# Patient Record
Sex: MALE | Race: WHITE | NOT HISPANIC OR LATINO | Employment: FULL TIME | URBAN - METROPOLITAN AREA
[De-identification: names, ages, dates, MRNs, and addresses within clinical notes are randomized per-mention and may not be internally consistent; named-entity substitution may affect disease eponyms.]

---

## 2017-02-02 ENCOUNTER — LAB (OUTPATIENT)
Dept: LAB | Facility: HOSPITAL | Age: 72
End: 2017-02-02

## 2017-02-02 ENCOUNTER — OFFICE VISIT (OUTPATIENT)
Dept: ONCOLOGY | Facility: CLINIC | Age: 72
End: 2017-02-02

## 2017-02-02 VITALS
HEIGHT: 69 IN | TEMPERATURE: 98 F | SYSTOLIC BLOOD PRESSURE: 192 MMHG | HEART RATE: 67 BPM | BODY MASS INDEX: 25.45 KG/M2 | WEIGHT: 171.8 LBS | DIASTOLIC BLOOD PRESSURE: 98 MMHG | OXYGEN SATURATION: 98 % | RESPIRATION RATE: 14 BRPM

## 2017-02-02 DIAGNOSIS — C91.40: ICD-10-CM

## 2017-02-02 DIAGNOSIS — C91.41 HAIRY CELL LEUKEMIA, IN REMISSION (HCC): Primary | ICD-10-CM

## 2017-02-02 LAB
BASOPHILS # BLD AUTO: 0.01 10*3/MM3 (ref 0–0.1)
BASOPHILS NFR BLD AUTO: 0.2 % (ref 0–1.1)
DEPRECATED RDW RBC AUTO: 39.8 FL (ref 37–49)
EOSINOPHIL # BLD AUTO: 0.05 10*3/MM3 (ref 0–0.36)
EOSINOPHIL NFR BLD AUTO: 0.9 % (ref 1–5)
ERYTHROCYTE [DISTWIDTH] IN BLOOD BY AUTOMATED COUNT: 12.1 % (ref 11.7–14.5)
HCT VFR BLD AUTO: 40.5 % (ref 40–49)
HGB BLD-MCNC: 14.8 G/DL (ref 13.5–16.5)
IMM GRANULOCYTES # BLD: 0.04 10*3/MM3 (ref 0–0.03)
IMM GRANULOCYTES NFR BLD: 0.7 % (ref 0–0.5)
LYMPHOCYTES # BLD AUTO: 2.1 10*3/MM3 (ref 1–3.5)
LYMPHOCYTES NFR BLD AUTO: 37.8 % (ref 20–49)
MCH RBC QN AUTO: 33.1 PG (ref 27–33)
MCHC RBC AUTO-ENTMCNC: 36.5 G/DL (ref 32–35)
MCV RBC AUTO: 90.6 FL (ref 83–97)
MONOCYTES # BLD AUTO: 0.38 10*3/MM3 (ref 0.25–0.8)
MONOCYTES NFR BLD AUTO: 6.8 % (ref 4–12)
NEUTROPHILS # BLD AUTO: 2.97 10*3/MM3 (ref 1.5–7)
NEUTROPHILS NFR BLD AUTO: 53.6 % (ref 39–75)
NRBC BLD MANUAL-RTO: 0 /100 WBC (ref 0–0)
PLATELET # BLD AUTO: 104 10*3/MM3 (ref 150–375)
PMV BLD AUTO: 11.9 FL (ref 8.9–12.1)
RBC # BLD AUTO: 4.47 10*6/MM3 (ref 4.3–5.5)
WBC NRBC COR # BLD: 5.55 10*3/MM3 (ref 4–10)

## 2017-02-02 PROCEDURE — 85025 COMPLETE CBC W/AUTO DIFF WBC: CPT

## 2017-02-02 PROCEDURE — 36416 COLLJ CAPILLARY BLOOD SPEC: CPT

## 2017-02-02 PROCEDURE — 99213 OFFICE O/P EST LOW 20 MIN: CPT | Performed by: INTERNAL MEDICINE

## 2017-02-02 NOTE — PROGRESS NOTES
Subjective .  Patient generally feels well, describes recent prostate biopsy that was negative though with subsequent bleeding post procedure that has abated.    REASONS FOR FOLLOWUP:   1. Hairy cell leukemia.   2. Admission 05/11/2012 through 05/17/12 for 7 day continuous infusion cladribine (2-CdA) without complication.   3. Patient seen 5 months post treatment in complete remission.   4. Patient seen 8 months post treatment with continued complete remission, every 3 month reassessment per CBC planned, 6 month review by MD scheduled.   5. Patient seen at 15 months without evidence of recurrent disease, every 6 month followup planned.   6. The patient was seen 02/05/2014 with no evidence of recurrent disease, 6-month followup planned.   7. Patient seen 07/31/2014, stable with no evidence of recurrent disease, every 6 month followup.   8. Patient seen 01/15/2015, stable without recurrent disease, 6-month CBC planned, yearly followup scheduled.   9. Patient seen 02/04/2016, stable, ?early peripheral neuropathy developing distally per feet, no evidence for recurrent disease per hairy cell leukemia.  10. Patient reviewed September 02, 2016, previous July CBC with mild thrombocytopenia developing, recheck September 2 normalized, every 6 month follow-ups anticipated  11. Patient reviewed February 02, 2017, status post negative prostate biopsy, mild thrombocytopenia again recognized        HISTORY OF PRESENT ILLNESS:  The patient is a 71 y.o. year old male who is here for follow-up with the above-mentioned history.    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 He is usually followed by Dr. Vuong for a history of hypertension, hyperlipidemia, minimal right carotid plaque as well as elevated PSA.       He had exam done on 1-09-12 for routine followup. This included normal serum chemistry including LFTs, cholesterol  131, triglyceride 34, HDL 51, and LDL 73. CBC revealed H&H 13.3, 37.9%, WBC 3300, platelet count 110,000, 26% polys and 74% lymphs with A NC of 0.9.       Dr. Remy provides information today with studies from as far back as 1- at which time hemoglobin and hematocrit was 14.4 and 42.1%, WBC 6950, platelets 227,000 with 47% polys, 42% lymph; this was automated. The additional test includ es approximately every year to every other year thereafter though it was noted in January 2010 WBC dropped from an average of approximately 5,000-6,000 to 3600. Hemoglobin and hematocrit 13.6 and 39.7%. Platelets 150,000-170,000 with ANC beginning in Ja nuary 2010 at 1900.       We were contacted per the above findings and asked Dr. Remy to undergo further assessment including flow cytometric exam per peripheral blood. This revealed no evidence of abnormal myeloid maturation increased blast population. No evidence of lymphopro l iferative disorder. The patient was asked to be seen formally in the office now and comes in today for further assessment. Dr. Remy indicates that he has taken a number of medications in an attempt to improve his health. Several homeopathic medication s include vitamin C, folate, flaxseed oil, fish oil, vitamin E, selenium, pomegranate tabs, Co-enzyme Q, vitamin D, niacin, and red yeast rice daily. He has discontinued these and stays on those described below.       He feels well with no additional problem s and states that he has no little or no symptoms that he can detail, whether this is just ill health in anyway, though he does have occasional hemorrhoidal pain. He also notes rare palpitations and occasional epistaxis when the ambient air has dried sub stantially. No fevers, chills, weight loss, night sweats, or other constitutional symptoms.       As a result of the above, the patient was asked to undergo a series of studies. This went onto include rheumatoid factor of 6, MARYURI screen negative,  negative s misael protein electrophoresis, normal quantitative immunoglobulins, CMV IgG of 2.9, IgM less than .9, EBV IgM of less than .9 and BCA IgG antibody of 4.6. These are consistent with previous exposures. HIV was negative, CRSP less than .1, sedimentation ra t e of 4, iron 125, TIBC 297, 14% saturation, and ferritin of 77. Peripheral blood flow cytometry was negative for evidence of abnormal myeloid maturation, increased blast population or lymphoproliferative disorder. As the patient is seen back today, he i s continuing to feel well though is obviously greatly concerned about his followup counts. Reviews in the office had been planned to follow him briefly recheck performance 3/29/12 with an H&H of 12.9, 36.1, WBC 3900, 32 polys, 62 lymphs, platelet count 94 , 000, IPF slightly elevated at 8.8. The patient was therefore asked to return and to be further assessed with bone marrow aspirate and biopsy. He now presents to do so. He has had no additional symptoms since last seen. He has discontinued his Crestor use.       As a result of his review the patient underwent bone marrow aspiration and biopsy. This revealed evidence of lymphocytic infiltrate reviewed here in the office. 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 Eventually cytogenetics was found to be negative with no evidence of cyclin-D1/IgH or BCL-2/IgH rearrangement. Additional tissue was requested. As a result, the patient was notifi e d of these findings by personal visit, and plans were made to proceed with endoscopy, ? mantle cell involvement. At the time of this dictation, this was not yet performed. He was also asked to undergo CT of the neck, chest, abdomen and pelvis which show ed no evidence of abnormality. PET scan 04/13/12 showed increased activity within the marrow, but no additional abnormalities including the spleen, with  no background bowel or gastric activity as well.       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 hairy cell leukemia. This has been discussed in great detail with the alexander thakur at [employer] and additional stains are pending as is the patient's GI assessment now to be pursued through Dr. Ray.       His case was again reviewed over quite a period of time, and ultimately his marrow was signed out as 80% involved with hairy cell leukemia - normal cytogenetics, no evidence of cyclin D1/IgH or BCL-2/IgH rearrangement. Dr. Remy was advised of the treatment options which include followup with no immediate therapy, and/or the use of cladribine or pentostatin. After numerous di s cussion, he ultimately elected to try to proceed with treatment when he is feeling as well as he is to improve his ability to withstand the therapy itself. We therefore began to discuss the treatment schedule, which also could be somewhat variable, inclu d ing 7-day infusions, 2-hr infusion q.d. x five days or every other week infusion. After discussion he wishes to proceed with 7-day infusion. When we attempted to do this as an outpatient we found out thereafter that this would not be covered as an outpa tient infusion through his insurance. Therefore we have made plans for him to be admitted after he is seen in office 05/10/12.       The patient was thereafter admitted 05/11-05/17/12. PICC line was placed and he initiated treatment at 0.1 mg/kg or 7.7 mg IV in fusion over seven days. The patient fortunately had no serious issues at all during the seven days and continued to work out on a daily basis. He was seen by his internist as well with some of his meds adjusted including his HCTZ and Ziac. He was stabl e at discharge, but was given Neulasta 6 mg  subcutaneously 24 hours after he completed treatment. He has been having counts done on a regular basis, and returns back today with only minimal evidence of neutropenia at his becca, and has an excellent periph eral smear today - normal findings. His performance status remains excellent as well.       The patient when seen on 6/7/12 was felt to have improved substantially. We followed him for weekly counts and plans at that point were for him to take a trip out of the country. He did actually take this trip, which had him eventually hiking at 11-12,000 feet without any complication or ill effect. As he returns back today on 7/19 he feels well and again with an excellent performance status. He has had no fever, c hills or suggestion of infection or complications thus far. He remains again, with an excellent performance status.       The patient thereafter is asked to have his counts done on an every six weeks basis, now seen three months later with a normal CBC as well as examination. He again feels quite well with a completely normal performance status.       The patient was asked to return for followup studies that included normal serum chemistries, unchanged lipid profile and stable hematologic findings. He is now seen on 2/7/13, 4 months from his last appointment. He continues to do well with unchanged performance status.       The patient was asked to have counts done q3 months seeing the physician in six months. He is now seen back 08/22/2013 feeling well [place]. He had no additional fevers, sweats, chills, significant change in weight although he has tried to lose some additional pounds and we see this today. He continues to exercise regularl y and dietary program. Review of his smears again continues to demonstrate a complete remission.       Today, the patient was asked to be seen back in 6 months for followup, and is now seen  02/05/2014, continuing to feel well. Again, review of his smears and physical examination fortunately demonstrates no evidence of recurrent disease.       The patient was asked to be seen back in 6 months for followup and is now seen on 07/31/2014. Again he feels well with an excellent performance status and no change since last reviewed.       The patient thereafter was now seen back 2015, 2016 stable at both visits with no evidence of recurrence of his hairy cell leukemia. He feels well but when now reviewed 02/04/2016, he has had some degree of peripheral neuropathy? This improv es with activity and it is certainly not limiting his action or his function.   Patient now reviewed back again September 02, 2016, continues to have an excellent performance status and hematologically remain stable brother had some concern about thrombocytopenia last visit.    The patient is next seen February 02, 2017.  He has not had any medical issues since last seen and remains physically quite well.  He does describe following with urology and undergoing a repeat biopsy recently when his PSA was above 7.  His findings evidently were negative for malignancy though he did have post procedure hematuria.  This is also now abated.    Past Medical History   Diagnosis Date   • H/O Elevated PSA    • H/O Internal thrombosed hemorrhoids    • H/O minimal right carotid plaque    • H/O peripheral neuropathy    • Hairy cell leukemia      H/O   • Hyperlipidemia    • Hypertension        ONCOLOGIC HISTORY:  (History from previous dates can be found in the separate document.)    Current Outpatient Prescriptions on File Prior to Visit   Medication Sig Dispense Refill   • acyclovir (ZOVIRAX) 400 MG tablet Take 1 tablet by mouth 2 (two) times a day. Take no more than 5 doses a day. 60 tablet 2   • propafenone (RHTHYMOL) 150 MG tablet Take 1 tablet by mouth 3 (three) times a day.     • [DISCONTINUED] acyclovir (ZOVIRAX) 400 MG tablet TAKE ONE TABLET BY  "MOUTH TWICE A DAY 60 tablet 1     No current facility-administered medications on file prior to visit.        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   • Alcohol use Yes      Comment: 1 glass of wine or beer per day   • Drug use: Not on file   • Sexual activity: Not on file     Other Topics Concern   • Not on file     Social History Narrative         Cancer-related family history includes Cancer in his father; Cancer (age of onset: 90) in his mother.     Review of Systems  A comprehensive 14 point review of systems was performed and was negative except as mentioned.    Objective      Vitals:    02/02/17 1241   BP: (!) 192/98   Pulse: 67   Resp: 14   Temp: 98 °F (36.7 °C)   TempSrc: Oral   SpO2: 98%   Weight: 171 lb 12.8 oz (77.9 kg)   Height: 69.09\" (175.5 cm)   PainSc: 0-No pain     Current Status 2/2/2017   ECOG score 0       Physical Exam      GENERAL: Well-developed, well-nourished male in no acute distress.   SKIN: Warm, dry without rashes, purpura or petechiae.   HEAD: Normocephalic.   EYES: Pupils equal, round and reactive to light. EOMs intact. Conjunctivae normal.   EARS: Hearing intact.   NOSE: Septum midline. No excoriations or nasal discharge.   MOUTH: Tongue is well-papillated; no stomatitis or ulcers. Lips normal.   THROAT: Oropharynx without lesions or exudates.   NECK: Supple with good range of motion; no thyromegaly or masses, no JVD or bruits.   LYMPHATICS: No cervical, supraclavicular, axillary or inguinal adenopathy.   CHEST: Lungs clear to percussion and auscultation.   CARDIAC: Regular rate and rhythm without murmurs, rubs or gallops. "   ABDOMEN: Soft, nontender with no organomegaly or masses.   EXTREMITIES: No clubbing, cyanosis or edema.   NEUROLOGICAL: No focal neurological deficits.       RECENT LABS:  Hematology WBC   Date Value Ref Range Status   02/02/2017 5.55 4.00 - 10.00 10*3/mm3 Final     RBC   Date Value Ref Range Status   02/02/2017 4.47 4.30 - 5.50 10*6/mm3 Final     HEMOGLOBIN   Date Value Ref Range Status   02/02/2017 14.8 13.5 - 16.5 g/dL Final     HEMATOCRIT   Date Value Ref Range Status   02/02/2017 40.5 40.0 - 49.0 % Final     PLATELETS   Date Value Ref Range Status   02/02/2017 104 (L) 150 - 375 10*3/mm3 Final        Assessment/Plan     A 71-year-old male with the diagnosis of hairy cell leukemia in April 2012. He was treated 5/11-5/17/ 12 with cladribine being given as continuous infusion at 0.1 mg/kg or 7.7 mg/24-hours x7 days. He did well during hospitalization with little toxicity and minimal myelosuppression. He has gone onto obtain a complete remission and this continues to be th e case at a 6 month visit now 07/31/2014. The patient has been now assessed on a regular basis every 6 months with no evidence of hematologic deterioration. As he is seen today, 02/04/2016, and now again September 02, 2016 he remains clinically stable as well.      He is now assessed February 2 and overall doing well without any additional issues except for the prostate described as above.  He is now recognized there is also possibly hypertensive and I'll contact him about this is afternoon though during our conversation he had few if any symptoms.?.  Pending our review will ask him to have a repeat CBC at 3 months to see that physician in 6 months.              The patient indicates when called about his blood pressure that he often experiences white coat hypertension.  He is going to recheck his blood pressure night and notify me if it remains elevated.        Cc:  Angel Remy MD

## 2017-03-31 ENCOUNTER — TRANSCRIBE ORDERS (OUTPATIENT)
Dept: ADMINISTRATIVE | Facility: HOSPITAL | Age: 72
End: 2017-03-31

## 2017-03-31 DIAGNOSIS — I10 ESSENTIAL HYPERTENSION: Primary | ICD-10-CM

## 2017-04-06 ENCOUNTER — HOSPITAL ENCOUNTER (OUTPATIENT)
Dept: CARDIOLOGY | Facility: HOSPITAL | Age: 72
Discharge: HOME | End: 2017-04-06
Attending: INTERNAL MEDICINE | Admitting: INTERNAL MEDICINE

## 2017-04-06 DIAGNOSIS — I10 ESSENTIAL HYPERTENSION: ICD-10-CM

## 2017-04-06 LAB
BH CV ECHO MEAS - DIST REN A EDV LEFT: 23.3 CM/SEC
BH CV ECHO MEAS - DIST REN A PSV LEFT: 80.4 CM/SEC
BH CV ECHO MEAS - DIST REN A RI LEFT: 0.71
BH CV ECHO MEAS - MID REN A EDV LEFT: 29.8 CM/SEC
BH CV ECHO MEAS - MID REN A PSV LEFT: 123 CM/SEC
BH CV ECHO MEAS - MID REN A RI LEFT: 0.76
BH CV ECHO MEAS - PROX REN A EDV LEFT: 40.9 CM/SEC
BH CV ECHO MEAS - PROX REN A PSV LEFT: 177 CM/SEC
BH CV ECHO MEAS - PROX REN A RI LEFT: 0.78
BH CV VAS RENAL AORTIC MID EDV: 15.5 CM/S
BH CV VAS RENAL AORTIC MID PSV: 101 CM/S
BH CV VAS RENAL HILUM LEFT EDV: 17.7 CM/S
BH CV VAS RENAL HILUM LEFT PSV: 66 CM/S
BH CV VAS RENAL HILUM RIGHT EDV: 16.5 CM/S
BH CV VAS RENAL HILUM RIGHT PSV: 58.1 CM/S
BH CV XLRA MEAS - KID L LEFT: 10.7 CM
BH CV XLRA MEAS DIST REN A EDV RIGHT: 31 CM/SEC
BH CV XLRA MEAS DIST REN A PSV RIGHT: 126 CM/SEC
BH CV XLRA MEAS DIST REN A RI RIGHT: 0.75
BH CV XLRA MEAS KID L RIGHT: 9.71 CM
BH CV XLRA MEAS KID W RIGHT: 4.54 CM
BH CV XLRA MEAS MID REN A EDV RIGHT: 27.9 CM/SEC
BH CV XLRA MEAS MID REN A PSV RIGHT: 108 CM/SEC
BH CV XLRA MEAS MID REN A RI RIGHT: 0.74
BH CV XLRA MEAS PROX REN A EDV RIGHT: 25.2 CM/SEC
BH CV XLRA MEAS PROX REN A PSV RIGHT: 110 CM/SEC
BH CV XLRA MEAS PROX REN A RI RIGHT: 0.77
BH CV XLRA MEAS RAR LEFT: 1.75
BH CV XLRA MEAS RAR RIGHT: 1.24
BH CV XLRA MEAS RENAL A ORG EDV RIGHT: 19.4 CM/S
BH CV XLRA MEAS RENAL A ORG PSV RIGHT: 74.3 CM/S
LEFT KIDNEY WIDTH: 5.58 CM
LEFT RENAL UPPER PARENCHYMA MAX: 21.7 CM/S
LEFT RENAL UPPER PARENCHYMA MIN: 7.33 CM/S
LEFT RENAL UPPER PARENCHYMA RI: 0.66
RIGHT RENAL UPPER PARENCHYMA MAX: 25.4 CM/S
RIGHT RENAL UPPER PARENCHYMA MIN: 6.11 CM/S
RIGHT RENAL UPPER PARENCHYMA RI: 0.76

## 2017-04-06 PROCEDURE — 93975 VASCULAR STUDY: CPT

## 2017-04-27 ENCOUNTER — LAB (OUTPATIENT)
Dept: LAB | Facility: HOSPITAL | Age: 72
End: 2017-04-27

## 2017-04-27 ENCOUNTER — CLINICAL SUPPORT (OUTPATIENT)
Dept: ONCOLOGY | Facility: HOSPITAL | Age: 72
End: 2017-04-27

## 2017-04-27 DIAGNOSIS — C91.41 HAIRY CELL LEUKEMIA, IN REMISSION (HCC): ICD-10-CM

## 2017-04-27 LAB
BASOPHILS # BLD AUTO: 0.01 10*3/MM3 (ref 0–0.1)
BASOPHILS NFR BLD AUTO: 0.2 % (ref 0–1.1)
DEPRECATED RDW RBC AUTO: 40.4 FL (ref 37–49)
EOSINOPHIL # BLD AUTO: 0.05 10*3/MM3 (ref 0–0.36)
EOSINOPHIL NFR BLD AUTO: 1 % (ref 1–5)
ERYTHROCYTE [DISTWIDTH] IN BLOOD BY AUTOMATED COUNT: 12 % (ref 11.7–14.5)
HCT VFR BLD AUTO: 40.2 % (ref 40–49)
HGB BLD-MCNC: 14.6 G/DL (ref 13.5–16.5)
IMM GRANULOCYTES # BLD: 0.02 10*3/MM3 (ref 0–0.03)
IMM GRANULOCYTES NFR BLD: 0.4 % (ref 0–0.5)
LYMPHOCYTES # BLD AUTO: 2.11 10*3/MM3 (ref 1–3.5)
LYMPHOCYTES NFR BLD AUTO: 40.6 % (ref 20–49)
MCH RBC QN AUTO: 33.3 PG (ref 27–33)
MCHC RBC AUTO-ENTMCNC: 36.3 G/DL (ref 32–35)
MCV RBC AUTO: 91.6 FL (ref 83–97)
MONOCYTES # BLD AUTO: 0.39 10*3/MM3 (ref 0.25–0.8)
MONOCYTES NFR BLD AUTO: 7.5 % (ref 4–12)
NEUTROPHILS # BLD AUTO: 2.62 10*3/MM3 (ref 1.5–7)
NEUTROPHILS NFR BLD AUTO: 50.3 % (ref 39–75)
NRBC BLD MANUAL-RTO: 0 /100 WBC (ref 0–0)
PLATELET # BLD AUTO: 167 10*3/MM3 (ref 150–375)
PMV BLD AUTO: 10.5 FL (ref 8.9–12.1)
RBC # BLD AUTO: 4.39 10*6/MM3 (ref 4.3–5.5)
WBC NRBC COR # BLD: 5.2 10*3/MM3 (ref 4–10)

## 2017-04-27 PROCEDURE — 85025 COMPLETE CBC W/AUTO DIFF WBC: CPT

## 2017-04-27 PROCEDURE — 36416 COLLJ CAPILLARY BLOOD SPEC: CPT

## 2017-04-27 NOTE — PROGRESS NOTES
CALL PLACED TO PATIENT.  PATIENT DID NOT STAY FOR RN REVIEW.  INFORMED THAT HIS CBC VALUES WERE ALL WNL.  EACH COMPONENT REVIEWED.  PATIENT APPRECIATIVE OF CALL AND WAS INSTRUCTED TO KEEP APPT IN 3 MONTHS WITH MD AND TO CALL FOR NEW QUESTIONS OR CONCERNS.

## 2017-07-28 ENCOUNTER — OFFICE VISIT (OUTPATIENT)
Dept: ONCOLOGY | Facility: CLINIC | Age: 72
End: 2017-07-28

## 2017-07-28 ENCOUNTER — LAB (OUTPATIENT)
Dept: LAB | Facility: HOSPITAL | Age: 72
End: 2017-07-28

## 2017-07-28 VITALS
HEART RATE: 67 BPM | BODY MASS INDEX: 25.18 KG/M2 | OXYGEN SATURATION: 99 % | SYSTOLIC BLOOD PRESSURE: 164 MMHG | WEIGHT: 170 LBS | HEIGHT: 69 IN | RESPIRATION RATE: 12 BRPM | DIASTOLIC BLOOD PRESSURE: 78 MMHG | TEMPERATURE: 98.2 F

## 2017-07-28 DIAGNOSIS — C91.41 HAIRY CELL LEUKEMIA, IN REMISSION (HCC): ICD-10-CM

## 2017-07-28 DIAGNOSIS — C91.41 HAIRY CELL LEUKEMIA, IN REMISSION (HCC): Primary | ICD-10-CM

## 2017-07-28 LAB
BASOPHILS # BLD AUTO: 0.02 10*3/MM3 (ref 0–0.1)
BASOPHILS NFR BLD AUTO: 0.4 % (ref 0–1.1)
DEPRECATED RDW RBC AUTO: 41.6 FL (ref 37–49)
EOSINOPHIL # BLD AUTO: 0.13 10*3/MM3 (ref 0–0.36)
EOSINOPHIL NFR BLD AUTO: 2.3 % (ref 1–5)
ERYTHROCYTE [DISTWIDTH] IN BLOOD BY AUTOMATED COUNT: 12.2 % (ref 11.7–14.5)
HCT VFR BLD AUTO: 41.2 % (ref 40–49)
HGB BLD-MCNC: 15 G/DL (ref 13.5–16.5)
IMM GRANULOCYTES # BLD: 0.03 10*3/MM3 (ref 0–0.03)
IMM GRANULOCYTES NFR BLD: 0.5 % (ref 0–0.5)
LYMPHOCYTES # BLD AUTO: 2.2 10*3/MM3 (ref 1–3.5)
LYMPHOCYTES NFR BLD AUTO: 39.1 % (ref 20–49)
MCH RBC QN AUTO: 33.7 PG (ref 27–33)
MCHC RBC AUTO-ENTMCNC: 36.4 G/DL (ref 32–35)
MCV RBC AUTO: 92.6 FL (ref 83–97)
MONOCYTES # BLD AUTO: 0.4 10*3/MM3 (ref 0.25–0.8)
MONOCYTES NFR BLD AUTO: 7.1 % (ref 4–12)
NEUTROPHILS # BLD AUTO: 2.84 10*3/MM3 (ref 1.5–7)
NEUTROPHILS NFR BLD AUTO: 50.6 % (ref 39–75)
NRBC BLD MANUAL-RTO: 0 /100 WBC (ref 0–0)
PLATELET # BLD AUTO: 125 10*3/MM3 (ref 150–375)
PMV BLD AUTO: 11 FL (ref 8.9–12.1)
RBC # BLD AUTO: 4.45 10*6/MM3 (ref 4.3–5.5)
WBC NRBC COR # BLD: 5.62 10*3/MM3 (ref 4–10)

## 2017-07-28 PROCEDURE — 36416 COLLJ CAPILLARY BLOOD SPEC: CPT | Performed by: INTERNAL MEDICINE

## 2017-07-28 PROCEDURE — 99213 OFFICE O/P EST LOW 20 MIN: CPT | Performed by: INTERNAL MEDICINE

## 2017-07-28 PROCEDURE — 85025 COMPLETE CBC W/AUTO DIFF WBC: CPT | Performed by: INTERNAL MEDICINE

## 2017-07-28 RX ORDER — BISOPROLOL FUMARATE AND HYDROCHLOROTHIAZIDE 10; 6.25 MG/1; MG/1
1 TABLET ORAL DAILY
COMMUNITY

## 2017-07-28 RX ORDER — AMLODIPINE BESYLATE 10 MG/1
10 TABLET ORAL DAILY
COMMUNITY
End: 2021-01-21

## 2017-07-28 RX ORDER — ASPIRIN 81 MG/1
81 TABLET, CHEWABLE ORAL DAILY
COMMUNITY
End: 2022-06-23 | Stop reason: HOSPADM

## 2017-07-28 RX ORDER — ROSUVASTATIN CALCIUM 10 MG/1
10 TABLET, COATED ORAL DAILY
COMMUNITY

## 2017-07-28 NOTE — PROGRESS NOTES
Subjective .  Patient generally feels well    REASONS FOR FOLLOWUP:   1. Hairy cell leukemia.   2. Admission 05/11/2012 through 05/17/12 for 7 day continuous infusion cladribine (2-CdA) without complication.   3. Patient seen 5 months post treatment in complete remission.   4. Patient seen 8 months post treatment with continued complete remission, every 3 month reassessment per CBC planned, 6 month review by MD scheduled.   5. Patient seen at 15 months without evidence of recurrent disease, every 6 month followup planned.   6. The patient was seen 02/05/2014 with no evidence of recurrent disease, 6-month followup planned.   7. Patient seen 07/31/2014, stable with no evidence of recurrent disease, every 6 month followup.   8. Patient seen 01/15/2015, stable without recurrent disease, 6-month CBC planned, yearly followup scheduled.   9. Patient seen 02/04/2016, stable, ?early peripheral neuropathy developing distally per feet, no evidence for recurrent disease per hairy cell leukemia.  10. Patient reviewed September 02, 2016, previous July CBC with mild thrombocytopenia developing, recheck September 2 normalized, every 6 month follow-ups anticipated  11. Patient reviewed February 02, 2017, status post negative prostate biopsy, mild thrombocytopenia again recognized  12. Patient reviewed July 20, 2017, hematologically stable, every 6 month follow-up CBCs planned, yearly M.D. assessment        HISTORY OF PRESENT ILLNESS:  The patient is a 71 y.o. year old male who is here for follow-up with the above-mentioned history.    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 He is usually followed by Dr. Vuong for a history of hypertension, hyperlipidemia, minimal right carotid plaque as well as elevated PSA.       He had exam done on 1-09-12 for routine followup. This included normal serum chemistry including LFTs,  cholesterol 131, triglyceride 34, HDL 51, and LDL 73. CBC revealed H&H 13.3, 37.9%, WBC 3300, platelet count 110,000, 26% polys and 74% lymphs with A NC of 0.9.       Dr. Remy provides information today with studies from as far back as 1- at which time hemoglobin and hematocrit was 14.4 and 42.1%, WBC 6950, platelets 227,000 with 47% polys, 42% lymph; this was automated. The additional test includ es approximately every year to every other year thereafter though it was noted in January 2010 WBC dropped from an average of approximately 5,000-6,000 to 3600. Hemoglobin and hematocrit 13.6 and 39.7%. Platelets 150,000-170,000 [facility]       We were contacted per the above findings and asked Dr. Remy to undergo further assessment including flow cytometric exam per peripheral blood. This revealed no evidence of abnormal myeloid maturation increased blast population. No evidence of lymphopro l iferative disorder. The patient was asked to be seen formally in the office now and comes in today for further assessment. Dr. Remy indicates that he has taken a number of medications in an attempt to improve his health. Several homeopathic medication s include vitamin C, folate, flaxseed oil, fish oil, vitamin E, selenium, pomegranate tabs, Co-enzyme Q, vitamin D, niacin, and red yeast rice daily. He has discontinued these and stays on those described below.       He feels well with no additional problem s and states that he has no little or no symptoms that he can detail, whether this is just ill health in anyway, though he does have occasional hemorrhoidal pain. He also notes rare palpitations and occasional epistaxis when the ambient air has dried sub stantially. No fevers, chills, weight loss, night sweats, or other constitutional symptoms.       As a result of the above, the patient was asked to undergo a series of studies. This went onto include rheumatoid factor of 6, MARYURI screen  negative, negative s misael protein electrophoresis, normal quantitative immunoglobulins, CMV IgG of 2.9, IgM less than .9, EBV IgM of less than .9 and BCA IgG antibody of 4.6. These are consistent with previous exposures. HIV was negative, CRSP less than .1, sedimentation ra t e of 4, iron 125, TIBC 297, 14% saturation, and ferritin of 77. Peripheral blood flow cytometry was negative for evidence of abnormal myeloid maturation, increased blast population or lymphoproliferative disorder. As the patient is seen back today, he i s continuing to feel well though is obviously greatly concerned about his followup counts. Reviews in the office had been planned to follow him briefly recheck performance 3/29/12 with an H&H of 12.9, 36.1, WBC 3900, 32 polys, 62 lymphs, platelet count 94 , 000, IPF slightly elevated at 8.8. The patient was therefore asked to return and to be further assessed with bone marrow aspirate and biopsy. He now presents to do so. He has had no additional symptoms since last seen. He has discontinued his Crestor use.       As a result of his review the patient underwent bone marrow aspiration and biopsy. This revealed evidence of lymphocytic infiltrate reviewed here in the office. 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 Eventually cytogenetics was found to be negative with no evidence of cyclin-D1/IgH or BCL-2/IgH rearrangement. Additional tissue was requested. As a result, the patient was notifi e edward of these findings by personal visit, and plans were made to proceed with endoscopy, ? mantle cell involvement. At the time of this dictation, this was not yet performed. He was also asked to undergo CT of the neck, chest, abdomen and pelvis which show ed no evidence of abnormality. PET scan 04/13/12 showed increased activity within the marrow, but no additional abnormalities including the  spleen, with no background bowel or gastric activity as well.       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 hairy cell leukemia. This has been discussed in great detail with the alexander thakur at [facility] and additional stains are pending as is the patient's GI assessment now to be pursued through Dr. Ray.       His case was again reviewed over quite a period of time, and ultimately his marrow was signed out as 80% involved with hairy cell leukemia - normal cytogenetics, no evidence of cyclin D1/IgH or BCL-2/IgH rearrangement. Dr. Remy was advised of the treatment options which include followup with no immediate therapy, and/or the use of cladribine or pentostatin. After numerous di s cussion, he ultimately elected to try to proceed with treatment when he is feeling as well as he is to improve his ability to withstand the therapy itself. We therefore began to discuss the treatment schedule, which also could be somewhat variable, inclu d ing 7-day infusions, 2-hr infusion q.d. x five days or every other week infusion. After discussion he wishes to proceed with 7-day infusion. When we attempted to do this as an outpatient we found out thereafter that this would not be covered as an outpa tient infusion through his insurance. Therefore we have made plans for him to be admitted after he is seen in office 05/10/12.       The patient was thereafter admitted 05/11-05/17/12. PICC line was placed and he initiated treatment at 0.1 mg/kg or 7.7 mg IV in fusion over seven days. The patient fortunately had no serious issues at all during the seven days and continued to work out on a daily basis. He was seen by his internist as well with some of his meds adjusted including his HCTZ and Ziac. He was stabl e at discharge, but was given  Neulasta 6 mg subcutaneously 24 hours after he completed treatment. He has been having counts done on a regular basis, and returns back today with only minimal evidence of neutropenia at his becca, and has an excellent periph eral smear today - normal findings. His performance status remains excellent as well.       The patient when seen on 6/7/12 was felt to have improved substantially. We followed him for weekly counts and plans at that point were for him to take a trip out of the country. He did actually take this trip, which had him eventually hiking at 11-12,000 feet without any complication or ill effect. As he returns back today on 7/19 he feels well and again with an excellent performance status. He has had no fever, c hills or suggestion of infection or complications thus far. He remains again, with an excellent performance status.       The patient thereafter is asked to have his counts done on an every six weeks basis, now seen three months later with a normal CBC as well as examination. He again feels quite well with a completely normal performance status.       The patient was asked to return for followup studies that included normal serum chemistries, unchanged lipid profile and stable hematologic findings. He is now seen on 2/7/13, 4 months from his last appointment. He continues to do well with unchanged performance status.       The patient was asked to have counts done q3 months seeing the physician in six months. He is now seen back 08/22/2013 feeling well [place]. He had no additional fevers, sweats, chills, significant change in weight although he has tried to lose some additional pounds and we see this today. He continues to exercise regularl y and dietary program. Review of his smears again continues to demonstrate a complete remission.       Today, the patient was asked to be seen back in 6 months for followup, and is  now seen 02/05/2014, continuing to feel well. Again, review of his smears and physical examination fortunately demonstrates no evidence of recurrent disease.       The patient was asked to be seen back in 6 months for followup and is now seen on 07/31/2014. Again he feels well with an excellent performance status and no change since last reviewed.       The patient thereafter was now seen back 2015, 2016 stable at both visits with no evidence of recurrence of his hairy cell leukemia. He feels well but when now reviewed 02/04/2016, he has had some degree of peripheral neuropathy? This improv es with activity and it is certainly not limiting his action or his function.   Patient now reviewed back again September 02, 2016, continues to have an excellent performance status and hematologically remain stable brother had some concern about thrombocytopenia last visit.    The patient is next seen February 02, 2017.  He has not had any medical issues since last seen and remains physically quite well.  He does describe following with urology and undergoing a repeat biopsy recently when his PSA was above 7.  His findings evidently were negative for malignancy though he did have post procedure hematuria.  This is also now abated.  The patient is next seen July 28, 2017.  He continues to have an excellent performance status and no particular difficulty in day-to-day function and/or pursuing active vacations.      Past Medical History:   Diagnosis Date   • H/O Elevated PSA    • H/O Internal thrombosed hemorrhoids    • H/O minimal right carotid plaque    • H/O peripheral neuropathy    • Hairy cell leukemia     H/O   • Hyperlipidemia    • Hypertension        ONCOLOGIC HISTORY:  (History from previous dates can be found in the separate document.)    Current Outpatient Prescriptions on File Prior to Visit   Medication Sig Dispense Refill   • acyclovir (ZOVIRAX) 400 MG tablet Take 1 tablet by mouth 2 (two) times a day. Take no more  than 5 doses a day. 60 tablet 2   • propafenone (RHTHYMOL) 150 MG tablet Take 1 tablet by mouth 3 (three) times a day.       No current facility-administered medications on file prior to visit.        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   • Alcohol use Yes      Comment: 1 glass of wine or beer per day   • Drug use: Not on file   • Sexual activity: Not on file     Other Topics Concern   • Not on file     Social History Narrative         Cancer-related family history includes Cancer in his father; Cancer (age of onset: 90) in his mother.     Review of Systems  A comprehensive 14 point review of systems was performed and was negative except as mentioned.    Objective      There were no vitals filed for this visit.  Current Status 2/2/2017   ECOG score 0       Physical Exam      GENERAL: Well-developed, well-nourished male in no acute distress.   SKIN: Warm, dry without rashes, purpura or petechiae.   HEAD: Normocephalic.   EYES: Pupils equal, round and reactive to light. EOMs intact. Conjunctivae normal.   EARS: Hearing intact.   NOSE: Septum midline. No excoriations or nasal discharge.   MOUTH: Tongue is well-papillated; no stomatitis or ulcers. Lips normal.   THROAT: Oropharynx without lesions or exudates.   NECK: Supple with good range of motion; no thyromegaly or masses, no JVD or bruits.   LYMPHATICS: No cervical, supraclavicular, axillary or inguinal adenopathy.   CHEST: Lungs clear to percussion and auscultation.   CARDIAC: Regular rate and rhythm without murmurs, rubs or gallops.   ABDOMEN: Soft, nontender with no organomegaly or masses.   EXTREMITIES: No  clubbing, cyanosis or edema.   NEUROLOGICAL: No focal neurological deficits.       RECENT LABS:  Hematology WBC   Date Value Ref Range Status   07/28/2017 5.62 4.00 - 10.00 10*3/mm3 Final     RBC   Date Value Ref Range Status   07/28/2017 4.45 4.30 - 5.50 10*6/mm3 Final     Hemoglobin   Date Value Ref Range Status   07/28/2017 15.0 13.5 - 16.5 g/dL Final     Hematocrit   Date Value Ref Range Status   07/28/2017 41.2 40.0 - 49.0 % Final     Platelets   Date Value Ref Range Status   07/28/2017 125 (L) 150 - 375 10*3/mm3 Final        Assessment/Plan     A 71-year-old male with the diagnosis of hairy cell leukemia in April 2012. He was treated 5/11-5/17/ 12 with cladribine being given as continuous infusion at 0.1 mg/kg or 7.7 mg/24-hours x7 days. He did well during hospitalization with little toxicity and minimal myelosuppression. He has gone onto obtain a complete remission and this continues to be th e case at a 6 month visit now 07/31/2014. The patient has been now assessed on a regular basis every 6 months with no evidence of hematologic deterioration. As he is seen today, 02/04/2016, and now again September 02, 2016 he remains clinically stable as well.      He is now assessed February 2 and overall doing well without any additional issues except for the prostate described as above.  He is now recognized there is also possibly hypertensive and I'll contact him about this is afternoon though during our conversation he had few if any symptoms.?.  Pending our review will ask him to have a repeat CBC at 3 months to see that physician in 6 months.    The patient indicates when called about his blood pressure that he often experiences white coat hypertension.  He is going to recheck his blood pressure night and notify me if it remains elevated.     He is next seen July 28, 2017, hematologically stable, remaining quite active.  Plan at this point to move to every 6 months CBC, MGtD. yearly.

## 2018-01-16 ENCOUNTER — LAB (OUTPATIENT)
Dept: LAB | Facility: HOSPITAL | Age: 73
End: 2018-01-16

## 2018-01-16 DIAGNOSIS — C91.41 HAIRY CELL LEUKEMIA, IN REMISSION (HCC): ICD-10-CM

## 2018-01-16 LAB
BASOPHILS # BLD AUTO: 0.02 10*3/MM3 (ref 0–0.1)
BASOPHILS NFR BLD AUTO: 0.3 % (ref 0–1.1)
DEPRECATED RDW RBC AUTO: 41.9 FL (ref 37–49)
EOSINOPHIL # BLD AUTO: 0.07 10*3/MM3 (ref 0–0.36)
EOSINOPHIL NFR BLD AUTO: 1.2 % (ref 1–5)
ERYTHROCYTE [DISTWIDTH] IN BLOOD BY AUTOMATED COUNT: 12.1 % (ref 11.7–14.5)
HCT VFR BLD AUTO: 41.6 % (ref 40–49)
HGB BLD-MCNC: 14.6 G/DL (ref 13.5–16.5)
IMM GRANULOCYTES # BLD: 0.06 10*3/MM3 (ref 0–0.03)
IMM GRANULOCYTES NFR BLD: 1 % (ref 0–0.5)
LYMPHOCYTES # BLD AUTO: 2.46 10*3/MM3 (ref 1–3.5)
LYMPHOCYTES NFR BLD AUTO: 40.7 % (ref 20–49)
MCH RBC QN AUTO: 33 PG (ref 27–33)
MCHC RBC AUTO-ENTMCNC: 35.1 G/DL (ref 32–35)
MCV RBC AUTO: 93.9 FL (ref 83–97)
MONOCYTES # BLD AUTO: 0.37 10*3/MM3 (ref 0.25–0.8)
MONOCYTES NFR BLD AUTO: 6.1 % (ref 4–12)
NEUTROPHILS # BLD AUTO: 3.07 10*3/MM3 (ref 1.5–7)
NEUTROPHILS NFR BLD AUTO: 50.7 % (ref 39–75)
NRBC BLD MANUAL-RTO: 0 /100 WBC (ref 0–0)
PLATELET # BLD AUTO: 157 10*3/MM3 (ref 150–375)
PMV BLD AUTO: 10.9 FL (ref 8.9–12.1)
RBC # BLD AUTO: 4.43 10*6/MM3 (ref 4.3–5.5)
WBC NRBC COR # BLD: 6.05 10*3/MM3 (ref 4–10)

## 2018-01-16 PROCEDURE — 36416 COLLJ CAPILLARY BLOOD SPEC: CPT | Performed by: INTERNAL MEDICINE

## 2018-01-16 PROCEDURE — 85025 COMPLETE CBC W/AUTO DIFF WBC: CPT | Performed by: INTERNAL MEDICINE

## 2018-01-18 ENCOUNTER — APPOINTMENT (OUTPATIENT)
Dept: ONCOLOGY | Facility: HOSPITAL | Age: 73
End: 2018-01-18

## 2018-01-18 ENCOUNTER — APPOINTMENT (OUTPATIENT)
Dept: LAB | Facility: HOSPITAL | Age: 73
End: 2018-01-18

## 2018-07-26 ENCOUNTER — LAB (OUTPATIENT)
Dept: LAB | Facility: HOSPITAL | Age: 73
End: 2018-07-26

## 2018-07-26 ENCOUNTER — OFFICE VISIT (OUTPATIENT)
Dept: ONCOLOGY | Facility: CLINIC | Age: 73
End: 2018-07-26

## 2018-07-26 ENCOUNTER — CLINICAL SUPPORT (OUTPATIENT)
Dept: CARDIOLOGY | Facility: CLINIC | Age: 73
End: 2018-07-26

## 2018-07-26 VITALS
HEART RATE: 58 BPM | RESPIRATION RATE: 16 BRPM | HEIGHT: 69 IN | SYSTOLIC BLOOD PRESSURE: 120 MMHG | OXYGEN SATURATION: 99 % | DIASTOLIC BLOOD PRESSURE: 82 MMHG | TEMPERATURE: 99 F | WEIGHT: 157.8 LBS | BODY MASS INDEX: 23.37 KG/M2

## 2018-07-26 DIAGNOSIS — I48.0 PAF (PAROXYSMAL ATRIAL FIBRILLATION) (HCC): Primary | ICD-10-CM

## 2018-07-26 DIAGNOSIS — C91.41 HAIRY CELL LEUKEMIA, IN REMISSION (HCC): Primary | ICD-10-CM

## 2018-07-26 DIAGNOSIS — C91.41 HAIRY CELL LEUKEMIA, IN REMISSION (HCC): ICD-10-CM

## 2018-07-26 LAB
ALBUMIN SERPL-MCNC: 4.1 G/DL (ref 3.5–5.2)
ALBUMIN/GLOB SERPL: 1.5 G/DL (ref 1.1–2.4)
ALP SERPL-CCNC: 51 U/L (ref 38–116)
ALT SERPL W P-5'-P-CCNC: 25 U/L (ref 0–41)
ANION GAP SERPL CALCULATED.3IONS-SCNC: 11.7 MMOL/L
AST SERPL-CCNC: 18 U/L (ref 0–40)
BASOPHILS # BLD AUTO: 0 10*3/MM3 (ref 0–0.1)
BASOPHILS NFR BLD AUTO: 0 % (ref 0–1.1)
BILIRUB SERPL-MCNC: 1.4 MG/DL (ref 0.1–1.2)
BUN BLD-MCNC: 15 MG/DL (ref 6–20)
BUN/CREAT SERPL: 18.8 (ref 7.3–30)
CALCIUM SPEC-SCNC: 9 MG/DL (ref 8.5–10.2)
CHLORIDE SERPL-SCNC: 90 MMOL/L (ref 98–107)
CO2 SERPL-SCNC: 28.3 MMOL/L (ref 22–29)
CREAT BLD-MCNC: 0.8 MG/DL (ref 0.7–1.3)
DEPRECATED RDW RBC AUTO: 41.4 FL (ref 37–49)
EOSINOPHIL # BLD AUTO: 0 10*3/MM3 (ref 0–0.36)
EOSINOPHIL NFR BLD AUTO: 0 % (ref 1–5)
ERYTHROCYTE [DISTWIDTH] IN BLOOD BY AUTOMATED COUNT: 12.1 % (ref 11.7–14.5)
GFR SERPL CREATININE-BSD FRML MDRD: 95 ML/MIN/1.73
GLOBULIN UR ELPH-MCNC: 2.7 GM/DL (ref 1.8–3.5)
GLUCOSE BLD-MCNC: 138 MG/DL (ref 74–124)
HCT VFR BLD AUTO: 36.9 % (ref 40–49)
HGB BLD-MCNC: 13.5 G/DL (ref 13.5–16.5)
IMM GRANULOCYTES # BLD: 0.01 10*3/MM3 (ref 0–0.03)
IMM GRANULOCYTES NFR BLD: 0.3 % (ref 0–0.5)
LYMPHOCYTES # BLD AUTO: 1.18 10*3/MM3 (ref 1–3.5)
LYMPHOCYTES NFR BLD AUTO: 32.6 % (ref 20–49)
MCH RBC QN AUTO: 34 PG (ref 27–33)
MCHC RBC AUTO-ENTMCNC: 36.6 G/DL (ref 32–35)
MCV RBC AUTO: 92.9 FL (ref 83–97)
MONOCYTES # BLD AUTO: 0.17 10*3/MM3 (ref 0.25–0.8)
MONOCYTES NFR BLD AUTO: 4.7 % (ref 4–12)
NEUTROPHILS # BLD AUTO: 2.26 10*3/MM3 (ref 1.5–7)
NEUTROPHILS NFR BLD AUTO: 62.4 % (ref 39–75)
NRBC BLD MANUAL-RTO: 0 /100 WBC (ref 0–0)
PLATELET # BLD AUTO: 183 10*3/MM3 (ref 150–375)
PMV BLD AUTO: 9.4 FL (ref 8.9–12.1)
POTASSIUM BLD-SCNC: 3.9 MMOL/L (ref 3.5–4.7)
PROT SERPL-MCNC: 6.8 G/DL (ref 6.3–8)
RBC # BLD AUTO: 3.97 10*6/MM3 (ref 4.3–5.5)
SODIUM BLD-SCNC: 130 MMOL/L (ref 134–145)
WBC NRBC COR # BLD: 3.62 10*3/MM3 (ref 4–10)

## 2018-07-26 PROCEDURE — 93000 ELECTROCARDIOGRAM COMPLETE: CPT | Performed by: INTERNAL MEDICINE

## 2018-07-26 PROCEDURE — 99213 OFFICE O/P EST LOW 20 MIN: CPT | Performed by: INTERNAL MEDICINE

## 2018-07-26 PROCEDURE — 80053 COMPREHEN METABOLIC PANEL: CPT | Performed by: INTERNAL MEDICINE

## 2018-07-26 PROCEDURE — 85025 COMPLETE CBC W/AUTO DIFF WBC: CPT | Performed by: INTERNAL MEDICINE

## 2018-07-26 PROCEDURE — 36415 COLL VENOUS BLD VENIPUNCTURE: CPT | Performed by: INTERNAL MEDICINE

## 2018-07-26 RX ORDER — RAMIPRIL 10 MG/1
10 CAPSULE ORAL DAILY
Refills: 2 | COMMUNITY
Start: 2018-07-22

## 2018-07-26 NOTE — PROGRESS NOTES
Procedure     ECG 12 Lead  Date/Time: 7/26/2018 4:32 PM  Performed by: ROSANA BROWN  Authorized by: ROSANA BROWN   Previous ECG: no previous ECG available  Rhythm: sinus rhythm  Clinical impression: normal ECG            Patient here for EKG complaining of PVC's.  EKG faxed to Dr. Brown at [facility].

## 2018-07-26 NOTE — PROGRESS NOTES
Subjective .  With recently severe cold symptoms-primarily cough and congestion after airplane travel    REASONS FOR FOLLOWUP:   1. Hairy cell leukemia.   2. Admission 05/11/2012 through 05/17/12 for 7 day continuous infusion cladribine (2-CdA) without complication.   3. Patient seen 5 months post treatment in complete remission.   4. Patient seen 8 months post treatment with continued complete remission, every 3 month reassessment per CBC planned, 6 month review by MD scheduled.   5. Patient seen at 15 months without evidence of recurrent disease, every 6 month followup planned.   6. The patient was seen 02/05/2014 with no evidence of recurrent disease, 6-month followup planned.   7. Patient seen 07/31/2014, stable with no evidence of recurrent disease, every 6 month followup.   8. Patient seen 01/15/2015, stable without recurrent disease, 6-month CBC planned, yearly followup scheduled.   9. Patient seen 02/04/2016, stable, ?early peripheral neuropathy developing distally per feet, no evidence for recurrent disease per hairy cell leukemia.  10. Patient reviewed September 02, 2016, previous July CBC with mild thrombocytopenia developing, recheck September 2 normalized, every 6 month follow-ups anticipated  11. Patient reviewed February 02, 2017, status post negative prostate biopsy, mild thrombocytopenia again recognized  12. Patient reviewed July 20, 2017, hematologically stable, every 6 month follow-up CBCs planned, yearly M.D. Assessment  13. Patient seen July 26, 2018 after recent   airplane travel and development of cold symptoms.        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 He is usually followed by Dr. Vuong for a history of hypertension, hyperlipidemia, minimal right carotid plaque as well as elevated PSA.       He had exam done on 1-09-12 for routine followup. This included normal serum  chemistry including LFTs, cholesterol 131, triglyceride 34, HDL 51, and LDL 73. CBC revealed H&H 13.3, 37.9%, WBC 3300, platelet count 110,000, 26% polys and 74% lymphs with A NC of 0.9.       Dr. Remy provides information today with studies from as far back as 1- at which time hemoglobin and hematocrit was 14.4 and 42.1%, WBC 6950, platelets 227,000 with 47% polys, 42% lymph; this was automated. The additional test includ es approximately every year to every other year thereafter though it was noted in January 2010 WBC dropped from an average of approximately 5,000-6,000 to 3600. Hemoglobin and hematocrit 13.6 and 39.7%. Platelets 150,000-170,000 with ANC beginning in Ja nuary 2010 at 1900.       We were contacted per the above findings and asked Dr. Remy to undergo further assessment including flow cytometric exam per peripheral blood. This revealed no evidence of abnormal myeloid maturation increased blast population. No evidence of lymphopro l iferative disorder. The patient was asked to be seen formally in the office now and comes in today for further assessment. Dr. Remy indicates that he has taken a number of medications in an attempt to improve his health. Several homeopathic medication s include vitamin C, folate, flaxseed oil, fish oil, vitamin E, selenium, pomegranate tabs, Co-enzyme Q, vitamin D, niacin, and red yeast rice daily. He has discontinued these and stays on those described below.       He feels well with no additional problem s and states that he has no little or no symptoms that he can detail, whether this is just ill health in anyway, though he does have occasional hemorrhoidal pain. He also notes rare palpitations and occasional epistaxis when the ambient air has dried sub stantially. No fevers, chills, weight loss, night sweats, or other constitutional symptoms.       As a result of the above, the patient was asked to undergo a series of studies. This went onto include rheumatoid  factor of 6, MARYURI screen negative, negative s misael protein electrophoresis, normal quantitative immunoglobulins, CMV IgG of 2.9, IgM less than .9, EBV IgM of less than .9 and BCA IgG antibody of 4.6. These are consistent with previous exposures. HIV was negative, CRSP less than .1, sedimentation ra t e of 4, iron 125, TIBC 297, 14% saturation, and ferritin of 77. Peripheral blood flow cytometry was negative for evidence of abnormal myeloid maturation, increased blast population or lymphoproliferative disorder. As the patient is seen back today, he i s continuing to feel well though is obviously greatly concerned about his followup counts. Reviews in the office had been planned to follow him briefly recheck performance 3/29/12 with an H&H of 12.9, 36.1, WBC 3900, 32 polys, 62 lymphs, platelet count 94 , 000, IPF slightly elevated at 8.8. The patient was therefore asked to return and to be further assessed with bone marrow aspirate and biopsy. He now presents to do so. He has had no additional symptoms since last seen. He has discontinued his Crestor use.       As a result of his review the patient underwent bone marrow aspiration and biopsy. This revealed evidence of lymphocytic infiltrate reviewed here in the office. 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 Eventually cytogenetics was found to be negative with no evidence of cyclin-D1/IgH or BCL-2/IgH rearrangement. Additional tissue was requested. As a result, the patient was notifi e edward of these findings by personal visit, and plans were made to proceed with endoscopy, ? mantle cell involvement. At the time of this dictation, this was not yet performed. He was also asked to undergo CT of the neck, chest, abdomen and pelvis which show ed no evidence of abnormality. PET scan 04/13/12 showed increased activity within the marrow, but no additional  abnormalities including the spleen, with no background bowel or gastric activity as well.       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 hairy cell leukemia. This has been discussed in great detail with the alexander thakur at [facility] and additional stains are pending as is the patient's GI assessment now to be pursued through Dr. Ray.       His case was again reviewed over quite a period of time, and ultimately his marrow was signed out as 80% involved with hairy cell leukemia - normal cytogenetics, no evidence of cyclin D1/IgH or BCL-2/IgH rearrangement. Dr. Remy was advised of the treatment options which include followup with no immediate therapy, and/or the use of cladribine or pentostatin. After numerous di s cussion, he ultimately elected to try to proceed with treatment when he is feeling as well as he is to improve his ability to withstand the therapy itself. We therefore began to discuss the treatment schedule, which also could be somewhat variable, inclu d ing 7-day infusions, 2-hr infusion q.d. x five days or every other week infusion. After discussion he wishes to proceed with 7-day infusion. When we attempted to do this as an outpatient we found out thereafter that this would not be covered as an outpa tient infusion through his insurance. Therefore we have made plans for him to be admitted after he is seen in office 05/10/12.       The patient was thereafter admitted 05/11-05/17/12. PICC line was placed and he initiated treatment at 0.1 mg/kg or 7.7 mg IV in fusion over seven days. The patient fortunately had no serious issues at all during the seven days and continued to work out on a daily basis. He was seen by his internist as well with some of his meds adjusted including his HCTZ and Ziac. He was stabl e  at discharge, but was given Neulasta 6 mg subcutaneously 24 hours after he completed treatment. He has been having counts done on a regular basis, and returns back today with only minimal evidence of neutropenia at his becca, and has an excellent periph eral smear today - normal findings. His performance status remains excellent as well.       The patient when seen on 6/7/12 was felt to have improved substantially. We followed him for weekly counts and plans at that point were for him to take a trip out of the country. He did actually take this trip, which had him eventually hiking at 11-12,000 feet without any complication or ill effect. As he returns back today on 7/19 he feels well and again with an excellent performance status. He has had no fever, c hills or suggestion of infection or complications thus far. He remains again, with an excellent performance status.       The patient thereafter is asked to have his counts done on an every six weeks basis, now seen three months later with a normal CBC as well as examination. He again feels quite well with a completely normal performance status.       The patient was asked to return for followup studies that included normal serum chemistries, unchanged lipid profile and stable hematologic findings. He is now seen on 2/7/13, 4 months from his last appointment. He continues to do well with unchanged performance status.       The patient was asked to have counts done q3 months seeing the physician in six months. He is now seen back 08/22/2013 feeling well [place]. He had no additional fevers, sweats, chills, significant change in weight although he has tried to lose some additional pounds and we see this today. He continues to exercise regularl y and dietary program. Review of his smears again continues to demonstrate a complete remission.       Today, the patient was asked to be seen back in 6  months for followup, and is now seen 02/05/2014, continuing to feel well. Again, review of his smears and physical examination fortunately demonstrates no evidence of recurrent disease.       The patient was asked to be seen back in 6 months for followup and is now seen on 07/31/2014. Again he feels well with an excellent performance status and no change since last reviewed.       The patient thereafter was now seen back 2015, 2016 stable at both visits with no evidence of recurrence of his hairy cell leukemia. He feels well but when now reviewed 02/04/2016, he has had some degree of peripheral neuropathy? This improv es with activity and it is certainly not limiting his action or his function.   Patient now reviewed back again September 02, 2016, continues to have an excellent performance status and hematologically remain stable brother had some concern about thrombocytopenia last visit.    The patient is next seen February 02, 2017.  He has not had any medical issues since last seen and remains physically quite well.  He does describe following with urology and undergoing a repeat biopsy recently when his PSA was above 7.  His findings evidently were negative for malignancy though he did have post procedure hematuria.  This is also now abated.  The patient is next seen July 28, 2017.  He continues to have an excellent performance status and no particular difficulty in day-to-day function and/or pursuing active vacations.      The patient is next seen July 26, 2018.  He had recently returned from a trip to [place].  After experiencing an airplane ride with multiple coughing passengers he himself developed a cold, particularly severe over the last 3-4 days with cough and congestion.  He's had low-grade fever which is now beginning to resolve but is clearly uncomfortable.    Past Medical History:   Diagnosis Date   • H/O Elevated PSA    • H/O Internal thrombosed hemorrhoids    • H/O minimal right carotid plaque    •  H/O peripheral neuropathy    • Hairy cell leukemia (CMS/HCC)     H/O   • Hyperlipidemia    • Hypertension        ONCOLOGIC HISTORY:  (History from previous dates can be found in the separate document.)    Current Outpatient Prescriptions on File Prior to Visit   Medication Sig Dispense Refill   • acyclovir (ZOVIRAX) 400 MG tablet Take 1 tablet by mouth 2 (two) times a day. Take no more than 5 doses a day. 60 tablet 2   • amLODIPine (NORVASC) 10 MG tablet Take 10 mg by mouth Daily.     • aspirin 81 MG chewable tablet Chew 81 mg Daily.     • bisoprolol-hydrochlorothiazide (ZIAC) 10-6.25 MG per tablet Take 1 tablet by mouth Daily.     • hepatitis A (HAVRIX) 1440 EL U/ML vaccine Inject  into the shoulder, thigh, or buttocks. 1 mL 0   • propafenone (RHTHYMOL) 150 MG tablet Take 1 tablet by mouth 3 (three) times a day.     • rosuvastatin (CRESTOR) 10 MG tablet Take 10 mg by mouth Daily.       No current facility-administered medications on file prior to visit.        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   • Alcohol use Yes      Comment: 1 glass of wine or beer per day   • Drug use: Unknown   • Sexual activity: Not on file     Other Topics Concern   • Not on file     Social History Narrative   • No narrative on file         Cancer-related family history includes Cancer in his father; Cancer (age of onset: 90) in his mother.     Review of Systems  A comprehensive 14 point review of systems was performed and was negative except as mentioned.  As above per cold symptoms   Objective      Vitals:    07/26/18 1606   BP: 120/82   Pulse: 58   Resp: 16   Temp: 99 °F (37.2 °C)  "  SpO2: 99%  Comment: at rest   Weight: 71.6 kg (157 lb 12.8 oz)   Height: 176 cm (69.29\")  Comment: new ht.   PainSc: 0-No pain     Current Status 7/26/2018   ECOG score 0       Physical Exam      GENERAL: Well-developed, well-nourished male in no acute distress.   SKIN: Warm, dry without rashes, purpura or petechiae.   HEAD: Normocephalic.   EYES: Pupils equal, round and reactive to light.Mild periorbital swelling  EOMs intact. Conjunctivae normal.   EARS: Hearing intact.   NOSE: Septum midline. No excoriations or nasal discharge.   MOUTH: Tongue is well-papillated; no stomatitis or ulcers. Lips normal.  dry mucous membranes, thick sinus drainage clear   THROAT: Oropharynx without lesions or exudates.   NECK: Supple with good range of motion; no thyromegaly or masses, no JVD or bruits.   LYMPHATICS: No cervical, supraclavicular, axillary or inguinal adenopathy.   CHEST: Lungs clear to percussion and auscultation.   CARDIAC: Regular rate and rhythm without murmurs, rubs or gallops.   ABDOMEN: Soft, nontender with no organomegaly or masses.   EXTREMITIES: No clubbing, cyanosis or edema.   NEUROLOGICAL: No focal neurological deficits.       RECENT LABS:  Hematology WBC   Date Value Ref Range Status   07/26/2018 3.62 (L) 4.00 - 10.00 10*3/mm3 Final     RBC   Date Value Ref Range Status   07/26/2018 3.97 (L) 4.30 - 5.50 10*6/mm3 Final     Hemoglobin   Date Value Ref Range Status   07/26/2018 13.5 13.5 - 16.5 g/dL Final     Hematocrit   Date Value Ref Range Status   07/26/2018 36.9 (L) 40.0 - 49.0 % Final     Platelets   Date Value Ref Range Status   07/26/2018 183 150 - 375 10*3/mm3 Final        Assessment/Plan     A 72-year-old male with the diagnosis of hairy cell leukemia in April 2012. He was treated 5/11-5/17/ 12 with cladribine being given as continuous infusion at 0.1 mg/kg or 7.7 mg/24-hours x7 days. He did well during hospitalization with little toxicity and minimal myelosuppression. He has gone onto obtain a " complete remission and this continues to be th e case at a 6 month visit now 07/31/2014. The patient has been now assessed on a regular basis every 6 months with no evidence of hematologic deterioration. As he is seen today, 02/04/2016, and now again September 02, 2016 he remains clinically stable as well.      He is now assessed February 2 and overall doing well without any additional issues except for the prostate described as above.  He is now recognized there is also possibly hypertensive and I'll contact him about this is afternoon though during our conversation he had few if any symptoms.?.  Pending our review will ask him to have a repeat CBC at 3 months to see that physician in 6 months.    The patient indicates when called about his blood pressure that he often experiences white coat hypertension.  He is going to recheck his blood pressure night and notify me if it remains elevated.     He is next seen July 28, 2017, hematologically stable, remaining quite active.      As he is again reviewed July 2018 he has had recent viral exposure and is slowly working through a cold.  This appears to have suppressed his marrow very modestly though review of the smear shows no evidence of hairy cells though there are atypical lymphocytes thought to be virally activated.  At this point we'll have a follow-up CBC in 4 weeks and have him see the physician again in 6 months .

## 2018-08-23 ENCOUNTER — LAB (OUTPATIENT)
Dept: LAB | Facility: HOSPITAL | Age: 73
End: 2018-08-23

## 2018-08-23 DIAGNOSIS — C91.41 HAIRY CELL LEUKEMIA, IN REMISSION (HCC): Primary | ICD-10-CM

## 2018-08-23 LAB
BASOPHILS # BLD AUTO: 0 10*3/MM3 (ref 0–0.1)
BASOPHILS NFR BLD AUTO: 0 % (ref 0–1.1)
DEPRECATED RDW RBC AUTO: 44 FL (ref 37–49)
EOSINOPHIL # BLD AUTO: 0.04 10*3/MM3 (ref 0–0.36)
EOSINOPHIL NFR BLD AUTO: 1 % (ref 1–5)
ERYTHROCYTE [DISTWIDTH] IN BLOOD BY AUTOMATED COUNT: 12.4 % (ref 11.7–14.5)
HCT VFR BLD AUTO: 37.4 % (ref 40–49)
HGB BLD-MCNC: 13.2 G/DL (ref 13.5–16.5)
IMM GRANULOCYTES # BLD: 0.01 10*3/MM3 (ref 0–0.03)
IMM GRANULOCYTES NFR BLD: 0.3 % (ref 0–0.5)
LYMPHOCYTES # BLD AUTO: 1.63 10*3/MM3 (ref 1–3.5)
LYMPHOCYTES NFR BLD AUTO: 41.9 % (ref 20–49)
MCH RBC QN AUTO: 34 PG (ref 27–33)
MCHC RBC AUTO-ENTMCNC: 35.3 G/DL (ref 32–35)
MCV RBC AUTO: 96.4 FL (ref 83–97)
MONOCYTES # BLD AUTO: 0.21 10*3/MM3 (ref 0.25–0.8)
MONOCYTES NFR BLD AUTO: 5.4 % (ref 4–12)
NEUTROPHILS # BLD AUTO: 2 10*3/MM3 (ref 1.5–7)
NEUTROPHILS NFR BLD AUTO: 51.4 % (ref 39–75)
NRBC BLD MANUAL-RTO: 0 /100 WBC (ref 0–0)
PLATELET # BLD AUTO: 135 10*3/MM3 (ref 150–375)
PMV BLD AUTO: 10.5 FL (ref 8.9–12.1)
RBC # BLD AUTO: 3.88 10*6/MM3 (ref 4.3–5.5)
WBC NRBC COR # BLD: 3.89 10*3/MM3 (ref 4–10)

## 2018-08-23 PROCEDURE — 36415 COLL VENOUS BLD VENIPUNCTURE: CPT | Performed by: INTERNAL MEDICINE

## 2018-08-23 PROCEDURE — 85025 COMPLETE CBC W/AUTO DIFF WBC: CPT | Performed by: INTERNAL MEDICINE

## 2018-09-13 ENCOUNTER — DOCUMENTATION (OUTPATIENT)
Dept: ONCOLOGY | Facility: HOSPITAL | Age: 73
End: 2018-09-13

## 2018-09-13 NOTE — PROGRESS NOTES
Pt calling wanting to know if it is okay to receive flu vaccine and 2nd shingles vaccine  Message forwarded to Dr Jeffers  Awaiting response

## 2018-09-17 ENCOUNTER — TELEPHONE (OUTPATIENT)
Dept: ONCOLOGY | Facility: HOSPITAL | Age: 73
End: 2018-09-17

## 2018-09-17 NOTE — TELEPHONE ENCOUNTER
----- Message from Fiona Bingham sent at 9/17/2018 10:42 AM EDT -----  Contact: [phone]  Pt is calling back about getting his second shingles vac and hep shot.  He said no one called him back last week.

## 2018-09-17 NOTE — TELEPHONE ENCOUNTER
Called and notified patient. He v/u     ----- Message from Sofia Cintorn RN sent at 9/17/2018 11:54 AM EDT -----  Regarding: FW: shots  Contact: [phone]      ----- Message -----  From: Paco Jeffers MD  Sent: 9/14/2018   7:38 AM  To: Faith Bundy RN  Subject: RE: shots                                        Yes, please. ZACHARY Degroot  ----- Message -----  From: Faith Bundy RN  Sent: 9/13/2018   2:37 PM  To: Paco Jeffers MD  Subject: FW: shots                                        Please advise  Thanks    ----- Message -----  From: Fiona Bingham  Sent: 9/13/2018   2:33 PM  To: [facility]  Subject: shots                                            Can he get a flu shot and his second shingles shot

## 2019-01-10 ENCOUNTER — OFFICE VISIT (OUTPATIENT)
Dept: ONCOLOGY | Facility: CLINIC | Age: 74
End: 2019-01-10

## 2019-01-10 ENCOUNTER — APPOINTMENT (OUTPATIENT)
Dept: LAB | Facility: HOSPITAL | Age: 74
End: 2019-01-10

## 2019-01-10 VITALS
HEART RATE: 65 BPM | RESPIRATION RATE: 18 BRPM | TEMPERATURE: 98.8 F | HEIGHT: 69 IN | SYSTOLIC BLOOD PRESSURE: 191 MMHG | WEIGHT: 161.5 LBS | DIASTOLIC BLOOD PRESSURE: 81 MMHG | OXYGEN SATURATION: 98 % | BODY MASS INDEX: 23.92 KG/M2

## 2019-01-10 DIAGNOSIS — C91.41 HAIRY CELL LEUKEMIA, IN REMISSION (HCC): Primary | ICD-10-CM

## 2019-01-10 LAB
BASOPHILS # BLD AUTO: 0.01 10*3/MM3 (ref 0–0.1)
BASOPHILS NFR BLD AUTO: 0.3 % (ref 0–1.1)
DEPRECATED RDW RBC AUTO: 42.1 FL (ref 37–49)
EOSINOPHIL # BLD AUTO: 0.04 10*3/MM3 (ref 0–0.36)
EOSINOPHIL NFR BLD AUTO: 1 % (ref 1–5)
ERYTHROCYTE [DISTWIDTH] IN BLOOD BY AUTOMATED COUNT: 12.1 % (ref 11.7–14.5)
HCT VFR BLD AUTO: 40.5 % (ref 40–49)
HGB BLD-MCNC: 14.6 G/DL (ref 13.5–16.5)
IMM GRANULOCYTES # BLD AUTO: 0.02 10*3/MM3 (ref 0–0.03)
IMM GRANULOCYTES NFR BLD AUTO: 0.5 % (ref 0–0.5)
LYMPHOCYTES # BLD AUTO: 1.63 10*3/MM3 (ref 1–3.5)
LYMPHOCYTES NFR BLD AUTO: 41.4 % (ref 20–49)
MCH RBC QN AUTO: 34.1 PG (ref 27–33)
MCHC RBC AUTO-ENTMCNC: 36 G/DL (ref 32–35)
MCV RBC AUTO: 94.6 FL (ref 83–97)
MONOCYTES # BLD AUTO: 0.33 10*3/MM3 (ref 0.25–0.8)
MONOCYTES NFR BLD AUTO: 8.4 % (ref 4–12)
NEUTROPHILS # BLD AUTO: 1.91 10*3/MM3 (ref 1.5–7)
NEUTROPHILS NFR BLD AUTO: 48.4 % (ref 39–75)
NRBC BLD AUTO-RTO: 0 /100 WBC (ref 0–0)
PLATELET # BLD AUTO: 123 10*3/MM3 (ref 150–375)
PMV BLD AUTO: 10.9 FL (ref 8.9–12.1)
RBC # BLD AUTO: 4.28 10*6/MM3 (ref 4.3–5.5)
WBC NRBC COR # BLD: 3.94 10*3/MM3 (ref 4–10)

## 2019-01-10 PROCEDURE — 85025 COMPLETE CBC W/AUTO DIFF WBC: CPT | Performed by: INTERNAL MEDICINE

## 2019-01-10 PROCEDURE — 99213 OFFICE O/P EST LOW 20 MIN: CPT | Performed by: INTERNAL MEDICINE

## 2019-01-10 PROCEDURE — 36416 COLLJ CAPILLARY BLOOD SPEC: CPT | Performed by: INTERNAL MEDICINE

## 2019-01-10 NOTE — PROGRESS NOTES
Subjective no current issues, patient feels excellent    REASONS FOR FOLLOWUP:   1. Hairy cell leukemia.   2. Admission 05/11/2012 through 05/17/12 for 7 day continuous infusion cladribine (2-CdA) without complication.   3. Patient seen 5 months post treatment in complete remission.   4. Patient seen 8 months post treatment with continued complete remission, every 3 month reassessment per CBC planned, 6 month review by MD scheduled.   5. Patient seen at 15 months without evidence of recurrent disease, every 6 month followup planned.   6. The patient was seen 02/05/2014 with no evidence of recurrent disease, 6-month followup planned.   7. Patient seen 07/31/2014, stable with no evidence of recurrent disease, every 6 month followup.   8. Patient seen 01/15/2015, stable without recurrent disease, 6-month CBC planned, yearly followup scheduled.   9. Patient seen 02/04/2016, stable, ?early peripheral neuropathy developing distally per feet, no evidence for recurrent disease per hairy cell leukemia.  10. Patient reviewed September 02, 2016, previous July CBC with mild thrombocytopenia developing, recheck September 2 normalized, every 6 month follow-ups anticipated  11. Patient reviewed February 02, 2017, status post negative prostate biopsy, mild thrombocytopenia again recognized  12. Patient reviewed July 20, 2017, hematologically stable, every 6 month follow-up CBCs planned, yearly M.D. Assessment  13. Patient seen July 26, 2018 after recent airline travel any cold symptoms  14. 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 He is usually followed by Dr. Vuong for a history of hypertension, hyperlipidemia, minimal right carotid plaque as well as elevated PSA.       He had exam done on 1-09-12 for routine followup. This included  normal serum chemistry including LFTs, cholesterol 131, triglyceride 34, HDL 51, and LDL 73. CBC revealed H&H 13.3, 37.9%, WBC 3300, platelet count 110,000, 26% polys and 74% lymphs with A NC of 0.9.       Dr. Remy provides information today with studies from as far back as 1- at which time hemoglobin and hematocrit was 14.4 and 42.1%, WBC 6950, platelets 227,000 with 47% polys, 42% lymph; this was automated. The additional test includ es approximately every year to every other year thereafter though it was noted in January 2010 WBC dropped from an average of approximately 5,000-6,000 to 3600. Hemoglobin and hematocrit 13.6 and 39.7%. Platelets 150,000-170,000 with ANC beginning in Ja nuary 2010 at 1900.       We were contacted per the above findings and asked Dr. Remy to undergo further assessment including flow cytometric exam per peripheral blood. This revealed no evidence of abnormal myeloid maturation increased blast population. No evidence of lymphopro l iferative disorder. The patient was asked to be seen formally in the office now and comes in today for further assessment. Dr. Remy indicates that he has taken a number of medications in an attempt to improve his health. Several homeopathic medication s include vitamin C, folate, flaxseed oil, fish oil, vitamin E, selenium, pomegranate tabs, Co-enzyme Q, vitamin D, niacin, and red yeast rice daily. He has discontinued these and stays on those described below.       He feels well with no additional problem s and states that he has no little or no symptoms that he can detail, whether this is just ill health in anyway, though he does have occasional hemorrhoidal pain. He also notes rare palpitations and occasional epistaxis when the ambient air has dried sub stantially. No fevers, chills, weight loss, night sweats, or other constitutional symptoms.       As a result of the above, the patient was asked to undergo a series of studies. This went onto  include rheumatoid factor of 6, MARYURI screen negative, negative s misael protein electrophoresis, normal quantitative immunoglobulins, CMV IgG of 2.9, IgM less than .9, EBV IgM of less than .9 and BCA IgG antibody of 4.6. These are consistent with previous exposures. HIV was negative, CRSP less than .1, sedimentation ra t e of 4, iron 125, TIBC 297, 14% saturation, and ferritin of 77. Peripheral blood flow cytometry was negative for evidence of abnormal myeloid maturation, increased blast population or lymphoproliferative disorder. As the patient is seen back today, he i s continuing to feel well though is obviously greatly concerned about his followup counts. Reviews in the office had been planned to follow him briefly recheck performance 3/29/12 with an H&H of 12.9, 36.1, WBC 3900, 32 polys, 62 lymphs, platelet count 94 , 000, IPF slightly elevated at 8.8. The patient was therefore asked to return and to be further assessed with bone marrow aspirate and biopsy. He now presents to do so. He has had no additional symptoms since last seen. He has discontinued his Crestor use.       As a result of his review the patient underwent bone marrow aspiration and biopsy. This revealed evidence of lymphocytic infiltrate reviewed here in the office. 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 Eventually cytogenetics was found to be negative with no evidence of cyclin-D1/IgH or BCL-2/IgH rearrangement. Additional tissue was requested. As a result, the patient was notifi e edward of these findings by personal visit, and plans were made to proceed with endoscopy, ? mantle cell involvement. At the time of this dictation, this was not yet performed. He was also asked to undergo CT of the neck, chest, abdomen and pelvis which show ed no evidence of abnormality. PET scan 04/13/12 showed increased activity within the marrow, but  no additional abnormalities including the spleen, with no background bowel or gastric activity as well.       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 hairy cell leukemia. This has been discussed in great detail with the alexander thakur at [facility] and additional stains are pending as is the patient's GI assessment now to be pursued through Dr. Ray.       His case was again reviewed over quite a period of time, and ultimately his marrow was signed out as 80% involved with hairy cell leukemia - normal cytogenetics, no evidence of cyclin D1/IgH or BCL-2/IgH rearrangement. Dr. Remy was advised of the treatment options which include followup with no immediate therapy, and/or the use of cladribine or pentostatin. After numerous di s cussion, he ultimately elected to try to proceed with treatment when he is feeling as well as he is to improve his ability to withstand the therapy itself. We therefore began to discuss the treatment schedule, which also could be somewhat variable, inclu d ing 7-day infusions, 2-hr infusion q.d. x five days or every other week infusion. After discussion he wishes to proceed with 7-day infusion. When we attempted to do this as an outpatient we found out thereafter that this would not be covered as an outpa tient infusion through his insurance. Therefore we have made plans for him to be admitted after he is seen in office 05/10/12.       The patient was thereafter admitted 05/11-05/17/12. PICC line was placed and he initiated treatment at 0.1 mg/kg or 7.7 mg IV in fusion over seven days. The patient fortunately had no serious issues at all during the seven days and continued to work out on a daily basis. He was seen by his internist as well with some of his meds adjusted including his HCTZ and Ziac. He  was stabl e at discharge, but was given Neulasta 6 mg subcutaneously 24 hours after he completed treatment. He has been having counts done on a regular basis, and returns back today with only minimal evidence of neutropenia at his becca, and has an excellent periph eral smear today - normal findings. His performance status remains excellent as well.       The patient when seen on 6/7/12 was felt to have improved substantially. We followed him for weekly counts and plans at that point were for him to take a trip out of the country. He did actually take this trip, which had him eventually hiking at 11-12,000 feet without any complication or ill effect. As he returns back today on 7/19 he feels well and again with an excellent performance status. He has had no fever, c hills or suggestion of infection or complications thus far. He remains again, with an excellent performance status.       The patient thereafter is asked to have his counts done on an every six weeks basis, now seen three months later with a normal CBC as well as examination. He again feels quite well with a completely normal performance status.       The patient was asked to return for followup studies that included normal serum chemistries, unchanged lipid profile and stable hematologic findings. He is now seen on 2/7/13, 4 months from his last appointment. He continues to do well with unchanged performance status.       The patient was asked to have counts done q3 months seeing the physician in six months. He is now seen back 08/22/2013 feeling well having [place] with that. He had no additional fevers, sweats, chills, significant change in weight although he has tried to lose some additional pounds and we see this today. He continues to exercise regularl y and dietary program. Review of his smears again continues to demonstrate a complete remission.       Today, the patient was asked to be  seen back in 6 months for followup, and is now seen 02/05/2014, continuing to feel well. Again, review of his smears and physical examination fortunately demonstrates no evidence of recurrent disease.       The patient was asked to be seen back in 6 months for followup and is now seen on 07/31/2014. Again he feels well with an excellent performance status and no change since last reviewed.       The patient thereafter was now seen back 2015, 2016 stable at both visits with no evidence of recurrence of his hairy cell leukemia. He feels well but when now reviewed 02/04/2016, he has had some degree of peripheral neuropathy? This improv es with activity and it is certainly not limiting his action or his function.   Patient now reviewed back again September 02, 2016, continues to have an excellent performance status and hematologically remain stable brother had some concern about thrombocytopenia last visit.    The patient is next seen February 02, 2017.  He has not had any medical issues since last seen and remains physically quite well.  He does describe following with urology and undergoing a repeat biopsy recently when his PSA was above 7.  His findings evidently were negative for malignancy though he did have post procedure hematuria.  This is also now abated.  The patient is next seen July 28, 2017.  He continues to have an excellent performance status and no particular difficulty in day-to-day function and/or pursuing active vacations.      The patient is next seen July 26, 2018.  He had recently returned from a trip to [place].  After experiencing an airplane ride with multiple coughing passengers he himself developed a cold, particularly severe over the last 3-4 days with cough and congestion.  He's had low-grade fever which is now beginning to resolve but is clearly uncomfortable.    The patient's next seen January 10, 2019.  He has continued to travel without issues and is feeling well when reviewed  today.    Past Medical History:   Diagnosis Date   • H/O Elevated PSA    • H/O Hairy cell leukemia (CMS/HCC) 2012   • H/O Internal thrombosed hemorrhoids    • H/O minimal right carotid plaque    • H/O peripheral neuropathy    • Hyperlipidemia    • Hypertension        ONCOLOGIC HISTORY:  (History from previous dates can be found in the separate document.)    Current Outpatient Medications on File Prior to Visit   Medication Sig Dispense Refill   • acyclovir (ZOVIRAX) 400 MG tablet Take 1 tablet by mouth 2 (two) times a day. Take no more than 5 doses a day. 60 tablet 2   • amLODIPine (NORVASC) 10 MG tablet Take 10 mg by mouth Daily.     • aspirin 81 MG chewable tablet Chew 81 mg Daily.     • bisoprolol-hydrochlorothiazide (ZIAC) 10-6.25 MG per tablet Take 1 tablet by mouth Daily.     • hepatitis A (HAVRIX) 1440 EL U/ML vaccine Inject  into the shoulder, thigh, or buttocks. 1 mL 0   • hepatitis A (HAVRIX) 1440 EL U/ML vaccine Inject  into the appropriate muscle as directed by prescriber. 1 mL 0   • propafenone (RHTHYMOL) 150 MG tablet Take 1 tablet by mouth 3 (three) times a day.     • ramipril (ALTACE) 10 MG capsule TK ONE C PO QD  2   • rosuvastatin (CRESTOR) 10 MG tablet Take 10 mg by mouth Daily.       No current facility-administered medications on file prior to visit.        ALLERGIES:   No Known Allergies    Social History     Socioeconomic History   • Marital status:      Spouse name: Hanna   • Number of children: Not on file   • Years of education: Not on file   • Highest education level: Not on file   Social Needs   • Financial resource strain: Not on file   • Food insecurity - worry: Not on file   • Food insecurity - inability: Not on file   • Transportation needs - medical: Not on file   • Transportation needs - non-medical: Not on file   Occupational History   • Occupation: Dermatologist     Employer: [employer]   Tobacco Use   • Smoking status: Former Smoker     Packs/day: 0.50   • Smokeless  "tobacco: Never Used   • Tobacco comment: Quit smoking many years ago.   Substance and Sexual Activity   • Alcohol use: Yes     Comment: 1 glass of wine or beer per day   • Drug use: No   • Sexual activity: Defer   Other Topics Concern   • Not on file   Social History Narrative   • Not on file         Cancer-related family history includes Cancer in his father; Cancer (age of onset: 90) in his mother.      Review of Systems   Constitutional: Negative for fatigue.   Respiratory: Negative for chest tightness, shortness of breath and wheezing.    Gastrointestinal: Negative for constipation, diarrhea, nausea and vomiting.   Neurological: Negative for weakness.     A comprehensive 14 point review of systems was performed and was negative except as mentioned.  As above per cold symptoms   Objective      Vitals:    01/10/19 1623   BP: (!) 191/81   Pulse: 65   Resp: 18   Temp: 98.8 °F (37.1 °C)   TempSrc: Oral   SpO2: 98%   Weight: 73.3 kg (161 lb 8 oz)   Height: 176 cm (69.29\")  Comment: new 6 month height   PainSc: 0-No pain     Current Status 1/10/2019   ECOG score 0       Physical Exam      GENERAL: Well-developed, well-nourished male in no acute distress.   SKIN: Warm, dry without rashes, purpura or petechiae.   HEAD: Normocephalic.   EYES: Pupils equal, round and reactive to light.Mild periorbital swelling  EOMs intact. Conjunctivae normal.   EARS: Hearing intact.   NOSE: Septum midline. No excoriations or nasal discharge.   MOUTH: Tongue is well-papillated; no stomatitis or ulcers. Lips normal.  dry mucous membranes, thick sinus drainage clear   THROAT: Oropharynx without lesions or exudates.   NECK: Supple with good range of motion; no thyromegaly or masses, no JVD or bruits.   LYMPHATICS: No cervical, supraclavicular, axillary or inguinal adenopathy.   CHEST: Lungs clear to percussion and auscultation.   CARDIAC: Regular rate and rhythm without murmurs, rubs or gallops.   ABDOMEN: Soft, nontender with no " organomegaly or masses.   EXTREMITIES: No clubbing, cyanosis or edema.   NEUROLOGICAL: No focal neurological deficits.       RECENT LABS:  Hematology WBC   Date Value Ref Range Status   01/10/2019 3.94 (L) 4.00 - 10.00 10*3/mm3 Final     RBC   Date Value Ref Range Status   01/10/2019 4.28 (L) 4.30 - 5.50 10*6/mm3 Final     Hemoglobin   Date Value Ref Range Status   01/10/2019 14.6 13.5 - 16.5 g/dL Final     Hematocrit   Date Value Ref Range Status   01/10/2019 40.5 40.0 - 49.0 % Final     Platelets   Date Value Ref Range Status   01/10/2019 123 (L) 150 - 375 10*3/mm3 Final        Assessment/Plan     A 72-year-old male with the diagnosis of hairy cell leukemia in April 2012. He was treated 5/11-5/17/ 12 with cladribine being given as continuous infusion at 0.1 mg/kg or 7.7 mg/24-hours x7 days. He did well during hospitalization with little toxicity and minimal myelosuppression. He has gone onto obtain a complete remission and this continues to be th e case at a 6 month visit now 07/31/2014. The patient has been now assessed on a regular basis every 6 months with no evidence of hematologic deterioration. As he is seen today, 02/04/2016, and now again September 02, 2016 he remains clinically stable as well.      He is now assessed February 2 and overall doing well without any additional issues except for the prostate described as above.  He is now recognized there is also possibly hypertensive and I'll contact him about this is afternoon though during our conversation he had few if any symptoms.?.  Pending our review will ask him to have a repeat CBC at 3 months to see that physician in 6 months.    The patient indicates when called about his blood pressure that he often experiences white coat hypertension.  He is going to recheck his blood pressure night and notify me if it remains elevated.     He is next seen July 28, 2017, hematologically stable, remaining quite active.      As he is again reviewed July 2018 he has  had recent viral exposure and is slowly working through a cold.  This appears to have suppressed his marrow very modestly though review of the smear shows no evidence of hairy cells though there are atypical lymphocytes thought to be virally activated.  At this point we'll have a follow-up CBC in 4 weeks and have him see the physician again in 6 months .    The patient is next seen January 10, 2019 doing well with no hematologic deterioration.  Plan to see back in 6 months for follow-up.

## 2019-07-25 ENCOUNTER — APPOINTMENT (OUTPATIENT)
Dept: LAB | Facility: HOSPITAL | Age: 74
End: 2019-07-25

## 2019-07-25 ENCOUNTER — OFFICE VISIT (OUTPATIENT)
Dept: ONCOLOGY | Facility: CLINIC | Age: 74
End: 2019-07-25

## 2019-07-25 VITALS
RESPIRATION RATE: 16 BRPM | HEART RATE: 61 BPM | TEMPERATURE: 98.1 F | SYSTOLIC BLOOD PRESSURE: 198 MMHG | WEIGHT: 157.4 LBS | HEIGHT: 69 IN | OXYGEN SATURATION: 99 % | BODY MASS INDEX: 23.31 KG/M2 | DIASTOLIC BLOOD PRESSURE: 84 MMHG

## 2019-07-25 DIAGNOSIS — C91.41 HAIRY CELL LEUKEMIA, IN REMISSION (HCC): Primary | ICD-10-CM

## 2019-07-25 LAB
BASOPHILS # BLD AUTO: 0.01 10*3/MM3 (ref 0–0.2)
BASOPHILS NFR BLD AUTO: 0.3 % (ref 0–1.5)
DEPRECATED RDW RBC AUTO: 44.2 FL (ref 37–54)
EOSINOPHIL # BLD AUTO: 0.01 10*3/MM3 (ref 0–0.4)
EOSINOPHIL NFR BLD AUTO: 0.3 % (ref 0.3–6.2)
ERYTHROCYTE [DISTWIDTH] IN BLOOD BY AUTOMATED COUNT: 12.4 % (ref 12.3–15.4)
HCT VFR BLD AUTO: 38.2 % (ref 37.5–51)
HGB BLD-MCNC: 13.8 G/DL (ref 13–17.7)
IMM GRANULOCYTES # BLD AUTO: 0.03 10*3/MM3 (ref 0–0.05)
IMM GRANULOCYTES NFR BLD AUTO: 0.9 % (ref 0–0.5)
LYMPHOCYTES # BLD AUTO: 1.36 10*3/MM3 (ref 0.7–3.1)
LYMPHOCYTES NFR BLD AUTO: 42.4 % (ref 19.6–45.3)
MCH RBC QN AUTO: 35.2 PG (ref 26.6–33)
MCHC RBC AUTO-ENTMCNC: 36.1 G/DL (ref 31.5–35.7)
MCV RBC AUTO: 97.4 FL (ref 79–97)
MONOCYTES # BLD AUTO: 0.2 10*3/MM3 (ref 0.1–0.9)
MONOCYTES NFR BLD AUTO: 6.2 % (ref 5–12)
NEUTROPHILS # BLD AUTO: 1.6 10*3/MM3 (ref 1.7–7)
NEUTROPHILS NFR BLD AUTO: 49.9 % (ref 42.7–76)
NRBC BLD AUTO-RTO: 0 /100 WBC (ref 0–0.2)
PLATELET # BLD AUTO: 119 10*3/MM3 (ref 140–450)
PMV BLD AUTO: 10.4 FL (ref 6–12)
RBC # BLD AUTO: 3.92 10*6/MM3 (ref 4.14–5.8)
WBC NRBC COR # BLD: 3.21 10*3/MM3 (ref 3.4–10.8)

## 2019-07-25 PROCEDURE — 85025 COMPLETE CBC W/AUTO DIFF WBC: CPT | Performed by: INTERNAL MEDICINE

## 2019-07-25 PROCEDURE — 36415 COLL VENOUS BLD VENIPUNCTURE: CPT | Performed by: INTERNAL MEDICINE

## 2019-07-25 PROCEDURE — 99213 OFFICE O/P EST LOW 20 MIN: CPT | Performed by: INTERNAL MEDICINE

## 2019-07-25 NOTE — PROGRESS NOTES
Subjective no current issues, patient feels excellent    REASONS FOR FOLLOWUP:   1. Hairy cell leukemia.   2. Admission 05/11/2012 through 05/17/12 for 7 day continuous infusion cladribine (2-CdA) without complication.   3. Patient seen 5 months post treatment in complete remission.   4. Patient seen 8 months post treatment with continued complete remission, every 3 month reassessment per CBC planned, 6 month review by MD scheduled.   5. Patient seen at 15 months without evidence of recurrent disease, every 6 month followup planned.   6. The patient was seen 02/05/2014 with no evidence of recurrent disease, 6-month followup planned.   7. Patient seen 07/31/2014, stable with no evidence of recurrent disease, every 6 month followup.   8. Patient seen 01/15/2015, stable without recurrent disease, 6-month CBC planned, yearly followup scheduled.   9. Patient seen 02/04/2016, stable, ?early peripheral neuropathy developing distally per feet, no evidence for recurrent disease per hairy cell leukemia.  10. Patient reviewed September 02, 2016, previous July CBC with mild thrombocytopenia developing, recheck September 2 normalized, every 6 month follow-ups anticipated  11. Patient reviewed February 02, 2017, status post negative prostate biopsy, mild thrombocytopenia again recognized  12. Patient reviewed July 20, 2017, hematologically stable, every 6 month follow-up CBCs planned, yearly M.D. Assessment  13. Patient seen July 26, 2018 after recent airline travel any cold symptoms  14. Patient reviewed January 10, 2019, no additional symptoms, hematologically stable  15. 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 He is usually followed by Dr. Vuong for a history of hypertension, hyperlipidemia, minimal right carotid plaque as well as elevated  PSA.       He had exam done on 1-09-12 for routine followup. This included normal serum chemistry including LFTs, cholesterol 131, triglyceride 34, HDL 51, and LDL 73. CBC revealed H&H 13.3, 37.9%, WBC 3300, platelet count 110,000, 26% polys and 74% lymphs with A NC of 0.9.       Dr. Remy provides information today with studies from as far back as 1- at which time hemoglobin and hematocrit was 14.4 and 42.1%, WBC 6950, platelets 227,000 with 47% polys, 42% lymph; this was automated. The additional test includ es approximately every year to every other year thereafter though it was noted in January 2010 WBC dropped from an average of approximately 5,000-6,000 to 3600. Hemoglobin and hematocrit 13.6 and 39.7%. Platelets 150,000-170,000 with ANC beginning in Ja nuary 2010 at 1900.       We were contacted per the above findings and asked Dr. Remy to undergo further assessment including flow cytometric exam per peripheral blood. This revealed no evidence of abnormal myeloid maturation increased blast population. No evidence of lymphopro l iferative disorder. The patient was asked to be seen formally in the office now and comes in today for further assessment. Dr. Remy indicates that he has taken a number of medications in an attempt to improve his health. Several homeopathic medication s include vitamin C, folate, flaxseed oil, fish oil, vitamin E, selenium, pomegranate tabs, Co-enzyme Q, vitamin D, niacin, and red yeast rice daily. He has discontinued these and stays on those described below.       He feels well with no additional problem s and states that he has no little or no symptoms that he can detail, whether this is just ill health in anyway, though he does have occasional hemorrhoidal pain. He also notes rare palpitations and occasional epistaxis when the ambient air has dried sub stantially. No fevers, chills, weight loss, night sweats, or other constitutional symptoms.       As a result of the above,  the patient was asked to undergo a series of studies. This went onto include rheumatoid factor of 6, MARYURI screen negative, negative s misael protein electrophoresis, normal quantitative immunoglobulins, CMV IgG of 2.9, IgM less than .9, EBV IgM of less than .9 and BCA IgG antibody of 4.6. These are consistent with previous exposures. HIV was negative, CRSP less than .1, sedimentation ra t e of 4, iron 125, TIBC 297, 14% saturation, and ferritin of 77. Peripheral blood flow cytometry was negative for evidence of abnormal myeloid maturation, increased blast population or lymphoproliferative disorder. As the patient is seen back today, he i s continuing to feel well though is obviously greatly concerned about his followup counts. Reviews in the office had been planned to follow him briefly recheck performance 3/29/12 with an H&H of 12.9, 36.1, WBC 3900, 32 polys, 62 lymphs, platelet count 94 , 000, IPF slightly elevated at 8.8. The patient was therefore asked to return and to be further assessed with bone marrow aspirate and biopsy. He now presents to do so. He has had no additional symptoms since last seen. He has discontinued his Crestor use.       As a result of his review the patient underwent bone marrow aspiration and biopsy. This revealed evidence of lymphocytic infiltrate reviewed here in the office. 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 Eventually cytogenetics was found to be negative with no evidence of cyclin-D1/IgH or BCL-2/IgH rearrangement. Additional tissue was requested. As a result, the patient was notifi e d of these findings by personal visit, and plans were made to proceed with endoscopy, ? mantle cell involvement. At the time of this dictation, this was not yet performed. He was also asked to undergo CT of the neck, chest, abdomen and pelvis which show ed no evidence of abnormality.  PET scan 04/13/12 showed increased activity within the marrow, but no additional abnormalities including the spleen, with no background bowel or gastric activity as well.       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 hairy cell leukemia. This has been discussed in great detail with the alexander thakur at [employer] and additional stains are pending as is the patient's GI assessment now to be pursued through Dr. Ray.       His case was again reviewed over quite a period of time, and ultimately his marrow was signed out as 80% involved with hairy cell leukemia - normal cytogenetics, no evidence of cyclin D1/IgH or BCL-2/IgH rearrangement. Dr. Remy was advised of the treatment options which include followup with no immediate therapy, and/or the use of cladribine or pentostatin. After numerous di s cussion, he ultimately elected to try to proceed with treatment when he is feeling as well as he is to improve his ability to withstand the therapy itself. We therefore began to discuss the treatment schedule, which also could be somewhat variable, inclu d ing 7-day infusions, 2-hr infusion q.d. x five days or every other week infusion. After discussion he wishes to proceed with 7-day infusion. When we attempted to do this as an outpatient we found out thereafter that this would not be covered as an outpa tient infusion through his insurance. Therefore we have made plans for him to be admitted after he is seen in office 05/10/12.       The patient was thereafter admitted 05/11-05/17/12. PICC line was placed and he initiated treatment at 0.1 mg/kg or 7.7 mg IV in fusion over seven days. The patient fortunately had no serious issues at all during the seven days and continued to work out on a daily basis. He was seen by his internist as  well with some of his meds adjusted including his HCTZ and Ziac. He was stabl e at discharge, but was given Neulasta 6 mg subcutaneously 24 hours after he completed treatment. He has been having counts done on a regular basis, and returns back today with only minimal evidence of neutropenia at his becca, and has an excellent periph eral smear today - normal findings. His performance status remains excellent as well.       The patient when seen on 6/7/12 was felt to have improved substantially. We followed him for weekly counts and plans at that point were for him to take a trip out of the country. He did actually take this trip, which had him eventually hiking at 11-12,000 feet without any complication or ill effect. As he returns back today on 7/19 he feels well and again with an excellent performance status. He has had no fever, c hills or suggestion of infection or complications thus far. He remains again, with an excellent performance status.       The patient thereafter is asked to have his counts done on an every six weeks basis, now seen three months later with a normal CBC as well as examination. He again feels quite well with a completely normal performance status.       The patient was asked to return for followup studies that included normal serum chemistries, unchanged lipid profile and stable hematologic findings. He is now seen on 2/7/13, 4 months from his last appointment. He continues to do well with unchanged performance status.       The patient was asked to have counts done q3 months seeing the physician in six months. He is now seen back 08/22/2013 feeling well having [place] with that. He had no additional fevers, sweats, chills, significant change in weight although he has tried to lose some additional pounds and we see this today. He continues to exercise regularl y and dietary program. Review of his smears again continues to  demonstrate a complete remission.       Today, the patient was asked to be seen back in 6 months for followup, and is now seen 02/05/2014, continuing to feel well. Again, review of his smears and physical examination fortunately demonstrates no evidence of recurrent disease.       The patient was asked to be seen back in 6 months for followup and is now seen on 07/31/2014. Again he feels well with an excellent performance status and no change since last reviewed.       The patient thereafter was now seen back 2015, 2016 stable at both visits with no evidence of recurrence of his hairy cell leukemia. He feels well but when now reviewed 02/04/2016, he has had some degree of peripheral neuropathy? This improv es with activity and it is certainly not limiting his action or his function.   Patient now reviewed back again September 02, 2016, continues to have an excellent performance status and hematologically remain stable brother had some concern about thrombocytopenia last visit.    The patient is next seen February 02, 2017.  He has not had any medical issues since last seen and remains physically quite well.  He does describe following with urology and undergoing a repeat biopsy recently when his PSA was above 7.  His findings evidently were negative for malignancy though he did have post procedure hematuria.  This is also now abated.  The patient is next seen July 28, 2017.  He continues to have an excellent performance status and no particular difficulty in day-to-day function and/or pursuing active vacations.      The patient is next seen July 26, 2018.  He had recently returned from a trip to [place].  After experiencing an airplane ride with multiple coughing passengers he himself developed a cold, particularly severe over the last 3-4 days with cough and congestion.  He's had low-grade fever which is now beginning to resolve but is clearly uncomfortable.    The patient's next seen January 10, 2019.  He has  continued to travel without issues and is feeling well when reviewed today.  Plans were made for usual follow-up and the patient is next seen July 25, 2019.  His performance status remains excellent though he has lost some weight and indicates he did this per altering his diet.  His stamina and other usual activities are continued unabated.  Past Medical History:   Diagnosis Date   • H/O Elevated PSA    • H/O Hairy cell leukemia (CMS/HCC) 2012   • H/O Internal thrombosed hemorrhoids    • H/O minimal right carotid plaque    • H/O peripheral neuropathy    • Hyperlipidemia    • Hypertension        ONCOLOGIC HISTORY:  (History from previous dates can be found in the separate document.)    Current Outpatient Medications on File Prior to Visit   Medication Sig Dispense Refill   • acyclovir (ZOVIRAX) 400 MG tablet Take 1 tablet by mouth 2 (two) times a day. Take no more than 5 doses a day. 60 tablet 2   • amLODIPine (NORVASC) 10 MG tablet Take 10 mg by mouth Daily.     • aspirin 81 MG chewable tablet Chew 81 mg Daily.     • bisoprolol-hydrochlorothiazide (ZIAC) 10-6.25 MG per tablet Take 1 tablet by mouth Daily.     • hepatitis A (HAVRIX) 1440 EL U/ML vaccine Inject  into the shoulder, thigh, or buttocks. 1 mL 0   • hepatitis A (HAVRIX) 1440 EL U/ML vaccine Inject  into the appropriate muscle as directed by prescriber. 1 mL 0   • propafenone (RHTHYMOL) 150 MG tablet Take 1 tablet by mouth 3 (three) times a day.     • ramipril (ALTACE) 10 MG capsule TK ONE C PO QD  2   • rosuvastatin (CRESTOR) 10 MG tablet Take 10 mg by mouth Daily.       No current facility-administered medications on file prior to visit.        ALLERGIES:   No Known Allergies    Social History     Socioeconomic History   • Marital status:      Spouse name: Hanna   • Number of children: Not on file   • Years of education: Not on file   • Highest education level: Not on file   Occupational History   • Occupation: Dermatologist     Employer: [employer]   Tobacco Use   • Smoking status: Former Smoker     Packs/day: 0.50   • Smokeless tobacco: Never Used   • Tobacco comment: Quit smoking many years ago.   Substance and Sexual Activity   • Alcohol use: Yes     Comment: 1 glass of wine or beer per day   • Drug use: No   • Sexual activity: Defer         Cancer-related family history includes Cancer in his father; Cancer (age of onset: 90) in his mother.      Review of Systems   Constitutional: Negative for fatigue.   Respiratory: Negative for chest tightness, shortness of breath and wheezing.    Gastrointestinal: Negative for constipation, diarrhea, nausea and vomiting.   Neurological: Negative for weakness.     A comprehensive 14 point review of systems was performed and was negative except as mentioned.  As above per cold symptoms   Objective      Vitals:    07/25/19 1401   BP: (!) 198/84   Pulse: 61   Resp: 16   Temp: 98.1 °F (36.7 °C)   TempSrc: Oral   SpO2: 99%   Weight: 71.4 kg (157 lb 6.4 oz)   Height: 176 cm (69.29\")  Comment: new 6 month height   PainSc: 0-No pain     Current Status 7/25/2019   ECOG score 0       Physical Exam      GENERAL: Well-developed, well-nourished male in no acute distress.   SKIN: Warm, dry without rashes, purpura or petechiae.   HEAD: Normocephalic.   EYES: Pupils equal, round and reactive to light.Mild periorbital swelling  EOMs intact. Conjunctivae normal.   EARS: Hearing intact.   NOSE: Septum midline. No excoriations or nasal discharge.   MOUTH: Tongue is well-papillated; no stomatitis or ulcers. Lips normal.  dry mucous membranes, thick sinus drainage clear   THROAT: Oropharynx without lesions or exudates.   NECK: Supple with good range of motion; no thyromegaly or masses, no JVD or bruits.   LYMPHATICS: No cervical, supraclavicular, axillary or inguinal adenopathy.   CHEST: Lungs clear to percussion and auscultation.   CARDIAC: Regular rate and rhythm without murmurs, rubs or gallops.   ABDOMEN: Soft, nontender with no " organomegaly or masses.   EXTREMITIES: No clubbing, cyanosis or edema.   NEUROLOGICAL: No focal neurological deficits.       RECENT LABS:  Hematology WBC   Date Value Ref Range Status   07/25/2019 3.21 (L) 3.40 - 10.80 10*3/mm3 Final     RBC   Date Value Ref Range Status   07/25/2019 3.92 (L) 4.14 - 5.80 10*6/mm3 Final     Hemoglobin   Date Value Ref Range Status   07/25/2019 13.8 13.0 - 17.7 g/dL Final     Hematocrit   Date Value Ref Range Status   07/25/2019 38.2 37.5 - 51.0 % Final     Platelets   Date Value Ref Range Status   07/25/2019 119 (L) 140 - 450 10*3/mm3 Final        Assessment/Plan     A 73-year-old male with the diagnosis of hairy cell leukemia in April 2012. He was treated 5/11-5/17/ 12 with cladribine being given as continuous infusion at 0.1 mg/kg or 7.7 mg/24-hours x7 days. He did well during hospitalization with little toxicity and minimal myelosuppression. He has gone onto obtain a complete remission and this continues to be th e case at a 6 month visit now 07/31/2014. The patient has been now assessed on a regular basis every 6 months with no evidence of hematologic deterioration. As he is seen today, 02/04/2016, and now again September 02, 2016 he remains clinically stable as well.      He is now assessed February 2 and overall doing well without any additional issues except for the prostate described as above.  He is now recognized there is also possibly hypertensive and I'll contact him about this is afternoon though during our conversation he had few if any symptoms.?.  Pending our review will ask him to have a repeat CBC at 3 months to see that physician in 6 months.    The patient indicates when called about his blood pressure that he often experiences white coat hypertension.  He is going to recheck his blood pressure night and notify me if it remains elevated.     He is next seen July 28, 2017, hematologically stable, remaining quite active.      As he is again reviewed July 2018 he has  had recent viral exposure and is slowly working through a cold.  This appears to have suppressed his marrow very modestly though review of the smear shows no evidence of hairy cells though there are atypical lymphocytes thought to be virally activated.  At this point we'll have a follow-up CBC in 4 weeks and have him see the physician again in 6 months .    The patient is next seen January 10, 2019 doing well with no hematologic deterioration.  Plan to see back in 6 months for follow-up.  The patient is next seen July 25, 2019.  He does continue to do extremely well and we discussed his weight loss which appears to be dietarily produced.  His exam is not indicative of progressive disease or new disorder developing.  We have agreed to have him reassessed in 6-month follow-ups.

## 2019-08-08 DIAGNOSIS — R00.2 PALPITATION: Primary | ICD-10-CM

## 2020-01-09 NOTE — PROGRESS NOTES
Subjective The patient has lost additional 8 to 9 pounds by dieting    REASONS FOR FOLLOWUP:   1. Hairy cell leukemia.   2. Admission 05/11/2012 through 05/17/12 for 7 day continuous infusion cladribine (2-CdA) without complication.   3. Patient seen 5 months post treatment in complete remission.   4. Patient seen 8 months post treatment with continued complete remission, every 3 month reassessment per CBC planned, 6 month review by MD scheduled.   5. Patient seen at 15 months without evidence of recurrent disease, every 6 month followup planned.   6. The patient was seen 02/05/2014 with no evidence of recurrent disease, 6-month followup planned.   7. Patient seen 07/31/2014, stable with no evidence of recurrent disease, every 6 month followup.   8. Patient seen 01/15/2015, stable without recurrent disease, 6-month CBC planned, yearly followup scheduled.   9. Patient seen 02/04/2016, stable, ?early peripheral neuropathy developing distally per feet, no evidence for recurrent disease per hairy cell leukemia.  10. Patient reviewed September 02, 2016, previous July CBC with mild thrombocytopenia developing, recheck September 2 normalized, every 6 month follow-ups anticipated  11. Patient reviewed February 02, 2017, status post negative prostate biopsy, mild thrombocytopenia again recognized  12. Patient reviewed July 20, 2017, hematologically stable, every 6 month follow-up CBCs planned, yearly M.D. Assessment  13. Patient seen July 26, 2018 after recent airline travel any cold symptoms  14. Patient reviewed January 10, 2019, no additional symptoms, hematologically stable  15. Patient reviewed July 25, 2019, stable, six-month follow-up as planned  16. 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 He is usually followed by  Dr. Vuong for a history of hypertension, hyperlipidemia, minimal right carotid plaque as well as elevated PSA.       He had exam done on 1-09-12 for routine followup. This included normal serum chemistry including LFTs, cholesterol 131, triglyceride 34, HDL 51, and LDL 73. CBC revealed H&H 13.3, 37.9%, WBC 3300, platelet count 110,000, 26% polys and 74% lymphs with A NC of 0.9.       Dr. Remy provides information today with studies from as far back as 1- at which time hemoglobin and hematocrit was 14.4 and 42.1%, WBC 6950, platelets 227,000 with 47% polys, 42% lymph; this was automated. The additional test includ es approximately every year to every other year thereafter though it was noted in January 2010 WBC dropped from an average of approximately 5,000-6,000 to 3600. Hemoglobin and hematocrit 13.6 and 39.7%. Platelets 150,000-170,000 with ANC beginning in Ja nuary 2010 at 1900.       We were contacted per the above findings and asked Dr. Remy to undergo further assessment including flow cytometric exam per peripheral blood. This revealed no evidence of abnormal myeloid maturation increased blast population. No evidence of lymphopro l iferative disorder. The patient was asked to be seen formally in the office now and comes in today for further assessment. Dr. Remy indicates that he has taken a number of medications in an attempt to improve his health. Several homeopathic medication s include vitamin C, folate, flaxseed oil, fish oil, vitamin E, selenium, pomegranate tabs, Co-enzyme Q, vitamin D, niacin, and red yeast rice daily. He has discontinued these and stays on those described below.       He feels well with no additional problem s and states that he has no little or no symptoms that he can detail, whether this is just ill health in anyway, though he does have occasional hemorrhoidal pain. He also notes rare palpitations and occasional epistaxis when the ambient air has dried sub stantially. No  fevers, chills, weight loss, night sweats, or other constitutional symptoms.       As a result of the above, the patient was asked to undergo a series of studies. This went onto include rheumatoid factor of 6, MARYURI screen negative, negative s misael protein electrophoresis, normal quantitative immunoglobulins, CMV IgG of 2.9, IgM less than .9, EBV IgM of less than .9 and BCA IgG antibody of 4.6. These are consistent with previous exposures. HIV was negative, CRSP less than .1, sedimentation ra t e of 4, iron 125, TIBC 297, 14% saturation, and ferritin of 77. Peripheral blood flow cytometry was negative for evidence of abnormal myeloid maturation, increased blast population or lymphoproliferative disorder. As the patient is seen back today, he i s continuing to feel well though is obviously greatly concerned about his followup counts. Reviews in the office had been planned to follow him briefly recheck performance 3/29/12 with an H&H of 12.9, 36.1, WBC 3900, 32 polys, 62 lymphs, platelet count 94 , 000, IPF slightly elevated at 8.8. The patient was therefore asked to return and to be further assessed with bone marrow aspirate and biopsy. He now presents to do so. He has had no additional symptoms since last seen. He has discontinued his Crestor use.       As a result of his review the patient underwent bone marrow aspiration and biopsy. This revealed evidence of lymphocytic infiltrate reviewed here in the office. 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 Eventually cytogenetics was found to be negative with no evidence of cyclin-D1/IgH or BCL-2/IgH rearrangement. Additional tissue was requested. As a result, the patient was notifi e d of these findings by personal visit, and plans were made to proceed with endoscopy, ? mantle cell involvement. At the time of this dictation, this was not yet performed. He  was also asked to undergo CT of the neck, chest, abdomen and pelvis which show ed no evidence of abnormality. PET scan 04/13/12 showed increased activity within the marrow, but no additional abnormalities including the spleen, with no background bowel or gastric activity as well.       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 hairy cell leukemia. This has been discussed in great detail with the alexander thakur at [facility] and additional stains are pending as is the patient's GI assessment now to be pursued through Dr. Ray.       His case was again reviewed over quite a period of time, and ultimately his marrow was signed out as 80% involved with hairy cell leukemia - normal cytogenetics, no evidence of cyclin D1/IgH or BCL-2/IgH rearrangement. Dr. Remy was advised of the treatment options which include followup with no immediate therapy, and/or the use of cladribine or pentostatin. After numerous di s cussion, he ultimately elected to try to proceed with treatment when he is feeling as well as he is to improve his ability to withstand the therapy itself. We therefore began to discuss the treatment schedule, which also could be somewhat variable, inclu d ing 7-day infusions, 2-hr infusion q.d. x five days or every other week infusion. After discussion he wishes to proceed with 7-day infusion. When we attempted to do this as an outpatient we found out thereafter that this would not be covered as an outpa tient infusion through his insurance. Therefore we have made plans for him to be admitted after he is seen in office 05/10/12.       The patient was thereafter admitted 05/11-05/17/12. PICC line was placed and he initiated treatment at 0.1 mg/kg or 7.7 mg IV in fusion over seven days. The patient fortunately had no serious  issues at all during the seven days and continued to work out on a daily basis. He was seen by his internist as well with some of his meds adjusted including his HCTZ and Ziac. He was stabl e at discharge, but was given Neulasta 6 mg subcutaneously 24 hours after he completed treatment. He has been having counts done on a regular basis, and returns back today with only minimal evidence of neutropenia at his becca, and has an excellent periph eral smear today - normal findings. His performance status remains excellent as well.       The patient when seen on 6/7/12 was felt to have improved substantially. We followed him for weekly counts and plans at that point were for him to take a trip out of the country. He did actually take this trip, which had him eventually hiking at 11-12,000 feet without any complication or ill effect. As he returns back today on 7/19 he feels well and again with an excellent performance status. He has had no fever, c hills or suggestion of infection or complications thus far. He remains again, with an excellent performance status.       The patient thereafter is asked to have his counts done on an every six weeks basis, now seen three months later with a normal CBC as well as examination. He again feels quite well with a completely normal performance status.       The patient was asked to return for followup studies that included normal serum chemistries, unchanged lipid profile and stable hematologic findings. He is now seen on 2/7/13, 4 months from his last appointment. He continues to do well with unchanged performance status.       The patient was asked to have counts done q3 months seeing the physician in six months. He is now seen back 08/22/2013 feeling well having [place] with that. He had no additional fevers, sweats, chills, significant change in weight although he has tried to lose some additional pounds and we see this  today. He continues to exercise regularl y and dietary program. Review of his smears again continues to demonstrate a complete remission.       Today, the patient was asked to be seen back in 6 months for followup, and is now seen 02/05/2014, continuing to feel well. Again, review of his smears and physical examination fortunately demonstrates no evidence of recurrent disease.       The patient was asked to be seen back in 6 months for followup and is now seen on 07/31/2014. Again he feels well with an excellent performance status and no change since last reviewed.       The patient thereafter was now seen back 2015, 2016 stable at both visits with no evidence of recurrence of his hairy cell leukemia. He feels well but when now reviewed 02/04/2016, he has had some degree of peripheral neuropathy? This improv es with activity and it is certainly not limiting his action or his function.   Patient now reviewed back again September 02, 2016, continues to have an excellent performance status and hematologically remain stable brother had some concern about thrombocytopenia last visit.    The patient is next seen February 02, 2017.  He has not had any medical issues since last seen and remains physically quite well.  He does describe following with urology and undergoing a repeat biopsy recently when his PSA was above 7.  His findings evidently were negative for malignancy though he did have post procedure hematuria.  This is also now abated.  The patient is next seen July 28, 2017.  He continues to have an excellent performance status and no particular difficulty in day-to-day function and/or pursuing active vacations.      The patient is next seen July 26, 2018.  He had recently returned from a trip to [place].  After experiencing an airplane ride with multiple coughing passengers he himself developed a cold, particularly severe over the last 3-4 days with cough and congestion.  He's had low-grade fever which is now  beginning to resolve but is clearly uncomfortable.    The patient's next seen January 10, 2019.  He has continued to travel without issues and is feeling well when reviewed today.  Plans were made for usual follow-up and the patient is next seen July 25, 2019.  His performance status remains excellent though he has lost some weight and indicates he did this per altering his diet.  His stamina and other usual activities are continued unabated.  The patient is next seen January 16, 2020.  He continues an active practice and lifestyle.  He has lost some additional weight on purpose through diet.       Past Medical History:   Diagnosis Date   • H/O Elevated PSA    • H/O Hairy cell leukemia (CMS/HCC) 2012   • H/O Internal thrombosed hemorrhoids    • H/O minimal right carotid plaque    • H/O peripheral neuropathy    • Hyperlipidemia    • Hypertension        ONCOLOGIC HISTORY:  (History from previous dates can be found in the separate document.)    Current Outpatient Medications on File Prior to Visit   Medication Sig Dispense Refill   • acyclovir (ZOVIRAX) 400 MG tablet Take 1 tablet by mouth 2 (two) times a day. Take no more than 5 doses a day. 60 tablet 2   • amLODIPine (NORVASC) 10 MG tablet Take 10 mg by mouth Daily.     • amLODIPine (NORVASC) 2.5 MG tablet      • amoxicillin (AMOXIL) 500 MG capsule      • aspirin 81 MG chewable tablet Chew 81 mg Daily.     • bisoprolol-hydrochlorothiazide (ZIAC) 10-6.25 MG per tablet Take 1 tablet by mouth Daily.     • hepatitis A (HAVRIX) 1440 EL U/ML vaccine Inject  into the shoulder, thigh, or buttocks. 1 mL 0   • hepatitis A (HAVRIX) 1440 EL U/ML vaccine Inject  into the appropriate muscle as directed by prescriber. 1 mL 0   • propafenone (RHTHYMOL) 150 MG tablet Take 1 tablet by mouth 3 (three) times a day.     • ramipril (ALTACE) 10 MG capsule TK ONE C PO QD  2   • rosuvastatin (CRESTOR) 10 MG tablet Take 10 mg by mouth Daily.       No current facility-administered medications  "on file prior to visit.        ALLERGIES:   No Known Allergies    Social History     Socioeconomic History   • Marital status:      Spouse name: Hanna   • Number of children: Not on file   • Years of education: Not on file   • Highest education level: Not on file   Occupational History   • Occupation: Dermatologist     Employer: [employer]   Tobacco Use   • Smoking status: Former Smoker     Packs/day: 0.50   • Smokeless tobacco: Never Used   • Tobacco comment: Quit smoking many years ago.   Substance and Sexual Activity   • Alcohol use: Yes     Comment: 1 glass of wine or beer per day   • Drug use: No   • Sexual activity: Defer         Cancer-related family history includes Cancer in his father; Cancer (age of onset: 90) in his mother.      Review of Systems   Constitutional: Negative for fatigue.   Respiratory: Negative for chest tightness, shortness of breath and wheezing.    Gastrointestinal: Negative for constipation, diarrhea, nausea and vomiting.   Neurological: Negative for weakness.        Vitals:    01/16/20 1407   BP: (!) 192/90   Pulse: 61   Resp: 16   Temp: 98.2 °F (36.8 °C)   TempSrc: Oral   SpO2: 100%   Weight: 71.9 kg (158 lb 9.6 oz)   Height: 176 cm (69.29\")   PainSc: 0-No pain     Current Status 1/16/2020   ECOG score 1       Physical Exam      GENERAL: Well-developed, well-nourished male in no acute distress.   SKIN: Warm, dry, areas of excoriation mid chest status post adhesive pad placements for rhythm monitoring  HEAD: Normocephalic.   EYES: Pupils equal, round and reactive to light.Mild periorbital swelling  EOMs intact. Conjunctivae normal.   EARS: Hearing intact.   NOSE: Septum midline. No excoriations or nasal discharge.   MOUTH: Tongue is well-papillated; no stomatitis or ulcers. Lips normal.  dry mucous membranes, thick sinus drainage clear   THROAT: Oropharynx without lesions or exudates.   NECK: Supple with good range of motion; no thyromegaly or masses, no JVD or bruits. "   LYMPHATICS: No cervical, supraclavicular, axillary or inguinal adenopathy.   CHEST: Lungs clear to percussion and auscultation.   CARDIAC: Regular rate and rhythm without murmurs, rubs or gallops.   ABDOMEN: Soft, nontender with no organomegaly or masses.   EXTREMITIES: No clubbing, cyanosis or edema.   NEUROLOGICAL: No focal neurological deficits.       RECENT LABS:  Hematology WBC   Date Value Ref Range Status   01/16/2020 3.61 3.40 - 10.80 10*3/mm3 Final     RBC   Date Value Ref Range Status   01/16/2020 4.06 (L) 4.14 - 5.80 10*6/mm3 Final     Hemoglobin   Date Value Ref Range Status   01/16/2020 14.1 13.0 - 17.7 g/dL Final     Hematocrit   Date Value Ref Range Status   01/16/2020 38.8 37.5 - 51.0 % Final     Platelets   Date Value Ref Range Status   01/16/2020 101 (L) 140 - 450 10*3/mm3 Final        Assessment/Plan     A 74-year-old male with the diagnosis of hairy cell leukemia in April 2012. He was treated 5/11-5/17/ 12 with cladribine being given as continuous infusion at 0.1 mg/kg or 7.7 mg/24-hours x7 days. He did well during hospitalization with little toxicity and minimal myelosuppression. He has gone onto obtain a complete remission and this continues to be th e case at a 6 month visit now 07/31/2014. The patient has been now assessed on a regular basis every 6 months with no evidence of hematologic deterioration. As he is seen today, 02/04/2016, and now again September 02, 2016 he remains clinically stable as well.      He is now assessed February 2 and overall doing well without any additional issues except for the prostate described as above.  He is now recognized there is also possibly hypertensive and I'll contact him about this is afternoon though during our conversation he had few if any symptoms.?.  Pending our review will ask him to have a repeat CBC at 3 months to see that physician in 6 months.    The patient indicates when called about his blood pressure that he often experiences white coat  hypertension.  He is going to recheck his blood pressure night and notify me if it remains elevated.     He is next seen July 28, 2017, hematologically stable, remaining quite active.      As he is again reviewed July 2018 he has had recent viral exposure and is slowly working through a cold.  This appears to have suppressed his marrow very modestly though review of the smear shows no evidence of hairy cells though there are atypical lymphocytes thought to be virally activated.  At this point we'll have a follow-up CBC in 4 weeks and have him see the physician again in 6 months .    The patient is next seen January 10, 2019 doing well with no hematologic deterioration.  Plan to see back in 6 months for follow-up.  The patient is next seen July 25, 2019.  He does continue to do extremely well and we discussed his weight loss which appears to be dietarily produced.  His exam is not indicative of progressive disease or new disorder developing.  We have agreed to have him reassessed in 6-month follow-ups.  As he is seen back January 16, 2020 there are no findings of concern though he has lost weight purposely.  It is felt that we can reduce his visits for MD assessment but continue every 6 month CBCs.  Plan:    *6-month CBC RN evaluation    *12 months, MD assessment, CBC    *Advised patient of additional availability of Lumoxiti recently released for treatment of hairy cell leukemia

## 2020-01-16 ENCOUNTER — LAB (OUTPATIENT)
Dept: LAB | Facility: HOSPITAL | Age: 75
End: 2020-01-16

## 2020-01-16 ENCOUNTER — OFFICE VISIT (OUTPATIENT)
Dept: ONCOLOGY | Facility: CLINIC | Age: 75
End: 2020-01-16

## 2020-01-16 VITALS
RESPIRATION RATE: 16 BRPM | OXYGEN SATURATION: 100 % | SYSTOLIC BLOOD PRESSURE: 192 MMHG | HEART RATE: 61 BPM | WEIGHT: 158.6 LBS | BODY MASS INDEX: 23.49 KG/M2 | HEIGHT: 69 IN | DIASTOLIC BLOOD PRESSURE: 90 MMHG | TEMPERATURE: 98.2 F

## 2020-01-16 DIAGNOSIS — C91.41 HAIRY CELL LEUKEMIA, IN REMISSION (HCC): Primary | ICD-10-CM

## 2020-01-16 LAB
BASOPHILS # BLD AUTO: 0.01 10*3/MM3 (ref 0–0.2)
BASOPHILS NFR BLD AUTO: 0.3 % (ref 0–1.5)
DEPRECATED RDW RBC AUTO: 42.5 FL (ref 37–54)
EOSINOPHIL # BLD AUTO: 0.03 10*3/MM3 (ref 0–0.4)
EOSINOPHIL NFR BLD AUTO: 0.8 % (ref 0.3–6.2)
ERYTHROCYTE [DISTWIDTH] IN BLOOD BY AUTOMATED COUNT: 12.2 % (ref 12.3–15.4)
HCT VFR BLD AUTO: 38.8 % (ref 37.5–51)
HGB BLD-MCNC: 14.1 G/DL (ref 13–17.7)
IMM GRANULOCYTES # BLD AUTO: 0.05 10*3/MM3 (ref 0–0.05)
IMM GRANULOCYTES NFR BLD AUTO: 1.4 % (ref 0–0.5)
LYMPHOCYTES # BLD AUTO: 1.64 10*3/MM3 (ref 0.7–3.1)
LYMPHOCYTES NFR BLD AUTO: 45.4 % (ref 19.6–45.3)
MCH RBC QN AUTO: 34.7 PG (ref 26.6–33)
MCHC RBC AUTO-ENTMCNC: 36.3 G/DL (ref 31.5–35.7)
MCV RBC AUTO: 95.6 FL (ref 79–97)
MONOCYTES # BLD AUTO: 0.24 10*3/MM3 (ref 0.1–0.9)
MONOCYTES NFR BLD AUTO: 6.6 % (ref 5–12)
NEUTROPHILS # BLD AUTO: 1.64 10*3/MM3 (ref 1.7–7)
NEUTROPHILS NFR BLD AUTO: 45.5 % (ref 42.7–76)
NRBC BLD AUTO-RTO: 0 /100 WBC (ref 0–0.2)
PLATELET # BLD AUTO: 101 10*3/MM3 (ref 140–450)
PMV BLD AUTO: 11 FL (ref 6–12)
RBC # BLD AUTO: 4.06 10*6/MM3 (ref 4.14–5.8)
WBC NRBC COR # BLD: 3.61 10*3/MM3 (ref 3.4–10.8)

## 2020-01-16 PROCEDURE — 85025 COMPLETE CBC W/AUTO DIFF WBC: CPT

## 2020-01-16 PROCEDURE — 99213 OFFICE O/P EST LOW 20 MIN: CPT | Performed by: INTERNAL MEDICINE

## 2020-01-16 PROCEDURE — 36415 COLL VENOUS BLD VENIPUNCTURE: CPT

## 2020-01-16 RX ORDER — AMOXICILLIN 500 MG/1
CAPSULE ORAL
COMMUNITY
Start: 2019-12-12 | End: 2021-01-21

## 2020-01-16 RX ORDER — AMLODIPINE BESYLATE 2.5 MG/1
6 TABLET ORAL DAILY
COMMUNITY
Start: 2019-11-24

## 2020-07-23 ENCOUNTER — LAB (OUTPATIENT)
Dept: LAB | Facility: HOSPITAL | Age: 75
End: 2020-07-23

## 2020-07-23 DIAGNOSIS — C91.41 HAIRY CELL LEUKEMIA, IN REMISSION (HCC): ICD-10-CM

## 2020-07-23 LAB
BASOPHILS # BLD AUTO: 0 10*3/MM3 (ref 0–0.2)
BASOPHILS NFR BLD AUTO: 0 % (ref 0–1.5)
DEPRECATED RDW RBC AUTO: 44.5 FL (ref 37–54)
EOSINOPHIL # BLD AUTO: 0.01 10*3/MM3 (ref 0–0.4)
EOSINOPHIL NFR BLD AUTO: 0.2 % (ref 0.3–6.2)
ERYTHROCYTE [DISTWIDTH] IN BLOOD BY AUTOMATED COUNT: 12.3 % (ref 12.3–15.4)
HCT VFR BLD AUTO: 38.2 % (ref 37.5–51)
HGB BLD-MCNC: 13.9 G/DL (ref 13–17.7)
IMM GRANULOCYTES # BLD AUTO: 0.05 10*3/MM3 (ref 0–0.05)
IMM GRANULOCYTES NFR BLD AUTO: 1.2 % (ref 0–0.5)
LYMPHOCYTES # BLD AUTO: 2 10*3/MM3 (ref 0.7–3.1)
LYMPHOCYTES NFR BLD AUTO: 48.2 % (ref 19.6–45.3)
MCH RBC QN AUTO: 35.5 PG (ref 26.6–33)
MCHC RBC AUTO-ENTMCNC: 36.4 G/DL (ref 31.5–35.7)
MCV RBC AUTO: 97.7 FL (ref 79–97)
MONOCYTES # BLD AUTO: 0.24 10*3/MM3 (ref 0.1–0.9)
MONOCYTES NFR BLD AUTO: 5.8 % (ref 5–12)
NEUTROPHILS NFR BLD AUTO: 1.85 10*3/MM3 (ref 1.7–7)
NEUTROPHILS NFR BLD AUTO: 44.6 % (ref 42.7–76)
NRBC BLD AUTO-RTO: 0 /100 WBC (ref 0–0.2)
PLATELET # BLD AUTO: 107 10*3/MM3 (ref 140–450)
PMV BLD AUTO: 12.5 FL (ref 6–12)
RBC # BLD AUTO: 3.91 10*6/MM3 (ref 4.14–5.8)
WBC # BLD AUTO: 4.15 10*3/MM3 (ref 3.4–10.8)

## 2020-07-23 PROCEDURE — 36415 COLL VENOUS BLD VENIPUNCTURE: CPT

## 2020-07-23 PROCEDURE — 85025 COMPLETE CBC W/AUTO DIFF WBC: CPT

## 2020-08-10 ENCOUNTER — PREP FOR SURGERY (OUTPATIENT)
Dept: OTHER | Facility: HOSPITAL | Age: 75
End: 2020-08-10

## 2020-08-10 ENCOUNTER — TELEPHONE (OUTPATIENT)
Dept: GASTROENTEROLOGY | Facility: CLINIC | Age: 75
End: 2020-08-10

## 2020-08-10 DIAGNOSIS — Z80.0 FH: COLON CANCER: Primary | ICD-10-CM

## 2020-08-10 NOTE — TELEPHONE ENCOUNTER
JH - Please schedule Dr. Angel Remy to have a colonoscopy to be done by me the next time that I am on call on a Saturday. [email or web address]

## 2020-08-10 NOTE — TELEPHONE ENCOUNTER
8/10/20 - I spoke to Dr. Angel Remy in the [facility] and he says it is time for his q 5 yr colonoscopy. He wants another one done on a Saturday that I am on call. I will try to get his latest colonoscopy report to define why we are doing q 5 yr (instead of q 10 yr) colonoscopies.    SA/MT - please get his most recent colonoscopy (and path) report from [facility]. [email or web address]

## 2020-09-02 PROBLEM — Z80.0 FH: COLON CANCER: Status: ACTIVE | Noted: 2020-09-02

## 2020-10-12 ENCOUNTER — TRANSCRIBE ORDERS (OUTPATIENT)
Dept: ADMINISTRATIVE | Facility: HOSPITAL | Age: 75
End: 2020-10-12

## 2020-10-12 DIAGNOSIS — Z01.818 OTHER SPECIFIED PRE-OPERATIVE EXAMINATION: Primary | ICD-10-CM

## 2020-10-24 ENCOUNTER — TELEPHONE (OUTPATIENT)
Dept: GASTROENTEROLOGY | Facility: CLINIC | Age: 75
End: 2020-10-24

## 2020-10-24 NOTE — TELEPHONE ENCOUNTER
CHRISTY - Please cancel his colonoscopy that is scheduled for 10/31/20. He called me today and said he prefers to do this when COVID is under control. I told him I would have you cancel the colonoscopy.  [email or web address]

## 2020-10-29 ENCOUNTER — APPOINTMENT (OUTPATIENT)
Dept: LAB | Facility: HOSPITAL | Age: 75
End: 2020-10-29

## 2020-12-18 ENCOUNTER — IMMUNIZATION (OUTPATIENT)
Dept: VACCINE CLINIC | Facility: HOSPITAL | Age: 75
End: 2020-12-18

## 2020-12-18 PROCEDURE — 0001A: CPT | Performed by: INTERNAL MEDICINE

## 2020-12-18 PROCEDURE — 91300 HC SARSCOV02 VAC 30MCG/0.3ML IM: CPT | Performed by: INTERNAL MEDICINE

## 2021-01-08 ENCOUNTER — IMMUNIZATION (OUTPATIENT)
Dept: VACCINE CLINIC | Facility: HOSPITAL | Age: 76
End: 2021-01-08

## 2021-01-08 PROCEDURE — 0001A: CPT | Performed by: INTERNAL MEDICINE

## 2021-01-08 PROCEDURE — 0002A: CPT | Performed by: INTERNAL MEDICINE

## 2021-01-08 PROCEDURE — 91300 HC SARSCOV02 VAC 30MCG/0.3ML IM: CPT | Performed by: INTERNAL MEDICINE

## 2021-01-15 NOTE — PROGRESS NOTES
Subjective Patient continues dieting, regular exercise    REASONS FOR FOLLOWUP:   1. Hairy cell leukemia.   2. Admission 05/11/2012 through 05/17/12 for 7 day continuous infusion cladribine (2-CdA) without complication.   3. Patient seen 5 months post treatment in complete remission.   4. Patient seen 8 months post treatment with continued complete remission, every 3 month reassessment per CBC planned, 6 month review by MD scheduled.   5. Patient seen at 15 months without evidence of recurrent disease, every 6 month followup planned.   6. The patient was seen 02/05/2014 with no evidence of recurrent disease, 6-month followup planned.   7. Patient seen 07/31/2014, stable with no evidence of recurrent disease, every 6 month followup.   8. Patient seen 01/15/2015, stable without recurrent disease, 6-month CBC planned, yearly followup scheduled.   9. Patient seen 02/04/2016, stable, ?early peripheral neuropathy developing distally per feet, no evidence for recurrent disease per hairy cell leukemia.  10. Patient reviewed September 02, 2016, previous July CBC with mild thrombocytopenia developing, recheck September 2 normalized, every 6 month follow-ups anticipated  11. Patient reviewed February 02, 2017, status post negative prostate biopsy, mild thrombocytopenia again recognized  12. Patient reviewed July 20, 2017, hematologically stable, every 6 month follow-up CBCs planned, yearly M.D. Assessment  13. Patient seen July 26, 2018 after recent airline travel any cold symptoms  14. Patient reviewed January 10, 2019, no additional symptoms, hematologically stable  15. Patient reviewed July 25, 2019, stable, six-month follow-up as planned  16. Patient viewed January 16, 2020, stable, every 6 months CBC, MD assessment yearly planned  17. Reassessment January 21, 2021, no evidence of recurrence disease       History of Present Illness    The patient is a 75-year-old male, again, well-known to our practice from his  dermatology work on the [facility]. He is usually followed by Dr. Vuong for a history of hypertension, hyperlipidemia, minimal right carotid plaque as well as elevated PSA.       He had exam done on 1-09-12 for routine followup. This included normal serum chemistry including LFTs, cholesterol 131, triglyceride 34, HDL 51, and LDL 73. CBC revealed H&H 13.3, 37.9%, WBC 3300, platelet count 110,000, 26% polys and 74% lymphs with A NC of 0.9.       Dr. Remy provides information today with studies from as far back as 1- at which time hemoglobin and hematocrit was 14.4 and 42.1%, WBC 6950, platelets 227,000 with 47% polys, 42% lymph; this was automated. The additional test includ es approximately every year to every other year thereafter though it was noted in January 2010 WBC dropped from an average of approximately 5,000-6,000 to 3600. Hemoglobin and hematocrit 13.6 and 39.7%. Platelets 150,000-170,000 with ANC beginning in Ja nuary 2010 at 1900.       We were contacted per the above findings and asked Dr. Remy to undergo further assessment including flow cytometric exam per peripheral blood. This revealed no evidence of abnormal myeloid maturation increased blast population. No evidence of lymphopro l iferative disorder. The patient was asked to be seen formally in the office now and comes in today for further assessment. Dr. Remy indicates that he has taken a number of medications in an attempt to improve his health. Several homeopathic medication s include vitamin C, folate, flaxseed oil, fish oil, vitamin E, selenium, pomegranate tabs, Co-enzyme Q, vitamin D, niacin, and red yeast rice daily. He has discontinued these and stays on those described below.       He feels well with no additional problem s and states that he has no little or no symptoms that he can detail, whether this is just ill health in anyway, though he does have occasional hemorrhoidal pain. He also notes rare palpitations and  occasional epistaxis when the ambient air has dried sub stantially. No fevers, chills, weight loss, night sweats, or other constitutional symptoms.       As a result of the above, the patient was asked to undergo a series of studies. This went onto include rheumatoid factor of 6, MARYURI screen negative, negative s misael protein electrophoresis, normal quantitative immunoglobulins, CMV IgG of 2.9, IgM less than .9, EBV IgM of less than .9 and BCA IgG antibody of 4.6. These are consistent with previous exposures. HIV was negative, CRSP less than .1, sedimentation ra t e of 4, iron 125, TIBC 297, 14% saturation, and ferritin of 77. Peripheral blood flow cytometry was negative for evidence of abnormal myeloid maturation, increased blast population or lymphoproliferative disorder. As the patient is seen back today, he i s continuing to feel well though is obviously greatly concerned about his followup counts. Reviews in the office had been planned to follow him briefly recheck performance 3/29/12 with an H&H of 12.9, 36.1, WBC 3900, 32 polys, 62 lymphs, platelet count 94 , 000, IPF slightly elevated at 8.8. The patient was therefore asked to return and to be further assessed with bone marrow aspirate and biopsy. He now presents to do so. He has had no additional symptoms since last seen. He has discontinued his Crestor use.       As a result of his review the patient underwent bone marrow aspiration and biopsy. This revealed evidence of lymphocytic infiltrate reviewed here in the office. 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 Eventually cytogenetics was found to be negative with no evidence of cyclin-D1/IgH or BCL-2/IgH rearrangement. Additional tissue was requested. As a result, the patient was notifi e d of these findings by personal visit, and plans were made to proceed with endoscopy, ? mantle cell  involvement. At the time of this dictation, this was not yet performed. He was also asked to undergo CT of the neck, chest, abdomen and pelvis which show ed no evidence of abnormality. PET scan 04/13/12 showed increased activity within the marrow, but no additional abnormalities including the spleen, with no background bowel or gastric activity as well.       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 hairy cell leukemia. This has been discussed in great detail with the alexander thakur at [facility] and additional stains are pending as is the patient's GI assessment now to be pursued through Dr. Ray.       His case was again reviewed over quite a period of time, and ultimately his marrow was signed out as 80% involved with hairy cell leukemia - normal cytogenetics, no evidence of cyclin D1/IgH or BCL-2/IgH rearrangement. Dr. Remy was advised of the treatment options which include followup with no immediate therapy, and/or the use of cladribine or pentostatin. After numerous di s cussion, he ultimately elected to try to proceed with treatment when he is feeling as well as he is to improve his ability to withstand the therapy itself. We therefore began to discuss the treatment schedule, which also could be somewhat variable, inclu d ing 7-day infusions, 2-hr infusion q.d. x five days or every other week infusion. After discussion he wishes to proceed with 7-day infusion. When we attempted to do this as an outpatient we found out thereafter that this would not be covered as an outpa tient infusion through his insurance. Therefore we have made plans for him to be admitted after he is seen in office 05/10/12.       The patient was thereafter admitted 05/11-05/17/12. PICC line was placed and he initiated treatment at 0.1 mg/kg or 7.7  mg IV in fusion over seven days. The patient fortunately had no serious issues at all during the seven days and continued to work out on a daily basis. He was seen by his internist as well with some of his meds adjusted including his HCTZ and Ziac. He was stabl e at discharge, but was given Neulasta 6 mg subcutaneously 24 hours after he completed treatment. He has been having counts done on a regular basis, and returns back today with only minimal evidence of neutropenia at his becca, and has an excellent periph eral smear today - normal findings. His performance status remains excellent as well.       The patient when seen on 6/7/12 was felt to have improved substantially. We followed him for weekly counts and plans at that point were for him to take a trip out of the country. He did actually take this trip, which had him eventually hiking at 11-12,000 feet without any complication or ill effect. As he returns back today on 7/19 he feels well and again with an excellent performance status. He has had no fever, c hills or suggestion of infection or complications thus far. He remains again, with an excellent performance status.       The patient thereafter is asked to have his counts done on an every six weeks basis, now seen three months later with a normal CBC as well as examination. He again feels quite well with a completely normal performance status.       The patient was asked to return for followup studies that included normal serum chemistries, unchanged lipid profile and stable hematologic findings. He is now seen on 2/7/13, 4 months from his last appointment. He continues to do well with unchanged performance status.       The patient was asked to have counts done q3 months seeing the physician in six months. He is now seen back 08/22/2013 feeling well having [place] with that. He had no additional fevers, sweats, chills, significant change in weight  although he has tried to lose some additional pounds and we see this today. He continues to exercise regularl y and dietary program. Review of his smears again continues to demonstrate a complete remission.       Today, the patient was asked to be seen back in 6 months for followup, and is now seen 02/05/2014, continuing to feel well. Again, review of his smears and physical examination fortunately demonstrates no evidence of recurrent disease.       The patient was asked to be seen back in 6 months for followup and is now seen on 07/31/2014. Again he feels well with an excellent performance status and no change since last reviewed.       The patient thereafter was now seen back 2015, 2016 stable at both visits with no evidence of recurrence of his hairy cell leukemia. He feels well but when now reviewed 02/04/2016, he has had some degree of peripheral neuropathy? This improv es with activity and it is certainly not limiting his action or his function.   Patient now reviewed back again September 02, 2016, continues to have an excellent performance status and hematologically remain stable brother had some concern about thrombocytopenia last visit.    The patient is next seen February 02, 2017.  He has not had any medical issues since last seen and remains physically quite well.  He does describe following with urology and undergoing a repeat biopsy recently when his PSA was above 7.  His findings evidently were negative for malignancy though he did have post procedure hematuria.  This is also now abated.  The patient is next seen July 28, 2017.  He continues to have an excellent performance status and no particular difficulty in day-to-day function and/or pursuing active vacations.      The patient is next seen July 26, 2018.  He had recently returned from a trip to [place].  After experiencing an airplane ride with multiple coughing passengers he himself developed a cold, particularly severe over the last 3-4 days  with cough and congestion.  He's had low-grade fever which is now beginning to resolve but is clearly uncomfortable.    The patient's next seen January 10, 2019.  He has continued to travel without issues and is feeling well when reviewed today.  Plans were made for usual follow-up and the patient is next seen July 25, 2019.  His performance status remains excellent though he has lost some weight and indicates he did this per altering his diet.  His stamina and other usual activities are continued unabated.  The patient is next seen January 16, 2020.  He continues an active practice and lifestyle.  He has lost some additional weight on purpose through diet.    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       Past Medical History:   Diagnosis Date   • H/O Elevated PSA    • H/O Hairy cell leukemia (CMS/HCC) 2012   • H/O Internal thrombosed hemorrhoids    • H/O minimal right carotid plaque    • H/O peripheral neuropathy    • Hyperlipidemia    • Hypertension        ONCOLOGIC HISTORY:  (History from previous dates can be found in the separate document.)    Current Outpatient Medications on File Prior to Visit   Medication Sig Dispense Refill   • acyclovir (ZOVIRAX) 400 MG tablet Take 1 tablet by mouth 2 (two) times a day. Take no more than 5 doses a day. 60 tablet 2   • amLODIPine (NORVASC) 2.5 MG tablet      • aspirin 81 MG chewable tablet Chew 81 mg Daily.     • bisoprolol-hydrochlorothiazide (ZIAC) 10-6.25 MG per tablet Take 1 tablet by mouth Daily.     • hepatitis A (HAVRIX) 1440 EL U/ML vaccine Inject  into the shoulder, thigh, or buttocks. 1 mL 0   • hepatitis A (HAVRIX) 1440 EL U/ML vaccine Inject  into the appropriate muscle as directed by prescriber. 1 mL 0   • propafenone (RHTHYMOL) 150 MG tablet Take 1 tablet by mouth 3 (three) times a day.     • ramipril (ALTACE) 10  "MG capsule TK ONE C PO QD  2   • rosuvastatin (CRESTOR) 10 MG tablet Take 10 mg by mouth Daily.     • [DISCONTINUED] amLODIPine (NORVASC) 10 MG tablet Take 10 mg by mouth Daily.     • [DISCONTINUED] amoxicillin (AMOXIL) 500 MG capsule        No current facility-administered medications on file prior to visit.        ALLERGIES:   No Known Allergies    Social History     Socioeconomic History   • Marital status:      Spouse name: Hanna   • Number of children: Not on file   • Years of education: Not on file   • Highest education level: Not on file   Occupational History   • Occupation: Dermatologist     Employer: [employer]   Tobacco Use   • Smoking status: Former Smoker     Packs/day: 0.50   • Smokeless tobacco: Never Used   • Tobacco comment: Quit smoking many years ago.   Substance and Sexual Activity   • Alcohol use: Yes     Comment: 1 glass of wine or beer per day   • Drug use: No   • Sexual activity: Defer         Cancer-related family history includes Cancer in his father; Cancer (age of onset: 90) in his mother.      Review of Systems   Constitutional: Negative for fatigue.   Respiratory: Negative for chest tightness, shortness of breath and wheezing.    Gastrointestinal: Negative for constipation, diarrhea, nausea and vomiting.   Neurological: Negative for weakness.        Vitals:    01/21/21 1512   BP: (!) 192/104  Comment: 2nd 193/100   Pulse: 62   Resp: 16   Temp: 98.2 °F (36.8 °C)   TempSrc: Temporal   SpO2: 99%   Weight: 70.7 kg (155 lb 12.8 oz)   Height: 177.8 cm (70\")  Comment: new yearly height   PainSc: 0-No pain     Current Status 1/21/2021   ECOG score 0       Physical Exam      GENERAL: Well-developed, well-nourished male in no acute distress.   SKIN: Warm, dry, areas of excoriation mid chest status post adhesive pad placements for rhythm monitoring  HEAD: Normocephalic.   EYES: Pupils equal, round and reactive to light.Mild periorbital swelling  EOMs intact. Conjunctivae normal. "   EARS: Hearing intact.   NOSE: Septum midline. No excoriations or nasal discharge.   MOUTH: Tongue is well-papillated; no stomatitis or ulcers. Lips normal.  dry mucous membranes, thick sinus drainage clear   THROAT: Oropharynx without lesions or exudates.   NECK: Supple with good range of motion; no thyromegaly or masses, no JVD or bruits.   LYMPHATICS: No cervical, supraclavicular, axillary or inguinal adenopathy.   CHEST: Lungs clear to percussion and auscultation.   CARDIAC: Regular rate and rhythm without murmurs, rubs or gallops.   ABDOMEN: Soft, nontender with no organomegaly or masses.   EXTREMITIES: No clubbing, cyanosis or edema.   NEUROLOGICAL: No focal neurological deficits.       RECENT LABS:  Hematology WBC   Date Value Ref Range Status   01/21/2021 3.50 3.40 - 10.80 10*3/mm3 Final     RBC   Date Value Ref Range Status   01/21/2021 3.75 (L) 4.14 - 5.80 10*6/mm3 Final     Hemoglobin   Date Value Ref Range Status   01/21/2021 12.9 (L) 13.0 - 17.7 g/dL Final     Hematocrit   Date Value Ref Range Status   01/21/2021 35.8 (L) 37.5 - 51.0 % Final     Platelets   Date Value Ref Range Status   01/21/2021 134 (L) 140 - 450 10*3/mm3 Final        Assessment/Plan        A 75-year-old male with the diagnosis of hairy cell leukemia in April 2012. He was treated 5/11-5/17/ 12 with cladribine being given as continuous infusion at 0.1 mg/kg or 7.7 mg/24-hours x7 days. He did well during hospitalization with little toxicity and minimal myelosuppression. He has gone onto obtain a complete remission and this continues to be th e case at a 6 month visit now 07/31/2014. The patient has been now assessed on a regular basis every 6 months with no evidence of hematologic deterioration. As he is seen today, 02/04/2016, and now again September 02, 2016 he remains clinically stable as well.      He is now assessed February 2 and overall doing well without any additional issues except for the prostate described as above.  He is  now recognized there is also possibly hypertensive and I'll contact him about this is afternoon though during our conversation he had few if any symptoms.?.  Pending our review will ask him to have a repeat CBC at 3 months to see that physician in 6 months.    The patient indicates when called about his blood pressure that he often experiences white coat hypertension.  He is going to recheck his blood pressure night and notify me if it remains elevated.     He is next seen July 28, 2017, hematologically stable, remaining quite active.      As he is again reviewed July 2018 he has had recent viral exposure and is slowly working through a cold.  This appears to have suppressed his marrow very modestly though review of the smear shows no evidence of hairy cells though there are atypical lymphocytes thought to be virally activated.  At this point we'll have a follow-up CBC in 4 weeks and have him see the physician again in 6 months .    The patient is next seen January 10, 2019 doing well with no hematologic deterioration.  Plan to see back in 6 months for follow-up.  The patient is next seen July 25, 2019.  He does continue to do extremely well and we discussed his weight loss which appears to be dietarily produced.  His exam is not indicative of progressive disease or new disorder developing.  We have agreed to have him reassessed in 6-month follow-ups.  As he is seen back January 16, 2020 there are no findings of concern though he has lost weight purposely.  It is felt that we can reduce his visits for MD assessment but continue every 6 month CBCs.  This remains the case as he is assessed January 21, 2021.  Plan:    *6-month CBC RN evaluation    *12 months, MD assessment, CBC    *Advised patient of additional availability of Lumoxiti recently released for treatment of hairy cell leukemia

## 2021-01-21 ENCOUNTER — OFFICE VISIT (OUTPATIENT)
Dept: ONCOLOGY | Facility: CLINIC | Age: 76
End: 2021-01-21

## 2021-01-21 ENCOUNTER — LAB (OUTPATIENT)
Dept: LAB | Facility: HOSPITAL | Age: 76
End: 2021-01-21

## 2021-01-21 VITALS
TEMPERATURE: 98.2 F | RESPIRATION RATE: 16 BRPM | SYSTOLIC BLOOD PRESSURE: 192 MMHG | HEIGHT: 70 IN | BODY MASS INDEX: 22.3 KG/M2 | OXYGEN SATURATION: 99 % | HEART RATE: 62 BPM | DIASTOLIC BLOOD PRESSURE: 104 MMHG | WEIGHT: 155.8 LBS

## 2021-01-21 DIAGNOSIS — C91.41 HAIRY CELL LEUKEMIA, IN REMISSION (HCC): Primary | ICD-10-CM

## 2021-01-21 LAB
BASOPHILS # BLD AUTO: 0.01 10*3/MM3 (ref 0–0.2)
BASOPHILS NFR BLD AUTO: 0.3 % (ref 0–1.5)
DEPRECATED RDW RBC AUTO: 42.7 FL (ref 37–54)
EOSINOPHIL # BLD AUTO: 0.02 10*3/MM3 (ref 0–0.4)
EOSINOPHIL NFR BLD AUTO: 0.6 % (ref 0.3–6.2)
ERYTHROCYTE [DISTWIDTH] IN BLOOD BY AUTOMATED COUNT: 12.3 % (ref 12.3–15.4)
HCT VFR BLD AUTO: 35.8 % (ref 37.5–51)
HGB BLD-MCNC: 12.9 G/DL (ref 13–17.7)
IMM GRANULOCYTES # BLD AUTO: 0.08 10*3/MM3 (ref 0–0.05)
IMM GRANULOCYTES NFR BLD AUTO: 2.3 % (ref 0–0.5)
LYMPHOCYTES # BLD AUTO: 1.67 10*3/MM3 (ref 0.7–3.1)
LYMPHOCYTES NFR BLD AUTO: 47.7 % (ref 19.6–45.3)
MCH RBC QN AUTO: 34.4 PG (ref 26.6–33)
MCHC RBC AUTO-ENTMCNC: 36 G/DL (ref 31.5–35.7)
MCV RBC AUTO: 95.5 FL (ref 79–97)
MONOCYTES # BLD AUTO: 0.16 10*3/MM3 (ref 0.1–0.9)
MONOCYTES NFR BLD AUTO: 4.6 % (ref 5–12)
NEUTROPHILS NFR BLD AUTO: 1.56 10*3/MM3 (ref 1.7–7)
NEUTROPHILS NFR BLD AUTO: 44.5 % (ref 42.7–76)
NRBC BLD AUTO-RTO: 0 /100 WBC (ref 0–0.2)
PLATELET # BLD AUTO: 134 10*3/MM3 (ref 140–450)
PMV BLD AUTO: 10.8 FL (ref 6–12)
RBC # BLD AUTO: 3.75 10*6/MM3 (ref 4.14–5.8)
WBC # BLD AUTO: 3.5 10*3/MM3 (ref 3.4–10.8)

## 2021-01-21 PROCEDURE — 85025 COMPLETE CBC W/AUTO DIFF WBC: CPT

## 2021-01-21 PROCEDURE — 36415 COLL VENOUS BLD VENIPUNCTURE: CPT

## 2021-01-21 PROCEDURE — 99213 OFFICE O/P EST LOW 20 MIN: CPT | Performed by: INTERNAL MEDICINE

## 2021-06-16 ENCOUNTER — TELEPHONE (OUTPATIENT)
Dept: GASTROENTEROLOGY | Facility: CLINIC | Age: 76
End: 2021-06-16

## 2021-06-16 NOTE — TELEPHONE ENCOUNTER
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        MW - please call the patient and work this out. Make sure that he is not on any blood thinners. [email or web address]

## 2021-07-08 ENCOUNTER — LAB (OUTPATIENT)
Dept: LAB | Facility: HOSPITAL | Age: 76
End: 2021-07-08

## 2021-07-08 DIAGNOSIS — C91.41 HAIRY CELL LEUKEMIA, IN REMISSION (HCC): ICD-10-CM

## 2021-07-08 LAB
BASOPHILS # BLD AUTO: 0.01 10*3/MM3 (ref 0–0.2)
BASOPHILS NFR BLD AUTO: 0.3 % (ref 0–1.5)
DEPRECATED RDW RBC AUTO: 44.1 FL (ref 37–54)
EOSINOPHIL # BLD AUTO: 0.03 10*3/MM3 (ref 0–0.4)
EOSINOPHIL NFR BLD AUTO: 0.9 % (ref 0.3–6.2)
ERYTHROCYTE [DISTWIDTH] IN BLOOD BY AUTOMATED COUNT: 12.6 % (ref 12.3–15.4)
HCT VFR BLD AUTO: 36.3 % (ref 37.5–51)
HGB BLD-MCNC: 13.1 G/DL (ref 13–17.7)
IMM GRANULOCYTES # BLD AUTO: 0.04 10*3/MM3 (ref 0–0.05)
IMM GRANULOCYTES NFR BLD AUTO: 1.2 % (ref 0–0.5)
LYMPHOCYTES # BLD AUTO: 1.5 10*3/MM3 (ref 0.7–3.1)
LYMPHOCYTES NFR BLD AUTO: 45.5 % (ref 19.6–45.3)
MCH RBC QN AUTO: 34.7 PG (ref 26.6–33)
MCHC RBC AUTO-ENTMCNC: 36.1 G/DL (ref 31.5–35.7)
MCV RBC AUTO: 96.3 FL (ref 79–97)
MONOCYTES # BLD AUTO: 0.15 10*3/MM3 (ref 0.1–0.9)
MONOCYTES NFR BLD AUTO: 4.5 % (ref 5–12)
NEUTROPHILS NFR BLD AUTO: 1.57 10*3/MM3 (ref 1.7–7)
NEUTROPHILS NFR BLD AUTO: 47.6 % (ref 42.7–76)
NRBC BLD AUTO-RTO: 0 /100 WBC (ref 0–0.2)
PLATELET # BLD AUTO: 116 10*3/MM3 (ref 140–450)
PMV BLD AUTO: 10.4 FL (ref 6–12)
RBC # BLD AUTO: 3.77 10*6/MM3 (ref 4.14–5.8)
WBC # BLD AUTO: 3.3 10*3/MM3 (ref 3.4–10.8)

## 2021-07-08 PROCEDURE — 85025 COMPLETE CBC W/AUTO DIFF WBC: CPT

## 2021-07-08 PROCEDURE — 36415 COLL VENOUS BLD VENIPUNCTURE: CPT

## 2021-07-19 ENCOUNTER — PREP FOR SURGERY (OUTPATIENT)
Dept: OTHER | Facility: HOSPITAL | Age: 76
End: 2021-07-19

## 2021-07-19 DIAGNOSIS — Z80.0 FH: COLON CANCER: Primary | ICD-10-CM

## 2021-07-21 ENCOUNTER — TELEPHONE (OUTPATIENT)
Dept: GASTROENTEROLOGY | Facility: CLINIC | Age: 76
End: 2021-07-21

## 2021-09-25 ENCOUNTER — HOSPITAL ENCOUNTER (OUTPATIENT)
Facility: HOSPITAL | Age: 76
Setting detail: HOSPITAL OUTPATIENT SURGERY
Discharge: HOME | End: 2021-09-25
Attending: INTERNAL MEDICINE | Admitting: INTERNAL MEDICINE

## 2021-09-25 ENCOUNTER — ANESTHESIA EVENT (OUTPATIENT)
Dept: GASTROENTEROLOGY | Facility: HOSPITAL | Age: 76
End: 2021-09-25

## 2021-09-25 ENCOUNTER — ANESTHESIA (OUTPATIENT)
Dept: GASTROENTEROLOGY | Facility: HOSPITAL | Age: 76
End: 2021-09-25

## 2021-09-25 VITALS
HEART RATE: 56 BPM | TEMPERATURE: 98.1 F | RESPIRATION RATE: 16 BRPM | OXYGEN SATURATION: 99 % | DIASTOLIC BLOOD PRESSURE: 64 MMHG | SYSTOLIC BLOOD PRESSURE: 121 MMHG

## 2021-09-25 DIAGNOSIS — Z80.0 FH: COLON CANCER: ICD-10-CM

## 2021-09-25 LAB
ALBUMIN SERPL-MCNC: 3.8 G/DL (ref 3.5–5.2)
ALBUMIN/GLOB SERPL: 2.2 G/DL
ALP SERPL-CCNC: 33 U/L (ref 39–117)
ALT SERPL W P-5'-P-CCNC: 31 U/L (ref 1–41)
ANION GAP SERPL CALCULATED.3IONS-SCNC: 7.8 MMOL/L (ref 5–15)
AST SERPL-CCNC: 26 U/L (ref 1–40)
BASOPHILS # BLD AUTO: 0 10*3/MM3 (ref 0–0.2)
BASOPHILS NFR BLD AUTO: 0 % (ref 0–1.5)
BILIRUB SERPL-MCNC: 1.2 MG/DL (ref 0–1.2)
BUN SERPL-MCNC: 12 MG/DL (ref 8–23)
BUN/CREAT SERPL: 16.9 (ref 7–25)
CALCIUM SPEC-SCNC: 8.6 MG/DL (ref 8.6–10.5)
CHLORIDE SERPL-SCNC: 100 MMOL/L (ref 98–107)
CO2 SERPL-SCNC: 27.2 MMOL/L (ref 22–29)
CREAT SERPL-MCNC: 0.71 MG/DL (ref 0.76–1.27)
DEPRECATED RDW RBC AUTO: 46.4 FL (ref 37–54)
EOSINOPHIL # BLD AUTO: 0.01 10*3/MM3 (ref 0–0.4)
EOSINOPHIL NFR BLD AUTO: 0.6 % (ref 0.3–6.2)
ERYTHROCYTE [DISTWIDTH] IN BLOOD BY AUTOMATED COUNT: 12.6 % (ref 12.3–15.4)
GFR SERPL CREATININE-BSD FRML MDRD: 108 ML/MIN/1.73
GLOBULIN UR ELPH-MCNC: 1.7 GM/DL
GLUCOSE SERPL-MCNC: 97 MG/DL (ref 65–99)
HCT VFR BLD AUTO: 34.8 % (ref 37.5–51)
HGB BLD-MCNC: 12 G/DL (ref 13–17.7)
IMM GRANULOCYTES # BLD AUTO: 0 10*3/MM3 (ref 0–0.05)
IMM GRANULOCYTES NFR BLD AUTO: 0 % (ref 0–0.5)
LYMPHOCYTES # BLD AUTO: 0.89 10*3/MM3 (ref 0.7–3.1)
LYMPHOCYTES NFR BLD AUTO: 51.7 % (ref 19.6–45.3)
MCH RBC QN AUTO: 34.7 PG (ref 26.6–33)
MCHC RBC AUTO-ENTMCNC: 34.5 G/DL (ref 31.5–35.7)
MCV RBC AUTO: 100.6 FL (ref 79–97)
MONOCYTES # BLD AUTO: 0.11 10*3/MM3 (ref 0.1–0.9)
MONOCYTES NFR BLD AUTO: 6.4 % (ref 5–12)
NEUTROPHILS NFR BLD AUTO: 0.71 10*3/MM3 (ref 1.7–7)
NEUTROPHILS NFR BLD AUTO: 41.3 % (ref 42.7–76)
NRBC BLD AUTO-RTO: 0 /100 WBC (ref 0–0.2)
PLATELET # BLD AUTO: 101 10*3/MM3 (ref 140–450)
PMV BLD AUTO: 10.2 FL (ref 6–12)
POTASSIUM SERPL-SCNC: 4 MMOL/L (ref 3.5–5.2)
PROT SERPL-MCNC: 5.5 G/DL (ref 6–8.5)
RBC # BLD AUTO: 3.46 10*6/MM3 (ref 4.14–5.8)
SODIUM SERPL-SCNC: 135 MMOL/L (ref 136–145)
WBC # BLD AUTO: 1.72 10*3/MM3 (ref 3.4–10.8)

## 2021-09-25 PROCEDURE — 80053 COMPREHEN METABOLIC PANEL: CPT | Performed by: INTERNAL MEDICINE

## 2021-09-25 PROCEDURE — 88305 TISSUE EXAM BY PATHOLOGIST: CPT | Performed by: INTERNAL MEDICINE

## 2021-09-25 PROCEDURE — 85025 COMPLETE CBC W/AUTO DIFF WBC: CPT | Performed by: INTERNAL MEDICINE

## 2021-09-25 PROCEDURE — 25010000002 PROPOFOL 10 MG/ML EMULSION: Performed by: ANESTHESIOLOGY

## 2021-09-25 PROCEDURE — 45380 COLONOSCOPY AND BIOPSY: CPT | Performed by: INTERNAL MEDICINE

## 2021-09-25 RX ORDER — MULTIVIT WITH MINERALS/LUTEIN
1000 TABLET ORAL DAILY
COMMUNITY

## 2021-09-25 RX ORDER — PROPOFOL 10 MG/ML
VIAL (ML) INTRAVENOUS AS NEEDED
Status: DISCONTINUED | OUTPATIENT
Start: 2021-09-25 | End: 2021-09-25 | Stop reason: SURG

## 2021-09-25 RX ORDER — NIACIN 500 MG
500 TABLET ORAL NIGHTLY
COMMUNITY

## 2021-09-25 RX ORDER — THIAMINE HCL 100 MG
500 TABLET ORAL 2 TIMES DAILY
COMMUNITY

## 2021-09-25 RX ORDER — LIDOCAINE HYDROCHLORIDE 20 MG/ML
INJECTION, SOLUTION INFILTRATION; PERINEURAL AS NEEDED
Status: DISCONTINUED | OUTPATIENT
Start: 2021-09-25 | End: 2021-09-25 | Stop reason: SURG

## 2021-09-25 RX ORDER — SODIUM CHLORIDE, SODIUM LACTATE, POTASSIUM CHLORIDE, CALCIUM CHLORIDE 600; 310; 30; 20 MG/100ML; MG/100ML; MG/100ML; MG/100ML
30 INJECTION, SOLUTION INTRAVENOUS CONTINUOUS PRN
Status: DISCONTINUED | OUTPATIENT
Start: 2021-09-25 | End: 2021-09-27 | Stop reason: HOSPADM

## 2021-09-25 RX ORDER — PROPOFOL 10 MG/ML
VIAL (ML) INTRAVENOUS CONTINUOUS PRN
Status: DISCONTINUED | OUTPATIENT
Start: 2021-09-25 | End: 2021-09-25 | Stop reason: SURG

## 2021-09-25 RX ORDER — EPHEDRINE SULFATE 50 MG/ML
INJECTION, SOLUTION INTRAVENOUS AS NEEDED
Status: DISCONTINUED | OUTPATIENT
Start: 2021-09-25 | End: 2021-09-25 | Stop reason: SURG

## 2021-09-25 RX ADMIN — EPHEDRINE SULFATE 35 MG: 50 INJECTION INTRAVENOUS at 08:26

## 2021-09-25 RX ADMIN — SODIUM CHLORIDE, POTASSIUM CHLORIDE, SODIUM LACTATE AND CALCIUM CHLORIDE 30 ML/HR: 600; 310; 30; 20 INJECTION, SOLUTION INTRAVENOUS at 08:01

## 2021-09-25 RX ADMIN — Medication 100 MCG/KG/MIN: at 08:22

## 2021-09-25 RX ADMIN — LIDOCAINE HYDROCHLORIDE 100 MG: 20 INJECTION, SOLUTION INFILTRATION; PERINEURAL at 08:16

## 2021-09-25 RX ADMIN — EPHEDRINE SULFATE 15 MG: 50 INJECTION INTRAVENOUS at 08:21

## 2021-09-25 RX ADMIN — PROPOFOL 150 MG: 10 INJECTION, EMULSION INTRAVENOUS at 08:17

## 2021-09-25 NOTE — DISCHARGE INSTRUCTIONS
For the next 24 hours patient needs to be with a responsible adult.    For 24 hours DO NOT drive, operate machinery, appliances, drink alcohol, make important decisions or sign legal documents.    Start with a light or bland diet if you are feeling sick to your stomach otherwise advance to regular diet as tolerated.    Follow recommendations on procedure report if provided by your doctor.    Call Dr Jenkins for problems [phone]    Problems may include but not limited to: large amounts of bleeding, trouble breathing, repeated vomiting, severe unrelieved pain, fever or chills.              WHAT ARE DIVERTICULOSIS AND DIVERTICULITIS?  Many people have small pouches in their colons that bulge outward through weak spots, like an inner tube that pokes through weak places in a tire.  Each pouch is called a diverticulum.  The condition of having diverticula is DIVERTICULOSIS.  The condition becomes more common as people age.  About half of all people over the age of 60 have diverticulosis    When pouches become infected or inflamed, the condition is called DIVERTICULITIS.  This happens in 10% to 25% of people with diverticulosis.  Diverticulosis and diverticulitis are also called DIVERTICULAR DISEASE.     WHAT ARE THE SYMPTOMS?  Diverticulosis - Most people do not have any discomfort or symptoms.  However, symptoms may include mild cramps, bloating, and constipation.  Other diseases such as irritable bowel syndrome (IBS) and stomach ulcers cause similar problems, so these symptoms do not always mean a person has diverticulosis.  You should visit your doctor if you have these troubling symptoms.    Diverticulitis - The most common symptom is abdominal pain.  The most common sign is tenderness around the left side of the lower abdomen.  If infection is the cause, fever, nausea, vomiting, chills, cramping, and constipation may occur as well.  The severity depends on the extent of the infection and complications.    WHAT ARE  THE COMPLICATIONS?  Diverticulitis can lead to bleeding, infections,perforations or tears, or blockages.  These complications always require treatment to prevent them from proggressing and causing serous illness.    Bleeding from a diverticula is a rare complication.  When this occurs, blood may appear in the toilet or in your stool.  Bleeding can be severe, but it may stop by itself and not require treatment.  Doctors believe bleeding diverticula are caused by a small blood vessel in a diverticulum that weakens and finally bursts.  If you have bleeding from the rectum, you should see your doctor.  If the bleeding does not stop you may need surgery.    Abscess, Perforation, and Peritonitis - The infection causing diverticulitis often clears up after a few days of treatment with antibiotics.  If the condition gets worse, an abscess may form in the colon.  An abscess is an infected area with pus that may cause swelling and destroy tissue.  Sometimes the infected diverticula may develop small holes, called perforations.  These perforations allow pus to leak out of the colon into the abdominal area.  If the abscess is small and remains in the colon, it may clear up after treatment with antibiotics.  If not, the doctor may need to drain it.  A large abscess can become a serious problem if the infection leaks out and contaminates areas outside the colon.  Infection that spreads into the abdominal cavity is called peritonitis.  Peritonitis requires immediate surgery toclean the abdominal cavity and remove the damaged part of the colon.  Without surgery, peritonitis can be fatal.    FISTULA  A fistula is an abnormal connection of tissue between two organs or between an organ and the skin.  When damaged tissues come into contact with each other during infection, they sometimes stick together.  If they heal that way, a fistula forms.  When diverticulitis-related infection spreads through out the colon, the colon's tissue may  stick to nearby tissues.  The organs usually involved are the bladder, small intestine, and skin.  The problem can be corrected with surgery to remove the fistula and affected part of the colon.    INTESTINAL OBSTRUCTION  The scarring caused by infection may cause partial or total blockage of the large intestine.  When this happens, the colon is unable to move bowel contents normally.  When the obstruction totally blocks the intestine, emergency surgery is necessary.  Partial blockage is not an emergency, so the surgery to correct it can be planned.    WHAT CAUSES DIVERTICULAR DISEASE  Although not proven, the dominant theory is that a low-fiber diet is the main cause of diverticular disease.  The disease was first noticed in the [place] in the early 1900s.  At about the same time, processed foods were introduced into the American diet.  Many processed foods contain refined, low-fiber flour.  Unlike whole-wheat flour, refined flour has no wheat bran.    Diverticular disease is common in developed or industrialized countries-particularly the [place], [place], and [place]-where low-fiber diets are common.  The disease is rare in countries of [place] and [place], where people eat high-fiber vegetable diets.    Fiber is the part of fruits, vegetables, and whole grains that the body cannot digest.  Some fiber dissolves easily in water (soluble fiber).  It takes on a soft, jelly-like texture in the intestines.  Some fiber passes almost unchanged through the intestines (insoluble fiber).  Both kinds of fiber help make stools soft and easy to pass.  Fiber also prevents constipation.    Constipation makes the muscles strain to move stool that is too hard.  It is the main cause of increased pressure in the colon.  This excess pressure might cause the weak spots in the colon to bulge out and become diverticula.  Diverticulitis occurs when diverticula become infected or inflamed.  Doctors are not certain what causes  the infection.  It may begin when stool or bacteria are caught in the diverticula.  An attack of diverticulitis can develop suddenly and without warning.    HOW DOES THE DOCTOR DIAGNOSE DIVERTICULAR DISEASE  The doctor asks about medical history, does a physical exam, and may perform one or more diagnostic tests.  Because most people do not have symptoms, diverticulosis is often found through tests ordered for another ailment.    When taking a medical history, the doctor may ask about bowel habits, symptoms, pain, diet, and medications.  The physical exam usually involves a digital rectal exam.  To preform this test. The doctor inserts a gloved, lubricated finger into the rectum to detect tenderness, blockage, or blood.  The doctor may check stool for signs of bleeding and test blood for signs of infection.  The doctor may also order x-rays or other tests.    WHAT IS THE TREATMENT FOR DIVERTICULAR DISEASE  Increasing the amount of fiber in the diet may reduce symptoms of diverticulosis and prevent complications such as diverticulitis.  Fiber keeps stool soft and lowers pressure inside the colon so that bowel contents can move through easily.  The American Dietetic Association. Recommends 20 to 35 grams of fiber each day.  The doctor may also recommend taking a fiber product such as Citrucel or Metamucil once a dya.  These products are mixed water and provide about 2 to 3.5 grams of fiber per  Tablespoon, mixed with 8 ounces of water.    Avoidance of nuts, popcorn, and sunflower, pumpkin, darlene, and sesame seeds has been recommended by physicians out of fear that food particles could enter, block, or irritate the diverticula.  However, no scientific data support this treatment measure.  Eating a high-fiber diet is the only requirement highly emphasized across the medical literature.  Eliminating specific foods is not necessary.  The seeds in tomatoes, zucchini, cucumbers, strawberries, and raspberries, as well as  poppy seeds, are generally considered harmless.  People differ in amounts and types of foods the can eat.  Decisions about diet should be made based on what works best for each person.  Keeping a food diary may help identify what foods may cause symptoms.    If cramps, bloating, and constipation are problems, the doctor may prescribe  Short course of pain medication.  However, many medications affect emptying of the colon, an undesirable side effect for people with diverticulosis.    DIVERTICULITIS  Treatment focuses on clearing up the infection and inflammation, resting the colon, and preventing or minimizing complications.  An attack of diverticulitis without complications may respond to antibiotics within a few days if treated early.  To help the colon rest, the doctor may recommend bed rest and a liquid diet, along with a pain reliever.    An acute attack with severe pain or sever infection may require a hospital stay.  Most acute cases of diverticulitis are treated with antibiotics and a liquid diet.  The antibiotics are given by injection into a vein.  In some cases, however, surgery may be necessary.    WHEN IS SURGERY NECESSARY  If attacks are severe or frequent, the doctor may advise surgery.  The surgeon removes the affected part of the colon and joins the remaining sections.  This typed of surgery, called colon resection, aims to keep attacks from coming back and to prevent complications.  The doctor may also recommend surgery for complications of a fistula or intestinal obstruction.    If antibiotics do not correct an attack, emergency surgery may be required.  Other reasons for emergency surgery include a large abscess, perforation, peritonitis, or continued bleeding.    Emergency surgery usually involves 2 operations.  The first will clear the infected abdominal cavity and remove part of the colon.  Because infection and sometimes obstruction, it is not safe to rejoin the colon during the first  operation.  Instead, the surgeon creates a temporary hole, or stoma, in the abdomen.  The end of the colon is connected to the hole, a procedure called a colostomy, to allow normal eating and bowel movements.  The stool goes into a bag attached to the opening in the abdomen.  In the second operation, the surgeon rejoins the ends of the colon.

## 2021-09-25 NOTE — ANESTHESIA POSTPROCEDURE EVALUATION
Patient: Angel Remy    Procedure Summary     Date: 09/25/21 Room / Location:  [facility]    Anesthesia Start: 0814 Anesthesia Stop: 0844    Procedure: COLONOSCOPY TO CECUM AND INTO TI WITH COLD BIOPSY POLYPECTOMIES (N/A ) Diagnosis:       FH: colon cancer      (FH: colon cancer [Z80.0])    Surgeons: Juan Jenkins MD Provider: Franklin Eric MD    Anesthesia Type: MAC ASA Status: 3          Anesthesia Type: MAC    Vitals  Vitals Value Taken Time   /64 09/25/21 0857   Temp     Pulse 56 09/25/21 0857   Resp 16 09/25/21 0857   SpO2 99 % 09/25/21 0857           Post Anesthesia Care and Evaluation    Patient location during evaluation: bedside  Patient participation: complete - patient participated  Level of consciousness: sleepy but conscious  Pain score: 0  Pain management: adequate  Airway patency: patent  Anesthetic complications: No anesthetic complications    Cardiovascular status: acceptable  Respiratory status: acceptable  Hydration status: acceptable    Comments: /64 (BP Location: Left arm, Patient Position: Lying)   Pulse 56   Temp 36.7 °C (98.1 °F) (Oral)   Resp 16   SpO2 99%

## 2021-09-25 NOTE — ANESTHESIA PREPROCEDURE EVALUATION
Anesthesia Evaluation     Patient summary reviewed and Nursing notes reviewed   NPO Solid Status: > 8 hours  NPO Liquid Status: > 2 hours           Airway   Mallampati: II  Neck ROM: full  No difficulty expected  Dental - normal exam     Pulmonary     breath sounds clear to auscultation  Cardiovascular     Rhythm: regular    (+) hypertension, hyperlipidemia,  carotid artery disease      Neuro/Psych  GI/Hepatic/Renal/Endo      Musculoskeletal     Abdominal    Substance History      OB/GYN          Other   blood dyscrasia,   history of cancer      Other Comment: leukemia                  Anesthesia Plan    ASA 3     MAC     intravenous induction     Anesthetic plan, all risks, benefits, and alternatives have been provided, discussed and informed consent has been obtained with: patient.

## 2021-09-25 NOTE — PROGRESS NOTES
09/25/21        MOSES KRUGER did a colonoscopy on Billy Remy and he did well.  After the procedure I drew some lab work.  I am sending this lab work for your information.  Note that his white count is 1.72!!   Thx. kjh

## 2021-09-25 NOTE — H&P
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 His last colonoscopy was in 2/15.     Past Medical History:   Past Medical History:   Diagnosis Date   • H/O Elevated PSA    • H/O Hairy cell leukemia (CMS/HCC) 2012   • H/O Internal thrombosed hemorrhoids    • H/O minimal right carotid plaque    • H/O peripheral neuropathy    • Hyperlipidemia    • Hypertension        Family History:  Family History   Problem Relation Age of Onset   • Cancer Mother 90        Breast    • Hypertension Mother    • Cancer Father         Unknown primary melanoma   • Hypertension Father    • Hypertension Sister        Social History:   reports that he has quit smoking. He smoked 0.50 packs per day. He has never used smokeless tobacco. He reports current alcohol use. He reports that he does not use drugs.    Medications:   No medications prior to admission.       Allergies:  Patient has no known allergies.    ROS:    Pertinent items are noted in HPI     Objective     Blood pressure (!) 183/88, pulse 56, temperature 98.1 °F (36.7 °C), temperature source Oral, resp. rate 16, SpO2 100 %.    Physical Exam   Constitutional: Pt is oriented to person, place, and time and well-developed, well-nourished, and in no distress.   HENT:   Mouth/Throat: Oropharynx is clear and moist.   Neck: Normal range of motion. Neck supple.   Cardiovascular: Normal rate, regular rhythm and normal heart sounds.    Pulmonary/Chest: Effort normal and breath sounds normal. No respiratory distress. No  wheezes.   Abdominal: Soft. Bowel sounds are normal.   Skin: Skin is warm and dry.   Psychiatric: Mood, memory, affect and judgment normal.     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 His last colonoscopy was in 2/15.     Anticipated Surgical Procedure:  Colonoscopy    The risks, benefits, and alternatives of this procedure have been discussed with the patient or the responsible party- the patient understands and agrees to proceed.    Juan Jenkins M.D.

## 2021-09-27 ENCOUNTER — LAB (OUTPATIENT)
Dept: LAB | Facility: HOSPITAL | Age: 76
End: 2021-09-27

## 2021-09-27 ENCOUNTER — TELEPHONE (OUTPATIENT)
Dept: ONCOLOGY | Facility: CLINIC | Age: 76
End: 2021-09-27

## 2021-09-27 DIAGNOSIS — C91.41 HAIRY CELL LEUKEMIA, IN REMISSION (HCC): Primary | ICD-10-CM

## 2021-09-27 DIAGNOSIS — C91.41 HAIRY CELL LEUKEMIA, IN REMISSION (HCC): ICD-10-CM

## 2021-09-27 LAB
BASOPHILS # BLD AUTO: 0 10*3/MM3 (ref 0–0.2)
BASOPHILS NFR BLD AUTO: 0 % (ref 0–1.5)
DEPRECATED RDW RBC AUTO: 46.5 FL (ref 37–54)
EOSINOPHIL # BLD AUTO: 0.01 10*3/MM3 (ref 0–0.4)
EOSINOPHIL NFR BLD AUTO: 0.3 % (ref 0.3–6.2)
ERYTHROCYTE [DISTWIDTH] IN BLOOD BY AUTOMATED COUNT: 12.6 % (ref 12.3–15.4)
HCT VFR BLD AUTO: 36.3 % (ref 37.5–51)
HGB BLD-MCNC: 12.7 G/DL (ref 13–17.7)
IMM GRANULOCYTES # BLD AUTO: 0 10*3/MM3 (ref 0–0.05)
IMM GRANULOCYTES NFR BLD AUTO: 0 % (ref 0–0.5)
LYMPHOCYTES # BLD AUTO: 1.34 10*3/MM3 (ref 0.7–3.1)
LYMPHOCYTES NFR BLD AUTO: 45 % (ref 19.6–45.3)
MCH RBC QN AUTO: 34.9 PG (ref 26.6–33)
MCHC RBC AUTO-ENTMCNC: 35 G/DL (ref 31.5–35.7)
MCV RBC AUTO: 99.7 FL (ref 79–97)
MONOCYTES # BLD AUTO: 0.1 10*3/MM3 (ref 0.1–0.9)
MONOCYTES NFR BLD AUTO: 3.4 % (ref 5–12)
NEUTROPHILS NFR BLD AUTO: 1.53 10*3/MM3 (ref 1.7–7)
NEUTROPHILS NFR BLD AUTO: 51.3 % (ref 42.7–76)
NRBC BLD AUTO-RTO: 0 /100 WBC (ref 0–0.2)
PLATELET # BLD AUTO: 120 10*3/MM3 (ref 140–450)
PMV BLD AUTO: 10 FL (ref 6–12)
RBC # BLD AUTO: 3.64 10*6/MM3 (ref 4.14–5.8)
WBC # BLD AUTO: 2.98 10*3/MM3 (ref 3.4–10.8)

## 2021-09-27 PROCEDURE — 85025 COMPLETE CBC W/AUTO DIFF WBC: CPT

## 2021-09-27 PROCEDURE — 36415 COLL VENOUS BLD VENIPUNCTURE: CPT | Performed by: INTERNAL MEDICINE

## 2021-09-27 NOTE — TELEPHONE ENCOUNTER
Called pt and informed him of the need to come in for CBC today. He is able to come in today. Orders placed and pt sent to scheduling.   ----- Message from Paco Jeffers MD sent at 9/26/2021  8:10 PM EDT -----  Please see about this patient 9/27/2021, needs repeat CBC approximately noon.  Thanks, ZACHARY

## 2021-09-27 NOTE — TELEPHONE ENCOUNTER
----- Message from Paco Jeffers MD sent at 9/26/2021  8:10 PM EDT -----  Please see about this patient 9/27/2021, needs repeat CBC approximately noon.  Thanks, ZACHARY

## 2021-09-28 LAB
CYTO UR: NORMAL
LAB AP CASE REPORT: NORMAL
PATH REPORT.FINAL DX SPEC: NORMAL
PATH REPORT.GROSS SPEC: NORMAL

## 2021-09-29 ENCOUNTER — TELEPHONE (OUTPATIENT)
Dept: GASTROENTEROLOGY | Facility: CLINIC | Age: 76
End: 2021-09-29

## 2021-09-29 NOTE — TELEPHONE ENCOUNTER
----- Message from Juan Jeknins MD sent at 9/29/2021 12:50 PM EDT -----  09/29/21  I talked to Dr. Remy about the results of the pathology from his recent colonoscopy.  I would recommend that he consider a repeat colonoscopy in 3 to 5 years.  Please fax a copy of this report to his PCP.  Thx. kjh

## 2021-09-29 NOTE — PROGRESS NOTES
09/29/21  I talked to Dr. Remy about the results of the pathology from his recent colonoscopy.  I would recommend that he consider a repeat colonoscopy in 3 to 5 years.  Please fax a copy of this report to his PCP.  Jaquelin. kjh

## 2022-02-07 ENCOUNTER — DOCUMENTATION (OUTPATIENT)
Dept: ONCOLOGY | Facility: CLINIC | Age: 77
End: 2022-02-07

## 2022-02-07 ENCOUNTER — LAB (OUTPATIENT)
Dept: LAB | Facility: HOSPITAL | Age: 77
End: 2022-02-07

## 2022-02-07 DIAGNOSIS — C91.41 HAIRY CELL LEUKEMIA, IN REMISSION: ICD-10-CM

## 2022-02-07 DIAGNOSIS — C91.41 HAIRY CELL LEUKEMIA, IN REMISSION: Primary | ICD-10-CM

## 2022-02-07 LAB
BASOPHILS # BLD AUTO: 0 10*3/MM3 (ref 0–0.2)
BASOPHILS NFR BLD AUTO: 0 % (ref 0–1.5)
DEPRECATED RDW RBC AUTO: 46.9 FL (ref 37–54)
EOSINOPHIL # BLD AUTO: 0.03 10*3/MM3 (ref 0–0.4)
EOSINOPHIL NFR BLD AUTO: 0.8 % (ref 0.3–6.2)
ERYTHROCYTE [DISTWIDTH] IN BLOOD BY AUTOMATED COUNT: 12.7 % (ref 12.3–15.4)
HCT VFR BLD AUTO: 37.9 % (ref 37.5–51)
HGB BLD-MCNC: 13.2 G/DL (ref 13–17.7)
IMM GRANULOCYTES # BLD AUTO: 0.01 10*3/MM3 (ref 0–0.05)
IMM GRANULOCYTES NFR BLD AUTO: 0.3 % (ref 0–0.5)
LYMPHOCYTES # BLD AUTO: 2.14 10*3/MM3 (ref 0.7–3.1)
LYMPHOCYTES NFR BLD AUTO: 59.8 % (ref 19.6–45.3)
MCH RBC QN AUTO: 34.9 PG (ref 26.6–33)
MCHC RBC AUTO-ENTMCNC: 34.8 G/DL (ref 31.5–35.7)
MCV RBC AUTO: 100.3 FL (ref 79–97)
MONOCYTES # BLD AUTO: 0.14 10*3/MM3 (ref 0.1–0.9)
MONOCYTES NFR BLD AUTO: 3.9 % (ref 5–12)
NEUTROPHILS NFR BLD AUTO: 1.26 10*3/MM3 (ref 1.7–7)
NEUTROPHILS NFR BLD AUTO: 35.2 % (ref 42.7–76)
NRBC BLD AUTO-RTO: 0 /100 WBC (ref 0–0.2)
PLATELET # BLD AUTO: 126 10*3/MM3 (ref 140–450)
PMV BLD AUTO: 9.4 FL (ref 6–12)
RBC # BLD AUTO: 3.78 10*6/MM3 (ref 4.14–5.8)
WBC NRBC COR # BLD: 3.58 10*3/MM3 (ref 3.4–10.8)

## 2022-02-07 PROCEDURE — 36415 COLL VENOUS BLD VENIPUNCTURE: CPT

## 2022-02-07 PROCEDURE — 88182 CELL MARKER STUDY: CPT

## 2022-02-07 PROCEDURE — 85025 COMPLETE CBC W/AUTO DIFF WBC: CPT

## 2022-02-07 PROCEDURE — 88184 FLOWCYTOMETRY/ TC 1 MARKER: CPT

## 2022-02-07 PROCEDURE — 88185 FLOWCYTOMETRY/TC ADD-ON: CPT

## 2022-02-07 NOTE — PROGRESS NOTES
Flow Cytometry ordered from Sept not drawn. Per Dr. Jeffers, he would like to have this done prior to him being seen on 2/10. Called pt and he said he could come over today to have this done. Informed pt and he v/u.

## 2022-02-08 LAB — REF LAB TEST METHOD: NORMAL

## 2022-02-10 ENCOUNTER — APPOINTMENT (OUTPATIENT)
Dept: LAB | Facility: HOSPITAL | Age: 77
End: 2022-02-10

## 2022-02-10 ENCOUNTER — OFFICE VISIT (OUTPATIENT)
Dept: ONCOLOGY | Facility: CLINIC | Age: 77
End: 2022-02-10

## 2022-02-10 VITALS
TEMPERATURE: 97.5 F | BODY MASS INDEX: 23.64 KG/M2 | RESPIRATION RATE: 18 BRPM | OXYGEN SATURATION: 99 % | HEIGHT: 69 IN | WEIGHT: 159.6 LBS | HEART RATE: 59 BPM | SYSTOLIC BLOOD PRESSURE: 187 MMHG | DIASTOLIC BLOOD PRESSURE: 92 MMHG

## 2022-02-10 DIAGNOSIS — C91.41 HAIRY CELL LEUKEMIA, IN REMISSION: Primary | ICD-10-CM

## 2022-02-10 PROCEDURE — 99214 OFFICE O/P EST MOD 30 MIN: CPT | Performed by: INTERNAL MEDICINE

## 2022-02-10 PROCEDURE — 36415 COLL VENOUS BLD VENIPUNCTURE: CPT | Performed by: INTERNAL MEDICINE

## 2022-02-10 NOTE — PROGRESS NOTES
Subjective Patient with excellent performance status, no directed symptoms per relapse, discussed findings including flow cytometry is consistent with remission.    REASONS FOR FOLLOWUP:   1. Hairy cell leukemia.   2. Admission 05/11/2012 through 05/17/12 for 7 day continuous infusion cladribine (2-CdA) without complication.   3. Patient seen 5 months post treatment in complete remission.   4. Patient seen 8 months post treatment with continued complete remission, every 3 month reassessment per CBC planned, 6 month review by MD scheduled.   5. Patient seen at 15 months without evidence of recurrent disease, every 6 month followup planned.   6. The patient was seen 02/05/2014 with no evidence of recurrent disease, 6-month followup planned.   7. Patient seen 07/31/2014, stable with no evidence of recurrent disease, every 6 month followup.   8. Patient seen 01/15/2015, stable without recurrent disease, 6-month CBC planned, yearly followup scheduled.   9. Patient seen 02/04/2016, stable, ?early peripheral neuropathy developing distally per feet, no evidence for recurrent disease per hairy cell leukemia.  10. Patient reviewed September 02, 2016, previous July CBC with mild thrombocytopenia developing, recheck September 2 normalized, every 6 month follow-ups anticipated  11. Patient reviewed February 02, 2017, status post negative prostate biopsy, mild thrombocytopenia again recognized  12. Patient reviewed July 20, 2017, hematologically stable, every 6 month follow-up CBCs planned, yearly M.D. Assessment  13. Patient seen July 26, 2018 after recent airline travel any cold symptoms  14. Patient reviewed January 10, 2019, no additional symptoms, hematologically stable  15. Patient reviewed July 25, 2019, stable, six-month follow-up as planned  16. Patient viewed January 16, 2020, stable, every 6 months CBC, MD assessment yearly planned  17. Reassessment January 21, 2021, no evidence of recurrence disease  18. Patient  assessed 2/10/2022, evidence of continued remission noted both on clinical examination, peripheral blood smear review and flow cytometric evaluation.       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 He is usually followed by Dr. Vuong for a history of hypertension, hyperlipidemia, minimal right carotid plaque as well as elevated PSA.       He had exams done on 1-09-12 for routine followup. This included normal serum chemistry including LFTs, cholesterol 131, triglyceride 34, HDL 51, and LDL 73. CBC revealed H&H 13.3, 37.9%, WBC 3300, platelet count 110,000, 26% polys and 74% lymphs with A NC of 0.9.       Dr. Remy provides information when initially seen with studies from as far back as 1- at which time hemoglobin and hematocrit was 14.4 and 42.1%, WBC 6950, platelets 227,000 with 47% polys, 42% lymph; this was automated. The additional test includ es approximately every year to every other year thereafter though it was noted in January 2010 WBC dropped from an average of approximately 5,000-6,000 to 3600. Hemoglobin and hematocrit 13.6 and 39.7%. Platelets 150,000-170,000 with ANC beginning in Ja nuary 2010 at 1900.       We were contacted per the above findings and asked Dr. Remy to undergo further assessment including flow cytometric exam per peripheral blood. This revealed no evidence of abnormal myeloid maturation increased blast population. No evidence of lymphopro l iferative disorder. The patient was asked to be seen formally in the office now and comes in today for further assessment. Dr. Remy indicates that he has taken a number of medications in an attempt to improve his health. Several homeopathic medication s include vitamin C, folate, flaxseed oil, fish oil, vitamin E, selenium, pomegranate tabs, Co-enzyme Q, vitamin D, niacin, and red yeast rice daily. He has discontinued these and  stays on those described below.       He feels well with no additional problem s and states that he has no little or no symptoms that he can detail, whether this is just ill health in anyway, though he does have occasional hemorrhoidal pain. He also notes rare palpitations and occasional epistaxis when the ambient air has dried sub stantially. No fevers, chills, weight loss, night sweats, or other constitutional symptoms.       As a result of the above, the patient was asked to undergo a series of studies. This went onto include rheumatoid factor of 6, MARYURI screen negative, negative s misael protein electrophoresis, normal quantitative immunoglobulins, CMV IgG of 2.9, IgM less than .9, EBV IgM of less than .9 and BCA IgG antibody of 4.6. These are consistent with previous exposures. HIV was negative, CRSP less than .1, sedimentation ra t e of 4, iron 125, TIBC 297, 14% saturation, and ferritin of 77. Peripheral blood flow cytometry was negative for evidence of abnormal myeloid maturation, increased blast population or lymphoproliferative disorder. As the patient is seen back today, he i s continuing to feel well though is obviously greatly concerned about his followup counts. Reviews in the office had been planned to follow him briefly recheck performance 3/29/12 with an H&H of 12.9, 36.1, WBC 3900, 32 polys, 62 lymphs, platelet count 94 , 000, IPF slightly elevated at 8.8. The patient was therefore asked to return and to be further assessed with bone marrow aspirate and biopsy. He now presents to do so. He has had no additional symptoms since last seen. He has discontinued his Crestor use.       As a result of his review the patient underwent bone marrow aspiration and biopsy. This revealed evidence of lymphocytic infiltrate reviewed here in the office. 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  Eventually cytogenetics was found to be negative with no evidence of cyclin-D1/IgH or BCL-2/IgH rearrangement. Additional tissue was requested. As a result, the patient was notifi alesha leon of these findings by personal visit, and plans were made to proceed with endoscopy, ? mantle cell involvement. At the time of this dictation, this was not yet performed. He was also asked to undergo CT of the neck, chest, abdomen and pelvis which show ed no evidence of abnormality. PET scan 04/13/12 showed increased activity within the marrow, but no additional abnormalities including the spleen, with no background bowel or gastric activity as well.       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 hairy cell leukemia. This has been discussed in great detail with the alexander thakur at [facility] and additional stains are pending as is the patient's GI assessment now to be pursued through Dr. Ray.       His case was again reviewed over quite a period of time, and ultimately his marrow was signed out as 80% involved with hairy cell leukemia - normal cytogenetics, no evidence of cyclin D1/IgH or BCL-2/IgH rearrangement. Dr. Remy was advised of the treatment options which include followup with no immediate therapy, and/or the use of cladribine or pentostatin. After numerous di s cussion, he ultimately elected to try to proceed with treatment when he is feeling as well as he is to improve his ability to withstand the therapy itself. We therefore began to discuss the treatment schedule, which also could be somewhat variable, inclu d ing 7-day infusions, 2-hr infusion q.d. x five days or every other week infusion. After discussion he wishes to proceed with 7-day infusion. When we attempted to do this as an outpatient we found out thereafter that this  would not be covered as an outpa tient infusion through his insurance. Therefore we have made plans for him to be admitted after he is seen in office 05/10/12.       The patient was thereafter admitted 05/11-05/17/12. PICC line was placed and he initiated treatment at 0.1 mg/kg or 7.7 mg IV in fusion over seven days. The patient fortunately had no serious issues at all during the seven days and continued to work out on a daily basis. He was seen by his internist as well with some of his meds adjusted including his HCTZ and Ziac. He was stabl e at discharge, but was given Neulasta 6 mg subcutaneously 24 hours after he completed treatment. He has been having counts done on a regular basis, and returns back today with only minimal evidence of neutropenia at his becca, and has an excellent periph eral smear today - normal findings. His performance status remains excellent as well.       The patient when seen on 6/7/12 was felt to have improved substantially. We followed him for weekly counts and plans at that point were for him to take a trip out of the country. He did actually take this trip, which had him eventually hiking at 11-12,000 feet without any complication or ill effect. As he returns back today on 7/19 he feels well and again with an excellent performance status. He has had no fever, c hills or suggestion of infection or complications thus far. He remains again, with an excellent performance status.       The patient thereafter is asked to have his counts done on an every six weeks basis, now seen three months later with a normal CBC as well as examination. He again feels quite well with a completely normal performance status.       The patient was asked to return for followup studies that included normal serum chemistries, unchanged lipid profile and stable hematologic findings. He is now seen on 2/7/13, 4 months from his last appointment. He continues to do well with unchanged performance status.       The  patient was asked to have counts done q3 months seeing the physician in six months. He is now seen back 08/22/2013 feeling well having [place] with [place]. He had no additional fevers, sweats, chills, significant change in weight although he has tried to lose some additional pounds and we see this today. He continues to exercise regularl y and dietary program. Review of his smears again continues to demonstrate a complete remission.       Today, the patient was asked to be seen back in 6 months for followup, and is now seen 02/05/2014, continuing to feel well. Again, review of his smears and physical examination fortunately demonstrates no evidence of recurrent disease.       The patient was asked to be seen back in 6 months for followup and is now seen on 07/31/2014. Again he feels well with an excellent performance status and no change since last reviewed.       The patient thereafter was now seen back 2015, 2016 stable at both visits with no evidence of recurrence of his hairy cell leukemia. He feels well but when now reviewed 02/04/2016, he has had some degree of peripheral neuropathy? This improv es with activity and it is certainly not limiting his action or his function.   Patient now reviewed back again September 02, 2016, continues to have an excellent performance status and hematologically remain stable brother had some concern about thrombocytopenia last visit.    The patient is next seen February 02, 2017.  He has not had any medical issues since last seen and remains physically quite well.  He does describe following with urology and undergoing a repeat biopsy recently when his PSA was above 7.  His findings evidently were negative for malignancy though he did have post procedure hematuria.  This is also now abated.  The patient is next seen July 28, 2017.  He continues to have an excellent performance status and no particular difficulty in  day-to-day function and/or pursuing active vacations.      The patient is next seen July 26, 2018.  He had recently returned from a trip to [place].  After experiencing an airplane ride with multiple coughing passengers he himself developed a cold, particularly severe over the last 3-4 days with cough and congestion.  He's had low-grade fever which is now beginning to resolve but is clearly uncomfortable.    The patient's next seen January 10, 2019.  He has continued to travel without issues and is feeling well when reviewed today.  Plans were made for usual follow-up and the patient is next seen July 25, 2019.  His performance status remains excellent though he has lost some weight and indicates he did this per altering his diet.  His stamina and other usual activities are continued unabated.  The patient is next seen January 16, 2020.  He continues an active practice and lifestyle.  He has lost some additional weight on purpose through diet.    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    The patient's follow-up laboratory studies demonstrated by late September degree of leukopenia, unchanged thrombocytopenia and this was followed with repeat analysis within the last several weeks as he seen February 10, 2022. Flow cytometry failed to demonstrate any new process.  As you seen February 10, 2020 his performance status remains excellent, stable weight, appetite, no additional fever, chills, rash, night sweats.       Past Medical History:   Diagnosis Date   • H/O Elevated PSA    • H/O Hairy cell leukemia (CMS/HCC) 2012   • H/O Internal thrombosed hemorrhoids    • H/O minimal right carotid plaque    • H/O peripheral neuropathy    • Hyperlipidemia    • Hypertension        ONCOLOGIC HISTORY:  (History from previous dates can be found in the separate document.)    Current  Outpatient Medications on File Prior to Visit   Medication Sig Dispense Refill   • acyclovir (ZOVIRAX) 400 MG tablet Take 1 tablet by mouth 2 (two) times a day. Take no more than 5 doses a day. 60 tablet 2   • amLODIPine (NORVASC) 2.5 MG tablet      • aspirin 81 MG chewable tablet Chew 81 mg Daily.     • bisoprolol-hydrochlorothiazide (ZIAC) 10-6.25 MG per tablet Take 1 tablet by mouth Daily.     • Calcium-Magnesium-Vitamin D (CALCIUM 1200+D3 PO) Take  by mouth.     • hepatitis A (HAVRIX) 1440 EL U/ML vaccine Inject  into the shoulder, thigh, or buttocks. 1 mL 0   • hepatitis A (HAVRIX) 1440 EL U/ML vaccine Inject  into the appropriate muscle as directed by prescriber. 1 mL 0   • Influenza Vac High-Dose Quad (Fluzone High-Dose Quadrivalent) 0.7 ML suspension prefilled syringe injection Inject 0.7 mL into the appropriate muscle as directed by prescriber. 0.7 mL 0   • Magnesium 500 MG tablet Take 500 mg by mouth 2 (two) times a day.     • niacin 500 MG tablet Take 500 mg by mouth Every Night.     • propafenone (RHTHYMOL) 150 MG tablet Take 1 tablet by mouth 3 (three) times a day.     • ramipril (ALTACE) 10 MG capsule TK ONE C PO QD  2   • rosuvastatin (CRESTOR) 10 MG tablet Take 10 mg by mouth Daily.     • vitamin C (ASCORBIC ACID) 250 MG tablet Take 1,000 mg by mouth Daily. With madisyn hips     • vitamin D3 125 MCG (5000 UT) capsule capsule Take 5,000 Units by mouth Daily.       No current facility-administered medications on file prior to visit.       ALLERGIES:   No Known Allergies    Social History     Socioeconomic History   • Marital status:      Spouse name: Hanna   Tobacco Use   • Smoking status: Former Smoker     Packs/day: 0.50   • Smokeless tobacco: Never Used   • Tobacco comment: Quit smoking many years ago.   Vaping Use   • Vaping Use: Never used   Substance and Sexual Activity   • Alcohol use: Yes     Comment: 1 glass of wine or beer per day   • Drug use: No   • Sexual activity: Defer      "    Cancer-related family history includes Cancer in his father; Cancer (age of onset: 90) in his mother.      Review of Systems   Constitutional: Positive for fever. Negative for fatigue.   HENT: Negative.    Eyes: Negative.    Respiratory: Negative for chest tightness, shortness of breath and wheezing.    Gastrointestinal: Negative for constipation, diarrhea, nausea and vomiting.   Endocrine: Negative.    Musculoskeletal: Negative.    Skin: Negative.    Neurological: Negative.  Negative for weakness.        Vitals:    02/10/22 1524   BP: (!) 187/92   Pulse: 59   Resp: 18   Temp: 97.5 °F (36.4 °C)   TempSrc: Temporal   SpO2: 99%   Weight: 72.4 kg (159 lb 9.6 oz)   Height: 175.3 cm (69\")   PainSc: 0-No pain     Current Status 2/10/2022   ECOG score 0       Physical Exam      GENERAL: Well-developed, well-nourished male in no acute distress.   SKIN: Warm, dry, without new rash or lesion  HEAD: Normocephalic.   EYES: Pupils equal, round and reactive to light, EOMs intact. Conjunctivae normal.   EARS: Hearing intact.   NOSE: Septum midline. No excoriations or nasal discharge.   MOUTH: Tongue is well-papillated; no stomatitis or ulcers. Lips normal.  dry mucous membranes, clear sinus drainage noted  THROAT: Oropharynx without lesions or exudates.   NECK: Supple with good range of motion; no thyromegaly or masses, no JVD or bruits.   LYMPHATICS: No cervical, supraclavicular, axillary or inguinal adenopathy.   CHEST: Lungs clear to percussion and auscultation.   CARDIAC: Regular rate and rhythm without murmurs, rubs or gallops.   ABDOMEN: Soft, nontender with no evidence hepatosplenomegaly or masses  EXTREMITIES: No clubbing, cyanosis or edema.   NEUROLOGICAL: No focal neurological deficits.       RECENT LABS:  Hematology WBC   Date Value Ref Range Status   02/07/2022 3.58 3.40 - 10.80 10*3/mm3 Final     RBC   Date Value Ref Range Status   02/07/2022 3.78 (L) 4.14 - 5.80 10*6/mm3 Final     Hemoglobin   Date Value Ref " Range Status   02/07/2022 13.2 13.0 - 17.7 g/dL Final     Hematocrit   Date Value Ref Range Status   02/07/2022 37.9 37.5 - 51.0 % Final     Platelets   Date Value Ref Range Status   02/07/2022 126 (L) 140 - 450 10*3/mm3 Final        Assessment/Plan        A 76-year-old male with the diagnosis of hairy cell leukemia in April 2012. He was treated 5/11-5/17/ 12 with cladribine being given as continuous infusion at 0.1 mg/kg or 7.7 mg/24-hours x7 days. He did well during hospitalization with little toxicity and minimal myelosuppression. He has gone onto obtain a complete remission and this continues to be th e case at a 6 month visit now 07/31/2014. The patient has been now assessed on a regular basis every 6 months with no evidence of hematologic deterioration. As he is seen today, 02/04/2016, and now again September 02, 2016 he remains clinically stable as well.      He is now assessed February 2 and overall doing well without any additional issues except for the prostate described as above.  He is now recognized there is also possibly hypertensive and I'll contact him about this is afternoon though during our conversation he had few if any symptoms.?.  Pending our review will ask him to have a repeat CBC at 3 months to see that physician in 6 months.    The patient indicates when called about his blood pressure that he often experiences white coat hypertension.  He is going to recheck his blood pressure night and notify me if it remains elevated.     He is next seen July 28, 2017, hematologically stable, remaining quite active.      As he is again reviewed July 2018 he has had recent viral exposure and is slowly working through a cold.  This appears to have suppressed his marrow very modestly though review of the smear shows no evidence of hairy cells though there are atypical lymphocytes thought to be virally activated.  At this point we'll have a follow-up CBC in 4 weeks and have him see the physician again in 6  months .    The patient is next seen January 10, 2019 doing well with no hematologic deterioration.  Plan to see back in 6 months for follow-up.  The patient is next seen July 25, 2019.  He does continue to do extremely well and we discussed his weight loss which appears to be dietarily produced.  His exam is not indicative of progressive disease or new disorder developing.  We have agreed to have him reassessed in 6-month follow-ups.  As he is seen back January 16, 2020 there are no findings of concern though he has lost weight purposely.  It is felt that we can reduce his visits for MD assessment but continue every 6 month CBCs.  This remains the case as he is assessed January 21, 2021.     The patient's follow-up laboratory studies demonstrated by late September 2021, a degree of leukopenia, unchanged thrombocytopenia and this was followed with repeat analysis within the last several weeks as he seen February 10, 2022. Flow cytometry failed to demonstrate any new process.  As you seen February 10, 2020 his performance status remains excellent, stable weight, appetite, no additional fever, chills, rash, night sweats.  As he is reexamined including peripheral blood smears 2/10/2022 there is no suggestion of additional lymphadenopathy hepatosplenomegaly.  We have discussed a fourth COVID 19 vaccination considering his history and will clarify his status by COVID 19 antibodies in office today.    Plan:    *Return to laboratory today for SARS-CoV-2 semiquantitative antibodies    *6-month CBC RN evaluation    *12 months, MD assessment, CBC    *Advised patient of additional availability of other medications including Lumoxiti recently released for treatment of hairy cell leukemia

## 2022-02-11 ENCOUNTER — TELEPHONE (OUTPATIENT)
Dept: ONCOLOGY | Facility: CLINIC | Age: 77
End: 2022-02-11

## 2022-02-11 LAB
SARS-COV-2 AB SERPL IA-ACNC: >2500 U/ML
SARS-COV-2 AB SERPL-IMP: POSITIVE

## 2022-02-17 ENCOUNTER — TELEPHONE (OUTPATIENT)
Dept: ONCOLOGY | Facility: CLINIC | Age: 77
End: 2022-02-17

## 2022-02-17 NOTE — TELEPHONE ENCOUNTER
Caller: DR. KOTHARI    Relationship to patient: Self    Best call back number: [phone]    Chief complaint: CANC. RN REVIEW BUT NO LAB APPT.    Type of visit: RN REVIEW    When is the original appointment: 7/28/2022    Additional notes: I WOULD NORMALLY CANC. THIS BUT WANTED YOU ALL TO KNOW SO SENDING TO YOU SO YOY KNOW WHY.  DR. KOTHARI IS OKAY WITH THE LAB BUT STATES HE DOES NOT DO THE RN REVIEW.

## 2022-06-20 ENCOUNTER — TRANSCRIBE ORDERS (OUTPATIENT)
Dept: ADMINISTRATIVE | Facility: HOSPITAL | Age: 77
End: 2022-06-20

## 2022-06-20 ENCOUNTER — LAB (OUTPATIENT)
Dept: LAB | Facility: HOSPITAL | Age: 77
End: 2022-06-20

## 2022-06-20 DIAGNOSIS — Z01.818 PRE-OP EXAM: Primary | ICD-10-CM

## 2022-06-20 DIAGNOSIS — Z01.818 PRE-OP EXAM: ICD-10-CM

## 2022-06-20 LAB
DEPRECATED RDW RBC AUTO: 44.9 FL (ref 37–54)
ERYTHROCYTE [DISTWIDTH] IN BLOOD BY AUTOMATED COUNT: 12.9 % (ref 12.3–15.4)
HCT VFR BLD AUTO: 34.5 % (ref 37.5–51)
HGB BLD-MCNC: 12.4 G/DL (ref 13–17.7)
MCH RBC QN AUTO: 35.3 PG (ref 26.6–33)
MCHC RBC AUTO-ENTMCNC: 35.9 G/DL (ref 31.5–35.7)
MCV RBC AUTO: 98.3 FL (ref 79–97)
PLATELET # BLD AUTO: 103 10*3/MM3 (ref 140–450)
PMV BLD AUTO: 10.2 FL (ref 6–12)
RBC # BLD AUTO: 3.51 10*6/MM3 (ref 4.14–5.8)
WBC NRBC COR # BLD: 2.75 10*3/MM3 (ref 3.4–10.8)

## 2022-06-20 PROCEDURE — 85027 COMPLETE CBC AUTOMATED: CPT

## 2022-06-20 PROCEDURE — 36415 COLL VENOUS BLD VENIPUNCTURE: CPT

## 2022-06-22 ENCOUNTER — LAB (OUTPATIENT)
Dept: LAB | Facility: HOSPITAL | Age: 77
End: 2022-06-22

## 2022-06-22 ENCOUNTER — TRANSCRIBE ORDERS (OUTPATIENT)
Dept: ADMINISTRATIVE | Facility: HOSPITAL | Age: 77
End: 2022-06-22

## 2022-06-22 DIAGNOSIS — Z01.818 OTHER SPECIFIED PRE-OPERATIVE EXAMINATION: Primary | ICD-10-CM

## 2022-06-22 LAB — SARS-COV-2 ORF1AB RESP QL NAA+PROBE: NOT DETECTED

## 2022-06-22 PROCEDURE — C9803 HOPD COVID-19 SPEC COLLECT: HCPCS | Performed by: INTERNAL MEDICINE

## 2022-06-22 PROCEDURE — U0005 INFEC AGEN DETEC AMPLI PROBE: HCPCS | Performed by: INTERNAL MEDICINE

## 2022-06-22 PROCEDURE — U0004 COV-19 TEST NON-CDC HGH THRU: HCPCS | Performed by: INTERNAL MEDICINE

## 2022-06-22 RX ORDER — BIMATOPROST 0.3 MG/ML
1 SOLUTION/ DROPS OPHTHALMIC NIGHTLY
COMMUNITY
Start: 2021-05-27

## 2022-06-23 ENCOUNTER — ANESTHESIA EVENT (OUTPATIENT)
Dept: PERIOP | Facility: HOSPITAL | Age: 77
End: 2022-06-23

## 2022-06-23 ENCOUNTER — ANESTHESIA (OUTPATIENT)
Dept: PERIOP | Facility: HOSPITAL | Age: 77
End: 2022-06-23

## 2022-06-23 ENCOUNTER — HOSPITAL ENCOUNTER (OUTPATIENT)
Facility: HOSPITAL | Age: 77
Setting detail: HOSPITAL OUTPATIENT SURGERY
Discharge: HOME | End: 2022-06-23
Attending: PLASTIC SURGERY | Admitting: PLASTIC SURGERY

## 2022-06-23 VITALS
RESPIRATION RATE: 14 BRPM | WEIGHT: 152 LBS | BODY MASS INDEX: 21.76 KG/M2 | OXYGEN SATURATION: 98 % | DIASTOLIC BLOOD PRESSURE: 75 MMHG | TEMPERATURE: 97.4 F | SYSTOLIC BLOOD PRESSURE: 157 MMHG | HEIGHT: 70 IN | HEART RATE: 57 BPM

## 2022-06-23 DIAGNOSIS — C44.91 BASAL CELL CARCINOMA: ICD-10-CM

## 2022-06-23 PROCEDURE — 88305 TISSUE EXAM BY PATHOLOGIST: CPT | Performed by: PLASTIC SURGERY

## 2022-06-23 PROCEDURE — 25010000002 PROPOFOL 10 MG/ML EMULSION: Performed by: NURSE ANESTHETIST, CERTIFIED REGISTERED

## 2022-06-23 PROCEDURE — 25010000002 SUCCINYLCHOLINE PER 20 MG: Performed by: NURSE ANESTHETIST, CERTIFIED REGISTERED

## 2022-06-23 PROCEDURE — 88331 PATH CONSLTJ SURG 1 BLK 1SPC: CPT | Performed by: PLASTIC SURGERY

## 2022-06-23 PROCEDURE — 25010000002 DEXAMETHASONE PER 1 MG: Performed by: NURSE ANESTHETIST, CERTIFIED REGISTERED

## 2022-06-23 PROCEDURE — 25010000002 CEFAZOLIN IN DEXTROSE 2-4 GM/100ML-% SOLUTION: Performed by: PLASTIC SURGERY

## 2022-06-23 PROCEDURE — 25010000002 FENTANYL CITRATE (PF) 50 MCG/ML SOLUTION: Performed by: NURSE ANESTHETIST, CERTIFIED REGISTERED

## 2022-06-23 PROCEDURE — 25010000002 ONDANSETRON PER 1 MG: Performed by: NURSE ANESTHETIST, CERTIFIED REGISTERED

## 2022-06-23 RX ORDER — NALOXONE HCL 0.4 MG/ML
0.2 VIAL (ML) INJECTION AS NEEDED
Status: DISCONTINUED | OUTPATIENT
Start: 2022-06-23 | End: 2022-06-23 | Stop reason: HOSPADM

## 2022-06-23 RX ORDER — LIDOCAINE HYDROCHLORIDE 20 MG/ML
INJECTION, SOLUTION INFILTRATION; PERINEURAL AS NEEDED
Status: DISCONTINUED | OUTPATIENT
Start: 2022-06-23 | End: 2022-06-23 | Stop reason: SURG

## 2022-06-23 RX ORDER — LIDOCAINE HYDROCHLORIDE AND EPINEPHRINE 10; 10 MG/ML; UG/ML
INJECTION, SOLUTION INFILTRATION; PERINEURAL AS NEEDED
Status: DISCONTINUED | OUTPATIENT
Start: 2022-06-23 | End: 2022-06-23 | Stop reason: HOSPADM

## 2022-06-23 RX ORDER — IBUPROFEN 600 MG/1
600 TABLET ORAL ONCE AS NEEDED
Status: DISCONTINUED | OUTPATIENT
Start: 2022-06-23 | End: 2022-06-23 | Stop reason: HOSPADM

## 2022-06-23 RX ORDER — GLYCOPYRROLATE 0.2 MG/ML
INJECTION INTRAMUSCULAR; INTRAVENOUS AS NEEDED
Status: DISCONTINUED | OUTPATIENT
Start: 2022-06-23 | End: 2022-06-23 | Stop reason: SURG

## 2022-06-23 RX ORDER — HYDROCODONE BITARTRATE AND ACETAMINOPHEN 7.5; 325 MG/1; MG/1
1 TABLET ORAL ONCE AS NEEDED
Status: DISCONTINUED | OUTPATIENT
Start: 2022-06-23 | End: 2022-06-23 | Stop reason: HOSPADM

## 2022-06-23 RX ORDER — DEXAMETHASONE SODIUM PHOSPHATE 10 MG/ML
INJECTION INTRAMUSCULAR; INTRAVENOUS AS NEEDED
Status: DISCONTINUED | OUTPATIENT
Start: 2022-06-23 | End: 2022-06-23 | Stop reason: SURG

## 2022-06-23 RX ORDER — EPHEDRINE SULFATE 50 MG/ML
INJECTION, SOLUTION INTRAVENOUS AS NEEDED
Status: DISCONTINUED | OUTPATIENT
Start: 2022-06-23 | End: 2022-06-23 | Stop reason: SURG

## 2022-06-23 RX ORDER — ONDANSETRON 2 MG/ML
4 INJECTION INTRAMUSCULAR; INTRAVENOUS ONCE AS NEEDED
Status: DISCONTINUED | OUTPATIENT
Start: 2022-06-23 | End: 2022-06-23 | Stop reason: HOSPADM

## 2022-06-23 RX ORDER — HYDRALAZINE HYDROCHLORIDE 20 MG/ML
5 INJECTION INTRAMUSCULAR; INTRAVENOUS
Status: DISCONTINUED | OUTPATIENT
Start: 2022-06-23 | End: 2022-06-23 | Stop reason: HOSPADM

## 2022-06-23 RX ORDER — DIPHENHYDRAMINE HCL 25 MG
25 CAPSULE ORAL
Status: DISCONTINUED | OUTPATIENT
Start: 2022-06-23 | End: 2022-06-23 | Stop reason: HOSPADM

## 2022-06-23 RX ORDER — HYDROMORPHONE HYDROCHLORIDE 1 MG/ML
0.5 INJECTION, SOLUTION INTRAMUSCULAR; INTRAVENOUS; SUBCUTANEOUS
Status: DISCONTINUED | OUTPATIENT
Start: 2022-06-23 | End: 2022-06-23 | Stop reason: HOSPADM

## 2022-06-23 RX ORDER — SODIUM CHLORIDE 0.9 % (FLUSH) 0.9 %
3 SYRINGE (ML) INJECTION EVERY 12 HOURS SCHEDULED
Status: DISCONTINUED | OUTPATIENT
Start: 2022-06-23 | End: 2022-06-23 | Stop reason: HOSPADM

## 2022-06-23 RX ORDER — HYDROCODONE BITARTRATE AND ACETAMINOPHEN 7.5; 325 MG/1; MG/1
1 TABLET ORAL EVERY 6 HOURS PRN
Qty: 12 TABLET | Refills: 0 | Status: SHIPPED | OUTPATIENT
Start: 2022-06-23

## 2022-06-23 RX ORDER — FLUMAZENIL 0.1 MG/ML
0.2 INJECTION INTRAVENOUS AS NEEDED
Status: DISCONTINUED | OUTPATIENT
Start: 2022-06-23 | End: 2022-06-23 | Stop reason: HOSPADM

## 2022-06-23 RX ORDER — EPHEDRINE SULFATE 50 MG/ML
5 INJECTION, SOLUTION INTRAVENOUS ONCE AS NEEDED
Status: DISCONTINUED | OUTPATIENT
Start: 2022-06-23 | End: 2022-06-23 | Stop reason: HOSPADM

## 2022-06-23 RX ORDER — PROMETHAZINE HYDROCHLORIDE 25 MG/1
25 SUPPOSITORY RECTAL ONCE AS NEEDED
Status: DISCONTINUED | OUTPATIENT
Start: 2022-06-23 | End: 2022-06-23 | Stop reason: HOSPADM

## 2022-06-23 RX ORDER — CEFAZOLIN SODIUM 2 G/100ML
2 INJECTION, SOLUTION INTRAVENOUS ONCE
Status: COMPLETED | OUTPATIENT
Start: 2022-06-23 | End: 2022-06-23

## 2022-06-23 RX ORDER — FENTANYL CITRATE 50 UG/ML
50 INJECTION, SOLUTION INTRAMUSCULAR; INTRAVENOUS
Status: DISCONTINUED | OUTPATIENT
Start: 2022-06-23 | End: 2022-06-23 | Stop reason: HOSPADM

## 2022-06-23 RX ORDER — OXYCODONE AND ACETAMINOPHEN 7.5; 325 MG/1; MG/1
1 TABLET ORAL EVERY 4 HOURS PRN
Status: DISCONTINUED | OUTPATIENT
Start: 2022-06-23 | End: 2022-06-23 | Stop reason: HOSPADM

## 2022-06-23 RX ORDER — ONDANSETRON 2 MG/ML
INJECTION INTRAMUSCULAR; INTRAVENOUS AS NEEDED
Status: DISCONTINUED | OUTPATIENT
Start: 2022-06-23 | End: 2022-06-23 | Stop reason: SURG

## 2022-06-23 RX ORDER — MAGNESIUM HYDROXIDE 1200 MG/15ML
LIQUID ORAL AS NEEDED
Status: DISCONTINUED | OUTPATIENT
Start: 2022-06-23 | End: 2022-06-23 | Stop reason: HOSPADM

## 2022-06-23 RX ORDER — LABETALOL HYDROCHLORIDE 5 MG/ML
5 INJECTION, SOLUTION INTRAVENOUS
Status: DISCONTINUED | OUTPATIENT
Start: 2022-06-23 | End: 2022-06-23 | Stop reason: HOSPADM

## 2022-06-23 RX ORDER — SODIUM CHLORIDE 0.9 % (FLUSH) 0.9 %
3-10 SYRINGE (ML) INJECTION AS NEEDED
Status: DISCONTINUED | OUTPATIENT
Start: 2022-06-23 | End: 2022-06-23 | Stop reason: HOSPADM

## 2022-06-23 RX ORDER — ROCURONIUM BROMIDE 10 MG/ML
INJECTION, SOLUTION INTRAVENOUS AS NEEDED
Status: DISCONTINUED | OUTPATIENT
Start: 2022-06-23 | End: 2022-06-23 | Stop reason: SURG

## 2022-06-23 RX ORDER — PROPOFOL 10 MG/ML
VIAL (ML) INTRAVENOUS AS NEEDED
Status: DISCONTINUED | OUTPATIENT
Start: 2022-06-23 | End: 2022-06-23 | Stop reason: SURG

## 2022-06-23 RX ORDER — SODIUM CHLORIDE, SODIUM LACTATE, POTASSIUM CHLORIDE, CALCIUM CHLORIDE 600; 310; 30; 20 MG/100ML; MG/100ML; MG/100ML; MG/100ML
9 INJECTION, SOLUTION INTRAVENOUS CONTINUOUS
Status: DISCONTINUED | OUTPATIENT
Start: 2022-06-23 | End: 2022-06-23 | Stop reason: HOSPADM

## 2022-06-23 RX ORDER — DIPHENHYDRAMINE HYDROCHLORIDE 50 MG/ML
12.5 INJECTION INTRAMUSCULAR; INTRAVENOUS
Status: DISCONTINUED | OUTPATIENT
Start: 2022-06-23 | End: 2022-06-23 | Stop reason: HOSPADM

## 2022-06-23 RX ORDER — SUCCINYLCHOLINE CHLORIDE 20 MG/ML
INJECTION INTRAMUSCULAR; INTRAVENOUS AS NEEDED
Status: DISCONTINUED | OUTPATIENT
Start: 2022-06-23 | End: 2022-06-23 | Stop reason: SURG

## 2022-06-23 RX ORDER — PROMETHAZINE HYDROCHLORIDE 25 MG/1
25 TABLET ORAL ONCE AS NEEDED
Status: DISCONTINUED | OUTPATIENT
Start: 2022-06-23 | End: 2022-06-23 | Stop reason: HOSPADM

## 2022-06-23 RX ORDER — CEFAZOLIN SODIUM 1 G/50ML
1 INJECTION, SOLUTION INTRAVENOUS ONCE
Status: DISCONTINUED | OUTPATIENT
Start: 2022-06-23 | End: 2022-06-23 | Stop reason: DRUGHIGH

## 2022-06-23 RX ORDER — LIDOCAINE HYDROCHLORIDE 10 MG/ML
0.5 INJECTION, SOLUTION EPIDURAL; INFILTRATION; INTRACAUDAL; PERINEURAL ONCE AS NEEDED
Status: DISCONTINUED | OUTPATIENT
Start: 2022-06-23 | End: 2022-06-23 | Stop reason: HOSPADM

## 2022-06-23 RX ORDER — FENTANYL CITRATE 50 UG/ML
INJECTION, SOLUTION INTRAMUSCULAR; INTRAVENOUS AS NEEDED
Status: DISCONTINUED | OUTPATIENT
Start: 2022-06-23 | End: 2022-06-23 | Stop reason: SURG

## 2022-06-23 RX ADMIN — CEFAZOLIN SODIUM 2 G: 2 INJECTION, SOLUTION INTRAVENOUS at 12:13

## 2022-06-23 RX ADMIN — PROPOFOL 150 MG: 10 INJECTION, EMULSION INTRAVENOUS at 12:23

## 2022-06-23 RX ADMIN — ONDANSETRON 4 MG: 2 INJECTION INTRAMUSCULAR; INTRAVENOUS at 13:03

## 2022-06-23 RX ADMIN — DEXAMETHASONE SODIUM PHOSPHATE 4 MG: 10 INJECTION INTRAMUSCULAR; INTRAVENOUS at 12:28

## 2022-06-23 RX ADMIN — FENTANYL CITRATE 25 MCG: 50 INJECTION INTRAMUSCULAR; INTRAVENOUS at 13:32

## 2022-06-23 RX ADMIN — LIDOCAINE HYDROCHLORIDE 70 MG: 20 INJECTION, SOLUTION INFILTRATION; PERINEURAL at 12:23

## 2022-06-23 RX ADMIN — FENTANYL CITRATE 25 MCG: 50 INJECTION INTRAMUSCULAR; INTRAVENOUS at 12:36

## 2022-06-23 RX ADMIN — PROPOFOL 30 MG: 10 INJECTION, EMULSION INTRAVENOUS at 12:24

## 2022-06-23 RX ADMIN — FENTANYL CITRATE 50 MCG: 50 INJECTION INTRAMUSCULAR; INTRAVENOUS at 12:21

## 2022-06-23 RX ADMIN — ROCURONIUM BROMIDE 5 MG: 50 INJECTION INTRAVENOUS at 12:23

## 2022-06-23 RX ADMIN — SODIUM CHLORIDE, POTASSIUM CHLORIDE, SODIUM LACTATE AND CALCIUM CHLORIDE 9 ML/HR: 600; 310; 30; 20 INJECTION, SOLUTION INTRAVENOUS at 11:23

## 2022-06-23 RX ADMIN — EPHEDRINE SULFATE 10 MG: 50 INJECTION INTRAVENOUS at 13:01

## 2022-06-23 RX ADMIN — GLYCOPYRROLATE 0.2 MG: 0.2 INJECTION INTRAMUSCULAR; INTRAVENOUS at 12:42

## 2022-06-23 RX ADMIN — EPHEDRINE SULFATE 10 MG: 50 INJECTION INTRAVENOUS at 13:20

## 2022-06-23 RX ADMIN — SUCCINYLCHOLINE CHLORIDE 140 MG: 20 INJECTION, SOLUTION INTRAMUSCULAR; INTRAVENOUS; PARENTERAL at 12:23

## 2022-06-23 RX ADMIN — EPHEDRINE SULFATE 10 MG: 50 INJECTION INTRAVENOUS at 12:51

## 2022-06-23 RX ADMIN — EPHEDRINE SULFATE 10 MG: 50 INJECTION INTRAVENOUS at 12:28

## 2022-06-23 NOTE — ANESTHESIA POSTPROCEDURE EVALUATION
"Patient: Angel Remy    Procedure Summary     Date: 06/23/22 Room / Location:  [facility]    Anesthesia Start: 1217 Anesthesia Stop: 1349    Procedure: EXCISION BASAL CELL CARCINOMA NOSE WITH FROZEN SECTION FULL THICKNESS GRAFT (N/A ) Diagnosis: (BCC nose)    Surgeons: Arturo Weiss MD Provider: Ron Torres MD    Anesthesia Type: general ASA Status: 2          Anesthesia Type: general    Vitals  Vitals Value Taken Time   /76 06/23/22 1431   Temp 36.3 °C (97.4 °F) 06/23/22 1430   Pulse 59 06/23/22 1434   Resp 16 06/23/22 1430   SpO2 98 % 06/23/22 1434   Vitals shown include unvalidated device data.        Post Anesthesia Care and Evaluation    Patient location during evaluation: bedside  Patient participation: complete - patient participated  Level of consciousness: awake  Pain management: adequate    Airway patency: patent  Anesthetic complications: No anesthetic complications  PONV Status: controlled  Cardiovascular status: acceptable  Respiratory status: acceptable  Hydration status: acceptable    Comments: /75 (BP Location: Right arm, Patient Position: Lying)   Pulse 57   Temp 36.3 °C (97.4 °F) (Temporal)   Resp 14   Ht 177.8 cm (70\")   Wt 68.9 kg (152 lb)   SpO2 98%   BMI 21.81 kg/m²         "

## 2022-06-23 NOTE — ANESTHESIA PREPROCEDURE EVALUATION
Anesthesia Evaluation     Patient summary reviewed and Nursing notes reviewed   no history of anesthetic complications:  NPO Solid Status: > 8 hours  NPO Liquid Status: > 2 hours           Airway   Mallampati: II  TM distance: >3 FB  Neck ROM: full  Dental      Pulmonary    Cardiovascular     (+) hypertension, hyperlipidemia,       Neuro/Psych  GI/Hepatic/Renal/Endo      Musculoskeletal     Abdominal    Substance History      OB/GYN          Other      history of cancer                    Anesthesia Plan    ASA 2     general     intravenous induction     Anesthetic plan, risks, benefits, and alternatives have been provided, discussed and informed consent has been obtained with: patient.        CODE STATUS:

## 2022-06-23 NOTE — BRIEF OP NOTE
EXCISION MASS HEAD NECK  Progress Note    Angel Remy  6/23/2022    Pre-op Diagnosis:   BCC nose       Post-Op Diagnosis Codes:  Same    Procedure/CPT® Codes:        Procedure(s):  EXCISION BASAL CELL CARCINOMA NOSE WITH FROZEN SECTION FULL THICKNESS GRAFT    Surgeon(s):  Arturo Weiss MD    Anesthesia: General    Staff:   Circulator: Andria Chery RN  Scrub Person: Jasmin Alvarez         Estimated Blood Loss: minimal    Urine Voided: * No values recorded between 6/23/2022 12:00 AM and 6/23/2022 12:21 PM *    Specimens:                A: BCC nose          Drains: * No LDAs found *    Findings: BCC tip of nose        Complications: None          Arturo Weiss MD     Date: 6/23/2022  Time: 12:21 EDT

## 2022-06-23 NOTE — H&P
Patient is a 76-year-old male with a biopsy-proven BCC on the tip of the nose.    Medical history includes hypertension, arrhythmia and hairy cell leukemia.  He is a non-smoker and has no known drug allergies.    On examination he is 5 foot 10 inches tall and weighs 152 pounds.  He is alert and oriented and in no distress.  Chest is clear, heart sounds are normal with a dual rhythm and no bruits.  There is a biopsy site on the tip of the nose.    Diagnosis is basal cell carcinoma tip of the nose and plan of management is wide excision with frozen section and full-thickness graft reconstruction.

## 2022-06-23 NOTE — ANESTHESIA PROCEDURE NOTES
Airway  Urgency: elective    Date/Time: 6/23/2022 12:25 PM  Airway not difficult    General Information and Staff    Patient location during procedure: OR  Anesthesiologist: Ron Torres MD  CRNA/CAA: Kiara Del Rio CRNA    Indications and Patient Condition  Indications for airway management: airway protection    Preoxygenated: yes  MILS not maintained throughout  Mask difficulty assessment: 1 - vent by mask    Final Airway Details  Final airway type: endotracheal airway      Successful airway: ETT  Cuffed: yes   Successful intubation technique: direct laryngoscopy  Facilitating devices/methods: intubating stylet and anterior pressure/BURP  Endotracheal tube insertion site: oral  Blade: Markus  Blade size: 4  ETT size (mm): 7.5  Cormack-Lehane Classification: grade I - full view of glottis  Placement verified by: chest auscultation and capnometry   Measured from: lips  ETT/EBT  to lips (cm): 23  Number of attempts at approach: 1  Assessment: lips, teeth, and gum same as pre-op and atraumatic intubation

## 2022-06-23 NOTE — OP NOTE
Lesion Excision Procedure Note    Indications: patient complains of a lesion on the tip of the nose, which has been growing and producing discomfort.  Dermatological biopsy shows BCC with marginal involvement.    Pre-operative Diagnosis:  BCC (basal cell carcinoma),       Post-operative Diagnosis:      * BCC (basal cell carcinoma),     Surgeon: Arturo Weiss MD     Assistants: None    Anesthesia: LMA    Procedure Details   The patient was seen in the Holding Room. The patient concurred with the proposed plan, giving informed consent.  The site of surgery properly noted/marked. The patient was taken to Operating Room, identified  and staff verified the following Procedure(s):  Excision of  BCC tip of nose with frozen sections and full-thickness graft   A Time Out was held and the above information confirmed.    The patient was placed lying supine.  The face and left neck were prepped and draped in standard fashion.  One-half percent Marcaine with epinephrine was infiltrated under the skin surrounding a 1 by 1 cm rodent ulcer. The BCC was completely excised and sent for frozen section. Hemostasis was achieved with cautery.  Frozen section showed positive margin at 3:00.  A further excision was performed with negative margins.  A full-thickness skin graft measuring 2 x 2 cm cm was harvested from the left neck.  Hemostasis was secured and the donor site was closed with interrupted and continuous 4-0 Vicryl sutures. The wound was reinforced with Steri-Strips after Mastisol application and dressed with sterile gauze and coverall tape.  The full-thickness graft was defatted and cut to shape and sewn into the nasal defect with 5-0 and 6-0 Vicryl sutures.  A tie-over dressing of Adaptic bacitracin ointment and saline soaked gauze was constructed Bacitracin ointment was applied around the margins of the dressing. At the end of the operation, all sponge, instrument, and needle counts were correct.    Findings:  BCC  nasal tip    Estimated Blood Loss:  Minimal              Specimens:  A:  infiltrating basal cell carcinoma           Complications:  None; patient tolerated the procedure well.           Disposition: PACU - hemodynamically stable.           Condition: stable    Attending Attestation: I was present and scrubbed for the entire procedure.    Arturo Weiss MD

## 2022-06-24 LAB
LAB AP CASE REPORT: NORMAL
Lab: NORMAL
PATH REPORT.FINAL DX SPEC: NORMAL
PATH REPORT.GROSS SPEC: NORMAL

## 2022-07-28 ENCOUNTER — LAB (OUTPATIENT)
Dept: LAB | Facility: HOSPITAL | Age: 77
End: 2022-07-28

## 2022-07-28 DIAGNOSIS — C91.41 HAIRY CELL LEUKEMIA, IN REMISSION: ICD-10-CM

## 2022-07-28 LAB
BASOPHILS # BLD AUTO: 0.01 10*3/MM3 (ref 0–0.2)
BASOPHILS NFR BLD AUTO: 0.4 % (ref 0–1.5)
DEPRECATED RDW RBC AUTO: 46.8 FL (ref 37–54)
EOSINOPHIL # BLD AUTO: 0.02 10*3/MM3 (ref 0–0.4)
EOSINOPHIL NFR BLD AUTO: 0.8 % (ref 0.3–6.2)
ERYTHROCYTE [DISTWIDTH] IN BLOOD BY AUTOMATED COUNT: 12.8 % (ref 12.3–15.4)
HCT VFR BLD AUTO: 34.9 % (ref 37.5–51)
HGB BLD-MCNC: 12.1 G/DL (ref 13–17.7)
IMM GRANULOCYTES # BLD AUTO: 0 10*3/MM3 (ref 0–0.05)
IMM GRANULOCYTES NFR BLD AUTO: 0 % (ref 0–0.5)
LYMPHOCYTES # BLD AUTO: 1.43 10*3/MM3 (ref 0.7–3.1)
LYMPHOCYTES NFR BLD AUTO: 54.8 % (ref 19.6–45.3)
MCH RBC QN AUTO: 34.7 PG (ref 26.6–33)
MCHC RBC AUTO-ENTMCNC: 34.7 G/DL (ref 31.5–35.7)
MCV RBC AUTO: 100 FL (ref 79–97)
MONOCYTES # BLD AUTO: 0.1 10*3/MM3 (ref 0.1–0.9)
MONOCYTES NFR BLD AUTO: 3.8 % (ref 5–12)
NEUTROPHILS NFR BLD AUTO: 1.05 10*3/MM3 (ref 1.7–7)
NEUTROPHILS NFR BLD AUTO: 40.2 % (ref 42.7–76)
NRBC BLD AUTO-RTO: 0 /100 WBC (ref 0–0.2)
PLATELET # BLD AUTO: 101 10*3/MM3 (ref 140–450)
PMV BLD AUTO: 9.5 FL (ref 6–12)
RBC # BLD AUTO: 3.49 10*6/MM3 (ref 4.14–5.8)
WBC NRBC COR # BLD: 2.61 10*3/MM3 (ref 3.4–10.8)

## 2022-07-28 PROCEDURE — 85025 COMPLETE CBC W/AUTO DIFF WBC: CPT

## 2022-07-28 PROCEDURE — 36415 COLL VENOUS BLD VENIPUNCTURE: CPT

## 2022-10-13 ENCOUNTER — TELEPHONE (OUTPATIENT)
Dept: ONCOLOGY | Facility: CLINIC | Age: 77
End: 2022-10-13

## 2022-10-13 NOTE — TELEPHONE ENCOUNTER
Caller: YURIDIA    Relationship to patient:     Best call back number: [phone]    Patient is needing: TO PUSH 2-16-23 LAB AND F/U APPT TO 2-.

## 2023-02-17 DIAGNOSIS — C91.41 HAIRY CELL LEUKEMIA, IN REMISSION: Primary | ICD-10-CM

## 2023-02-23 ENCOUNTER — LAB (OUTPATIENT)
Dept: LAB | Facility: HOSPITAL | Age: 78
End: 2023-02-23
Payer: MEDICARE

## 2023-02-23 ENCOUNTER — OFFICE VISIT (OUTPATIENT)
Dept: ONCOLOGY | Facility: CLINIC | Age: 78
End: 2023-02-23
Payer: MEDICARE

## 2023-02-23 VITALS
SYSTOLIC BLOOD PRESSURE: 184 MMHG | OXYGEN SATURATION: 99 % | DIASTOLIC BLOOD PRESSURE: 80 MMHG | HEIGHT: 70 IN | HEART RATE: 66 BPM | RESPIRATION RATE: 16 BRPM | WEIGHT: 168.4 LBS | TEMPERATURE: 98.2 F | BODY MASS INDEX: 24.11 KG/M2

## 2023-02-23 DIAGNOSIS — C91.41 HAIRY CELL LEUKEMIA, IN REMISSION: ICD-10-CM

## 2023-02-23 DIAGNOSIS — C91.41 HAIRY CELL LEUKEMIA, IN REMISSION: Primary | ICD-10-CM

## 2023-02-23 LAB
DEPRECATED RDW RBC AUTO: 44.1 FL (ref 37–54)
EOSINOPHIL # BLD MANUAL: 0.03 10*3/MM3 (ref 0–0.4)
EOSINOPHIL NFR BLD MANUAL: 1 % (ref 0.3–6.2)
ERYTHROCYTE [DISTWIDTH] IN BLOOD BY AUTOMATED COUNT: 12.6 % (ref 12.3–15.4)
HCT VFR BLD AUTO: 34.7 % (ref 37.5–51)
HGB BLD-MCNC: 12.7 G/DL (ref 13–17.7)
LYMPHOCYTES # BLD MANUAL: 1.35 10*3/MM3 (ref 0.7–3.1)
LYMPHOCYTES NFR BLD MANUAL: 8 % (ref 5–12)
MCH RBC QN AUTO: 34.6 PG (ref 26.6–33)
MCHC RBC AUTO-ENTMCNC: 36.6 G/DL (ref 31.5–35.7)
MCV RBC AUTO: 94.6 FL (ref 79–97)
MONOCYTES # BLD: 0.22 10*3/MM3 (ref 0.1–0.9)
NEUTROPHILS # BLD AUTO: 1.21 10*3/MM3 (ref 1.7–7)
NEUTROPHILS NFR BLD MANUAL: 40 % (ref 42.7–76)
NEUTS BAND NFR BLD MANUAL: 3 % (ref 0–5)
PLAT MORPH BLD: NORMAL
PLATELET # BLD AUTO: 82 10*3/MM3 (ref 140–450)
PMV BLD AUTO: 10.7 FL (ref 6–12)
RBC # BLD AUTO: 3.67 10*6/MM3 (ref 4.14–5.8)
RBC MORPH BLD: NORMAL
VARIANT LYMPHS NFR BLD MANUAL: 48 % (ref 19.6–45.3)
WBC MORPH BLD: NORMAL
WBC NRBC COR # BLD: 2.81 10*3/MM3 (ref 3.4–10.8)

## 2023-02-23 PROCEDURE — 99214 OFFICE O/P EST MOD 30 MIN: CPT | Performed by: INTERNAL MEDICINE

## 2023-02-23 PROCEDURE — 85007 BL SMEAR W/DIFF WBC COUNT: CPT

## 2023-02-23 PROCEDURE — 85025 COMPLETE CBC W/AUTO DIFF WBC: CPT

## 2023-02-23 PROCEDURE — 36415 COLL VENOUS BLD VENIPUNCTURE: CPT

## 2023-02-23 RX ORDER — BIMATOPROST 0.01 %
1 DROPS OPHTHALMIC (EYE)
COMMUNITY
Start: 2022-11-30

## 2023-02-23 RX ORDER — TAMSULOSIN HYDROCHLORIDE 0.4 MG/1
CAPSULE ORAL
COMMUNITY
Start: 2022-10-13

## 2023-02-23 RX ORDER — FINASTERIDE 5 MG/1
TABLET, FILM COATED ORAL
COMMUNITY
Start: 2022-11-10

## 2023-02-23 NOTE — PROGRESS NOTES
Subjective Patient with excellent performance status, no additional symptoms    REASONS FOR FOLLOWUP:   1. Hairy cell leukemia.   2. Admission 05/11/2012 through 05/17/12 for 7 day continuous infusion cladribine (2-CdA) without complication.   3. Patient seen 5 months post treatment in complete remission.   4. Patient seen 8 months post treatment with continued complete remission, every 3 month reassessment per CBC planned, 6 month review by MD scheduled.   5. Patient seen at 15 months without evidence of recurrent disease, every 6 month followup planned.   6. The patient was seen 02/05/2014 with no evidence of recurrent disease, 6-month followup planned.   7. Patient seen 07/31/2014, stable with no evidence of recurrent disease, every 6 month followup.   8. Patient seen 01/15/2015, stable without recurrent disease, 6-month CBC planned, yearly followup scheduled.   9. Patient seen 02/04/2016, stable, ?early peripheral neuropathy developing distally per feet, no evidence for recurrent disease per hairy cell leukemia.  10. Patient reviewed September 02, 2016, previous July CBC with mild thrombocytopenia developing, recheck September 2 normalized, every 6 month follow-ups anticipated  11. Patient reviewed February 02, 2017, status post negative prostate biopsy, mild thrombocytopenia again recognized  12. Patient reviewed July 20, 2017, hematologically stable, every 6 month follow-up CBCs planned, yearly M.D. Assessment  13. Patient seen July 26, 2018 after recent airline travel any cold symptoms  14. Patient reviewed January 10, 2019, no additional symptoms, hematologically stable  15. Patient reviewed July 25, 2019, stable, six-month follow-up as planned  16. Patient viewed January 16, 2020, stable, every 6 months CBC, MD assessment yearly planned  17. Reassessment January 21, 2021, no evidence of recurrence disease  18. Patient assessed 2/10/2022, evidence of continued remission noted both on clinical examination,  peripheral blood smear review and flow cytometric evaluation.  19. 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 He is usually followed by Dr. Vuong for a history of hypertension, hyperlipidemia, minimal right carotid plaque as well as elevated PSA.       He had exams done on 1-09-12 for routine followup. This included normal serum chemistry including LFTs, cholesterol 131, triglyceride 34, HDL 51, and LDL 73. CBC revealed H&H 13.3, 37.9%, WBC 3300, platelet count 110,000, 26% polys and 74% lymphs with A NC of 0.9.       Dr. Remy provides information when initially seen with studies from as far back as 1- at which time hemoglobin and hematocrit was 14.4 and 42.1%, WBC 6950, platelets 227,000 with 47% polys, 42% lymph; this was automated. The additional test includ es approximately every year to every other year thereafter though it was noted in January 2010 WBC dropped from an average of approximately 5,000-6,000 to 3600. Hemoglobin and hematocrit 13.6 and 39.7%. Platelets 150,000-170,000 with ANC beginning in Ja nuary 2010 at 1900.       We were contacted per the above findings and asked Dr. Remy to undergo further assessment including flow cytometric exam per peripheral blood. This revealed no evidence of abnormal myeloid maturation increased blast population. No evidence of lymphopro l iferative disorder. The patient was asked to be seen formally in the office now and comes in today for further assessment. Dr. Remy indicates that he has taken a number of medications in an attempt to improve his health. Several homeopathic medication s include vitamin C, folate, flaxseed oil, fish oil, vitamin E, selenium, pomegranate tabs, Co-enzyme Q, vitamin D, niacin, and red yeast rice  daily. He has discontinued these and stays on those described below.       He feels well with no additional problem s and states that he has no little or no symptoms that he can detail, whether this is just ill health in anyway, though he does have occasional hemorrhoidal pain. He also notes rare palpitations and occasional epistaxis when the ambient air has dried sub stantially. No fevers, chills, weight loss, night sweats, or other constitutional symptoms.       As a result of the above, the patient was asked to undergo a series of studies. This went onto include rheumatoid factor of 6, MARYURI screen negative, negative s misael protein electrophoresis, normal quantitative immunoglobulins, CMV IgG of 2.9, IgM less than .9, EBV IgM of less than .9 and BCA IgG antibody of 4.6. These are consistent with previous exposures. HIV was negative, CRSP less than .1, sedimentation ra t e of 4, iron 125, TIBC 297, 14% saturation, and ferritin of 77. Peripheral blood flow cytometry was negative for evidence of abnormal myeloid maturation, increased blast population or lymphoproliferative disorder. As the patient is seen back today, he i s continuing to feel well though is obviously greatly concerned about his followup counts. Reviews in the office had been planned to follow him briefly recheck performance 3/29/12 with an H&H of 12.9, 36.1, WBC 3900, 32 polys, 62 lymphs, platelet count 94 , 000, IPF slightly elevated at 8.8. The patient was therefore asked to return and to be further assessed with bone marrow aspirate and biopsy. He now presents to do so. He has had no additional symptoms since last seen. He has discontinued his Crestor use.       As a result of his review the patient underwent bone marrow aspiration and biopsy. This revealed evidence of lymphocytic infiltrate reviewed here in the office. Bone marrow biopsy and clot section revealed normocellular marrow involved by CD10 po sitive B cell lymphoma, approximately 8%,  with BCL-1 protein expression. Eventually cytogenetics was found to be negative with no evidence of cyclin-D1/IgH or BCL-2/IgH rearrangement. Additional tissue was requested. As a result, the patient was notifi alesha leon of these findings by personal visit, and plans were made to proceed with endoscopy, ? mantle cell involvement. At the time of this dictation, this was not yet performed. He was also asked to undergo CT of the neck, chest, abdomen and pelvis which show ed no evidence of abnormality. PET scan 04/13/12 showed increased activity within the marrow, but no additional abnormalities including the spleen, with no background bowel or gastric activity as well.       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 hairy cell leukemia. This has been discussed in great detail with the alexander thakur at [facility] and additional stains are pending as is the patient's GI assessment now to be pursued through Dr. Ray.       His case was again reviewed over quite a period of time, and ultimately his marrow was signed out as 80% involved with hairy cell leukemia - normal cytogenetics, no evidence of cyclin D1/IgH or BCL-2/IgH rearrangement. Dr. Remy was advised of the treatment options which include followup with no immediate therapy, and/or the use of cladribine or pentostatin. After numerous di s cussion, he ultimately elected to try to proceed with treatment when he is feeling as well as he is to improve his ability to withstand the therapy itself. We therefore began to discuss the treatment schedule, which also could be somewhat variable, inclu d ing 7-day infusions, 2-hr infusion q.d. x five days or every other week infusion. After discussion he wishes to proceed with 7-day infusion. When we attempted to do this as an outpatient we  found out thereafter that this would not be covered as an outpa tient infusion through his insurance. Therefore we have made plans for him to be admitted after he is seen in office 05/10/12.       The patient was thereafter admitted 05/11-05/17/12. PICC line was placed and he initiated treatment at 0.1 mg/kg or 7.7 mg IV in fusion over seven days. The patient fortunately had no serious issues at all during the seven days and continued to work out on a daily basis. He was seen by his internist as well with some of his meds adjusted including his HCTZ and Ziac. He was stabl e at discharge, but was given Neulasta 6 mg subcutaneously 24 hours after he completed treatment. He has been having counts done on a regular basis, and returns back today with only minimal evidence of neutropenia at his becca, and has an excellent periph eral smear today - normal findings. His performance status remains excellent as well.       The patient when seen on 6/7/12 was felt to have improved substantially. We followed him for weekly counts and plans at that point were for him to take a trip out of the country. He did actually take this trip, which had him eventually hiking at 11-12,000 feet without any complication or ill effect. As he returns back today on 7/19 he feels well and again with an excellent performance status. He has had no fever, c hills or suggestion of infection or complications thus far. He remains again, with an excellent performance status.       The patient thereafter is asked to have his counts done on an every six weeks basis, now seen three months later with a normal CBC as well as examination. He again feels quite well with a completely normal performance status.       The patient was asked to return for followup studies that included normal serum chemistries, unchanged lipid profile and stable hematologic findings. He is now seen on 2/7/13, 4 months from his last appointment. He continues to do well with  unchanged performance status.       The patient was asked to have counts done q3 months seeing the physician in six months. He is now seen back 08/22/2013 feeling well having [place] with that. He had no additional fevers, sweats, chills, significant change in weight although he has tried to lose some additional pounds and we see this today. He continues to exercise regularl y and dietary program. Review of his smears again continues to demonstrate a complete remission.       Today, the patient was asked to be seen back in 6 months for followup, and is now seen 02/05/2014, continuing to feel well. Again, review of his smears and physical examination fortunately demonstrates no evidence of recurrent disease.       The patient was asked to be seen back in 6 months for followup and is now seen on 07/31/2014. Again he feels well with an excellent performance status and no change since last reviewed.       The patient thereafter was now seen back 2015, 2016 stable at both visits with no evidence of recurrence of his hairy cell leukemia. He feels well but when now reviewed 02/04/2016, he has had some degree of peripheral neuropathy? This improv es with activity and it is certainly not limiting his action or his function.   Patient now reviewed back again September 02, 2016, continues to have an excellent performance status and hematologically remain stable brother had some concern about thrombocytopenia last visit.    The patient is next seen February 02, 2017.  He has not had any medical issues since last seen and remains physically quite well.  He does describe following with urology and undergoing a repeat biopsy recently when his PSA was above 7.  His findings evidently were negative for malignancy though he did have post procedure hematuria.  This is also now abated.  The patient is next seen July 28, 2017.  He continues to have an excellent performance status  and no particular difficulty in day-to-day function and/or pursuing active vacations.      The patient is next seen July 26, 2018.  He had recently returned from a trip to [place].  After experiencing an airplane ride with multiple coughing passengers he himself developed a cold, particularly severe over the last 3-4 days with cough and congestion.  He's had low-grade fever which is now beginning to resolve but is clearly uncomfortable.    The patient's next seen January 10, 2019.  He has continued to travel without issues and is feeling well when reviewed today.  Plans were made for usual follow-up and the patient is next seen July 25, 2019.  His performance status remains excellent though he has lost some weight and indicates he did this per altering his diet.  His stamina and other usual activities are continued unabated.  The patient is next seen January 16, 2020.  He continues an active practice and lifestyle.  He has lost some additional weight on purpose through diet.    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    The patient's follow-up laboratory studies demonstrated by late September degree of leukopenia, unchanged thrombocytopenia and this was followed with repeat analysis within the last several weeks as he seen February 10, 2022. Flow cytometry failed to demonstrate any new process.  As you seen February 10, 2020 his performance status remains excellent, stable weight, appetite, no additional fever, chills, rash, night sweats.    The patient is next evaluated 2/23/2023 with an unchanged performance status for mild thrombocytopenia.  He has had some exposure to viral illnesses through his grandchildren as of late but is otherwise felt fine with a unchanged performance status and work schedule.       Past Medical History:   Diagnosis Date   • BCC (basal cell  carcinoma of skin) 06/22/2022    NOSE   • Benign prostate hyperplasia    • H/O Elevated PSA    • H/O Hairy cell leukemia (CMS/HCC) 2012   • H/O Internal thrombosed hemorrhoids    • H/O minimal right carotid plaque    • H/O peripheral neuropathy    • Hyperlipidemia    • Hypertension    • Primary open-angle glaucoma, bilateral, mild stage        ONCOLOGIC HISTORY:  (History from previous dates can be found in the separate document.)    Current Outpatient Medications on File Prior to Visit   Medication Sig Dispense Refill   • acyclovir (ZOVIRAX) 400 MG tablet Take 1 tablet by mouth 2 (two) times a day. Take no more than 5 doses a day. 60 tablet 2   • amLODIPine (NORVASC) 2.5 MG tablet Take 6 mg by mouth Daily.     • bimatoprost (LUMIGAN) 0.03 % ophthalmic drops Administer 1 drop to both eyes Every Night.     • bisoprolol-hydrochlorothiazide (ZIAC) 10-6.25 MG per tablet Take 1 tablet by mouth Daily.     • Calcium-Magnesium-Vitamin D (CALCIUM 1200+D3 PO) Take  by mouth.     • finasteride (PROSCAR) 5 MG tablet      • Lumigan 0.01 % ophthalmic drops Administer 1 drop to both eyes every night at bedtime.     • Magnesium 500 MG tablet Take 500 mg by mouth 2 (two) times a day.     • niacin 500 MG tablet Take 500 mg by mouth Every Night.     • propafenone (RHTHYMOL) 150 MG tablet Take 1 tablet by mouth 3 (three) times a day.     • ramipril (ALTACE) 10 MG capsule Take 10 mg by mouth Daily.  2   • rosuvastatin (CRESTOR) 10 MG tablet Take 10 mg by mouth Daily.     • tamsulosin (FLOMAX) 0.4 MG capsule 24 hr capsule      • vitamin C (ASCORBIC ACID) 250 MG tablet Take 1,000 mg by mouth Daily. With madisyn hips     • vitamin D3 125 MCG (5000 UT) capsule capsule Take 5,000 Units by mouth Daily.     • HYDROcodone-acetaminophen (Norco) 7.5-325 MG per tablet Take 1 tablet by mouth Every 6 (Six) Hours As Needed for Moderate Pain . 12 tablet 0     No current facility-administered medications on file prior to visit.       ALLERGIES:   No Known  "Allergies    Social History     Socioeconomic History   • Marital status:      Spouse name: Hanna   Tobacco Use   • Smoking status: Former     Packs/day: 0.50     Types: Cigarettes   • Smokeless tobacco: Never   • Tobacco comments:     Quit smoking many years ago.   Vaping Use   • Vaping Use: Never used   Substance and Sexual Activity   • Alcohol use: Yes     Comment: 1 glass of wine or beer per day   • Drug use: No   • Sexual activity: Defer         Cancer-related family history includes Cancer in his father; Cancer (age of onset: 90) in his mother.      Review of Systems   Constitutional: Negative for fatigue.   HENT: Negative.    Eyes: Negative.    Respiratory: Negative for chest tightness, shortness of breath and wheezing.    Gastrointestinal: Negative for constipation, diarrhea, nausea and vomiting.   Endocrine: Negative.    Musculoskeletal: Negative.    Skin: Negative.    Neurological: Negative.  Negative for weakness.        Vitals:    02/23/23 1449   BP: (!) 184/80   Pulse: 66   Resp: 16   Temp: 98.2 °F (36.8 °C)   TempSrc: Temporal   SpO2: 99%   Weight: 76.4 kg (168 lb 6.4 oz)   Height: 177.8 cm (70\")   PainSc: 0-No pain     Current Status 2/23/2023   ECOG score 0       Physical Exam      GENERAL: Well-developed, well-nourished male in no acute distress.   SKIN: Warm, dry, without new rash or lesion  HEAD: Normocephalic.   EYES: Pupils equal, round and reactive to light, EOMs intact. Conjunctivae normal.   EARS: Hearing intact.   NOSE: Septum midline. No excoriations or nasal discharge.   MOUTH: Tongue is well-papillated; no stomatitis or ulcers. Lips normal.  dry mucous membranes, clear sinus drainage noted  THROAT: Oropharynx without lesions or exudates.   NECK: Supple with good range of motion; no thyromegaly or masses, no JVD or bruits.   LYMPHATICS: No cervical, supraclavicular, axillary or inguinal adenopathy.   CHEST: Lungs clear to percussion and auscultation.   CARDIAC: Regular rate and " rhythm without murmurs, rubs or gallops.   ABDOMEN: Soft, nontender with no evidence hepatosplenomegaly or masses  EXTREMITIES: No clubbing, cyanosis or edema.   NEUROLOGICAL: No focal neurological deficits.       RECENT LABS:  Hematology WBC   Date Value Ref Range Status   02/23/2023 2.81 (L) 3.40 - 10.80 10*3/mm3 Final     RBC   Date Value Ref Range Status   02/23/2023 3.67 (L) 4.14 - 5.80 10*6/mm3 Final     Hemoglobin   Date Value Ref Range Status   02/23/2023 12.7 (L) 13.0 - 17.7 g/dL Final     Hematocrit   Date Value Ref Range Status   02/23/2023 34.7 (L) 37.5 - 51.0 % Final     Platelets   Date Value Ref Range Status   02/23/2023 82 (L) 140 - 450 10*3/mm3 Final        Assessment & Plan        A 77-year-old male with the diagnosis of hairy cell leukemia in April 2012. He was treated 5/11-5/17/ 12 with cladribine being given as continuous infusion at 0.1 mg/kg or 7.7 mg/24-hours x7 days. He did well during hospitalization with little toxicity and minimal myelosuppression. He has gone onto obtain a complete remission and this continues to be th e case at a 6 month visit now 07/31/2014. The patient has been now assessed on a regular basis every 6 months with no evidence of hematologic deterioration. As he is seen today, 02/04/2016, and now again September 02, 2016 he remains clinically stable as well.      He is now assessed February 2 and overall doing well without any additional issues except for the prostate described as above.  He is now recognized there is also possibly hypertensive and I'll contact him about this is afternoon though during our conversation he had few if any symptoms.?.  Pending our review will ask him to have a repeat CBC at 3 months to see that physician in 6 months.    The patient indicates when called about his blood pressure that he often experiences white coat hypertension.  He is going to recheck his blood pressure night and notify me if it remains elevated.     He is next seen July  28, 2017, hematologically stable, remaining quite active.      As he is again reviewed July 2018 he has had recent viral exposure and is slowly working through a cold.  This appears to have suppressed his marrow very modestly though review of the smear shows no evidence of hairy cells though there are atypical lymphocytes thought to be virally activated.  At this point we'll have a follow-up CBC in 4 weeks and have him see the physician again in 6 months .    The patient is next seen January 10, 2019 doing well with no hematologic deterioration.  Plan to see back in 6 months for follow-up.  The patient is next seen July 25, 2019.  He does continue to do extremely well and we discussed his weight loss which appears to be dietarily produced.  His exam is not indicative of progressive disease or new disorder developing.  We have agreed to have him reassessed in 6-month follow-ups.  As he is seen back January 16, 2020 there are no findings of concern though he has lost weight purposely.  It is felt that we can reduce his visits for MD assessment but continue every 6 month CBCs.  This remains the case as he is assessed January 21, 2021.     The patient's follow-up laboratory studies demonstrated by late September 2021, a degree of leukopenia, unchanged thrombocytopenia and this was followed with repeat analysis within the last several weeks as he seen February 10, 2022. Flow cytometry failed to demonstrate any new process.  As you seen February 10, 2020 his performance status remains excellent, stable weight, appetite, no additional fever, chills, rash, night sweats.  As he is reexamined including peripheral blood smears 2/10/2022 there is no suggestion of additional lymphadenopathy hepatosplenomegaly.  We have discussed a fourth COVID 19 vaccination considering his history and will clarify his status by COVID 19 antibodies in office today.    Patient's subsequent exams did demonstrate responsiveness to COVID-19  vaccinations.  He is now reviewed at 6 and 12 months and with some degree of mild thrombocytopenia noted 2/23/2023 after recent viral exposures.  Peripheral smear does not demonstrate any suggestion of relapse though there are activated lymphocytes noted.    Plan:    *3-month CBC RN evaluation    *6 months MD CBC    *Advised patient of additional availability of other medications including Lumoxiti recently released for treatment of hairy cell leukemia

## 2023-05-23 ENCOUNTER — APPOINTMENT (OUTPATIENT)
Dept: GENERAL RADIOLOGY | Facility: HOSPITAL | Age: 78
DRG: 644 | End: 2023-05-23
Payer: MEDICARE

## 2023-05-23 ENCOUNTER — HOSPITAL ENCOUNTER (INPATIENT)
Facility: HOSPITAL | Age: 78
LOS: 4 days | Discharge: HOME | DRG: 644 | End: 2023-05-27
Attending: EMERGENCY MEDICINE | Admitting: INTERNAL MEDICINE
Payer: MEDICARE

## 2023-05-23 DIAGNOSIS — R11.0 NAUSEA: ICD-10-CM

## 2023-05-23 DIAGNOSIS — E87.1 HYPONATREMIA: Primary | ICD-10-CM

## 2023-05-23 PROBLEM — D64.9 ANEMIA: Status: ACTIVE | Noted: 2023-05-23

## 2023-05-23 PROBLEM — I95.1 ORTHOSTATIC HYPOTENSION: Status: ACTIVE | Noted: 2023-05-23

## 2023-05-23 PROBLEM — I10 SUPINE HYPERTENSION: Status: ACTIVE | Noted: 2023-05-23

## 2023-05-23 PROBLEM — D75.89 MACROCYTOSIS: Status: ACTIVE | Noted: 2023-05-23

## 2023-05-23 LAB
ALBUMIN SERPL-MCNC: 4.3 G/DL (ref 3.5–5.2)
ALBUMIN/GLOB SERPL: 1.9 G/DL
ALP SERPL-CCNC: 58 U/L (ref 39–117)
ALT SERPL W P-5'-P-CCNC: 30 U/L (ref 1–41)
ANION GAP SERPL CALCULATED.3IONS-SCNC: 15.3 MMOL/L (ref 5–15)
AST SERPL-CCNC: 23 U/L (ref 1–40)
B-OH-BUTYR SERPL-SCNC: 0.62 MMOL/L (ref 0.02–0.27)
BACTERIA UR QL AUTO: ABNORMAL /HPF
BASOPHILS # BLD AUTO: 0 10*3/MM3 (ref 0–0.2)
BASOPHILS NFR BLD AUTO: 0 % (ref 0–1.5)
BILIRUB SERPL-MCNC: 2.2 MG/DL (ref 0–1.2)
BILIRUB UR QL STRIP: NEGATIVE
BUN SERPL-MCNC: 10 MG/DL (ref 8–23)
BUN/CREAT SERPL: 14.7 (ref 7–25)
CALCIUM SPEC-SCNC: 8.4 MG/DL (ref 8.6–10.5)
CHLORIDE SERPL-SCNC: 78 MMOL/L (ref 98–107)
CLARITY UR: CLEAR
CO2 SERPL-SCNC: 21.7 MMOL/L (ref 22–29)
COLOR UR: YELLOW
CORTIS SERPL-MCNC: 42.7 MCG/DL
CREAT SERPL-MCNC: 0.68 MG/DL (ref 0.76–1.27)
D-LACTATE SERPL-SCNC: 2.5 MMOL/L (ref 0.5–2)
DEPRECATED RDW RBC AUTO: 46.9 FL (ref 37–54)
EGFRCR SERPLBLD CKD-EPI 2021: 95.7 ML/MIN/1.73
EOSINOPHIL # BLD AUTO: 0 10*3/MM3 (ref 0–0.4)
EOSINOPHIL NFR BLD AUTO: 0 % (ref 0.3–6.2)
ERYTHROCYTE [DISTWIDTH] IN BLOOD BY AUTOMATED COUNT: 13.1 % (ref 12.3–15.4)
GLOBULIN UR ELPH-MCNC: 2.3 GM/DL
GLUCOSE SERPL-MCNC: 148 MG/DL (ref 65–99)
GLUCOSE UR STRIP-MCNC: NEGATIVE MG/DL
HCT VFR BLD AUTO: 36.4 % (ref 37.5–51)
HGB BLD-MCNC: 12.9 G/DL (ref 13–17.7)
HGB UR QL STRIP.AUTO: ABNORMAL
HOLD SPECIMEN: NORMAL
HOLD SPECIMEN: NORMAL
HYALINE CASTS UR QL AUTO: ABNORMAL /LPF
IMM GRANULOCYTES # BLD AUTO: 0.01 10*3/MM3 (ref 0–0.05)
IMM GRANULOCYTES NFR BLD AUTO: 0.3 % (ref 0–0.5)
KETONES UR QL STRIP: ABNORMAL
LEUKOCYTE ESTERASE UR QL STRIP.AUTO: NEGATIVE
LIPASE SERPL-CCNC: 17 U/L (ref 13–60)
LYMPHOCYTES # BLD AUTO: 0.85 10*3/MM3 (ref 0.7–3.1)
LYMPHOCYTES NFR BLD AUTO: 28.1 % (ref 19.6–45.3)
MCH RBC QN AUTO: 34.7 PG (ref 26.6–33)
MCHC RBC AUTO-ENTMCNC: 35.4 G/DL (ref 31.5–35.7)
MCV RBC AUTO: 97.8 FL (ref 79–97)
MONOCYTES # BLD AUTO: 0.06 10*3/MM3 (ref 0.1–0.9)
MONOCYTES NFR BLD AUTO: 2 % (ref 5–12)
NEUTROPHILS NFR BLD AUTO: 2.1 10*3/MM3 (ref 1.7–7)
NEUTROPHILS NFR BLD AUTO: 69.6 % (ref 42.7–76)
NITRITE UR QL STRIP: NEGATIVE
NRBC BLD AUTO-RTO: 0 /100 WBC (ref 0–0.2)
OSMOLALITY SERPL: 246 MOSM/KG (ref 280–301)
OSMOLALITY UR: 458 MOSM/KG
PH UR STRIP.AUTO: 8 [PH] (ref 5–8)
PLATELET # BLD AUTO: 156 10*3/MM3 (ref 140–450)
PMV BLD AUTO: 9.3 FL (ref 6–12)
POTASSIUM SERPL-SCNC: 4.4 MMOL/L (ref 3.5–5.2)
PROT SERPL-MCNC: 6.6 G/DL (ref 6–8.5)
PROT UR QL STRIP: NEGATIVE
QT INTERVAL: 510 MS
RBC # BLD AUTO: 3.72 10*6/MM3 (ref 4.14–5.8)
RBC # UR STRIP: ABNORMAL /HPF
REF LAB TEST METHOD: ABNORMAL
SODIUM SERPL-SCNC: 112 MMOL/L (ref 136–145)
SODIUM SERPL-SCNC: 112 MMOL/L (ref 136–145)
SODIUM SERPL-SCNC: 115 MMOL/L (ref 136–145)
SODIUM UR-SCNC: 138 MMOL/L
SP GR UR STRIP: 1.01 (ref 1–1.03)
SQUAMOUS #/AREA URNS HPF: ABNORMAL /HPF
TSH SERPL DL<=0.05 MIU/L-ACNC: 5.03 UIU/ML (ref 0.27–4.2)
URATE SERPL-MCNC: 1.7 MG/DL (ref 3.4–7)
UROBILINOGEN UR QL STRIP: ABNORMAL
WBC # UR STRIP: ABNORMAL /HPF
WBC NRBC COR # BLD: 3.02 10*3/MM3 (ref 3.4–10.8)
WHOLE BLOOD HOLD COAG: NORMAL
WHOLE BLOOD HOLD SPECIMEN: NORMAL

## 2023-05-23 PROCEDURE — 93010 ELECTROCARDIOGRAM REPORT: CPT | Performed by: INTERNAL MEDICINE

## 2023-05-23 PROCEDURE — 25010000002 ONDANSETRON PER 1 MG: Performed by: INTERNAL MEDICINE

## 2023-05-23 PROCEDURE — 84300 ASSAY OF URINE SODIUM: CPT | Performed by: NURSE PRACTITIONER

## 2023-05-23 PROCEDURE — 25010000002 METOCLOPRAMIDE PER 10 MG: Performed by: NURSE PRACTITIONER

## 2023-05-23 PROCEDURE — 71046 X-RAY EXAM CHEST 2 VIEWS: CPT

## 2023-05-23 PROCEDURE — 82010 KETONE BODYS QUAN: CPT | Performed by: INTERNAL MEDICINE

## 2023-05-23 PROCEDURE — 85025 COMPLETE CBC W/AUTO DIFF WBC: CPT

## 2023-05-23 PROCEDURE — 83930 ASSAY OF BLOOD OSMOLALITY: CPT | Performed by: INTERNAL MEDICINE

## 2023-05-23 PROCEDURE — 80053 COMPREHEN METABOLIC PANEL: CPT | Performed by: EMERGENCY MEDICINE

## 2023-05-23 PROCEDURE — 84550 ASSAY OF BLOOD/URIC ACID: CPT | Performed by: INTERNAL MEDICINE

## 2023-05-23 PROCEDURE — 84295 ASSAY OF SERUM SODIUM: CPT | Performed by: INTERNAL MEDICINE

## 2023-05-23 PROCEDURE — 83605 ASSAY OF LACTIC ACID: CPT

## 2023-05-23 PROCEDURE — 84443 ASSAY THYROID STIM HORMONE: CPT | Performed by: INTERNAL MEDICINE

## 2023-05-23 PROCEDURE — 99284 EMERGENCY DEPT VISIT MOD MDM: CPT

## 2023-05-23 PROCEDURE — 83690 ASSAY OF LIPASE: CPT

## 2023-05-23 PROCEDURE — 81001 URINALYSIS AUTO W/SCOPE: CPT

## 2023-05-23 PROCEDURE — 82533 TOTAL CORTISOL: CPT | Performed by: INTERNAL MEDICINE

## 2023-05-23 PROCEDURE — 93005 ELECTROCARDIOGRAM TRACING: CPT | Performed by: NURSE PRACTITIONER

## 2023-05-23 PROCEDURE — 36415 COLL VENOUS BLD VENIPUNCTURE: CPT

## 2023-05-23 PROCEDURE — 83935 ASSAY OF URINE OSMOLALITY: CPT | Performed by: NURSE PRACTITIONER

## 2023-05-23 PROCEDURE — 25010000002 DIPHENHYDRAMINE PER 50 MG: Performed by: NURSE PRACTITIONER

## 2023-05-23 PROCEDURE — 25010000002 FUROSEMIDE PER 20 MG: Performed by: INTERNAL MEDICINE

## 2023-05-23 RX ORDER — AMOXICILLIN 250 MG
2 CAPSULE ORAL 2 TIMES DAILY
Status: DISCONTINUED | OUTPATIENT
Start: 2023-05-23 | End: 2023-05-27 | Stop reason: HOSPADM

## 2023-05-23 RX ORDER — FUROSEMIDE 10 MG/ML
20 INJECTION INTRAMUSCULAR; INTRAVENOUS ONCE
Status: COMPLETED | OUTPATIENT
Start: 2023-05-23 | End: 2023-05-23

## 2023-05-23 RX ORDER — ACYCLOVIR 400 MG/1
400 TABLET ORAL DAILY
Status: COMPLETED | OUTPATIENT
Start: 2023-05-23 | End: 2023-05-27

## 2023-05-23 RX ORDER — SODIUM CHLORIDE 9 MG/ML
40 INJECTION, SOLUTION INTRAVENOUS AS NEEDED
Status: DISCONTINUED | OUTPATIENT
Start: 2023-05-23 | End: 2023-05-27 | Stop reason: HOSPADM

## 2023-05-23 RX ORDER — PROPAFENONE HYDROCHLORIDE 150 MG/1
150 TABLET, COATED ORAL 3 TIMES DAILY
Status: DISCONTINUED | OUTPATIENT
Start: 2023-05-23 | End: 2023-05-27 | Stop reason: HOSPADM

## 2023-05-23 RX ORDER — SODIUM CHLORIDE 0.9 % (FLUSH) 0.9 %
10 SYRINGE (ML) INJECTION AS NEEDED
Status: DISCONTINUED | OUTPATIENT
Start: 2023-05-23 | End: 2023-05-27 | Stop reason: HOSPADM

## 2023-05-23 RX ORDER — BISACODYL 10 MG
10 SUPPOSITORY, RECTAL RECTAL DAILY PRN
Status: DISCONTINUED | OUTPATIENT
Start: 2023-05-23 | End: 2023-05-27 | Stop reason: HOSPADM

## 2023-05-23 RX ORDER — BISACODYL 5 MG/1
5 TABLET, DELAYED RELEASE ORAL DAILY PRN
Status: DISCONTINUED | OUTPATIENT
Start: 2023-05-23 | End: 2023-05-27 | Stop reason: HOSPADM

## 2023-05-23 RX ORDER — SODIUM CHLORIDE 1 G/1
1 TABLET ORAL
Status: DISCONTINUED | OUTPATIENT
Start: 2023-05-24 | End: 2023-05-27 | Stop reason: HOSPADM

## 2023-05-23 RX ORDER — ONDANSETRON 2 MG/ML
4 INJECTION INTRAMUSCULAR; INTRAVENOUS EVERY 6 HOURS PRN
Status: DISCONTINUED | OUTPATIENT
Start: 2023-05-23 | End: 2023-05-27 | Stop reason: HOSPADM

## 2023-05-23 RX ORDER — FINASTERIDE 5 MG/1
5 TABLET, FILM COATED ORAL DAILY
Status: DISCONTINUED | OUTPATIENT
Start: 2023-05-23 | End: 2023-05-27 | Stop reason: HOSPADM

## 2023-05-23 RX ORDER — SODIUM CHLORIDE 1 G/1
1 TABLET ORAL 2 TIMES DAILY WITH MEALS
Status: DISCONTINUED | OUTPATIENT
Start: 2023-05-23 | End: 2023-05-23

## 2023-05-23 RX ORDER — SODIUM CHLORIDE 0.9 % (FLUSH) 0.9 %
10 SYRINGE (ML) INJECTION EVERY 12 HOURS SCHEDULED
Status: DISCONTINUED | OUTPATIENT
Start: 2023-05-23 | End: 2023-05-27 | Stop reason: HOSPADM

## 2023-05-23 RX ORDER — SODIUM CHLORIDE 9 MG/ML
100 INJECTION, SOLUTION INTRAVENOUS CONTINUOUS
Status: DISCONTINUED | OUTPATIENT
Start: 2023-05-23 | End: 2023-05-23

## 2023-05-23 RX ORDER — LATANOPROST 50 UG/ML
1 SOLUTION/ DROPS OPHTHALMIC NIGHTLY
Status: DISCONTINUED | OUTPATIENT
Start: 2023-05-23 | End: 2023-05-27 | Stop reason: HOSPADM

## 2023-05-23 RX ORDER — ASCORBIC ACID 500 MG
1000 TABLET ORAL DAILY
Status: DISCONTINUED | OUTPATIENT
Start: 2023-05-23 | End: 2023-05-27 | Stop reason: HOSPADM

## 2023-05-23 RX ORDER — TAMSULOSIN HYDROCHLORIDE 0.4 MG/1
0.4 CAPSULE ORAL DAILY
Status: DISCONTINUED | OUTPATIENT
Start: 2023-05-23 | End: 2023-05-25

## 2023-05-23 RX ORDER — MELATONIN
5000 DAILY
Status: DISCONTINUED | OUTPATIENT
Start: 2023-05-23 | End: 2023-05-27 | Stop reason: HOSPADM

## 2023-05-23 RX ORDER — DIPHENHYDRAMINE HYDROCHLORIDE 50 MG/ML
25 INJECTION INTRAMUSCULAR; INTRAVENOUS ONCE
Status: COMPLETED | OUTPATIENT
Start: 2023-05-23 | End: 2023-05-23

## 2023-05-23 RX ORDER — NITROGLYCERIN 0.4 MG/1
0.4 TABLET SUBLINGUAL
Status: DISCONTINUED | OUTPATIENT
Start: 2023-05-23 | End: 2023-05-27 | Stop reason: HOSPADM

## 2023-05-23 RX ORDER — ROSUVASTATIN CALCIUM 10 MG/1
10 TABLET, COATED ORAL DAILY
Status: DISCONTINUED | OUTPATIENT
Start: 2023-05-23 | End: 2023-05-27 | Stop reason: HOSPADM

## 2023-05-23 RX ORDER — POLYETHYLENE GLYCOL 3350 17 G/17G
17 POWDER, FOR SOLUTION ORAL DAILY PRN
Status: DISCONTINUED | OUTPATIENT
Start: 2023-05-23 | End: 2023-05-27 | Stop reason: HOSPADM

## 2023-05-23 RX ORDER — METOCLOPRAMIDE HYDROCHLORIDE 5 MG/ML
10 INJECTION INTRAMUSCULAR; INTRAVENOUS ONCE
Status: COMPLETED | OUTPATIENT
Start: 2023-05-23 | End: 2023-05-23

## 2023-05-23 RX ADMIN — ONDANSETRON 4 MG: 2 INJECTION INTRAMUSCULAR; INTRAVENOUS at 20:37

## 2023-05-23 RX ADMIN — DIPHENHYDRAMINE HYDROCHLORIDE 25 MG: 50 INJECTION, SOLUTION INTRAMUSCULAR; INTRAVENOUS at 16:47

## 2023-05-23 RX ADMIN — DOCUSATE SODIUM 50MG AND SENNOSIDES 8.6MG 2 TABLET: 8.6; 5 TABLET, FILM COATED ORAL at 20:43

## 2023-05-23 RX ADMIN — SODIUM CHLORIDE 1000 ML: 9 INJECTION, SOLUTION INTRAVENOUS at 16:22

## 2023-05-23 RX ADMIN — FUROSEMIDE 20 MG: 20 INJECTION, SOLUTION INTRAMUSCULAR; INTRAVENOUS at 20:44

## 2023-05-23 RX ADMIN — OXYCODONE HYDROCHLORIDE AND ACETAMINOPHEN 1000 MG: 500 TABLET ORAL at 20:43

## 2023-05-23 RX ADMIN — AMLODIPINE BESYLATE 6.25 MG: 2.5 TABLET ORAL at 20:42

## 2023-05-23 RX ADMIN — TAMSULOSIN HYDROCHLORIDE 0.4 MG: 0.4 CAPSULE ORAL at 20:44

## 2023-05-23 RX ADMIN — ROSUVASTATIN CALCIUM 10 MG: 10 TABLET, FILM COATED ORAL at 20:43

## 2023-05-23 RX ADMIN — Medication 10 ML: at 23:50

## 2023-05-23 RX ADMIN — Medication 15 G: at 21:54

## 2023-05-23 RX ADMIN — ACYCLOVIR 400 MG: 400 TABLET ORAL at 20:44

## 2023-05-23 RX ADMIN — Medication 5000 UNITS: at 20:41

## 2023-05-23 RX ADMIN — PROPAFENONE HYDROCHLORIDE 150 MG: 150 TABLET, COATED ORAL at 20:42

## 2023-05-23 RX ADMIN — METOCLOPRAMIDE 10 MG: 5 INJECTION, SOLUTION INTRAMUSCULAR; INTRAVENOUS at 16:47

## 2023-05-23 RX ADMIN — FINASTERIDE 5 MG: 5 TABLET, FILM COATED ORAL at 21:55

## 2023-05-23 RX ADMIN — LATANOPROST 1 DROP: 50 SOLUTION OPHTHALMIC at 21:55

## 2023-05-23 NOTE — ED PROVIDER NOTES
EMERGENCY DEPARTMENT ENCOUNTER    Room Number:  26/26  Date seen:  5/23/2023  PCP: Damián Vuong Jr., MD  Historian: patient, wife      HPI:  Chief Complaint: Nausea  A complete HPI/ROS/PMH/PSH/SH/FH are unobtainable due to: Nothing  Context: Angel Remy is an afebrile ambulatory 77 y.o.  male with a past medical history of hairy T-cell leukemia in remission who presents to the ED c/o nausea.    He states he has been persistently nauseous over the last 48 hours.  Recently got home from [place] vacation with family.  States nobody else at home is having GI symptoms.  He denies any emesis but states his nausea is present despite taking Zofran.    States he had a bowel movement today which was small.  He denies any melena.  He denies any abdominal pain, fever or chills.    He has a known history of hairy cell leukemia.  He follows with a [facility] oncology group.  He has been in remission for this since 2012.  Patient's daughter works as an internist in [place] and called him in a prescription for Phenergan but he has not picked this up yet.    PAST MEDICAL HISTORY  Active Ambulatory Problems     Diagnosis Date Noted   • Hairy T-cell leukemia 09/02/2016   • FH: colon cancer 09/02/2020     Resolved Ambulatory Problems     Diagnosis Date Noted   • No Resolved Ambulatory Problems     Past Medical History:   Diagnosis Date   • BCC (basal cell carcinoma of skin) 06/22/2022   • Benign prostate hyperplasia    • H/O Elevated PSA    • H/O Hairy cell leukemia (CMS/HCC) 2012   • H/O Internal thrombosed hemorrhoids    • H/O minimal right carotid plaque    • H/O peripheral neuropathy    • Hyperlipidemia    • Hypertension    • Primary open-angle glaucoma, bilateral, mild stage          PAST SURGICAL HISTORY  Past Surgical History:   Procedure Laterality Date   • BONE MARROW BIOPSY W/ ASPIRATION     • COLONOSCOPY     • COLONOSCOPY N/A 9/25/2021    Procedure: COLONOSCOPY TO CECUM AND INTO TI WITH COLD BIOPSY  POLYPECTOMIES;  Surgeon: Juan Jenkins MD;  Location: [facility] ENDOSCOPY;  Service: Gastroenterology;  Laterality: N/A;  pre: family history of colon cancer  post: polyps, diverticulosis  and internal hemorrhoids   • EXCISION MASS HEAD/NECK N/A 6/23/2022    Procedure: EXCISION BASAL CELL CARCINOMA NOSE WITH FROZEN SECTION FULL THICKNESS GRAFT;  Surgeon: Arturo Weiss MD;  Location:  [facility];  Service: Plastics;  Laterality: N/A;   • HERNIA REPAIR Right 02/2000    Inguinal    • OTHER SURGICAL HISTORY  1989    Median nerve foreign body excision   • PROSTATE BIOPSY      March 2010 and October 2011 whan PSA had acceleration   • SIGMOIDOSCOPY  1999    With small thrombosed internal hemorrhoid.   • TONSILLECTOMY     • WISDOM TOOTH EXTRACTION  1961         FAMILY HISTORY  Family History   Problem Relation Age of Onset   • Cancer Mother 90        Breast    • Hypertension Mother    • Cancer Father         Unknown primary melanoma   • Hypertension Father    • Hypertension Sister    • Malig Hyperthermia Neg Hx          SOCIAL HISTORY  Social History     Socioeconomic History   • Marital status:      Spouse name: Hanna   Tobacco Use   • Smoking status: Former     Packs/day: 0.50     Types: Cigarettes   • Smokeless tobacco: Never   • Tobacco comments:     Quit smoking many years ago.   Vaping Use   • Vaping Use: Never used   Substance and Sexual Activity   • Alcohol use: Yes     Comment: 1 glass of wine or beer per day   • Drug use: No   • Sexual activity: Defer         ALLERGIES  Patient has no known allergies.        REVIEW OF SYSTEMS  Review of Systems   Gastrointestinal: Positive for nausea.   All other systems reviewed and are negative.       PHYSICAL EXAM  ED Triage Vitals   Temp Heart Rate Resp BP SpO2   05/23/23 1457 05/23/23 1456 05/23/23 1456 05/23/23 1509 05/23/23 1456   98 °F (36.7 °C) 67 18 175/85 97 %      Temp src Heart Rate Source Patient Position BP Location FiO2 (%)   05/23/23 1457 --  05/23/23 1509 05/23/23 1509 --   Tympanic  Sitting Left arm        Physical Exam      GENERAL: Alert and oriented x4, no acute distress, thin body habitus  HENT: nares patent, mucous membranes moist and intact  EYES: no scleral icterus, no injection  CV: regular rhythm, normal rate, no peripheral edema, no rubs or murmurs  RESPIRATORY: normal effort, clear to auscultation to all lung fields, able to speak in full sentences without him to distress  ABDOMEN: soft and nontender diffusely, no rebound or guarding, bowel sounds WNL  MUSCULOSKELETAL: no deformity, normal active range of motion to all extremities without difficulty  NEURO: alert, moves all extremities, follows commands  PSYCH:  calm, cooperative  SKIN: warm, dry and intact, generalized sallow present      Vital signs and nursing notes reviewed.          LAB RESULTS  Recent Results (from the past 24 hour(s))   Lipase    Collection Time: 05/23/23  3:21 PM    Specimen: Blood   Result Value Ref Range    Lipase 17 13 - 60 U/L   Lactic Acid, Plasma    Collection Time: 05/23/23  3:21 PM    Specimen: Blood   Result Value Ref Range    Lactate 2.5 (C) 0.5 - 2.0 mmol/L   Green Top (Gel)    Collection Time: 05/23/23  3:21 PM   Result Value Ref Range    Extra Tube Hold for add-ons.    Lavender Top    Collection Time: 05/23/23  3:21 PM   Result Value Ref Range    Extra Tube hold for add-on    Gold Top - SST    Collection Time: 05/23/23  3:21 PM   Result Value Ref Range    Extra Tube Hold for add-ons.    Light Blue Top    Collection Time: 05/23/23  3:21 PM   Result Value Ref Range    Extra Tube Hold for add-ons.    CBC Auto Differential    Collection Time: 05/23/23  3:21 PM    Specimen: Blood   Result Value Ref Range    WBC 3.02 (L) 3.40 - 10.80 10*3/mm3    RBC 3.72 (L) 4.14 - 5.80 10*6/mm3    Hemoglobin 12.9 (L) 13.0 - 17.7 g/dL    Hematocrit 36.4 (L) 37.5 - 51.0 %    MCV 97.8 (H) 79.0 - 97.0 fL    MCH 34.7 (H) 26.6 - 33.0 pg    MCHC 35.4 31.5 - 35.7 g/dL    RDW 13.1  12.3 - 15.4 %    RDW-SD 46.9 37.0 - 54.0 fl    MPV 9.3 6.0 - 12.0 fL    Platelets 156 140 - 450 10*3/mm3    Neutrophil % 69.6 42.7 - 76.0 %    Lymphocyte % 28.1 19.6 - 45.3 %    Monocyte % 2.0 (L) 5.0 - 12.0 %    Eosinophil % 0.0 (L) 0.3 - 6.2 %    Basophil % 0.0 0.0 - 1.5 %    Immature Grans % 0.3 0.0 - 0.5 %    Neutrophils, Absolute 2.10 1.70 - 7.00 10*3/mm3    Lymphocytes, Absolute 0.85 0.70 - 3.10 10*3/mm3    Monocytes, Absolute 0.06 (L) 0.10 - 0.90 10*3/mm3    Eosinophils, Absolute 0.00 0.00 - 0.40 10*3/mm3    Basophils, Absolute 0.00 0.00 - 0.20 10*3/mm3    Immature Grans, Absolute 0.01 0.00 - 0.05 10*3/mm3    nRBC 0.0 0.0 - 0.2 /100 WBC   Comprehensive Metabolic Panel    Collection Time: 05/23/23  3:21 PM    Specimen: Blood   Result Value Ref Range    Glucose 148 (H) 65 - 99 mg/dL    BUN 10 8 - 23 mg/dL    Creatinine 0.68 (L) 0.76 - 1.27 mg/dL    Sodium 115 (C) 136 - 145 mmol/L    Potassium 4.4 3.5 - 5.2 mmol/L    Chloride 78 (L) 98 - 107 mmol/L    CO2 21.7 (L) 22.0 - 29.0 mmol/L    Calcium 8.4 (L) 8.6 - 10.5 mg/dL    Total Protein 6.6 6.0 - 8.5 g/dL    Albumin 4.3 3.5 - 5.2 g/dL    ALT (SGPT) 30 1 - 41 U/L    AST (SGOT) 23 1 - 40 U/L    Alkaline Phosphatase 58 39 - 117 U/L    Total Bilirubin 2.2 (H) 0.0 - 1.2 mg/dL    Globulin 2.3 gm/dL    A/G Ratio 1.9 g/dL    BUN/Creatinine Ratio 14.7 7.0 - 25.0    Anion Gap 15.3 (H) 5.0 - 15.0 mmol/L    eGFR 95.7 >60.0 mL/min/1.73   Urinalysis With Microscopic If Indicated (No Culture) - Urine, Clean Catch    Collection Time: 05/23/23  4:17 PM    Specimen: Urine, Clean Catch   Result Value Ref Range    Color, UA Yellow Yellow, Straw    Appearance, UA Clear Clear    pH, UA 8.0 5.0 - 8.0    Specific Gravity, UA 1.014 1.005 - 1.030    Glucose, UA Negative Negative    Ketones, UA 40 mg/dL (2+) (A) Negative    Bilirubin, UA Negative Negative    Blood, UA Small (1+) (A) Negative    Protein, UA Negative Negative    Leuk Esterase, UA Negative Negative    Nitrite, UA Negative  Negative    Urobilinogen, UA 1.0 E.U./dL 0.2 - 1.0 E.U./dL   Urinalysis, Microscopic Only - Urine, Clean Catch    Collection Time: 05/23/23  4:17 PM    Specimen: Urine, Clean Catch   Result Value Ref Range    RBC, UA 13-20 (A) None Seen, 0-2 /HPF    WBC, UA 0-2 None Seen, 0-2 /HPF    Bacteria, UA None Seen None Seen /HPF    Squamous Epithelial Cells, UA 0-2 None Seen, 0-2 /HPF    Hyaline Casts, UA 0-2 None Seen /LPF    Methodology Automated Microscopy    Sodium, Urine, Random - Urine, Clean Catch    Collection Time: 05/23/23  4:17 PM    Specimen: Urine, Clean Catch   Result Value Ref Range    Sodium, Urine 138 mmol/L   ECG 12 Lead Electrolyte Imbalance    Collection Time: 05/23/23  4:54 PM   Result Value Ref Range    QT Interval 510 ms       Ordered the above labs and reviewed the results.        RADIOLOGY  No Radiology Exams Resulted Within Past 24 Hours    Ordered the above noted radiological studies. Reviewed by me in PACS.            PROCEDURES  Procedures        MEDICATIONS GIVEN IN ER  Medications   sodium chloride 0.9 % flush 10 mL (has no administration in time range)   sodium chloride 0.9 % bolus 1,000 mL (1,000 mL Intravenous New Bag 5/23/23 1622)   diphenhydrAMINE (BENADRYL) injection 25 mg (25 mg Intravenous Given 5/23/23 1647)   metoclopramide (REGLAN) injection 10 mg (10 mg Intravenous Given 5/23/23 1647)                   MEDICAL DECISION MAKING, PROGRESS, and CONSULTS    All labs have been independently reviewed by me.  All radiology studies have been reviewed by me and I have also reviewed the radiology report.   EKG's independently viewed and interpreted by me.  Discussion below represents my analysis of pertinent findings related to patient's condition, differential diagnosis, treatment plan and final disposition.      Additional sources:  - Discussed/ obtained information from independent historians: Obtained from patient    - External (non-ED) record review: Oncology office visit 2/23/2023   Zeyad for hairy cell leukemia, has known leukopenia and unchanged thrombocytopenia with flow cytometry not showing any new process    - Chronic or social conditions impacting care: None patient has good social support    - Shared decision making: I have discussed admission, patient is agreeable and request Dr. Wilson's group to be consulted for nephrology      Orders placed during this visit:  Orders Placed This Encounter   Procedures   • [place] Draw   • Lipase   • Urinalysis With Microscopic If Indicated (No Culture) - Urine, Clean Catch   • Lactic Acid, Plasma   • CBC Auto Differential   • Comprehensive Metabolic Panel   • STAT Lactic Acid, Reflex   • NPO Diet NPO Type: Strict NPO   • Undress & Gown   • Insert Peripheral IV   • CBC & Differential   • Green Top (Gel)   • Lavender Top   • Gold Top - SST   • Light Blue Top         Additional orders considered but not ordered:  I considered CT imaging of patient's chest abdomen pelvis to evaluate for neoplasm however based on need for nephrology consultation I suspect patient will undergo imaging once we get the rest of his osmolarity and urine sodium results back and will defer to the recommendations        Differential diagnosis includes but is not limited to:    Anemia, hyponatremia, GI bleeding, acute renal failure      Independent interpretation of labs, radiology studies, and discussions with consultants:  ED Course as of 05/23/23 1714 Tue May 23, 2023   1642 Patient here with nausea and poor p.o. intake, found to have marked hyponatremia he is on HCTZ at home no other diuretics [AH]   1655 Phone call with dr. De La Vega.  Discussed the patient, relevant history, exam, diagnostics, ED findings/progress, and concerns. MD requests orthostatic vitals and consult to nephrology They agree to admit to an inpatient tele bed.    [AH]   1999 Phone call with dr. Corbett on call for nephrology.  Discussed the patient, relevant history, exam, diagnostics, ED  findings/progress, and concerns. They agree to consult. States will be by in about 30 minutes   []   1714 Patient reports nausea is improved following Reglan administration []      ED Course User Index  [] Lubna Thomas APRN             I have worn appropriate PPE during this patient encounter, sanitized my hands both with entering and exiting patient's room.      DIAGNOSIS  Final diagnoses:   Hyponatremia   Nausea         DISPOSITION  Admitted to medicine            Latest Documented Vital Signs:  As of 16:07 EDT  BP- 175/85 HR- 67 Temp- 98 °F (36.7 °C) (Tympanic) O2 sat- 97%              --    Please note that portions of this were completed with a voice recognition program.       Note Disclaimer: At [facility], we believe that sharing information builds trust and better relationships. You are receiving this note because you are receiving care at [facility] or recently visited. It is possible you will see health information before a provider has talked with you about it. This kind of information can be easy to misunderstand. To help you fully understand what it means for your health, we urge you to discuss this note with your provider.           Lubna Thomas APRN  05/23/23 9832

## 2023-05-23 NOTE — H&P
Internal medicine history and physical  INTERNAL MEDICINE   [facility]       Patient Identification:  Name: Angel Remy  Age: 77 y.o.  Sex: male  :  1945  MRN: [id number]                   Primary Care Physician: Damián Vuong Jr., MD                               Date of admission:2023    Chief Complaint: Nauseous and unable to eat in the week since .    History of Present Illness:   Patient is a 77-year-old male with past medical history as noted below including hypertension, dyslipidemia, open-angle glaucoma bilateral, history of hairy cell leukemia was in his usual state of his health and went to [place] [facility] last [facility].  Patient had a great time and no issues while he was there.  Couple days after arriving back to [place] he developed cold symptoms with cough congestion and hoarseness of voice and body aches and not feeling very well.  His wife also had similar symptoms.  His symptoms of cold and upper respiratory tract infection resolved by this past Friday and he was okay in the earlier part of the weekend until  when he started having significant nausea and unable to keep anything down.  He took some Zofran prescribed to his wife by her primary care physician with some improvement in his symptoms but was getting weaker and has been some dizzy spells upon standing up and walking.  Combination of persistent nausea and weakness and not getting better despite using silhouetted treatment patient decided to come to the emergency room for further evaluation.  Patient continues to take his blood pressure medications throughout this illness despite decreased appetite and nausea and was drinking flavored water to keep up with his thirst.  Evaluation in the emergency room revealed elevated blood pressure while laying in the bed was dropped significantly upon sitting up and standing without much rise in his heart rate and blood work revealed  serum sodium level of 115.  Patient is being admitted for further care.  Nephrology service have been consulted and they have written the order for the management of hyponatremia.      Past Medical History:  Past Medical History:   Diagnosis Date   • BCC (basal cell carcinoma of skin) 06/22/2022    NOSE   • Benign prostate hyperplasia    • H/O Elevated PSA    • H/O Hairy cell leukemia (CMS/HCC) 2012   • H/O Internal thrombosed hemorrhoids    • H/O minimal right carotid plaque    • H/O peripheral neuropathy    • Hyperlipidemia    • Hypertension    • Primary open-angle glaucoma, bilateral, mild stage      Past Surgical History:  Past Surgical History:   Procedure Laterality Date   • BONE MARROW BIOPSY W/ ASPIRATION     • COLONOSCOPY     • COLONOSCOPY N/A 9/25/2021    Procedure: COLONOSCOPY TO CECUM AND INTO TI WITH COLD BIOPSY POLYPECTOMIES;  Surgeon: Juan Jenkins MD;  Location: [facility] ENDOSCOPY;  Service: Gastroenterology;  Laterality: N/A;  pre: family history of colon cancer  post: polyps, diverticulosis  and internal hemorrhoids   • EXCISION MASS HEAD/NECK N/A 6/23/2022    Procedure: EXCISION BASAL CELL CARCINOMA NOSE WITH FROZEN SECTION FULL THICKNESS GRAFT;  Surgeon: Arturo Weiss MD;  Location: [facility] OR [facility];  Service: Plastics;  Laterality: N/A;   • HERNIA REPAIR Right 02/2000    Inguinal    • OTHER SURGICAL HISTORY  1989    Median nerve foreign body excision   • PROSTATE BIOPSY      March 2010 and October 2011 whan PSA had acceleration   • SIGMOIDOSCOPY  1999    With small thrombosed internal hemorrhoid.   • TONSILLECTOMY     • WISDOM TOOTH EXTRACTION  1961      Home Meds:  Medications Prior to Admission   Medication Sig Dispense Refill Last Dose   • acyclovir (ZOVIRAX) 400 MG tablet Take 1 tablet by mouth 2 (two) times a day. Take no more than 5 doses a day. (Patient taking differently: Take 1 tablet by mouth Daily. Take no more than 5 doses a day.) 60 tablet 2 5/23/2023   • amLODIPine (NORVASC)  2.5 MG tablet Take 6 mg by mouth Daily.   5/23/2023   • bimatoprost (LUMIGAN) 0.03 % ophthalmic drops Administer 1 drop to both eyes Every Night.   5/22/2023   • Calcium-Magnesium-Vitamin D (CALCIUM 1200+D3 PO) Take  by mouth.   5/23/2023   • finasteride (PROSCAR) 5 MG tablet    5/23/2023   • Lumigan 0.01 % ophthalmic drops Administer 1 drop to both eyes every night at bedtime.   5/22/2023   • Magnesium 500 MG tablet Take 1 tablet by mouth 2 (two) times a day.   5/23/2023   • niacin 500 MG tablet Take 1 tablet by mouth Every Night.   Past Week   • propafenone (RHTHYMOL) 150 MG tablet Take 1 tablet by mouth 3 (Three) Times a Day.   5/22/2023   • ramipril (ALTACE) 10 MG capsule Take 1 capsule by mouth Daily.  2 5/22/2023   • rosuvastatin (CRESTOR) 10 MG tablet Take 1 tablet by mouth Daily.   5/23/2023   • tamsulosin (FLOMAX) 0.4 MG capsule 24 hr capsule    5/23/2023   • vitamin C (ASCORBIC ACID) 250 MG tablet Take 4 tablets by mouth Daily. With madisyn hips   Past Week   • vitamin D3 125 MCG (5000 UT) capsule capsule Take 1 capsule by mouth Daily.   Past Week   • bisoprolol-hydrochlorothiazide (ZIAC) 10-6.25 MG per tablet Take 1.5 tablets by mouth 2 (Two) Times a Day. Pt states they take 1 tablet in morning and 0.5 tablet in evening      • HYDROcodone-acetaminophen (Norco) 7.5-325 MG per tablet Take 1 tablet by mouth Every 6 (Six) Hours As Needed for Moderate Pain . 12 tablet 0      Current Meds:     Current Facility-Administered Medications:   •  sodium chloride 0.9 % flush 10 mL, 10 mL, Intravenous, PRN, Emergency, Triage Protocol, MD  •  sodium chloride tablet 1 g, 1 g, Oral, BID With Meals, Derek Corbett MD  Allergies:  No Known Allergies  Social History:   Social History     Tobacco Use   • Smoking status: Former     Packs/day: 0.50     Types: Cigarettes   • Smokeless tobacco: Never   • Tobacco comments:     Quit smoking many years ago.   Substance Use Topics   • Alcohol use: Yes     Comment: 1 glass of wine  "or beer per day      Family History:  Family History   Problem Relation Age of Onset   • Cancer Mother 90        Breast    • Hypertension Mother    • Cancer Father         Unknown primary melanoma   • Hypertension Father    • Hypertension Sister    • Malig Hyperthermia Neg Hx           Review of Systems  See history of present illness and past medical history.    Constitutional: Remarkable for no fever or chills  Cardiovascular: Remarkable for no chest pain or shortness of breath occasional swelling of his legs for which he takes Lasix but not currently taking it.  No orthopnea or PND does have dizziness.  Respiratory: Remarkable for symptoms of Cough and congestion improved his voice is still hoarse but is getting better.  GI: Remarkable for decreased appetite and nausea but denies any abdominal pain vomiting or diarrhea.  : Remarkable for no burning urination frequency urgency  Musculoskeletal: Remarkable for no new joint aches and pain.  No    Vitals:   /70 (BP Location: Left arm, Patient Position: Lying)   Pulse 63   Temp 97.2 °F (36.2 °C) (Oral)   Resp 16   Ht 177.8 cm (70\")   Wt 70.3 kg (155 lb)   SpO2 96%   BMI 22.24 kg/m²   I/O: No intake or output data in the 24 hours ending 05/23/23 1905  Exam:  Patient is examined using the personal protective equipment as per guidelines from infection control for this particular patient as enacted.  Hand washing was performed before and after patient interaction.  General Appearance:    Alert, cooperative, no distress, appears stated age   Head:    Normocephalic, without obvious abnormality, atraumatic   Eyes:    PERRL, conjunctiva/corneas clear, EOM's intact, both eyes   Ears:    Normal external ear canals, both ears   Nose:   Nares normal, septum midline, mucosa normal, no drainage    or sinus tenderness   Throat:   Lips, tongue, gums normal; oral mucosa pink and moist   Neck:   Supple, symmetrical, trachea midline, no adenopathy;     thyroid:  no " enlargement/tenderness/nodules; no carotid    bruit or JVD   Back:     Symmetric, no curvature, ROM normal, no CVA tenderness   Lungs:     Clear to auscultation bilaterally, respirations unlabored   Chest Wall:    No tenderness or deformity    Heart:   S1-S2 regular   Abdomen:    Soft nontender no organomegaly detected   Extremities:  Bilateral trace edema around his ankles.   Pulses:   Pulses palpable in all extremities; symmetric all extremities   Skin:  Chronic skin changes noted.   Neurologic:  Alert and oriented and grossly nonfocal       Data Review:      I reviewed the patient's new clinical results.  Results from last 7 days   Lab Units 05/23/23  1521   WBC 10*3/mm3 3.02*   HEMOGLOBIN g/dL 12.9*   PLATELETS 10*3/mm3 156     Results from last 7 days   Lab Units 05/23/23  1521   SODIUM mmol/L 115*   POTASSIUM mmol/L 4.4   CHLORIDE mmol/L 78*   CO2 mmol/L 21.7*   BUN mg/dL 10   CREATININE mg/dL 0.68*   CALCIUM mg/dL 8.4*   GLUCOSE mg/dL 148*     No radiology results for the last 30 days.  Microbiology Results (last 10 days)     ** No results found for the last 240 hours. **        ECG 12 Lead Electrolyte Imbalance   Final Result   HEART RATE= 62  bpm   RR Interval= 968  ms   OR Interval= 220  ms   P Horizontal Axis= 104  deg   P Front Axis= -74  deg   QRSD Interval= 182  ms   QT Interval= 510  ms   QRS Axis= -13  deg   T Wave Axis= 65  deg   - ABNORMAL ECG -   Sinus or ectopic atrial rhythm   Prolonged OR interval   Left bundle branch block   No Prior Tracing for Comparison   Electronically Signed By: Lloyd Barraza ([facility]) 23-May-2023 17:48:16   Date and Time of Study: 2023-05-23 16:54:33        Brief Urine Lab Results  (Last result in the past 365 days)      Color   Clarity   Blood   Leuk Est   Nitrite   Protein   CREAT   Urine HCG        05/23/23 1617 Yellow   Clear   Small (1+)   Negative   Negative   Negative             Serum osmolality 246  TSH 5.03  Cortisol level 42.7  Uric acid 1.7  Urine sodium 138  urine osmolality 458  Orthostatic blood pressure:pulse                        BP   59 -- 126/63 Standing -- --    05/23/23 1710 -- 66 -- 99/51 Standing -- --   05/23/23 1704 -- -- -- -- Standing -- --   05/23/23 1701 -- 60 -- 148/64 Lying -- --   05/23/23 1509 -- -- -- 175/85 Sitting --        Assessment:  Active Hospital Problems    Diagnosis  POA   • **Hyponatremia [E87.1]  Yes   • Orthostatic hypotension [I95.1]  Unknown   • Supine hypertension [I10]  Unknown   • Anemia [D64.9]  Yes   • Macrocytosis [D75.89]  Unknown   • 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]  Yes       Medical decision making/care plan: See admitting orders  · Symptomatic hyponatremia-multifactorial and likely represent combination of ongoing use of hydrochlorothiazide, decrease intake and consumption of free water after recent upper respiratory tract infection orthostatic drop in blood pressure reflective of some element of volume depletion as he has elevated lactic acid level and also may be underlying element of autonomic insufficiency exacerbated by recent viral illness and underlying chronic SIADH as his serum sodium level has been low in the past and was noted to be 130 in 2018.  Plan is to hold his hydrochlorothiazide, hydrate him with close monitoring of his orthostatic, check urine and serum osmolality and urine electrolytes and probably would require combination of fluid restriction and correction of intravascular volume depletion because of the orthostatic changes that he is demonstrating while awaiting the nephrology consultation.  Check serial BMP.  To complete work-up we will get chest x-ray given his recent respiratory symptoms rule out structural intrapulmonary process contributing to this.  · Supine hypertension with significant drop in blood pressure upon orthostatic measurement with no significant rise in heart rate with postural stress-questionable underlying autonomic insufficiency-continue his Norvasc and Flomax while holding his  other regimen until seen by nephrology service.  Check orthostatics  every shift.  · History of glaucoma continue his current regimen.  · History of benign prostatic hyperplasia continue with finasteride and Flomax.      Angel De La Vega MD   5/23/2023  19:05 EDT    Parts of this note may be an electronic transcription/translation of spoken language to printed text using the Dragon dictation system.

## 2023-05-23 NOTE — ED NOTES
"Nursing report ED to floor  Angel Remy  77 y.o.  male    HPI :   Chief Complaint   Patient presents with    Nausea       Admitting doctor:   Angel De La Vega MD    Admitting diagnosis:   The encounter diagnosis was Hyponatremia.    Code status:   Current Code Status       Date Active Code Status Order ID Comments User Context       Not on file            Allergies:   Patient has no known allergies.    Isolation:   No active isolations    Intake and Output  No intake or output data in the 24 hours ending 05/23/23 1701    Weight:       05/23/23  1513   Weight: 70.3 kg (155 lb)       Most recent vitals:   Vitals:    05/23/23 1456 05/23/23 1457 05/23/23 1509 05/23/23 1513   BP:   175/85    BP Location:   Left arm    Patient Position:   Sitting    Pulse: 67      Resp: 18      Temp:  98 °F (36.7 °C)     TempSrc:  Tympanic     SpO2: 97%      Weight:    70.3 kg (155 lb)   Height:   177.8 cm (70\")        Active LDAs/IV Access:   Lines, Drains & Airways       Active LDAs       Name Placement date Placement time Site Days    Peripheral IV 05/23/23 1621 Distal;Posterior;Right Forearm 05/23/23  1621  Forearm  less than 1                    Labs (abnormal labs have a star):   Labs Reviewed   URINALYSIS W/ MICROSCOPIC IF INDICATED (NO CULTURE) - Abnormal; Notable for the following components:       Result Value    Ketones, UA 40 mg/dL (2+) (*)     Blood, UA Small (1+) (*)     All other components within normal limits   LACTIC ACID, PLASMA - Abnormal; Notable for the following components:    Lactate 2.5 (*)     All other components within normal limits   CBC WITH AUTO DIFFERENTIAL - Abnormal; Notable for the following components:    WBC 3.02 (*)     RBC 3.72 (*)     Hemoglobin 12.9 (*)     Hematocrit 36.4 (*)     MCV 97.8 (*)     MCH 34.7 (*)     Monocyte % 2.0 (*)     Eosinophil % 0.0 (*)     Monocytes, Absolute 0.06 (*)     All other components within normal limits   COMPREHENSIVE METABOLIC PANEL - Abnormal; Notable for the " following components:    Glucose 148 (*)     Creatinine 0.68 (*)     Sodium 115 (*)     Chloride 78 (*)     CO2 21.7 (*)     Calcium 8.4 (*)     Total Bilirubin 2.2 (*)     Anion Gap 15.3 (*)     All other components within normal limits    Narrative:     GFR Normal >60  Chronic Kidney Disease <60  Kidney Failure <15    The GFR formula is only valid for adults with stable renal function between ages 18 and 70.   URINALYSIS, MICROSCOPIC ONLY - Abnormal; Notable for the following components:    RBC, UA 13-20 (*)     All other components within normal limits   LIPASE - Normal   RAINBOW DRAW    Narrative:     The following orders were created for panel order [place] Draw.  Procedure                               Abnormality         Status                     ---------                               -----------         ------                     Green Top (Gel)[[id number]]                                  Final result               Lavender Top[[id number]]                                     Final result               Gold Top - SST[[id number]]                                   Final result               Light Blue Top[[id number]]                                   Final result                 Please view results for these tests on the individual orders.   SODIUM, URINE, RANDOM    Narrative:     Reference intervals for random urine have not been established.  Clinical usage is dependent upon physician's interpretation in combination with other laboratory tests.      LACTIC ACID, REFLEX   OSMOLALITY, URINE   CBC AND DIFFERENTIAL    Narrative:     The following orders were created for panel order CBC & Differential.  Procedure                               Abnormality         Status                     ---------                               -----------         ------                     CBC Auto Differential[[id number]]        Abnormal            Final result                 Please view results for these tests on the individual  orders.   GREEN TOP   LAVENDER TOP   GOLD TOP - SST   LIGHT BLUE TOP       EKG:   ECG 12 Lead Electrolyte Imbalance   Preliminary Result   HEART RATE= 62  bpm   RR Interval= 968  ms   OH Interval= 220  ms   P Horizontal Axis= 104  deg   P Front Axis= -74  deg   QRSD Interval= 182  ms   QT Interval= 510  ms   QRS Axis= -13  deg   T Wave Axis= 65  deg   - ABNORMAL ECG -   Sinus or ectopic atrial rhythm   Prolonged OH interval   Left bundle branch block   Electronically Signed By:    Date and Time of Study: 2023-05-23 16:54:33          Meds given in ED:   Medications   sodium chloride 0.9 % flush 10 mL (has no administration in time range)   sodium chloride 0.9 % bolus 1,000 mL (1,000 mL Intravenous New Bag 5/23/23 1622)   diphenhydrAMINE (BENADRYL) injection 25 mg (25 mg Intravenous Given 5/23/23 1647)   metoclopramide (REGLAN) injection 10 mg (10 mg Intravenous Given 5/23/23 1647)       Imaging results:  No radiology results for the last day    Ambulatory status:   - adlib    Social issues:   Social History     Socioeconomic History    Marital status:      Spouse name: Hanna   Tobacco Use    Smoking status: Former     Packs/day: 0.50     Types: Cigarettes    Smokeless tobacco: Never    Tobacco comments:     Quit smoking many years ago.   Vaping Use    Vaping Use: Never used   Substance and Sexual Activity    Alcohol use: Yes     Comment: 1 glass of wine or beer per day    Drug use: No    Sexual activity: Defer       NIH Stroke Scale:         Angela Remy RN  05/23/23 17:01 EDT

## 2023-05-23 NOTE — PLAN OF CARE
Goal Outcome Evaluation:             Problem: Adult Inpatient Plan of Care  Goal: Plan of Care Review  Outcome: Ongoing, Progressing  Flowsheets (Taken 5/23/2023 1914)  Progress: no change  Outcome Evaluation: Pt admitted to 4E. Vitals stable. Heart healthy diet and 1200mL fluid restriction ordered. Pt needs met and questions answered. Will continue to monitor

## 2023-05-23 NOTE — ED TRIAGE NOTES
"Patient reports \"cold symptoms\" after returning from [place]; states symptoms subsided by Sunday. Sunday evening, patient reports nausea started, was unable to eat. Since Sunday, patient has taken Zofran x5 since Sunday; states first 2 doses helped. Patient denies vomiting, reporting dry heaving only.  "

## 2023-05-24 ENCOUNTER — APPOINTMENT (OUTPATIENT)
Dept: CARDIOLOGY | Facility: HOSPITAL | Age: 78
DRG: 644 | End: 2023-05-24
Payer: MEDICARE

## 2023-05-24 PROBLEM — I44.7 LBBB (LEFT BUNDLE BRANCH BLOCK): Status: ACTIVE | Noted: 2023-05-24

## 2023-05-24 PROBLEM — I44.0 PROLONGED P-R INTERVAL: Status: ACTIVE | Noted: 2023-05-24

## 2023-05-24 PROBLEM — D61.818 PANCYTOPENIA: Status: ACTIVE | Noted: 2023-05-24

## 2023-05-24 LAB
ALBUMIN SERPL-MCNC: 3.9 G/DL (ref 3.5–5.2)
ALBUMIN/GLOB SERPL: 1.6 G/DL
ALP SERPL-CCNC: 51 U/L (ref 39–117)
ALT SERPL W P-5'-P-CCNC: 29 U/L (ref 1–41)
ANION GAP SERPL CALCULATED.3IONS-SCNC: 11.2 MMOL/L (ref 5–15)
ANION GAP SERPL CALCULATED.3IONS-SCNC: 11.4 MMOL/L (ref 5–15)
ANION GAP SERPL CALCULATED.3IONS-SCNC: 11.6 MMOL/L (ref 5–15)
ANION GAP SERPL CALCULATED.3IONS-SCNC: 11.6 MMOL/L (ref 5–15)
ANION GAP SERPL CALCULATED.3IONS-SCNC: 8.4 MMOL/L (ref 5–15)
ANION GAP SERPL CALCULATED.3IONS-SCNC: 9.9 MMOL/L (ref 5–15)
AST SERPL-CCNC: 27 U/L (ref 1–40)
BASOPHILS # BLD AUTO: 0 10*3/MM3 (ref 0–0.2)
BASOPHILS NFR BLD AUTO: 0 % (ref 0–1.5)
BILIRUB SERPL-MCNC: 2 MG/DL (ref 0–1.2)
BUN SERPL-MCNC: 18 MG/DL (ref 8–23)
BUN SERPL-MCNC: 19 MG/DL (ref 8–23)
BUN SERPL-MCNC: 22 MG/DL (ref 8–23)
BUN SERPL-MCNC: 24 MG/DL (ref 8–23)
BUN/CREAT SERPL: 24.7 (ref 7–25)
BUN/CREAT SERPL: 28.6 (ref 7–25)
BUN/CREAT SERPL: 32.2 (ref 7–25)
BUN/CREAT SERPL: 32.2 (ref 7–25)
BUN/CREAT SERPL: 34.8 (ref 7–25)
BUN/CREAT SERPL: 35.5 (ref 7–25)
CALCIUM SPEC-SCNC: 7.9 MG/DL (ref 8.6–10.5)
CALCIUM SPEC-SCNC: 7.9 MG/DL (ref 8.6–10.5)
CALCIUM SPEC-SCNC: 8.5 MG/DL (ref 8.6–10.5)
CALCIUM SPEC-SCNC: 8.5 MG/DL (ref 8.6–10.5)
CALCIUM SPEC-SCNC: 8.6 MG/DL (ref 8.6–10.5)
CALCIUM SPEC-SCNC: 8.7 MG/DL (ref 8.6–10.5)
CHLORIDE SERPL-SCNC: 80 MMOL/L (ref 98–107)
CHLORIDE SERPL-SCNC: 81 MMOL/L (ref 98–107)
CHLORIDE SERPL-SCNC: 82 MMOL/L (ref 98–107)
CO2 SERPL-SCNC: 21.8 MMOL/L (ref 22–29)
CO2 SERPL-SCNC: 22.4 MMOL/L (ref 22–29)
CO2 SERPL-SCNC: 22.4 MMOL/L (ref 22–29)
CO2 SERPL-SCNC: 22.6 MMOL/L (ref 22–29)
CO2 SERPL-SCNC: 24.1 MMOL/L (ref 22–29)
CO2 SERPL-SCNC: 25.6 MMOL/L (ref 22–29)
CREAT SERPL-MCNC: 0.59 MG/DL (ref 0.76–1.27)
CREAT SERPL-MCNC: 0.59 MG/DL (ref 0.76–1.27)
CREAT SERPL-MCNC: 0.62 MG/DL (ref 0.76–1.27)
CREAT SERPL-MCNC: 0.63 MG/DL (ref 0.76–1.27)
CREAT SERPL-MCNC: 0.69 MG/DL (ref 0.76–1.27)
CREAT SERPL-MCNC: 0.77 MG/DL (ref 0.76–1.27)
DEPRECATED RDW RBC AUTO: 48.1 FL (ref 37–54)
EGFRCR SERPLBLD CKD-EPI 2021: 92.2 ML/MIN/1.73
EGFRCR SERPLBLD CKD-EPI 2021: 95.3 ML/MIN/1.73
EGFRCR SERPLBLD CKD-EPI 2021: 98 ML/MIN/1.73
EGFRCR SERPLBLD CKD-EPI 2021: 98.4 ML/MIN/1.73
EGFRCR SERPLBLD CKD-EPI 2021: 99.9 ML/MIN/1.73
EGFRCR SERPLBLD CKD-EPI 2021: 99.9 ML/MIN/1.73
EOSINOPHIL # BLD AUTO: 0 10*3/MM3 (ref 0–0.4)
EOSINOPHIL NFR BLD AUTO: 0 % (ref 0.3–6.2)
ERYTHROCYTE [DISTWIDTH] IN BLOOD BY AUTOMATED COUNT: 13.2 % (ref 12.3–15.4)
GEN 5 2HR TROPONIN T REFLEX: 17 NG/L
GLOBULIN UR ELPH-MCNC: 2.4 GM/DL
GLUCOSE SERPL-MCNC: 113 MG/DL (ref 65–99)
GLUCOSE SERPL-MCNC: 113 MG/DL (ref 65–99)
GLUCOSE SERPL-MCNC: 127 MG/DL (ref 65–99)
GLUCOSE SERPL-MCNC: 131 MG/DL (ref 65–99)
GLUCOSE SERPL-MCNC: 132 MG/DL (ref 65–99)
GLUCOSE SERPL-MCNC: 133 MG/DL (ref 65–99)
HCT VFR BLD AUTO: 36.3 % (ref 37.5–51)
HGB BLD-MCNC: 12.9 G/DL (ref 13–17.7)
IMM GRANULOCYTES # BLD AUTO: 0.01 10*3/MM3 (ref 0–0.05)
IMM GRANULOCYTES NFR BLD AUTO: 0.4 % (ref 0–0.5)
LYMPHOCYTES # BLD AUTO: 0.83 10*3/MM3 (ref 0.7–3.1)
LYMPHOCYTES NFR BLD AUTO: 32 % (ref 19.6–45.3)
MCH RBC QN AUTO: 35.1 PG (ref 26.6–33)
MCHC RBC AUTO-ENTMCNC: 35.5 G/DL (ref 31.5–35.7)
MCV RBC AUTO: 98.6 FL (ref 79–97)
MONOCYTES # BLD AUTO: 0.06 10*3/MM3 (ref 0.1–0.9)
MONOCYTES NFR BLD AUTO: 2.3 % (ref 5–12)
NEUTROPHILS NFR BLD AUTO: 1.69 10*3/MM3 (ref 1.7–7)
NEUTROPHILS NFR BLD AUTO: 65.3 % (ref 42.7–76)
NRBC BLD AUTO-RTO: 0 /100 WBC (ref 0–0.2)
NT-PROBNP SERPL-MCNC: 733 PG/ML (ref 0–1800)
PLATELET # BLD AUTO: 123 10*3/MM3 (ref 140–450)
PMV BLD AUTO: 9.1 FL (ref 6–12)
POTASSIUM SERPL-SCNC: 3.1 MMOL/L (ref 3.5–5.2)
POTASSIUM SERPL-SCNC: 3.1 MMOL/L (ref 3.5–5.2)
POTASSIUM SERPL-SCNC: 3.5 MMOL/L (ref 3.5–5.2)
POTASSIUM SERPL-SCNC: 3.9 MMOL/L (ref 3.5–5.2)
POTASSIUM SERPL-SCNC: 3.9 MMOL/L (ref 3.5–5.2)
POTASSIUM SERPL-SCNC: 4 MMOL/L (ref 3.5–5.2)
PROT SERPL-MCNC: 6.3 G/DL (ref 6–8.5)
QT INTERVAL: 512 MS
RBC # BLD AUTO: 3.68 10*6/MM3 (ref 4.14–5.8)
SODIUM SERPL-SCNC: 114 MMOL/L (ref 136–145)
SODIUM SERPL-SCNC: 115 MMOL/L (ref 136–145)
SODIUM SERPL-SCNC: 115 MMOL/L (ref 136–145)
TROPONIN T DELTA: 0 NG/L
TROPONIN T SERPL HS-MCNC: 17 NG/L
WBC NRBC COR # BLD: 2.59 10*3/MM3 (ref 3.4–10.8)

## 2023-05-24 PROCEDURE — 85025 COMPLETE CBC W/AUTO DIFF WBC: CPT | Performed by: INTERNAL MEDICINE

## 2023-05-24 PROCEDURE — 84484 ASSAY OF TROPONIN QUANT: CPT | Performed by: INTERNAL MEDICINE

## 2023-05-24 PROCEDURE — 93306 TTE W/DOPPLER COMPLETE: CPT

## 2023-05-24 PROCEDURE — 87040 BLOOD CULTURE FOR BACTERIA: CPT | Performed by: INTERNAL MEDICINE

## 2023-05-24 PROCEDURE — 93005 ELECTROCARDIOGRAM TRACING: CPT | Performed by: INTERNAL MEDICINE

## 2023-05-24 PROCEDURE — 25510000001 PERFLUTREN (DEFINITY) 8.476 MG IN SODIUM CHLORIDE (PF) 0.9 % 10 ML INJECTION: Performed by: INTERNAL MEDICINE

## 2023-05-24 PROCEDURE — 97530 THERAPEUTIC ACTIVITIES: CPT

## 2023-05-24 PROCEDURE — 93010 ELECTROCARDIOGRAM REPORT: CPT | Performed by: INTERNAL MEDICINE

## 2023-05-24 PROCEDURE — 80053 COMPREHEN METABOLIC PANEL: CPT | Performed by: INTERNAL MEDICINE

## 2023-05-24 PROCEDURE — 93306 TTE W/DOPPLER COMPLETE: CPT | Performed by: INTERNAL MEDICINE

## 2023-05-24 PROCEDURE — 84295 ASSAY OF SERUM SODIUM: CPT | Performed by: INTERNAL MEDICINE

## 2023-05-24 PROCEDURE — 97162 PT EVAL MOD COMPLEX 30 MIN: CPT

## 2023-05-24 PROCEDURE — 25010000002 ONDANSETRON PER 1 MG: Performed by: INTERNAL MEDICINE

## 2023-05-24 PROCEDURE — 83880 ASSAY OF NATRIURETIC PEPTIDE: CPT | Performed by: INTERNAL MEDICINE

## 2023-05-24 RX ORDER — POTASSIUM CHLORIDE 1.5 G/1.77G
40 POWDER, FOR SOLUTION ORAL ONCE
Status: DISCONTINUED | OUTPATIENT
Start: 2023-05-24 | End: 2023-05-27 | Stop reason: HOSPADM

## 2023-05-24 RX ORDER — FUROSEMIDE 40 MG/1
40 TABLET ORAL DAILY
Status: DISCONTINUED | OUTPATIENT
Start: 2023-05-24 | End: 2023-05-27 | Stop reason: HOSPADM

## 2023-05-24 RX ORDER — POTASSIUM CHLORIDE 750 MG/1
40 TABLET, FILM COATED, EXTENDED RELEASE ORAL ONCE
Status: COMPLETED | OUTPATIENT
Start: 2023-05-24 | End: 2023-05-24

## 2023-05-24 RX ADMIN — FUROSEMIDE 40 MG: 40 TABLET ORAL at 11:02

## 2023-05-24 RX ADMIN — FINASTERIDE 5 MG: 5 TABLET, FILM COATED ORAL at 08:18

## 2023-05-24 RX ADMIN — SODIUM CHLORIDE TAB 1 GM 1 G: 1 TAB at 08:17

## 2023-05-24 RX ADMIN — Medication 10 ML: at 20:55

## 2023-05-24 RX ADMIN — POTASSIUM CHLORIDE 40 MEQ: 750 TABLET, EXTENDED RELEASE ORAL at 02:07

## 2023-05-24 RX ADMIN — ACYCLOVIR 400 MG: 400 TABLET ORAL at 08:17

## 2023-05-24 RX ADMIN — DOCUSATE SODIUM 50MG AND SENNOSIDES 8.6MG 2 TABLET: 8.6; 5 TABLET, FILM COATED ORAL at 11:01

## 2023-05-24 RX ADMIN — LATANOPROST 1 DROP: 50 SOLUTION OPHTHALMIC at 20:55

## 2023-05-24 RX ADMIN — Medication 5000 UNITS: at 08:17

## 2023-05-24 RX ADMIN — PROPAFENONE HYDROCHLORIDE 150 MG: 150 TABLET, COATED ORAL at 20:48

## 2023-05-24 RX ADMIN — OXYCODONE HYDROCHLORIDE AND ACETAMINOPHEN 1000 MG: 500 TABLET ORAL at 08:17

## 2023-05-24 RX ADMIN — Medication 10 ML: at 11:02

## 2023-05-24 RX ADMIN — ONDANSETRON 4 MG: 2 INJECTION INTRAMUSCULAR; INTRAVENOUS at 01:59

## 2023-05-24 RX ADMIN — TAMSULOSIN HYDROCHLORIDE 0.4 MG: 0.4 CAPSULE ORAL at 08:17

## 2023-05-24 RX ADMIN — PERFLUTREN 2 ML: 6.52 INJECTION, SUSPENSION INTRAVENOUS at 18:19

## 2023-05-24 RX ADMIN — AMLODIPINE BESYLATE 6.25 MG: 2.5 TABLET ORAL at 08:17

## 2023-05-24 RX ADMIN — PROPAFENONE HYDROCHLORIDE 150 MG: 150 TABLET, COATED ORAL at 16:18

## 2023-05-24 RX ADMIN — DOCUSATE SODIUM 50MG AND SENNOSIDES 8.6MG 2 TABLET: 8.6; 5 TABLET, FILM COATED ORAL at 20:48

## 2023-05-24 RX ADMIN — PROPAFENONE HYDROCHLORIDE 150 MG: 150 TABLET, COATED ORAL at 08:17

## 2023-05-24 RX ADMIN — SODIUM CHLORIDE TAB 1 GM 1 G: 1 TAB at 18:15

## 2023-05-24 RX ADMIN — SODIUM CHLORIDE TAB 1 GM 1 G: 1 TAB at 11:54

## 2023-05-24 RX ADMIN — ROSUVASTATIN CALCIUM 10 MG: 10 TABLET, FILM COATED ORAL at 08:17

## 2023-05-24 NOTE — PLAN OF CARE
Goal Outcome Evaluation:  Plan of Care Reviewed With: patient          Problem: Adult Inpatient Plan of Care  Goal: Plan of Care Review  Outcome: Ongoing, Progressing  Flowsheets (Taken 5/24/2023 3105)  Progress: improving  Plan of Care Reviewed With: patient  Outcome Evaluation: Vitals stable. Pt worked w PT. Ambulated multiple times around unit w nursing staff SBA. Q6h sodium draws. 1200mL fluid restriction. Pt needs met and questions answered. Will continue to monitor.

## 2023-05-24 NOTE — NURSING NOTE
Dr carlie Remy admitted 5/23/2023 for hyponatremia. Pt feels nausea and generalized weakness. Given twice Zofran for nausea.Pt Potassium was 3.1 notified nephrology and new order 40 mEq.  Denies pain. Safety maintained. Will continue to monitor.

## 2023-05-24 NOTE — THERAPY EVALUATION
Patient Name: Angel Remy  : 1945    MRN: [id number]                              Today's Date: 2023       Admit Date: 2023    Visit Dx:     ICD-10-CM ICD-9-CM   1. Hyponatremia  E87.1 276.1   2. Nausea  R11.0 787.02     Patient Active Problem List   Diagnosis   • Hairy T-cell leukemia   • FH: colon cancer   • Hyponatremia   • Orthostatic hypotension   • Supine hypertension   • Anemia   • Macrocytosis   • LBBB (left bundle branch block)   • Prolonged P-R interval     Past Medical History:   Diagnosis Date   • BCC (basal cell carcinoma of skin) 2022    NOSE   • Benign prostate hyperplasia    • H/O Elevated PSA    • H/O Hairy cell leukemia (CMS/HCC)    • H/O Internal thrombosed hemorrhoids    • H/O minimal right carotid plaque    • H/O peripheral neuropathy    • Hyperlipidemia    • Hypertension    • Primary open-angle glaucoma, bilateral, mild stage      Past Surgical History:   Procedure Laterality Date   • BONE MARROW BIOPSY W/ ASPIRATION     • COLONOSCOPY     • COLONOSCOPY N/A 2021    Procedure: COLONOSCOPY TO CECUM AND INTO TI WITH COLD BIOPSY POLYPECTOMIES;  Surgeon: Juan Jenkins MD;  Location: [facility] ENDOSCOPY;  Service: Gastroenterology;  Laterality: N/A;  pre: family history of colon cancer  post: polyps, diverticulosis  and internal hemorrhoids   • EXCISION MASS HEAD/NECK N/A 2022    Procedure: EXCISION BASAL CELL CARCINOMA NOSE WITH FROZEN SECTION FULL THICKNESS GRAFT;  Surgeon: Arturo Weiss MD;  Location: [facility] OR [facility];  Service: Plastics;  Laterality: N/A;   • HERNIA REPAIR Right 2000    Inguinal    • OTHER SURGICAL HISTORY      Median nerve foreign body excision   • PROSTATE BIOPSY      2010 and 2011 whan PSA had acceleration   • SIGMOIDOSCOPY      With small thrombosed internal hemorrhoid.   • TONSILLECTOMY     • WISDOM TOOTH EXTRACTION        General Information     Row Name 23 1017          Physical Therapy Time  and Intention    Document Type evaluation  -DJ     Mode of Treatment individual therapy;physical therapy  -DJ     Row Name 05/24/23 1017          General Information    Patient Profile Reviewed yes  -DJ     Prior Level of Function independent:;ADL's;work  -DJ     Existing Precautions/Restrictions fall  -DJ     Barriers to Rehab none identified  -DJ     Row Name 05/24/23 1017          Living Environment    People in Home spouse  -DJ     Row Name 05/24/23 1017          Stairs Within Home, Primary    Stairs, Within Home, Primary Pt reports he has stairs in home which he uses for exercise  -DJ     Row Name 05/24/23 1017          Cognition    Orientation Status (Cognition) oriented x 4  -DJ     Row Name 05/24/23 1017          Safety Issues, Functional Mobility    Impairments Affecting Function (Mobility) balance  -DJ     Comment, Safety Issues/Impairments (Mobility) nonskid socks  -DJ           User Key  (r) = Recorded By, (t) = Taken By, (c) = Cosigned By    Initials Name Provider Type    DJ Brigitte Barrios, PT Physical Therapist               Mobility     Row Name 05/24/23 1018          Bed Mobility    Supine-Sit [place] (Bed Mobility) not tested  -DJ     Comment, (Bed Mobility) Pt UIC (get to chair by himself per nsg report)  -DJ     Row Name 05/24/23 1018          Transfers    Comment, (Transfers) sit/stand from recliner  -DJ     Row Name 05/24/23 1018          Bed-Chair Transfer    Bed-Chair [place] (Transfers) not tested  -DJ     Row Name 05/24/23 1018          Sit-Stand Transfer    Sit-Stand [place] (Transfers) standby assist;verbal cues  -DJ     Row Name 05/24/23 1018          Gait/Stairs (Locomotion)    [place] Level (Gait) contact guard;verbal cues  -DJ     Assistive Device (Gait) other (see comments)  0 AD  -DJ     Distance in Feet (Gait) 180'  -DJ     Deviations/Abnormal Patterns (Gait) base of support, narrow;stride length decreased  -DJ     [place] Level (Stairs) not tested  -DJ      Comment, (Gait/Stairs) Pt amb 180' with CGA without AD on level surfaces. Pt is mildly unsteady on his feet with several LOB that req CGA to recover. Pt declined use of r wx.  -DJ           User Key  (r) = Recorded By, (t) = Taken By, (c) = Cosigned By    Initials Name Provider Type    Brigitte Torrez, PT Physical Therapist               Obj/Interventions     Row Name 05/24/23 1021          Range of Motion Comprehensive    General Range of Motion no range of motion deficits identified  -DJ     Row Name 05/24/23 1021          Strength Comprehensive (MMT)    Comment, General Manual Muscle Testing (MMT) Assessment good extremity strength  -DJ     Row Name 05/24/23 1021          Motor Skills    Motor Skills functional endurance  -DJ     Functional Endurance fair  -DJ     Therapeutic Exercise other (see comments)  standing MIP and calf raises  -DJ     Row Name 05/24/23 1021          Balance    Balance Assessment standing static balance;standing dynamic balance  -DJ     Static Standing Balance standby assist;verbal cues  -DJ     Dynamic Standing Balance contact guard;verbal cues  -DJ     Position/Device Used, Standing Balance supported  -DJ     Balance Interventions standing;sit to stand;supported;weight shifting activity  -DJ     Comment, Balance unsteady when amb without AD - several LOB  -DJ     Row Name 05/24/23 1021          Sensory Assessment (Somatosensory)    Sensory Assessment (Somatosensory) sensation intact  -DJ           User Key  (r) = Recorded By, (t) = Taken By, (c) = Cosigned By    Initials Name Provider Type    Brigitte Torrez, PT Physical Therapist               Goals/Plan     Row Name 05/24/23 1029          Bed Mobility Goal 1 (PT)    Activity/Assistive Device (Bed Mobility Goal 1, PT) sit to supine;supine to sit  -DJ     [place] Level/Cues Needed (Bed Mobility Goal 1, PT) modified independence;verbal cues required  -DJ     Time Frame (Bed Mobility Goal 1, PT) 1 week  -DJ     Row Name 05/24/23 1021  "         Transfer Goal 1 (PT)    Activity/Assistive Device (Transfer Goal 1, PT) sit-to-stand/stand-to-sit  -DJ     [place] Level/Cues Needed (Transfer Goal 1, PT) verbal cues required;modified independence  -DJ     Time Frame (Transfer Goal 1, PT) 1 week  -DJ     Row Name 05/24/23 1029          Gait Training Goal 1 (PT)    Activity/Assistive Device (Gait Training Goal 1, PT) gait (walking locomotion);assistive device use;improve balance and speed;increase endurance/gait distance  -DJ     [place] Level (Gait Training Goal 1, PT) standby assist  -DJ     Distance (Gait Training Goal 1, PT) >350'  -DJ     Time Frame (Gait Training Goal 1, PT) 1 week  -DJ     Row Name 05/24/23 1029          Stairs Goal 1 (PT)    Activity/Assistive Device (Stairs Goal 1, PT) ascending stairs;descending stairs  -DJ     [place] Level/Cues Needed (Stairs Goal 1, PT) contact guard required  -DJ     Number of Stairs (Stairs Goal 1, PT) 1 flight  -DJ     Time Frame (Stairs Goal 1, PT) 1 week  -DJ     Row Name 05/24/23 1029          Patient Education Goal (PT)    Activity (Patient Education Goal, PT) HEP  -DJ     [place]/Cues/Accuracy (Memory Goal 2, PT) demonstrates adequately;verbalizes understanding  -DJ     Time Frame (Patient Education Goal, PT) 3 days;short term goal (STG)  -DJ     Row Name 05/24/23 1029          Therapy Assessment/Plan (PT)    Planned Therapy Interventions (PT) balance training;bed mobility training;gait training;home exercise program;strengthening;stair training;patient/family education;transfer training  -DJ           User Key  (r) = Recorded By, (t) = Taken By, (c) = Cosigned By    Initials Name Provider Type    Brigitte Torrez, PT Physical Therapist               Clinical Impression     Row Name 05/24/23 1022          Pain    Pretreatment Pain Rating 0/10 - no pain  -DJ     Pre/Posttreatment Pain Comment c/c is \"immobility\"  -DJ     Row Name 05/24/23 1022          Plan of Care Review    Plan of " Care Reviewed With patient  -DJ     Outcome Evaluation 76yo pleasant white male admitted 5/23/23 with hyponatremia, abn ekg, L heart block and c/o nausea. PMH includes hairy cell leukemia, anemia, orthostatic hypotension. PLOF: Pt lives at home with his wife and has stairs in his home which he uses for exs. 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 he was independent with ADLs. He is limited now by generalized weaknesss and decreased dynamic balance. today, he was found UIC (nsg reports he got to chair without assist). He req min A to don pants to place legs in pants. He req SBA to stand from recliner.Pt amb 180' with CGA without AD on level surfaces. Pt is mildly unsteady on his feet with several LOB that req CGA to recover. Pt declined use of r wx.Pt performed standing LE ther ex until MD entered room for exam. Pt placed in recliner chair with all needs met. He may benefit from skilled PT services to address functional deficits and prepare for d/c home.  -DJ     Row Name 05/24/23 1022          Therapy Assessment/Plan (PT)    Patient/Family Therapy Goals Statement (PT) return to home and work  -DJ     Rehab Potential (PT) good, to achieve stated therapy goals  -DJ     Criteria for Skilled Interventions Met (PT) yes;meets criteria;skilled treatment is necessary  -DJ     Therapy Frequency (PT) 5 times/wk  -DJ     Row Name 05/24/23 1022          Vital Signs    O2 Delivery Intra Treatment room air  -DJ     O2 Delivery Post Treatment room air  -DJ     Pre Patient Position Sitting  -DJ     Intra Patient Position Standing  -DJ     Post Patient Position Sitting  -DJ     Row Name 05/24/23 1022          Positioning and Restraints    Pre-Treatment Position sitting in chair/recliner  -DJ     Post Treatment Position chair  -DJ     In Chair reclined;call light within reach;encouraged to call for assist;with other staff  0 chair alarm on when PT entered room  -DJ           User Key  (r) = Recorded By,  (t) = Taken By, (c) = Cosigned By    Initials Name Provider Type    Brigitte Torrez, PT Physical Therapist               Outcome Measures     Row Name 05/24/23 1030          How much help from another person do you currently need...    Turning from your back to your side while in flat bed without using bedrails? 3  -DJ     Moving from lying on back to sitting on the side of a flat bed without bedrails? 3  -DJ     Moving to and from a bed to a chair (including a wheelchair)? 3  -DJ     Standing up from a chair using your arms (e.g., wheelchair, bedside chair)? 4  -DJ     Climbing 3-5 steps with a railing? 2  -DJ     To walk in hospital room? 3  -DJ     AM-PAC 6 Clicks Score (PT) 18  -DJ     Highest level of mobility 6 --> Walked 10 steps or more  -DJ     Row Name 05/24/23 1030          Functional Assessment    Outcome Measure Options AM-PAC 6 Clicks Basic Mobility (PT)  -           User Key  (r) = Recorded By, (t) = Taken By, (c) = Cosigned By    Initials Name Provider Type    Brigitte Torrez, TEENA Physical Therapist                             Physical Therapy Education     Title: PT OT SLP Therapies (Done)     Topic: Physical Therapy (Done)     Point: Mobility training (Done)     Learning Progress Summary           Patient Acceptance, TB, DU by AMNA at 5/24/2023 1031                   Point: Home exercise program (Done)     Learning Progress Summary           Patient Acceptance, TB, DU by AMNA at 5/24/2023 1031                   Point: Body mechanics (Done)     Learning Progress Summary           Patient Acceptance, TB, DU by  at 5/24/2023 1031                   Point: Precautions (Done)     Learning Progress Summary           Patient Acceptance, TB, DU by  at 5/24/2023 1031                               User Key     Initials Effective Dates Name Provider Type Discipline     10/25/19 -  Brigitte Barrios, PT Physical Therapist PT              PT Recommendation and Plan  Planned Therapy Interventions (PT): balance  training, bed mobility training, gait training, home exercise program, strengthening, stair training, patient/family education, transfer training  Plan of Care Reviewed With: patient  Outcome Evaluation: 76yo pleasant white male admitted 5/23/23 with hyponatremia, abn ekg, L heart block and c/o nausea. PMH includes hairy cell leukemia, anemia, orthostatic hypotension. PLOF: Pt lives at home with his wife and has stairs in his home which he uses for exs. 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 he was independent with ADLs. He is limited now by generalized weaknesss and decreased dynamic balance. today, he was found UIC (nsg reports he got to chair without assist). He req min A to don pants to place legs in pants. He req SBA to stand from recliner.Pt amb 180' with CGA without AD on level surfaces. Pt is mildly unsteady on his feet with several LOB that req CGA to recover. Pt declined use of r wx.Pt performed standing LE ther ex until MD entered room for exam. Pt placed in recliner chair with all needs met. He may benefit from skilled PT services to address functional deficits and prepare for d/c home.     Time Calculation:    PT Charges     Row Name 05/24/23 1032             Time Calculation    Start Time 0922  -DJ      Stop Time 0940  -DJ      Time Calculation (min) 18 min  -DJ      PT Non-Billable Time (min) 10 min  -DJ      PT Received On 05/24/23  -AMNA      PT - Next Appointment 05/25/23  -AMNA      PT Goal Re-Cert Due Date 05/31/23  -AMNA            User Key  (r) = Recorded By, (t) = Taken By, (c) = Cosigned By    Initials Name Provider Type    Brigitte Torrez PT Physical Therapist              Therapy Charges for Today     Code Description Service Date Service Provider Modifiers Qty    30960235820 HC PT EVAL MOD COMPLEXITY 2 5/24/2023 Brigitte Barrios, PT GP 1    33519838840 HC PT THERAPEUTIC ACT EA 15 MIN 5/24/2023 Brigitte Barrios, PT GP 1          PT G-Codes  Outcome Measure Options: AM-PAC  6 Clicks Basic Mobility (PT)  AM-PAC 6 Clicks Score (PT): 18  PT Discharge Summary  Anticipated Discharge Disposition (PT): home with assist    Brigitte Barrios, PT  5/24/2023

## 2023-05-24 NOTE — CASE MANAGEMENT/SOCIAL WORK
Discharge Planning Assessment  [facility]     Patient Name: Angel Remy  MRN: [id number]  Today's Date: 5/24/2023    Admit Date: 5/23/2023    Plan: Home with wife. Family to transport.   Discharge Needs Assessment    No documentation.                Discharge Plan     Row Name 05/24/23 1753       Plan    Plan Home with wife. Family to transport.    Patient/Family in Agreement with Plan yes    Plan Comments Pt lives with his wife.  There are steps at his home but has no issues maneuvering. Denies any home DME.  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 Pt has never been to Rehab or used HH. Pt’s PCP is Damián Vuong MD. Uses [facility] at [address]. At discharge, family will transport. Walked 180 ft with PT without using cane or walker. PT rec home. Explained that CCP would follow to assess for discharge needs.  Timur Charlton RN-BC              Continued Care and Services - Admitted Since 5/23/2023    Coordination has not been started for this encounter.               Timur Charlton RN

## 2023-05-24 NOTE — PROGRESS NOTES
[facility] Progress Note      Patient Name: Angel Remy  : 1945  MRN: [id number]  Primary Care Physician:  Damián Vuong Jr., MD  Date of admission: 2023    Subjective     Interval History:   Follow-up severe hyponatremia    Patient complaining of weakness, no nausea or vomiting today, no chest pain or shortness of air, no orthopnea or PND, no dysuria or gross hematuria he had mild lower extremity edema    Review of Systems:   As noted above    Objective     Vitals:   Temp:  [97.2 °F (36.2 °C)-99 °F (37.2 °C)] 98.9 °F (37.2 °C)  Heart Rate:  [55-67] 57  Resp:  [16-18] 18  BP: ()/(51-85) 142/71    Intake/Output Summary (Last 24 hours) at 2023 0750  Last data filed at 2023 0749  Gross per 24 hour   Intake 0 ml   Output --   Net 0 ml       Physical Exam:    General Appearance: alert, oriented x 3, no acute distress, chronically  Skin: warm and dry  HEENT: oral mucosa normal, nonicteric sclera  Neck: supple, no JVD, faint carotid bruit  Lungs: CTA, unlabored breathing effort  Heart: RRR, normal S1 and S2, no S3, no rub  Abdomen: soft, nontender, nondistended, normoactive  : no palpable bladder  Extremities: 1+ pretibial edema  Neuro: normal speech and mental status     Scheduled Meds:     acyclovir, 400 mg, Oral, Daily  amLODIPine, 6.25 mg, Oral, Daily  vitamin C, 1,000 mg, Oral, Daily  cholecalciferol, 5,000 Units, Oral, Daily  finasteride, 5 mg, Oral, Daily  latanoprost, 1 drop, Both Eyes, Nightly  potassium chloride, 40 mEq, Oral, Once  propafenone, 150 mg, Oral, TID  rosuvastatin, 10 mg, Oral, Daily  senna-docusate sodium, 2 tablet, Oral, BID  sodium chloride, 10 mL, Intravenous, Q12H  sodium chloride, 1 g, Oral, TID With Meals  tamsulosin, 0.4 mg, Oral, Daily      IV Meds:        Results Reviewed:   I have personally reviewed the results from the time of this admission to 2023 07:50 EDT     Results from last 7 days   Lab Units  05/24/23  0556 05/23/23  2340 05/23/23  2131 05/23/23  1901 05/23/23  1521   SODIUM mmol/L 114*  114* 115* 112*   < > 115*   POTASSIUM mmol/L 3.9  3.9 3.1*  --   --  4.4   CHLORIDE mmol/L 80*  80* 81*  --   --  78*   CO2 mmol/L 22.4  22.4 25.6  --   --  21.7*   BUN mg/dL 19  19 24*  --   --  10   CREATININE mg/dL 0.59*  0.59* 0.69*  --   --  0.68*   CALCIUM mg/dL 8.5*  8.5* 7.9*  --   --  8.4*   BILIRUBIN mg/dL 2.0*  --   --   --  2.2*   ALK PHOS U/L 51  --   --   --  58   ALT (SGPT) U/L 29  --   --   --  30   AST (SGOT) U/L 27  --   --   --  23   GLUCOSE mg/dL 113*  113* 132*  --   --  148*    < > = values in this interval not displayed.       Estimated Creatinine Clearance: 104.3 mL/min (A) (by C-G formula based on SCr of 0.59 mg/dL (L)).          Results from last 7 days   Lab Units 05/23/23  1521   URIC ACID mg/dL 1.7*       Results from last 7 days   Lab Units 05/24/23  0556 05/23/23  1521   WBC 10*3/mm3 2.59* 3.02*   HEMOGLOBIN g/dL 12.9* 12.9*   PLATELETS 10*3/mm3 123* 156             Assessment / Plan     ASSESSMENT:  1.  Hyponatremia picture consistent with SIADH the only caveat that we have here he has mild edema but his uric acid is 1.7 and was on hydrochlorothiazide until yesterday morning which causes hyponatremia by sodium wasting also and SIADH-like picture.  The drop in sodium apparently took place over a few days hence the need to correct it should be very slow  2.  Hypertension, acceptable control systolic component is a bit on the high side  3.  Chronic thrombocytopenia and leukopenia followed by hematology with known history of hairy cell leukemia treated in 2012  4.  BPH, treated  5.  Dyslipidemia  6.  History of glaucoma    PLAN:  -Restrict oral fluid intake to 1200 cc per 24 hours  -Start oral salt tablets 1 g 3 times daily  -Start oral furosemide 20 mg daily  -The goal is not to allow the sodium to increase by more than 6 to 8 mEq per 24 hours in order to prevent osmotic  demyelination  -Surveillance labs  I discussed the case with Dr. Remy and I will be trying to contact his daughter later this morning by phone  Thank you for involving us in the care of Angel Remy.  Please feel free to call with any questions.    Remy Wilson MD  05/24/23  07:50 EDT    [facility]  [phone]    Please note that portions of this note were completed with a voice recognition program.

## 2023-05-24 NOTE — PROGRESS NOTES
[facility] Medicine Services  PROGRESS NOTE    Patient Name: Angel Remy  : 1945  MRN: [id number]    Date of Admission: 2023  Primary Care Physician: Damián Vuong Jr., MD    Subjective   Subjective     CC:  Follow-up low sodium    Subjective: Patient says he is feeling better today.  Denies any nausea vomiting lightheadedness or confusion.  No other new complaints reported.    Review of Systems  No current fevers or chills  No current shortness of breath or cough  No current nausea, vomiting, or diarrhea  No current chest pain or palpitations      Objective   Objective     Vital Signs:   Temp:  [97.2 °F (36.2 °C)-99 °F (37.2 °C)] 98.9 °F (37.2 °C)  Heart Rate:  [55-67] 59  Resp:  [16-18] 18  BP: ()/(51-85) 142/71        Physical Exam:  Constitutional:Awake, alert  HENT: NCAT, mucous membranes moist, neck supple  Respiratory: No cough or wheezing, nonlabored breathing, normal inspiration on room air  Cardiovascular: Pulse rate is normal, palpable radial pulse bilaterally  Gastrointestinal: Positive bowel sounds, soft, nontender, nondistended  Musculoskeletal: Normal musculature for age, no lower extremity edema, BMI 22  Psychiatric: Appropriate affect, cooperative, conversational  Neurologic: No slurred speech or facial droop, follows commands  Skin: No rashes or jaundice, warm      Results Reviewed:  Results from last 7 days   Lab Units 23  0556 23  1521   WBC 10*3/mm3 2.59* 3.02*   HEMOGLOBIN g/dL 12.9* 12.9*   HEMATOCRIT % 36.3* 36.4*   PLATELETS 10*3/mm3 123* 156     Results from last 7 days   Lab Units 23  0833 23  0556 23  2340 23  1901 23  1521   SODIUM mmol/L 114*  114* 114*  114* 115*   < > 115*   POTASSIUM mmol/L 4.0 3.9  3.9 3.1*  --  4.4   CHLORIDE mmol/L 80* 80*  80* 81*  --  78*   CO2 mmol/L 22.6 22.4  22.4 25.6  --  21.7*   BUN mg/dL 18 19  19 24*  --  10   CREATININE mg/dL 0.63* 0.59*  0.59* 0.69*  --  0.68*    GLUCOSE mg/dL 127* 113*  113* 132*  --  148*   CALCIUM mg/dL 8.6 8.5*  8.5* 7.9*  --  8.4*   ALT (SGPT) U/L  --  29  --   --  30   AST (SGOT) U/L  --  27  --   --  23   HSTROP T ng/L 17* 17*  --   --   --    PROBNP pg/mL  --  733.0  --   --   --     < > = values in this interval not displayed.     Estimated Creatinine Clearance: 97.6 mL/min (A) (by C-G formula based on SCr of 0.63 mg/dL (L)).    Microbiology Results Abnormal     None          Imaging Results (Last 24 Hours)     Procedure Component Value Units Date/Time    XR Chest PA & Lateral [[id number]] Collected: 05/24/23 1031     Updated: 05/24/23 1035    Narrative:      XR CHEST PA AND LATERAL-  05/23/2023     HISTORY: Hyponatremia.     Heart size is within normal limits. Lungs appear free of acute  infiltrates. Bones and soft tissues are unremarkable.       Impression:      1. No acute process.     This report was finalized on 5/24/2023 10:31 AM by Dr. Kun Villatoro M.D.                 I have reviewed the medications:  Scheduled Meds:acyclovir, 400 mg, Oral, Daily  amLODIPine, 6.25 mg, Oral, Daily  vitamin C, 1,000 mg, Oral, Daily  cholecalciferol, 5,000 Units, Oral, Daily  finasteride, 5 mg, Oral, Daily  furosemide, 40 mg, Oral, Daily  latanoprost, 1 drop, Both Eyes, Nightly  potassium chloride, 40 mEq, Oral, Once  propafenone, 150 mg, Oral, TID  rosuvastatin, 10 mg, Oral, Daily  senna-docusate sodium, 2 tablet, Oral, BID  sodium chloride, 10 mL, Intravenous, Q12H  sodium chloride, 1 g, Oral, TID With Meals  tamsulosin, 0.4 mg, Oral, Daily      Continuous Infusions:   PRN Meds:.•  senna-docusate sodium **AND** polyethylene glycol **AND** bisacodyl **AND** bisacodyl  •  nitroglycerin  •  ondansetron  •  sodium chloride  •  sodium chloride  •  sodium chloride    Assessment & Plan   Assessment & Plan     Active Hospital Problems    Diagnosis  POA   • **Hyponatremia [E87.1]  Yes   • LBBB (left bundle branch block) [I44.7]  Yes   • Prolonged P-R interval  [I44.0]  Yes   • Orthostatic hypotension [I95.1]  Unknown   • Supine hypertension [I10]  Unknown   • Anemia [D64.9]  Yes   • Macrocytosis [D75.89]  Unknown   • 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        Brief Hospital Course to date:  Angel Remy is a 77 y.o. male with history of hairy T-cell leukemia presents to the hospital with nausea and poor appetite and was found to have severe hyponatremia with sodium level initially of 115.    Discussion/plan for today:  All medical problems are new under my management today.  Plan for fluid restriction.  Sodium tablets.  Supportive care and symptom treatment.  Careful monitoring of frequent electrolytes every 6 hours and avoid excessive rise in creatinine.    GI symptoms of nausea vomiting and poor appetite are improving.  Zofran as needed.  Encourage oral intake.    Pancytopenia noted.  Blood counts are stable.  White count slightly decreased and platelets show mild thrombocytopenia.  No sign of bleeding.  Continue to trend and patient will follow-up with outpatient oncology clinic.    Beta hydroxybutyrate elevation.  Patient likely has starvation ketosis with metabolic acidosis noted on admission.  Encouraging nutrition.    EKG reviewed.  Left bundle branch block is new.  Check echocardiogram.  WA interval prolonged.  Possibly related to electrolyte imbalance.  Recheck EKG.  No acute sign of ACS.  Continue chronic cardiac medicines for now.    Treatment plan discussed with the patient who is in agreement.    DVT Prophylaxis: Mechanical      Disposition: Possibly home in 1 to 2 days    CODE STATUS:   Code Status and Medical Interventions:   Ordered at: 05/23/23 1900     Code Status (Patient has no pulse and is not breathing):    CPR (Attempt to Resuscitate)     Medical Interventions (Patient has pulse or is breathing):    Full Support       Angel Sagastume MD  05/24/23

## 2023-05-24 NOTE — PLAN OF CARE
Goal Outcome Evaluation:  Plan of Care Reviewed With: patient           Outcome Evaluation: 76yo pleasant white male admitted 5/23/23 with hyponatremia, abn ekg, L heart block and c/o nausea. PMH includes hairy cell leukemia, anemia, orthostatic hypotension. PLOF: Pt lives at home with his wife and has stairs in his home which he uses for exs. 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 he was independent with ADLs. He is limited now by generalized weaknesss and decreased dynamic balance. today, he was found UIC (nsg reports he got to chair without assist). He req min A to don pants to place legs in pants. He req SBA to stand from recliner.Pt amb 180' with CGA without AD on level surfaces. Pt is mildly unsteady on his feet with several LOB that req CGA to recover. Pt declined use of r wx.Pt performed standing LE ther ex until MD entered room for exam. Pt placed in recliner chair with all needs met. He may benefit from skilled PT services to address functional deficits and prepare for d/c home.

## 2023-05-25 LAB
ALBUMIN SERPL-MCNC: 4 G/DL (ref 3.5–5.2)
ANION GAP SERPL CALCULATED.3IONS-SCNC: 10 MMOL/L (ref 5–15)
ANION GAP SERPL CALCULATED.3IONS-SCNC: 13.2 MMOL/L (ref 5–15)
AORTIC DIMENSIONLESS INDEX: 1 (DI)
BASOPHILS # BLD AUTO: 0 10*3/MM3 (ref 0–0.2)
BASOPHILS NFR BLD AUTO: 0 % (ref 0–1.5)
BH CV ECHO MEAS - ACS: 2.16 CM
BH CV ECHO MEAS - AO MAX PG: 6.9 MMHG
BH CV ECHO MEAS - AO MEAN PG: 4.1 MMHG
BH CV ECHO MEAS - AO V2 MAX: 131.4 CM/SEC
BH CV ECHO MEAS - AO V2 VTI: 26.1 CM
BH CV ECHO MEAS - AVA(I,D): 2.7 CM2
BH CV ECHO MEAS - EDV(CUBED): 100.1 ML
BH CV ECHO MEAS - EDV(MOD-SP2): 73 ML
BH CV ECHO MEAS - EDV(MOD-SP4): 124 ML
BH CV ECHO MEAS - EF(MOD-BP): 60.5 %
BH CV ECHO MEAS - EF(MOD-SP2): 53.4 %
BH CV ECHO MEAS - EF(MOD-SP4): 62.9 %
BH CV ECHO MEAS - ESV(CUBED): 30 ML
BH CV ECHO MEAS - ESV(MOD-SP2): 34 ML
BH CV ECHO MEAS - ESV(MOD-SP4): 46 ML
BH CV ECHO MEAS - FS: 33.1 %
BH CV ECHO MEAS - IVS/LVPW: 0.92 CM
BH CV ECHO MEAS - IVSD: 0.87 CM
BH CV ECHO MEAS - LAT PEAK E' VEL: 7.5 CM/SEC
BH CV ECHO MEAS - LV DIASTOLIC VOL/BSA (35-75): 66.5 CM2
BH CV ECHO MEAS - LV MASS(C)D: 142.4 GRAMS
BH CV ECHO MEAS - LV MAX PG: 6.2 MMHG
BH CV ECHO MEAS - LV MEAN PG: 2.7 MMHG
BH CV ECHO MEAS - LV SYSTOLIC VOL/BSA (12-30): 24.7 CM2
BH CV ECHO MEAS - LV V1 MAX: 124.3 CM/SEC
BH CV ECHO MEAS - LV V1 VTI: 24.9 CM
BH CV ECHO MEAS - LVIDD: 4.6 CM
BH CV ECHO MEAS - LVIDS: 3.1 CM
BH CV ECHO MEAS - LVOT AREA: 2.9 CM2
BH CV ECHO MEAS - LVOT DIAM: 1.91 CM
BH CV ECHO MEAS - LVPWD: 0.95 CM
BH CV ECHO MEAS - MED PEAK E' VEL: 6.7 CM/SEC
BH CV ECHO MEAS - MV A MAX VEL: 53.1 CM/SEC
BH CV ECHO MEAS - MV DEC SLOPE: 433 CM/SEC2
BH CV ECHO MEAS - MV DEC TIME: 0.19 MSEC
BH CV ECHO MEAS - MV E MAX VEL: 57 CM/SEC
BH CV ECHO MEAS - MV E/A: 1.07
BH CV ECHO MEAS - MV MAX PG: 1.48 MMHG
BH CV ECHO MEAS - MV MEAN PG: 0.58 MMHG
BH CV ECHO MEAS - MV P1/2T: 37 MSEC
BH CV ECHO MEAS - MV V2 VTI: 18.4 CM
BH CV ECHO MEAS - MVA(P1/2T): 5.9 CM2
BH CV ECHO MEAS - MVA(VTI): 3.9 CM2
BH CV ECHO MEAS - PA ACC TIME: 0.11 SEC
BH CV ECHO MEAS - PA PR(ACCEL): 27.9 MMHG
BH CV ECHO MEAS - PA V2 MAX: 152 CM/SEC
BH CV ECHO MEAS - QP/QS: 1.12
BH CV ECHO MEAS - RAP SYSTOLE: 3 MMHG
BH CV ECHO MEAS - RV MAX PG: 4.6 MMHG
BH CV ECHO MEAS - RV V1 MAX: 107.1 CM/SEC
BH CV ECHO MEAS - RV V1 VTI: 16.2 CM
BH CV ECHO MEAS - RVOT DIAM: 2.5 CM
BH CV ECHO MEAS - RVSP: 34 MMHG
BH CV ECHO MEAS - SI(MOD-SP2): 20.9 ML/M2
BH CV ECHO MEAS - SI(MOD-SP4): 41.9 ML/M2
BH CV ECHO MEAS - SV(LVOT): 71.3 ML
BH CV ECHO MEAS - SV(MOD-SP2): 39 ML
BH CV ECHO MEAS - SV(MOD-SP4): 78 ML
BH CV ECHO MEAS - SV(RVOT): 79.8 ML
BH CV ECHO MEAS - TR MAX PG: 31.3 MMHG
BH CV ECHO MEAS - TR MAX VEL: 279.6 CM/SEC
BH CV ECHO MEASUREMENTS AVERAGE E/E' RATIO: 8.03
BH CV XLRA - RV BASE: 3.6 CM
BH CV XLRA - RV LENGTH: 8.1 CM
BH CV XLRA - TDI S': 23.4 CM/SEC
BUN SERPL-MCNC: 18 MG/DL (ref 8–23)
BUN SERPL-MCNC: 24 MG/DL (ref 8–23)
BUN/CREAT SERPL: 27.3 (ref 7–25)
BUN/CREAT SERPL: 29 (ref 7–25)
CALCIUM SPEC-SCNC: 7.7 MG/DL (ref 8.6–10.5)
CALCIUM SPEC-SCNC: 8.6 MG/DL (ref 8.6–10.5)
CHLORIDE SERPL-SCNC: 84 MMOL/L (ref 98–107)
CHLORIDE SERPL-SCNC: 86 MMOL/L (ref 98–107)
CO2 SERPL-SCNC: 22.8 MMOL/L (ref 22–29)
CO2 SERPL-SCNC: 23 MMOL/L (ref 22–29)
CREAT SERPL-MCNC: 0.62 MG/DL (ref 0.76–1.27)
CREAT SERPL-MCNC: 0.88 MG/DL (ref 0.76–1.27)
DEPRECATED RDW RBC AUTO: 45.4 FL (ref 37–54)
EGFRCR SERPLBLD CKD-EPI 2021: 88.6 ML/MIN/1.73
EGFRCR SERPLBLD CKD-EPI 2021: 98.4 ML/MIN/1.73
EOSINOPHIL # BLD AUTO: 0 10*3/MM3 (ref 0–0.4)
EOSINOPHIL NFR BLD AUTO: 0 % (ref 0.3–6.2)
ERYTHROCYTE [DISTWIDTH] IN BLOOD BY AUTOMATED COUNT: 13 % (ref 12.3–15.4)
GLUCOSE SERPL-MCNC: 109 MG/DL (ref 65–99)
GLUCOSE SERPL-MCNC: 123 MG/DL (ref 65–99)
HCT VFR BLD AUTO: 32.7 % (ref 37.5–51)
HGB BLD-MCNC: 12.2 G/DL (ref 13–17.7)
LEFT ATRIUM VOLUME INDEX: 47.7 ML/M2
LYMPHOCYTES # BLD AUTO: 0.71 10*3/MM3 (ref 0.7–3.1)
LYMPHOCYTES NFR BLD AUTO: 28.9 % (ref 19.6–45.3)
MAGNESIUM SERPL-MCNC: 1.9 MG/DL (ref 1.6–2.4)
MAXIMAL PREDICTED HEART RATE: 143 BPM
MCH RBC QN AUTO: 35.5 PG (ref 26.6–33)
MCHC RBC AUTO-ENTMCNC: 37.3 G/DL (ref 31.5–35.7)
MCV RBC AUTO: 95.1 FL (ref 79–97)
MONOCYTES # BLD AUTO: 0.06 10*3/MM3 (ref 0.1–0.9)
MONOCYTES NFR BLD AUTO: 2.4 % (ref 5–12)
NEUTROPHILS NFR BLD AUTO: 1.67 10*3/MM3 (ref 1.7–7)
NEUTROPHILS NFR BLD AUTO: 67.9 % (ref 42.7–76)
PHOSPHATE SERPL-MCNC: 2.1 MG/DL (ref 2.5–4.5)
PLATELET # BLD AUTO: 133 10*3/MM3 (ref 140–450)
PMV BLD AUTO: 9.4 FL (ref 6–12)
POTASSIUM SERPL-SCNC: 3.1 MMOL/L (ref 3.5–5.2)
POTASSIUM SERPL-SCNC: 4.2 MMOL/L (ref 3.5–5.2)
RBC # BLD AUTO: 3.44 10*6/MM3 (ref 4.14–5.8)
SINUS: 3.4 CM
SODIUM SERPL-SCNC: 117 MMOL/L (ref 136–145)
SODIUM SERPL-SCNC: 122 MMOL/L (ref 136–145)
STRESS TARGET HR: 122 BPM
URATE SERPL-MCNC: 2 MG/DL (ref 3.4–7)
WBC NRBC COR # BLD: 2.46 10*3/MM3 (ref 3.4–10.8)

## 2023-05-25 PROCEDURE — 85025 COMPLETE CBC W/AUTO DIFF WBC: CPT | Performed by: INTERNAL MEDICINE

## 2023-05-25 PROCEDURE — 80048 BASIC METABOLIC PNL TOTAL CA: CPT | Performed by: INTERNAL MEDICINE

## 2023-05-25 PROCEDURE — 97530 THERAPEUTIC ACTIVITIES: CPT

## 2023-05-25 PROCEDURE — 83735 ASSAY OF MAGNESIUM: CPT | Performed by: INTERNAL MEDICINE

## 2023-05-25 PROCEDURE — 84550 ASSAY OF BLOOD/URIC ACID: CPT | Performed by: INTERNAL MEDICINE

## 2023-05-25 PROCEDURE — 80069 RENAL FUNCTION PANEL: CPT | Performed by: INTERNAL MEDICINE

## 2023-05-25 RX ORDER — POTASSIUM CHLORIDE 750 MG/1
40 TABLET, FILM COATED, EXTENDED RELEASE ORAL
Status: COMPLETED | OUTPATIENT
Start: 2023-05-25 | End: 2023-05-25

## 2023-05-25 RX ORDER — AMLODIPINE BESYLATE 2.5 MG/1
2.5 TABLET ORAL DAILY
Status: DISCONTINUED | OUTPATIENT
Start: 2023-05-25 | End: 2023-05-27 | Stop reason: HOSPADM

## 2023-05-25 RX ORDER — TAMSULOSIN HYDROCHLORIDE 0.4 MG/1
0.4 CAPSULE ORAL ONCE
Status: COMPLETED | OUTPATIENT
Start: 2023-05-26 | End: 2023-05-26

## 2023-05-25 RX ORDER — TAMSULOSIN HYDROCHLORIDE 0.4 MG/1
0.4 CAPSULE ORAL 2 TIMES DAILY
Status: DISCONTINUED | OUTPATIENT
Start: 2023-05-26 | End: 2023-05-27 | Stop reason: HOSPADM

## 2023-05-25 RX ADMIN — PROPAFENONE HYDROCHLORIDE 150 MG: 150 TABLET, COATED ORAL at 20:29

## 2023-05-25 RX ADMIN — PROPAFENONE HYDROCHLORIDE 150 MG: 150 TABLET, COATED ORAL at 16:24

## 2023-05-25 RX ADMIN — DOCUSATE SODIUM 50MG AND SENNOSIDES 8.6MG 2 TABLET: 8.6; 5 TABLET, FILM COATED ORAL at 20:29

## 2023-05-25 RX ADMIN — PROPAFENONE HYDROCHLORIDE 150 MG: 150 TABLET, COATED ORAL at 08:59

## 2023-05-25 RX ADMIN — Medication 10 ML: at 09:02

## 2023-05-25 RX ADMIN — Medication 5000 UNITS: at 08:59

## 2023-05-25 RX ADMIN — FUROSEMIDE 40 MG: 40 TABLET ORAL at 08:58

## 2023-05-25 RX ADMIN — DOCUSATE SODIUM 50MG AND SENNOSIDES 8.6MG 2 TABLET: 8.6; 5 TABLET, FILM COATED ORAL at 08:59

## 2023-05-25 RX ADMIN — POTASSIUM CHLORIDE 40 MEQ: 750 TABLET, EXTENDED RELEASE ORAL at 08:59

## 2023-05-25 RX ADMIN — SODIUM CHLORIDE TAB 1 GM 1 G: 1 TAB at 08:59

## 2023-05-25 RX ADMIN — TAMSULOSIN HYDROCHLORIDE 0.4 MG: 0.4 CAPSULE ORAL at 08:59

## 2023-05-25 RX ADMIN — SODIUM CHLORIDE TAB 1 GM 1 G: 1 TAB at 18:08

## 2023-05-25 RX ADMIN — AMLODIPINE BESYLATE 2.5 MG: 2.5 TABLET ORAL at 08:58

## 2023-05-25 RX ADMIN — Medication 10 ML: at 20:29

## 2023-05-25 RX ADMIN — SODIUM CHLORIDE TAB 1 GM 1 G: 1 TAB at 12:28

## 2023-05-25 RX ADMIN — POTASSIUM CHLORIDE 40 MEQ: 750 TABLET, EXTENDED RELEASE ORAL at 12:28

## 2023-05-25 RX ADMIN — OXYCODONE HYDROCHLORIDE AND ACETAMINOPHEN 1000 MG: 500 TABLET ORAL at 08:58

## 2023-05-25 RX ADMIN — LATANOPROST 1 DROP: 50 SOLUTION OPHTHALMIC at 20:31

## 2023-05-25 RX ADMIN — FINASTERIDE 5 MG: 5 TABLET, FILM COATED ORAL at 09:00

## 2023-05-25 RX ADMIN — ROSUVASTATIN CALCIUM 10 MG: 10 TABLET, FILM COATED ORAL at 08:58

## 2023-05-25 RX ADMIN — ACYCLOVIR 400 MG: 400 TABLET ORAL at 08:58

## 2023-05-25 NOTE — PROGRESS NOTES
[facility] Progress Note      Patient Name: Angel Remy  : 1945  MRN: [id number]  Primary Care Physician:  Damián Vuong Jr., MD  Date of admission: 2023    Subjective     Interval History:   Follow-up severe hyponatremia    Patient complaining of weakness, no nausea or vomiting today, no chest pain or shortness of air, no orthopnea or PND, no dysuria or gross hematuria, his edema has resolved his blood pressure this morning is 98 systolic  Review of Systems:   As noted above    Objective     Vitals:   Temp:  [98.4 °F (36.9 °C)-98.9 °F (37.2 °C)] 98.4 °F (36.9 °C)  Heart Rate:  [57-67] 59  Resp:  [15-18] 15  BP: ()/(61-72) 98/61    Intake/Output Summary (Last 24 hours) at 2023 0742  Last data filed at 2023 2337  Gross per 24 hour   Intake 600 ml   Output 700 ml   Net -100 ml       Physical Exam:    General Appearance: alert, oriented x 3, no acute distress, chronically  Skin: warm and dry  HEENT: oral mucosa normal, nonicteric sclera  Neck: supple, no JVD, faint carotid bruit  Lungs: CTA, unlabored breathing effort  Heart: RRR, normal S1 and S2, no S3, no rub  Abdomen: soft, nontender, nondistended, normoactive  : no palpable bladder  Extremities: No edema  Neuro: normal speech and mental status     Scheduled Meds:     acyclovir, 400 mg, Oral, Daily  amLODIPine, 6.25 mg, Oral, Daily  vitamin C, 1,000 mg, Oral, Daily  cholecalciferol, 5,000 Units, Oral, Daily  finasteride, 5 mg, Oral, Daily  furosemide, 40 mg, Oral, Daily  latanoprost, 1 drop, Both Eyes, Nightly  potassium chloride, 40 mEq, Oral, Once  propafenone, 150 mg, Oral, TID  rosuvastatin, 10 mg, Oral, Daily  senna-docusate sodium, 2 tablet, Oral, BID  sodium chloride, 10 mL, Intravenous, Q12H  sodium chloride, 1 g, Oral, TID With Meals  tamsulosin, 0.4 mg, Oral, Daily      IV Meds:        Results Reviewed:   I have personally reviewed the results from the time of this admission to  5/25/2023 07:42 EDT     Results from last 7 days   Lab Units 05/25/23  0452 05/24/23  2238 05/24/23  1641 05/24/23  0833 05/24/23  0556 05/23/23  1901 05/23/23  1521   SODIUM mmol/L 117* 115* 114*   < > 114*  114*   < > 115*   POTASSIUM mmol/L 3.1* 3.1* 3.5   < > 3.9  3.9   < > 4.4   CHLORIDE mmol/L 84* 82* 80*   < > 80*  80*   < > 78*   CO2 mmol/L 23.0 21.8* 24.1   < > 22.4  22.4   < > 21.7*   BUN mg/dL 18 22 19   < > 19  19   < > 10   CREATININE mg/dL 0.62* 0.62* 0.77   < > 0.59*  0.59*   < > 0.68*   CALCIUM mg/dL 7.7* 7.9* 8.7   < > 8.5*  8.5*   < > 8.4*   BILIRUBIN mg/dL  --   --   --   --  2.0*  --  2.2*   ALK PHOS U/L  --   --   --   --  51  --  58   ALT (SGPT) U/L  --   --   --   --  29  --  30   AST (SGOT) U/L  --   --   --   --  27  --  23   GLUCOSE mg/dL 109* 131* 133*   < > 113*  113*   < > 148*    < > = values in this interval not displayed.       Estimated Creatinine Clearance: 98.8 mL/min (A) (by C-G formula based on SCr of 0.62 mg/dL (L)).    Results from last 7 days   Lab Units 05/25/23  0452   MAGNESIUM mg/dL 1.9   PHOSPHORUS mg/dL 2.1*       Results from last 7 days   Lab Units 05/25/23  0452 05/23/23  1521   URIC ACID mg/dL 2.0* 1.7*       Results from last 7 days   Lab Units 05/25/23  0452 05/24/23  0556 05/23/23  1521   WBC 10*3/mm3 2.46* 2.59* 3.02*   HEMOGLOBIN g/dL 12.2* 12.9* 12.9*   PLATELETS 10*3/mm3 133* 123* 156             Assessment / Plan     ASSESSMENT:  1.  Hyponatremia picture consistent with SIADH most likely related to hydrochlorothiazide, sodium up to 117 today and the potassium is low that is probably slowing the rise of sodium day desired level of sodium rises about 6 mEq to 8 mEq per 24 hours no more than that.  He appears to be euvolemic, he has no further edema.  Uric acid is 2 and renal function is normal this is all consistent with SIADH, in the next few days if we do not see adequate improvement in sodium we may have to look for other etiologies like malignancies  but at this moment I would not get any further work-up in the next day or 2 until we have a clearer picture.  2.  Hypertension, blood pressure is a bit on the low side this morning  3.  Chronic thrombocytopenia and leukopenia followed by hematology with known history of hairy cell leukemia treated in 2012  4.  BPH, treated  5.  Dyslipidemia  6.  History of glaucoma  7.  Hypokalemia, contributing to the slow rate of rise of sodium since sodium is shifted intracellularly to compensate for the hypokalemia, hopefully repleting the potassium will help improve the rate of sodium rise    PLAN:  -Continue fluid restriction and salt tablets 1 g 3 times a day, and the furosemide 40 mg daily  -Decrease amlodipine to 2.5 mg daily  -Check orthostatic blood pressure  -Replete potassium  -Surveillance labs  I discussed the case with Dr. Remy also I discussed the case with his daughter Princess over the phone yesterday  Thank you for involving us in the care of Angel Remy.  Please feel free to call with any questions.    Remy Wilson MD  05/25/23  07:42 EDT    [facility]  [phone]    Please note that portions of this note were completed with a voice recognition program.

## 2023-05-25 NOTE — PLAN OF CARE
Goal Outcome Evaluation:  Plan of Care Reviewed With: patient        Progress: improving  Outcome Evaluation: Pt seen for PT tx this PM showing good progress with endurance and balance. Pt stood independently from the bedside chair ambulating into hallway and 3 laps around the nsg station w/ SBA progressing to independent and no use of AD. Pt had no c/o dizziness, SOB or faitgue with mobility. No LOB or unsteadiness noted. Pt performed standing exercises at window w/ SBA demoing good form. Educated on slow/controlled movement with exercises and mobility. Educated on activity rec of performing exercises TID and ambulating w/ nsg staff TID. Pt states no concerns for return home and feels back to baseline. As pt is back to baseline, PT will sign-off. Thank you.

## 2023-05-25 NOTE — THERAPY TREATMENT NOTE
Patient Name: Angel Remy  : 1945    MRN: [id number]                              Today's Date: 2023       Admit Date: 2023    Visit Dx:     ICD-10-CM ICD-9-CM   1. Hyponatremia  E87.1 276.1   2. Nausea  R11.0 787.02     Patient Active Problem List   Diagnosis   • Hairy T-cell leukemia   • FH: colon cancer   • Hyponatremia   • Orthostatic hypotension   • Supine hypertension   • Anemia   • Macrocytosis   • LBBB (left bundle branch block)   • Prolonged P-R interval   • Pancytopenia     Past Medical History:   Diagnosis Date   • BCC (basal cell carcinoma of skin) 2022    NOSE   • Benign prostate hyperplasia    • H/O Elevated PSA    • H/O Hairy cell leukemia (CMS/HCC)    • H/O Internal thrombosed hemorrhoids    • H/O minimal right carotid plaque    • H/O peripheral neuropathy    • Hyperlipidemia    • Hypertension    • Primary open-angle glaucoma, bilateral, mild stage      Past Surgical History:   Procedure Laterality Date   • BONE MARROW BIOPSY W/ ASPIRATION     • COLONOSCOPY     • COLONOSCOPY N/A 2021    Procedure: COLONOSCOPY TO CECUM AND INTO TI WITH COLD BIOPSY POLYPECTOMIES;  Surgeon: Juan Jenkins MD;  Location: [facility] ENDOSCOPY;  Service: Gastroenterology;  Laterality: N/A;  pre: family history of colon cancer  post: polyps, diverticulosis  and internal hemorrhoids   • EXCISION MASS HEAD/NECK N/A 2022    Procedure: EXCISION BASAL CELL CARCINOMA NOSE WITH FROZEN SECTION FULL THICKNESS GRAFT;  Surgeon: Arturo Weiss MD;  Location: [facility] OR [facility];  Service: Plastics;  Laterality: N/A;   • HERNIA REPAIR Right 2000    Inguinal    • OTHER SURGICAL HISTORY      Median nerve foreign body excision   • PROSTATE BIOPSY      2010 and 2011 whan PSA had acceleration   • SIGMOIDOSCOPY      With small thrombosed internal hemorrhoid.   • TONSILLECTOMY     • WISDOM TOOTH EXTRACTION        General Information     Row Name 23 1611           Physical Therapy Time and Intention    Document Type therapy note (daily note)  -MG     Mode of Treatment individual therapy;physical therapy  -MG     Row Name 05/25/23 1613          General Information    Patient Profile Reviewed yes  -MG     Existing Precautions/Restrictions fall  -MG     Row Name 05/25/23 1613          Cognition    Orientation Status (Cognition) oriented x 4  -MG     Row Name 05/25/23 1613          Safety Issues, Functional Mobility    Comment, Safety Issues/Impairments (Mobility) Non-skid socks donned  -MG           User Key  (r) = Recorded By, (t) = Taken By, (c) = Cosigned By    Initials Name Provider Type    MG Carolyne Freire, PT Physical Therapist               Mobility     Row Name 05/25/23 1614          Bed Mobility    Comment, (Bed Mobility) UIC and returned to chair  -MG     Row Name 05/25/23 1614          Sit-Stand Transfer    Sit-Stand [place] (Transfers) independent  -MG     Comment, (Sit-Stand Transfer) no AD.  -MG     Row Name 05/25/23 1614          Gait/Stairs (Locomotion)    [place] Level (Gait) standby assist;independent  -MG     Assistive Device (Gait) other (see comments)  no AD  -MG     Distance in Feet (Gait) 450  -MG     Deviations/Abnormal Patterns (Gait) base of support, narrow;stride length decreased  -MG     Comment, (Gait/Stairs) Pt ambulated in hallway and 3 laps around [facility] station w/ SBA progressing to independent. No AD. No c/o fatigue, SOB or dizziness. No LOB. Demoing increased gait speed w/ cues to slow.  -MG           User Key  (r) = Recorded By, (t) = Taken By, (c) = Cosigned By    Initials Name Provider Type    MG Carolyne Freire, PT Physical Therapist               Obj/Interventions     Row Name 05/25/23 1616          Motor Skills    Therapeutic Exercise other (see comments)  Heel raises and standing marches x10  -MG     Row Name 05/25/23 1616          Balance    Static Standing Balance standby assist;independent  -MG     Dynamic Standing Balance  standby assist;independent  -MG     Position/Device Used, Standing Balance unsupported  -MG     Comment, Balance No LOB or unsteadiness.  -MG           User Key  (r) = Recorded By, (t) = Taken By, (c) = Cosigned By    Initials Name Provider Type    Carolyne Siddiqui, PT Physical Therapist               Goals/Plan     Row Name 05/25/23 1621          Bed Mobility Goal 1 (PT)    Progress/Outcomes (Bed Mobility Goal 1, PT) goal met  -MG     Row Name 05/25/23 1621          Transfer Goal 1 (PT)    Progress/Outcome (Transfer Goal 1, PT) goal met  -MG     Row Name 05/25/23 1621          Gait Training Goal 1 (PT)    Progress/Outcome (Gait Training Goal 1, PT) goal met  -MG     Row Name 05/25/23 1621          Patient Education Goal (PT)    Progress/Outcome (Patient Education Goal, PT) goal met  -MG           User Key  (r) = Recorded By, (t) = Taken By, (c) = Cosigned By    Initials Name Provider Type    Carolyne Siddiqui, PT Physical Therapist               Clinical Impression     Row Name 05/25/23 1617          Pain    Pretreatment Pain Rating 0/10 - no pain  -MG     Posttreatment Pain Rating 0/10 - no pain  -MG     Pre/Posttreatment Pain Comment States BLE stiff once getting up.  -MG     Pain Intervention(s) Medication (See MAR);Ambulation/increased activity;Repositioned;Rest  -MG     Row Name 05/25/23 1617          Plan of Care Review    Plan of Care Reviewed With patient  -MG     Progress improving  -MG     Outcome Evaluation Pt seen for PT tx this PM showing good progress with endurance and balance. Pt stood independently from the bedside chair ambulating into hallway and 3 laps around the nsg station w/ SBA progressing to independent and no use of AD. Pt had no c/o dizziness, SOB or faitgue with mobility. No LOB or unsteadiness noted. Pt performed standing exercises at window w/ SBA demoing good form. Educated on slow/controlled movement with exercises and mobility. Educated on activity rec of performing exercises TID  and ambulating w/ nsg staff TID. Pt states no concerns for return home and feels back to baseline. As pt is back to baseline, PT will sign-off. Thank you.  -MG     Row Name 05/25/23 1617          Vital Signs    O2 Delivery Pre Treatment room air  -MG     O2 Delivery Intra Treatment room air  -MG     Post SpO2 (%) 96  -MG     O2 Delivery Post Treatment room air  -MG     Pre Patient Position Sitting  -MG     Intra Patient Position Standing  -MG     Post Patient Position Sitting  -MG     Row Name 05/25/23 1617          Positioning and Restraints    Pre-Treatment Position sitting in chair/recliner  -MG     Post Treatment Position chair  -MG     In Chair sitting;call light within reach;encouraged to call for assist  -MG           User Key  (r) = Recorded By, (t) = Taken By, (c) = Cosigned By    Initials Name Provider Type    Carolyne Siddiqui, PT Physical Therapist               Outcome Measures     Row Name 05/25/23 1621 05/25/23 0858       How much help from another person do you currently need...    Turning from your back to your side while in flat bed without using bedrails? 4  -MG 4  -BB    Moving from lying on back to sitting on the side of a flat bed without bedrails? 4  -MG 4  -BB    Moving to and from a bed to a chair (including a wheelchair)? 4  -MG 4  -BB    Standing up from a chair using your arms (e.g., wheelchair, bedside chair)? 4  -MG 4  -BB    Climbing 3-5 steps with a railing? 3  -MG 4  -BB    To walk in hospital room? 4  -MG 4  -BB    AM-PAC 6 Clicks Score (PT) 23  -MG 24  -BB    Highest level of mobility 7 --> Walked 25 feet or more  -MG 8 --> Walked 250 feet or more  -BB          User Key  (r) = Recorded By, (t) = Taken By, (c) = Cosigned By    Initials Name Provider Type    Carolyne Siddiqui, PT Physical Therapist    Andra Drake, RN Registered Nurse                             Physical Therapy Education     Title: PT OT SLP Therapies (Done)     Topic: Physical Therapy (Done)     Point:  Mobility training (Done)     Learning Progress Summary           Patient Acceptance, E,TB, VU by MG at 5/25/2023 1622    Acceptance, TB, DU by DJ at 5/24/2023 1031                   Point: Home exercise program (Done)     Learning Progress Summary           Patient Acceptance, E,TB, VU by MG at 5/25/2023 1622    Acceptance, TB, DU by DJ at 5/24/2023 1031                   Point: Body mechanics (Done)     Learning Progress Summary           Patient Acceptance, E,TB, VU by MG at 5/25/2023 1622    Acceptance, TB, DU by DJ at 5/24/2023 1031                   Point: Precautions (Done)     Learning Progress Summary           Patient Acceptance, E,TB, VU by MG at 5/25/2023 1622    Acceptance, TB, DU by DJ at 5/24/2023 1031                               User Key     Initials Effective Dates Name Provider Type Discipline    MG 05/24/22 -  Carolyne Freire, PT Physical Therapist PT    DJ 10/25/19 -  Brigitte Barrios, PT Physical Therapist PT              PT Recommendation and Plan     Plan of Care Reviewed With: patient  Progress: improving  Outcome Evaluation: Pt seen for PT tx this PM showing good progress with endurance and balance. Pt stood independently from the bedside chair ambulating into hallway and 3 laps around the nsg station w/ SBA progressing to independent and no use of AD. Pt had no c/o dizziness, SOB or faitgue with mobility. No LOB or unsteadiness noted. Pt performed standing exercises at window w/ SBA demoing good form. Educated on slow/controlled movement with exercises and mobility. Educated on activity rec of performing exercises TID and ambulating w/ nsg staff TID. Pt states no concerns for return home and feels back to baseline. As pt is back to baseline, PT will sign-off. Thank you.     Time Calculation:    PT Charges     Row Name 05/25/23 1622             Time Calculation    Start Time 1600  -MG      Stop Time 1609  -MG      Time Calculation (min) 9 min  -MG      PT Received On 05/25/23  -MG             User Key  (r) = Recorded By, (t) = Taken By, (c) = Cosigned By    Initials Name Provider Type    MG Carolyne Freire, PT Physical Therapist              Therapy Charges for Today     Code Description Service Date Service Provider Modifiers Qty    [id number]  PT THERAPEUTIC ACT EA 15 MIN 5/25/2023 Carolyne Freire, PT GP 1          PT G-Codes  Outcome Measure Options: AM-PAC 6 Clicks Basic Mobility (PT)  AM-PAC 6 Clicks Score (PT): 23  PT Discharge Summary  Anticipated Discharge Disposition (PT): home with assist    Carolyne Freire, TEENA  5/25/2023

## 2023-05-25 NOTE — PLAN OF CARE
Goal Outcome Evaluation:    Progress: improving  Outcome Evaluation: Vitals stable. Na up to 122 from 117. K - 3.1 - replaced per nephrology orders. Orthostatics positive - no dizziness upon standing. Laying B/P (159/81), standing 1 min (124/730, standing 3 min (137/83). Nephrology notified - no new orders. Fluid restriction maintained. No c/o pain, nausea, or dizziness. Ambulated in roldan multiple times throughout the day. Wife at bedside this afternoon. Patient stable and needs met at this time.

## 2023-05-25 NOTE — PLAN OF CARE
Goal Outcome Evaluation:   Dr carlie Remy admitted 5/23/2023 for hyponatremia. Pt mentioned he feels much better today . Pt ambulate  outside the room multiple times. Pt sodium is 114 asymptomatic. Q6h sodium draws. 1200 ml fluid restrictionNo complaints  or concerns per pt. Seizer precaution maintained.will continue to monitor .Plan going home with wife

## 2023-05-25 NOTE — PROGRESS NOTES
Name: Angel Remy ADMIT: 2023   : 1945  PCP: Damián Vuong Jr., MD    MRN: [id number] LOS: 2 days   AGE/SEX: 77 y.o. male  ROOM: [facility]/     Subjective   Subjective   Sitting up in chair. No family present. He denies any pain, nausea, vomiting. No dizziness and states he walked around the nurses' station. He hasn't had a BM in a few days. States that he normally goes every day but hasn't had a large salad since being here which he uses to promote his bowel movements. No chest pain or dyspnea. Asking about his echo results.      Objective   Objective   Vital Signs  Temp:  [98.1 °F (36.7 °C)-98.6 °F (37 °C)] 98.1 °F (36.7 °C)  Heart Rate:  [59-68] 68  Resp:  [15-18] 16  BP: ()/(61-84) 154/71  SpO2:  [96 %-98 %] 96 %  on   ;   Device (Oxygen Therapy): room air  Body mass index is 22.34 kg/m².     Physical Exam  Vitals and nursing note reviewed.   Constitutional:       Appearance: He is ill-appearing. He is not toxic-appearing.   Cardiovascular:      Rate and Rhythm: Normal rate and regular rhythm.      Pulses: Normal pulses.   Pulmonary:      Effort: Pulmonary effort is normal. No respiratory distress.      Breath sounds: Normal breath sounds.   Abdominal:      General: Bowel sounds are normal. There is no distension.      Palpations: Abdomen is soft.      Tenderness: There is no abdominal tenderness.   Musculoskeletal:         General: Swelling (mild BLE at ankles) present. Normal range of motion.   Skin:     General: Skin is warm and dry.      Findings: No bruising.   Neurological:      Mental Status: He is alert and oriented to person, place, and time.      Sensory: No sensory deficit.      Coordination: Coordination normal.   Psychiatric:         Mood and Affect: Mood normal.         Behavior: Behavior normal.       Results Review:       I reviewed the patient's new clinical results.  Results from last 7 days   Lab Units 23  0452 23  0556 23  1521   WBC 10*3/mm3  2.46* 2.59* 3.02*   HEMOGLOBIN g/dL 12.2* 12.9* 12.9*   PLATELETS 10*3/mm3 133* 123* 156     Results from last 7 days   Lab Units 05/25/23 0452 05/24/23 2238 05/24/23 1641 05/24/23  0833   SODIUM mmol/L 117* 115* 114* 114*  114*   POTASSIUM mmol/L 3.1* 3.1* 3.5 4.0   CHLORIDE mmol/L 84* 82* 80* 80*   CO2 mmol/L 23.0 21.8* 24.1 22.6   BUN mg/dL 18 22 19 18   CREATININE mg/dL 0.62* 0.62* 0.77 0.63*   GLUCOSE mg/dL 109* 131* 133* 127*   Estimated Creatinine Clearance: 98.8 mL/min (A) (by C-G formula based on SCr of 0.62 mg/dL (L)).  Results from last 7 days   Lab Units 05/25/23 0452 05/24/23 0556 05/23/23  1521   ALBUMIN g/dL 4.0 3.9 4.3   BILIRUBIN mg/dL  --  2.0* 2.2*   ALK PHOS U/L  --  51 58   AST (SGOT) U/L  --  27 23   ALT (SGPT) U/L  --  29 30     Results from last 7 days   Lab Units 05/25/23 0452 05/24/23 2238 05/24/23 1641 05/24/23  0833 05/24/23 0556 05/23/23  2340 05/23/23  1521   CALCIUM mg/dL 7.7* 7.9* 8.7 8.6 8.5*  8.5*   < > 8.4*   ALBUMIN g/dL 4.0  --   --   --  3.9  --  4.3   MAGNESIUM mg/dL 1.9  --   --   --   --   --   --    PHOSPHORUS mg/dL 2.1*  --   --   --   --   --   --     < > = values in this interval not displayed.     Results from last 7 days   Lab Units 05/23/23  1521   LACTATE mmol/L 2.5*     No results found for: HGBA1C, POCGLU    acyclovir, 400 mg, Oral, Daily  amLODIPine, 2.5 mg, Oral, Daily  vitamin C, 1,000 mg, Oral, Daily  cholecalciferol, 5,000 Units, Oral, Daily  finasteride, 5 mg, Oral, Daily  furosemide, 40 mg, Oral, Daily  latanoprost, 1 drop, Both Eyes, Nightly  potassium chloride, 40 mEq, Oral, Q2H  potassium chloride, 40 mEq, Oral, Once  propafenone, 150 mg, Oral, TID  rosuvastatin, 10 mg, Oral, Daily  senna-docusate sodium, 2 tablet, Oral, BID  sodium chloride, 10 mL, Intravenous, Q12H  sodium chloride, 1 g, Oral, TID With Meals  tamsulosin, 0.4 mg, Oral, Daily       Diet: Cardiac Diets, Fluid Restriction (240 mL/tray) Diets; Healthy Heart (2-3 Na+); Other  (Specify mL/day) (1200 ml/day); Texture: Regular Texture (IDDSI 7); Fluid Consistency: Thin (IDDSI 0)       Assessment/Plan     Active Hospital Problems    Diagnosis  POA   • **Hyponatremia [E87.1]  Yes   • LBBB (left bundle branch block) [I44.7]  Yes   • Prolonged P-R interval [I44.0]  Yes   • Pancytopenia [D61.818]  Yes   • Orthostatic hypotension [I95.1]  Yes   • Supine hypertension [I10]  Yes   • Anemia [D64.9]  Yes   • Macrocytosis [D75.89]  Yes   • 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 He recently took a trip to [place] and developed URI symptoms upon returning. He developed significant nausea and vomiting and was unable to keep anything down. He developed associated dizziness as well. He presented to the ED where he was found to have a sodium of 115.     · Hyponatremia: Acute on chronic with prior levels in low-mid 130s. Nephrology following. [place] to have SIADH aggravated by decreased solute intake and increased free water intake in combination of HCTZ use. On fluid restriction as well as salt tablets TID and furosemide 40 mg daily.     · Pancytopenia/anemia/history of hairy T-cell leukemia: Levels appear near his prior baseline. He is established with Dr. Jeffers with CBC group. Monitor trends. Recommend close continued outpatient follow up.    · Orthostatic hypotension/supine hypertension: Norvasc reduced per nephrology. Monitor BP trends. Off HCTZ, lisinopril and bisoprolol since admission.    · LBBB/prolonged [place]: LBBB new (when compared to 2018). Echo here with EF 60.5%, indeterminate diastolic function. Left atrial cavity/volume increased. No wall motion abnormalities or significant valvular findings. Continue to monitor on telemetry and correct electrolytes per nephrology. No cardiac complaints at this time. Appears orders  have been placed in the past with Dr. Messina- no office notes available for review. He is on propafenone therapy (history of PAF?).    Discussed with patient and Dr. Sagastume.    VTE Prophylaxis - SCDs  Code Status - Full code  Disposition - Anticipate discharge home once sodium levels stabilized. Likely another couple days.      CHANTE Casey  [facility]  05/25/23  10:43 EDT

## 2023-05-26 LAB
ALBUMIN SERPL-MCNC: 3.5 G/DL (ref 3.5–5.2)
ANION GAP SERPL CALCULATED.3IONS-SCNC: 12 MMOL/L (ref 5–15)
ANION GAP SERPL CALCULATED.3IONS-SCNC: 8 MMOL/L (ref 5–15)
ANION GAP SERPL CALCULATED.3IONS-SCNC: 9.3 MMOL/L (ref 5–15)
ANION GAP SERPL CALCULATED.3IONS-SCNC: 9.9 MMOL/L (ref 5–15)
ANION GAP SERPL CALCULATED.3IONS-SCNC: 9.9 MMOL/L (ref 5–15)
BUN SERPL-MCNC: 20 MG/DL (ref 8–23)
BUN SERPL-MCNC: 20 MG/DL (ref 8–23)
BUN SERPL-MCNC: 22 MG/DL (ref 8–23)
BUN SERPL-MCNC: 23 MG/DL (ref 8–23)
BUN SERPL-MCNC: 30 MG/DL (ref 8–23)
BUN/CREAT SERPL: 23.7 (ref 7–25)
BUN/CREAT SERPL: 31.1 (ref 7–25)
BUN/CREAT SERPL: 33.9 (ref 7–25)
BUN/CREAT SERPL: 33.9 (ref 7–25)
BUN/CREAT SERPL: 35.7 (ref 7–25)
CALCIUM SPEC-SCNC: 8.1 MG/DL (ref 8.6–10.5)
CALCIUM SPEC-SCNC: 8.1 MG/DL (ref 8.6–10.5)
CALCIUM SPEC-SCNC: 8.4 MG/DL (ref 8.6–10.5)
CHLORIDE SERPL-SCNC: 86 MMOL/L (ref 98–107)
CHLORIDE SERPL-SCNC: 89 MMOL/L (ref 98–107)
CHLORIDE SERPL-SCNC: 90 MMOL/L (ref 98–107)
CO2 SERPL-SCNC: 23 MMOL/L (ref 22–29)
CO2 SERPL-SCNC: 23.1 MMOL/L (ref 22–29)
CO2 SERPL-SCNC: 23.1 MMOL/L (ref 22–29)
CO2 SERPL-SCNC: 24.7 MMOL/L (ref 22–29)
CO2 SERPL-SCNC: 28 MMOL/L (ref 22–29)
CREAT SERPL-MCNC: 0.59 MG/DL (ref 0.76–1.27)
CREAT SERPL-MCNC: 0.59 MG/DL (ref 0.76–1.27)
CREAT SERPL-MCNC: 0.74 MG/DL (ref 0.76–1.27)
CREAT SERPL-MCNC: 0.84 MG/DL (ref 0.76–1.27)
CREAT SERPL-MCNC: 0.93 MG/DL (ref 0.76–1.27)
DEPRECATED RDW RBC AUTO: 45.7 FL (ref 37–54)
EGFRCR SERPLBLD CKD-EPI 2021: 84.6 ML/MIN/1.73
EGFRCR SERPLBLD CKD-EPI 2021: 89.8 ML/MIN/1.73
EGFRCR SERPLBLD CKD-EPI 2021: 93.3 ML/MIN/1.73
EGFRCR SERPLBLD CKD-EPI 2021: 99.9 ML/MIN/1.73
EGFRCR SERPLBLD CKD-EPI 2021: 99.9 ML/MIN/1.73
ERYTHROCYTE [DISTWIDTH] IN BLOOD BY AUTOMATED COUNT: 13.2 % (ref 12.3–15.4)
GLUCOSE SERPL-MCNC: 106 MG/DL (ref 65–99)
GLUCOSE SERPL-MCNC: 106 MG/DL (ref 65–99)
GLUCOSE SERPL-MCNC: 112 MG/DL (ref 65–99)
GLUCOSE SERPL-MCNC: 121 MG/DL (ref 65–99)
GLUCOSE SERPL-MCNC: 127 MG/DL (ref 65–99)
HCT VFR BLD AUTO: 30.5 % (ref 37.5–51)
HGB BLD-MCNC: 11.3 G/DL (ref 13–17.7)
MAGNESIUM SERPL-MCNC: 2.1 MG/DL (ref 1.6–2.4)
MCH RBC QN AUTO: 35.2 PG (ref 26.6–33)
MCHC RBC AUTO-ENTMCNC: 37 G/DL (ref 31.5–35.7)
MCV RBC AUTO: 95 FL (ref 79–97)
PHOSPHATE SERPL-MCNC: 2.5 MG/DL (ref 2.5–4.5)
PLATELET # BLD AUTO: 113 10*3/MM3 (ref 140–450)
PMV BLD AUTO: 9.2 FL (ref 6–12)
POTASSIUM SERPL-SCNC: 3.7 MMOL/L (ref 3.5–5.2)
POTASSIUM SERPL-SCNC: 3.7 MMOL/L (ref 3.5–5.2)
POTASSIUM SERPL-SCNC: 3.9 MMOL/L (ref 3.5–5.2)
POTASSIUM SERPL-SCNC: 3.9 MMOL/L (ref 3.5–5.2)
POTASSIUM SERPL-SCNC: 4.1 MMOL/L (ref 3.5–5.2)
RBC # BLD AUTO: 3.21 10*6/MM3 (ref 4.14–5.8)
SODIUM SERPL-SCNC: 121 MMOL/L (ref 136–145)
SODIUM SERPL-SCNC: 123 MMOL/L (ref 136–145)
SODIUM SERPL-SCNC: 123 MMOL/L (ref 136–145)
SODIUM SERPL-SCNC: 124 MMOL/L (ref 136–145)
SODIUM SERPL-SCNC: 125 MMOL/L (ref 136–145)
URATE SERPL-MCNC: 2.7 MG/DL (ref 3.4–7)
WBC NRBC COR # BLD: 2.02 10*3/MM3 (ref 3.4–10.8)

## 2023-05-26 PROCEDURE — 80069 RENAL FUNCTION PANEL: CPT | Performed by: INTERNAL MEDICINE

## 2023-05-26 PROCEDURE — 85027 COMPLETE CBC AUTOMATED: CPT | Performed by: INTERNAL MEDICINE

## 2023-05-26 PROCEDURE — 84550 ASSAY OF BLOOD/URIC ACID: CPT | Performed by: INTERNAL MEDICINE

## 2023-05-26 PROCEDURE — 80048 BASIC METABOLIC PNL TOTAL CA: CPT | Performed by: INTERNAL MEDICINE

## 2023-05-26 PROCEDURE — 83735 ASSAY OF MAGNESIUM: CPT | Performed by: INTERNAL MEDICINE

## 2023-05-26 RX ADMIN — TAMSULOSIN HYDROCHLORIDE 0.4 MG: 0.4 CAPSULE ORAL at 00:02

## 2023-05-26 RX ADMIN — SODIUM CHLORIDE TAB 1 GM 1 G: 1 TAB at 08:03

## 2023-05-26 RX ADMIN — ROSUVASTATIN CALCIUM 10 MG: 10 TABLET, FILM COATED ORAL at 08:03

## 2023-05-26 RX ADMIN — LATANOPROST 1 DROP: 50 SOLUTION OPHTHALMIC at 20:28

## 2023-05-26 RX ADMIN — AMLODIPINE BESYLATE 2.5 MG: 2.5 TABLET ORAL at 08:03

## 2023-05-26 RX ADMIN — DOCUSATE SODIUM 50MG AND SENNOSIDES 8.6MG 2 TABLET: 8.6; 5 TABLET, FILM COATED ORAL at 20:28

## 2023-05-26 RX ADMIN — SODIUM CHLORIDE TAB 1 GM 1 G: 1 TAB at 18:06

## 2023-05-26 RX ADMIN — TAMSULOSIN HYDROCHLORIDE 0.4 MG: 0.4 CAPSULE ORAL at 20:28

## 2023-05-26 RX ADMIN — DOCUSATE SODIUM 50MG AND SENNOSIDES 8.6MG 2 TABLET: 8.6; 5 TABLET, FILM COATED ORAL at 08:03

## 2023-05-26 RX ADMIN — PROPAFENONE HYDROCHLORIDE 150 MG: 150 TABLET, COATED ORAL at 16:16

## 2023-05-26 RX ADMIN — FINASTERIDE 5 MG: 5 TABLET, FILM COATED ORAL at 08:03

## 2023-05-26 RX ADMIN — OXYCODONE HYDROCHLORIDE AND ACETAMINOPHEN 1000 MG: 500 TABLET ORAL at 08:03

## 2023-05-26 RX ADMIN — Medication 10 ML: at 20:28

## 2023-05-26 RX ADMIN — Medication 5000 UNITS: at 08:03

## 2023-05-26 RX ADMIN — Medication 10 ML: at 08:06

## 2023-05-26 RX ADMIN — TAMSULOSIN HYDROCHLORIDE 0.4 MG: 0.4 CAPSULE ORAL at 08:03

## 2023-05-26 RX ADMIN — SODIUM CHLORIDE TAB 1 GM 1 G: 1 TAB at 12:17

## 2023-05-26 RX ADMIN — PROPAFENONE HYDROCHLORIDE 150 MG: 150 TABLET, COATED ORAL at 20:28

## 2023-05-26 RX ADMIN — FUROSEMIDE 40 MG: 40 TABLET ORAL at 08:03

## 2023-05-26 RX ADMIN — ACYCLOVIR 400 MG: 400 TABLET ORAL at 08:03

## 2023-05-26 RX ADMIN — PROPAFENONE HYDROCHLORIDE 150 MG: 150 TABLET, COATED ORAL at 08:03

## 2023-05-26 NOTE — PROGRESS NOTES
[facility] Progress Note      Patient Name: Angel Remy  : 1945  MRN: [id number]  Primary Care Physician:  Damián Vuong Jr., MD  Date of admission: 2023    Subjective     Interval History:   Follow-up severe hyponatremia    He is feeling better today, he had mild orthostatic blood pressure changes but he was totally asymptomatic his amlodipine was decreased yesterday, his sodium slowly rising, last chest pain or shortness of air, no nausea or vomiting, no lightheadedness  Review of Systems:   As noted above    Objective     Vitals:   Temp:  [97.9 °F (36.6 °C)-98.6 °F (37 °C)] 98.6 °F (37 °C)  Heart Rate:  [68-88] 71  Resp:  [16-18] 18  BP: (103-172)/(61-83) 123/80    Intake/Output Summary (Last 24 hours) at 2023 0841  Last data filed at 2023 0806  Gross per 24 hour   Intake 860 ml   Output 2200 ml   Net -1340 ml       Physical Exam:    General Appearance: alert, oriented x 3, no acute distress,   Skin: warm and dry  HEENT: oral mucosa normal, nonicteric sclera  Neck: supple, no JVD, faint carotid bruit  Lungs: CTA, unlabored breathing effort  Heart: RRR, normal S1 and S2, no S3, no rub  Extremities: No edema  Neuro: normal speech and mental status     Scheduled Meds:     acyclovir, 400 mg, Oral, Daily  amLODIPine, 2.5 mg, Oral, Daily  vitamin C, 1,000 mg, Oral, Daily  cholecalciferol, 5,000 Units, Oral, Daily  finasteride, 5 mg, Oral, Daily  furosemide, 40 mg, Oral, Daily  latanoprost, 1 drop, Both Eyes, Nightly  potassium chloride, 40 mEq, Oral, Once  propafenone, 150 mg, Oral, TID  rosuvastatin, 10 mg, Oral, Daily  senna-docusate sodium, 2 tablet, Oral, BID  sodium chloride, 10 mL, Intravenous, Q12H  sodium chloride, 1 g, Oral, TID With Meals  tamsulosin, 0.4 mg, Oral, BID      IV Meds:        Results Reviewed:   I have personally reviewed the results from the time of this admission to 2023 08:41 EDT     Results from last 7 days   Lab Units  05/26/23  0558 05/26/23  0010 05/25/23  1615 05/24/23  0833 05/24/23  0556 05/23/23  1901 05/23/23  1521   SODIUM mmol/L 123*  123* 121* 122*   < > 114*  114*   < > 115*   POTASSIUM mmol/L 3.7  3.7 4.1 4.2   < > 3.9  3.9   < > 4.4   CHLORIDE mmol/L 90*  90* 86* 86*   < > 80*  80*   < > 78*   CO2 mmol/L 23.1  23.1 23.0 22.8   < > 22.4  22.4   < > 21.7*   BUN mg/dL 20  20 30* 24*   < > 19  19   < > 10   CREATININE mg/dL 0.59*  0.59* 0.84 0.88   < > 0.59*  0.59*   < > 0.68*   CALCIUM mg/dL 8.1*  8.1* 8.4* 8.6   < > 8.5*  8.5*   < > 8.4*   BILIRUBIN mg/dL  --   --   --   --  2.0*  --  2.2*   ALK PHOS U/L  --   --   --   --  51  --  58   ALT (SGPT) U/L  --   --   --   --  29  --  30   AST (SGOT) U/L  --   --   --   --  27  --  23   GLUCOSE mg/dL 106*  106* 127* 123*   < > 113*  113*   < > 148*    < > = values in this interval not displayed.       Estimated Creatinine Clearance: 103.8 mL/min (A) (by C-G formula based on SCr of 0.59 mg/dL (L)).    Results from last 7 days   Lab Units 05/26/23 0558 05/25/23  0452   MAGNESIUM mg/dL 2.1 1.9   PHOSPHORUS mg/dL 2.5 2.1*       Results from last 7 days   Lab Units 05/26/23 0558 05/25/23  0452 05/23/23  1521   URIC ACID mg/dL 2.7* 2.0* 1.7*       Results from last 7 days   Lab Units 05/26/23  0558 05/25/23  0452 05/24/23  0556 05/23/23  1521   WBC 10*3/mm3 2.02* 2.46* 2.59* 3.02*   HEMOGLOBIN g/dL 11.3* 12.2* 12.9* 12.9*   PLATELETS 10*3/mm3 113* 133* 123* 156             Assessment / Plan     ASSESSMENT:  1.  Hyponatremia picture consistent with SIADH most likely related to hydrochlorothiazide, sodium up to 123 today, the rate of rise as expected to maintain the safety of this condition.  Potassium was corrected and that definitely helped improvement of the hyponatremia uric acid slowly rising up to 2.7 renal function is normal.  2.  Hypertension, reasonably controlled with slight orthostatic changes but the patient is totally asymptomatic.  3.  Chronic  thrombocytopenia and leukopenia followed by hematology with known history of hairy cell leukemia treated in 2012  4.  BPH, treated  5.  Dyslipidemia  6.  History of glaucoma  7.  Hypokalemia, resolved    PLAN:  -Continue fluid restriction and salt tablets 1 g 3 times a day, and the furosemide 40 mg daily  -Continue the same dose of amlodipine and will continue to monitor orthostatic blood pressures  -Surveillance labs  I discussed the case with Dr. Remy, will postpone any decision to make any further evaluation looking for malignancies in his case until reevaluating his hyponatremia as an outpatient, in the next week or 2.    Copied text in this note has been reviewed and is accurate as of 05/26/23.       Thank you for involving us in the care of Angel Remy.  Please feel free to call with any questions.    Remy Wilson MD  05/26/23  08:41 EDT    [facility]  [phone]    Please note that portions of this note were completed with a voice recognition program.

## 2023-05-26 NOTE — PROGRESS NOTES
Name: Angel Remy ADMIT: 2023   : 1945  PCP: Damián Vuong Jr., MD    MRN: [id number] LOS: 3 days   AGE/SEX: 77 y.o. male  ROOM: [facility]     Subjective   Subjective   Sitting up in chair. Slept well. Denies any pain, nausea, vomiting. + BM yesterday. He is eating and drinking well with food brought in by family. No dyspnea or dizziness. He walked well with PT who has signed off.     Objective   Objective   Vital Signs  Temp:  [97.9 °F (36.6 °C)-98.6 °F (37 °C)] 98.6 °F (37 °C)  Heart Rate:  [71-88] 71  Resp:  [16-18] 18  BP: (103-172)/(61-83) 123/80  SpO2:  [96 %-99 %] 99 %  on   ;   Device (Oxygen Therapy): room air  Body mass index is 22.34 kg/m².     Physical Exam  Vitals and nursing note reviewed.   Constitutional:       Appearance: He is ill-appearing. He is not toxic-appearing.   Cardiovascular:      Rate and Rhythm: Normal rate and regular rhythm.      Pulses: Normal pulses.   Pulmonary:      Effort: Pulmonary effort is normal. No respiratory distress.      Breath sounds: Normal breath sounds.   Abdominal:      General: Bowel sounds are normal. There is no distension.      Palpations: Abdomen is soft.      Tenderness: There is no abdominal tenderness.   Musculoskeletal:         General: Swelling (mild BLE at ankles) present. Normal range of motion.   Skin:     General: Skin is warm and dry.      Findings: No bruising.   Neurological:      Mental Status: He is alert and oriented to person, place, and time.      Sensory: No sensory deficit.      Coordination: Coordination normal.   Psychiatric:         Mood and Affect: Mood normal.         Behavior: Behavior normal.     Results Review:       I reviewed the patient's new clinical results.  Results from last 7 days   Lab Units 23  0558 23  0452 23  0556 23  1521   WBC 10*3/mm3 2.02* 2.46* 2.59* 3.02*   HEMOGLOBIN g/dL 11.3* 12.2* 12.9* 12.9*   PLATELETS 10*3/mm3 113* 133* 123* 156     Results from last 7 days    Lab Units 05/26/23 0558 05/26/23  0010 05/25/23 1615 05/25/23  0452   SODIUM mmol/L 123*  123* 121* 122* 117*   POTASSIUM mmol/L 3.7  3.7 4.1 4.2 3.1*   CHLORIDE mmol/L 90*  90* 86* 86* 84*   CO2 mmol/L 23.1  23.1 23.0 22.8 23.0   BUN mg/dL 20  20 30* 24* 18   CREATININE mg/dL 0.59*  0.59* 0.84 0.88 0.62*   GLUCOSE mg/dL 106*  106* 127* 123* 109*   Estimated Creatinine Clearance: 103.8 mL/min (A) (by C-G formula based on SCr of 0.59 mg/dL (L)).  Results from last 7 days   Lab Units 05/26/23 0558 05/25/23 0452 05/24/23  0556 05/23/23  1521   ALBUMIN g/dL 3.5 4.0 3.9 4.3   BILIRUBIN mg/dL  --   --  2.0* 2.2*   ALK PHOS U/L  --   --  51 58   AST (SGOT) U/L  --   --  27 23   ALT (SGPT) U/L  --   --  29 30     Results from last 7 days   Lab Units 05/26/23 0558 05/26/23 0010 05/25/23 1615 05/25/23 0452 05/24/23  0833 05/24/23  0556 05/23/23  2340 05/23/23  1521   CALCIUM mg/dL 8.1*  8.1* 8.4* 8.6 7.7*   < > 8.5*  8.5*   < > 8.4*   ALBUMIN g/dL 3.5  --   --  4.0  --  3.9  --  4.3   MAGNESIUM mg/dL 2.1  --   --  1.9  --   --   --   --    PHOSPHORUS mg/dL 2.5  --   --  2.1*  --   --   --   --     < > = values in this interval not displayed.     Results from last 7 days   Lab Units 05/23/23  1521   LACTATE mmol/L 2.5*     No results found for: HGBA1C, POCGLU    acyclovir, 400 mg, Oral, Daily  amLODIPine, 2.5 mg, Oral, Daily  vitamin C, 1,000 mg, Oral, Daily  cholecalciferol, 5,000 Units, Oral, Daily  finasteride, 5 mg, Oral, Daily  furosemide, 40 mg, Oral, Daily  latanoprost, 1 drop, Both Eyes, Nightly  potassium chloride, 40 mEq, Oral, Once  propafenone, 150 mg, Oral, TID  rosuvastatin, 10 mg, Oral, Daily  senna-docusate sodium, 2 tablet, Oral, BID  sodium chloride, 10 mL, Intravenous, Q12H  sodium chloride, 1 g, Oral, TID With Meals  tamsulosin, 0.4 mg, Oral, BID       Diet: Cardiac Diets, Fluid Restriction (240 mL/tray) Diets; Healthy Heart (2-3 Na+); Other (Specify mL/day) (1200 ml/day); Texture:  Regular Texture (IDDSI 7); Fluid Consistency: Thin (IDDSI 0)       Assessment/Plan     Active Hospital Problems    Diagnosis  POA   • **Hyponatremia [E87.1]  Yes   • LBBB (left bundle branch block) [I44.7]  Yes   • Prolonged P-R interval [I44.0]  Yes   • Pancytopenia [D61.818]  Yes   • Orthostatic hypotension [I95.1]  Yes   • Supine hypertension [I10]  Yes   • Anemia [D64.9]  Yes   • Macrocytosis [D75.89]  Yes   • 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      Remy is a 77 year old male who presented to the hospital with complaints of nausea, vomiting and dizziness. He recently took a trip to [place] and developed URI symptoms upon returning. He developed significant nausea and vomiting and was unable to keep anything down. He developed associated dizziness as well. He presented to the ED where he was found to have a sodium of 115.      • Hyponatremia: Acute on chronic with prior levels in low-mid 130s. Nephrology following. [place] to have SIADH aggravated by decreased solute intake and increased free water intake in combination of HCTZ use. On fluid restriction as well as salt tablets TID and furosemide 40 mg daily. Sodium improving to 123 today. Nephrology would like closer to 130 prior to discharge.     • Pancytopenia/anemia/history of hairy T-cell leukemia: Levels appear near his prior baseline. He is established with Dr. Jeffers with CBC group. Monitor trends. Recommend close continued outpatient follow up.      • Orthostatic hypotension/supine hypertension: Norvasc reduced per nephrology. Monitor BP trends. Off HCTZ, lisinopril and bisoprolol since admission.     • LBBB/prolonged [place]: LBBB new (when compared to 2018). Echo here with EF 60.5%, indeterminate diastolic function. Left atrial cavity/volume increased. No wall motion abnormalities or significant valvular findings. Continue to monitor on telemetry and correct electrolytes per nephrology. No  cardiac complaints at this time. Appears orders have been placed in the past with Dr. Messina- no office notes available for review. He is on propafenone therapy for a history of PAF per his reports. I recommended he follow up with Dr. Messina at discharge given his new LBBB.      Discussed with patient and Dr. Sagastume.     VTE Prophylaxis - SCDs  Code Status - Full code  Disposition - Anticipate discharge home once sodium levels stabilized. Possibly 1-2 days.     CHANTE Casey  [facility]  05/26/23  12:50 EDT

## 2023-05-26 NOTE — CASE MANAGEMENT/SOCIAL WORK
Continued Stay Note  [facility]     Patient Name: Angel Remy  MRN: [id number]  Today's Date: 5/26/2023    Admit Date: 5/23/2023    Plan: Home with wife. Family to transport.   Discharge Plan     Row Name 05/26/23 1238       Plan    Plan Home with wife. Family to transport.    Patient/Family in Agreement with Plan yes    Plan Comments Na 123 this AM. Discussed in huddle and may D/C tomorrow if Na 130. No needs. Timur Charlton RN-BC               Discharge Codes    No documentation.               Expected Discharge Date and Time     Expected Discharge Date Expected Discharge Time    May 27, 2023             Timur Charlton RN

## 2023-05-26 NOTE — PLAN OF CARE
Problem: Adult Inpatient Plan of Care  Goal: Plan of Care Review  Outcome: Ongoing, Progressing  Flowsheets (Taken 5/26/2023 0429)  Outcome Evaluation: VSS. NA up to 121. Patient verbalized he's taking Flomax twice daily, [facility] informed, changed quantity. Fluid restriction maintained. Patient up ad sushant. Will continue to monitor. To be endorsed accordingly.

## 2023-05-26 NOTE — PLAN OF CARE
Goal Outcome Evaluation:    Progress: improving  Outcome Evaluation: Vitals stable. Na up to 125 - awaiting lab draw at 1800. Orthostatics positive this AM - Dr. Wilson aware. Ambulating in roldan throughout the day with standby assistance. No c/o pain, dizziness, or nausea. Potential discharge home tomorrow - nephrology wanting Na at least 130. Wife at bedside this afternoon. Patient stable and needs met at this time.

## 2023-05-27 ENCOUNTER — READMISSION MANAGEMENT (OUTPATIENT)
Dept: CALL CENTER | Facility: HOSPITAL | Age: 78
End: 2023-05-27
Payer: MEDICARE

## 2023-05-27 VITALS
HEART RATE: 87 BPM | RESPIRATION RATE: 18 BRPM | SYSTOLIC BLOOD PRESSURE: 134 MMHG | HEIGHT: 70 IN | DIASTOLIC BLOOD PRESSURE: 79 MMHG | OXYGEN SATURATION: 98 % | BODY MASS INDEX: 22.09 KG/M2 | WEIGHT: 154.32 LBS | TEMPERATURE: 98.2 F

## 2023-05-27 LAB
ALBUMIN SERPL-MCNC: 3.2 G/DL (ref 3.5–5.2)
ANION GAP SERPL CALCULATED.3IONS-SCNC: 7.7 MMOL/L (ref 5–15)
ANION GAP SERPL CALCULATED.3IONS-SCNC: 7.7 MMOL/L (ref 5–15)
ANION GAP SERPL CALCULATED.3IONS-SCNC: 8.7 MMOL/L (ref 5–15)
BUN SERPL-MCNC: 24 MG/DL (ref 8–23)
BUN SERPL-MCNC: 24 MG/DL (ref 8–23)
BUN SERPL-MCNC: 31 MG/DL (ref 8–23)
BUN/CREAT SERPL: 24.8 (ref 7–25)
BUN/CREAT SERPL: 34.8 (ref 7–25)
BUN/CREAT SERPL: 34.8 (ref 7–25)
CALCIUM SPEC-SCNC: 8.9 MG/DL (ref 8.6–10.5)
CALCIUM SPEC-SCNC: 8.9 MG/DL (ref 8.6–10.5)
CALCIUM SPEC-SCNC: 9 MG/DL (ref 8.6–10.5)
CHLORIDE SERPL-SCNC: 92 MMOL/L (ref 98–107)
CHLORIDE SERPL-SCNC: 94 MMOL/L (ref 98–107)
CHLORIDE SERPL-SCNC: 94 MMOL/L (ref 98–107)
CO2 SERPL-SCNC: 27.3 MMOL/L (ref 22–29)
CREAT SERPL-MCNC: 0.69 MG/DL (ref 0.76–1.27)
CREAT SERPL-MCNC: 0.69 MG/DL (ref 0.76–1.27)
CREAT SERPL-MCNC: 1.25 MG/DL (ref 0.76–1.27)
DEPRECATED RDW RBC AUTO: 46.6 FL (ref 37–54)
EGFRCR SERPLBLD CKD-EPI 2021: 59.3 ML/MIN/1.73
EGFRCR SERPLBLD CKD-EPI 2021: 95.3 ML/MIN/1.73
EGFRCR SERPLBLD CKD-EPI 2021: 95.3 ML/MIN/1.73
ERYTHROCYTE [DISTWIDTH] IN BLOOD BY AUTOMATED COUNT: 13.1 % (ref 12.3–15.4)
GLUCOSE SERPL-MCNC: 100 MG/DL (ref 65–99)
GLUCOSE SERPL-MCNC: 100 MG/DL (ref 65–99)
GLUCOSE SERPL-MCNC: 112 MG/DL (ref 65–99)
HCT VFR BLD AUTO: 31 % (ref 37.5–51)
HGB BLD-MCNC: 10.9 G/DL (ref 13–17.7)
MAGNESIUM SERPL-MCNC: 2.1 MG/DL (ref 1.6–2.4)
MCH RBC QN AUTO: 34.3 PG (ref 26.6–33)
MCHC RBC AUTO-ENTMCNC: 35.2 G/DL (ref 31.5–35.7)
MCV RBC AUTO: 97.5 FL (ref 79–97)
PHOSPHATE SERPL-MCNC: 3.3 MG/DL (ref 2.5–4.5)
PLATELET # BLD AUTO: 113 10*3/MM3 (ref 140–450)
PMV BLD AUTO: 9.1 FL (ref 6–12)
POTASSIUM SERPL-SCNC: 3.5 MMOL/L (ref 3.5–5.2)
RBC # BLD AUTO: 3.18 10*6/MM3 (ref 4.14–5.8)
SODIUM SERPL-SCNC: 128 MMOL/L (ref 136–145)
SODIUM SERPL-SCNC: 129 MMOL/L (ref 136–145)
SODIUM SERPL-SCNC: 129 MMOL/L (ref 136–145)
URATE SERPL-MCNC: 3.2 MG/DL (ref 3.4–7)
WBC NRBC COR # BLD: 1.92 10*3/MM3 (ref 3.4–10.8)

## 2023-05-27 PROCEDURE — 80069 RENAL FUNCTION PANEL: CPT | Performed by: INTERNAL MEDICINE

## 2023-05-27 PROCEDURE — 80048 BASIC METABOLIC PNL TOTAL CA: CPT | Performed by: INTERNAL MEDICINE

## 2023-05-27 PROCEDURE — 83735 ASSAY OF MAGNESIUM: CPT | Performed by: INTERNAL MEDICINE

## 2023-05-27 PROCEDURE — 84550 ASSAY OF BLOOD/URIC ACID: CPT | Performed by: INTERNAL MEDICINE

## 2023-05-27 PROCEDURE — 85027 COMPLETE CBC AUTOMATED: CPT | Performed by: INTERNAL MEDICINE

## 2023-05-27 RX ORDER — BISOPROLOL FUMARATE 5 MG/1
5 TABLET, FILM COATED ORAL
Status: DISCONTINUED | OUTPATIENT
Start: 2023-05-27 | End: 2023-05-27 | Stop reason: HOSPADM

## 2023-05-27 RX ORDER — ACYCLOVIR 400 MG/1
400 TABLET ORAL DAILY
Start: 2023-05-27

## 2023-05-27 RX ORDER — POTASSIUM CHLORIDE 20 MEQ/1
20 TABLET, EXTENDED RELEASE ORAL DAILY
Qty: 30 TABLET | Refills: 0 | Status: SHIPPED | OUTPATIENT
Start: 2023-05-27

## 2023-05-27 RX ORDER — POTASSIUM CHLORIDE 750 MG/1
20 TABLET, FILM COATED, EXTENDED RELEASE ORAL DAILY
Status: DISCONTINUED | OUTPATIENT
Start: 2023-05-27 | End: 2023-05-27 | Stop reason: HOSPADM

## 2023-05-27 RX ORDER — AMLODIPINE BESYLATE 2.5 MG/1
2.5 TABLET ORAL DAILY
Start: 2023-05-27

## 2023-05-27 RX ORDER — FUROSEMIDE 40 MG/1
40 TABLET ORAL DAILY
Qty: 30 TABLET | Refills: 0 | Status: SHIPPED | OUTPATIENT
Start: 2023-05-27

## 2023-05-27 RX ORDER — BISOPROLOL FUMARATE 5 MG/1
5 TABLET, FILM COATED ORAL
Qty: 30 TABLET | Refills: 0 | Status: SHIPPED | OUTPATIENT
Start: 2023-05-27

## 2023-05-27 RX ORDER — SODIUM CHLORIDE 1 G/1
1 TABLET ORAL
Qty: 90 TABLET | Refills: 0 | Status: SHIPPED | OUTPATIENT
Start: 2023-05-27

## 2023-05-27 RX ADMIN — FUROSEMIDE 40 MG: 40 TABLET ORAL at 08:59

## 2023-05-27 RX ADMIN — ROSUVASTATIN CALCIUM 10 MG: 10 TABLET, FILM COATED ORAL at 08:58

## 2023-05-27 RX ADMIN — TAMSULOSIN HYDROCHLORIDE 0.4 MG: 0.4 CAPSULE ORAL at 08:57

## 2023-05-27 RX ADMIN — FINASTERIDE 5 MG: 5 TABLET, FILM COATED ORAL at 09:00

## 2023-05-27 RX ADMIN — POTASSIUM CHLORIDE 20 MEQ: 750 TABLET, EXTENDED RELEASE ORAL at 09:00

## 2023-05-27 RX ADMIN — Medication 10 ML: at 09:01

## 2023-05-27 RX ADMIN — AMLODIPINE BESYLATE 2.5 MG: 2.5 TABLET ORAL at 08:57

## 2023-05-27 RX ADMIN — SODIUM CHLORIDE TAB 1 GM 1 G: 1 TAB at 08:58

## 2023-05-27 RX ADMIN — OXYCODONE HYDROCHLORIDE AND ACETAMINOPHEN 1000 MG: 500 TABLET ORAL at 08:58

## 2023-05-27 RX ADMIN — ACYCLOVIR 400 MG: 400 TABLET ORAL at 08:58

## 2023-05-27 RX ADMIN — DOCUSATE SODIUM 50MG AND SENNOSIDES 8.6MG 2 TABLET: 8.6; 5 TABLET, FILM COATED ORAL at 08:58

## 2023-05-27 RX ADMIN — PROPAFENONE HYDROCHLORIDE 150 MG: 150 TABLET, COATED ORAL at 08:57

## 2023-05-27 NOTE — PLAN OF CARE
Problem: Adult Inpatient Plan of Care  Goal: Plan of Care Review  Outcome: Ongoing, Progressing  Flowsheets (Taken 5/27/2023 0340)  Outcome Evaluation: VSS. MA up to 128. Patient up ad sushant. No dizziness while standing. Possible discharge today. All due medications given. Will continue to monitor. To be endorsed accordingly.

## 2023-05-27 NOTE — PLAN OF CARE
Goal Outcome Evaluation:              Outcome Evaluation: Pt D/C home, new m,edications called in to preferred pharmacy

## 2023-05-27 NOTE — DISCHARGE SUMMARY
NAME: Angel Remy ADMIT: 2023   : 1945  PCP: Damián Vuong Jr., MD    MRN: [id number] LOS: 4 days   AGE/SEX: 77 y.o. 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 He recently took a trip to [place] and developed URI symptoms upon returning. He developed significant nausea and vomiting and was unable to keep anything down. He developed associated dizziness as well. He presented to the ED where he was found to have a sodium of 115.     Patient was admitted to the hospital and seen by hospitalist as well that is nephrology.  Etiology of his hyponatremia was likely related to his HCTZ along with SIADH.  Patient had fluid restriction as well as salt tabs and p.o. Lasix and had gradual improvement of his sodium.  He will continue the same at discharge and plan for  lab checks in 2 days with renal function, uric acid as well as magnesium.  He will discuss these results with nephrology as an outpatient for any change of plan.  We will discharge him on his current antihypertensive medications but will add back a beta-blocker which she previously was taking but in combination with the HCTZ and obviously will be holding the HCTZ at discharge.  For the time being he will be on reduced dose of Norvasc as well as his ACE inhibitor held as recommended by nephrology.    He has history of hairy cell leukemia status post previous treatment and follows up with Dr. Jeffers with [facility].  He does have chronic pancytopenia present previously but also present during this hospitalization.  Not having any bleeding.  Also not having any fevers or sign of infection at this time.  He should follow-up with Dr. Eldridge this coming week for lab monitoring.    He feels much better and strongly wishes for discharge today to get better rest at home as he feels continued hospitalization is not of additional benefit at this time.  I do agree with his assessment and think it is reasonable for him to discharge with close outpatient follow-up.    DIAGNOSTICS    Collected Updated Procedure Result Status    05/27/2023 0545 05/27/2023 0653 Renal Function Panel [[id number]]    (Abnormal)   Blood    Final result Component Value Units   Glucose 100 High  mg/dL   BUN 24 High  mg/dL   Creatinine 0.69 Low  mg/dL   Sodium 129 Low  mmol/L   Potassium 3.5 mmol/L   Chloride 94 Low  mmol/L   CO2 27.3 mmol/L   Calcium 8.9 mg/dL   Albumin 3.2 Low  g/dL   Phosphorus 3.3 mg/dL   Anion Gap 7.7 mmol/L   BUN/Creatinine Ratio 34.8 High     eGFR 95.3 mL/min/1.73          05/27/2023 0545 05/27/2023 0638 Uric Acid [[id number]]   (Abnormal)   Blood    Final result Component Value Units   Uric Acid 3.2 Low  mg/dL          05/27/2023 0545 05/27/2023 0638 Magnesium [[id number]]   Blood    Final result Component Value Units   Magnesium  2.1 mg/dL          05/27/2023 0545 05/27/2023 0621 CBC (No Diff) [[id number]]   (Abnormal)   Blood    Final result Component Value Units   WBC 1.92 Low Critical  10*3/mm3   RBC 3.18 Low  10*6/mm3   Hemoglobin 10.9 Low  g/dL   Hematocrit 31.0 Low  %   MCV 97.5 High  fL   MCH 34.3 High  pg   MCHC 35.2 g/dL   RDW 13.1 %   RDW-SD 46.6 fl   MPV 9.1 fL   Platelets 113 Low  10*3/mm3            PHYSICAL EXAM  Objective    Alert  nad  No resp distress  Wishing for discharge today    CONDITION ON DISCHARGE  Stable.      DISCHARGE DISPOSITION   Home or Self Care      DISCHARGE MEDICATIONS       Your medication list      START taking these medications      Instructions Last Dose Given Next Dose Due   bisoprolol 5 MG tablet  Commonly known as: ZEBeta      Take 1 tablet by mouth Daily.       furosemide 40 MG tablet  Commonly known as: LASIX      Take 1 tablet by mouth Daily.       potassium chloride 20 MEQ CR tablet  Commonly known as: K-DUR,KLOR-CON      Take 1 tablet by mouth Daily.       sodium chloride 1 g tablet      Take 1 tablet by mouth 3 (Three) Times a Day With Meals.          CHANGE how you take these medications      Instructions Last Dose Given Next Dose Due   amLODIPine 2.5 MG tablet  Commonly known as: NORVASC  What changed: how much to take      Take 1 tablet by mouth Daily.          CONTINUE taking these medications      Instructions Last Dose Given Next Dose Due   acyclovir 400 MG tablet  Commonly known as: ZOVIRAX      Take 1 tablet by mouth Daily. Take no more than 5 doses a day.       bimatoprost 0.03 % ophthalmic drops  Commonly known as: LUMIGAN      Administer 1 drop to both eyes Every Night.       Lumigan 0.01 % ophthalmic drops  Generic drug: bimatoprost      Administer 1 drop to both eyes every night at bedtime.       CALCIUM 1200+D3 PO      Take  by mouth.       finasteride 5 MG tablet  Commonly known as: PROSCAR           Magnesium 500 MG tablet      Take 1 tablet by mouth 2 (two) times a day.        niacin 500 MG tablet      Take 1 tablet by mouth Every Night.       propafenone 150 MG tablet  Commonly known as: RYTHMOL      Take 1 tablet by mouth 3 (Three) Times a Day.       rosuvastatin 10 MG tablet  Commonly known as: CRESTOR      Take 1 tablet by mouth Daily.       tamsulosin 0.4 MG capsule 24 hr capsule  Commonly known as: FLOMAX           vitamin C 250 MG tablet  Commonly known as: ASCORBIC ACID      Take 4 tablets by mouth Daily. With madisyn hips       vitamin D3 125 MCG (5000 UT) capsule capsule      Take 1 capsule by mouth Daily.          STOP taking these medications    bisoprolol-hydrochlorothiazide 10-6.25 MG per tablet  Commonly known as: ZIAC        HYDROcodone-acetaminophen 7.5-325 MG per tablet  Commonly known as: Norco        ramipril 10 MG capsule  Commonly known as: ALTACE              Where to Get Your Medications      These medications were sent to [facility]/pharmacy #[id number] [place], [place] - [address] [address] [facility] [facility] - [phone] [facility] [phone]   [address] [address], [place] [address]    Phone: [phone]   · bisoprolol 5 MG tablet  · furosemide 40 MG tablet  · potassium chloride 20 MEQ CR tablet  · sodium chloride 1 g tablet     Information about where to get these medications is not yet available    Ask your nurse or doctor about these medications  · acyclovir 400 MG tablet  · amLODIPine 2.5 MG tablet          Future Appointments   Date Time Provider Department Center   6/1/2023  1:40 PM LAB CHAIR 1 [facility]  LAB [facility]   8/3/2023  1:00 PM LAB CHAIR 2 [facility]  LAB [facility]EL[facility] Cornel   8/3/2023  1:20 PM Paco Jeffers MD [facility]     Additional Instructions for the Follow-ups that You Need to Schedule     Discharge Follow-up with Specialty: [facility] Dr. Jeffers- call his office this week to follow up on your pancytopenia   As directed      Specialty: [facility] Dr. Jeffers- call his office this week to follow up on your pancytopenia          Discharge Follow-up with Specialty: PCP in 1-2 weeks. Nephrology- have a renal panel and magnesium and uric acid levels checked on Monday and then call Dr. Driver office after the labs are drawn for him to review   As directed      Specialty: PCP in 1-2 weeks. Nephrology- have a renal panel and magnesium and uric acid levels checked on Monday and then call Dr. Driver office after the labs are drawn for him to review            Follow-up Information     Damián Vuong Jr., MD .    Specialty: Internal Medicine  Contact information:  [address] [id number] [phone]             Provider, No Known .    Contact information:  [facility] [address]                         TEST  RESULTS PENDING AT DISCHARGE  Pending Labs     Order Current Status    Blood Culture - Blood, Arm, Left Preliminary result    Blood Culture - Blood, Arm, Right Preliminary result             Elvis Hector MD  [facility]  05/27/23  08:12 EDT      Time: greater than 32 minutes on discharge  It was a pleasure taking care of this patient while in the hospital.

## 2023-05-28 NOTE — OUTREACH NOTE
Prep Survey    Flowsheet Row Responses   [facility] facility patient discharged from? [place]   Is LACE score < 7 ? No   Eligibility Readm Mgmt   Discharge diagnosis Hyponatremia   Does the patient have one of the following disease processes/diagnoses(primary or secondary)? Other   Does the patient have Home health ordered? No   Is there a DME ordered? No   Prep survey completed? Yes          Hanna GIRALDO - Registered Nurse

## 2023-05-29 LAB
BACTERIA SPEC AEROBE CULT: NORMAL
BACTERIA SPEC AEROBE CULT: NORMAL

## 2023-05-29 NOTE — CASE MANAGEMENT/SOCIAL WORK
Case Management Discharge Note      Final Note: Pt discharged home on 5/27.   CHERIE Simmons RN         Selected Continued Care - Discharged on 5/27/2023 Admission date: 5/23/2023 - Discharge disposition: Home or Self Care    Destination    No services have been selected for the patient.              Durable Medical Equipment    No services have been selected for the patient.              Dialysis/Infusion    No services have been selected for the patient.              Home Medical Care    No services have been selected for the patient.              Therapy    No services have been selected for the patient.              Community Resources    No services have been selected for the patient.              Community & DME    No services have been selected for the patient.                  Transportation Services  Private: Car    Final Discharge Disposition Code: 01 - home or self-care

## 2023-05-30 ENCOUNTER — LAB (OUTPATIENT)
Dept: LAB | Facility: HOSPITAL | Age: 78
End: 2023-05-30

## 2023-05-30 ENCOUNTER — TRANSCRIBE ORDERS (OUTPATIENT)
Dept: ADMINISTRATIVE | Facility: HOSPITAL | Age: 78
End: 2023-05-30

## 2023-05-30 DIAGNOSIS — E87.1 HYPONATREMIA: ICD-10-CM

## 2023-05-30 DIAGNOSIS — E87.1 HYPONATREMIA: Primary | ICD-10-CM

## 2023-05-30 LAB
ALBUMIN SERPL-MCNC: 4.6 G/DL (ref 3.5–5.2)
ANION GAP SERPL CALCULATED.3IONS-SCNC: 8.7 MMOL/L (ref 5–15)
BUN SERPL-MCNC: 36 MG/DL (ref 8–23)
BUN/CREAT SERPL: 40.4 (ref 7–25)
CALCIUM SPEC-SCNC: 9.9 MG/DL (ref 8.6–10.5)
CHLORIDE SERPL-SCNC: 103 MMOL/L (ref 98–107)
CO2 SERPL-SCNC: 32.3 MMOL/L (ref 22–29)
CREAT SERPL-MCNC: 0.89 MG/DL (ref 0.76–1.27)
EGFRCR SERPLBLD CKD-EPI 2021: 88.3 ML/MIN/1.73
GLUCOSE SERPL-MCNC: 123 MG/DL (ref 65–99)
HOLD SPECIMEN: NORMAL
MAGNESIUM SERPL-MCNC: 2.6 MG/DL (ref 1.6–2.4)
PHOSPHATE SERPL-MCNC: 4.5 MG/DL (ref 2.5–4.5)
POTASSIUM SERPL-SCNC: 4.3 MMOL/L (ref 3.5–5.2)
SODIUM SERPL-SCNC: 144 MMOL/L (ref 136–145)
URATE SERPL-MCNC: 4.1 MG/DL (ref 3.4–7)
WHOLE BLOOD HOLD SPECIMEN: NORMAL

## 2023-05-30 PROCEDURE — 83735 ASSAY OF MAGNESIUM: CPT

## 2023-05-30 PROCEDURE — 36415 COLL VENOUS BLD VENIPUNCTURE: CPT

## 2023-05-30 PROCEDURE — 80069 RENAL FUNCTION PANEL: CPT

## 2023-05-30 PROCEDURE — 84550 ASSAY OF BLOOD/URIC ACID: CPT

## 2023-05-31 ENCOUNTER — READMISSION MANAGEMENT (OUTPATIENT)
Dept: CALL CENTER | Facility: HOSPITAL | Age: 78
End: 2023-05-31

## 2023-05-31 NOTE — OUTREACH NOTE
Medical Week 1 Survey    Flowsheet Row Responses   [facility] [place] patient discharged from? [place]   Does the patient have one of the following disease processes/diagnoses(primary or secondary)? Other   Week 1 attempt successful? Yes   Call start time 1410   Call end time 1412   Discharge diagnosis Hyponatremia   Person spoke with today (if not patient) and relationship Patient   Meds reviewed with patient/caregiver? Yes  [Denies any medication questions or needs. ]   Is the patient taking all medications as directed (includes completed medication regime)? Yes   Does the patient have a primary care provider?  Yes   Comments regarding PCP Follow up with Damián Vuong,   Has the patient kept scheduled appointments due by today? N/A   Comments Follow up with [facility] Dr. Jeffers, PCP in 1-2 weeks. Nephrology   Has home health visited the patient within 72 hours of discharge? N/A   Did the patient receive a copy of their discharge instructions? Yes   What is the patient's perception of their health status since discharge? Improving   If the patient is a current smoker, are they able to teach back resources for cessation? Not a smoker   Week 1 call completed? Yes   Is the patient interested in additional calls from an ambulatory ?  NOTE:  applies to high risk patients requiring additional follow-up. No   Revoked No further contact(revokes)-requires comment   Graduated/Revoked comments Patient verbalizes satisfaction with care received. Denies any further needs today. No further follow up calls.          ELLEN JACOB - Registered Nurse

## 2023-06-01 ENCOUNTER — LAB (OUTPATIENT)
Dept: LAB | Facility: HOSPITAL | Age: 78
End: 2023-06-01
Payer: MEDICARE

## 2023-06-01 DIAGNOSIS — C91.41 HAIRY CELL LEUKEMIA, IN REMISSION: ICD-10-CM

## 2023-06-01 LAB
BASOPHILS # BLD AUTO: 0.01 10*3/MM3 (ref 0–0.2)
BASOPHILS NFR BLD AUTO: 0.4 % (ref 0–1.5)
DEPRECATED RDW RBC AUTO: 47.8 FL (ref 37–54)
EOSINOPHIL # BLD AUTO: 0.02 10*3/MM3 (ref 0–0.4)
EOSINOPHIL NFR BLD AUTO: 0.7 % (ref 0.3–6.2)
ERYTHROCYTE [DISTWIDTH] IN BLOOD BY AUTOMATED COUNT: 13.2 % (ref 12.3–15.4)
HCT VFR BLD AUTO: 32.2 % (ref 37.5–51)
HGB BLD-MCNC: 11.3 G/DL (ref 13–17.7)
IMM GRANULOCYTES # BLD AUTO: 0.01 10*3/MM3 (ref 0–0.05)
IMM GRANULOCYTES NFR BLD AUTO: 0.4 % (ref 0–0.5)
LYMPHOCYTES # BLD AUTO: 1.13 10*3/MM3 (ref 0.7–3.1)
LYMPHOCYTES NFR BLD AUTO: 39.8 % (ref 19.6–45.3)
MCH RBC QN AUTO: 35.1 PG (ref 26.6–33)
MCHC RBC AUTO-ENTMCNC: 35.1 G/DL (ref 31.5–35.7)
MCV RBC AUTO: 100 FL (ref 79–97)
MONOCYTES # BLD AUTO: 0.06 10*3/MM3 (ref 0.1–0.9)
MONOCYTES NFR BLD AUTO: 2.1 % (ref 5–12)
NEUTROPHILS NFR BLD AUTO: 1.61 10*3/MM3 (ref 1.7–7)
NEUTROPHILS NFR BLD AUTO: 56.6 % (ref 42.7–76)
NRBC BLD AUTO-RTO: 0 /100 WBC (ref 0–0.2)
PLATELET # BLD AUTO: 93 10*3/MM3 (ref 140–450)
PMV BLD AUTO: 9.7 FL (ref 6–12)
RBC # BLD AUTO: 3.22 10*6/MM3 (ref 4.14–5.8)
WBC NRBC COR # BLD: 2.84 10*3/MM3 (ref 3.4–10.8)

## 2023-06-01 PROCEDURE — 85025 COMPLETE CBC W/AUTO DIFF WBC: CPT

## 2023-06-01 PROCEDURE — 36415 COLL VENOUS BLD VENIPUNCTURE: CPT

## 2023-06-02 ENCOUNTER — TRANSCRIBE ORDERS (OUTPATIENT)
Dept: ADMINISTRATIVE | Facility: HOSPITAL | Age: 78
End: 2023-06-02

## 2023-06-02 ENCOUNTER — LAB (OUTPATIENT)
Dept: LAB | Facility: HOSPITAL | Age: 78
End: 2023-06-02
Payer: MEDICARE

## 2023-06-02 DIAGNOSIS — E87.1 HYPONATREMIA: Primary | ICD-10-CM

## 2023-06-02 DIAGNOSIS — E87.1 HYPONATREMIA: ICD-10-CM

## 2023-06-02 LAB
ALBUMIN SERPL-MCNC: 3.9 G/DL (ref 3.5–5.2)
ANION GAP SERPL CALCULATED.3IONS-SCNC: 6.3 MMOL/L (ref 5–15)
BUN SERPL-MCNC: 30 MG/DL (ref 8–23)
BUN/CREAT SERPL: 50 (ref 7–25)
CALCIUM SPEC-SCNC: 9 MG/DL (ref 8.6–10.5)
CHLORIDE SERPL-SCNC: 105 MMOL/L (ref 98–107)
CO2 SERPL-SCNC: 27.7 MMOL/L (ref 22–29)
CREAT SERPL-MCNC: 0.6 MG/DL (ref 0.76–1.27)
EGFRCR SERPLBLD CKD-EPI 2021: 99.4 ML/MIN/1.73
GLUCOSE SERPL-MCNC: 108 MG/DL (ref 65–99)
MAGNESIUM SERPL-MCNC: 2.5 MG/DL (ref 1.6–2.4)
PHOSPHATE SERPL-MCNC: 2.8 MG/DL (ref 2.5–4.5)
POTASSIUM SERPL-SCNC: 4.6 MMOL/L (ref 3.5–5.2)
SODIUM SERPL-SCNC: 139 MMOL/L (ref 136–145)
URATE SERPL-MCNC: 3.4 MG/DL (ref 3.4–7)
WHOLE BLOOD HOLD SPECIMEN: NORMAL

## 2023-06-02 PROCEDURE — 83735 ASSAY OF MAGNESIUM: CPT

## 2023-06-02 PROCEDURE — 80069 RENAL FUNCTION PANEL: CPT

## 2023-06-02 PROCEDURE — 84550 ASSAY OF BLOOD/URIC ACID: CPT

## 2023-06-07 DIAGNOSIS — I44.7 LBBB (LEFT BUNDLE BRANCH BLOCK): Primary | ICD-10-CM

## 2023-06-07 DIAGNOSIS — R94.31 ABNORMAL ELECTROCARDIOGRAM (ECG) (EKG): ICD-10-CM

## 2023-07-06 ENCOUNTER — TELEPHONE (OUTPATIENT)
Dept: CARDIOLOGY | Facility: CLINIC | Age: 78
End: 2023-07-06

## 2023-07-06 NOTE — TELEPHONE ENCOUNTER
Caller: Angel Remy    Relationship: Self    Best call back number: [phone]    What is the best time to reach you: ANY    Who are you requesting to speak with (clinical staff, provider,  specific staff member): CLINICAL        What was the call regarding: PATIENT IS SCHEDULED FOR A STRESS TEST ON 07-13-23, AND HAS SOME QUESTIONS IN REGARDS TO THE TEST AND HIS MEDICATION. PLEASE REACH OUT TO PATIENT AT YOUR EARLIEST CONVENIENCE.

## 2023-08-03 ENCOUNTER — OFFICE VISIT (OUTPATIENT)
Dept: ONCOLOGY | Facility: CLINIC | Age: 78
End: 2023-08-03
Payer: MEDICARE

## 2023-08-03 ENCOUNTER — LAB (OUTPATIENT)
Dept: LAB | Facility: HOSPITAL | Age: 78
End: 2023-08-03
Payer: MEDICARE

## 2023-08-03 VITALS
RESPIRATION RATE: 16 BRPM | HEIGHT: 70 IN | DIASTOLIC BLOOD PRESSURE: 84 MMHG | WEIGHT: 160.4 LBS | OXYGEN SATURATION: 99 % | HEART RATE: 62 BPM | SYSTOLIC BLOOD PRESSURE: 185 MMHG | TEMPERATURE: 98.4 F | BODY MASS INDEX: 22.96 KG/M2

## 2023-08-03 DIAGNOSIS — C91.41 HAIRY CELL LEUKEMIA, IN REMISSION: ICD-10-CM

## 2023-08-03 DIAGNOSIS — C91.41 HAIRY CELL LEUKEMIA, IN REMISSION: Primary | ICD-10-CM

## 2023-08-03 LAB
ALBUMIN SERPL-MCNC: 4.2 G/DL (ref 3.5–5.2)
ALBUMIN/GLOB SERPL: 2.6 G/DL
ALP SERPL-CCNC: 43 U/L (ref 39–117)
ALT SERPL W P-5'-P-CCNC: 45 U/L (ref 1–41)
ANION GAP SERPL CALCULATED.3IONS-SCNC: 14.3 MMOL/L (ref 5–15)
AST SERPL-CCNC: 49 U/L (ref 1–40)
BASOPHILS # BLD AUTO: 0 10*3/MM3 (ref 0–0.2)
BASOPHILS NFR BLD AUTO: 0 % (ref 0–1.5)
BILIRUB SERPL-MCNC: 0.9 MG/DL (ref 0–1.2)
BUN SERPL-MCNC: 27 MG/DL (ref 8–23)
BUN/CREAT SERPL: 35.5 (ref 7–25)
CALCIUM SPEC-SCNC: 8.7 MG/DL (ref 8.6–10.5)
CHLORIDE SERPL-SCNC: 103 MMOL/L (ref 98–107)
CO2 SERPL-SCNC: 22.7 MMOL/L (ref 22–29)
CREAT SERPL-MCNC: 0.76 MG/DL (ref 0.7–1.3)
DEPRECATED RDW RBC AUTO: 49.6 FL (ref 37–54)
EGFRCR SERPLBLD CKD-EPI 2021: 92.6 ML/MIN/1.73
EOSINOPHIL # BLD AUTO: 0.02 10*3/MM3 (ref 0–0.4)
EOSINOPHIL NFR BLD AUTO: 0.9 % (ref 0.3–6.2)
ERYTHROCYTE [DISTWIDTH] IN BLOOD BY AUTOMATED COUNT: 13.5 % (ref 12.3–15.4)
GLOBULIN UR ELPH-MCNC: 1.6 GM/DL
GLUCOSE SERPL-MCNC: 109 MG/DL (ref 65–99)
HCT VFR BLD AUTO: 33.6 % (ref 37.5–51)
HGB BLD-MCNC: 11.8 G/DL (ref 13–17.7)
IMM GRANULOCYTES # BLD AUTO: 0.01 10*3/MM3 (ref 0–0.05)
IMM GRANULOCYTES NFR BLD AUTO: 0.4 % (ref 0–0.5)
LDH SERPL-CCNC: 178 U/L (ref 135–225)
LYMPHOCYTES # BLD AUTO: 1.18 10*3/MM3 (ref 0.7–3.1)
LYMPHOCYTES NFR BLD AUTO: 52.4 % (ref 19.6–45.3)
MCH RBC QN AUTO: 35 PG (ref 26.6–33)
MCHC RBC AUTO-ENTMCNC: 35.1 G/DL (ref 31.5–35.7)
MCV RBC AUTO: 99.7 FL (ref 79–97)
MONOCYTES # BLD AUTO: 0.06 10*3/MM3 (ref 0.1–0.9)
MONOCYTES NFR BLD AUTO: 2.7 % (ref 5–12)
NEUTROPHILS NFR BLD AUTO: 0.98 10*3/MM3 (ref 1.7–7)
NEUTROPHILS NFR BLD AUTO: 43.6 % (ref 42.7–76)
NRBC BLD AUTO-RTO: 0 /100 WBC (ref 0–0.2)
PLATELET # BLD AUTO: 88 10*3/MM3 (ref 140–450)
PMV BLD AUTO: 10.2 FL (ref 6–12)
POTASSIUM SERPL-SCNC: 4.6 MMOL/L (ref 3.5–5.2)
PROT SERPL-MCNC: 5.8 G/DL (ref 6–8.5)
RBC # BLD AUTO: 3.37 10*6/MM3 (ref 4.14–5.8)
SODIUM SERPL-SCNC: 140 MMOL/L (ref 136–145)
WBC NRBC COR # BLD: 2.25 10*3/MM3 (ref 3.4–10.8)

## 2023-08-03 PROCEDURE — 3079F DIAST BP 80-89 MM HG: CPT | Performed by: INTERNAL MEDICINE

## 2023-08-03 PROCEDURE — 85025 COMPLETE CBC W/AUTO DIFF WBC: CPT

## 2023-08-03 PROCEDURE — 1126F AMNT PAIN NOTED NONE PRSNT: CPT | Performed by: INTERNAL MEDICINE

## 2023-08-03 PROCEDURE — 36415 COLL VENOUS BLD VENIPUNCTURE: CPT

## 2023-08-03 PROCEDURE — 3077F SYST BP >= 140 MM HG: CPT | Performed by: INTERNAL MEDICINE

## 2023-08-03 PROCEDURE — 80053 COMPREHEN METABOLIC PANEL: CPT | Performed by: INTERNAL MEDICINE

## 2023-08-03 PROCEDURE — 88185 FLOWCYTOMETRY/TC ADD-ON: CPT | Performed by: INTERNAL MEDICINE

## 2023-08-03 PROCEDURE — 99214 OFFICE O/P EST MOD 30 MIN: CPT | Performed by: INTERNAL MEDICINE

## 2023-08-03 PROCEDURE — 88182 CELL MARKER STUDY: CPT | Performed by: INTERNAL MEDICINE

## 2023-08-03 PROCEDURE — 88184 FLOWCYTOMETRY/ TC 1 MARKER: CPT | Performed by: INTERNAL MEDICINE

## 2023-08-03 PROCEDURE — 83615 LACTATE (LD) (LDH) ENZYME: CPT | Performed by: INTERNAL MEDICINE

## 2023-08-04 LAB
ALBUMIN SERPL ELPH-MCNC: 4.1 G/DL (ref 2.9–4.4)
ALBUMIN/GLOB SERPL: 1.8 {RATIO} (ref 0.7–1.7)
ALPHA1 GLOB SERPL ELPH-MCNC: 0.2 G/DL (ref 0–0.4)
ALPHA2 GLOB SERPL ELPH-MCNC: 0.5 G/DL (ref 0.4–1)
B-GLOBULIN SERPL ELPH-MCNC: 0.8 G/DL (ref 0.7–1.3)
GAMMA GLOB SERPL ELPH-MCNC: 0.8 G/DL (ref 0.4–1.8)
GLOBULIN SER-MCNC: 2.4 G/DL (ref 2.2–3.9)
IGA SERPL-MCNC: 79 MG/DL (ref 61–437)
IGG SERPL-MCNC: 869 MG/DL (ref 603–1613)
IGM SERPL-MCNC: 52 MG/DL (ref 15–143)
INTERPRETATION SERPL IEP-IMP: ABNORMAL
KAPPA LC FREE SER-MCNC: 14.2 MG/L (ref 3.3–19.4)
KAPPA LC FREE/LAMBDA FREE SER: 1.07 {RATIO} (ref 0.26–1.65)
LABORATORY COMMENT REPORT: ABNORMAL
LAMBDA LC FREE SERPL-MCNC: 13.3 MG/L (ref 5.7–26.3)
M PROTEIN SERPL ELPH-MCNC: ABNORMAL G/DL
PROT SERPL-MCNC: 6.5 G/DL (ref 6–8.5)

## 2023-08-08 LAB — REF LAB TEST METHOD: NORMAL

## 2023-08-10 ENCOUNTER — DOCUMENTATION (OUTPATIENT)
Dept: ONCOLOGY | Facility: CLINIC | Age: 78
End: 2023-08-10
Payer: MEDICARE

## 2023-08-10 DIAGNOSIS — C91.41 HAIRY CELL LEUKEMIA, IN REMISSION: Primary | ICD-10-CM

## 2023-08-10 NOTE — PROGRESS NOTES
Patient contacted about flow cytometry findings of small population potentially suggestive of recurrence versus secondary process.  Plan to reassess in follow-up visits 9/7/2023.  Please note that if treatment is necessary then repeat treatment with cladribine could be given in a variety of of schedules including 0.15 mg/kg/day over 2 hours x 5 days followed by rituximab or 5.6 mg/mý over 2 hours also for 5 days and also followed by rituximab therapy.  ZACHAYR

## 2023-08-17 ENCOUNTER — LAB (OUTPATIENT)
Dept: LAB | Facility: HOSPITAL | Age: 78
End: 2023-08-17
Payer: MEDICARE

## 2023-08-17 DIAGNOSIS — C91.41 HAIRY CELL LEUKEMIA, IN REMISSION: ICD-10-CM

## 2023-08-17 LAB
BASOPHILS # BLD AUTO: 0 10*3/MM3 (ref 0–0.2)
BASOPHILS NFR BLD AUTO: 0 % (ref 0–1.5)
DEPRECATED RDW RBC AUTO: 48.1 FL (ref 37–54)
EOSINOPHIL # BLD AUTO: 0.03 10*3/MM3 (ref 0–0.4)
EOSINOPHIL NFR BLD AUTO: 1.4 % (ref 0.3–6.2)
ERYTHROCYTE [DISTWIDTH] IN BLOOD BY AUTOMATED COUNT: 13.1 % (ref 12.3–15.4)
HCT VFR BLD AUTO: 33.1 % (ref 37.5–51)
HGB BLD-MCNC: 11.7 G/DL (ref 13–17.7)
IMM GRANULOCYTES # BLD AUTO: 0.02 10*3/MM3 (ref 0–0.05)
IMM GRANULOCYTES NFR BLD AUTO: 1 % (ref 0–0.5)
LYMPHOCYTES # BLD AUTO: 1.01 10*3/MM3 (ref 0.7–3.1)
LYMPHOCYTES NFR BLD AUTO: 48.3 % (ref 19.6–45.3)
MCH RBC QN AUTO: 35.3 PG (ref 26.6–33)
MCHC RBC AUTO-ENTMCNC: 35.3 G/DL (ref 31.5–35.7)
MCV RBC AUTO: 100 FL (ref 79–97)
MONOCYTES # BLD AUTO: 0.07 10*3/MM3 (ref 0.1–0.9)
MONOCYTES NFR BLD AUTO: 3.3 % (ref 5–12)
NEUTROPHILS NFR BLD AUTO: 0.96 10*3/MM3 (ref 1.7–7)
NEUTROPHILS NFR BLD AUTO: 46 % (ref 42.7–76)
NRBC BLD AUTO-RTO: 0 /100 WBC (ref 0–0.2)
PLATELET # BLD AUTO: 80 10*3/MM3 (ref 140–450)
PMV BLD AUTO: 10.7 FL (ref 6–12)
RBC # BLD AUTO: 3.31 10*6/MM3 (ref 4.14–5.8)
WBC NRBC COR # BLD: 2.09 10*3/MM3 (ref 3.4–10.8)

## 2023-08-17 PROCEDURE — 85025 COMPLETE CBC W/AUTO DIFF WBC: CPT

## 2023-08-17 PROCEDURE — 36415 COLL VENOUS BLD VENIPUNCTURE: CPT

## 2023-09-07 ENCOUNTER — LAB (OUTPATIENT)
Dept: LAB | Facility: HOSPITAL | Age: 78
End: 2023-09-07
Payer: MEDICARE

## 2023-09-07 DIAGNOSIS — C91.41 HAIRY CELL LEUKEMIA, IN REMISSION: ICD-10-CM

## 2023-09-07 LAB
BASOPHILS # BLD AUTO: 0 10*3/MM3 (ref 0–0.2)
BASOPHILS NFR BLD AUTO: 0 % (ref 0–1.5)
DEPRECATED RDW RBC AUTO: 47.9 FL (ref 37–54)
EOSINOPHIL # BLD AUTO: 0.02 10*3/MM3 (ref 0–0.4)
EOSINOPHIL NFR BLD AUTO: 1 % (ref 0.3–6.2)
ERYTHROCYTE [DISTWIDTH] IN BLOOD BY AUTOMATED COUNT: 12.9 % (ref 12.3–15.4)
HCT VFR BLD AUTO: 33.1 % (ref 37.5–51)
HGB BLD-MCNC: 11.4 G/DL (ref 13–17.7)
IMM GRANULOCYTES # BLD AUTO: 0 10*3/MM3 (ref 0–0.05)
IMM GRANULOCYTES NFR BLD AUTO: 0 % (ref 0–0.5)
LYMPHOCYTES # BLD AUTO: 0.88 10*3/MM3 (ref 0.7–3.1)
LYMPHOCYTES NFR BLD AUTO: 44.7 % (ref 19.6–45.3)
MCH RBC QN AUTO: 35.5 PG (ref 26.6–33)
MCHC RBC AUTO-ENTMCNC: 34.4 G/DL (ref 31.5–35.7)
MCV RBC AUTO: 103.1 FL (ref 79–97)
MONOCYTES # BLD AUTO: 0.08 10*3/MM3 (ref 0.1–0.9)
MONOCYTES NFR BLD AUTO: 4.1 % (ref 5–12)
NEUTROPHILS NFR BLD AUTO: 0.99 10*3/MM3 (ref 1.7–7)
NEUTROPHILS NFR BLD AUTO: 50.2 % (ref 42.7–76)
NRBC BLD AUTO-RTO: 0 /100 WBC (ref 0–0.2)
PLATELET # BLD AUTO: 65 10*3/MM3 (ref 140–450)
PMV BLD AUTO: 9.3 FL (ref 6–12)
RBC # BLD AUTO: 3.21 10*6/MM3 (ref 4.14–5.8)
WBC NRBC COR # BLD: 1.97 10*3/MM3 (ref 3.4–10.8)

## 2023-09-07 PROCEDURE — 85025 COMPLETE CBC W/AUTO DIFF WBC: CPT

## 2023-09-07 PROCEDURE — 88182 CELL MARKER STUDY: CPT

## 2023-09-07 PROCEDURE — 88184 FLOWCYTOMETRY/ TC 1 MARKER: CPT

## 2023-09-07 PROCEDURE — 88185 FLOWCYTOMETRY/TC ADD-ON: CPT

## 2023-09-07 PROCEDURE — 36415 COLL VENOUS BLD VENIPUNCTURE: CPT

## 2023-09-11 LAB — REF LAB TEST METHOD: NORMAL

## 2023-09-12 DIAGNOSIS — C91.41 HAIRY CELL LEUKEMIA, IN REMISSION: Primary | ICD-10-CM

## 2023-09-13 NOTE — PROGRESS NOTES
Patient contacted about flow cytometry findings of small population potentially suggestive of recurrence versus secondary process.  Plan to reassess in follow-up visits 9/7/2023.  Please note that if treatment is necessary then repeat treatment with cladribine could be given in a variety of of schedules including 0.15 mg/kg/day over 2 hours x 5 days followed by rituximab or 5.6 mg/m² over 2 hours also for 5 days and also followed by rituximab therapy.  ZACHARY

## 2023-09-13 NOTE — H&P (VIEW-ONLY)
Subjective Patient, again, with excellent performance status, no additional symptoms    REASONS FOR FOLLOWUP:   Hairy cell leukemia.   Admission 05/11/2012 through 05/17/12 for 7 day continuous infusion cladribine (2-CdA) without complication.   Patient seen 5 months post treatment in complete remission.   Patient seen 8 months post treatment with continued complete remission, every 3 month reassessment per CBC planned, 6 month review by MD scheduled.   Patient seen at 15 months without evidence of recurrent disease, every 6 month followup planned.   The patient was seen 02/05/2014 with no evidence of recurrent disease, 6-month followup planned.   Patient seen 07/31/2014, stable with no evidence of recurrent disease, every 6 month followup.   Patient seen 01/15/2015, stable without recurrent disease, 6-month CBC planned, yearly followup scheduled.   Patient seen 02/04/2016, stable, ?early peripheral neuropathy developing distally per feet, no evidence for recurrent disease per hairy cell leukemia.  Patient reviewed September 02, 2016, previous July CBC with mild thrombocytopenia developing, recheck September 2 normalized, every 6 month follow-ups anticipated  Patient reviewed February 02, 2017, status post negative prostate biopsy, mild thrombocytopenia again recognized  Patient reviewed July 20, 2017, hematologically stable, every 6 month follow-up CBCs planned, yearly M.D. Assessment  Patient seen July 26, 2018 after recent airline travel any cold symptoms  Patient reviewed January 10, 2019, no additional symptoms, hematologically stable  Patient reviewed July 25, 2019, stable, six-month follow-up as planned  Patient viewed January 16, 2020, stable, every 6 months CBC, MD assessment yearly planned  Reassessment January 21, 2021, no evidence of recurrence disease  Patient assessed 2/10/2022, evidence of continued remission noted both on clinical examination, peripheral blood smear review and flow cytometric  evaluation.  Patient review 2/23/2023 mild thrombocytopenia, no new abnormalities per peripheral smear, close for follow-up planned  Patient assessed 9/14/2023, concern for early relapse.       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 He is usually followed by Dr. Vuong for a history of hypertension, hyperlipidemia, minimal right carotid plaque as well as elevated PSA.       He had exams done on 1-09-12 for routine followup. This included normal serum chemistry including LFTs, cholesterol 131, triglyceride 34, HDL 51, and LDL 73. CBC revealed H&H 13.3, 37.9%, WBC 3300, platelet count 110,000, 26% polys and 74% lymphs with A NC of 0.9.       Dr. Remy provides information when initially seen with studies from as far back as 1- at which time hemoglobin and hematocrit was 14.4 and 42.1%, WBC 6950, platelets 227,000 with 47% polys, 42% lymph; this was automated. The additional test includ es approximately every year to every other year thereafter though it was noted in January 2010 WBC dropped from an average of approximately 5,000-6,000 to 3600. Hemoglobin and hematocrit 13.6 and 39.7%. Platelets 150,000-170,000 with ANC beginning in Ja nuary 2010 at 1900.       We were contacted per the above findings and asked Dr. Remy to undergo further assessment including flow cytometric exam per peripheral blood. This revealed no evidence of abnormal myeloid maturation increased blast population. No evidence of lymphopro l iferative disorder. The patient was asked to be seen formally in the office now and comes in today for further assessment. Dr. Remy indicates that he has taken a number of medications in an attempt to improve his health. Several homeopathic medication s include vitamin C, folate, flaxseed oil, fish oil, vitamin E, selenium, pomegranate tabs, Co-enzyme Q, vitamin D, niacin, and red yeast  rice daily. He has discontinued these and stays on those described below.       He feels well with no additional problem s and states that he has no little or no symptoms that he can detail, whether this is just ill health in anyway, though he does have occasional hemorrhoidal pain. He also notes rare palpitations and occasional epistaxis when the ambient air has dried sub stantially. No fevers, chills, weight loss, night sweats, or other constitutional symptoms.       As a result of the above, the patient was asked to undergo a series of studies. This went onto include rheumatoid factor of 6, MARYURI screen negative, negative s misael protein electrophoresis, normal quantitative immunoglobulins, CMV IgG of 2.9, IgM less than .9, EBV IgM of less than .9 and BCA IgG antibody of 4.6. These are consistent with previous exposures. HIV was negative, CRSP less than .1, sedimentation ra t e of 4, iron 125, TIBC 297, 14% saturation, and ferritin of 77. Peripheral blood flow cytometry was negative for evidence of abnormal myeloid maturation, increased blast population or lymphoproliferative disorder. As the patient is seen back today, he i s continuing to feel well though is obviously greatly concerned about his followup counts. Reviews in the office had been planned to follow him briefly recheck performance 3/29/12 with an H&H of 12.9, 36.1, WBC 3900, 32 polys, 62 lymphs, platelet count 94 , 000, IPF slightly elevated at 8.8. The patient was therefore asked to return and to be further assessed with bone marrow aspirate and biopsy. He now presents to do so. He has had no additional symptoms since last seen. He has discontinued his Crestor use.       As a result of his review the patient underwent bone marrow aspiration and biopsy. This revealed evidence of lymphocytic infiltrate reviewed here in the office. Bone marrow biopsy and clot section revealed normocellular marrow involved by CD10 po sitive B cell lymphoma, approximately  8%, with BCL-1 protein expression. Eventually cytogenetics was found to be negative with no evidence of cyclin-D1/IgH or BCL-2/IgH rearrangement. Additional tissue was requested. As a result, the patient was notifi alesha leon of these findings by personal visit, and plans were made to proceed with endoscopy, ? mantle cell involvement. At the time of this dictation, this was not yet performed. He was also asked to undergo CT of the neck, chest, abdomen and pelvis which show ed no evidence of abnormality. PET scan 04/13/12 showed increased activity within the marrow, but no additional abnormalities including the spleen, with no background bowel or gastric activity as well.       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 hairy cell leukemia. This has been discussed in great detail with the alexander thakur at [facility] and additional stains are pending as is the patient's GI assessment now to be pursued through Dr. Ray.       His case was again reviewed over quite a period of time, and ultimately his marrow was signed out as 80% involved with hairy cell leukemia - normal cytogenetics, no evidence of cyclin D1/IgH or BCL-2/IgH rearrangement. Dr. Remy was advised of the treatment options which include followup with no immediate therapy, and/or the use of cladribine or pentostatin. After numerous di s cussion, he ultimately elected to try to proceed with treatment when he is feeling as well as he is to improve his ability to withstand the therapy itself. We therefore began to discuss the treatment schedule, which also could be somewhat variable, inclu d ing 7-day infusions, 2-hr infusion q.d. x five days or every other week infusion. After discussion he wishes to proceed with 7-day infusion. When we attempted to do this as an outpatient we  found out thereafter that this would not be covered as an outpa tient infusion through his insurance. Therefore we have made plans for him to be admitted after he is seen in office 05/10/12.       The patient was thereafter admitted 05/11-05/17/12. PICC line was placed and he initiated treatment at 0.1 mg/kg or 7.7 mg IV in fusion over seven days. The patient fortunately had no serious issues at all during the seven days and continued to work out on a daily basis. He was seen by his internist as well with some of his meds adjusted including his HCTZ and Ziac. He was stabl e at discharge, but was given Neulasta 6 mg subcutaneously 24 hours after he completed treatment. He has been having counts done on a regular basis, and returns back today with only minimal evidence of neutropenia at his becca, and has an excellent periph eral smear today - normal findings. His performance status remains excellent as well.       The patient when seen on 6/7/12 was felt to have improved substantially. We followed him for weekly counts and plans at that point were for him to take a trip out of the country. He did actually take this trip, which had him eventually hiking at 11-12,000 feet without any complication or ill effect. As he returns back today on 7/19 he feels well and again with an excellent performance status. He has had no fever, c hills or suggestion of infection or complications thus far. He remains again, with an excellent performance status.       The patient thereafter is asked to have his counts done on an every six weeks basis, now seen three months later with a normal CBC as well as examination. He again feels quite well with a completely normal performance status.       The patient was asked to return for followup studies that included normal serum chemistries, unchanged lipid profile and stable hematologic findings. He is now seen on 2/7/13, 4 months from his last appointment. He continues to do well with  unchanged performance status.       The patient was asked to have counts done q3 months seeing the physician in six months. He is now seen back 08/22/2013 feeling well having [place] with that. He had no additional fevers, sweats, chills, significant change in weight although he has tried to lose some additional pounds and we see this today. He continues to exercise regularl y and dietary program. Review of his smears again continues to demonstrate a complete remission.       Today, the patient was asked to be seen back in 6 months for followup, and is now seen 02/05/2014, continuing to feel well. Again, review of his smears and physical examination fortunately demonstrates no evidence of recurrent disease.       The patient was asked to be seen back in 6 months for followup and is now seen on 07/31/2014. Again he feels well with an excellent performance status and no change since last reviewed.       The patient thereafter was now seen back 2015, 2016 stable at both visits with no evidence of recurrence of his hairy cell leukemia. He feels well but when now reviewed 02/04/2016, he has had some degree of peripheral neuropathy? This improv es with activity and it is certainly not limiting his action or his function.   Patient now reviewed back again September 02, 2016, continues to have an excellent performance status and hematologically remain stable brother had some concern about thrombocytopenia last visit.    The patient is next seen February 02, 2017.  He has not had any medical issues since last seen and remains physically quite well.  He does describe following with urology and undergoing a repeat biopsy recently when his PSA was above 7.  His findings evidently were negative for malignancy though he did have post procedure hematuria.  This is also now abated.  The patient is next seen July 28, 2017.  He continues to have an excellent performance status  and no particular difficulty in day-to-day function and/or pursuing active vacations.      The patient is next seen July 26, 2018.  He had recently returned from a trip to [place].  After experiencing an airplane ride with multiple coughing passengers he himself developed a cold, particularly severe over the last 3-4 days with cough and congestion.  He's had low-grade fever which is now beginning to resolve but is clearly uncomfortable.    The patient's next seen January 10, 2019.  He has continued to travel without issues and is feeling well when reviewed today.  Plans were made for usual follow-up and the patient is next seen July 25, 2019.  His performance status remains excellent though he has lost some weight and indicates he did this per altering his diet.  His stamina and other usual activities are continued unabated.  The patient is next seen January 16, 2020.  He continues an active practice and lifestyle.  He has lost some additional weight on purpose through diet.    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    The patient's follow-up laboratory studies demonstrated by late September degree of leukopenia, unchanged thrombocytopenia and this was followed with repeat analysis within the last several weeks as he seen February 10, 2022. Flow cytometry failed to demonstrate any new process.  As you seen February 10, 2020 his performance status remains excellent, stable weight, appetite, no additional fever, chills, rash, night sweats.    The patient is next evaluated 2/23/2023 with an unchanged performance status for mild thrombocytopenia.  He has had some exposure to viral illnesses through his grandchildren as of late but is otherwise felt fine with a unchanged performance status and work schedule.    He required admission 5/23-27/2023 had return from [place] where  he developed nausea and vomiting and difficulty with keeping p.o.  He had further nausea, vomiting and dizziness and was seen in the emergency department and was found to have a sodium 115.  He was seen by nephrology with concern of hyponatremia related to hydrochlorothiazide along with SIDH, treated with fluid restriction and salt tablets and p.o. Lasix.  His medications were adjusted per renal medicine.  At that point he continued to demonstrate chronic pancytopenia that did not accelerate in any particular way.     Subsequent CBC 6/1/2023 include H&H 11.3 and 32.2 with white count of 2840, platelet count 93,000, ANC of 1610.    The patient is next evaluated 8/3/2020 3 repeat CBC includes a white count of 2250, H&H 11.8 and 33.6, platelet count of 88,000-differential of 44% polys, 52% lymphs, 3% monocytes.    Patient contacted about flow cytometry findings of small population potentially suggestive of recurrence versus secondary process.  Plan to reassess in follow-up visits 9/7/2023.  Please note that if treatment is necessary then repeat treatment with cladribine could be given in a variety of of schedules including 0.15 mg/kg/day over 2 hours x 5 days followed by rituximab or 5.6 mg/m² over 2 hours also for 5 days and also followed by rituximab therapy.    The patient is seen back 9/14/2023.Flow cytometry assessed on 2 visits includes a CD10 positive monoclonal lambda B-cell population representing 0.3% of total events 8/3/2023 and repeat 9/7/2023 demonstrates a similar B-cell population also 0.3% total events-phenotype positive CD10 bright, CD19 bright, CD20 bright, CD25, CD45,   slambda.  Discussed symptoms usually due to splenomegaly, fatigue, weakness, weight loss, bruising, recurrent infections, periodic vasculitis.  He is clear that he is not having any of the symptoms and feels that his overall performance status is as good as its ever been.  We have discussed that additional issues include possible  organomegaly and cytopenias developing with his follow-up CBC in office today including H&H of 12.2 and 33.4, white count of 3250 and platelet count of 92,000.  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       Past Medical History:   Diagnosis Date    BCC (basal cell carcinoma of skin) 06/22/2022    NOSE    Benign prostate hyperplasia     H/O Elevated PSA     H/O Hairy cell leukemia (CMS/HCC) 2012    H/O Internal thrombosed hemorrhoids     H/O minimal right carotid plaque     H/O peripheral neuropathy     Hyperlipidemia     Hypertension     Primary open-angle glaucoma, bilateral, mild stage        ONCOLOGIC HISTORY:  (History from previous dates can be found in the separate document.)    Current Outpatient Medications on File Prior to Visit   Medication Sig Dispense Refill    acyclovir (ZOVIRAX) 400 MG tablet Take 1 tablet by mouth Daily. Take no more than 5 doses a day. 90 tablet 3    amLODIPine (NORVASC) 2.5 MG tablet Take 1 tablet by mouth Daily. 90 tablet 3    bimatoprost (LUMIGAN) 0.03 % ophthalmic drops Administer 1 drop to both eyes Every Night.      bisoprolol (ZEBeta) 5 MG tablet Take 1 tablet by mouth Daily. 90 tablet 3    Calcium-Magnesium-Vitamin D (CALCIUM 1200+D3 PO) Take  by mouth.      chlorhexidine (PERIDEX) 0.12 % solution Rinse with 15 mL by mouth after breakfast and at bedtime for 30 seconds, then spit. No food or drink for 30 minutes after rinse. 473 mL 3    finasteride (PROSCAR) 5 MG tablet Take 1 tablet by mouth Daily. 90 tablet 3    Magnesium 500 MG tablet Take 1 tablet by mouth 2 (two) times a day.      niacin 500 MG tablet Take 1 tablet by mouth Every Night.      propafenone (RYTHMOL) 150 MG tablet Take 1 tablet by mouth 3 (Three) Times a Day. 270 tablet 3    rosuvastatin (CRESTOR) 10 MG tablet Take 1 tablet by mouth every night at bedtime.  90 tablet 2    tamsulosin (FLOMAX) 0.4 MG capsule 24 hr capsule Take 2 capsules by mouth Daily. 180 capsule 3    vitamin C (ASCORBIC ACID) 250 MG tablet Take 4 tablets by mouth Daily. With madisyn hips      vitamin D3 125 MCG (5000 UT) capsule capsule Take 1 capsule by mouth Daily.      acyclovir (ZOVIRAX) 400 MG tablet Take 1 tablet by mouth Daily. Take no more than 5 doses a day.      amLODIPine (NORVASC) 2.5 MG tablet Take 1 tablet by mouth Daily.      amLODIPine (NORVASC) 2.5 MG tablet Take 1 tablet by mouth Daily. 90 tablet 3    bimatoprost (Lumigan) 0.01 % ophthalmic drops Administer 1 drop to both eyes every night at bedtime. 2.5 mL 6    bisoprolol (ZEBeta) 5 MG tablet Take 1 tablet by mouth Daily. 30 tablet 0    bisoprolol (ZEBeta) 5 MG tablet Take 1 tablet by mouth Daily. 30 tablet 3    bisoprolol-hydrochlorothiazide (ZIAC) 5-6.25 MG per tablet Take 1 tablet by mouth 2 (Two) Times a Day. 180 tablet 2    finasteride (PROSCAR) 5 MG tablet       furosemide (LASIX) 40 MG tablet Take 1 tablet by mouth Daily. 30 tablet 0    Lumigan 0.01 % ophthalmic drops Administer 1 drop to both eyes every night at bedtime.      potassium chloride (K-DUR,KLOR-CON) 20 MEQ CR tablet Take 1 tablet by mouth Daily. 30 tablet 0    propafenone (RHTHYMOL) 150 MG tablet Take 1 tablet by mouth 3 (Three) Times a Day.      ramipril (ALTACE) 10 MG capsule Take 1 capsule by mouth Daily. 90 capsule 3    ramipril (ALTACE) 10 MG capsule Take 1 capsule by mouth Daily. 90 capsule 2    rosuvastatin (CRESTOR) 10 MG tablet Take 1 tablet by mouth Daily.      rosuvastatin (CRESTOR) 10 MG tablet Take 1 tablet by mouth every night at bedtime. 90 tablet 3    sodium chloride 1 g tablet Take 1 tablet by mouth 3 (Three) Times a Day With Meals. 90 tablet 0    sodium chloride 1 g tablet Take 1 tablet by mouth 3 (Three) Times a Day with meals. 90 tablet 0    tamsulosin (FLOMAX) 0.4 MG capsule 24 hr capsule       tamsulosin (FLOMAX) 0.4 MG capsule 24 hr capsule  "Take 1 capsule by mouth every night at bedtime. 30 capsule 3    tamsulosin (FLOMAX) 0.4 MG capsule 24 hr capsule Take 1 capsule by mouth every night at bedtime. 90 capsule 3     No current facility-administered medications on file prior to visit.       ALLERGIES:   No Known Allergies    Social History     Socioeconomic History    Marital status:      Spouse name: Hanna   Tobacco Use    Smoking status: Former     Packs/day: 0.50     Types: Cigarettes    Smokeless tobacco: Never    Tobacco comments:     Quit smoking many years ago.   Vaping Use    Vaping Use: Never used   Substance and Sexual Activity    Alcohol use: Yes     Comment: 1 glass of wine or beer per day    Drug use: No    Sexual activity: Defer         Cancer-related family history includes Cancer in his father; Cancer (age of onset: 90) in his mother.      Review of Systems   Constitutional:  Negative for fatigue.   HENT: Negative.     Eyes: Negative.    Respiratory:  Negative for chest tightness, shortness of breath and wheezing.    Gastrointestinal:  Negative for constipation, diarrhea, nausea and vomiting.   Endocrine: Negative.    Musculoskeletal: Negative.    Skin: Negative.    Neurological: Negative.  Negative for weakness.      Vitals:    09/14/23 1514   BP: 178/95   Pulse: 64   Resp: 16   Temp: 98.7 °F (37.1 °C)   TempSrc: Temporal   SpO2: 99%   Weight: 73.1 kg (161 lb 3.2 oz)   Height: 177 cm (69.69\")   PainSc: 0-No pain         9/14/2023     3:17 PM   Current Status   ECOG score 0       Physical Exam      GENERAL: Well-developed, well-nourished male in no acute distress.   SKIN: Warm, dry, without new rash or lesion  HEAD: Normocephalic.   EYES: Pupils equal, round and reactive to light, EOMs intact. Conjunctivae normal.   EARS: Hearing intact.   NOSE: Septum midline. No excoriations or nasal discharge.   MOUTH: Tongue is well-papillated; no stomatitis or ulcers. Lips normal.  dry mucous membranes, clear sinus drainage noted  THROAT: " Oropharynx without lesions or exudates.   NECK: Supple with good range of motion; no thyromegaly or masses, no JVD or bruits.   LYMPHATICS: No cervical, supraclavicular, axillary or inguinal adenopathy.   CHEST: Lungs clear to percussion and auscultation.   CARDIAC: Regular rate and rhythm without murmurs, rubs or gallops.   ABDOMEN: Soft, nontender with no evidence hepatosplenomegaly or masses  EXTREMITIES: No clubbing, cyanosis or edema.   NEUROLOGICAL: No focal neurological deficits.       RECENT LABS:  Hematology WBC   Date Value Ref Range Status   09/14/2023 3.25 (L) 3.40 - 10.80 10*3/mm3 Final     RBC   Date Value Ref Range Status   09/14/2023 3.43 (L) 4.14 - 5.80 10*6/mm3 Final     Hemoglobin   Date Value Ref Range Status   09/14/2023 12.2 (L) 13.0 - 17.7 g/dL Final     Hematocrit   Date Value Ref Range Status   09/14/2023 33.4 (L) 37.5 - 51.0 % Final     Platelets   Date Value Ref Range Status   09/14/2023 92 (L) 140 - 450 10*3/mm3 Final        Assessment & Plan        A 77-year-old male with the diagnosis of hairy cell leukemia in April 2012. He was treated 5/11-5/17/ 12 with cladribine being given as continuous infusion at 0.1 mg/kg or 7.7 mg/24-hours x7 days. He did well during hospitalization with little toxicity and minimal myelosuppression. He has gone onto obtain a complete remission and this continues to be th e case at a 6 month visit now 07/31/2014. The patient has been now assessed on a regular basis every 6 months with no evidence of hematologic deterioration. As he is seen today, 02/04/2016, and now again September 02, 2016 he remains clinically stable as well.      He is now assessed February 2 and overall doing well without any additional issues except for the prostate described as above.  He is now recognized there is also possibly hypertensive and I'll contact him about this is afternoon though during our conversation he had few if any symptoms.?.  Pending our review will ask him to have a  repeat CBC at 3 months to see that physician in 6 months.    The patient indicates when called about his blood pressure that he often experiences white coat hypertension.  He is going to recheck his blood pressure night and notify me if it remains elevated.     He is next seen July 28, 2017, hematologically stable, remaining quite active.      As he is again reviewed July 2018 he has had recent viral exposure and is slowly working through a cold.  This appears to have suppressed his marrow very modestly though review of the smear shows no evidence of hairy cells though there are atypical lymphocytes thought to be virally activated.  At this point we'll have a follow-up CBC in 4 weeks and have him see the physician again in 6 months .    The patient is next seen January 10, 2019 doing well with no hematologic deterioration.  Plan to see back in 6 months for follow-up.  The patient is next seen July 25, 2019.  He does continue to do extremely well and we discussed his weight loss which appears to be dietarily produced.  His exam is not indicative of progressive disease or new disorder developing.  We have agreed to have him reassessed in 6-month follow-ups.  As he is seen back January 16, 2020 there are no findings of concern though he has lost weight purposely.  It is felt that we can reduce his visits for MD assessment but continue every 6 month CBCs.  This remains the case as he is assessed January 21, 2021.     The patient's follow-up laboratory studies demonstrated by late September 2021, a degree of leukopenia, unchanged thrombocytopenia and this was followed with repeat analysis within the last several weeks as he seen February 10, 2022. Flow cytometry failed to demonstrate any new process.  As you seen February 10, 2020 his performance status remains excellent, stable weight, appetite, no additional fever, chills, rash, night sweats.  As he is reexamined including peripheral blood smears 2/10/2022 there is no  suggestion of additional lymphadenopathy hepatosplenomegaly.  We have discussed a fourth COVID 19 vaccination considering his history and will clarify his status by COVID 19 antibodies in office today.    Patient's subsequent exams did demonstrate responsiveness to COVID-19 vaccinations.  He is now reviewed at 6 and 12 months and with some degree of mild thrombocytopenia noted 2/23/2023 after recent viral exposures.  Peripheral smear does not demonstrate any suggestion of relapse though there are activated lymphocytes noted.    He required admission 5/23-27/2023 had return from [place] where he developed nausea and vomiting and difficulty with keeping p.o.  He had further nausea, vomiting and dizziness and was seen in the emergency department and was found to have a sodium 115.  He was seen by nephrology with concern of hyponatremia related to hydrochlorothiazide along with SIDH, treated with fluid restriction and salt tablets and p.o. Lasix.  His medications were adjusted per renal medicine.  At that point he continued to demonstrate chronic pancytopenia that did not accelerate in any particular way.     Subsequent CBC 6/1/2023 include H&H 11.3 and 32.2 with white count of 2840, platelet count 93,000, ANC of 1610.    The patient is next evaluated 8/3/2020 3 repeat CBC includes a white count of 2250, H&H 11.8 and 33.6, platelet count of 88,000-differential of 44% polys, 52% lymphs, 3% monocytes.    Patient contacted about flow cytometry findings of small population potentially suggestive of recurrence versus secondary process.  Plan to reassess in follow-up visits 9/7/2023.  Please note that if treatment is necessary then repeat treatment with cladribine could be given in a variety of of schedules including 0.15 mg/kg/day over 2 hours x 5 days followed by rituximab or 5.6 mg/m² over 2 hours also for 5 days and also followed by rituximab therapy.    The patient is seen back 9/14/2023.Flow cytometry assessed on 2  visits includes a CD10 positive monoclonal lambda B-cell population representing 0.3% of total events 8/3/2023 and repeat 9/7/2023 demonstrates a similar B-cell population also 0.3% total events-phenotype positive CD10 bright, CD19 bright, CD20 bright, CD25, CD45,   slambda.  Discussed symptoms usually due to splenomegaly, fatigue, weakness, weight loss, bruising, recurrent infections, periodic vasculitis.  He is clear that he is not having any of the symptoms and feels that his overall performance status is as good as its ever been.  We have discussed that additional issues include possible organomegaly and cytopenias developing with his follow-up CBC in office today including H&H of 12.2 and 33.4, white count of 3250 and platelet count of 92,000.  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           Plan:    *Every 2 weeks CBC    *As he returns will obtain hepatitis B surface antigen, hepatitis B surface antibody and hepatitis B core antibody should we need to initiate therapy soon.    *We discussed the various regimens including 5-day cladribine in office followed by rituximab therapy    *4 weeks CT chest, abdomen, pelvis    *Consider repeat marrow    *6 weeks MD CBC.2  I spent 55 minutes caring for Angel on this date of service. This time includes time spent by me in the following activities: preparing for the visit, reviewing tests, obtaining and/or reviewing a separately obtained history, performing a medically appropriate examination and/or evaluation, counseling and educating the patient/family/caregiver, ordering medications, tests, or procedures, referring and communicating with other health care professionals, documenting information in the medical record, independently interpreting results and communicating that information with the  patient/family/caregiver, and care coordination.

## 2023-09-13 NOTE — PROGRESS NOTES
Subjective Patient, again, with excellent performance status, no additional symptoms    REASONS FOR FOLLOWUP:   Hairy cell leukemia.   Admission 05/11/2012 through 05/17/12 for 7 day continuous infusion cladribine (2-CdA) without complication.   Patient seen 5 months post treatment in complete remission.   Patient seen 8 months post treatment with continued complete remission, every 3 month reassessment per CBC planned, 6 month review by MD scheduled.   Patient seen at 15 months without evidence of recurrent disease, every 6 month followup planned.   The patient was seen 02/05/2014 with no evidence of recurrent disease, 6-month followup planned.   Patient seen 07/31/2014, stable with no evidence of recurrent disease, every 6 month followup.   Patient seen 01/15/2015, stable without recurrent disease, 6-month CBC planned, yearly followup scheduled.   Patient seen 02/04/2016, stable, ?early peripheral neuropathy developing distally per feet, no evidence for recurrent disease per hairy cell leukemia.  Patient reviewed September 02, 2016, previous July CBC with mild thrombocytopenia developing, recheck September 2 normalized, every 6 month follow-ups anticipated  Patient reviewed February 02, 2017, status post negative prostate biopsy, mild thrombocytopenia again recognized  Patient reviewed July 20, 2017, hematologically stable, every 6 month follow-up CBCs planned, yearly M.D. Assessment  Patient seen July 26, 2018 after recent airline travel any cold symptoms  Patient reviewed January 10, 2019, no additional symptoms, hematologically stable  Patient reviewed July 25, 2019, stable, six-month follow-up as planned  Patient viewed January 16, 2020, stable, every 6 months CBC, MD assessment yearly planned  Reassessment January 21, 2021, no evidence of recurrence disease  Patient assessed 2/10/2022, evidence of continued remission noted both on clinical examination, peripheral blood smear review and flow cytometric  evaluation.  Patient review 2/23/2023 mild thrombocytopenia, no new abnormalities per peripheral smear, close for follow-up planned  Patient assessed 9/14/2023, concern for early relapse.       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 He is usually followed by Dr. Vuong for a history of hypertension, hyperlipidemia, minimal right carotid plaque as well as elevated PSA.       He had exams done on 1-09-12 for routine followup. This included normal serum chemistry including LFTs, cholesterol 131, triglyceride 34, HDL 51, and LDL 73. CBC revealed H&H 13.3, 37.9%, WBC 3300, platelet count 110,000, 26% polys and 74% lymphs with A NC of 0.9.       Dr. Remy provides information when initially seen with studies from as far back as 1- at which time hemoglobin and hematocrit was 14.4 and 42.1%, WBC 6950, platelets 227,000 with 47% polys, 42% lymph; this was automated. The additional test includ es approximately every year to every other year thereafter though it was noted in January 2010 WBC dropped from an average of approximately 5,000-6,000 to 3600. Hemoglobin and hematocrit 13.6 and 39.7%. Platelets 150,000-170,000 with ANC beginning in Ja nuary 2010 at 1900.       We were contacted per the above findings and asked Dr. Remy to undergo further assessment including flow cytometric exam per peripheral blood. This revealed no evidence of abnormal myeloid maturation increased blast population. No evidence of lymphopro l iferative disorder. The patient was asked to be seen formally in the office now and comes in today for further assessment. Dr. Remy indicates that he has taken a number of medications in an attempt to improve his health. Several homeopathic medication s include vitamin C, folate, flaxseed oil, fish oil, vitamin E, selenium, pomegranate tabs, Co-enzyme Q, vitamin D, niacin, and red yeast  rice daily. He has discontinued these and stays on those described below.       He feels well with no additional problem s and states that he has no little or no symptoms that he can detail, whether this is just ill health in anyway, though he does have occasional hemorrhoidal pain. He also notes rare palpitations and occasional epistaxis when the ambient air has dried sub stantially. No fevers, chills, weight loss, night sweats, or other constitutional symptoms.       As a result of the above, the patient was asked to undergo a series of studies. This went onto include rheumatoid factor of 6, MARYURI screen negative, negative s misael protein electrophoresis, normal quantitative immunoglobulins, CMV IgG of 2.9, IgM less than .9, EBV IgM of less than .9 and BCA IgG antibody of 4.6. These are consistent with previous exposures. HIV was negative, CRSP less than .1, sedimentation ra t e of 4, iron 125, TIBC 297, 14% saturation, and ferritin of 77. Peripheral blood flow cytometry was negative for evidence of abnormal myeloid maturation, increased blast population or lymphoproliferative disorder. As the patient is seen back today, he i s continuing to feel well though is obviously greatly concerned about his followup counts. Reviews in the office had been planned to follow him briefly recheck performance 3/29/12 with an H&H of 12.9, 36.1, WBC 3900, 32 polys, 62 lymphs, platelet count 94 , 000, IPF slightly elevated at 8.8. The patient was therefore asked to return and to be further assessed with bone marrow aspirate and biopsy. He now presents to do so. He has had no additional symptoms since last seen. He has discontinued his Crestor use.       As a result of his review the patient underwent bone marrow aspiration and biopsy. This revealed evidence of lymphocytic infiltrate reviewed here in the office. Bone marrow biopsy and clot section revealed normocellular marrow involved by CD10 po sitive B cell lymphoma, approximately  8%, with BCL-1 protein expression. Eventually cytogenetics was found to be negative with no evidence of cyclin-D1/IgH or BCL-2/IgH rearrangement. Additional tissue was requested. As a result, the patient was notifi alesha leon of these findings by personal visit, and plans were made to proceed with endoscopy, ? mantle cell involvement. At the time of this dictation, this was not yet performed. He was also asked to undergo CT of the neck, chest, abdomen and pelvis which show ed no evidence of abnormality. PET scan 04/13/12 showed increased activity within the marrow, but no additional abnormalities including the spleen, with no background bowel or gastric activity as well.       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 hairy cell leukemia. This has been discussed in great detail with the alexander thakur at [facility] and additional stains are pending as is the patient's GI assessment now to be pursued through Dr. Ray.       His case was again reviewed over quite a period of time, and ultimately his marrow was signed out as 80% involved with hairy cell leukemia - normal cytogenetics, no evidence of cyclin D1/IgH or BCL-2/IgH rearrangement. Dr. Remy was advised of the treatment options which include followup with no immediate therapy, and/or the use of cladribine or pentostatin. After numerous di s cussion, he ultimately elected to try to proceed with treatment when he is feeling as well as he is to improve his ability to withstand the therapy itself. We therefore began to discuss the treatment schedule, which also could be somewhat variable, inclu d ing 7-day infusions, 2-hr infusion q.d. x five days or every other week infusion. After discussion he wishes to proceed with 7-day infusion. When we attempted to do this as an outpatient we  found out thereafter that this would not be covered as an outpa tient infusion through his insurance. Therefore we have made plans for him to be admitted after he is seen in office 05/10/12.       The patient was thereafter admitted 05/11-05/17/12. PICC line was placed and he initiated treatment at 0.1 mg/kg or 7.7 mg IV in fusion over seven days. The patient fortunately had no serious issues at all during the seven days and continued to work out on a daily basis. He was seen by his internist as well with some of his meds adjusted including his HCTZ and Ziac. He was stabl e at discharge, but was given Neulasta 6 mg subcutaneously 24 hours after he completed treatment. He has been having counts done on a regular basis, and returns back today with only minimal evidence of neutropenia at his becca, and has an excellent periph eral smear today - normal findings. His performance status remains excellent as well.       The patient when seen on 6/7/12 was felt to have improved substantially. We followed him for weekly counts and plans at that point were for him to take a trip out of the country. He did actually take this trip, which had him eventually hiking at 11-12,000 feet without any complication or ill effect. As he returns back today on 7/19 he feels well and again with an excellent performance status. He has had no fever, c hills or suggestion of infection or complications thus far. He remains again, with an excellent performance status.       The patient thereafter is asked to have his counts done on an every six weeks basis, now seen three months later with a normal CBC as well as examination. He again feels quite well with a completely normal performance status.       The patient was asked to return for followup studies that included normal serum chemistries, unchanged lipid profile and stable hematologic findings. He is now seen on 2/7/13, 4 months from his last appointment. He continues to do well with  unchanged performance status.       The patient was asked to have counts done q3 months seeing the physician in six months. He is now seen back 08/22/2013 feeling well having [place] with that. He had no additional fevers, sweats, chills, significant change in weight although he has tried to lose some additional pounds and we see this today. He continues to exercise regularl y and dietary program. Review of his smears again continues to demonstrate a complete remission.       Today, the patient was asked to be seen back in 6 months for followup, and is now seen 02/05/2014, continuing to feel well. Again, review of his smears and physical examination fortunately demonstrates no evidence of recurrent disease.       The patient was asked to be seen back in 6 months for followup and is now seen on 07/31/2014. Again he feels well with an excellent performance status and no change since last reviewed.       The patient thereafter was now seen back 2015, 2016 stable at both visits with no evidence of recurrence of his hairy cell leukemia. He feels well but when now reviewed 02/04/2016, he has had some degree of peripheral neuropathy? This improv es with activity and it is certainly not limiting his action or his function.   Patient now reviewed back again September 02, 2016, continues to have an excellent performance status and hematologically remain stable brother had some concern about thrombocytopenia last visit.    The patient is next seen February 02, 2017.  He has not had any medical issues since last seen and remains physically quite well.  He does describe following with urology and undergoing a repeat biopsy recently when his PSA was above 7.  His findings evidently were negative for malignancy though he did have post procedure hematuria.  This is also now abated.  The patient is next seen July 28, 2017.  He continues to have an excellent performance status  and no particular difficulty in day-to-day function and/or pursuing active vacations.      The patient is next seen July 26, 2018.  He had recently returned from a trip to [place].  After experiencing an airplane ride with multiple coughing passengers he himself developed a cold, particularly severe over the last 3-4 days with cough and congestion.  He's had low-grade fever which is now beginning to resolve but is clearly uncomfortable.    The patient's next seen January 10, 2019.  He has continued to travel without issues and is feeling well when reviewed today.  Plans were made for usual follow-up and the patient is next seen July 25, 2019.  His performance status remains excellent though he has lost some weight and indicates he did this per altering his diet.  His stamina and other usual activities are continued unabated.  The patient is next seen January 16, 2020.  He continues an active practice and lifestyle.  He has lost some additional weight on purpose through diet.    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    The patient's follow-up laboratory studies demonstrated by late September degree of leukopenia, unchanged thrombocytopenia and this was followed with repeat analysis within the last several weeks as he seen February 10, 2022. Flow cytometry failed to demonstrate any new process.  As you seen February 10, 2020 his performance status remains excellent, stable weight, appetite, no additional fever, chills, rash, night sweats.    The patient is next evaluated 2/23/2023 with an unchanged performance status for mild thrombocytopenia.  He has had some exposure to viral illnesses through his grandchildren as of late but is otherwise felt fine with a unchanged performance status and work schedule.    He required admission 5/23-27/2023 had return from [place] where  he developed nausea and vomiting and difficulty with keeping p.o.  He had further nausea, vomiting and dizziness and was seen in the emergency department and was found to have a sodium 115.  He was seen by nephrology with concern of hyponatremia related to hydrochlorothiazide along with SIDH, treated with fluid restriction and salt tablets and p.o. Lasix.  His medications were adjusted per renal medicine.  At that point he continued to demonstrate chronic pancytopenia that did not accelerate in any particular way.     Subsequent CBC 6/1/2023 include H&H 11.3 and 32.2 with white count of 2840, platelet count 93,000, ANC of 1610.    The patient is next evaluated 8/3/2020 3 repeat CBC includes a white count of 2250, H&H 11.8 and 33.6, platelet count of 88,000-differential of 44% polys, 52% lymphs, 3% monocytes.    Patient contacted about flow cytometry findings of small population potentially suggestive of recurrence versus secondary process.  Plan to reassess in follow-up visits 9/7/2023.  Please note that if treatment is necessary then repeat treatment with cladribine could be given in a variety of of schedules including 0.15 mg/kg/day over 2 hours x 5 days followed by rituximab or 5.6 mg/m² over 2 hours also for 5 days and also followed by rituximab therapy.    The patient is seen back 9/14/2023.Flow cytometry assessed on 2 visits includes a CD10 positive monoclonal lambda B-cell population representing 0.3% of total events 8/3/2023 and repeat 9/7/2023 demonstrates a similar B-cell population also 0.3% total events-phenotype positive CD10 bright, CD19 bright, CD20 bright, CD25, CD45,   slambda.  Discussed symptoms usually due to splenomegaly, fatigue, weakness, weight loss, bruising, recurrent infections, periodic vasculitis.  He is clear that he is not having any of the symptoms and feels that his overall performance status is as good as its ever been.  We have discussed that additional issues include possible  organomegaly and cytopenias developing with his follow-up CBC in office today including H&H of 12.2 and 33.4, white count of 3250 and platelet count of 92,000.  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       Past Medical History:   Diagnosis Date    BCC (basal cell carcinoma of skin) 06/22/2022    NOSE    Benign prostate hyperplasia     H/O Elevated PSA     H/O Hairy cell leukemia (CMS/HCC) 2012    H/O Internal thrombosed hemorrhoids     H/O minimal right carotid plaque     H/O peripheral neuropathy     Hyperlipidemia     Hypertension     Primary open-angle glaucoma, bilateral, mild stage        ONCOLOGIC HISTORY:  (History from previous dates can be found in the separate document.)    Current Outpatient Medications on File Prior to Visit   Medication Sig Dispense Refill    acyclovir (ZOVIRAX) 400 MG tablet Take 1 tablet by mouth Daily. Take no more than 5 doses a day. 90 tablet 3    amLODIPine (NORVASC) 2.5 MG tablet Take 1 tablet by mouth Daily. 90 tablet 3    bimatoprost (LUMIGAN) 0.03 % ophthalmic drops Administer 1 drop to both eyes Every Night.      bisoprolol (ZEBeta) 5 MG tablet Take 1 tablet by mouth Daily. 90 tablet 3    Calcium-Magnesium-Vitamin D (CALCIUM 1200+D3 PO) Take  by mouth.      chlorhexidine (PERIDEX) 0.12 % solution Rinse with 15 mL by mouth after breakfast and at bedtime for 30 seconds, then spit. No food or drink for 30 minutes after rinse. 473 mL 3    finasteride (PROSCAR) 5 MG tablet Take 1 tablet by mouth Daily. 90 tablet 3    Magnesium 500 MG tablet Take 1 tablet by mouth 2 (two) times a day.      niacin 500 MG tablet Take 1 tablet by mouth Every Night.      propafenone (RYTHMOL) 150 MG tablet Take 1 tablet by mouth 3 (Three) Times a Day. 270 tablet 3    rosuvastatin (CRESTOR) 10 MG tablet Take 1 tablet by mouth every night at bedtime.  90 tablet 2    tamsulosin (FLOMAX) 0.4 MG capsule 24 hr capsule Take 2 capsules by mouth Daily. 180 capsule 3    vitamin C (ASCORBIC ACID) 250 MG tablet Take 4 tablets by mouth Daily. With madisyn hips      vitamin D3 125 MCG (5000 UT) capsule capsule Take 1 capsule by mouth Daily.      acyclovir (ZOVIRAX) 400 MG tablet Take 1 tablet by mouth Daily. Take no more than 5 doses a day.      amLODIPine (NORVASC) 2.5 MG tablet Take 1 tablet by mouth Daily.      amLODIPine (NORVASC) 2.5 MG tablet Take 1 tablet by mouth Daily. 90 tablet 3    bimatoprost (Lumigan) 0.01 % ophthalmic drops Administer 1 drop to both eyes every night at bedtime. 2.5 mL 6    bisoprolol (ZEBeta) 5 MG tablet Take 1 tablet by mouth Daily. 30 tablet 0    bisoprolol (ZEBeta) 5 MG tablet Take 1 tablet by mouth Daily. 30 tablet 3    bisoprolol-hydrochlorothiazide (ZIAC) 5-6.25 MG per tablet Take 1 tablet by mouth 2 (Two) Times a Day. 180 tablet 2    finasteride (PROSCAR) 5 MG tablet       furosemide (LASIX) 40 MG tablet Take 1 tablet by mouth Daily. 30 tablet 0    Lumigan 0.01 % ophthalmic drops Administer 1 drop to both eyes every night at bedtime.      potassium chloride (K-DUR,KLOR-CON) 20 MEQ CR tablet Take 1 tablet by mouth Daily. 30 tablet 0    propafenone (RHTHYMOL) 150 MG tablet Take 1 tablet by mouth 3 (Three) Times a Day.      ramipril (ALTACE) 10 MG capsule Take 1 capsule by mouth Daily. 90 capsule 3    ramipril (ALTACE) 10 MG capsule Take 1 capsule by mouth Daily. 90 capsule 2    rosuvastatin (CRESTOR) 10 MG tablet Take 1 tablet by mouth Daily.      rosuvastatin (CRESTOR) 10 MG tablet Take 1 tablet by mouth every night at bedtime. 90 tablet 3    sodium chloride 1 g tablet Take 1 tablet by mouth 3 (Three) Times a Day With Meals. 90 tablet 0    sodium chloride 1 g tablet Take 1 tablet by mouth 3 (Three) Times a Day with meals. 90 tablet 0    tamsulosin (FLOMAX) 0.4 MG capsule 24 hr capsule       tamsulosin (FLOMAX) 0.4 MG capsule 24 hr capsule  "Take 1 capsule by mouth every night at bedtime. 30 capsule 3    tamsulosin (FLOMAX) 0.4 MG capsule 24 hr capsule Take 1 capsule by mouth every night at bedtime. 90 capsule 3     No current facility-administered medications on file prior to visit.       ALLERGIES:   No Known Allergies    Social History     Socioeconomic History    Marital status:      Spouse name: Hanna   Tobacco Use    Smoking status: Former     Packs/day: 0.50     Types: Cigarettes    Smokeless tobacco: Never    Tobacco comments:     Quit smoking many years ago.   Vaping Use    Vaping Use: Never used   Substance and Sexual Activity    Alcohol use: Yes     Comment: 1 glass of wine or beer per day    Drug use: No    Sexual activity: Defer         Cancer-related family history includes Cancer in his father; Cancer (age of onset: 90) in his mother.      Review of Systems   Constitutional:  Negative for fatigue.   HENT: Negative.     Eyes: Negative.    Respiratory:  Negative for chest tightness, shortness of breath and wheezing.    Gastrointestinal:  Negative for constipation, diarrhea, nausea and vomiting.   Endocrine: Negative.    Musculoskeletal: Negative.    Skin: Negative.    Neurological: Negative.  Negative for weakness.      Vitals:    09/14/23 1514   BP: 178/95   Pulse: 64   Resp: 16   Temp: 98.7 °F (37.1 °C)   TempSrc: Temporal   SpO2: 99%   Weight: 73.1 kg (161 lb 3.2 oz)   Height: 177 cm (69.69\")   PainSc: 0-No pain         9/14/2023     3:17 PM   Current Status   ECOG score 0       Physical Exam      GENERAL: Well-developed, well-nourished male in no acute distress.   SKIN: Warm, dry, without new rash or lesion  HEAD: Normocephalic.   EYES: Pupils equal, round and reactive to light, EOMs intact. Conjunctivae normal.   EARS: Hearing intact.   NOSE: Septum midline. No excoriations or nasal discharge.   MOUTH: Tongue is well-papillated; no stomatitis or ulcers. Lips normal.  dry mucous membranes, clear sinus drainage noted  THROAT: " Oropharynx without lesions or exudates.   NECK: Supple with good range of motion; no thyromegaly or masses, no JVD or bruits.   LYMPHATICS: No cervical, supraclavicular, axillary or inguinal adenopathy.   CHEST: Lungs clear to percussion and auscultation.   CARDIAC: Regular rate and rhythm without murmurs, rubs or gallops.   ABDOMEN: Soft, nontender with no evidence hepatosplenomegaly or masses  EXTREMITIES: No clubbing, cyanosis or edema.   NEUROLOGICAL: No focal neurological deficits.       RECENT LABS:  Hematology WBC   Date Value Ref Range Status   09/14/2023 3.25 (L) 3.40 - 10.80 10*3/mm3 Final     RBC   Date Value Ref Range Status   09/14/2023 3.43 (L) 4.14 - 5.80 10*6/mm3 Final     Hemoglobin   Date Value Ref Range Status   09/14/2023 12.2 (L) 13.0 - 17.7 g/dL Final     Hematocrit   Date Value Ref Range Status   09/14/2023 33.4 (L) 37.5 - 51.0 % Final     Platelets   Date Value Ref Range Status   09/14/2023 92 (L) 140 - 450 10*3/mm3 Final        Assessment & Plan        A 77-year-old male with the diagnosis of hairy cell leukemia in April 2012. He was treated 5/11-5/17/ 12 with cladribine being given as continuous infusion at 0.1 mg/kg or 7.7 mg/24-hours x7 days. He did well during hospitalization with little toxicity and minimal myelosuppression. He has gone onto obtain a complete remission and this continues to be th e case at a 6 month visit now 07/31/2014. The patient has been now assessed on a regular basis every 6 months with no evidence of hematologic deterioration. As he is seen today, 02/04/2016, and now again September 02, 2016 he remains clinically stable as well.      He is now assessed February 2 and overall doing well without any additional issues except for the prostate described as above.  He is now recognized there is also possibly hypertensive and I'll contact him about this is afternoon though during our conversation he had few if any symptoms.?.  Pending our review will ask him to have a  repeat CBC at 3 months to see that physician in 6 months.    The patient indicates when called about his blood pressure that he often experiences white coat hypertension.  He is going to recheck his blood pressure night and notify me if it remains elevated.     He is next seen July 28, 2017, hematologically stable, remaining quite active.      As he is again reviewed July 2018 he has had recent viral exposure and is slowly working through a cold.  This appears to have suppressed his marrow very modestly though review of the smear shows no evidence of hairy cells though there are atypical lymphocytes thought to be virally activated.  At this point we'll have a follow-up CBC in 4 weeks and have him see the physician again in 6 months .    The patient is next seen January 10, 2019 doing well with no hematologic deterioration.  Plan to see back in 6 months for follow-up.  The patient is next seen July 25, 2019.  He does continue to do extremely well and we discussed his weight loss which appears to be dietarily produced.  His exam is not indicative of progressive disease or new disorder developing.  We have agreed to have him reassessed in 6-month follow-ups.  As he is seen back January 16, 2020 there are no findings of concern though he has lost weight purposely.  It is felt that we can reduce his visits for MD assessment but continue every 6 month CBCs.  This remains the case as he is assessed January 21, 2021.     The patient's follow-up laboratory studies demonstrated by late September 2021, a degree of leukopenia, unchanged thrombocytopenia and this was followed with repeat analysis within the last several weeks as he seen February 10, 2022. Flow cytometry failed to demonstrate any new process.  As you seen February 10, 2020 his performance status remains excellent, stable weight, appetite, no additional fever, chills, rash, night sweats.  As he is reexamined including peripheral blood smears 2/10/2022 there is no  suggestion of additional lymphadenopathy hepatosplenomegaly.  We have discussed a fourth COVID 19 vaccination considering his history and will clarify his status by COVID 19 antibodies in office today.    Patient's subsequent exams did demonstrate responsiveness to COVID-19 vaccinations.  He is now reviewed at 6 and 12 months and with some degree of mild thrombocytopenia noted 2/23/2023 after recent viral exposures.  Peripheral smear does not demonstrate any suggestion of relapse though there are activated lymphocytes noted.    He required admission 5/23-27/2023 had return from [place] where he developed nausea and vomiting and difficulty with keeping p.o.  He had further nausea, vomiting and dizziness and was seen in the emergency department and was found to have a sodium 115.  He was seen by nephrology with concern of hyponatremia related to hydrochlorothiazide along with SIDH, treated with fluid restriction and salt tablets and p.o. Lasix.  His medications were adjusted per renal medicine.  At that point he continued to demonstrate chronic pancytopenia that did not accelerate in any particular way.     Subsequent CBC 6/1/2023 include H&H 11.3 and 32.2 with white count of 2840, platelet count 93,000, ANC of 1610.    The patient is next evaluated 8/3/2020 3 repeat CBC includes a white count of 2250, H&H 11.8 and 33.6, platelet count of 88,000-differential of 44% polys, 52% lymphs, 3% monocytes.    Patient contacted about flow cytometry findings of small population potentially suggestive of recurrence versus secondary process.  Plan to reassess in follow-up visits 9/7/2023.  Please note that if treatment is necessary then repeat treatment with cladribine could be given in a variety of of schedules including 0.15 mg/kg/day over 2 hours x 5 days followed by rituximab or 5.6 mg/m² over 2 hours also for 5 days and also followed by rituximab therapy.    The patient is seen back 9/14/2023.Flow cytometry assessed on 2  visits includes a CD10 positive monoclonal lambda B-cell population representing 0.3% of total events 8/3/2023 and repeat 9/7/2023 demonstrates a similar B-cell population also 0.3% total events-phenotype positive CD10 bright, CD19 bright, CD20 bright, CD25, CD45,   slambda.  Discussed symptoms usually due to splenomegaly, fatigue, weakness, weight loss, bruising, recurrent infections, periodic vasculitis.  He is clear that he is not having any of the symptoms and feels that his overall performance status is as good as its ever been.  We have discussed that additional issues include possible organomegaly and cytopenias developing with his follow-up CBC in office today including H&H of 12.2 and 33.4, white count of 3250 and platelet count of 92,000.  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           Plan:    *Every 2 weeks CBC    *As he returns will obtain hepatitis B surface antigen, hepatitis B surface antibody and hepatitis B core antibody should we need to initiate therapy soon.    *We discussed the various regimens including 5-day cladribine in office followed by rituximab therapy    *4 weeks CT chest, abdomen, pelvis    *Consider repeat marrow    *6 weeks MD CBC.2  I spent 55 minutes caring for Angel on this date of service. This time includes time spent by me in the following activities: preparing for the visit, reviewing tests, obtaining and/or reviewing a separately obtained history, performing a medically appropriate examination and/or evaluation, counseling and educating the patient/family/caregiver, ordering medications, tests, or procedures, referring and communicating with other health care professionals, documenting information in the medical record, independently interpreting results and communicating that information with the  patient/family/caregiver, and care coordination.

## 2023-09-14 ENCOUNTER — LAB (OUTPATIENT)
Dept: LAB | Facility: HOSPITAL | Age: 78
End: 2023-09-14
Payer: MEDICARE

## 2023-09-14 ENCOUNTER — OFFICE VISIT (OUTPATIENT)
Dept: ONCOLOGY | Facility: CLINIC | Age: 78
End: 2023-09-14
Payer: MEDICARE

## 2023-09-14 VITALS
RESPIRATION RATE: 16 BRPM | DIASTOLIC BLOOD PRESSURE: 95 MMHG | HEART RATE: 64 BPM | BODY MASS INDEX: 23.08 KG/M2 | OXYGEN SATURATION: 99 % | SYSTOLIC BLOOD PRESSURE: 178 MMHG | WEIGHT: 161.2 LBS | TEMPERATURE: 98.7 F | HEIGHT: 70 IN

## 2023-09-14 DIAGNOSIS — C91.41 HAIRY CELL LEUKEMIA, IN REMISSION: Primary | ICD-10-CM

## 2023-09-14 DIAGNOSIS — Z11.59 ENCOUNTER FOR SCREENING FOR OTHER VIRAL DISEASES: ICD-10-CM

## 2023-09-14 DIAGNOSIS — Z79.899 LONG-TERM USE OF HIGH-RISK MEDICATION: ICD-10-CM

## 2023-09-14 DIAGNOSIS — C91.41 HAIRY CELL LEUKEMIA, IN REMISSION: ICD-10-CM

## 2023-09-14 DIAGNOSIS — C91.42 HAIRY CELL LEUKEMIA, IN RELAPSE: ICD-10-CM

## 2023-09-14 LAB
BASOPHILS # BLD AUTO: 0.05 10*3/MM3 (ref 0–0.2)
BASOPHILS NFR BLD AUTO: 1.5 % (ref 0–1.5)
DEPRECATED RDW RBC AUTO: 45.3 FL (ref 37–54)
EOSINOPHIL # BLD AUTO: 0.03 10*3/MM3 (ref 0–0.4)
EOSINOPHIL NFR BLD AUTO: 0.9 % (ref 0.3–6.2)
ERYTHROCYTE [DISTWIDTH] IN BLOOD BY AUTOMATED COUNT: 12.8 % (ref 12.3–15.4)
HCT VFR BLD AUTO: 33.4 % (ref 37.5–51)
HGB BLD-MCNC: 12.2 G/DL (ref 13–17.7)
IMM GRANULOCYTES # BLD AUTO: 0.42 10*3/MM3 (ref 0–0.05)
IMM GRANULOCYTES NFR BLD AUTO: 12.9 % (ref 0–0.5)
LYMPHOCYTES # BLD AUTO: 1.33 10*3/MM3 (ref 0.7–3.1)
LYMPHOCYTES NFR BLD AUTO: 40.9 % (ref 19.6–45.3)
MCH RBC QN AUTO: 35.6 PG (ref 26.6–33)
MCHC RBC AUTO-ENTMCNC: 36.5 G/DL (ref 31.5–35.7)
MCV RBC AUTO: 97.4 FL (ref 79–97)
MONOCYTES # BLD AUTO: 0.09 10*3/MM3 (ref 0.1–0.9)
MONOCYTES NFR BLD AUTO: 2.8 % (ref 5–12)
NEUTROPHILS NFR BLD AUTO: 1.33 10*3/MM3 (ref 1.7–7)
NEUTROPHILS NFR BLD AUTO: 41 % (ref 42.7–76)
NRBC BLD AUTO-RTO: 0 /100 WBC (ref 0–0.2)
PLATELET # BLD AUTO: 92 10*3/MM3 (ref 140–450)
PMV BLD AUTO: 10.8 FL (ref 6–12)
RBC # BLD AUTO: 3.43 10*6/MM3 (ref 4.14–5.8)
WBC NRBC COR # BLD: 3.25 10*3/MM3 (ref 3.4–10.8)

## 2023-09-14 PROCEDURE — 36415 COLL VENOUS BLD VENIPUNCTURE: CPT

## 2023-09-14 PROCEDURE — 85025 COMPLETE CBC W/AUTO DIFF WBC: CPT

## 2023-09-14 NOTE — LETTER
September 14, 2023     Damián Vuong Jr., MD  [address]  [address] [address]    Patient: Angel Remy   YOB: 1945   Date of Visit: 9/14/2023     Dear Damián Vuong Jr., MD:       Thank you for referring Angel Remy to me for evaluation. Below are the relevant portions of my assessment and plan of care.    If you have questions, please do not hesitate to call me. I look forward to following Angel along with you.         Sincerely,        Paco Jeffers MD        CC: No Recipients    Paco Jeffers MD  09/14/23 2111  Sign when Signing Visit    Subjective Patient, again, with excellent performance status, no additional symptoms    REASONS FOR FOLLOWUP:   Hairy cell leukemia.   Admission 05/11/2012 through 05/17/12 for 7 day continuous infusion cladribine (2-CdA) without complication.   Patient seen 5 months post treatment in complete remission.   Patient seen 8 months post treatment with continued complete remission, every 3 month reassessment per CBC planned, 6 month review by MD scheduled.   Patient seen at 15 months without evidence of recurrent disease, every 6 month followup planned.   The patient was seen 02/05/2014 with no evidence of recurrent disease, 6-month followup planned.   Patient seen 07/31/2014, stable with no evidence of recurrent disease, every 6 month followup.   Patient seen 01/15/2015, stable without recurrent disease, 6-month CBC planned, yearly followup scheduled.   Patient seen 02/04/2016, stable, ?early peripheral neuropathy developing distally per feet, no evidence for recurrent disease per hairy cell leukemia.  Patient reviewed September 02, 2016, previous July CBC with mild thrombocytopenia developing, recheck September 2 normalized, every 6 month follow-ups anticipated  Patient reviewed February 02, 2017, status post negative prostate biopsy, mild thrombocytopenia again recognized  Patient reviewed July 20, 2017, hematologically  stable, every 6 month follow-up CBCs planned, yearly M.D. Assessment  Patient seen July 26, 2018 after recent airline travel any cold symptoms  Patient reviewed January 10, 2019, no additional symptoms, hematologically stable  Patient reviewed July 25, 2019, stable, six-month follow-up as planned  Patient viewed January 16, 2020, stable, every 6 months CBC, MD assessment yearly planned  Reassessment January 21, 2021, no evidence of recurrence disease  Patient assessed 2/10/2022, evidence of continued remission noted both on clinical examination, peripheral blood smear review and flow cytometric evaluation.  Patient review 2/23/2023 mild thrombocytopenia, no new abnormalities per peripheral smear, close for follow-up planned  Patient assessed 9/14/2023, concern for early relapse.       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 He is usually followed by Dr. Vuong for a history of hypertension, hyperlipidemia, minimal right carotid plaque as well as elevated PSA.       He had exams done on 1-09-12 for routine followup. This included normal serum chemistry including LFTs, cholesterol 131, triglyceride 34, HDL 51, and LDL 73. CBC revealed H&H 13.3, 37.9%, WBC 3300, platelet count 110,000, 26% polys and 74% lymphs with A NC of 0.9.       Dr. Remy provides information when initially seen with studies from as far back as 1- at which time hemoglobin and hematocrit was 14.4 and 42.1%, WBC 6950, platelets 227,000 with 47% polys, 42% lymph; this was automated. The additional test includ es approximately every year to every other year thereafter though it was noted in January 2010 WBC dropped from an average of approximately 5,000-6,000 to 3600. Hemoglobin and hematocrit 13.6 and 39.7%. Platelets 150,000-170,000 with ANC beginning in Ja nuary 2010 at 1900.       We were contacted per the above findings and  asked Dr. Remy to undergo further assessment including flow cytometric exam per peripheral blood. This revealed no evidence of abnormal myeloid maturation increased blast population. No evidence of lymphopro l iferative disorder. The patient was asked to be seen formally in the office now and comes in today for further assessment. Dr. Remy indicates that he has taken a number of medications in an attempt to improve his health. Several homeopathic medication s include vitamin C, folate, flaxseed oil, fish oil, vitamin E, selenium, pomegranate tabs, Co-enzyme Q, vitamin D, niacin, and red yeast rice daily. He has discontinued these and stays on those described below.       He feels well with no additional problem s and states that he has no little or no symptoms that he can detail, whether this is just ill health in anyway, though he does have occasional hemorrhoidal pain. He also notes rare palpitations and occasional epistaxis when the ambient air has dried sub stantially. No fevers, chills, weight loss, night sweats, or other constitutional symptoms.       As a result of the above, the patient was asked to undergo a series of studies. This went onto include rheumatoid factor of 6, MARYURI screen negative, negative s misael protein electrophoresis, normal quantitative immunoglobulins, CMV IgG of 2.9, IgM less than .9, EBV IgM of less than .9 and BCA IgG antibody of 4.6. These are consistent with previous exposures. HIV was negative, CRSP less than .1, sedimentation ra t e of 4, iron 125, TIBC 297, 14% saturation, and ferritin of 77. Peripheral blood flow cytometry was negative for evidence of abnormal myeloid maturation, increased blast population or lymphoproliferative disorder. As the patient is seen back today, he i s continuing to feel well though is obviously greatly concerned about his followup counts. Reviews in the office had been planned to follow him briefly recheck performance 3/29/12 with an H&H of 12.9,  36.1, WBC 3900, 32 polys, 62 lymphs, platelet count 94 , 000, IPF slightly elevated at 8.8. The patient was therefore asked to return and to be further assessed with bone marrow aspirate and biopsy. He now presents to do so. He has had no additional symptoms since last seen. He has discontinued his Crestor use.       As a result of his review the patient underwent bone marrow aspiration and biopsy. This revealed evidence of lymphocytic infiltrate reviewed here in the office. 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 Eventually cytogenetics was found to be negative with no evidence of cyclin-D1/IgH or BCL-2/IgH rearrangement. Additional tissue was requested. As a result, the patient was notifi e edward of these findings by personal visit, and plans were made to proceed with endoscopy, ? mantle cell involvement. At the time of this dictation, this was not yet performed. He was also asked to undergo CT of the neck, chest, abdomen and pelvis which show ed no evidence of abnormality. PET scan 04/13/12 showed increased activity within the marrow, but no additional abnormalities including the spleen, with no background bowel or gastric activity as well.       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 hairy cell leukemia. This has been discussed in great detail with the alexander thakur at [facility] and additional stains are pending as is the patient's GI assessment now to be pursued through Dr. Ray.       His case was again reviewed over quite a period of time, and ultimately his marrow was signed out as 80% involved with hairy cell leukemia - normal cytogenetics, no evidence of cyclin D1/IgH or BCL-2/IgH rearrangement.   Jonel was advised of the treatment options which include followup with no immediate therapy, and/or the use of cladribine or pentostatin. After numerous di s cussion, he ultimately elected to try to proceed with treatment when he is feeling as well as he is to improve his ability to withstand the therapy itself. We therefore began to discuss the treatment schedule, which also could be somewhat variable, inclu d ing 7-day infusions, 2-hr infusion q.d. x five days or every other week infusion. After discussion he wishes to proceed with 7-day infusion. When we attempted to do this as an outpatient we found out thereafter that this would not be covered as an outpa tient infusion through his insurance. Therefore we have made plans for him to be admitted after he is seen in office 05/10/12.       The patient was thereafter admitted 05/11-05/17/12. PICC line was placed and he initiated treatment at 0.1 mg/kg or 7.7 mg IV in fusion over seven days. The patient fortunately had no serious issues at all during the seven days and continued to work out on a daily basis. He was seen by his internist as well with some of his meds adjusted including his HCTZ and Ziac. He was stabl e at discharge, but was given Neulasta 6 mg subcutaneously 24 hours after he completed treatment. He has been having counts done on a regular basis, and returns back today with only minimal evidence of neutropenia at his becca, and has an excellent periph eral smear today - normal findings. His performance status remains excellent as well.       The patient when seen on 6/7/12 was felt to have improved substantially. We followed him for weekly counts and plans at that point were for him to take a trip out of the country. He did actually take this trip, which had him eventually hiking at 11-12,000 feet without any complication or ill effect. As he returns back today on 7/19 he feels well and again with an excellent performance status. He has had no fever,  lucina alexandra or suggestion of infection or complications thus far. He remains again, with an excellent performance status.       The patient thereafter is asked to have his counts done on an every six weeks basis, now seen three months later with a normal CBC as well as examination. He again feels quite well with a completely normal performance status.       The patient was asked to return for followup studies that included normal serum chemistries, unchanged lipid profile and stable hematologic findings. He is now seen on 2/7/13, 4 months from his last appointment. He continues to do well with unchanged performance status.       The patient was asked to have counts done q3 months seeing the physician in six months. He is now seen back 08/22/2013 feeling well having [place] with that. He had no additional fevers, sweats, chills, significant change in weight although he has tried to lose some additional pounds and we see this today. He continues to exercise regularl y and dietary program. Review of his smears again continues to demonstrate a complete remission.       Today, the patient was asked to be seen back in 6 months for followup, and is now seen 02/05/2014, continuing to feel well. Again, review of his smears and physical examination fortunately demonstrates no evidence of recurrent disease.       The patient was asked to be seen back in 6 months for followup and is now seen on 07/31/2014. Again he feels well with an excellent performance status and no change since last reviewed.       The patient thereafter was now seen back 2015, 2016 stable at both visits with no evidence of recurrence of his hairy cell leukemia. He feels well but when now reviewed 02/04/2016, he has had some degree of peripheral neuropathy? This improv es with activity and it is certainly not limiting his action or his function.   Patient now reviewed back again September 02, 2016,  continues to have an excellent performance status and hematologically remain stable brother had some concern about thrombocytopenia last visit.    The patient is next seen February 02, 2017.  He has not had any medical issues since last seen and remains physically quite well.  He does describe following with urology and undergoing a repeat biopsy recently when his PSA was above 7.  His findings evidently were negative for malignancy though he did have post procedure hematuria.  This is also now abated.  The patient is next seen July 28, 2017.  He continues to have an excellent performance status and no particular difficulty in day-to-day function and/or pursuing active vacations.      The patient is next seen July 26, 2018.  He had recently returned from a trip to [place].  After experiencing an airplane ride with multiple coughing passengers he himself developed a cold, particularly severe over the last 3-4 days with cough and congestion.  He's had low-grade fever which is now beginning to resolve but is clearly uncomfortable.    The patient's next seen January 10, 2019.  He has continued to travel without issues and is feeling well when reviewed today.  Plans were made for usual follow-up and the patient is next seen July 25, 2019.  His performance status remains excellent though he has lost some weight and indicates he did this per altering his diet.  His stamina and other usual activities are continued unabated.  The patient is next seen January 16, 2020.  He continues an active practice and lifestyle.  He has lost some additional weight on purpose through diet.    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    The patient's follow-up laboratory studies demonstrated by late September degree of leukopenia, unchanged thrombocytopenia and this was followed with repeat  analysis within the last several weeks as he seen February 10, 2022. Flow cytometry failed to demonstrate any new process.  As you seen February 10, 2020 his performance status remains excellent, stable weight, appetite, no additional fever, chills, rash, night sweats.    The patient is next evaluated 2/23/2023 with an unchanged performance status for mild thrombocytopenia.  He has had some exposure to viral illnesses through his grandchildren as of late but is otherwise felt fine with a unchanged performance status and work schedule.    He required admission 5/23-27/2023 had return from [place] where he developed nausea and vomiting and difficulty with keeping p.o.  He had further nausea, vomiting and dizziness and was seen in the emergency department and was found to have a sodium 115.  He was seen by nephrology with concern of hyponatremia related to hydrochlorothiazide along with SIDH, treated with fluid restriction and salt tablets and p.o. Lasix.  His medications were adjusted per renal medicine.  At that point he continued to demonstrate chronic pancytopenia that did not accelerate in any particular way.     Subsequent CBC 6/1/2023 include H&H 11.3 and 32.2 with white count of 2840, platelet count 93,000, ANC of 1610.    The patient is next evaluated 8/3/2020 3 repeat CBC includes a white count of 2250, H&H 11.8 and 33.6, platelet count of 88,000-differential of 44% polys, 52% lymphs, 3% monocytes.    Patient contacted about flow cytometry findings of small population potentially suggestive of recurrence versus secondary process.  Plan to reassess in follow-up visits 9/7/2023.  Please note that if treatment is necessary then repeat treatment with cladribine could be given in a variety of of schedules including 0.15 mg/kg/day over 2 hours x 5 days followed by rituximab or 5.6 mg/m² over 2 hours also for 5 days and also followed by rituximab therapy.    The patient is seen back 9/14/2023.Flow cytometry assessed  on 2 visits includes a CD10 positive monoclonal lambda B-cell population representing 0.3% of total events 8/3/2023 and repeat 9/7/2023 demonstrates a similar B-cell population also 0.3% total events-phenotype positive CD10 bright, CD19 bright, CD20 bright, CD25, CD45,   slambda.  Discussed symptoms usually due to splenomegaly, fatigue, weakness, weight loss, bruising, recurrent infections, periodic vasculitis.  He is clear that he is not having any of the symptoms and feels that his overall performance status is as good as its ever been.  We have discussed that additional issues include possible organomegaly and cytopenias developing with his follow-up CBC in office today including H&H of 12.2 and 33.4, white count of 3250 and platelet count of 92,000.  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       Past Medical History:   Diagnosis Date   • BCC (basal cell carcinoma of skin) 06/22/2022    NOSE   • Benign prostate hyperplasia    • H/O Elevated PSA    • H/O Hairy cell leukemia (CMS/HCC) 2012   • H/O Internal thrombosed hemorrhoids    • H/O minimal right carotid plaque    • H/O peripheral neuropathy    • Hyperlipidemia    • Hypertension    • Primary open-angle glaucoma, bilateral, mild stage        ONCOLOGIC HISTORY:  (History from previous dates can be found in the separate document.)    Current Outpatient Medications on File Prior to Visit   Medication Sig Dispense Refill   • acyclovir (ZOVIRAX) 400 MG tablet Take 1 tablet by mouth Daily. Take no more than 5 doses a day. 90 tablet 3   • amLODIPine (NORVASC) 2.5 MG tablet Take 1 tablet by mouth Daily. 90 tablet 3   • bimatoprost (LUMIGAN) 0.03 % ophthalmic drops Administer 1 drop to both eyes Every Night.     • bisoprolol (ZEBeta) 5 MG tablet Take 1 tablet by mouth Daily. 90 tablet 3   •  Calcium-Magnesium-Vitamin D (CALCIUM 1200+D3 PO) Take  by mouth.     • chlorhexidine (PERIDEX) 0.12 % solution Rinse with 15 mL by mouth after breakfast and at bedtime for 30 seconds, then spit. No food or drink for 30 minutes after rinse. 473 mL 3   • finasteride (PROSCAR) 5 MG tablet Take 1 tablet by mouth Daily. 90 tablet 3   • Magnesium 500 MG tablet Take 1 tablet by mouth 2 (two) times a day.     • niacin 500 MG tablet Take 1 tablet by mouth Every Night.     • propafenone (RYTHMOL) 150 MG tablet Take 1 tablet by mouth 3 (Three) Times a Day. 270 tablet 3   • rosuvastatin (CRESTOR) 10 MG tablet Take 1 tablet by mouth every night at bedtime. 90 tablet 2   • tamsulosin (FLOMAX) 0.4 MG capsule 24 hr capsule Take 2 capsules by mouth Daily. 180 capsule 3   • vitamin C (ASCORBIC ACID) 250 MG tablet Take 4 tablets by mouth Daily. With madisyn hips     • vitamin D3 125 MCG (5000 UT) capsule capsule Take 1 capsule by mouth Daily.     • acyclovir (ZOVIRAX) 400 MG tablet Take 1 tablet by mouth Daily. Take no more than 5 doses a day.     • amLODIPine (NORVASC) 2.5 MG tablet Take 1 tablet by mouth Daily.     • amLODIPine (NORVASC) 2.5 MG tablet Take 1 tablet by mouth Daily. 90 tablet 3   • bimatoprost (Lumigan) 0.01 % ophthalmic drops Administer 1 drop to both eyes every night at bedtime. 2.5 mL 6   • bisoprolol (ZEBeta) 5 MG tablet Take 1 tablet by mouth Daily. 30 tablet 0   • bisoprolol (ZEBeta) 5 MG tablet Take 1 tablet by mouth Daily. 30 tablet 3   • bisoprolol-hydrochlorothiazide (ZIAC) 5-6.25 MG per tablet Take 1 tablet by mouth 2 (Two) Times a Day. 180 tablet 2   • finasteride (PROSCAR) 5 MG tablet      • furosemide (LASIX) 40 MG tablet Take 1 tablet by mouth Daily. 30 tablet 0   • Lumigan 0.01 % ophthalmic drops Administer 1 drop to both eyes every night at bedtime.     • potassium chloride (K-DUR,KLOR-CON) 20 MEQ CR tablet Take 1 tablet by mouth Daily. 30 tablet 0   • propafenone (RHTHYMOL) 150 MG tablet Take 1 tablet  by mouth 3 (Three) Times a Day.     • ramipril (ALTACE) 10 MG capsule Take 1 capsule by mouth Daily. 90 capsule 3   • ramipril (ALTACE) 10 MG capsule Take 1 capsule by mouth Daily. 90 capsule 2   • rosuvastatin (CRESTOR) 10 MG tablet Take 1 tablet by mouth Daily.     • rosuvastatin (CRESTOR) 10 MG tablet Take 1 tablet by mouth every night at bedtime. 90 tablet 3   • sodium chloride 1 g tablet Take 1 tablet by mouth 3 (Three) Times a Day With Meals. 90 tablet 0   • sodium chloride 1 g tablet Take 1 tablet by mouth 3 (Three) Times a Day with meals. 90 tablet 0   • tamsulosin (FLOMAX) 0.4 MG capsule 24 hr capsule      • tamsulosin (FLOMAX) 0.4 MG capsule 24 hr capsule Take 1 capsule by mouth every night at bedtime. 30 capsule 3   • tamsulosin (FLOMAX) 0.4 MG capsule 24 hr capsule Take 1 capsule by mouth every night at bedtime. 90 capsule 3     No current facility-administered medications on file prior to visit.       ALLERGIES:   No Known Allergies    Social History     Socioeconomic History   • Marital status:      Spouse name: Hanna   Tobacco Use   • Smoking status: Former     Packs/day: 0.50     Types: Cigarettes   • Smokeless tobacco: Never   • Tobacco comments:     Quit smoking many years ago.   Vaping Use   • Vaping Use: Never used   Substance and Sexual Activity   • Alcohol use: Yes     Comment: 1 glass of wine or beer per day   • Drug use: No   • Sexual activity: Defer         Cancer-related family history includes Cancer in his father; Cancer (age of onset: 90) in his mother.      Review of Systems   Constitutional:  Negative for fatigue.   HENT: Negative.     Eyes: Negative.    Respiratory:  Negative for chest tightness, shortness of breath and wheezing.    Gastrointestinal:  Negative for constipation, diarrhea, nausea and vomiting.   Endocrine: Negative.    Musculoskeletal: Negative.    Skin: Negative.    Neurological: Negative.  Negative for weakness.      Vitals:    09/14/23 1514   BP: 178/95  "  Pulse: 64   Resp: 16   Temp: 98.7 °F (37.1 °C)   TempSrc: Temporal   SpO2: 99%   Weight: 73.1 kg (161 lb 3.2 oz)   Height: 177 cm (69.69\")   PainSc: 0-No pain         9/14/2023     3:17 PM   Current Status   ECOG score 0       Physical Exam      GENERAL: Well-developed, well-nourished male in no acute distress.   SKIN: Warm, dry, without new rash or lesion  HEAD: Normocephalic.   EYES: Pupils equal, round and reactive to light, EOMs intact. Conjunctivae normal.   EARS: Hearing intact.   NOSE: Septum midline. No excoriations or nasal discharge.   MOUTH: Tongue is well-papillated; no stomatitis or ulcers. Lips normal.  dry mucous membranes, clear sinus drainage noted  THROAT: Oropharynx without lesions or exudates.   NECK: Supple with good range of motion; no thyromegaly or masses, no JVD or bruits.   LYMPHATICS: No cervical, supraclavicular, axillary or inguinal adenopathy.   CHEST: Lungs clear to percussion and auscultation.   CARDIAC: Regular rate and rhythm without murmurs, rubs or gallops.   ABDOMEN: Soft, nontender with no evidence hepatosplenomegaly or masses  EXTREMITIES: No clubbing, cyanosis or edema.   NEUROLOGICAL: No focal neurological deficits.       RECENT LABS:  Hematology WBC   Date Value Ref Range Status   09/14/2023 3.25 (L) 3.40 - 10.80 10*3/mm3 Final     RBC   Date Value Ref Range Status   09/14/2023 3.43 (L) 4.14 - 5.80 10*6/mm3 Final     Hemoglobin   Date Value Ref Range Status   09/14/2023 12.2 (L) 13.0 - 17.7 g/dL Final     Hematocrit   Date Value Ref Range Status   09/14/2023 33.4 (L) 37.5 - 51.0 % Final     Platelets   Date Value Ref Range Status   09/14/2023 92 (L) 140 - 450 10*3/mm3 Final        Assessment & Plan        A 77-year-old male with the diagnosis of hairy cell leukemia in April 2012. He was treated 5/11-5/17/ 12 with cladribine being given as continuous infusion at 0.1 mg/kg or 7.7 mg/24-hours x7 days. He did well during hospitalization with little toxicity and minimal " myelosuppression. He has gone onto obtain a complete remission and this continues to be th e case at a 6 month visit now 07/31/2014. The patient has been now assessed on a regular basis every 6 months with no evidence of hematologic deterioration. As he is seen today, 02/04/2016, and now again September 02, 2016 he remains clinically stable as well.      He is now assessed February 2 and overall doing well without any additional issues except for the prostate described as above.  He is now recognized there is also possibly hypertensive and I'll contact him about this is afternoon though during our conversation he had few if any symptoms.?.  Pending our review will ask him to have a repeat CBC at 3 months to see that physician in 6 months.    The patient indicates when called about his blood pressure that he often experiences white coat hypertension.  He is going to recheck his blood pressure night and notify me if it remains elevated.     He is next seen July 28, 2017, hematologically stable, remaining quite active.      As he is again reviewed July 2018 he has had recent viral exposure and is slowly working through a cold.  This appears to have suppressed his marrow very modestly though review of the smear shows no evidence of hairy cells though there are atypical lymphocytes thought to be virally activated.  At this point we'll have a follow-up CBC in 4 weeks and have him see the physician again in 6 months .    The patient is next seen January 10, 2019 doing well with no hematologic deterioration.  Plan to see back in 6 months for follow-up.  The patient is next seen July 25, 2019.  He does continue to do extremely well and we discussed his weight loss which appears to be dietarily produced.  His exam is not indicative of progressive disease or new disorder developing.  We have agreed to have him reassessed in 6-month follow-ups.  As he is seen back January 16, 2020 there are no findings of concern though he has  lost weight purposely.  It is felt that we can reduce his visits for MD assessment but continue every 6 month CBCs.  This remains the case as he is assessed January 21, 2021.     The patient's follow-up laboratory studies demonstrated by late September 2021, a degree of leukopenia, unchanged thrombocytopenia and this was followed with repeat analysis within the last several weeks as he seen February 10, 2022. Flow cytometry failed to demonstrate any new process.  As you seen February 10, 2020 his performance status remains excellent, stable weight, appetite, no additional fever, chills, rash, night sweats.  As he is reexamined including peripheral blood smears 2/10/2022 there is no suggestion of additional lymphadenopathy hepatosplenomegaly.  We have discussed a fourth COVID 19 vaccination considering his history and will clarify his status by COVID 19 antibodies in office today.    Patient's subsequent exams did demonstrate responsiveness to COVID-19 vaccinations.  He is now reviewed at 6 and 12 months and with some degree of mild thrombocytopenia noted 2/23/2023 after recent viral exposures.  Peripheral smear does not demonstrate any suggestion of relapse though there are activated lymphocytes noted.    He required admission 5/23-27/2023 had return from [place] where he developed nausea and vomiting and difficulty with keeping p.o.  He had further nausea, vomiting and dizziness and was seen in the emergency department and was found to have a sodium 115.  He was seen by nephrology with concern of hyponatremia related to hydrochlorothiazide along with SIDH, treated with fluid restriction and salt tablets and p.o. Lasix.  His medications were adjusted per renal medicine.  At that point he continued to demonstrate chronic pancytopenia that did not accelerate in any particular way.     Subsequent CBC 6/1/2023 include H&H 11.3 and 32.2 with white count of 2840, platelet count 93,000, ANC of 1610.    The patient is next  evaluated 8/3/2020 3 repeat CBC includes a white count of 2250, H&H 11.8 and 33.6, platelet count of 88,000-differential of 44% polys, 52% lymphs, 3% monocytes.    Patient contacted about flow cytometry findings of small population potentially suggestive of recurrence versus secondary process.  Plan to reassess in follow-up visits 9/7/2023.  Please note that if treatment is necessary then repeat treatment with cladribine could be given in a variety of of schedules including 0.15 mg/kg/day over 2 hours x 5 days followed by rituximab or 5.6 mg/m² over 2 hours also for 5 days and also followed by rituximab therapy.    The patient is seen back 9/14/2023.Flow cytometry assessed on 2 visits includes a CD10 positive monoclonal lambda B-cell population representing 0.3% of total events 8/3/2023 and repeat 9/7/2023 demonstrates a similar B-cell population also 0.3% total events-phenotype positive CD10 bright, CD19 bright, CD20 bright, CD25, CD45,   slambda.  Discussed symptoms usually due to splenomegaly, fatigue, weakness, weight loss, bruising, recurrent infections, periodic vasculitis.  He is clear that he is not having any of the symptoms and feels that his overall performance status is as good as its ever been.  We have discussed that additional issues include possible organomegaly and cytopenias developing with his follow-up CBC in office today including H&H of 12.2 and 33.4, white count of 3250 and platelet count of 92,000.  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           Plan:    *Every 2 weeks CBC    *As he returns will obtain hepatitis B surface antigen, hepatitis B surface antibody and hepatitis B core antibody should we need to initiate therapy soon.    *We discussed the various regimens including 5-day cladribine in office followed by rituximab  therapy    *4 weeks CT chest, abdomen, pelvis    *Consider repeat marrow    *6 weeks MD CBC.2  I spent 55 minutes caring for Angel on this date of service. This time includes time spent by me in the following activities: preparing for the visit, reviewing tests, obtaining and/or reviewing a separately obtained history, performing a medically appropriate examination and/or evaluation, counseling and educating the patient/family/caregiver, ordering medications, tests, or procedures, referring and communicating with other health care professionals, documenting information in the medical record, independently interpreting results and communicating that information with the patient/family/caregiver, and care coordination.                   Paco Jeffers MD  09/13/23 1146  Sign when Signing Visit  Patient contacted about flow cytometry findings of small population potentially suggestive of recurrence versus secondary process.  Plan to reassess in follow-up visits 9/7/2023.  Please note that if treatment is necessary then repeat treatment with cladribine could be given in a variety of of schedules including 0.15 mg/kg/day over 2 hours x 5 days followed by rituximab or 5.6 mg/m² over 2 hours also for 5 days and also followed by rituximab therapy.  ZACHARY

## 2023-09-18 ENCOUNTER — TELEPHONE (OUTPATIENT)
Dept: ONCOLOGY | Facility: CLINIC | Age: 78
End: 2023-09-18
Payer: MEDICARE

## 2023-09-18 NOTE — TELEPHONE ENCOUNTER
Caller: Angel Remy    Relationship to patient: Self    Best call back number: [phone]    Patient is needing: TO R/S 9-28-23 LAB TO 1 PM THAT DAY.

## 2023-09-28 ENCOUNTER — LAB (OUTPATIENT)
Dept: LAB | Facility: HOSPITAL | Age: 78
End: 2023-09-28
Payer: MEDICARE

## 2023-09-28 DIAGNOSIS — Z11.59 ENCOUNTER FOR SCREENING FOR OTHER VIRAL DISEASES: ICD-10-CM

## 2023-09-28 DIAGNOSIS — C91.42 HAIRY CELL LEUKEMIA, IN RELAPSE: ICD-10-CM

## 2023-09-28 DIAGNOSIS — C91.41 HAIRY CELL LEUKEMIA, IN REMISSION: ICD-10-CM

## 2023-09-28 DIAGNOSIS — Z79.899 LONG-TERM USE OF HIGH-RISK MEDICATION: ICD-10-CM

## 2023-09-28 LAB
ALBUMIN SERPL-MCNC: 3.9 G/DL (ref 3.5–5.2)
ALBUMIN/GLOB SERPL: 1.9 G/DL
ALP SERPL-CCNC: 48 U/L (ref 39–117)
ALT SERPL W P-5'-P-CCNC: 70 U/L (ref 1–41)
ANION GAP SERPL CALCULATED.3IONS-SCNC: 11.5 MMOL/L (ref 5–15)
AST SERPL-CCNC: 62 U/L (ref 1–40)
BASOPHILS # BLD AUTO: 0 10*3/MM3 (ref 0–0.2)
BASOPHILS NFR BLD AUTO: 0 % (ref 0–1.5)
BILIRUB SERPL-MCNC: 0.7 MG/DL (ref 0–1.2)
BUN SERPL-MCNC: 26 MG/DL (ref 8–23)
BUN/CREAT SERPL: 31.7 (ref 7–25)
CALCIUM SPEC-SCNC: 8.6 MG/DL (ref 8.6–10.5)
CHLORIDE SERPL-SCNC: 103 MMOL/L (ref 98–107)
CO2 SERPL-SCNC: 25.5 MMOL/L (ref 22–29)
CREAT SERPL-MCNC: 0.82 MG/DL (ref 0.7–1.3)
DEPRECATED RDW RBC AUTO: 49.1 FL (ref 37–54)
EGFRCR SERPLBLD CKD-EPI 2021: 90.5 ML/MIN/1.73
EOSINOPHIL # BLD AUTO: 0.02 10*3/MM3 (ref 0–0.4)
EOSINOPHIL NFR BLD AUTO: 1.1 % (ref 0.3–6.2)
ERYTHROCYTE [DISTWIDTH] IN BLOOD BY AUTOMATED COUNT: 12.8 % (ref 12.3–15.4)
GLOBULIN UR ELPH-MCNC: 2.1 GM/DL
GLUCOSE SERPL-MCNC: 107 MG/DL (ref 65–99)
HBV SURFACE AB SER RIA-ACNC: ABNORMAL
HBV SURFACE AG SERPL QL IA: NORMAL
HCT VFR BLD AUTO: 34.2 % (ref 37.5–51)
HGB BLD-MCNC: 11.3 G/DL (ref 13–17.7)
IMM GRANULOCYTES # BLD AUTO: 0.01 10*3/MM3 (ref 0–0.05)
IMM GRANULOCYTES NFR BLD AUTO: 0.6 % (ref 0–0.5)
LYMPHOCYTES # BLD AUTO: 0.9 10*3/MM3 (ref 0.7–3.1)
LYMPHOCYTES NFR BLD AUTO: 51.4 % (ref 19.6–45.3)
MCH RBC QN AUTO: 34.5 PG (ref 26.6–33)
MCHC RBC AUTO-ENTMCNC: 33 G/DL (ref 31.5–35.7)
MCV RBC AUTO: 104.3 FL (ref 79–97)
MONOCYTES # BLD AUTO: 0.05 10*3/MM3 (ref 0.1–0.9)
MONOCYTES NFR BLD AUTO: 2.9 % (ref 5–12)
NEUTROPHILS NFR BLD AUTO: 0.77 10*3/MM3 (ref 1.7–7)
NEUTROPHILS NFR BLD AUTO: 44 % (ref 42.7–76)
NRBC BLD AUTO-RTO: 0 /100 WBC (ref 0–0.2)
PLATELET # BLD AUTO: 73 10*3/MM3 (ref 140–450)
PMV BLD AUTO: 10.8 FL (ref 6–12)
POTASSIUM SERPL-SCNC: 4.5 MMOL/L (ref 3.5–5.2)
PROT SERPL-MCNC: 6 G/DL (ref 6–8.5)
RBC # BLD AUTO: 3.28 10*6/MM3 (ref 4.14–5.8)
SODIUM SERPL-SCNC: 140 MMOL/L (ref 136–145)
WBC NRBC COR # BLD: 1.75 10*3/MM3 (ref 3.4–10.8)

## 2023-09-28 PROCEDURE — 36415 COLL VENOUS BLD VENIPUNCTURE: CPT

## 2023-09-28 PROCEDURE — 85025 COMPLETE CBC W/AUTO DIFF WBC: CPT

## 2023-09-28 PROCEDURE — 87340 HEPATITIS B SURFACE AG IA: CPT | Performed by: INTERNAL MEDICINE

## 2023-09-28 PROCEDURE — 86706 HEP B SURFACE ANTIBODY: CPT | Performed by: INTERNAL MEDICINE

## 2023-09-29 ENCOUNTER — TELEPHONE (OUTPATIENT)
Dept: ONCOLOGY | Facility: CLINIC | Age: 78
End: 2023-09-29
Payer: MEDICARE

## 2023-09-29 DIAGNOSIS — C91.41 HAIRY CELL LEUKEMIA, IN REMISSION: Primary | ICD-10-CM

## 2023-09-29 DIAGNOSIS — C91.42 HAIRY CELL LEUKEMIA, IN RELAPSE: ICD-10-CM

## 2023-09-29 LAB — HBV CORE AB SERPL QL IA: NEGATIVE

## 2023-09-29 NOTE — TELEPHONE ENCOUNTER
Bone marrow biopsy placed and message sent to scheduling.     ----- Message from Paco Jeffers MD sent at 9/29/2023  7:41 AM EDT -----  This patient needs to have a bone marrow aspirate and biopsy obtained.  At present this should go to [facility] labs with a comprehensive profile and this needs to be placed on the request form.  We are going to be using [facility] over the next several months and a  project but his marrow, previously, was done at [facility] labs years ago and it is the comparison that I am after.    He tells me that he can schedule Thursday afternoons and is hopeful to have this done 10/5/2023.  He can best be reached on his cell phone at [phone].  Thanks, ZACHARY

## 2023-10-01 NOTE — PROGRESS NOTES
10/12/23 0004   Pre-Procedure Phone Call   Procedure Time Verified Yes   Arrival Time 1100   Procedure Location Verified Yes   Medical History Reviewed No   NPO Status Reinforced Yes   Ride and Caregiver Arranged Yes   Patient Knows to Bring Current Medications   (No changes in medications since last reviewed.)   Bring Outside Films Requested   (CBC & PLTS ( 73 )  9/28/23)

## 2023-10-05 ENCOUNTER — HOSPITAL ENCOUNTER (OUTPATIENT)
Dept: PET IMAGING | Facility: HOSPITAL | Age: 78
Discharge: HOME | End: 2023-10-05
Admitting: INTERNAL MEDICINE
Payer: MEDICARE

## 2023-10-05 DIAGNOSIS — C91.42 HAIRY CELL LEUKEMIA, IN RELAPSE: ICD-10-CM

## 2023-10-05 PROCEDURE — 71260 CT THORAX DX C+: CPT

## 2023-10-05 PROCEDURE — 0 DIATRIZOATE MEGLUMINE & SODIUM PER 1 ML: Performed by: INTERNAL MEDICINE

## 2023-10-05 PROCEDURE — 74177 CT ABD & PELVIS W/CONTRAST: CPT

## 2023-10-05 PROCEDURE — 25510000001 IOPAMIDOL 61 % SOLUTION: Performed by: INTERNAL MEDICINE

## 2023-10-05 RX ADMIN — DIATRIZOATE MEGLUMINE AND DIATRIZOATE SODIUM 30 ML: 660; 100 LIQUID ORAL; RECTAL at 13:41

## 2023-10-05 RX ADMIN — IOPAMIDOL 100 ML: 612 INJECTION, SOLUTION INTRAVENOUS at 13:30

## 2023-10-09 ENCOUNTER — TELEPHONE (OUTPATIENT)
Dept: ONCOLOGY | Facility: CLINIC | Age: 78
End: 2023-10-09
Payer: MEDICARE

## 2023-10-09 NOTE — TELEPHONE ENCOUNTER
Caller: Angel Remy    Relationship: Self    Best call back number: [phone]     What is the best time to reach you: ANYTIME    Who are you requesting to speak with (clinical staff, provider,  specific staff member): CLINICAL     What was the call regarding: PT IS GETTING LABS DONE ON 10/12/23 WHEN HE HAS HIS BONE MARROW BIOPSY DONE. PT WOULD LIKE TO AVOID GETTING ONE MORE VENIPUNCTURE IF POSSIBLE.     THE PT WAS WONDERING IF THEY WOULD DRAW THE LABS THROUGH THE SAME ENTRY WHEN THEY DO THE CT?     OR COULD THE PT JUST COME OVER TO THE LAB AFTER THE BONE MARROW BIOPSY AND HAVE A FINGER STICK DONE INSTEAD?    Is it okay if the provider responds through [employer]: NO - PLEASE CALL BACK.

## 2023-10-09 NOTE — TELEPHONE ENCOUNTER
Called the patient to let him knoe we requested for his lab to be drawn at his bone marrow biopsy. Patient v/u.

## 2023-10-12 ENCOUNTER — HOSPITAL ENCOUNTER (OUTPATIENT)
Dept: CT IMAGING | Facility: HOSPITAL | Age: 78
Discharge: HOME | End: 2023-10-12
Payer: MEDICARE

## 2023-10-12 VITALS
HEIGHT: 71 IN | BODY MASS INDEX: 21.7 KG/M2 | OXYGEN SATURATION: 97 % | DIASTOLIC BLOOD PRESSURE: 72 MMHG | TEMPERATURE: 98 F | HEART RATE: 58 BPM | SYSTOLIC BLOOD PRESSURE: 141 MMHG | RESPIRATION RATE: 16 BRPM | WEIGHT: 155 LBS

## 2023-10-12 DIAGNOSIS — C91.42 HAIRY CELL LEUKEMIA, IN RELAPSE: ICD-10-CM

## 2023-10-12 LAB
BASOPHILS # BLD AUTO: 0 10*3/MM3 (ref 0–0.2)
BASOPHILS NFR BLD AUTO: 0 % (ref 0–1.5)
DEPRECATED RDW RBC AUTO: 46.6 FL (ref 37–54)
EOSINOPHIL # BLD AUTO: 0.02 10*3/MM3 (ref 0–0.4)
EOSINOPHIL NFR BLD AUTO: 0.8 % (ref 0.3–6.2)
ERYTHROCYTE [DISTWIDTH] IN BLOOD BY AUTOMATED COUNT: 12.7 % (ref 12.3–15.4)
HCT VFR BLD AUTO: 36.5 % (ref 37.5–51)
HGB BLD-MCNC: 12.6 G/DL (ref 13–17.7)
LYMPHOCYTES # BLD AUTO: 1.27 10*3/MM3 (ref 0.7–3.1)
LYMPHOCYTES NFR BLD AUTO: 48.8 % (ref 19.6–45.3)
MCH RBC QN AUTO: 34.8 PG (ref 26.6–33)
MCHC RBC AUTO-ENTMCNC: 34.5 G/DL (ref 31.5–35.7)
MCV RBC AUTO: 100.8 FL (ref 79–97)
MONOCYTES # BLD AUTO: 0.07 10*3/MM3 (ref 0.1–0.9)
MONOCYTES NFR BLD AUTO: 2.7 % (ref 5–12)
NEUTROPHILS NFR BLD AUTO: 1.23 10*3/MM3 (ref 1.7–7)
NEUTROPHILS NFR BLD AUTO: 47.3 % (ref 42.7–76)
PLATELET # BLD AUTO: 103 10*3/MM3 (ref 140–450)
PMV BLD AUTO: 10.3 FL (ref 6–12)
RBC # BLD AUTO: 3.62 10*6/MM3 (ref 4.14–5.8)
WBC NRBC COR # BLD: 2.6 10*3/MM3 (ref 3.4–10.8)

## 2023-10-12 PROCEDURE — 88300 SURGICAL PATH GROSS: CPT | Performed by: INTERNAL MEDICINE

## 2023-10-12 PROCEDURE — 88185 FLOWCYTOMETRY/TC ADD-ON: CPT

## 2023-10-12 PROCEDURE — 88342 IMHCHEM/IMCYTCHM 1ST ANTB: CPT | Performed by: INTERNAL MEDICINE

## 2023-10-12 PROCEDURE — 88184 FLOWCYTOMETRY/ TC 1 MARKER: CPT

## 2023-10-12 PROCEDURE — 88341 IMHCHEM/IMCYTCHM EA ADD ANTB: CPT

## 2023-10-12 PROCEDURE — 88182 CELL MARKER STUDY: CPT

## 2023-10-12 PROCEDURE — 88313 SPECIAL STAINS GROUP 2: CPT | Performed by: INTERNAL MEDICINE

## 2023-10-12 PROCEDURE — 85025 COMPLETE CBC W/AUTO DIFF WBC: CPT | Performed by: RADIOLOGY

## 2023-10-12 PROCEDURE — 88313 SPECIAL STAINS GROUP 2: CPT

## 2023-10-12 PROCEDURE — 88311 DECALCIFY TISSUE: CPT | Performed by: INTERNAL MEDICINE

## 2023-10-12 PROCEDURE — 77012 CT SCAN FOR NEEDLE BIOPSY: CPT

## 2023-10-12 PROCEDURE — 25010000002 LIDOCAINE 1 % SOLUTION: Performed by: RADIOLOGY

## 2023-10-12 PROCEDURE — 88341 IMHCHEM/IMCYTCHM EA ADD ANTB: CPT | Performed by: INTERNAL MEDICINE

## 2023-10-12 PROCEDURE — 88305 TISSUE EXAM BY PATHOLOGIST: CPT | Performed by: INTERNAL MEDICINE

## 2023-10-12 RX ORDER — LIDOCAINE HYDROCHLORIDE 10 MG/ML
20 INJECTION, SOLUTION INFILTRATION; PERINEURAL ONCE
Status: COMPLETED | OUTPATIENT
Start: 2023-10-12 | End: 2023-10-12

## 2023-10-12 RX ORDER — SODIUM CHLORIDE 0.9 % (FLUSH) 0.9 %
10 SYRINGE (ML) INJECTION AS NEEDED
Status: DISCONTINUED | OUTPATIENT
Start: 2023-10-12 | End: 2023-10-13 | Stop reason: HOSPADM

## 2023-10-12 RX ORDER — SODIUM CHLORIDE 9 MG/ML
25 INJECTION, SOLUTION INTRAVENOUS ONCE
Status: DISCONTINUED | OUTPATIENT
Start: 2023-10-12 | End: 2023-10-13 | Stop reason: HOSPADM

## 2023-10-12 RX ORDER — MIDAZOLAM HYDROCHLORIDE 1 MG/ML
0.5 INJECTION INTRAMUSCULAR; INTRAVENOUS ONCE AS NEEDED
Status: DISCONTINUED | OUTPATIENT
Start: 2023-10-12 | End: 2023-10-13 | Stop reason: HOSPADM

## 2023-10-12 RX ADMIN — LIDOCAINE HYDROCHLORIDE 20 ML: 10 INJECTION, SOLUTION INFILTRATION; PERINEURAL at 12:35

## 2023-10-12 NOTE — POST-PROCEDURE NOTE
POST PROCEDURE NOTE    Procedure: ct bone marrow biopsy    Pre-Procedure Diagnosis: leukemia    Post-procedure Diagnosis: same    Findings: technically successful ct guided bone marrow biopsy    Complications: no immediate    Blood loss: none    Specimen Removed: bone marrow core and aspirate    Disposition:   Discharge home

## 2023-10-12 NOTE — DISCHARGE INSTRUCTIONS
EDUCATION /DISCHARGE INSTRUCTIONS  CT/US guided biopsy:  A biopsy is a procedure done to remove tissue for further analysis.  Before images are taken to locate the target area.  Images can be obtained using ultrasound, CT or MRI.  A physician will clean your skin with antiseptic soap, place a sterile towel around the site and administer a local anesthetic to numb the area.  The physician will then insert a special needle.  Sometimes images are taken of the needle after it is inserted to ensure the needle is in the correct area to be biopsied.   A sample is obtained and sent to the laboratory for study.  Occasionally the laboratory is unable to make a diagnosis from the sample and the procedure may need to be repeated.  Within a week the radiologist will send a report to your physician.  A pathologist will also examine the tissue and send a report.    Risks of the procedure include but are not limited to:   *  Bleeding    *  Infection   *  Puncture of surrounding organs *  Death     *  Lung collapse if the biopsy is near the chest which may require insertion of a      chest tube to re-inflate the lung if severe.    Benefits of the procedure:  Using x-ray helps to locate the area that requires a biopsy. The procedure is less invasive than a surgical procedure, there are no large incisions and it does not require anesthesia.    Alternatives to the procedure:  A biopsy can be performed surgically.  Risks of a surgical biopsy include exposure to anesthesia, infection, excessive bleeding and injury to abdominal organs.  A benefit of surgical biopsy is the ability to see the area to be biopsied and remove of a larger piece of tissue.    THIS EDUCATION INFORMATION WAS REVIEWED PRIOR TO PROCEDURE AND CONSENT. Patient initials__________________Time___________________    Post Procedure:    *  Expect the biopsy site may be tender up to one week.    *  Rest today (no pushing pulling or straining).   *  Slowly increase activity  tomorrow.    *  If you received sedation do not drive for 24 hours.   *  Keep dressing clean and dry.   *  Leave dressing on puncture site for 24 hours.    *  You may shower when dressing removed.  Call your doctor if experiencing:   *  Signs of infection such as redness, swelling, excessive pain and / or foul        smelling drainage from the puncture site.   *  Chills or fever over 101 degrees (by mouth).   *  Unrelieved pain.   *  Any new or severe symptoms.   *  If experiencing sudden / severe shortness of breath or chest pain go to the       nearest emergency room.   Following the procedure:     Follow-up with the ordering physician as directed.    Continue to take other medications as directed by your physician unless    otherwise instructed.   If applicable, resume taking your blood thinners or Aspirin on ___________.    If you have any concerns please call the Radiology Nurses Desk at [phone].  You are the most important factor in your recovery.  Follow the above instructions carefully.

## 2023-10-12 NOTE — NURSING NOTE
Pt arrived to \[facility]\"" by self. Wife Hanna spouse will be ride home.

## 2023-10-13 ENCOUNTER — TELEPHONE (OUTPATIENT)
Dept: INTERVENTIONAL RADIOLOGY/VASCULAR | Facility: HOSPITAL | Age: 78
End: 2023-10-13
Payer: MEDICARE

## 2023-10-17 ENCOUNTER — DOCUMENTATION (OUTPATIENT)
Dept: ONCOLOGY | Facility: CLINIC | Age: 78
End: 2023-10-17
Payer: MEDICARE

## 2023-10-17 NOTE — PROGRESS NOTES
Patient contacted about neuro findings that are going to be consistent with relapse.  As his biopsy is completed pathology is indicated that the sample is heavily involved with hairy cell.  Dr. Remy was notified and he indicates that he is going on and undergoing his repeat hep B vaccination as well as COVID, RSV and influenza.    We discussed that a port will be useful in the circumstances and will contact Dr. Fan to facilitate this with the patient to be seen 10/26/2023 and anticipating a 5-day cladribine treatment in the week following.  ZACHARY

## 2023-10-18 ENCOUNTER — APPOINTMENT (OUTPATIENT)
Dept: GENERAL RADIOLOGY | Facility: HOSPITAL | Age: 78
End: 2023-10-18
Payer: MEDICARE

## 2023-10-18 ENCOUNTER — HOSPITAL ENCOUNTER (OUTPATIENT)
Facility: HOSPITAL | Age: 78
Setting detail: HOSPITAL OUTPATIENT SURGERY
Discharge: HOME | End: 2023-10-18
Attending: SURGERY | Admitting: SURGERY
Payer: MEDICARE

## 2023-10-18 ENCOUNTER — ANESTHESIA (OUTPATIENT)
Dept: PERIOP | Facility: HOSPITAL | Age: 78
End: 2023-10-18
Payer: MEDICARE

## 2023-10-18 ENCOUNTER — ANESTHESIA EVENT (OUTPATIENT)
Dept: PERIOP | Facility: HOSPITAL | Age: 78
End: 2023-10-18
Payer: MEDICARE

## 2023-10-18 ENCOUNTER — TELEPHONE (OUTPATIENT)
Dept: ONCOLOGY | Facility: CLINIC | Age: 78
End: 2023-10-18

## 2023-10-18 VITALS
HEART RATE: 47 BPM | RESPIRATION RATE: 16 BRPM | HEIGHT: 70 IN | DIASTOLIC BLOOD PRESSURE: 52 MMHG | WEIGHT: 154.32 LBS | BODY MASS INDEX: 22.09 KG/M2 | OXYGEN SATURATION: 98 % | SYSTOLIC BLOOD PRESSURE: 104 MMHG | TEMPERATURE: 98.2 F

## 2023-10-18 DIAGNOSIS — C91.42 HAIRY CELL LEUKEMIA, IN RELAPSE: Primary | ICD-10-CM

## 2023-10-18 LAB
CYTO UR: NORMAL
DX PRELIMINARY: NORMAL
LAB AP CASE REPORT: NORMAL
LAB AP CLINICAL INFORMATION: NORMAL
LAB AP FLOW CYTOMETRY SUMMARY: NORMAL
LAB AP SPECIAL STAINS: NORMAL
PATH REPORT.FINAL DX SPEC: NORMAL
PATH REPORT.GROSS SPEC: NORMAL

## 2023-10-18 PROCEDURE — 0 IOTHALAMATE 60 % SOLUTION: Performed by: SURGERY

## 2023-10-18 PROCEDURE — 25010000002 PROPOFOL 10 MG/ML EMULSION: Performed by: ANESTHESIOLOGY

## 2023-10-18 PROCEDURE — 25010000002 VASOPRESSIN 20 UNIT/ML SOLUTION: Performed by: ANESTHESIOLOGY

## 2023-10-18 PROCEDURE — 25810000003 LACTATED RINGERS PER 1000 ML: Performed by: STUDENT IN AN ORGANIZED HEALTH CARE EDUCATION/TRAINING PROGRAM

## 2023-10-18 PROCEDURE — 25010000002 BUPIVACAINE (PF) 0.25 % SOLUTION: Performed by: SURGERY

## 2023-10-18 PROCEDURE — 76000 FLUOROSCOPY <1 HR PHYS/QHP: CPT

## 2023-10-18 PROCEDURE — 25010000002 HEPARIN (PORCINE) PER 1000 UNITS: Performed by: SURGERY

## 2023-10-18 PROCEDURE — C1788 PORT, INDWELLING, IMP: HCPCS | Performed by: SURGERY

## 2023-10-18 PROCEDURE — 25010000002 CEFAZOLIN IN DEXTROSE 2000 MG/ 100 ML SOLUTION: Performed by: SURGERY

## 2023-10-18 PROCEDURE — 25010000002 PROPOFOL 200 MG/20ML EMULSION: Performed by: ANESTHESIOLOGY

## 2023-10-18 PROCEDURE — 25010000002 FENTANYL CITRATE (PF) 50 MCG/ML SOLUTION: Performed by: ANESTHESIOLOGY

## 2023-10-18 PROCEDURE — 25010000002 LIDOCAINE 1 % SOLUTION: Performed by: SURGERY

## 2023-10-18 DEVICE — PRT INTRO VASC/INTERV VORTEX FILL/HL DETACH/POLYURET/CATH 8F: Type: IMPLANTABLE DEVICE | Site: CHEST | Status: FUNCTIONAL

## 2023-10-18 RX ORDER — EPHEDRINE SULFATE 50 MG/ML
10 INJECTION, SOLUTION INTRAVENOUS ONCE AS NEEDED
Status: DISCONTINUED | OUTPATIENT
Start: 2023-10-18 | End: 2023-10-18 | Stop reason: HOSPADM

## 2023-10-18 RX ORDER — PHENYLEPHRINE HCL IN 0.9% NACL 0.5 MG/5ML
SYRINGE (ML) INTRAVENOUS AS NEEDED
Status: DISCONTINUED | OUTPATIENT
Start: 2023-10-18 | End: 2023-10-18 | Stop reason: SURG

## 2023-10-18 RX ORDER — DIPHENHYDRAMINE HYDROCHLORIDE 50 MG/ML
12.5 INJECTION INTRAMUSCULAR; INTRAVENOUS
Status: DISCONTINUED | OUTPATIENT
Start: 2023-10-18 | End: 2023-10-18 | Stop reason: HOSPADM

## 2023-10-18 RX ORDER — HEPARIN SODIUM 1000 [USP'U]/ML
INJECTION, SOLUTION INTRAVENOUS; SUBCUTANEOUS AS NEEDED
Status: DISCONTINUED | OUTPATIENT
Start: 2023-10-18 | End: 2023-10-18 | Stop reason: HOSPADM

## 2023-10-18 RX ORDER — FENTANYL CITRATE 50 UG/ML
25 INJECTION, SOLUTION INTRAMUSCULAR; INTRAVENOUS
Status: DISCONTINUED | OUTPATIENT
Start: 2023-10-18 | End: 2023-10-18 | Stop reason: HOSPADM

## 2023-10-18 RX ORDER — BUPIVACAINE HYDROCHLORIDE 2.5 MG/ML
INJECTION, SOLUTION EPIDURAL; INFILTRATION; INTRACAUDAL AS NEEDED
Status: DISCONTINUED | OUTPATIENT
Start: 2023-10-18 | End: 2023-10-18 | Stop reason: HOSPADM

## 2023-10-18 RX ORDER — PROPOFOL 10 MG/ML
VIAL (ML) INTRAVENOUS CONTINUOUS PRN
Status: DISCONTINUED | OUTPATIENT
Start: 2023-10-18 | End: 2023-10-18 | Stop reason: SURG

## 2023-10-18 RX ORDER — SODIUM CHLORIDE 0.9 % (FLUSH) 0.9 %
3 SYRINGE (ML) INJECTION EVERY 12 HOURS SCHEDULED
Status: DISCONTINUED | OUTPATIENT
Start: 2023-10-18 | End: 2023-10-18 | Stop reason: HOSPADM

## 2023-10-18 RX ORDER — SODIUM CHLORIDE, SODIUM LACTATE, POTASSIUM CHLORIDE, CALCIUM CHLORIDE 600; 310; 30; 20 MG/100ML; MG/100ML; MG/100ML; MG/100ML
9 INJECTION, SOLUTION INTRAVENOUS CONTINUOUS
Status: DISCONTINUED | OUTPATIENT
Start: 2023-10-18 | End: 2023-10-18 | Stop reason: HOSPADM

## 2023-10-18 RX ORDER — LIDOCAINE HYDROCHLORIDE 20 MG/ML
INJECTION, SOLUTION INFILTRATION; PERINEURAL AS NEEDED
Status: DISCONTINUED | OUTPATIENT
Start: 2023-10-18 | End: 2023-10-18 | Stop reason: SURG

## 2023-10-18 RX ORDER — SODIUM CHLORIDE 0.9 % (FLUSH) 0.9 %
3-10 SYRINGE (ML) INJECTION AS NEEDED
Status: DISCONTINUED | OUTPATIENT
Start: 2023-10-18 | End: 2023-10-18 | Stop reason: HOSPADM

## 2023-10-18 RX ORDER — HYDROCODONE BITARTRATE AND ACETAMINOPHEN 5; 325 MG/1; MG/1
1 TABLET ORAL EVERY 4 HOURS PRN
Qty: 20 TABLET | Refills: 0 | Status: SHIPPED | OUTPATIENT
Start: 2023-10-18

## 2023-10-18 RX ORDER — LIDOCAINE HYDROCHLORIDE 10 MG/ML
INJECTION, SOLUTION INFILTRATION; PERINEURAL AS NEEDED
Status: DISCONTINUED | OUTPATIENT
Start: 2023-10-18 | End: 2023-10-18 | Stop reason: HOSPADM

## 2023-10-18 RX ORDER — LIDOCAINE HYDROCHLORIDE 10 MG/ML
0.5 INJECTION, SOLUTION INFILTRATION; PERINEURAL ONCE AS NEEDED
Status: DISCONTINUED | OUTPATIENT
Start: 2023-10-18 | End: 2023-10-18 | Stop reason: HOSPADM

## 2023-10-18 RX ORDER — FLUMAZENIL 0.1 MG/ML
0.2 INJECTION INTRAVENOUS AS NEEDED
Status: DISCONTINUED | OUTPATIENT
Start: 2023-10-18 | End: 2023-10-18 | Stop reason: HOSPADM

## 2023-10-18 RX ORDER — ONDANSETRON 2 MG/ML
4 INJECTION INTRAMUSCULAR; INTRAVENOUS ONCE AS NEEDED
Status: DISCONTINUED | OUTPATIENT
Start: 2023-10-18 | End: 2023-10-18 | Stop reason: HOSPADM

## 2023-10-18 RX ORDER — FENTANYL CITRATE 50 UG/ML
INJECTION, SOLUTION INTRAMUSCULAR; INTRAVENOUS AS NEEDED
Status: DISCONTINUED | OUTPATIENT
Start: 2023-10-18 | End: 2023-10-18 | Stop reason: SURG

## 2023-10-18 RX ORDER — FAMOTIDINE 10 MG/ML
20 INJECTION, SOLUTION INTRAVENOUS ONCE
Status: DISCONTINUED | OUTPATIENT
Start: 2023-10-18 | End: 2023-10-18

## 2023-10-18 RX ORDER — VASOPRESSIN 20 U/ML
INJECTION PARENTERAL AS NEEDED
Status: DISCONTINUED | OUTPATIENT
Start: 2023-10-18 | End: 2023-10-18 | Stop reason: SURG

## 2023-10-18 RX ORDER — DROPERIDOL 2.5 MG/ML
0.62 INJECTION, SOLUTION INTRAMUSCULAR; INTRAVENOUS ONCE AS NEEDED
Status: DISCONTINUED | OUTPATIENT
Start: 2023-10-18 | End: 2023-10-18 | Stop reason: HOSPADM

## 2023-10-18 RX ORDER — FENTANYL CITRATE 50 UG/ML
50 INJECTION, SOLUTION INTRAMUSCULAR; INTRAVENOUS
Status: DISCONTINUED | OUTPATIENT
Start: 2023-10-18 | End: 2023-10-18 | Stop reason: HOSPADM

## 2023-10-18 RX ORDER — HYDRALAZINE HYDROCHLORIDE 20 MG/ML
10 INJECTION INTRAMUSCULAR; INTRAVENOUS
Status: DISCONTINUED | OUTPATIENT
Start: 2023-10-18 | End: 2023-10-18 | Stop reason: HOSPADM

## 2023-10-18 RX ORDER — LABETALOL HYDROCHLORIDE 5 MG/ML
10 INJECTION, SOLUTION INTRAVENOUS
Status: DISCONTINUED | OUTPATIENT
Start: 2023-10-18 | End: 2023-10-18 | Stop reason: HOSPADM

## 2023-10-18 RX ORDER — MIDAZOLAM HYDROCHLORIDE 1 MG/ML
0.5 INJECTION INTRAMUSCULAR; INTRAVENOUS ONCE AS NEEDED
Status: DISCONTINUED | OUTPATIENT
Start: 2023-10-18 | End: 2023-10-18 | Stop reason: HOSPADM

## 2023-10-18 RX ORDER — NALOXONE HCL 0.4 MG/ML
0.2 VIAL (ML) INJECTION AS NEEDED
Status: DISCONTINUED | OUTPATIENT
Start: 2023-10-18 | End: 2023-10-18 | Stop reason: HOSPADM

## 2023-10-18 RX ORDER — PROPOFOL 10 MG/ML
INJECTION, EMULSION INTRAVENOUS AS NEEDED
Status: DISCONTINUED | OUTPATIENT
Start: 2023-10-18 | End: 2023-10-18 | Stop reason: SURG

## 2023-10-18 RX ORDER — CEFAZOLIN SODIUM 2 G/100ML
2000 INJECTION, SOLUTION INTRAVENOUS ONCE
Status: COMPLETED | OUTPATIENT
Start: 2023-10-18 | End: 2023-10-18

## 2023-10-18 RX ADMIN — CEFAZOLIN SODIUM 2 G: 2 INJECTION, SOLUTION INTRAVENOUS at 16:19

## 2023-10-18 RX ADMIN — Medication 200 MCG: at 16:38

## 2023-10-18 RX ADMIN — Medication 200 MCG: at 16:36

## 2023-10-18 RX ADMIN — VASOPRESSIN 1 UNITS: 20 INJECTION, SOLUTION INTRAMUSCULAR; SUBCUTANEOUS at 16:58

## 2023-10-18 RX ADMIN — PROPOFOL 100 MCG/KG/MIN: 10 INJECTION, EMULSION INTRAVENOUS at 16:24

## 2023-10-18 RX ADMIN — Medication 200 MCG: at 16:50

## 2023-10-18 RX ADMIN — FENTANYL CITRATE 50 MCG: 50 INJECTION, SOLUTION INTRAMUSCULAR; INTRAVENOUS at 16:22

## 2023-10-18 RX ADMIN — Medication 200 MCG: at 16:43

## 2023-10-18 RX ADMIN — Medication 200 MCG: at 16:33

## 2023-10-18 RX ADMIN — LIDOCAINE HYDROCHLORIDE 60 MG: 20 INJECTION, SOLUTION INFILTRATION; PERINEURAL at 16:24

## 2023-10-18 RX ADMIN — PROPOFOL 80 MG: 10 INJECTION, EMULSION INTRAVENOUS at 16:24

## 2023-10-18 RX ADMIN — SODIUM CHLORIDE, POTASSIUM CHLORIDE, SODIUM LACTATE AND CALCIUM CHLORIDE 9 ML/HR: 600; 310; 30; 20 INJECTION, SOLUTION INTRAVENOUS at 15:30

## 2023-10-18 NOTE — OP NOTE
Operative Note  Date of Admission:  10/18/2023  OR Date: 10/18/2023    Pre-op Diagnosis:   Recurrent hairy cell leukemia    Post-Op Diagnosis Codes:  Same    Procedure:   1) right internal jugular Mediport placement with duplex directed access under fluoroscopic guidance with superior venacavogram    Surgeon: Seth Fan MD    Assistant:  Sandra ARENAS CSA and they provided critical assistance during the case including suctioning, exposure, retraction, and reduction of blood loss.    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  Tortuous veins crossing the top of the supraclavicular area noted on duplex.  Superior venacavogram performed without any evidence of central venous stenosis or disease to explain these veins.  These veins were not directly visualized as the injection was too low for them.    Indications:    The patient is an 77 y.o. male seen for evaluation recurrent hairy cell leukemia with the need for Mediport placement.  Plans for placement of the jugular level of been made.  Patient and family understand risk benefits complications of the procedure and consents to the procedure.       Procedure:    The patient was prepped and draped in a sterile manner.  Using duplex direction we accessed the right internal jugular vein.  The vein was relatively small but there is a duplicated section on the medial side of the jugular as well as the lateral.  We accessed the lateral segment passed a wire centrally without difficulty although a past the right atrium into the infra hepatic vena cava.  Prior to doing this we  for performed superior venacavogram through the micropuncture sheath that was in place confirming venous position and normal central venous anatomy as far as could be seen.  We then performed a pocket on the anterior chest wall under local anesthetic and making sure it could fit the port we then tunneled the port tubing and divided it at approximately 27 cm with the port tip at the atriocaval junction under fluoroscopy.  It was hooked to the Mediport which was then sewn into place in the pocket with 3-0 Prolene suture.  The port was accessed injected and flushed with heparinized saline.  At completion the patient had final fluoroscopy confirming good position with no kinks.  We then closed the deep tissue with 3-0 Vicryl and the skin with 4-0 Vicryl subicular closure on both the neck and the chest with skin glue placed.  Patient tolerated the procedure well.    Radiographic Findings:  Flex direction shows small ulnar duplicated internal jugular vein on the right accessed with good position of the wire.  There was superior vena cava cavogram angiogram performed with wide patency to the superior vena cava no central venous stenosis seen at the level of the tip of the catheter down with good pulmonary outflow tract noted.  Fluoroscopic images confirmed placement of the tip of the port at the atriocaval junction with good position and no kinking of the tubing throughout its course.      Active Hospital Problems    Diagnosis  POA    Hairy cell leukemia [C91.40]  Yes      Resolved Hospital Problems   No resolved problems to display.      Randy Fan MD     Date: 10/18/2023  Time: 17:28 EDT

## 2023-10-18 NOTE — H&P
[facility]   HISTORY AND PHYSICAL    Patient Name:Angel Remy  : 1945  MRN: [id number]  Primary Care Physician: Damián Vuong Jr., MD  Date of admission: 10/18/2023    Subjective   Subjective     Chief Complaint: Recurrent hairy cell leukemia    History of Present Illness   Angel Remy is a 77 y.o. male with recurrent hairy cell leukemia in need of central venous access via Mediport.  Patient understands risk benefits complications of placement and agrees to procedure    Review of Systems no new complaints    Personal History     Past Medical History:   Diagnosis Date    BCC (basal cell carcinoma of skin) 2022    NOSE    Benign prostate hyperplasia     H/O Elevated PSA     H/O Hairy cell leukemia (CMS/HCC)     H/O Internal thrombosed hemorrhoids     H/O minimal right carotid plaque     H/O peripheral neuropathy     Hyperlipidemia     Hypertension     Primary open-angle glaucoma, bilateral, mild stage        Past Surgical History:   Procedure Laterality Date    BONE MARROW BIOPSY W/ ASPIRATION      COLONOSCOPY      COLONOSCOPY N/A 2021    Procedure: COLONOSCOPY TO CECUM AND INTO TI WITH COLD BIOPSY POLYPECTOMIES;  Surgeon: Juan Jenkins MD;  Location: [facility] ENDOSCOPY;  Service: Gastroenterology;  Laterality: N/A;  pre: family history of colon cancer  post: polyps, diverticulosis  and internal hemorrhoids    EXCISION MASS HEAD/NECK N/A 2022    Procedure: EXCISION BASAL CELL CARCINOMA NOSE WITH FROZEN SECTION FULL THICKNESS GRAFT;  Surgeon: Arturo Weiss MD;  Location: [facility] OR [facility];  Service: Plastics;  Laterality: N/A;    HERNIA REPAIR Right 2000    Inguinal     OTHER SURGICAL HISTORY      Median nerve foreign body excision    PROSTATE BIOPSY      2010 and 2011 whan PSA had acceleration    SIGMOIDOSCOPY      With small thrombosed internal hemorrhoid.    TONSILLECTOMY      WISDOM TOOTH EXTRACTION         Family History: His family  history includes Cancer in his father; Cancer (age of onset: 90) in his mother; Hypertension in his father, mother, and sister.     Social History: He  reports that he has quit smoking. His smoking use included cigarettes. He smoked an average of .5 packs per day. He has never used smokeless tobacco. He reports current alcohol use. He reports that he does not use drugs.    Home Medications:  Calcium-Magnesium-Vitamin D, Hepatitis B Vac Recombinant, Magnesium, acyclovir, amLODIPine, bimatoprost, bisoprolol, bisoprolol-hydrochlorothiazide, chlorhexidine, finasteride, furosemide, niacin, potassium chloride, propafenone, ramipril, rosuvastatin, sodium chloride, tamsulosin, vitamin C, and vitamin D3    Allergies:  He has No Known Allergies.    Objective    Objective     Vitals:    Temp:  [98.2 °F (36.8 °C)] 98.2 °F (36.8 °C)  Heart Rate:  [60] 60  Resp:  [16] 16  BP: (182)/(79) 182/79    Physical Exam    Lungs clear   Abd benign  Extremities viable    Result Review    Result Review:  I have personally reviewed the results from the time of this admission to 10/18/2023 15:49 EDT and agree with these findings:  []  Laboratory list / accordion  []  Microbiology  []  Radiology  []  EKG/Telemetry   []  Cardiology/Vascular   []  Pathology  []  Old records  []  Other:  Most notable findings include: None       Assessment & Plan   Assessment / Plan     Brief Patient Summary:  Angel Remy is a 77 y.o. male with recurrent hairy cell leukemia needing port for chemotherapy.  He understands risk benefits complications and is agreeable.    Active Hospital Problems:  Active Hospital Problems    Diagnosis     Hairy cell leukemia      Plan:   Mediport placement        Randy Fan MD

## 2023-10-18 NOTE — ANESTHESIA POSTPROCEDURE EVALUATION
"Patient: Angel Remy    Procedure Summary       Date: 10/18/23 Room / Location: [facility] OR  / [facility] MAIN OR    Anesthesia Start: 1619 Anesthesia Stop: 1727    Procedure: MEDIPORT PLACEMENTWITH SUPERIOR VENACAVAGRAM (Right: Chest) Diagnosis:     Surgeons: Randy Fan MD Provider: Mansoor Smart MD    Anesthesia Type: MAC ASA Status: 3            Anesthesia Type: MAC    Vitals  Vitals Value Taken Time   /52 10/18/23 1730   Temp     Pulse 52 10/18/23 1735   Resp 16 10/18/23 1730   SpO2 100 % 10/18/23 1735   Vitals shown include unfiled device data.        Post Anesthesia Care and Evaluation    Patient location during evaluation: bedside  Patient participation: complete - patient participated  Level of consciousness: sleepy but conscious  Pain management: adequate    Airway patency: patent  Anesthetic complications: No anesthetic complications    Cardiovascular status: acceptable  Respiratory status: acceptable  Hydration status: acceptable    Comments: */52 (BP Location: Right arm, Patient Position: Lying)   Pulse 51   Temp 36.8 °C (98.2 °F) (Oral)   Resp 16   Ht 177.8 cm (70\")   Wt 70 kg (154 lb 5.2 oz)   SpO2 100%   BMI 22.14 kg/m²       "

## 2023-10-18 NOTE — TELEPHONE ENCOUNTER
Caller: Angel Remy    Relationship: Self    Best call back number: [phone]      What was the call regarding: PT CALLED TO GET A SCRIPT FOR HIS SECOND HEP B INJECTION SENT TO  PHARMACY AND ALSO WANTED TO KNOW TREATMENT PLAN    PLEASE CALL TO ADVISE

## 2023-10-18 NOTE — ANESTHESIA PREPROCEDURE EVALUATION
Anesthesia Evaluation     Patient summary reviewed and Nursing notes reviewed   no history of anesthetic complications:   NPO Solid Status: > 8 hours  NPO Liquid Status: > 2 hours           Airway   Mallampati: II  TM distance: >3 FB  Neck ROM: full  Dental - normal exam     Pulmonary - normal exam   (+) a smoker Former,  (-) COPD, asthma  Cardiovascular   Exercise tolerance: good (4-7 METS)    ECG reviewed  Patient on routine beta blocker and Beta blocker given within 24 hours of surgery  Rhythm: regular    (+) hypertension, dysrhythmias (h/o a fib), hyperlipidemia,  carotid artery disease (mild R carotid plaque) right carotid  (-) past MI, angina, cardiac stents    ROS comment: Echo 07/2023:   ·  Left ventricular systolic function is normal. Calculated left ventricular EF = 60.5%  ·  Left ventricular diastolic function was indeterminate.  ·  The left atrial cavity is moderately dilated.  ·  Left atrial volume is moderately increased.  ·  Estimated right ventricular systolic pressure from tricuspid regurgitation is normal (<35 mmHg). Calculated right ventricular systolic pressure from tricuspid regurgitation is 34 mmHg.    Normal Stress 07/2023    Neuro/Psych  (-) seizures, CVA    ROS Comment: Glaucoma  GI/Hepatic/Renal/Endo    (-) GERD, liver disease, no renal disease    ROS Comment: H/o SIADH - controlled    Musculoskeletal     Abdominal    Substance History - negative use     OB/GYN          Other   blood dyscrasia (Pancytopenia; Hairy cell leukemia),   history of cancer (Hairy T-cell leukemia)                  Anesthesia Plan    ASA 3     MAC     (I have reviewed the patient's history and chart with the patient, including all pertinent laboratory results and imaging. I have explained the risks of monitored anesthesia care including but not limited to respiratory depression, possible need for airway intervention, or possible intra-op recall. I explained that airway intervention involves risk of possible dental  "injury.    VITALS:  BP (!) 182/79 (BP Location: Right arm, Patient Position: Lying)   Pulse 60   Temp 36.8 °C (98.2 °F) (Oral)   Resp 16   Ht 177.8 cm (70\")   Wt 70 kg (154 lb 5.2 oz)   SpO2 100%   BMI 22.14 kg/m² )  intravenous induction     Anesthetic plan, risks, benefits, and alternatives have been provided, discussed and informed consent has been obtained with: patient.  Pre-procedure education provided      CODE STATUS:       "

## 2023-10-18 NOTE — DISCHARGE INSTRUCTIONS
[facility]  Campos Hernandez, Tyrell, Christin Fan,Marcos, Kevin  [address]  [place] [id number] [phone]  Discharge Instructions for Port Placement    Go home, rest and take it easy today.    You may experience some dizziness or memory loss from the anesthesia.  This may last for the next 24 hours.  Someone should plan on staying with you for the first 24 hours for your safety.  Do not make any important legal decisions or sign any legal papers for the next 24 hours.    Eat and drink lightly today.  Start off with liquids, jello, soup, crackers or other bland foods at first. You may advance your diet tomorrow as tolerated as long as you do not experience any nausea or vomiting.   If skin glue was used, your incision will be open to air.  No care is required.  If you have a dressing, you may remove it in 2-3 days or until your first treatment whichever comes first. If you have the little white tapes known as steri-strips, leave them alone.  They usually fall off in 1-2 weeks.  Do not worry if they come off sooner.   You may notice some bleeding/drainage. A little bloody drainage is normal.  Some bruising is also normal.  You may shower tomorrow.  No tub baths until your incisions are completely healed.  You have received a prescription for a narcotic pain medicine, as you may have some pain/discomfort following surgery.   You will not be totally pain free, but your pain medicine should make the pain tolerable.  Please take your pain medicine as prescribed and always take your pills with food to prevent nausea. If you are having severe pain that cannot be controlled by the pain medicine, please contact me.  If the pain is such that narcotic pain medicine is not required, you may take Tylenol or Ibuprofen as directed unless indicated otherwise.    No driving for 24 hours and for as long as you are taking your prescription pain medicine.      Remember to contact me for any of  the following:    Fever> 101 degrees  Severe pain that cannot be controlled by taking your pain pills  Severe nausea or vomiting   Significant bleeding from your incision  Drainage that has a bad smell or is yellow or green in appearance  Any other questions or concerns

## 2023-10-19 ENCOUNTER — TELEPHONE (OUTPATIENT)
Dept: ONCOLOGY | Facility: CLINIC | Age: 78
End: 2023-10-19
Payer: MEDICARE

## 2023-10-19 ENCOUNTER — TELEMEDICINE (OUTPATIENT)
Dept: ONCOLOGY | Facility: CLINIC | Age: 78
End: 2023-10-19
Payer: MEDICARE

## 2023-10-19 DIAGNOSIS — C91.40 HAIRY CELL LEUKEMIA NOT HAVING ACHIEVED REMISSION: Primary | ICD-10-CM

## 2023-10-19 PROBLEM — Z45.2 FITTING AND ADJUSTMENT OF VASCULAR CATHETER: Status: ACTIVE | Noted: 2023-10-19

## 2023-10-19 RX ORDER — HEPARIN SODIUM (PORCINE) LOCK FLUSH IV SOLN 100 UNIT/ML 100 UNIT/ML
500 SOLUTION INTRAVENOUS AS NEEDED
Status: CANCELLED | OUTPATIENT
Start: 2023-10-19

## 2023-10-19 RX ORDER — ONDANSETRON HYDROCHLORIDE 8 MG/1
8 TABLET, FILM COATED ORAL 3 TIMES DAILY PRN
Qty: 30 TABLET | Refills: 5 | Status: SHIPPED | OUTPATIENT
Start: 2023-10-19

## 2023-10-19 RX ORDER — SULFAMETHOXAZOLE AND TRIMETHOPRIM 800; 160 MG/1; MG/1
1 TABLET ORAL 3 TIMES WEEKLY
Qty: 12 TABLET | Refills: 7 | Status: SHIPPED | OUTPATIENT
Start: 2023-10-19

## 2023-10-19 RX ORDER — ACYCLOVIR 400 MG/1
400 TABLET ORAL 2 TIMES DAILY
Qty: 60 TABLET | Refills: 7 | Status: SHIPPED | OUTPATIENT
Start: 2023-10-19

## 2023-10-19 RX ORDER — SODIUM CHLORIDE 0.9 % (FLUSH) 0.9 %
10 SYRINGE (ML) INJECTION AS NEEDED
Status: CANCELLED | OUTPATIENT
Start: 2023-10-19

## 2023-10-19 NOTE — PROGRESS NOTES
[facility] Hematology/Oncology Treatment Plan Summary    Name: Angel Remy  Acct# [id number]  MD: Dr. 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     Goal of treatment: disease control    Treatment Medication(s):   Cladribine day 1 through 5  Weekly Rituxan beginning 2 to 4 weeks after cladribine pending blood counts    Number of cycles: As above    Starting on: Monday, 10/23/2023      Rx written for: [x] Nausea    [] Pre-Treatment   acyclovir 400 mg by mouth twice daily , Bactrim DS 1 tab by mouth on Mondays, Wednesdays, and Fridays, and ondansetron 8 mg by mouth every 8 hours as needed for nausea    Notes:   Claritin 10 mg daily 48 hours prior to Neulasta continue for 7-10 days    Next Steps: PORT     Completing Provider: CHANTE Gallo             Date/time: 10/19/2023      Please note: You will be seen by a provider frequently with your treatment plan. This plan may change depending on many factors, if so, this will be discussed with you by your physician.  Last update 03/2022.

## 2023-10-19 NOTE — TELEPHONE ENCOUNTER
Caller: Angel Remy    Relationship to patient: Self    Best call back number: [phone]       Additional notes:PATIENT REQUESTING ORDERS FOR 2ND  HEPATITUS B VACCINE TO BE SENT TO [facility] TO COMPLETE SUNDAY         Patient

## 2023-10-19 NOTE — TELEPHONE ENCOUNTER
Called the patient and he stated that the last time he got the Hep B vaccine he paid $74 out of pocket and that the pharmacy told him if we ordered this for him he would not pay this much. He wanted tosee if we would send the order over. I let him know I would talk with Dr. Jeffers and let him know. He v/u.

## 2023-10-19 NOTE — PROGRESS NOTES
TREATMENT  PREPARATION    Angel Remy  [id number]  1945    Chief Complaint: Treatment preparation and needs assessment    History of present illness:  Angel Remy is a 77 y.o. year old male who is here today for treatment preparation and needs assessment.  The patient has been diagnosed with   Encounter Diagnosis   Name Primary?    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     Past Medical History, Past Surgical History, Social History, Family History have been reviewed and are without significant changes except as mentioned.    Physical Exam:    There were no vitals filed for this visit.  Vitals:    10/19/23 1230   PainSc: 0-No pain        ECOG score: 0             Physical Exam  HENT:      Head: Normocephalic and atraumatic.   Eyes:      Extraocular Movements: Extraocular movements intact.      Conjunctiva/sclera: Conjunctivae normal.   Pulmonary:      Effort: Pulmonary effort is normal.   Neurological:      General: No focal deficit present.      Mental Status: He is alert and oriented to person, place, and time.   Psychiatric:         Mood and Affect: Mood normal.         Behavior: Behavior normal.       I have reexamined the patient and the results are consistent with the previously documented exam. CHANTE Gallo      NEEDS ASSESSMENTS    Genetics  The patient's new diagnosis and family history have been reviewed for genetic counseling needs. The patient will not be referred..     Psychosocial and Barriers to care  The patient has  completed a PHQ-9 Depression Screening and the Distress Thermometer (DT) today.  PHQ-9 results show PHQ-2 Total Score:   PHQ-9 Total Score: PHQ-9 Total Score:       The patient scored their distress today as Distress Level: 0-No distress on a scale of 0-10 with 0 being no distress and 10 being extreme distress. Problems marked by the patient as being an issue for them within the last week include   .      Results were reviewed along with psychosocial resources offered by our cancer center.  Our Supportive Oncology team will be flagged for a score of 4 or above, and a same day call will be made for a score of 9 or 10.  A mental health referral is offered at that time. Patients who score less than 4 have been educated on our support services and can be referred to our  upon request.  The patient will not be referred to our .       Nutrition  The patient has completed the malnutrition screening today. They scored Malnutrition Screening Tool  Have you recently lost weight without trying?  If yes, how much weight have you lost?: 2--> Unsure  Have you been eating poorly because of a decreased appetite?: 0--> No  MST score: 2   with a score of 0-1 meaning not at risk in a score of 2 or greater meaning at risk.  Patients with a score of 3 or higher will be referred to our oncology dietitian for support. Patients beginning at risk treatment regimens or who have dietary concerns will also be referred to our oncology dietitian. The patient will not be referred.    Functional Assessment  Persons who are age 70 or greater will be screened for qualification of a comprehensive geriatric assessment by our survivorship nurse practitioner.  Older adults with cancer face unique challenges. These may include an increased risk of drug reactions, financial burdens, and caregiver stress. The patient scored   . Patients scoring 14 or lower will referred for an older adult functional assessment with the  "survivorship advanced practice registered nurse to ensure all needed support is provided as patients plan for their treatments. The patient will not be referred.    Intravenous Access Assessment  The patient and I discussed planned intravenous chemo/biotherapy as well as other IV treatments that are often needed throughout the course of treatment. These may include, but are not limited to blood transfusions, antibiotics, and IV hydration. Discussed that depending on selected treatment and vein assessment, patient may require venous access device (VAD) which could include but not limited to a Mediport or PICC line. Risks and benefits of VADs reviewed. The patient will be treated via Port.    Reproductive/Sexual Activity   People should avoid becoming pregnant and should not get a partner pregnant while undergoing chemo/biotherapy.  People of childbearing age should use effective contraception during active therapy. The best recommendation for all people is to use a barrier method for a minimum of 1 week after the last infusion of chemo/biotherapy to prevent your partner being exposed to byproducts from treatment medications in bodily fluids. Effective contraception should be discussed with your oncology team to make sure it is safe to take based on your diagnosis. Possible options include oral contraceptives, barrier methods. Chemo/biotherapy can change your ability to reproduce children in the future.  There are options for fertility preservation. NOT APPLICABLE    Advanced Care Planning  Advance Care Planning   The patient and I discussed advanced care planning, \"Conversations that Matter\".   This service is offered for development of advance directives with a certified ACP facilitator.  The patient does not have an up-to-date advanced directive. This document is not on file with our office. The patient is not interested in an appointment with one of our facilitators to create or update their advanced directives.  "   Have you reviewed your Advance Directive and is it valid for this stay?: Not applicable          Smoking cessation  Tobacco Use: Medium Risk (10/19/2023)    Patient History     Smoking Tobacco Use: Former     Smokeless Tobacco Use: Never     Passive Exposure: Not on file       Patient and I discussed their tobacco use history. Referral will not be made for smoking cessation.      Palliative Care  When appropriate, the patient and I discussed the availability palliative care services and when appropriate Hospice care. Palliative care is not the same as Hospice care which was explained to the patient.NOT APPLICABLE.    Survivorship   When appropriate, we discussed that we will refer the patient to survivorship clinic to discuss next steps following completion of planned treatment.  Reviewed this visit will include assessment of your physical, psychological, functional, and spiritual needs as a survivor and the need at attend this visit when scheduled.    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  The patient was given explanation of treatment premed side effects including office policy that prohibits patients to drive if sedating medications are administered, MD explanation given regarding benefits, side effects, toxicities and goals of treatment.  The patient received a Chemotherapy/Biotherapy Plan Summary including diagnosis and explanation of specific treatment plan.    SIDE EFFECTS:  Common side effects were discussed with the patient and/or significant other.  Discussion included where applicable hair loss/discoloration, anemia/fatigue, infection/chills/fever, appetite, bleeding risk/precautions, constipation, diarrhea, mouth sores,  taste alteration, loss of appetite, nausea/vomiting, peripheral neuropathy, skin/nail changes, rash, muscle aches/weakness, photosensitivity, weight gain/loss, hearing loss, dizziness, menopausal symptoms, menstrual irregularity, sterility, high blood pressure, heart damage, liver damage, lung damage, kidney damage, DVT/PE risk, fluid retention, pleural/pericardial effusion, somnolence, electrolyte/LFT imbalance, vein exercises and/or the possible need for vascular access/port placement.  The patient was advised that although uncommon, leakage of an infused medication from the vein or venous access device may lead to skin breakdown and/or other tissue damage.  The patient was advised that he/she may have pain, bleeding, and/or bruising from the insertion of a needle in their vein or venous access device (port).  The patient was further advised that, in spite of proper technique, infection with redness and irritation may rarely occur at the site where the needle was inserted.      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  Dispense: 60 tablet; Refill: 7  -     sulfamethoxazole-trimethoprim (BACTRIM DS,SEPTRA DS) 800-160 MG per tablet; Take 1 tablet by mouth 3 (Three) Times a Week.  Dispense: 12 tablet; Refill: 7  -      ondansetron (ZOFRAN) 8 MG tablet; Take 1 tablet by mouth 3 (Three) Times a Day As Needed for Nausea or Vomiting.  Dispense: 30 tablet; Refill: 5      No orders of the defined types were placed in this encounter.    The patient and I have reviewed their diagnosis and scheduled treatment plan. Needs assessment was completed where applicable including genetics, psychosocial needs, barriers to care, VAD evaluation, advanced care planning, survivorship, and palliative care services where indicated. Referrals have been ordered as appropriate based upon evaluation today and patient desires.   Chemo/biotherapy teaching was completed today and consent obtained. See separate documentation for further details.  Adequate time was given to answer questions.  Patient made aware of their care team members and contact information if they have questions or problems throughout the treatment course.  Discussion held and written information provided describing frequency of office visits and ongoing monitoring throughout the treatment plan.     Reviewed with patient any prescribed medication sent to pharmacy.  Education provided regarding proper storage, safe handling, and proper disposal of unused medication.  Proper handling of body fluids and waste discussed and written information provided.  If appropriate, patient had pretreatment labs drawn today.    Learning assessment completed at initial patient encounter. See separate flowsheet. Chemo/biotherapy education comprehension assessed at today's visit.    Mode of Visit: Video  Location of patient: home  Location of provider: [facility]  You have chosen to receive care through a telehealth visit.  Does the patient consent to use a video/audio connection for your medical care today? Yes  The visit included audio and video interaction.  I was able to visualize and hear the patient though he could not hear me.  We therefore performed audio through the telephone.    I spent 35 minutes  caring for Angel on this date of service. This time includes time spent by me in the following activities: preparing for the visit, reviewing tests, counseling and educating the patient/family/caregiver, ordering medications, tests, or procedures, referring and communicating with other health care professionals, documenting information in the medical record, and care coordination.     Sri Arnold, APRN   10/19/23

## 2023-10-23 ENCOUNTER — OFFICE VISIT (OUTPATIENT)
Dept: ONCOLOGY | Facility: CLINIC | Age: 78
End: 2023-10-23
Payer: MEDICARE

## 2023-10-23 ENCOUNTER — INFUSION (OUTPATIENT)
Dept: ONCOLOGY | Facility: HOSPITAL | Age: 78
End: 2023-10-23
Payer: MEDICARE

## 2023-10-23 VITALS
HEART RATE: 64 BPM | RESPIRATION RATE: 16 BRPM | DIASTOLIC BLOOD PRESSURE: 92 MMHG | OXYGEN SATURATION: 98 % | TEMPERATURE: 98.2 F | BODY MASS INDEX: 22.63 KG/M2 | WEIGHT: 157.7 LBS | SYSTOLIC BLOOD PRESSURE: 176 MMHG

## 2023-10-23 DIAGNOSIS — C91.40 HAIRY CELL LEUKEMIA NOT HAVING ACHIEVED REMISSION: ICD-10-CM

## 2023-10-23 DIAGNOSIS — C91.40 HAIRY CELL LEUKEMIA NOT HAVING ACHIEVED REMISSION: Primary | ICD-10-CM

## 2023-10-23 DIAGNOSIS — Z45.2 FITTING AND ADJUSTMENT OF VASCULAR CATHETER: ICD-10-CM

## 2023-10-23 LAB
ALBUMIN SERPL-MCNC: 3.7 G/DL (ref 3.5–5.2)
ALBUMIN/GLOB SERPL: 1.4 G/DL
ALP SERPL-CCNC: 37 U/L (ref 39–117)
ALT SERPL W P-5'-P-CCNC: 19 U/L (ref 1–41)
ANION GAP SERPL CALCULATED.3IONS-SCNC: 12.7 MMOL/L (ref 5–15)
AST SERPL-CCNC: 21 U/L (ref 1–40)
BASOPHILS # BLD AUTO: 0.01 10*3/MM3 (ref 0–0.2)
BASOPHILS NFR BLD AUTO: 0.4 % (ref 0–1.5)
BILIRUB SERPL-MCNC: 0.8 MG/DL (ref 0–1.2)
BUN SERPL-MCNC: 27 MG/DL (ref 8–23)
BUN/CREAT SERPL: 33.3 (ref 7–25)
CALCIUM SPEC-SCNC: 9.1 MG/DL (ref 8.6–10.5)
CHLORIDE SERPL-SCNC: 103 MMOL/L (ref 98–107)
CO2 SERPL-SCNC: 23.3 MMOL/L (ref 22–29)
CREAT SERPL-MCNC: 0.81 MG/DL (ref 0.7–1.3)
DEPRECATED RDW RBC AUTO: 47.4 FL (ref 37–54)
EGFRCR SERPLBLD CKD-EPI 2021: 90.8 ML/MIN/1.73
EOSINOPHIL # BLD AUTO: 0.02 10*3/MM3 (ref 0–0.4)
EOSINOPHIL NFR BLD AUTO: 0.8 % (ref 0.3–6.2)
ERYTHROCYTE [DISTWIDTH] IN BLOOD BY AUTOMATED COUNT: 12.9 % (ref 12.3–15.4)
GLOBULIN UR ELPH-MCNC: 2.7 GM/DL
GLUCOSE SERPL-MCNC: 119 MG/DL (ref 65–99)
HCT VFR BLD AUTO: 34.7 % (ref 37.5–51)
HGB BLD-MCNC: 11.9 G/DL (ref 13–17.7)
IMM GRANULOCYTES # BLD AUTO: 0.01 10*3/MM3 (ref 0–0.05)
IMM GRANULOCYTES NFR BLD AUTO: 0.4 % (ref 0–0.5)
LYMPHOCYTES # BLD AUTO: 1.13 10*3/MM3 (ref 0.7–3.1)
LYMPHOCYTES NFR BLD AUTO: 43.3 % (ref 19.6–45.3)
MCH RBC QN AUTO: 34.5 PG (ref 26.6–33)
MCHC RBC AUTO-ENTMCNC: 34.3 G/DL (ref 31.5–35.7)
MCV RBC AUTO: 100.6 FL (ref 79–97)
MONOCYTES # BLD AUTO: 0.06 10*3/MM3 (ref 0.1–0.9)
MONOCYTES NFR BLD AUTO: 2.3 % (ref 5–12)
NEUTROPHILS NFR BLD AUTO: 1.38 10*3/MM3 (ref 1.7–7)
NEUTROPHILS NFR BLD AUTO: 52.8 % (ref 42.7–76)
NRBC BLD AUTO-RTO: 0 /100 WBC (ref 0–0.2)
PLATELET # BLD AUTO: 78 10*3/MM3 (ref 140–450)
PMV BLD AUTO: 9.8 FL (ref 6–12)
POTASSIUM SERPL-SCNC: 4.5 MMOL/L (ref 3.5–5.2)
PROT SERPL-MCNC: 6.4 G/DL (ref 6–8.5)
RBC # BLD AUTO: 3.45 10*6/MM3 (ref 4.14–5.8)
SODIUM SERPL-SCNC: 139 MMOL/L (ref 136–145)
WBC NRBC COR # BLD: 2.61 10*3/MM3 (ref 3.4–10.8)

## 2023-10-23 PROCEDURE — 25810000003 SODIUM CHLORIDE 0.9 % SOLUTION: Performed by: INTERNAL MEDICINE

## 2023-10-23 PROCEDURE — 96413 CHEMO IV INFUSION 1 HR: CPT

## 2023-10-23 PROCEDURE — 80053 COMPREHEN METABOLIC PANEL: CPT

## 2023-10-23 PROCEDURE — 25010000002 HEPARIN LOCK FLUSH PER 10 UNITS: Performed by: INTERNAL MEDICINE

## 2023-10-23 PROCEDURE — 96415 CHEMO IV INFUSION ADDL HR: CPT

## 2023-10-23 PROCEDURE — 96417 CHEMO IV INFUS EACH ADDL SEQ: CPT

## 2023-10-23 PROCEDURE — 85025 COMPLETE CBC W/AUTO DIFF WBC: CPT

## 2023-10-23 PROCEDURE — 25010000002 CLADRIBINE 10 MG/10ML SOLUTION 10 ML VIAL: Performed by: INTERNAL MEDICINE

## 2023-10-23 PROCEDURE — 25810000003 SODIUM CHLORIDE 0.9 % SOLUTION 500 ML FLEX CONT: Performed by: INTERNAL MEDICINE

## 2023-10-23 RX ORDER — SODIUM CHLORIDE 9 MG/ML
250 INJECTION, SOLUTION INTRAVENOUS ONCE
Status: COMPLETED | OUTPATIENT
Start: 2023-10-23 | End: 2023-10-23

## 2023-10-23 RX ORDER — SODIUM CHLORIDE 0.9 % (FLUSH) 0.9 %
10 SYRINGE (ML) INJECTION AS NEEDED
Status: DISCONTINUED | OUTPATIENT
Start: 2023-10-23 | End: 2023-10-23 | Stop reason: HOSPADM

## 2023-10-23 RX ORDER — SODIUM CHLORIDE 9 MG/ML
250 INJECTION, SOLUTION INTRAVENOUS ONCE
Status: CANCELLED | OUTPATIENT
Start: 2023-10-23

## 2023-10-23 RX ORDER — SODIUM CHLORIDE 9 MG/ML
250 INJECTION, SOLUTION INTRAVENOUS ONCE
Status: CANCELLED | OUTPATIENT
Start: 2023-10-24

## 2023-10-23 RX ORDER — HEPARIN SODIUM (PORCINE) LOCK FLUSH IV SOLN 100 UNIT/ML 100 UNIT/ML
500 SOLUTION INTRAVENOUS AS NEEDED
Status: CANCELLED | OUTPATIENT
Start: 2023-10-23

## 2023-10-23 RX ORDER — SODIUM CHLORIDE 9 MG/ML
250 INJECTION, SOLUTION INTRAVENOUS ONCE
Status: CANCELLED | OUTPATIENT
Start: 2023-10-25

## 2023-10-23 RX ORDER — SODIUM CHLORIDE 9 MG/ML
250 INJECTION, SOLUTION INTRAVENOUS ONCE
Status: CANCELLED | OUTPATIENT
Start: 2023-10-26

## 2023-10-23 RX ORDER — SODIUM CHLORIDE 0.9 % (FLUSH) 0.9 %
10 SYRINGE (ML) INJECTION AS NEEDED
Status: CANCELLED | OUTPATIENT
Start: 2023-10-23

## 2023-10-23 RX ORDER — SODIUM CHLORIDE 9 MG/ML
250 INJECTION, SOLUTION INTRAVENOUS ONCE
Status: CANCELLED | OUTPATIENT
Start: 2023-10-27

## 2023-10-23 RX ORDER — HEPARIN SODIUM (PORCINE) LOCK FLUSH IV SOLN 100 UNIT/ML 100 UNIT/ML
500 SOLUTION INTRAVENOUS AS NEEDED
Status: DISCONTINUED | OUTPATIENT
Start: 2023-10-23 | End: 2023-10-23 | Stop reason: HOSPADM

## 2023-10-23 RX ADMIN — CLADRIBINE 10 MG: 1 INJECTION INTRAVENOUS at 13:48

## 2023-10-23 RX ADMIN — SODIUM CHLORIDE 250 ML: 9 INJECTION, SOLUTION INTRAVENOUS at 13:48

## 2023-10-23 RX ADMIN — Medication 10 ML: at 15:57

## 2023-10-23 RX ADMIN — Medication 500 UNITS: at 15:57

## 2023-10-23 NOTE — LETTER
October 23, 2023     Damián Vuong Jr., MD  [address]  [address] [address]    Patient: Angel Remy   YOB: 1945   Date of Visit: 10/23/2023     Dear Damián Vuong Jr., MD:       Thank you for referring Angel Remy to me for evaluation. Below are the relevant portions of my assessment and plan of care.    If you have questions, please do not hesitate to call me. I look forward to following Angel along with you.         Sincerely,        Paco Jeffers MD        CC: No Recipients    Paco Jeffers MD  10/23/23 1402  Sign when Signing Visit    SubjectivePatient, again, with excellent performance status, no additional symptoms    REASONS FOR FOLLOWUP:   Hairy cell leukemia.   Admission 05/11/2012 through 05/17/12 for 7 day continuous infusion cladribine (2-CdA) without complication.   Patient seen 5 months post treatment in complete remission.   Patient seen 8 months post treatment with continued complete remission, every 3 month reassessment per CBC planned, 6 month review by MD scheduled.   Patient seen at 15 months without evidence of recurrent disease, every 6 month followup planned.   The patient was seen 02/05/2014 with no evidence of recurrent disease, 6-month followup planned.   Patient seen 07/31/2014, stable with no evidence of recurrent disease, every 6 month followup.   Patient seen 01/15/2015, stable without recurrent disease, 6-month CBC planned, yearly followup scheduled.   Patient seen 02/04/2016, stable, ?early peripheral neuropathy developing distally per feet, no evidence for recurrent disease per hairy cell leukemia.  Patient reviewed September 02, 2016, previous July CBC with mild thrombocytopenia developing, recheck September 2 normalized, every 6 month follow-ups anticipated  Patient reviewed February 02, 2017, status post negative prostate biopsy, mild thrombocytopenia again recognized  Patient reviewed July 20, 2017, hematologically  stable, every 6 month follow-up CBCs planned, yearly M.D. Assessment  Patient seen July 26, 2018 after recent airline travel any cold symptoms  Patient reviewed January 10, 2019, no additional symptoms, hematologically stable  Patient reviewed July 25, 2019, stable, six-month follow-up as planned  Patient viewed January 16, 2020, stable, every 6 months CBC, MD assessment yearly planned  Reassessment January 21, 2021, no evidence of recurrence disease  Patient assessed 2/10/2022, evidence of continued remission noted both on clinical examination, peripheral blood smear review and flow cytometric evaluation.  Patient review 2/23/2023 mild thrombocytopenia, no new abnormalities per peripheral smear, close for follow-up planned  Patient assessed 9/14/2023, concern for early relapse.  Subsequent marrow 10/12/2023-variably hypercellular with diffuse involvement-a 90%-hairy cell leukemia relapse.  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. He is usually followed by Dr. Vuong for a history of hypertension, hyperlipidemia, minimal right carotid plaque as well as elevated PSA.       He had exams done on 1-09-12 for routine followup. This included normal serum chemistry including LFTs, cholesterol 131, triglyceride 34, HDL 51, and LDL 73. CBC revealed H&H 13.3, 37.9%, WBC 3300, platelet count 110,000, 26% polys and 74% lymphs with A NC of 0.9.       Dr. Remy provides information when initially seen with studies from as far back as 1- at which time hemoglobin and hematocrit was 14.4 and 42.1%, WBC 6950, platelets 227,000 with 47% polys, 42% lymph; this was automated. The additional test includ es approximately every year to every other year thereafter though it was  noted in January 2010 WBC dropped from an average of approximately 5,000-6,000 to 3600. Hemoglobin and hematocrit 13.6 and 39.7%. Platelets 150,000-170,000 with ANC beginning in Ja nuary 2010 at 1900.       We were contacted per the above findings and asked Dr. Remy to undergo further assessment including flow cytometric exam per peripheral blood. This revealed no evidence of abnormal myeloid maturation increased blast population. No evidence of lymphopro l iferative disorder. The patient was asked to be seen formally in the office now and comes in today for further assessment. Dr. Remy indicates that he has taken a number of medications in an attempt to improve his health. Several homeopathic medication s include vitamin C, folate, flaxseed oil, fish oil, vitamin E, selenium, pomegranate tabs, Co-enzyme Q, vitamin D, niacin, and red yeast rice daily. He has discontinued these and stays on those described below.       He feels well with no additional problem s and states that he has no little or no symptoms that he can detail, whether this is just ill health in anyway, though he does have occasional hemorrhoidal pain. He also notes rare palpitations and occasional epistaxis when the ambient air has dried sub stantially. No fevers, chills, weight loss, night sweats, or other constitutional symptoms.       As a result of the above, the patient was asked to undergo a series of studies. This went onto include rheumatoid factor of 6, MARYURI screen negative, negative s misael protein electrophoresis, normal quantitative immunoglobulins, CMV IgG of 2.9, IgM less than .9, EBV IgM of less than .9 and BCA IgG antibody of 4.6. These are consistent with previous exposures. HIV was negative, CRSP less than .1, sedimentation ra t e of 4, iron 125, TIBC 297, 14% saturation, and ferritin of 77. Peripheral blood flow cytometry was negative for evidence of abnormal myeloid maturation, increased blast population or lymphoproliferative  disorder. As the patient is seen back today, he i s continuing to feel well though is obviously greatly concerned about his followup counts. Reviews in the office had been planned to follow him briefly recheck performance 3/29/12 with an H&H of 12.9, 36.1, WBC 3900, 32 polys, 62 lymphs, platelet count 94 , 000, IPF slightly elevated at 8.8. The patient was therefore asked to return and to be further assessed with bone marrow aspirate and biopsy. He now presents to do so. He has had no additional symptoms since last seen. He has discontinued his Crestor use.       As a result of his review the patient underwent bone marrow aspiration and biopsy. This revealed evidence of lymphocytic infiltrate reviewed here in the office. 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 Eventually cytogenetics was found to be negative with no evidence of cyclin-D1/IgH or BCL-2/IgH rearrangement. Additional tissue was requested. As a result, the patient was notifi e edward of these findings by personal visit, and plans were made to proceed with endoscopy, ? mantle cell involvement. At the time of this dictation, this was not yet performed. He was also asked to undergo CT of the neck, chest, abdomen and pelvis which show ed no evidence of abnormality. PET scan 04/13/12 showed increased activity within the marrow, but no additional abnormalities including the spleen, with no background bowel or gastric activity as well.       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 hairy cell leukemia. This has been discussed in great detail with the alexander thakur at [facility] and additional stains are pending as is the patient's GI assessment now to be pursued  through Dr. Ray.       His case was again reviewed over quite a period of time, and ultimately his marrow was signed out as 80% involved with hairy cell leukemia - normal cytogenetics, no evidence of cyclin D1/IgH or BCL-2/IgH rearrangement. Dr. Remy was advised of the treatment options which include followup with no immediate therapy, and/or the use of cladribine or pentostatin. After numerous di s cussion, he ultimately elected to try to proceed with treatment when he is feeling as well as he is to improve his ability to withstand the therapy itself. We therefore began to discuss the treatment schedule, which also could be somewhat variable, inclu d ing 7-day infusions, 2-hr infusion q.d. x five days or every other week infusion. After discussion he wishes to proceed with 7-day infusion. When we attempted to do this as an outpatient we found out thereafter that this would not be covered as an outpa tient infusion through his insurance. Therefore we have made plans for him to be admitted after he is seen in office 05/10/12.       The patient was thereafter admitted 05/11-05/17/12. PICC line was placed and he initiated treatment at 0.1 mg/kg or 7.7 mg IV in fusion over seven days. The patient fortunately had no serious issues at all during the seven days and continued to work out on a daily basis. He was seen by his internist as well with some of his meds adjusted including his HCTZ and Ziac. He was stabl e at discharge, but was given Neulasta 6 mg subcutaneously 24 hours after he completed treatment. He has been having counts done on a regular basis, and returns back today with only minimal evidence of neutropenia at his becca, and has an excellent periph eral smear today - normal findings. His performance status remains excellent as well.       The patient when seen on 6/7/12 was felt to have improved substantially. We followed him for weekly counts and plans at that point were for him to take a trip out of  the country. He did actually take this trip, which had him eventually hiking at 11-12,000 feet without any complication or ill effect. As he returns back today on 7/19 he feels well and again with an excellent performance status. He has had no fever, c hills or suggestion of infection or complications thus far. He remains again, with an excellent performance status.       The patient thereafter is asked to have his counts done on an every six weeks basis, now seen three months later with a normal CBC as well as examination. He again feels quite well with a completely normal performance status.       The patient was asked to return for followup studies that included normal serum chemistries, unchanged lipid profile and stable hematologic findings. He is now seen on 2/7/13, 4 months from his last appointment. He continues to do well with unchanged performance status.       The patient was asked to have counts done q3 months seeing the physician in six months. He is now seen back 08/22/2013 feeling well having [place] with that. He had no additional fevers, sweats, chills, significant change in weight although he has tried to lose some additional pounds and we see this today. He continues to exercise regularl y and dietary program. Review of his smears again continues to demonstrate a complete remission.       Today, the patient was asked to be seen back in 6 months for followup, and is now seen 02/05/2014, continuing to feel well. Again, review of his smears and physical examination fortunately demonstrates no evidence of recurrent disease.       The patient was asked to be seen back in 6 months for followup and is now seen on 07/31/2014. Again he feels well with an excellent performance status and no change since last reviewed.       The patient thereafter was now seen back 2015, 2016 stable at both visits with no evidence of recurrence of his hairy cell  leukemia. He feels well but when now reviewed 02/04/2016, he has had some degree of peripheral neuropathy? This improv es with activity and it is certainly not limiting his action or his function.   Patient now reviewed back again September 02, 2016, continues to have an excellent performance status and hematologically remain stable brother had some concern about thrombocytopenia last visit.    The patient is next seen February 02, 2017.  He has not had any medical issues since last seen and remains physically quite well.  He does describe following with urology and undergoing a repeat biopsy recently when his PSA was above 7.  His findings evidently were negative for malignancy though he did have post procedure hematuria.  This is also now abated.  The patient is next seen July 28, 2017.  He continues to have an excellent performance status and no particular difficulty in day-to-day function and/or pursuing active vacations.      The patient is next seen July 26, 2018.  He had recently returned from a trip to [place].  After experiencing an airplane ride with multiple coughing passengers he himself developed a cold, particularly severe over the last 3-4 days with cough and congestion.  He's had low-grade fever which is now beginning to resolve but is clearly uncomfortable.    The patient's next seen January 10, 2019.  He has continued to travel without issues and is feeling well when reviewed today.  Plans were made for usual follow-up and the patient is next seen July 25, 2019.  His performance status remains excellent though he has lost some weight and indicates he did this per altering his diet.  His stamina and other usual activities are continued unabated.  The patient is next seen January 16, 2020.  He continues an active practice and lifestyle.  He has lost some additional weight on purpose through diet.    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    The patient's follow-up laboratory studies demonstrated by late September degree of leukopenia, unchanged thrombocytopenia and this was followed with repeat analysis within the last several weeks as he seen February 10, 2022. Flow cytometry failed to demonstrate any new process.  As you seen February 10, 2020 his performance status remains excellent, stable weight, appetite, no additional fever, chills, rash, night sweats.    The patient is next evaluated 2/23/2023 with an unchanged performance status for mild thrombocytopenia.  He has had some exposure to viral illnesses through his grandchildren as of late but is otherwise felt fine with a unchanged performance status and work schedule.    He required admission 5/23-27/2023 had return from [place] where he developed nausea and vomiting and difficulty with keeping p.o.  He had further nausea, vomiting and dizziness and was seen in the emergency department and was found to have a sodium 115.  He was seen by nephrology with concern of hyponatremia related to hydrochlorothiazide along with SIDH, treated with fluid restriction and salt tablets and p.o. Lasix.  His medications were adjusted per renal medicine.  At that point he continued to demonstrate chronic pancytopenia that did not accelerate in any particular way.     Subsequent CBC 6/1/2023 include H&H 11.3 and 32.2 with white count of 2840, platelet count 93,000, ANC of 1610.    The patient is next evaluated 8/3/2020 3 repeat CBC includes a white count of 2250, H&H 11.8 and 33.6, platelet count of 88,000-differential of 44% polys, 52% lymphs, 3% monocytes.    Patient contacted about flow cytometry findings of small population potentially suggestive of recurrence versus secondary process.  Plan to reassess in follow-up visits 9/7/2023.  Please note that if treatment is necessary then repeat treatment with cladribine  could be given in a variety of of schedules including 0.15 mg/kg/day over 2 hours x 5 days followed by rituximab or 5.6 mg/m² over 2 hours also for 5 days and also followed by rituximab therapy.      The patient was seen back 9/14/2023.Flow cytometry assessed on 2 visits includes a CD10 positive monoclonal lambda B-cell population representing 0.3% of total events 8/3/2023 and repeat 9/7/2023 demonstrates a similar B-cell population also 0.3% total events-phenotype positive CD10 bright, CD19 bright, CD20 bright, CD25, CD45,   slambda.  Discussed symptoms usually due to splenomegaly, fatigue, weakness, weight loss, bruising, recurrent infections, periodic vasculitis.  He is clear that he is not having any of the symptoms and feels that his overall performance status is as good as its ever been.  We have discussed that additional issues include possible organomegaly and cytopenias developing with his follow-up CBC in office today including H&H of 12.2 and 33.4, white count of 3250 and platelet count of 92,000.  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    We had the patient return for periodic blood counts, had him undergo testing for hepatitis and reviewed the various regimens including 5-day cladribine followed by Rituxan therapy.  10/5/2023 scans revealed no substantial abnormalities including lack of splenomegaly or lymphadenopathy.    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  Flow cytometry revealed a CD10 B-cell population, 45% total events, monoclonal lambda light chain expression compatible with his previously  diagnosed hairy cell leukemia.  Additionally the patient's hepatitis B surface antibody was equivocal and he went on to have additional vaccinations   -influenza, COVID-19, RSV and hepatitis B.  Further vascular surgeon was contacted and the patient underwent a right internal jugular Mediport placement 10/18/2023 and teaching concerning cladribine day 1 through 5 and subsequent Rituxan therapy.    After further discussion of available treatment options plans made to offer 5-day cladribine and, review of literature, indicates that Rituxan is associated with further immunosuppression.  Available guidance suggests proceeding with initial cladribine and then 4-6 weekly doses of Rituxan consolidation starting 4 to 6 weeks after initial treatment.             Past Medical History:   Diagnosis Date   • BCC (basal cell carcinoma of skin) 06/22/2022    NOSE   • Benign prostate hyperplasia    • H/O Elevated PSA    • H/O Hairy cell leukemia (CMS/HCC) 2012   • H/O Internal thrombosed hemorrhoids    • H/O minimal right carotid plaque    • H/O peripheral neuropathy    • Hyperlipidemia    • Hypertension    • Primary open-angle glaucoma, bilateral, mild stage        ONCOLOGIC HISTORY:  (History from previous dates can be found in the separate document.)    Current Outpatient Medications on File Prior to Visit   Medication Sig Dispense Refill   • acyclovir (Zovirax) 400 MG tablet Take 1 tablet by mouth 2 (Two) Times a Day. 60 tablet 7   • amLODIPine (NORVASC) 2.5 MG tablet Take 1 tablet by mouth Daily. 90 tablet 3   • bimatoprost (LUMIGAN) 0.03 % ophthalmic drops Administer 1 drop to both eyes Every Night.     • bisoprolol (ZEBeta) 5 MG tablet Take 1 tablet by mouth Daily. 90 tablet 3   • Calcium-Magnesium-Vitamin D (CALCIUM 1200+D3 PO) Take  by mouth.     • chlorhexidine (PERIDEX) 0.12 % solution Rinse with 15 mL by mouth after breakfast and at bedtime for 30 seconds, then spit. No food or drink for 30 minutes after rinse. 473 mL 3    • finasteride (PROSCAR) 5 MG tablet Take 1 tablet by mouth Daily. 90 tablet 3   • Hepatitis B Vac Recombinant (Engerix-B) 20 MCG/ML suspension prefilled syringe Inject 1 mL into the appropriate muscle as directed by prescriber 1 (One) Time for 1 dose. 1 mL 0   • Magnesium 500 MG tablet Take 1 tablet by mouth 2 (two) times a day.     • niacin 500 MG tablet Take 1 tablet by mouth Every Night.     • ondansetron (ZOFRAN) 8 MG tablet Take 1 tablet by mouth 3 (Three) Times a Day As Needed for Nausea or Vomiting. 30 tablet 5   • propafenone (RYTHMOL) 150 MG tablet Take 1 tablet by mouth 3 (Three) Times a Day. 270 tablet 3   • rosuvastatin (CRESTOR) 10 MG tablet Take 1 tablet by mouth every night at bedtime. 90 tablet 2   • sulfamethoxazole-trimethoprim (BACTRIM DS,SEPTRA DS) 800-160 MG per tablet Take 1 tablet by mouth 3 (Three) Times a Week. 12 tablet 7   • tamsulosin (FLOMAX) 0.4 MG capsule 24 hr capsule Take 2 capsules by mouth Daily. 180 capsule 3   • vitamin C (ASCORBIC ACID) 250 MG tablet Take 4 tablets by mouth Daily. With madisyn hips     • vitamin D3 125 MCG (5000 UT) capsule capsule Take 1 capsule by mouth Daily.     • HYDROcodone-acetaminophen (NORCO) 5-325 MG per tablet Take 1 tablet by mouth Every 4 (Four) Hours As Needed (Pain). (Patient not taking: Reported on 10/23/2023) 20 tablet 0     No current facility-administered medications on file prior to visit.       ALLERGIES:   No Known Allergies    Social History     Socioeconomic History   • Marital status:      Spouse name: Hanna   Tobacco Use   • Smoking status: Former     Packs/day: .5     Types: Cigarettes   • Smokeless tobacco: Never   • Tobacco comments:     Quit smoking many years ago.   Vaping Use   • Vaping Use: Never used   Substance and Sexual Activity   • Alcohol use: Yes     Comment: 1 glass of wine or beer per day   • Drug use: No   • Sexual activity: Defer         Cancer-related family history includes Cancer in his father; Cancer (age of  onset: 90) in his mother.      Review of Systems   Constitutional:  Negative for fatigue.   HENT: Negative.     Eyes: Negative.    Respiratory:  Negative for chest tightness, shortness of breath and wheezing.    Gastrointestinal:  Negative for constipation, diarrhea, nausea and vomiting.   Endocrine: Negative.    Musculoskeletal: Negative.    Skin: Negative.    Neurological: Negative.  Negative for weakness.        Vitals:    10/23/23 1250   BP: 176/92   Pulse: 64   Resp: 16   Temp: 98.2 °F (36.8 °C)   TempSrc: Temporal   SpO2: 98%   Weight: 71.5 kg (157 lb 11.2 oz)   PainSc: 0-No pain           10/23/2023    12:47 PM   Current Status   ECOG score 0       Physical Exam      GENERAL: Well-developed, well-nourished male in no acute distress.   SKIN: Warm, dry, without new rash or lesion  HEAD: Normocephalic.   EYES: Pupils equal, round and reactive to light, EOMs intact. Conjunctivae normal.   EARS: Hearing intact.   NOSE: Septum midline. No excoriations or nasal discharge.   MOUTH: Tongue is well-papillated; no stomatitis or ulcers. Lips normal.  dry mucous membranes, clear sinus drainage noted  THROAT: Oropharynx without lesions or exudates.   NECK: Supple with good range of motion; no thyromegaly or masses, no JVD or bruits.   LYMPHATICS: No cervical, supraclavicular, axillary or inguinal adenopathy.   CHEST: Lungs clear to percussion and auscultation.   CARDIAC: Regular rate and rhythm without murmurs, rubs or gallops.   ABDOMEN: Soft, nontender with no evidence hepatosplenomegaly or masses  EXTREMITIES: No clubbing, cyanosis or edema.   NEUROLOGICAL: No focal neurological deficits.       RECENT LABS:  Hematology WBC   Date Value Ref Range Status   10/23/2023 2.61 (L) 3.40 - 10.80 10*3/mm3 Final     RBC   Date Value Ref Range Status   10/23/2023 3.45 (L) 4.14 - 5.80 10*6/mm3 Final     Hemoglobin   Date Value Ref Range Status   10/23/2023 11.9 (L) 13.0 - 17.7 g/dL Final     Hematocrit   Date Value Ref Range Status    10/23/2023 34.7 (L) 37.5 - 51.0 % Final     Platelets   Date Value Ref Range Status   10/23/2023 78 (L) 140 - 450 10*3/mm3 Final        Assessment & Plan       A 77-year-old male with the diagnosis of hairy cell leukemia in April 2012. He was treated 5/11-5/17/ 12 with cladribine being given as continuous infusion at 0.1 mg/kg or 7.7 mg/24-hours x7 days. He did well during hospitalization with little toxicity and minimal myelosuppression. He has gone onto obtain a complete remission and this continues to be th e case at a 6 month visit now 07/31/2014. The patient has been now assessed on a regular basis every 6 months with no evidence of hematologic deterioration. As he is seen today, 02/04/2016, and now again September 02, 2016 he remains clinically stable as well.      He is now assessed February 2 and overall doing well without any additional issues except for the prostate described as above.  He is now recognized there is also possibly hypertensive and I'll contact him about this is afternoon though during our conversation he had few if any symptoms.?.  Pending our review will ask him to have a repeat CBC at 3 months to see that physician in 6 months.    The patient indicates when called about his blood pressure that he often experiences white coat hypertension.  He is going to recheck his blood pressure night and notify me if it remains elevated.     He is next seen July 28, 2017, hematologically stable, remaining quite active.      As he is again reviewed July 2018 he has had recent viral exposure and is slowly working through a cold.  This appears to have suppressed his marrow very modestly though review of the smear shows no evidence of hairy cells though there are atypical lymphocytes thought to be virally activated.  At this point we'll have a follow-up CBC in 4 weeks and have him see the physician again in 6 months .    The patient is next seen January 10, 2019 doing well with no hematologic  deterioration.  Plan to see back in 6 months for follow-up.  The patient is next seen July 25, 2019.  He does continue to do extremely well and we discussed his weight loss which appears to be dietarily produced.  His exam is not indicative of progressive disease or new disorder developing.  We have agreed to have him reassessed in 6-month follow-ups.  As he is seen back January 16, 2020 there are no findings of concern though he has lost weight purposely.  It is felt that we can reduce his visits for MD assessment but continue every 6 month CBCs.  This remains the case as he is assessed January 21, 2021.     The patient's follow-up laboratory studies demonstrated by late September 2021, a degree of leukopenia, unchanged thrombocytopenia and this was followed with repeat analysis within the last several weeks as he seen February 10, 2022. Flow cytometry failed to demonstrate any new process.  As you seen February 10, 2020 his performance status remains excellent, stable weight, appetite, no additional fever, chills, rash, night sweats.  As he is reexamined including peripheral blood smears 2/10/2022 there is no suggestion of additional lymphadenopathy hepatosplenomegaly.  We have discussed a fourth COVID 19 vaccination considering his history and will clarify his status by COVID 19 antibodies in office today.    Patient's subsequent exams did demonstrate responsiveness to COVID-19 vaccinations.  He is now reviewed at 6 and 12 months and with some degree of mild thrombocytopenia noted 2/23/2023 after recent viral exposures.  Peripheral smear does not demonstrate any suggestion of relapse though there are activated lymphocytes noted.    He required admission 5/23-27/2023 had return from [place] where he developed nausea and vomiting and difficulty with keeping p.o.  He had further nausea, vomiting and dizziness and was seen in the emergency department and was found to have a sodium 115.  He was seen by nephrology with  concern of hyponatremia related to hydrochlorothiazide along with SIDH, treated with fluid restriction and salt tablets and p.o. Lasix.  His medications were adjusted per renal medicine.  At that point he continued to demonstrate chronic pancytopenia that did not accelerate in any particular way.     Subsequent CBC 6/1/2023 include H&H 11.3 and 32.2 with white count of 2840, platelet count 93,000, ANC of 1610.    The patient is next evaluated 8/3/2020 3 repeat CBC includes a white count of 2250, H&H 11.8 and 33.6, platelet count of 88,000-differential of 44% polys, 52% lymphs, 3% monocytes.    Patient contacted about flow cytometry findings of small population potentially suggestive of recurrence versus secondary process.  Plan to reassess in follow-up visits 9/7/2023.  Please note that if treatment is necessary then repeat treatment with cladribine could be given in a variety of of schedules including 0.15 mg/kg/day over 2 hours x 5 days followed by rituximab or 5.6 mg/m² over 2 hours also for 5 days and also followed by rituximab therapy.    The patient was seen back 9/14/2023.Flow cytometry assessed on 2 visits includes a CD10 positive monoclonal lambda B-cell population representing 0.3% of total events 8/3/2023 and repeat 9/7/2023 demonstrates a similar B-cell population also 0.3% total events-phenotype positive CD10 bright, CD19 bright, CD20 bright, CD25, CD45,   slambda.  Discussed symptoms usually due to splenomegaly, fatigue, weakness, weight loss, bruising, recurrent infections, periodic vasculitis.  He is clear that he is not having any of the symptoms and feels that his overall performance status is as good as its ever been.  We have discussed that additional issues include possible organomegaly and cytopenias developing with his follow-up CBC in office today including H&H of 12.2 and 33.4, white count of 3250 and platelet count of 92,000.  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    We had the patient return for periodic blood counts, had him undergo testing for hepatitis and reviewed the various regimens including 5-day cladribine followed by Rituxan therapy.  10/5/2023 scans revealed no substantial abnormalities including lack of splenomegaly or lymphadenopathy.    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  Additionally the patient's hepatitis B surface antibody was equivocal and he went on to have additional vaccinations   -influenza, COVID-19, RSV and hepatitis B.  Further vascular surgeon was contacted and the patient underwent a right internal jugular Mediport placement 10/18/2023 and teaching concerning cladribine day 1 through 5 and subsequent Rituxan therapy.    After further discussion of available treatment options plans made to offer 5-day cladribine and, review of literature, indicates that Rituxan is associated with further immunosuppression.  Available guidance suggests proceeding with initial cladribine and then 4 weekly doses of Rituxan consolidation starting 4 to 6 weeks after initial treatment.  At this point the patient has completed all vaccinations including hepatitis B.      Plan:      *Proceed with retreatment using cladribine daily x5 starting 10/23/2023    *Neulasta will be given 10/28/2023 at [facility]    *Current prophylaxis continues with acyclovir 400 mg twice daily, other medications to continue    *MD follow-up  November 2, 2023, CBC, CMP, magnesium, possible IV fluids or additional therapy as needed.    I spent 50 minutes caring for Angel on this date of service. This time includes time spent by me in the following activities: preparing for the visit, reviewing tests, obtaining and/or reviewing a separately obtained history, performing a medically appropriate examination and/or evaluation, counseling and educating the patient/family/caregiver, ordering medications, tests, or procedures, referring and communicating with other health care professionals, documenting information in the medical record, independently interpreting results and communicating that information with the patient/family/caregiver, and care coordination.

## 2023-10-23 NOTE — PROGRESS NOTES
Subjective Patient, again, with excellent performance status, no additional symptoms    REASONS FOR FOLLOWUP:   Hairy cell leukemia.   Admission 05/11/2012 through 05/17/12 for 7 day continuous infusion cladribine (2-CdA) without complication.   Patient seen 5 months post treatment in complete remission.   Patient seen 8 months post treatment with continued complete remission, every 3 month reassessment per CBC planned, 6 month review by MD scheduled.   Patient seen at 15 months without evidence of recurrent disease, every 6 month followup planned.   The patient was seen 02/05/2014 with no evidence of recurrent disease, 6-month followup planned.   Patient seen 07/31/2014, stable with no evidence of recurrent disease, every 6 month followup.   Patient seen 01/15/2015, stable without recurrent disease, 6-month CBC planned, yearly followup scheduled.   Patient seen 02/04/2016, stable, ?early peripheral neuropathy developing distally per feet, no evidence for recurrent disease per hairy cell leukemia.  Patient reviewed September 02, 2016, previous July CBC with mild thrombocytopenia developing, recheck September 2 normalized, every 6 month follow-ups anticipated  Patient reviewed February 02, 2017, status post negative prostate biopsy, mild thrombocytopenia again recognized  Patient reviewed July 20, 2017, hematologically stable, every 6 month follow-up CBCs planned, yearly M.D. Assessment  Patient seen July 26, 2018 after recent airline travel any cold symptoms  Patient reviewed January 10, 2019, no additional symptoms, hematologically stable  Patient reviewed July 25, 2019, stable, six-month follow-up as planned  Patient viewed January 16, 2020, stable, every 6 months CBC, MD assessment yearly planned  Reassessment January 21, 2021, no evidence of recurrence disease  Patient assessed 2/10/2022, evidence of continued remission noted both on clinical examination, peripheral blood smear review and flow cytometric  evaluation.  Patient review 2/23/2023 mild thrombocytopenia, no new abnormalities per peripheral smear, close for follow-up planned  Patient assessed 9/14/2023, concern for early relapse.  Subsequent marrow 10/12/2023-variably hypercellular with diffuse involvement-a 90%-hairy cell leukemia relapse.  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 He is usually followed by Dr. Vuong for a history of hypertension, hyperlipidemia, minimal right carotid plaque as well as elevated PSA.       He had exams done on 1-09-12 for routine followup. This included normal serum chemistry including LFTs, cholesterol 131, triglyceride 34, HDL 51, and LDL 73. CBC revealed H&H 13.3, 37.9%, WBC 3300, platelet count 110,000, 26% polys and 74% lymphs with A NC of 0.9.       Dr. Remy provides information when initially seen with studies from as far back as 1- at which time hemoglobin and hematocrit was 14.4 and 42.1%, WBC 6950, platelets 227,000 with 47% polys, 42% lymph; this was automated. The additional test includ es approximately every year to every other year thereafter though it was noted in January 2010 WBC dropped from an average of approximately 5,000-6,000 to 3600. Hemoglobin and hematocrit 13.6 and 39.7%. Platelets 150,000-170,000 with ANC beginning in Ja nuary 2010 at 1900.       We were contacted per the above findings and asked Dr. Remy to undergo further assessment including flow cytometric exam per peripheral blood. This revealed no evidence of abnormal myeloid maturation increased blast population. No evidence of lymphopro l iferative disorder. The patient was asked to be seen formally in the office now and comes in today for further assessment. Dr. Remy  indicates that he has taken a number of medications in an attempt to improve his health. Several homeopathic medication s include vitamin C, folate, flaxseed oil, fish oil, vitamin E, selenium, pomegranate tabs, Co-enzyme Q, vitamin D, niacin, and red yeast rice daily. He has discontinued these and stays on those described below.       He feels well with no additional problem s and states that he has no little or no symptoms that he can detail, whether this is just ill health in anyway, though he does have occasional hemorrhoidal pain. He also notes rare palpitations and occasional epistaxis when the ambient air has dried sub stantially. No fevers, chills, weight loss, night sweats, or other constitutional symptoms.       As a result of the above, the patient was asked to undergo a series of studies. This went onto include rheumatoid factor of 6, MARYURI screen negative, negative s misael protein electrophoresis, normal quantitative immunoglobulins, CMV IgG of 2.9, IgM less than .9, EBV IgM of less than .9 and BCA IgG antibody of 4.6. These are consistent with previous exposures. HIV was negative, CRSP less than .1, sedimentation ra t e of 4, iron 125, TIBC 297, 14% saturation, and ferritin of 77. Peripheral blood flow cytometry was negative for evidence of abnormal myeloid maturation, increased blast population or lymphoproliferative disorder. As the patient is seen back today, he i s continuing to feel well though is obviously greatly concerned about his followup counts. Reviews in the office had been planned to follow him briefly recheck performance 3/29/12 with an H&H of 12.9, 36.1, WBC 3900, 32 polys, 62 lymphs, platelet count 94 , 000, IPF slightly elevated at 8.8. The patient was therefore asked to return and to be further assessed with bone marrow aspirate and biopsy. He now presents to do so. He has had no additional symptoms since last seen. He has discontinued his Crestor use.       As a result of his review  the patient underwent bone marrow aspiration and biopsy. This revealed evidence of lymphocytic infiltrate reviewed here in the office. 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 Eventually cytogenetics was found to be negative with no evidence of cyclin-D1/IgH or BCL-2/IgH rearrangement. Additional tissue was requested. As a result, the patient was notifi e edward of these findings by personal visit, and plans were made to proceed with endoscopy, ? mantle cell involvement. At the time of this dictation, this was not yet performed. He was also asked to undergo CT of the neck, chest, abdomen and pelvis which show ed no evidence of abnormality. PET scan 04/13/12 showed increased activity within the marrow, but no additional abnormalities including the spleen, with no background bowel or gastric activity as well.       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 hairy cell leukemia. This has been discussed in great detail with the alexander thakur at [facility] and additional stains are pending as is the patient's GI assessment now to be pursued through Dr. Ray.       His case was again reviewed over quite a period of time, and ultimately his marrow was signed out as 80% involved with hairy cell leukemia - normal cytogenetics, no evidence of cyclin D1/IgH or BCL-2/IgH rearrangement. Dr. Remy was advised of the treatment options which include followup with no immediate therapy, and/or the use of cladribine or pentostatin. After numerous di s cussion, he ultimately elected to try to proceed with treatment when he is feeling as well as he is to improve his ability to withstand the therapy itself. We therefore began to discuss  the treatment schedule, which also could be somewhat variable, inclu d ing 7-day infusions, 2-hr infusion q.d. x five days or every other week infusion. After discussion he wishes to proceed with 7-day infusion. When we attempted to do this as an outpatient we found out thereafter that this would not be covered as an outpa tient infusion through his insurance. Therefore we have made plans for him to be admitted after he is seen in office 05/10/12.       The patient was thereafter admitted 05/11-05/17/12. PICC line was placed and he initiated treatment at 0.1 mg/kg or 7.7 mg IV in fusion over seven days. The patient fortunately had no serious issues at all during the seven days and continued to work out on a daily basis. He was seen by his internist as well with some of his meds adjusted including his HCTZ and Ziac. He was stabl e at discharge, but was given Neulasta 6 mg subcutaneously 24 hours after he completed treatment. He has been having counts done on a regular basis, and returns back today with only minimal evidence of neutropenia at his becca, and has an excellent periph eral smear today - normal findings. His performance status remains excellent as well.       The patient when seen on 6/7/12 was felt to have improved substantially. We followed him for weekly counts and plans at that point were for him to take a trip out of the country. He did actually take this trip, which had him eventually hiking at 11-12,000 feet without any complication or ill effect. As he returns back today on 7/19 he feels well and again with an excellent performance status. He has had no fever, c hills or suggestion of infection or complications thus far. He remains again, with an excellent performance status.       The patient thereafter is asked to have his counts done on an every six weeks basis, now seen three months later with a normal CBC as well as examination. He again feels quite well with a completely normal performance  status.       The patient was asked to return for followup studies that included normal serum chemistries, unchanged lipid profile and stable hematologic findings. He is now seen on 2/7/13, 4 months from his last appointment. He continues to do well with unchanged performance status.       The patient was asked to have counts done q3 months seeing the physician in six months. He is now seen back 08/22/2013 feeling well having [place] with that. He had no additional fevers, sweats, chills, significant change in weight although he has tried to lose some additional pounds and we see this today. He continues to exercise regularl y and dietary program. Review of his smears again continues to demonstrate a complete remission.       Today, the patient was asked to be seen back in 6 months for followup, and is now seen 02/05/2014, continuing to feel well. Again, review of his smears and physical examination fortunately demonstrates no evidence of recurrent disease.       The patient was asked to be seen back in 6 months for followup and is now seen on 07/31/2014. Again he feels well with an excellent performance status and no change since last reviewed.       The patient thereafter was now seen back 2015, 2016 stable at both visits with no evidence of recurrence of his hairy cell leukemia. He feels well but when now reviewed 02/04/2016, he has had some degree of peripheral neuropathy? This improv es with activity and it is certainly not limiting his action or his function.   Patient now reviewed back again September 02, 2016, continues to have an excellent performance status and hematologically remain stable brother had some concern about thrombocytopenia last visit.    The patient is next seen February 02, 2017.  He has not had any medical issues since last seen and remains physically quite well.  He does describe following with urology and undergoing a repeat  biopsy recently when his PSA was above 7.  His findings evidently were negative for malignancy though he did have post procedure hematuria.  This is also now abated.  The patient is next seen July 28, 2017.  He continues to have an excellent performance status and no particular difficulty in day-to-day function and/or pursuing active vacations.      The patient is next seen July 26, 2018.  He had recently returned from a trip to [place].  After experiencing an airplane ride with multiple coughing passengers he himself developed a cold, particularly severe over the last 3-4 days with cough and congestion.  He's had low-grade fever which is now beginning to resolve but is clearly uncomfortable.    The patient's next seen January 10, 2019.  He has continued to travel without issues and is feeling well when reviewed today.  Plans were made for usual follow-up and the patient is next seen July 25, 2019.  His performance status remains excellent though he has lost some weight and indicates he did this per altering his diet.  His stamina and other usual activities are continued unabated.  The patient is next seen January 16, 2020.  He continues an active practice and lifestyle.  He has lost some additional weight on purpose through diet.    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    The patient's follow-up laboratory studies demonstrated by late September degree of leukopenia, unchanged thrombocytopenia and this was followed with repeat analysis within the last several weeks as he seen February 10, 2022. Flow cytometry failed to demonstrate any new process.  As you seen February 10, 2020 his performance status remains excellent, stable weight, appetite, no additional fever, chills, rash, night sweats.    The patient is next evaluated 2/23/2023 with an unchanged performance  status for mild thrombocytopenia.  He has had some exposure to viral illnesses through his grandchildren as of late but is otherwise felt fine with a unchanged performance status and work schedule.    He required admission 5/23-27/2023 had return from [place] where he developed nausea and vomiting and difficulty with keeping p.o.  He had further nausea, vomiting and dizziness and was seen in the emergency department and was found to have a sodium 115.  He was seen by nephrology with concern of hyponatremia related to hydrochlorothiazide along with SIDH, treated with fluid restriction and salt tablets and p.o. Lasix.  His medications were adjusted per renal medicine.  At that point he continued to demonstrate chronic pancytopenia that did not accelerate in any particular way.     Subsequent CBC 6/1/2023 include H&H 11.3 and 32.2 with white count of 2840, platelet count 93,000, ANC of 1610.    The patient is next evaluated 8/3/2020 3 repeat CBC includes a white count of 2250, H&H 11.8 and 33.6, platelet count of 88,000-differential of 44% polys, 52% lymphs, 3% monocytes.    Patient contacted about flow cytometry findings of small population potentially suggestive of recurrence versus secondary process.  Plan to reassess in follow-up visits 9/7/2023.  Please note that if treatment is necessary then repeat treatment with cladribine could be given in a variety of of schedules including 0.15 mg/kg/day over 2 hours x 5 days followed by rituximab or 5.6 mg/m² over 2 hours also for 5 days and also followed by rituximab therapy.      The patient was seen back 9/14/2023.Flow cytometry assessed on 2 visits includes a CD10 positive monoclonal lambda B-cell population representing 0.3% of total events 8/3/2023 and repeat 9/7/2023 demonstrates a similar B-cell population also 0.3% total events-phenotype positive CD10 bright, CD19 bright, CD20 bright, CD25, CD45,   slambda.  Discussed symptoms usually due to splenomegaly,  fatigue, weakness, weight loss, bruising, recurrent infections, periodic vasculitis.  He is clear that he is not having any of the symptoms and feels that his overall performance status is as good as its ever been.  We have discussed that additional issues include possible organomegaly and cytopenias developing with his follow-up CBC in office today including H&H of 12.2 and 33.4, white count of 3250 and platelet count of 92,000.  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    We had the patient return for periodic blood counts, had him undergo testing for hepatitis and reviewed the various regimens including 5-day cladribine followed by Rituxan therapy.  10/5/2023 scans revealed no substantial abnormalities including lack of splenomegaly or lymphadenopathy.    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  Additionally the patient's hepatitis B surface antibody was equivocal and he went on to have additional vaccinations   -influenza, COVID-19, RSV and hepatitis B.  Further vascular surgeon was contacted and the patient underwent a right internal jugular Mediport placement 10/18/2023 and teaching concerning cladribine day 1 through 5 and subsequent Rituxan therapy.    After further discussion of available treatment options plans made to offer 5-day cladribine and, review of literature, indicates that Rituxan is associated with further immunosuppression.   Available guidance suggests proceeding with initial cladribine and then 4-6 weekly doses of Rituxan consolidation starting 4 to 6 weeks after initial treatment.             Past Medical History:   Diagnosis Date    BCC (basal cell carcinoma of skin) 06/22/2022    NOSE    Benign prostate hyperplasia     H/O Elevated PSA     H/O Hairy cell leukemia (CMS/HCC) 2012    H/O Internal thrombosed hemorrhoids     H/O minimal right carotid plaque     H/O peripheral neuropathy     Hyperlipidemia     Hypertension     Primary open-angle glaucoma, bilateral, mild stage        ONCOLOGIC HISTORY:  (History from previous dates can be found in the separate document.)    Current Outpatient Medications on File Prior to Visit   Medication Sig Dispense Refill    acyclovir (Zovirax) 400 MG tablet Take 1 tablet by mouth 2 (Two) Times a Day. 60 tablet 7    amLODIPine (NORVASC) 2.5 MG tablet Take 1 tablet by mouth Daily. 90 tablet 3    bimatoprost (LUMIGAN) 0.03 % ophthalmic drops Administer 1 drop to both eyes Every Night.      bisoprolol (ZEBeta) 5 MG tablet Take 1 tablet by mouth Daily. 90 tablet 3    Calcium-Magnesium-Vitamin D (CALCIUM 1200+D3 PO) Take  by mouth.      chlorhexidine (PERIDEX) 0.12 % solution Rinse with 15 mL by mouth after breakfast and at bedtime for 30 seconds, then spit. No food or drink for 30 minutes after rinse. 473 mL 3    finasteride (PROSCAR) 5 MG tablet Take 1 tablet by mouth Daily. 90 tablet 3    Hepatitis B Vac Recombinant (Engerix-B) 20 MCG/ML suspension prefilled syringe Inject 1 mL into the appropriate muscle as directed by prescriber 1 (One) Time for 1 dose. 1 mL 0    Magnesium 500 MG tablet Take 1 tablet by mouth 2 (two) times a day.      niacin 500 MG tablet Take 1 tablet by mouth Every Night.      ondansetron (ZOFRAN) 8 MG tablet Take 1 tablet by mouth 3 (Three) Times a Day As Needed for Nausea or Vomiting. 30 tablet 5    propafenone (RYTHMOL) 150 MG tablet Take 1 tablet by mouth 3 (Three) Times a Day.  270 tablet 3    rosuvastatin (CRESTOR) 10 MG tablet Take 1 tablet by mouth every night at bedtime. 90 tablet 2    sulfamethoxazole-trimethoprim (BACTRIM DS,SEPTRA DS) 800-160 MG per tablet Take 1 tablet by mouth 3 (Three) Times a Week. 12 tablet 7    tamsulosin (FLOMAX) 0.4 MG capsule 24 hr capsule Take 2 capsules by mouth Daily. 180 capsule 3    vitamin C (ASCORBIC ACID) 250 MG tablet Take 4 tablets by mouth Daily. With madisyn hips      vitamin D3 125 MCG (5000 UT) capsule capsule Take 1 capsule by mouth Daily.      HYDROcodone-acetaminophen (NORCO) 5-325 MG per tablet Take 1 tablet by mouth Every 4 (Four) Hours As Needed (Pain). (Patient not taking: Reported on 10/23/2023) 20 tablet 0     No current facility-administered medications on file prior to visit.       ALLERGIES:   No Known Allergies    Social History     Socioeconomic History    Marital status:      Spouse name: Hanna   Tobacco Use    Smoking status: Former     Packs/day: .5     Types: Cigarettes    Smokeless tobacco: Never    Tobacco comments:     Quit smoking many years ago.   Vaping Use    Vaping Use: Never used   Substance and Sexual Activity    Alcohol use: Yes     Comment: 1 glass of wine or beer per day    Drug use: No    Sexual activity: Defer         Cancer-related family history includes Cancer in his father; Cancer (age of onset: 90) in his mother.      Review of Systems   Constitutional:  Negative for fatigue.   HENT: Negative.     Eyes: Negative.    Respiratory:  Negative for chest tightness, shortness of breath and wheezing.    Gastrointestinal:  Negative for constipation, diarrhea, nausea and vomiting.   Endocrine: Negative.    Musculoskeletal: Negative.    Skin: Negative.    Neurological: Negative.  Negative for weakness.        Vitals:    10/23/23 1250   BP: 176/92   Pulse: 64   Resp: 16   Temp: 98.2 °F (36.8 °C)   TempSrc: Temporal   SpO2: 98%   Weight: 71.5 kg (157 lb 11.2 oz)   PainSc: 0-No pain           10/23/2023    12:47 PM    Current Status   ECOG score 0       Physical Exam      GENERAL: Well-developed, well-nourished male in no acute distress.   SKIN: Warm, dry, without new rash or lesion  HEAD: Normocephalic.   EYES: Pupils equal, round and reactive to light, EOMs intact. Conjunctivae normal.   EARS: Hearing intact.   NOSE: Septum midline. No excoriations or nasal discharge.   MOUTH: Tongue is well-papillated; no stomatitis or ulcers. Lips normal.  dry mucous membranes, clear sinus drainage noted  THROAT: Oropharynx without lesions or exudates.   NECK: Supple with good range of motion; no thyromegaly or masses, no JVD or bruits.   LYMPHATICS: No cervical, supraclavicular, axillary or inguinal adenopathy.   CHEST: Lungs clear to percussion and auscultation.   CARDIAC: Regular rate and rhythm without murmurs, rubs or gallops.   ABDOMEN: Soft, nontender with no evidence hepatosplenomegaly or masses  EXTREMITIES: No clubbing, cyanosis or edema.   NEUROLOGICAL: No focal neurological deficits.       RECENT LABS:  Hematology WBC   Date Value Ref Range Status   10/23/2023 2.61 (L) 3.40 - 10.80 10*3/mm3 Final     RBC   Date Value Ref Range Status   10/23/2023 3.45 (L) 4.14 - 5.80 10*6/mm3 Final     Hemoglobin   Date Value Ref Range Status   10/23/2023 11.9 (L) 13.0 - 17.7 g/dL Final     Hematocrit   Date Value Ref Range Status   10/23/2023 34.7 (L) 37.5 - 51.0 % Final     Platelets   Date Value Ref Range Status   10/23/2023 78 (L) 140 - 450 10*3/mm3 Final        Assessment & Plan        A 77-year-old male with the diagnosis of hairy cell leukemia in April 2012. He was treated 5/11-5/17/ 12 with cladribine being given as continuous infusion at 0.1 mg/kg or 7.7 mg/24-hours x7 days. He did well during hospitalization with little toxicity and minimal myelosuppression. He has gone onto obtain a complete remission and this continues to be th e case at a 6 month visit now 07/31/2014. The patient has been now assessed on a regular basis every 6  months with no evidence of hematologic deterioration. As he is seen today, 02/04/2016, and now again September 02, 2016 he remains clinically stable as well.      He is now assessed February 2 and overall doing well without any additional issues except for the prostate described as above.  He is now recognized there is also possibly hypertensive and I'll contact him about this is afternoon though during our conversation he had few if any symptoms.?.  Pending our review will ask him to have a repeat CBC at 3 months to see that physician in 6 months.    The patient indicates when called about his blood pressure that he often experiences white coat hypertension.  He is going to recheck his blood pressure night and notify me if it remains elevated.     He is next seen July 28, 2017, hematologically stable, remaining quite active.      As he is again reviewed July 2018 he has had recent viral exposure and is slowly working through a cold.  This appears to have suppressed his marrow very modestly though review of the smear shows no evidence of hairy cells though there are atypical lymphocytes thought to be virally activated.  At this point we'll have a follow-up CBC in 4 weeks and have him see the physician again in 6 months .    The patient is next seen January 10, 2019 doing well with no hematologic deterioration.  Plan to see back in 6 months for follow-up.  The patient is next seen July 25, 2019.  He does continue to do extremely well and we discussed his weight loss which appears to be dietarily produced.  His exam is not indicative of progressive disease or new disorder developing.  We have agreed to have him reassessed in 6-month follow-ups.  As he is seen back January 16, 2020 there are no findings of concern though he has lost weight purposely.  It is felt that we can reduce his visits for MD assessment but continue every 6 month CBCs.  This remains the case as he is assessed January 21, 2021.     The patient's  follow-up laboratory studies demonstrated by late September 2021, a degree of leukopenia, unchanged thrombocytopenia and this was followed with repeat analysis within the last several weeks as he seen February 10, 2022. Flow cytometry failed to demonstrate any new process.  As you seen February 10, 2020 his performance status remains excellent, stable weight, appetite, no additional fever, chills, rash, night sweats.  As he is reexamined including peripheral blood smears 2/10/2022 there is no suggestion of additional lymphadenopathy hepatosplenomegaly.  We have discussed a fourth COVID 19 vaccination considering his history and will clarify his status by COVID 19 antibodies in office today.    Patient's subsequent exams did demonstrate responsiveness to COVID-19 vaccinations.  He is now reviewed at 6 and 12 months and with some degree of mild thrombocytopenia noted 2/23/2023 after recent viral exposures.  Peripheral smear does not demonstrate any suggestion of relapse though there are activated lymphocytes noted.    He required admission 5/23-27/2023 had return from [place] where he developed nausea and vomiting and difficulty with keeping p.o.  He had further nausea, vomiting and dizziness and was seen in the emergency department and was found to have a sodium 115.  He was seen by nephrology with concern of hyponatremia related to hydrochlorothiazide along with SIDH, treated with fluid restriction and salt tablets and p.o. Lasix.  His medications were adjusted per renal medicine.  At that point he continued to demonstrate chronic pancytopenia that did not accelerate in any particular way.     Subsequent CBC 6/1/2023 include H&H 11.3 and 32.2 with white count of 2840, platelet count 93,000, ANC of 1610.    The patient is next evaluated 8/3/2020 3 repeat CBC includes a white count of 2250, H&H 11.8 and 33.6, platelet count of 88,000-differential of 44% polys, 52% lymphs, 3% monocytes.    Patient contacted about flow  cytometry findings of small population potentially suggestive of recurrence versus secondary process.  Plan to reassess in follow-up visits 9/7/2023.  Please note that if treatment is necessary then repeat treatment with cladribine could be given in a variety of of schedules including 0.15 mg/kg/day over 2 hours x 5 days followed by rituximab or 5.6 mg/m² over 2 hours also for 5 days and also followed by rituximab therapy.    The patient was seen back 9/14/2023.Flow cytometry assessed on 2 visits includes a CD10 positive monoclonal lambda B-cell population representing 0.3% of total events 8/3/2023 and repeat 9/7/2023 demonstrates a similar B-cell population also 0.3% total events-phenotype positive CD10 bright, CD19 bright, CD20 bright, CD25, CD45,   slambda.  Discussed symptoms usually due to splenomegaly, fatigue, weakness, weight loss, bruising, recurrent infections, periodic vasculitis.  He is clear that he is not having any of the symptoms and feels that his overall performance status is as good as its ever been.  We have discussed that additional issues include possible organomegaly and cytopenias developing with his follow-up CBC in office today including H&H of 12.2 and 33.4, white count of 3250 and platelet count of 92,000.  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    We had the patient return for periodic blood counts, had him undergo testing for hepatitis and reviewed the various regimens including 5-day cladribine followed by Rituxan therapy.  10/5/2023 scans revealed no substantial abnormalities including lack of splenomegaly or lymphadenopathy.    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  Additionally the patient's hepatitis B surface antibody was equivocal and he went on to have additional vaccinations   -influenza, COVID-19, RSV and hepatitis B.  Further vascular surgeon was contacted and the patient underwent a right internal jugular Mediport placement 10/18/2023 and teaching concerning cladribine day 1 through 5 and subsequent Rituxan therapy.    After further discussion of available treatment options plans made to offer 5-day cladribine and, review of literature, indicates that Rituxan is associated with further immunosuppression.  Available guidance suggests proceeding with initial cladribine and then 4 weekly doses of Rituxan consolidation starting 4 to 6 weeks after initial treatment.  At this point the patient has completed all vaccinations including hepatitis B.      Plan:      *Proceed with retreatment using cladribine daily x5 starting 10/23/2023    *Neulasta will be given 10/28/2023 at [facility]    *Current prophylaxis continues with acyclovir 400 mg twice daily, other medications to continue    *MD follow-up November 2, 2023, CBC, CMP, magnesium, possible IV fluids or additional therapy as needed.    I spent 50 minutes caring for Angel on this date of service. This time includes time spent by me in the following activities: preparing for the visit, reviewing tests, obtaining and/or reviewing a separately obtained history, performing a medically appropriate examination and/or evaluation, counseling and educating the patient/family/caregiver, ordering medications, tests, or procedures, referring and communicating with other health care professionals, documenting information in the medical record, independently interpreting results and  communicating that information with the patient/family/caregiver, and care coordination.

## 2023-10-24 ENCOUNTER — INFUSION (OUTPATIENT)
Dept: ONCOLOGY | Facility: HOSPITAL | Age: 78
End: 2023-10-24
Payer: MEDICARE

## 2023-10-24 VITALS
RESPIRATION RATE: 16 BRPM | SYSTOLIC BLOOD PRESSURE: 178 MMHG | DIASTOLIC BLOOD PRESSURE: 76 MMHG | WEIGHT: 160.4 LBS | BODY MASS INDEX: 23.02 KG/M2 | TEMPERATURE: 97.3 F | HEART RATE: 64 BPM | OXYGEN SATURATION: 98 %

## 2023-10-24 DIAGNOSIS — C91.40 HAIRY CELL LEUKEMIA NOT HAVING ACHIEVED REMISSION: Primary | ICD-10-CM

## 2023-10-24 DIAGNOSIS — Z45.2 FITTING AND ADJUSTMENT OF VASCULAR CATHETER: ICD-10-CM

## 2023-10-24 PROCEDURE — 96413 CHEMO IV INFUSION 1 HR: CPT

## 2023-10-24 PROCEDURE — 96415 CHEMO IV INFUSION ADDL HR: CPT

## 2023-10-24 PROCEDURE — 25010000002 CLADRIBINE 10 MG/10ML SOLUTION 10 ML VIAL: Performed by: INTERNAL MEDICINE

## 2023-10-24 PROCEDURE — 25810000003 SODIUM CHLORIDE 0.9 % SOLUTION 500 ML FLEX CONT: Performed by: INTERNAL MEDICINE

## 2023-10-24 PROCEDURE — 25010000002 HEPARIN LOCK FLUSH PER 10 UNITS: Performed by: INTERNAL MEDICINE

## 2023-10-24 PROCEDURE — 25810000003 SODIUM CHLORIDE 0.9 % SOLUTION: Performed by: INTERNAL MEDICINE

## 2023-10-24 RX ORDER — HEPARIN SODIUM (PORCINE) LOCK FLUSH IV SOLN 100 UNIT/ML 100 UNIT/ML
500 SOLUTION INTRAVENOUS AS NEEDED
Status: CANCELLED | OUTPATIENT
Start: 2023-10-24

## 2023-10-24 RX ORDER — SODIUM CHLORIDE 0.9 % (FLUSH) 0.9 %
10 SYRINGE (ML) INJECTION AS NEEDED
Status: DISCONTINUED | OUTPATIENT
Start: 2023-10-24 | End: 2023-10-24 | Stop reason: HOSPADM

## 2023-10-24 RX ORDER — HEPARIN SODIUM (PORCINE) LOCK FLUSH IV SOLN 100 UNIT/ML 100 UNIT/ML
500 SOLUTION INTRAVENOUS AS NEEDED
Status: DISCONTINUED | OUTPATIENT
Start: 2023-10-24 | End: 2023-10-24 | Stop reason: HOSPADM

## 2023-10-24 RX ORDER — SODIUM CHLORIDE 0.9 % (FLUSH) 0.9 %
10 SYRINGE (ML) INJECTION AS NEEDED
Status: CANCELLED | OUTPATIENT
Start: 2023-10-24

## 2023-10-24 RX ORDER — SODIUM CHLORIDE 9 MG/ML
250 INJECTION, SOLUTION INTRAVENOUS ONCE
Status: COMPLETED | OUTPATIENT
Start: 2023-10-24 | End: 2023-10-24

## 2023-10-24 RX ADMIN — CLADRIBINE 10 MG: 1 INJECTION INTRAVENOUS at 14:43

## 2023-10-24 RX ADMIN — Medication 10 ML: at 16:52

## 2023-10-24 RX ADMIN — Medication 500 UNITS: at 16:52

## 2023-10-24 RX ADMIN — SODIUM CHLORIDE 250 ML: 9 INJECTION, SOLUTION INTRAVENOUS at 14:43

## 2023-10-25 ENCOUNTER — INFUSION (OUTPATIENT)
Dept: ONCOLOGY | Facility: HOSPITAL | Age: 78
End: 2023-10-25
Payer: MEDICARE

## 2023-10-25 VITALS
DIASTOLIC BLOOD PRESSURE: 80 MMHG | RESPIRATION RATE: 16 BRPM | HEART RATE: 66 BPM | OXYGEN SATURATION: 99 % | WEIGHT: 159.8 LBS | SYSTOLIC BLOOD PRESSURE: 152 MMHG | BODY MASS INDEX: 22.93 KG/M2 | TEMPERATURE: 97.5 F

## 2023-10-25 DIAGNOSIS — C91.40 HAIRY CELL LEUKEMIA NOT HAVING ACHIEVED REMISSION: ICD-10-CM

## 2023-10-25 DIAGNOSIS — Z45.2 FITTING AND ADJUSTMENT OF VASCULAR CATHETER: Primary | ICD-10-CM

## 2023-10-25 PROCEDURE — 96415 CHEMO IV INFUSION ADDL HR: CPT

## 2023-10-25 PROCEDURE — 96413 CHEMO IV INFUSION 1 HR: CPT

## 2023-10-25 PROCEDURE — 25010000002 CLADRIBINE 10 MG/10ML SOLUTION 10 ML VIAL: Performed by: INTERNAL MEDICINE

## 2023-10-25 PROCEDURE — 25810000003 SODIUM CHLORIDE 0.9 % SOLUTION: Performed by: INTERNAL MEDICINE

## 2023-10-25 PROCEDURE — 25010000002 HEPARIN LOCK FLUSH PER 10 UNITS: Performed by: INTERNAL MEDICINE

## 2023-10-25 PROCEDURE — 25810000003 SODIUM CHLORIDE 0.9 % SOLUTION 500 ML FLEX CONT: Performed by: INTERNAL MEDICINE

## 2023-10-25 RX ORDER — HEPARIN SODIUM (PORCINE) LOCK FLUSH IV SOLN 100 UNIT/ML 100 UNIT/ML
500 SOLUTION INTRAVENOUS AS NEEDED
OUTPATIENT
Start: 2023-10-25

## 2023-10-25 RX ORDER — SODIUM CHLORIDE 0.9 % (FLUSH) 0.9 %
10 SYRINGE (ML) INJECTION AS NEEDED
Status: DISCONTINUED | OUTPATIENT
Start: 2023-10-25 | End: 2023-10-25 | Stop reason: HOSPADM

## 2023-10-25 RX ORDER — HEPARIN SODIUM (PORCINE) LOCK FLUSH IV SOLN 100 UNIT/ML 100 UNIT/ML
500 SOLUTION INTRAVENOUS AS NEEDED
Status: DISCONTINUED | OUTPATIENT
Start: 2023-10-25 | End: 2023-10-25 | Stop reason: HOSPADM

## 2023-10-25 RX ORDER — SODIUM CHLORIDE 0.9 % (FLUSH) 0.9 %
10 SYRINGE (ML) INJECTION AS NEEDED
OUTPATIENT
Start: 2023-10-25

## 2023-10-25 RX ORDER — SODIUM CHLORIDE 9 MG/ML
250 INJECTION, SOLUTION INTRAVENOUS ONCE
Status: COMPLETED | OUTPATIENT
Start: 2023-10-25 | End: 2023-10-25

## 2023-10-25 RX ADMIN — Medication 500 UNITS: at 16:34

## 2023-10-25 RX ADMIN — CLADRIBINE 10 MG: 1 INJECTION INTRAVENOUS at 14:27

## 2023-10-25 RX ADMIN — SODIUM CHLORIDE 250 ML: 9 INJECTION, SOLUTION INTRAVENOUS at 14:27

## 2023-10-25 RX ADMIN — Medication 10 ML: at 16:34

## 2023-10-26 ENCOUNTER — INFUSION (OUTPATIENT)
Dept: ONCOLOGY | Facility: HOSPITAL | Age: 78
End: 2023-10-26
Payer: MEDICARE

## 2023-10-26 VITALS
SYSTOLIC BLOOD PRESSURE: 167 MMHG | DIASTOLIC BLOOD PRESSURE: 84 MMHG | BODY MASS INDEX: 23.36 KG/M2 | TEMPERATURE: 97.7 F | RESPIRATION RATE: 16 BRPM | WEIGHT: 162.8 LBS | OXYGEN SATURATION: 99 % | HEART RATE: 64 BPM

## 2023-10-26 DIAGNOSIS — C91.40 HAIRY CELL LEUKEMIA NOT HAVING ACHIEVED REMISSION: Primary | ICD-10-CM

## 2023-10-26 LAB
CYTO UR: NORMAL
DX PRELIMINARY: NORMAL
LAB AP CASE REPORT: NORMAL
LAB AP CLINICAL INFORMATION: NORMAL
LAB AP FLOW CYTOMETRY SUMMARY: NORMAL
LAB AP SPECIAL STAINS: NORMAL
PATH REPORT.ADDENDUM SPEC: NORMAL
PATH REPORT.FINAL DX SPEC: NORMAL
PATH REPORT.GROSS SPEC: NORMAL

## 2023-10-26 PROCEDURE — 25010000002 CLADRIBINE 10 MG/10ML SOLUTION 10 ML VIAL: Performed by: INTERNAL MEDICINE

## 2023-10-26 PROCEDURE — 25810000003 SODIUM CHLORIDE 0.9 % SOLUTION: Performed by: INTERNAL MEDICINE

## 2023-10-26 PROCEDURE — 96415 CHEMO IV INFUSION ADDL HR: CPT

## 2023-10-26 PROCEDURE — 96413 CHEMO IV INFUSION 1 HR: CPT

## 2023-10-26 PROCEDURE — 25810000003 SODIUM CHLORIDE 0.9 % SOLUTION 500 ML FLEX CONT: Performed by: INTERNAL MEDICINE

## 2023-10-26 RX ORDER — SODIUM CHLORIDE 9 MG/ML
250 INJECTION, SOLUTION INTRAVENOUS ONCE
Status: COMPLETED | OUTPATIENT
Start: 2023-10-26 | End: 2023-10-26

## 2023-10-26 RX ADMIN — CLADRIBINE 10 MG: 1 INJECTION INTRAVENOUS at 14:23

## 2023-10-26 RX ADMIN — SODIUM CHLORIDE 250 ML: 9 INJECTION, SOLUTION INTRAVENOUS at 14:23

## 2023-10-27 ENCOUNTER — INFUSION (OUTPATIENT)
Dept: ONCOLOGY | Facility: HOSPITAL | Age: 78
End: 2023-10-27
Payer: MEDICARE

## 2023-10-27 VITALS
HEART RATE: 65 BPM | RESPIRATION RATE: 16 BRPM | BODY MASS INDEX: 23.42 KG/M2 | TEMPERATURE: 97.8 F | OXYGEN SATURATION: 96 % | SYSTOLIC BLOOD PRESSURE: 143 MMHG | WEIGHT: 163.2 LBS | DIASTOLIC BLOOD PRESSURE: 77 MMHG

## 2023-10-27 DIAGNOSIS — C91.40 HAIRY CELL LEUKEMIA NOT HAVING ACHIEVED REMISSION: Primary | ICD-10-CM

## 2023-10-27 LAB
ALBUMIN SERPL-MCNC: 3.8 G/DL (ref 3.5–5.2)
ALBUMIN/GLOB SERPL: 2.1 G/DL
ALP SERPL-CCNC: 35 U/L (ref 39–117)
ALT SERPL W P-5'-P-CCNC: 20 U/L (ref 1–41)
ANION GAP SERPL CALCULATED.3IONS-SCNC: 13.8 MMOL/L (ref 5–15)
AST SERPL-CCNC: 23 U/L (ref 1–40)
BASOPHILS # BLD AUTO: 0 10*3/MM3 (ref 0–0.2)
BASOPHILS NFR BLD AUTO: 0 % (ref 0–1.5)
BILIRUB SERPL-MCNC: 0.5 MG/DL (ref 0–1.2)
BUN SERPL-MCNC: 28 MG/DL (ref 8–23)
BUN/CREAT SERPL: 36.8 (ref 7–25)
CALCIUM SPEC-SCNC: 8.4 MG/DL (ref 8.6–10.5)
CHLORIDE SERPL-SCNC: 102 MMOL/L (ref 98–107)
CO2 SERPL-SCNC: 24.2 MMOL/L (ref 22–29)
CREAT SERPL-MCNC: 0.76 MG/DL (ref 0.7–1.3)
DEPRECATED RDW RBC AUTO: 48.2 FL (ref 37–54)
EGFRCR SERPLBLD CKD-EPI 2021: 92.6 ML/MIN/1.73
EOSINOPHIL # BLD AUTO: 0.04 10*3/MM3 (ref 0–0.4)
EOSINOPHIL NFR BLD AUTO: 2.5 % (ref 0.3–6.2)
ERYTHROCYTE [DISTWIDTH] IN BLOOD BY AUTOMATED COUNT: 13 % (ref 12.3–15.4)
GLOBULIN UR ELPH-MCNC: 1.8 GM/DL
GLUCOSE SERPL-MCNC: 113 MG/DL (ref 65–99)
HCT VFR BLD AUTO: 33 % (ref 37.5–51)
HGB BLD-MCNC: 11.1 G/DL (ref 13–17.7)
IMM GRANULOCYTES # BLD AUTO: 0 10*3/MM3 (ref 0–0.05)
IMM GRANULOCYTES NFR BLD AUTO: 0 % (ref 0–0.5)
LYMPHOCYTES # BLD AUTO: 0.57 10*3/MM3 (ref 0.7–3.1)
LYMPHOCYTES NFR BLD AUTO: 35.2 % (ref 19.6–45.3)
MCH RBC QN AUTO: 34 PG (ref 26.6–33)
MCHC RBC AUTO-ENTMCNC: 33.6 G/DL (ref 31.5–35.7)
MCV RBC AUTO: 101.2 FL (ref 79–97)
MONOCYTES # BLD AUTO: 0.01 10*3/MM3 (ref 0.1–0.9)
MONOCYTES NFR BLD AUTO: 0.6 % (ref 5–12)
NEUTROPHILS NFR BLD AUTO: 1 10*3/MM3 (ref 1.7–7)
NEUTROPHILS NFR BLD AUTO: 61.7 % (ref 42.7–76)
NRBC BLD AUTO-RTO: 0 /100 WBC (ref 0–0.2)
PLATELET # BLD AUTO: 79 10*3/MM3 (ref 140–450)
PMV BLD AUTO: 10 FL (ref 6–12)
POTASSIUM SERPL-SCNC: 4.5 MMOL/L (ref 3.5–5.2)
PROT SERPL-MCNC: 5.6 G/DL (ref 6–8.5)
RBC # BLD AUTO: 3.26 10*6/MM3 (ref 4.14–5.8)
SODIUM SERPL-SCNC: 140 MMOL/L (ref 136–145)
WBC NRBC COR # BLD: 1.62 10*3/MM3 (ref 3.4–10.8)

## 2023-10-27 PROCEDURE — 80053 COMPREHEN METABOLIC PANEL: CPT

## 2023-10-27 PROCEDURE — 25810000003 SODIUM CHLORIDE 0.9 % SOLUTION 500 ML FLEX CONT: Performed by: INTERNAL MEDICINE

## 2023-10-27 PROCEDURE — 96415 CHEMO IV INFUSION ADDL HR: CPT

## 2023-10-27 PROCEDURE — 25010000002 CLADRIBINE 10 MG/10ML SOLUTION 10 ML VIAL: Performed by: INTERNAL MEDICINE

## 2023-10-27 PROCEDURE — 96413 CHEMO IV INFUSION 1 HR: CPT

## 2023-10-27 PROCEDURE — 25810000003 SODIUM CHLORIDE 0.9 % SOLUTION: Performed by: INTERNAL MEDICINE

## 2023-10-27 PROCEDURE — 85025 COMPLETE CBC W/AUTO DIFF WBC: CPT

## 2023-10-27 RX ORDER — SODIUM CHLORIDE 9 MG/ML
250 INJECTION, SOLUTION INTRAVENOUS ONCE
Status: COMPLETED | OUTPATIENT
Start: 2023-10-27 | End: 2023-10-27

## 2023-10-27 RX ADMIN — SODIUM CHLORIDE 250 ML: 9 INJECTION, SOLUTION INTRAVENOUS at 14:19

## 2023-10-27 RX ADMIN — CLADRIBINE 10 MG: 1 INJECTION INTRAVENOUS at 14:19

## 2023-10-27 NOTE — NURSING NOTE
Spoke to  pt has labs in his treatment plan today, per MD do not need to wait on labs to come back for treatment today okay to proceed.     Spoke to  about pts udencya injection due to pt being scheduled Saturday 28 at [facility] and Monday the [facility]th [facility]. Per MD pharmacy can not make injection at the hospital and pt will need to come here Monday @12:00. Pt is aware. RN called Reina in scheduling to cancel hospital appointment.

## 2023-10-30 ENCOUNTER — INFUSION (OUTPATIENT)
Dept: ONCOLOGY | Facility: HOSPITAL | Age: 78
End: 2023-10-30
Payer: MEDICARE

## 2023-10-30 DIAGNOSIS — C91.40 HAIRY CELL LEUKEMIA NOT HAVING ACHIEVED REMISSION: Primary | ICD-10-CM

## 2023-10-30 PROCEDURE — 96372 THER/PROPH/DIAG INJ SC/IM: CPT

## 2023-10-30 PROCEDURE — 25010000002 PEGFILGRASTIM-CBQV 6 MG/0.6ML SOLUTION PREFILLED SYRINGE: Performed by: INTERNAL MEDICINE

## 2023-10-30 RX ADMIN — PEGFILGRASTIM-CBQV 6 MG: 6 INJECTION, SOLUTION SUBCUTANEOUS at 12:24

## 2023-11-01 NOTE — PROGRESS NOTES
Subjective Patient, again, with excellent performance status, no additional symptoms    REASONS FOR FOLLOWUP:   Hairy cell leukemia.   Admission 05/11/2012 through 05/17/12 for 7 day continuous infusion cladribine (2-CdA) without complication.   Patient seen 5 months post treatment in complete remission.   Patient seen 8 months post treatment with continued complete remission, every 3 month reassessment per CBC planned, 6 month review by MD scheduled.   Patient seen at 15 months without evidence of recurrent disease, every 6 month followup planned.   The patient was seen 02/05/2014 with no evidence of recurrent disease, 6-month followup planned.   Patient seen 07/31/2014, stable with no evidence of recurrent disease, every 6 month followup.   Patient seen 01/15/2015, stable without recurrent disease, 6-month CBC planned, yearly followup scheduled.   Patient seen 02/04/2016, stable, ?early peripheral neuropathy developing distally per feet, no evidence for recurrent disease per hairy cell leukemia.  Patient reviewed September 02, 2016, previous July CBC with mild thrombocytopenia developing, recheck September 2 normalized, every 6 month follow-ups anticipated  Patient reviewed February 02, 2017, status post negative prostate biopsy, mild thrombocytopenia again recognized  Patient reviewed July 20, 2017, hematologically stable, every 6 month follow-up CBCs planned, yearly M.D. Assessment  Patient seen July 26, 2018 after recent airline travel any cold symptoms  Patient reviewed January 10, 2019, no additional symptoms, hematologically stable  Patient reviewed July 25, 2019, stable, six-month follow-up as planned  Patient viewed January 16, 2020, stable, every 6 months CBC, MD assessment yearly planned  Reassessment January 21, 2021, no evidence of recurrence disease  Patient assessed 2/10/2022, evidence of continued remission noted both on clinical examination, peripheral blood smear review and flow cytometric  evaluation.  Patient review 2/23/2023 mild thrombocytopenia, no new abnormalities per peripheral smear, close for follow-up planned  Patient assessed 9/14/2023, concern for early relapse.  Subsequent marrow 10/12/2023-variably hypercellular with diffuse involvement-a 90%-hairy cell leukemia relapse.  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 He is usually followed by Dr. Vuong for a history of hypertension, hyperlipidemia, minimal right carotid plaque as well as elevated PSA.       He had exams done on 1-09-12 for routine followup. This included normal serum chemistry including LFTs, cholesterol 131, triglyceride 34, HDL 51, and LDL 73. CBC revealed H&H 13.3, 37.9%, WBC 3300, platelet count 110,000, 26% polys and 74% lymphs with A NC of 0.9.       Dr. Remy provides information when initially seen with studies from as far back as 1- at which time hemoglobin and hematocrit was 14.4 and 42.1%, WBC 6950, platelets 227,000 with 47% polys, 42% lymph; this was automated. The additional test includ es approximately every year to every other year thereafter though it was noted in January 2010 WBC dropped from an average of approximately 5,000-6,000 to 3600. Hemoglobin and hematocrit 13.6 and 39.7%. Platelets 150,000-170,000 with ANC beginning in Ja nuary 2010 at 1900.       We were contacted per the above findings and asked Dr. Remy to undergo further assessment including flow cytometric exam per peripheral blood. This revealed no evidence of abnormal myeloid maturation increased blast population. No evidence of lymphopro l iferative disorder. The patient was asked to be seen formally in the office now and comes in today for further assessment. Dr. Remy  indicates that he has taken a number of medications in an attempt to improve his health. Several homeopathic medication s include vitamin C, folate, flaxseed oil, fish oil, vitamin E, selenium, pomegranate tabs, Co-enzyme Q, vitamin D, niacin, and red yeast rice daily. He has discontinued these and stays on those described below.       He feels well with no additional problem s and states that he has no little or no symptoms that he can detail, whether this is just ill health in anyway, though he does have occasional hemorrhoidal pain. He also notes rare palpitations and occasional epistaxis when the ambient air has dried sub stantially. No fevers, chills, weight loss, night sweats, or other constitutional symptoms.       As a result of the above, the patient was asked to undergo a series of studies. This went onto include rheumatoid factor of 6, MARYURI screen negative, negative s misael protein electrophoresis, normal quantitative immunoglobulins, CMV IgG of 2.9, IgM less than .9, EBV IgM of less than .9 and BCA IgG antibody of 4.6. These are consistent with previous exposures. HIV was negative, CRSP less than .1, sedimentation ra t e of 4, iron 125, TIBC 297, 14% saturation, and ferritin of 77. Peripheral blood flow cytometry was negative for evidence of abnormal myeloid maturation, increased blast population or lymphoproliferative disorder. As the patient is seen back today, he i s continuing to feel well though is obviously greatly concerned about his followup counts. Reviews in the office had been planned to follow him briefly recheck performance 3/29/12 with an H&H of 12.9, 36.1, WBC 3900, 32 polys, 62 lymphs, platelet count 94 , 000, IPF slightly elevated at 8.8. The patient was therefore asked to return and to be further assessed with bone marrow aspirate and biopsy. He now presents to do so. He has had no additional symptoms since last seen. He has discontinued his Crestor use.       As a result of his review  the patient underwent bone marrow aspiration and biopsy. This revealed evidence of lymphocytic infiltrate reviewed here in the office. 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 Eventually cytogenetics was found to be negative with no evidence of cyclin-D1/IgH or BCL-2/IgH rearrangement. Additional tissue was requested. As a result, the patient was notifi e edward of these findings by personal visit, and plans were made to proceed with endoscopy, ? mantle cell involvement. At the time of this dictation, this was not yet performed. He was also asked to undergo CT of the neck, chest, abdomen and pelvis which show ed no evidence of abnormality. PET scan 04/13/12 showed increased activity within the marrow, but no additional abnormalities including the spleen, with no background bowel or gastric activity as well.       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 hairy cell leukemia. This has been discussed in great detail with the alexander thakur at [facility] and additional stains are pending as is the patient's GI assessment now to be pursued through Dr. Ray.       His case was again reviewed over quite a period of time, and ultimately his marrow was signed out as 80% involved with hairy cell leukemia - normal cytogenetics, no evidence of cyclin D1/IgH or BCL-2/IgH rearrangement. Dr. Remy was advised of the treatment options which include followup with no immediate therapy, and/or the use of cladribine or pentostatin. After numerous di s cussion, he ultimately elected to try to proceed with treatment when he is feeling as well as he is to improve his ability to withstand the therapy itself. We therefore began to discuss  the treatment schedule, which also could be somewhat variable, inclu d ing 7-day infusions, 2-hr infusion q.d. x five days or every other week infusion. After discussion he wishes to proceed with 7-day infusion. When we attempted to do this as an outpatient we found out thereafter that this would not be covered as an outpa tient infusion through his insurance. Therefore we have made plans for him to be admitted after he is seen in office 05/10/12.       The patient was thereafter admitted 05/11-05/17/12. PICC line was placed and he initiated treatment at 0.1 mg/kg or 7.7 mg IV in fusion over seven days. The patient fortunately had no serious issues at all during the seven days and continued to work out on a daily basis. He was seen by his internist as well with some of his meds adjusted including his HCTZ and Ziac. He was stabl e at discharge, but was given Neulasta 6 mg subcutaneously 24 hours after he completed treatment. He has been having counts done on a regular basis, and returns back today with only minimal evidence of neutropenia at his becca, and has an excellent periph eral smear today - normal findings. His performance status remains excellent as well.       The patient when seen on 6/7/12 was felt to have improved substantially. We followed him for weekly counts and plans at that point were for him to take a trip out of the country. He did actually take this trip, which had him eventually hiking at 11-12,000 feet without any complication or ill effect. As he returns back today on 7/19 he feels well and again with an excellent performance status. He has had no fever, c hills or suggestion of infection or complications thus far. He remains again, with an excellent performance status.       The patient thereafter is asked to have his counts done on an every six weeks basis, now seen three months later with a normal CBC as well as examination. He again feels quite well with a completely normal performance  status.       The patient was asked to return for followup studies that included normal serum chemistries, unchanged lipid profile and stable hematologic findings. He is now seen on 2/7/13, 4 months from his last appointment. He continues to do well with unchanged performance status.       The patient was asked to have counts done q3 months seeing the physician in six months. He is now seen back 08/22/2013 feeling well having [place] with that. He had no additional fevers, sweats, chills, significant change in weight although he has tried to lose some additional pounds and we see this today. He continues to exercise regularl y and dietary program. Review of his smears again continues to demonstrate a complete remission.       Today, the patient was asked to be seen back in 6 months for followup, and is now seen 02/05/2014, continuing to feel well. Again, review of his smears and physical examination fortunately demonstrates no evidence of recurrent disease.       The patient was asked to be seen back in 6 months for followup and is now seen on 07/31/2014. Again he feels well with an excellent performance status and no change since last reviewed.       The patient thereafter was now seen back 2015, 2016 stable at both visits with no evidence of recurrence of his hairy cell leukemia. He feels well but when now reviewed 02/04/2016, he has had some degree of peripheral neuropathy? This improv es with activity and it is certainly not limiting his action or his function.   Patient now reviewed back again September 02, 2016, continues to have an excellent performance status and hematologically remain stable brother had some concern about thrombocytopenia last visit.    The patient is next seen February 02, 2017.  He has not had any medical issues since last seen and remains physically quite well.  He does describe following with urology and undergoing a repeat  biopsy recently when his PSA was above 7.  His findings evidently were negative for malignancy though he did have post procedure hematuria.  This is also now abated.  The patient is next seen July 28, 2017.  He continues to have an excellent performance status and no particular difficulty in day-to-day function and/or pursuing active vacations.      The patient is next seen July 26, 2018.  He had recently returned from a trip to [place].  After experiencing an airplane ride with multiple coughing passengers he himself developed a cold, particularly severe over the last 3-4 days with cough and congestion.  He's had low-grade fever which is now beginning to resolve but is clearly uncomfortable.    The patient's next seen January 10, 2019.  He has continued to travel without issues and is feeling well when reviewed today.  Plans were made for usual follow-up and the patient is next seen July 25, 2019.  His performance status remains excellent though he has lost some weight and indicates he did this per altering his diet.  His stamina and other usual activities are continued unabated.  The patient is next seen January 16, 2020.  He continues an active practice and lifestyle.  He has lost some additional weight on purpose through diet.    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    The patient's follow-up laboratory studies demonstrated by late September degree of leukopenia, unchanged thrombocytopenia and this was followed with repeat analysis within the last several weeks as he seen February 10, 2022. Flow cytometry failed to demonstrate any new process.  As you seen February 10, 2020 his performance status remains excellent, stable weight, appetite, no additional fever, chills, rash, night sweats.    The patient is next evaluated 2/23/2023 with an unchanged performance  status for mild thrombocytopenia.  He has had some exposure to viral illnesses through his grandchildren as of late but is otherwise felt fine with a unchanged performance status and work schedule.    He required admission 5/23-27/2023 had return from [place] where he developed nausea and vomiting and difficulty with keeping p.o.  He had further nausea, vomiting and dizziness and was seen in the emergency department and was found to have a sodium 115.  He was seen by nephrology with concern of hyponatremia related to hydrochlorothiazide along with SIDH, treated with fluid restriction and salt tablets and p.o. Lasix.  His medications were adjusted per renal medicine.  At that point he continued to demonstrate chronic pancytopenia that did not accelerate in any particular way.     Subsequent CBC 6/1/2023 include H&H 11.3 and 32.2 with white count of 2840, platelet count 93,000, ANC of 1610.    The patient is next evaluated 8/3/2020 3 repeat CBC includes a white count of 2250, H&H 11.8 and 33.6, platelet count of 88,000-differential of 44% polys, 52% lymphs, 3% monocytes.    Patient contacted about flow cytometry findings of small population potentially suggestive of recurrence versus secondary process.  Plan to reassess in follow-up visits 9/7/2023.  Please note that if treatment is necessary then repeat treatment with cladribine could be given in a variety of of schedules including 0.15 mg/kg/day over 2 hours x 5 days followed by rituximab or 5.6 mg/m² over 2 hours also for 5 days and also followed by rituximab therapy.      The patient was seen back 9/14/2023.Flow cytometry assessed on 2 visits includes a CD10 positive monoclonal lambda B-cell population representing 0.3% of total events 8/3/2023 and repeat 9/7/2023 demonstrates a similar B-cell population also 0.3% total events-phenotype positive CD10 bright, CD19 bright, CD20 bright, CD25, CD45,   slambda.  Discussed symptoms usually due to splenomegaly,  fatigue, weakness, weight loss, bruising, recurrent infections, periodic vasculitis.  He is clear that he is not having any of the symptoms and feels that his overall performance status is as good as its ever been.  We have discussed that additional issues include possible organomegaly and cytopenias developing with his follow-up CBC in office today including H&H of 12.2 and 33.4, white count of 3250 and platelet count of 92,000.  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    We had the patient return for periodic blood counts, had him undergo testing for hepatitis and reviewed the various regimens including 5-day cladribine followed by Rituxan therapy.  10/5/2023 scans revealed no substantial abnormalities including lack of splenomegaly or lymphadenopathy.    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  Additionally the patient's hepatitis B surface antibody was equivocal and he went on to have additional vaccinations   -influenza, COVID-19, RSV and hepatitis B.  Further vascular surgeon was contacted and the patient underwent a right internal jugular Mediport placement 10/18/2023 and teaching concerning cladribine day 1 through 5 and subsequent Rituxan therapy.    After further discussion of available treatment options plans made to offer 5-day cladribine and, review of literature, indicates that Rituxan is associated with further immunosuppression.   Available guidance suggests proceeding with initial cladribine and then 4-6 weekly doses of Rituxan consolidation starting 4 to 6 weeks after initial treatment.    The patient proceeded with cladribine taking therapy daily-days 1-5 through 10/27/2023 and given Neulasta 10/30/2023.  He is next seen 11/2/2023.  Fortunately he has had no untoward effects of treatment as far and continues to work unabated.  His follow-up CBC does not demonstrate significant development of neutropenia anemia or thrombocytopenia.  We discussed assessing him regularly over the next several weeks and anticipate starting Rituxan approximately 3 weeks from now.             Past Medical History:   Diagnosis Date    BCC (basal cell carcinoma of skin) 06/22/2022    NOSE    Benign prostate hyperplasia     H/O Elevated PSA     H/O Hairy cell leukemia (CMS/HCC) 2012    H/O Internal thrombosed hemorrhoids     H/O minimal right carotid plaque     H/O peripheral neuropathy     Hyperlipidemia     Hypertension     Primary open-angle glaucoma, bilateral, mild stage        ONCOLOGIC HISTORY:  (History from previous dates can be found in the separate document.)    Current Outpatient Medications on File Prior to Visit   Medication Sig Dispense Refill    acyclovir (Zovirax) 400 MG tablet Take 1 tablet by mouth 2 (Two) Times a Day. 60 tablet 7    amLODIPine (NORVASC) 2.5 MG tablet Take 1 tablet by mouth Daily. 90 tablet 3    bimatoprost (LUMIGAN) 0.03 % ophthalmic drops Administer 1 drop to both eyes Every Night.      bisoprolol (ZEBeta) 5 MG tablet Take 1 tablet by mouth Daily. 90 tablet 3    Calcium-Magnesium-Vitamin D (CALCIUM 1200+D3 PO) Take  by mouth.      chlorhexidine (PERIDEX) 0.12 % solution Rinse with 15 mL by mouth after breakfast and at bedtime for 30 seconds, then spit. No food or drink for 30 minutes after rinse. 473 mL 3    finasteride (PROSCAR) 5 MG tablet Take 1 tablet by mouth Daily. 90 tablet 3    Magnesium 500 MG tablet Take 1 tablet by  mouth 2 (two) times a day.      niacin 500 MG tablet Take 1 tablet by mouth Every Night.      ondansetron (ZOFRAN) 8 MG tablet Take 1 tablet by mouth 3 (Three) Times a Day As Needed for Nausea or Vomiting. 30 tablet 5    propafenone (RYTHMOL) 150 MG tablet Take 1 tablet by mouth 3 (Three) Times a Day. 270 tablet 3    rosuvastatin (CRESTOR) 10 MG tablet Take 1 tablet by mouth every night at bedtime. 90 tablet 2    sulfamethoxazole-trimethoprim (BACTRIM DS,SEPTRA DS) 800-160 MG per tablet Take 1 tablet by mouth 3 (Three) Times a Week. 12 tablet 7    tamsulosin (FLOMAX) 0.4 MG capsule 24 hr capsule Take 2 capsules by mouth Daily. 180 capsule 3    vitamin C (ASCORBIC ACID) 250 MG tablet Take 4 tablets by mouth Daily. With madisyn hips      vitamin D3 125 MCG (5000 UT) capsule capsule Take 1 capsule by mouth Daily.      HYDROcodone-acetaminophen (NORCO) 5-325 MG per tablet Take 1 tablet by mouth Every 4 (Four) Hours As Needed (Pain). (Patient not taking: Reported on 10/23/2023) 20 tablet 0     No current facility-administered medications on file prior to visit.       ALLERGIES:   No Known Allergies    Social History     Socioeconomic History    Marital status:      Spouse name: Hanna   Tobacco Use    Smoking status: Former     Packs/day: .5     Types: Cigarettes    Smokeless tobacco: Never    Tobacco comments:     Quit smoking many years ago.   Vaping Use    Vaping Use: Never used   Substance and Sexual Activity    Alcohol use: Yes     Comment: 1 glass of wine or beer per day    Drug use: No    Sexual activity: Defer         Cancer-related family history includes Cancer in his father; Cancer (age of onset: 90) in his mother.      Review of Systems   Constitutional:  Negative for fatigue.   HENT: Negative.     Eyes: Negative.    Respiratory:  Negative for chest tightness, shortness of breath and wheezing.    Gastrointestinal:  Negative for constipation, diarrhea, nausea and vomiting.   Endocrine: Negative.   "  Musculoskeletal: Negative.    Skin: Negative.    Neurological: Negative.  Negative for weakness.        Vitals:    11/02/23 1225   BP: 174/83   Pulse: 69   Resp: 16   Temp: 97.3 °F (36.3 °C)   TempSrc: Temporal   SpO2: 98%   Weight: 73.7 kg (162 lb 7.7 oz)   Height: 177 cm (69.69\")   PainSc: 0-No pain             11/2/2023    12:21 PM   Current Status   ECOG score 0       Physical Exam      GENERAL: Well-developed, well-nourished male in no acute distress.   SKIN: Warm, dry, without new rash or lesion  HEAD: Normocephalic.   EYES: Pupils equal, round and reactive to light, EOMs intact. Conjunctivae normal.   EARS: Hearing intact.   NOSE: Septum midline. No excoriations or nasal discharge.   MOUTH: Tongue is well-papillated; no stomatitis or ulcers. Lips normal.  dry mucous membranes, clear sinus drainage noted  THROAT: Oropharynx without lesions or exudates.   NECK: Supple with good range of motion; no thyromegaly or masses, no JVD or bruits.   LYMPHATICS: No cervical, supraclavicular, axillary or inguinal adenopathy.   CHEST: Lungs clear to percussion and auscultation.   CARDIAC: Regular rate and rhythm without murmurs, rubs or gallops.   ABDOMEN: Soft, nontender with no evidence hepatosplenomegaly or masses  EXTREMITIES: No clubbing, cyanosis or edema.   NEUROLOGICAL: No focal neurological deficits.       RECENT LABS:  Hematology WBC   Date Value Ref Range Status   11/02/2023 1.90 (L) 3.40 - 10.80 10*3/mm3 Final     RBC   Date Value Ref Range Status   11/02/2023 2.99 (L) 4.14 - 5.80 10*6/mm3 Final     Hemoglobin   Date Value Ref Range Status   11/02/2023 10.7 (L) 13.0 - 17.7 g/dL Final     Hematocrit   Date Value Ref Range Status   11/02/2023 31.1 (L) 37.5 - 51.0 % Final     Platelets   Date Value Ref Range Status   11/02/2023 82 (L) 140 - 450 10*3/mm3 Final        Assessment & Plan        A 77-year-old male with the diagnosis of hairy cell leukemia in April 2012. He was treated 5/11-5/17/ 12 with cladribine " being given as continuous infusion at 0.1 mg/kg or 7.7 mg/24-hours x7 days. He did well during hospitalization with little toxicity and minimal myelosuppression. He has gone onto obtain a complete remission and this continues to be th e case at a 6 month visit now 07/31/2014. The patient has been now assessed on a regular basis every 6 months with no evidence of hematologic deterioration. As he is seen today, 02/04/2016, and now again September 02, 2016 he remains clinically stable as well.      He is now assessed February 2 and overall doing well without any additional issues except for the prostate described as above.  He is now recognized there is also possibly hypertensive and I'll contact him about this is afternoon though during our conversation he had few if any symptoms.?.  Pending our review will ask him to have a repeat CBC at 3 months to see that physician in 6 months.    The patient indicates when called about his blood pressure that he often experiences white coat hypertension.  He is going to recheck his blood pressure night and notify me if it remains elevated.     He is next seen July 28, 2017, hematologically stable, remaining quite active.      As he is again reviewed July 2018 he has had recent viral exposure and is slowly working through a cold.  This appears to have suppressed his marrow very modestly though review of the smear shows no evidence of hairy cells though there are atypical lymphocytes thought to be virally activated.  At this point we'll have a follow-up CBC in 4 weeks and have him see the physician again in 6 months .    The patient is next seen January 10, 2019 doing well with no hematologic deterioration.  Plan to see back in 6 months for follow-up.  The patient is next seen July 25, 2019.  He does continue to do extremely well and we discussed his weight loss which appears to be dietarily produced.  His exam is not indicative of progressive disease or new disorder developing.   We have agreed to have him reassessed in 6-month follow-ups.  As he is seen back January 16, 2020 there are no findings of concern though he has lost weight purposely.  It is felt that we can reduce his visits for MD assessment but continue every 6 month CBCs.  This remains the case as he is assessed January 21, 2021.     The patient's follow-up laboratory studies demonstrated by late September 2021, a degree of leukopenia, unchanged thrombocytopenia and this was followed with repeat analysis within the last several weeks as he seen February 10, 2022. Flow cytometry failed to demonstrate any new process.  As you seen February 10, 2020 his performance status remains excellent, stable weight, appetite, no additional fever, chills, rash, night sweats.  As he is reexamined including peripheral blood smears 2/10/2022 there is no suggestion of additional lymphadenopathy hepatosplenomegaly.  We have discussed a fourth COVID 19 vaccination considering his history and will clarify his status by COVID 19 antibodies in office today.    Patient's subsequent exams did demonstrate responsiveness to COVID-19 vaccinations.  He is now reviewed at 6 and 12 months and with some degree of mild thrombocytopenia noted 2/23/2023 after recent viral exposures.  Peripheral smear does not demonstrate any suggestion of relapse though there are activated lymphocytes noted.    He required admission 5/23-27/2023 had return from [place] where he developed nausea and vomiting and difficulty with keeping p.o.  He had further nausea, vomiting and dizziness and was seen in the emergency department and was found to have a sodium 115.  He was seen by nephrology with concern of hyponatremia related to hydrochlorothiazide along with SIDH, treated with fluid restriction and salt tablets and p.o. Lasix.  His medications were adjusted per renal medicine.  At that point he continued to demonstrate chronic pancytopenia that did not accelerate in any particular  way.     Subsequent CBC 6/1/2023 include H&H 11.3 and 32.2 with white count of 2840, platelet count 93,000, ANC of 1610.    The patient is next evaluated 8/3/2020 3 repeat CBC includes a white count of 2250, H&H 11.8 and 33.6, platelet count of 88,000-differential of 44% polys, 52% lymphs, 3% monocytes.    Patient contacted about flow cytometry findings of small population potentially suggestive of recurrence versus secondary process.  Plan to reassess in follow-up visits 9/7/2023.  Please note that if treatment is necessary then repeat treatment with cladribine could be given in a variety of of schedules including 0.15 mg/kg/day over 2 hours x 5 days followed by rituximab or 5.6 mg/m² over 2 hours also for 5 days and also followed by rituximab therapy.    The patient was seen back 9/14/2023.Flow cytometry assessed on 2 visits includes a CD10 positive monoclonal lambda B-cell population representing 0.3% of total events 8/3/2023 and repeat 9/7/2023 demonstrates a similar B-cell population also 0.3% total events-phenotype positive CD10 bright, CD19 bright, CD20 bright, CD25, CD45,   slambda.  Discussed symptoms usually due to splenomegaly, fatigue, weakness, weight loss, bruising, recurrent infections, periodic vasculitis.  He is clear that he is not having any of the symptoms and feels that his overall performance status is as good as its ever been.  We have discussed that additional issues include possible organomegaly and cytopenias developing with his follow-up CBC in office today including H&H of 12.2 and 33.4, white count of 3250 and platelet count of 92,000.  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    We had the patient return for periodic blood counts, had him undergo testing for hepatitis and reviewed the various regimens including 5-day cladribine  followed by Rituxan therapy.  10/5/2023 scans revealed no substantial abnormalities including lack of splenomegaly or lymphadenopathy.    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  Additionally the patient's hepatitis B surface antibody was equivocal and he went on to have additional vaccinations   -influenza, COVID-19, RSV and hepatitis B.  Further vascular surgeon was contacted and the patient underwent a right internal jugular Mediport placement 10/18/2023 and teaching concerning cladribine day 1 through 5 and subsequent Rituxan therapy.    After further discussion of available treatment options plans made to offer 5-day cladribine and, review of literature, indicates that Rituxan is associated with further immunosuppression.  Available guidance suggests proceeding with initial cladribine and then 4 weekly doses of Rituxan consolidation starting 4 to 6 weeks after initial treatment.  At this point the patient has completed all vaccinations including hepatitis B.    The patient proceeded with cladribine taking therapy daily-days 1-5 through 10/27/2023 and given Neulasta 10/30/2023.  He is next seen 11/2/2023.  Fortunately he has had no untoward effects of treatment as far and continues to work unabated.  His follow-up CBC does not demonstrate significant development of neutropenia anemia or thrombocytopenia.  We discussed assessing him regularly over the next several weeks and anticipate starting Rituxan approximately 3 weeks from now.        Plan:    *.  Patient to continue current medication list  including acyclovir and Septra prophylaxis    *Return Monday and Thursday for CBC lab over the next 3 weeks with MD follow-up 11/20/2023 for formal assessment    *Anticipate Rituxan therapy to be initiated 11/22/2023

## 2023-11-02 ENCOUNTER — OFFICE VISIT (OUTPATIENT)
Dept: ONCOLOGY | Facility: CLINIC | Age: 78
End: 2023-11-02
Payer: MEDICARE

## 2023-11-02 ENCOUNTER — APPOINTMENT (OUTPATIENT)
Dept: ONCOLOGY | Facility: HOSPITAL | Age: 78
End: 2023-11-02
Payer: MEDICARE

## 2023-11-02 ENCOUNTER — INFUSION (OUTPATIENT)
Dept: ONCOLOGY | Facility: HOSPITAL | Age: 78
End: 2023-11-02
Payer: MEDICARE

## 2023-11-02 VITALS
RESPIRATION RATE: 16 BRPM | SYSTOLIC BLOOD PRESSURE: 174 MMHG | OXYGEN SATURATION: 98 % | DIASTOLIC BLOOD PRESSURE: 83 MMHG | WEIGHT: 162.4 LBS | TEMPERATURE: 97.3 F | HEART RATE: 69 BPM | BODY MASS INDEX: 23.3 KG/M2

## 2023-11-02 VITALS
OXYGEN SATURATION: 98 % | BODY MASS INDEX: 23.26 KG/M2 | TEMPERATURE: 97.3 F | HEART RATE: 69 BPM | SYSTOLIC BLOOD PRESSURE: 174 MMHG | HEIGHT: 70 IN | RESPIRATION RATE: 16 BRPM | DIASTOLIC BLOOD PRESSURE: 83 MMHG | WEIGHT: 162.48 LBS

## 2023-11-02 DIAGNOSIS — C91.40 HAIRY CELL LEUKEMIA NOT HAVING ACHIEVED REMISSION: Primary | ICD-10-CM

## 2023-11-02 DIAGNOSIS — C91.40 HAIRY CELL LEUKEMIA NOT HAVING ACHIEVED REMISSION: ICD-10-CM

## 2023-11-02 LAB
ALBUMIN SERPL-MCNC: 3.9 G/DL (ref 3.5–5.2)
ALBUMIN/GLOB SERPL: 2 G/DL
ALP SERPL-CCNC: 44 U/L (ref 39–117)
ALT SERPL W P-5'-P-CCNC: 20 U/L (ref 1–41)
ANION GAP SERPL CALCULATED.3IONS-SCNC: 12.3 MMOL/L (ref 5–15)
AST SERPL-CCNC: 21 U/L (ref 1–40)
BASOPHILS # BLD AUTO: 0.02 10*3/MM3 (ref 0–0.2)
BASOPHILS NFR BLD AUTO: 1.1 % (ref 0–1.5)
BILIRUB SERPL-MCNC: 1.4 MG/DL (ref 0–1.2)
BUN SERPL-MCNC: 23 MG/DL (ref 8–23)
BUN/CREAT SERPL: 28.4 (ref 7–25)
CALCIUM SPEC-SCNC: 8.4 MG/DL (ref 8.6–10.5)
CHLORIDE SERPL-SCNC: 103 MMOL/L (ref 98–107)
CO2 SERPL-SCNC: 26.7 MMOL/L (ref 22–29)
CREAT SERPL-MCNC: 0.81 MG/DL (ref 0.7–1.3)
DEPRECATED RDW RBC AUTO: 49.1 FL (ref 37–54)
EGFRCR SERPLBLD CKD-EPI 2021: 90.8 ML/MIN/1.73
EOSINOPHIL # BLD AUTO: 0.02 10*3/MM3 (ref 0–0.4)
EOSINOPHIL NFR BLD AUTO: 1.1 % (ref 0.3–6.2)
ERYTHROCYTE [DISTWIDTH] IN BLOOD BY AUTOMATED COUNT: 13.2 % (ref 12.3–15.4)
GLOBULIN UR ELPH-MCNC: 2 GM/DL
GLUCOSE SERPL-MCNC: 156 MG/DL (ref 65–99)
HCT VFR BLD AUTO: 31.1 % (ref 37.5–51)
HGB BLD-MCNC: 10.7 G/DL (ref 13–17.7)
IMM GRANULOCYTES # BLD AUTO: 0.18 10*3/MM3 (ref 0–0.05)
IMM GRANULOCYTES NFR BLD AUTO: 9.5 % (ref 0–0.5)
LYMPHOCYTES # BLD AUTO: 0.39 10*3/MM3 (ref 0.7–3.1)
LYMPHOCYTES NFR BLD AUTO: 20.5 % (ref 19.6–45.3)
MCH RBC QN AUTO: 35.8 PG (ref 26.6–33)
MCHC RBC AUTO-ENTMCNC: 34.4 G/DL (ref 31.5–35.7)
MCV RBC AUTO: 104 FL (ref 79–97)
MONOCYTES # BLD AUTO: 0.04 10*3/MM3 (ref 0.1–0.9)
MONOCYTES NFR BLD AUTO: 2.1 % (ref 5–12)
NEUTROPHILS NFR BLD AUTO: 1.25 10*3/MM3 (ref 1.7–7)
NEUTROPHILS NFR BLD AUTO: 65.7 % (ref 42.7–76)
NRBC BLD AUTO-RTO: 0 /100 WBC (ref 0–0.2)
PLATELET # BLD AUTO: 82 10*3/MM3 (ref 140–450)
PMV BLD AUTO: 9.6 FL (ref 6–12)
POTASSIUM SERPL-SCNC: 4.6 MMOL/L (ref 3.5–5.2)
PROT SERPL-MCNC: 5.9 G/DL (ref 6–8.5)
RBC # BLD AUTO: 2.99 10*6/MM3 (ref 4.14–5.8)
SODIUM SERPL-SCNC: 142 MMOL/L (ref 136–145)
WBC NRBC COR # BLD: 1.9 10*3/MM3 (ref 3.4–10.8)

## 2023-11-02 PROCEDURE — 1126F AMNT PAIN NOTED NONE PRSNT: CPT | Performed by: INTERNAL MEDICINE

## 2023-11-02 PROCEDURE — 80053 COMPREHEN METABOLIC PANEL: CPT

## 2023-11-02 PROCEDURE — 3079F DIAST BP 80-89 MM HG: CPT | Performed by: INTERNAL MEDICINE

## 2023-11-02 PROCEDURE — 99214 OFFICE O/P EST MOD 30 MIN: CPT | Performed by: INTERNAL MEDICINE

## 2023-11-02 PROCEDURE — 85025 COMPLETE CBC W/AUTO DIFF WBC: CPT

## 2023-11-02 PROCEDURE — G0463 HOSPITAL OUTPT CLINIC VISIT: HCPCS

## 2023-11-02 PROCEDURE — 3077F SYST BP >= 140 MM HG: CPT | Performed by: INTERNAL MEDICINE

## 2023-11-02 NOTE — LETTER
November 2, 2023     Damián Vuong Jr., MD  [address]  [address] [address]    Patient: Angel Remy   YOB: 1945   Date of Visit: 11/2/2023     Dear Damián Vuong Jr., MD:       Thank you for referring Angel Remy to me for evaluation. Below are the relevant portions of my assessment and plan of care.    If you have questions, please do not hesitate to call me. I look forward to following Angel along with you.         Sincerely,        Paco Jeffers MD        CC: No Recipients    Paco Jeffers MD  11/02/23 1538  Sign when Signing Visit    SubjectivePatient, again, with excellent performance status, no additional symptoms    REASONS FOR FOLLOWUP:   Hairy cell leukemia.   Admission 05/11/2012 through 05/17/12 for 7 day continuous infusion cladribine (2-CdA) without complication.   Patient seen 5 months post treatment in complete remission.   Patient seen 8 months post treatment with continued complete remission, every 3 month reassessment per CBC planned, 6 month review by MD scheduled.   Patient seen at 15 months without evidence of recurrent disease, every 6 month followup planned.   The patient was seen 02/05/2014 with no evidence of recurrent disease, 6-month followup planned.   Patient seen 07/31/2014, stable with no evidence of recurrent disease, every 6 month followup.   Patient seen 01/15/2015, stable without recurrent disease, 6-month CBC planned, yearly followup scheduled.   Patient seen 02/04/2016, stable, ?early peripheral neuropathy developing distally per feet, no evidence for recurrent disease per hairy cell leukemia.  Patient reviewed September 02, 2016, previous July CBC with mild thrombocytopenia developing, recheck September 2 normalized, every 6 month follow-ups anticipated  Patient reviewed February 02, 2017, status post negative prostate biopsy, mild thrombocytopenia again recognized  Patient reviewed July 20, 2017, hematologically stable,  every 6 month follow-up CBCs planned, yearly M.D. Assessment  Patient seen July 26, 2018 after recent airline travel any cold symptoms  Patient reviewed January 10, 2019, no additional symptoms, hematologically stable  Patient reviewed July 25, 2019, stable, six-month follow-up as planned  Patient viewed January 16, 2020, stable, every 6 months CBC, MD assessment yearly planned  Reassessment January 21, 2021, no evidence of recurrence disease  Patient assessed 2/10/2022, evidence of continued remission noted both on clinical examination, peripheral blood smear review and flow cytometric evaluation.  Patient review 2/23/2023 mild thrombocytopenia, no new abnormalities per peripheral smear, close for follow-up planned  Patient assessed 9/14/2023, concern for early relapse.  Subsequent marrow 10/12/2023-variably hypercellular with diffuse involvement-a 90%-hairy cell leukemia relapse.  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 He is usually followed by Dr. Vuong for a history of hypertension, hyperlipidemia, minimal right carotid plaque as well as elevated PSA.       He had exams done on 1-09-12 for routine followup. This included normal serum chemistry including LFTs, cholesterol 131, triglyceride 34, HDL 51, and LDL 73. CBC revealed H&H 13.3, 37.9%, WBC 3300, platelet count 110,000, 26% polys and 74% lymphs with A NC of 0.9.       Dr. Remy provides information when initially seen with studies from as far back as 1- at which time hemoglobin and hematocrit was 14.4 and 42.1%, WBC 6950, platelets 227,000 with 47% polys, 42% lymph; this was automated. The additional test includ es approximately every year to every other year thereafter though it was noted in  January 2010 WBC dropped from an average of approximately 5,000-6,000 to 3600. Hemoglobin and hematocrit 13.6 and 39.7%. Platelets 150,000-170,000 with ANC beginning in Ja nuary 2010 at 1900.       We were contacted per the above findings and asked Dr. Remy to undergo further assessment including flow cytometric exam per peripheral blood. This revealed no evidence of abnormal myeloid maturation increased blast population. No evidence of lymphopro l iferative disorder. The patient was asked to be seen formally in the office now and comes in today for further assessment. Dr. Remy indicates that he has taken a number of medications in an attempt to improve his health. Several homeopathic medication s include vitamin C, folate, flaxseed oil, fish oil, vitamin E, selenium, pomegranate tabs, Co-enzyme Q, vitamin D, niacin, and red yeast rice daily. He has discontinued these and stays on those described below.       He feels well with no additional problem s and states that he has no little or no symptoms that he can detail, whether this is just ill health in anyway, though he does have occasional hemorrhoidal pain. He also notes rare palpitations and occasional epistaxis when the ambient air has dried sub stantially. No fevers, chills, weight loss, night sweats, or other constitutional symptoms.       As a result of the above, the patient was asked to undergo a series of studies. This went onto include rheumatoid factor of 6, MARYURI screen negative, negative s misael protein electrophoresis, normal quantitative immunoglobulins, CMV IgG of 2.9, IgM less than .9, EBV IgM of less than .9 and BCA IgG antibody of 4.6. These are consistent with previous exposures. HIV was negative, CRSP less than .1, sedimentation ra t e of 4, iron 125, TIBC 297, 14% saturation, and ferritin of 77. Peripheral blood flow cytometry was negative for evidence of abnormal myeloid maturation, increased blast population or lymphoproliferative disorder.  As the patient is seen back today, he i s continuing to feel well though is obviously greatly concerned about his followup counts. Reviews in the office had been planned to follow him briefly recheck performance 3/29/12 with an H&H of 12.9, 36.1, WBC 3900, 32 polys, 62 lymphs, platelet count 94 , 000, IPF slightly elevated at 8.8. The patient was therefore asked to return and to be further assessed with bone marrow aspirate and biopsy. He now presents to do so. He has had no additional symptoms since last seen. He has discontinued his Crestor use.       As a result of his review the patient underwent bone marrow aspiration and biopsy. This revealed evidence of lymphocytic infiltrate reviewed here in the office. 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 Eventually cytogenetics was found to be negative with no evidence of cyclin-D1/IgH or BCL-2/IgH rearrangement. Additional tissue was requested. As a result, the patient was notifi e edward of these findings by personal visit, and plans were made to proceed with endoscopy, ? mantle cell involvement. At the time of this dictation, this was not yet performed. He was also asked to undergo CT of the neck, chest, abdomen and pelvis which show ed no evidence of abnormality. PET scan 04/13/12 showed increased activity within the marrow, but no additional abnormalities including the spleen, with no background bowel or gastric activity as well.       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 hairy cell leukemia. This has been discussed in great detail with the alexander thakur at [facility] and additional stains are pending as is the patient's GI assessment now to be pursued through   [facility].       His case was again reviewed over quite a period of time, and ultimately his marrow was signed out as 80% involved with hairy cell leukemia - normal cytogenetics, no evidence of cyclin D1/IgH or BCL-2/IgH rearrangement. Dr. Remy was advised of the treatment options which include followup with no immediate therapy, and/or the use of cladribine or pentostatin. After numerous di s cussion, he ultimately elected to try to proceed with treatment when he is feeling as well as he is to improve his ability to withstand the therapy itself. We therefore began to discuss the treatment schedule, which also could be somewhat variable, inclu d ing 7-day infusions, 2-hr infusion q.d. x five days or every other week infusion. After discussion he wishes to proceed with 7-day infusion. When we attempted to do this as an outpatient we found out thereafter that this would not be covered as an outpa tient infusion through his insurance. Therefore we have made plans for him to be admitted after he is seen in office 05/10/12.       The patient was thereafter admitted 05/11-05/17/12. PICC line was placed and he initiated treatment at 0.1 mg/kg or 7.7 mg IV in fusion over seven days. The patient fortunately had no serious issues at all during the seven days and continued to work out on a daily basis. He was seen by his internist as well with some of his meds adjusted including his HCTZ and Ziac. He was stabl e at discharge, but was given Neulasta 6 mg subcutaneously 24 hours after he completed treatment. He has been having counts done on a regular basis, and returns back today with only minimal evidence of neutropenia at his becca, and has an excellent periph eral smear today - normal findings. His performance status remains excellent as well.       The patient when seen on 6/7/12 was felt to have improved substantially. We followed him for weekly counts and plans at that point were for him to take a trip out of the  country. He did actually take this trip, which had him eventually hiking at 11-12,000 feet without any complication or ill effect. As he returns back today on 7/19 he feels well and again with an excellent performance status. He has had no fever, c hills or suggestion of infection or complications thus far. He remains again, with an excellent performance status.       The patient thereafter is asked to have his counts done on an every six weeks basis, now seen three months later with a normal CBC as well as examination. He again feels quite well with a completely normal performance status.       The patient was asked to return for followup studies that included normal serum chemistries, unchanged lipid profile and stable hematologic findings. He is now seen on 2/7/13, 4 months from his last appointment. He continues to do well with unchanged performance status.       The patient was asked to have counts done q3 months seeing the physician in six months. He is now seen back 08/22/2013 feeling well having [place] with that. He had no additional fevers, sweats, chills, significant change in weight although he has tried to lose some additional pounds and we see this today. He continues to exercise regularl y and dietary program. Review of his smears again continues to demonstrate a complete remission.       Today, the patient was asked to be seen back in 6 months for followup, and is now seen 02/05/2014, continuing to feel well. Again, review of his smears and physical examination fortunately demonstrates no evidence of recurrent disease.       The patient was asked to be seen back in 6 months for followup and is now seen on 07/31/2014. Again he feels well with an excellent performance status and no change since last reviewed.       The patient thereafter was now seen back 2015, 2016 stable at both visits with no evidence of recurrence of his hairy cell leukemia.  He feels well but when now reviewed 02/04/2016, he has had some degree of peripheral neuropathy? This improv es with activity and it is certainly not limiting his action or his function.   Patient now reviewed back again September 02, 2016, continues to have an excellent performance status and hematologically remain stable brother had some concern about thrombocytopenia last visit.    The patient is next seen February 02, 2017.  He has not had any medical issues since last seen and remains physically quite well.  He does describe following with urology and undergoing a repeat biopsy recently when his PSA was above 7.  His findings evidently were negative for malignancy though he did have post procedure hematuria.  This is also now abated.  The patient is next seen July 28, 2017.  He continues to have an excellent performance status and no particular difficulty in day-to-day function and/or pursuing active vacations.      The patient is next seen July 26, 2018.  He had recently returned from a trip to [place].  After experiencing an airplane ride with multiple coughing passengers he himself developed a cold, particularly severe over the last 3-4 days with cough and congestion.  He's had low-grade fever which is now beginning to resolve but is clearly uncomfortable.    The patient's next seen January 10, 2019.  He has continued to travel without issues and is feeling well when reviewed today.  Plans were made for usual follow-up and the patient is next seen July 25, 2019.  His performance status remains excellent though he has lost some weight and indicates he did this per altering his diet.  His stamina and other usual activities are continued unabated.  The patient is next seen January 16, 2020.  He continues an active practice and lifestyle.  He has lost some additional weight on purpose through diet.    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    The patient's follow-up laboratory studies demonstrated by late September degree of leukopenia, unchanged thrombocytopenia and this was followed with repeat analysis within the last several weeks as he seen February 10, 2022. Flow cytometry failed to demonstrate any new process.  As you seen February 10, 2020 his performance status remains excellent, stable weight, appetite, no additional fever, chills, rash, night sweats.    The patient is next evaluated 2/23/2023 with an unchanged performance status for mild thrombocytopenia.  He has had some exposure to viral illnesses through his grandchildren as of late but is otherwise felt fine with a unchanged performance status and work schedule.    He required admission 5/23-27/2023 had return from [place] where he developed nausea and vomiting and difficulty with keeping p.o.  He had further nausea, vomiting and dizziness and was seen in the emergency department and was found to have a sodium 115.  He was seen by nephrology with concern of hyponatremia related to hydrochlorothiazide along with SIDH, treated with fluid restriction and salt tablets and p.o. Lasix.  His medications were adjusted per renal medicine.  At that point he continued to demonstrate chronic pancytopenia that did not accelerate in any particular way.     Subsequent CBC 6/1/2023 include H&H 11.3 and 32.2 with white count of 2840, platelet count 93,000, ANC of 1610.    The patient is next evaluated 8/3/2020 3 repeat CBC includes a white count of 2250, H&H 11.8 and 33.6, platelet count of 88,000-differential of 44% polys, 52% lymphs, 3% monocytes.    Patient contacted about flow cytometry findings of small population potentially suggestive of recurrence versus secondary process.  Plan to reassess in follow-up visits 9/7/2023.  Please note that if treatment is necessary then repeat treatment with cladribine could be given in a  variety of of schedules including 0.15 mg/kg/day over 2 hours x 5 days followed by rituximab or 5.6 mg/m² over 2 hours also for 5 days and also followed by rituximab therapy.      The patient was seen back 9/14/2023.Flow cytometry assessed on 2 visits includes a CD10 positive monoclonal lambda B-cell population representing 0.3% of total events 8/3/2023 and repeat 9/7/2023 demonstrates a similar B-cell population also 0.3% total events-phenotype positive CD10 bright, CD19 bright, CD20 bright, CD25, CD45,   slambda.  Discussed symptoms usually due to splenomegaly, fatigue, weakness, weight loss, bruising, recurrent infections, periodic vasculitis.  He is clear that he is not having any of the symptoms and feels that his overall performance status is as good as its ever been.  We have discussed that additional issues include possible organomegaly and cytopenias developing with his follow-up CBC in office today including H&H of 12.2 and 33.4, white count of 3250 and platelet count of 92,000.  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    We had the patient return for periodic blood counts, had him undergo testing for hepatitis and reviewed the various regimens including 5-day cladribine followed by Rituxan therapy.  10/5/2023 scans revealed no substantial abnormalities including lack of splenomegaly or lymphadenopathy.    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  Flow cytometry revealed a CD10 B-cell population, 45% total events, monoclonal lambda light chain expression compatible with his previously diagnosed hairy cell  leukemia.  Additionally the patient's hepatitis B surface antibody was equivocal and he went on to have additional vaccinations   -influenza, COVID-19, RSV and hepatitis B.  Further vascular surgeon was contacted and the patient underwent a right internal jugular Mediport placement 10/18/2023 and teaching concerning cladribine day 1 through 5 and subsequent Rituxan therapy.    After further discussion of available treatment options plans made to offer 5-day cladribine and, review of literature, indicates that Rituxan is associated with further immunosuppression.  Available guidance suggests proceeding with initial cladribine and then 4-6 weekly doses of Rituxan consolidation starting 4 to 6 weeks after initial treatment.    The patient proceeded with cladribine taking therapy daily-days 1-5 through 10/27/2023 and given Neulasta 10/30/2023.  He is next seen 11/2/2023.  Fortunately he has had no untoward effects of treatment as far and continues to work unabated.  His follow-up CBC does not demonstrate significant development of neutropenia anemia or thrombocytopenia.  We discussed assessing him regularly over the next several weeks and anticipate starting Rituxan approximately 3 weeks from now.             Past Medical History:   Diagnosis Date   • BCC (basal cell carcinoma of skin) 06/22/2022    NOSE   • Benign prostate hyperplasia    • H/O Elevated PSA    • H/O Hairy cell leukemia (CMS/HCC) 2012   • H/O Internal thrombosed hemorrhoids    • H/O minimal right carotid plaque    • H/O peripheral neuropathy    • Hyperlipidemia    • Hypertension    • Primary open-angle glaucoma, bilateral, mild stage        ONCOLOGIC HISTORY:  (History from previous dates can be found in the separate document.)    Current Outpatient Medications on File Prior to Visit   Medication Sig Dispense Refill   • acyclovir (Zovirax) 400 MG tablet Take 1 tablet by mouth 2 (Two) Times a Day. 60 tablet 7   • amLODIPine (NORVASC) 2.5 MG tablet Take 1  tablet by mouth Daily. 90 tablet 3   • bimatoprost (LUMIGAN) 0.03 % ophthalmic drops Administer 1 drop to both eyes Every Night.     • bisoprolol (ZEBeta) 5 MG tablet Take 1 tablet by mouth Daily. 90 tablet 3   • Calcium-Magnesium-Vitamin D (CALCIUM 1200+D3 PO) Take  by mouth.     • chlorhexidine (PERIDEX) 0.12 % solution Rinse with 15 mL by mouth after breakfast and at bedtime for 30 seconds, then spit. No food or drink for 30 minutes after rinse. 473 mL 3   • finasteride (PROSCAR) 5 MG tablet Take 1 tablet by mouth Daily. 90 tablet 3   • Magnesium 500 MG tablet Take 1 tablet by mouth 2 (two) times a day.     • niacin 500 MG tablet Take 1 tablet by mouth Every Night.     • ondansetron (ZOFRAN) 8 MG tablet Take 1 tablet by mouth 3 (Three) Times a Day As Needed for Nausea or Vomiting. 30 tablet 5   • propafenone (RYTHMOL) 150 MG tablet Take 1 tablet by mouth 3 (Three) Times a Day. 270 tablet 3   • rosuvastatin (CRESTOR) 10 MG tablet Take 1 tablet by mouth every night at bedtime. 90 tablet 2   • sulfamethoxazole-trimethoprim (BACTRIM DS,SEPTRA DS) 800-160 MG per tablet Take 1 tablet by mouth 3 (Three) Times a Week. 12 tablet 7   • tamsulosin (FLOMAX) 0.4 MG capsule 24 hr capsule Take 2 capsules by mouth Daily. 180 capsule 3   • vitamin C (ASCORBIC ACID) 250 MG tablet Take 4 tablets by mouth Daily. With madisyn hips     • vitamin D3 125 MCG (5000 UT) capsule capsule Take 1 capsule by mouth Daily.     • HYDROcodone-acetaminophen (NORCO) 5-325 MG per tablet Take 1 tablet by mouth Every 4 (Four) Hours As Needed (Pain). (Patient not taking: Reported on 10/23/2023) 20 tablet 0     No current facility-administered medications on file prior to visit.       ALLERGIES:   No Known Allergies    Social History     Socioeconomic History   • Marital status:      Spouse name: Hanna   Tobacco Use   • Smoking status: Former     Packs/day: .5     Types: Cigarettes   • Smokeless tobacco: Never   • Tobacco comments:     Quit smoking  "many years ago.   Vaping Use   • Vaping Use: Never used   Substance and Sexual Activity   • Alcohol use: Yes     Comment: 1 glass of wine or beer per day   • Drug use: No   • Sexual activity: Defer         Cancer-related family history includes Cancer in his father; Cancer (age of onset: 90) in his mother.      Review of Systems   Constitutional:  Negative for fatigue.   HENT: Negative.     Eyes: Negative.    Respiratory:  Negative for chest tightness, shortness of breath and wheezing.    Gastrointestinal:  Negative for constipation, diarrhea, nausea and vomiting.   Endocrine: Negative.    Musculoskeletal: Negative.    Skin: Negative.    Neurological: Negative.  Negative for weakness.        Vitals:    11/02/23 1225   BP: 174/83   Pulse: 69   Resp: 16   Temp: 97.3 °F (36.3 °C)   TempSrc: Temporal   SpO2: 98%   Weight: 73.7 kg (162 lb 7.7 oz)   Height: 177 cm (69.69\")   PainSc: 0-No pain             11/2/2023    12:21 PM   Current Status   ECOG score 0       Physical Exam      GENERAL: Well-developed, well-nourished male in no acute distress.   SKIN: Warm, dry, without new rash or lesion  HEAD: Normocephalic.   EYES: Pupils equal, round and reactive to light, EOMs intact. Conjunctivae normal.   EARS: Hearing intact.   NOSE: Septum midline. No excoriations or nasal discharge.   MOUTH: Tongue is well-papillated; no stomatitis or ulcers. Lips normal.  dry mucous membranes, clear sinus drainage noted  THROAT: Oropharynx without lesions or exudates.   NECK: Supple with good range of motion; no thyromegaly or masses, no JVD or bruits.   LYMPHATICS: No cervical, supraclavicular, axillary or inguinal adenopathy.   CHEST: Lungs clear to percussion and auscultation.   CARDIAC: Regular rate and rhythm without murmurs, rubs or gallops.   ABDOMEN: Soft, nontender with no evidence hepatosplenomegaly or masses  EXTREMITIES: No clubbing, cyanosis or edema.   NEUROLOGICAL: No focal neurological deficits.       RECENT " LABS:  Hematology WBC   Date Value Ref Range Status   11/02/2023 1.90 (L) 3.40 - 10.80 10*3/mm3 Final     RBC   Date Value Ref Range Status   11/02/2023 2.99 (L) 4.14 - 5.80 10*6/mm3 Final     Hemoglobin   Date Value Ref Range Status   11/02/2023 10.7 (L) 13.0 - 17.7 g/dL Final     Hematocrit   Date Value Ref Range Status   11/02/2023 31.1 (L) 37.5 - 51.0 % Final     Platelets   Date Value Ref Range Status   11/02/2023 82 (L) 140 - 450 10*3/mm3 Final        Assessment & Plan       A 77-year-old male with the diagnosis of hairy cell leukemia in April 2012. He was treated 5/11-5/17/ 12 with cladribine being given as continuous infusion at 0.1 mg/kg or 7.7 mg/24-hours x7 days. He did well during hospitalization with little toxicity and minimal myelosuppression. He has gone onto obtain a complete remission and this continues to be th e case at a 6 month visit now 07/31/2014. The patient has been now assessed on a regular basis every 6 months with no evidence of hematologic deterioration. As he is seen today, 02/04/2016, and now again September 02, 2016 he remains clinically stable as well.      He is now assessed February 2 and overall doing well without any additional issues except for the prostate described as above.  He is now recognized there is also possibly hypertensive and I'll contact him about this is afternoon though during our conversation he had few if any symptoms.?.  Pending our review will ask him to have a repeat CBC at 3 months to see that physician in 6 months.    The patient indicates when called about his blood pressure that he often experiences white coat hypertension.  He is going to recheck his blood pressure night and notify me if it remains elevated.     He is next seen July 28, 2017, hematologically stable, remaining quite active.      As he is again reviewed July 2018 he has had recent viral exposure and is slowly working through a cold.  This appears to have suppressed his marrow very  modestly though review of the smear shows no evidence of hairy cells though there are atypical lymphocytes thought to be virally activated.  At this point we'll have a follow-up CBC in 4 weeks and have him see the physician again in 6 months .    The patient is next seen January 10, 2019 doing well with no hematologic deterioration.  Plan to see back in 6 months for follow-up.  The patient is next seen July 25, 2019.  He does continue to do extremely well and we discussed his weight loss which appears to be dietarily produced.  His exam is not indicative of progressive disease or new disorder developing.  We have agreed to have him reassessed in 6-month follow-ups.  As he is seen back January 16, 2020 there are no findings of concern though he has lost weight purposely.  It is felt that we can reduce his visits for MD assessment but continue every 6 month CBCs.  This remains the case as he is assessed January 21, 2021.     The patient's follow-up laboratory studies demonstrated by late September 2021, a degree of leukopenia, unchanged thrombocytopenia and this was followed with repeat analysis within the last several weeks as he seen February 10, 2022. Flow cytometry failed to demonstrate any new process.  As you seen February 10, 2020 his performance status remains excellent, stable weight, appetite, no additional fever, chills, rash, night sweats.  As he is reexamined including peripheral blood smears 2/10/2022 there is no suggestion of additional lymphadenopathy hepatosplenomegaly.  We have discussed a fourth COVID 19 vaccination considering his history and will clarify his status by COVID 19 antibodies in office today.    Patient's subsequent exams did demonstrate responsiveness to COVID-19 vaccinations.  He is now reviewed at 6 and 12 months and with some degree of mild thrombocytopenia noted 2/23/2023 after recent viral exposures.  Peripheral smear does not demonstrate any suggestion of relapse though there  are activated lymphocytes noted.    He required admission 5/23-27/2023 had return from [place] where he developed nausea and vomiting and difficulty with keeping p.o.  He had further nausea, vomiting and dizziness and was seen in the emergency department and was found to have a sodium 115.  He was seen by nephrology with concern of hyponatremia related to hydrochlorothiazide along with SIDH, treated with fluid restriction and salt tablets and p.o. Lasix.  His medications were adjusted per renal medicine.  At that point he continued to demonstrate chronic pancytopenia that did not accelerate in any particular way.     Subsequent CBC 6/1/2023 include H&H 11.3 and 32.2 with white count of 2840, platelet count 93,000, ANC of 1610.    The patient is next evaluated 8/3/2020 3 repeat CBC includes a white count of 2250, H&H 11.8 and 33.6, platelet count of 88,000-differential of 44% polys, 52% lymphs, 3% monocytes.    Patient contacted about flow cytometry findings of small population potentially suggestive of recurrence versus secondary process.  Plan to reassess in follow-up visits 9/7/2023.  Please note that if treatment is necessary then repeat treatment with cladribine could be given in a variety of of schedules including 0.15 mg/kg/day over 2 hours x 5 days followed by rituximab or 5.6 mg/m² over 2 hours also for 5 days and also followed by rituximab therapy.    The patient was seen back 9/14/2023.Flow cytometry assessed on 2 visits includes a CD10 positive monoclonal lambda B-cell population representing 0.3% of total events 8/3/2023 and repeat 9/7/2023 demonstrates a similar B-cell population also 0.3% total events-phenotype positive CD10 bright, CD19 bright, CD20 bright, CD25, CD45,   slambda.  Discussed symptoms usually due to splenomegaly, fatigue, weakness, weight loss, bruising, recurrent infections, periodic vasculitis.  He is clear that he is not having any of the symptoms and feels that his overall  performance status is as good as its ever been.  We have discussed that additional issues include possible organomegaly and cytopenias developing with his follow-up CBC in office today including H&H of 12.2 and 33.4, white count of 3250 and platelet count of 92,000.  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    We had the patient return for periodic blood counts, had him undergo testing for hepatitis and reviewed the various regimens including 5-day cladribine followed by Rituxan therapy.  10/5/2023 scans revealed no substantial abnormalities including lack of splenomegaly or lymphadenopathy.    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  Additionally the patient's hepatitis B surface antibody was equivocal and he went on to have additional vaccinations   -influenza, COVID-19, RSV and hepatitis B.  Further vascular surgeon was contacted and the patient underwent a right internal jugular Mediport placement 10/18/2023 and teaching concerning cladribine day 1 through 5 and subsequent Rituxan therapy.    After further discussion of available treatment options plans made to offer 5-day cladribine and, review of literature, indicates that Rituxan is associated with further immunosuppression.  Available guidance suggests proceeding with initial cladribine and then 4 weekly doses of Rituxan consolidation starting 4 to 6 weeks after initial treatment.  At this point  the patient has completed all vaccinations including hepatitis B.    The patient proceeded with cladribine taking therapy daily-days 1-5 through 10/27/2023 and given Neulasta 10/30/2023.  He is next seen 11/2/2023.  Fortunately he has had no untoward effects of treatment as far and continues to work unabated.  His follow-up CBC does not demonstrate significant development of neutropenia anemia or thrombocytopenia.  We discussed assessing him regularly over the next several weeks and anticipate starting Rituxan approximately 3 weeks from now.        Plan:    *.  Patient to continue current medication list including acyclovir and Septra prophylaxis    *Return Monday and Thursday for CBC lab over the next 3 weeks with MD follow-up 11/20/2023 for formal assessment    *Anticipate Rituxan therapy to be initiated 11/22/2023

## 2023-11-06 ENCOUNTER — LAB (OUTPATIENT)
Dept: LAB | Facility: HOSPITAL | Age: 78
End: 2023-11-06
Payer: MEDICARE

## 2023-11-06 DIAGNOSIS — C91.40 HAIRY CELL LEUKEMIA NOT HAVING ACHIEVED REMISSION: ICD-10-CM

## 2023-11-06 LAB
BASOPHILS # BLD AUTO: 0.08 10*3/MM3 (ref 0–0.2)
BASOPHILS NFR BLD AUTO: 1.6 % (ref 0–1.5)
DEPRECATED RDW RBC AUTO: 49 FL (ref 37–54)
EOSINOPHIL # BLD AUTO: 0.02 10*3/MM3 (ref 0–0.4)
EOSINOPHIL NFR BLD AUTO: 0.4 % (ref 0.3–6.2)
ERYTHROCYTE [DISTWIDTH] IN BLOOD BY AUTOMATED COUNT: 13.7 % (ref 12.3–15.4)
HCT VFR BLD AUTO: 32.1 % (ref 37.5–51)
HGB BLD-MCNC: 11.4 G/DL (ref 13–17.7)
IMM GRANULOCYTES # BLD AUTO: 0.08 10*3/MM3 (ref 0–0.05)
IMM GRANULOCYTES NFR BLD AUTO: 1.6 % (ref 0–0.5)
LYMPHOCYTES # BLD AUTO: 0.55 10*3/MM3 (ref 0.7–3.1)
LYMPHOCYTES NFR BLD AUTO: 11.2 % (ref 19.6–45.3)
MCH RBC QN AUTO: 35.2 PG (ref 26.6–33)
MCHC RBC AUTO-ENTMCNC: 35.5 G/DL (ref 31.5–35.7)
MCV RBC AUTO: 99.1 FL (ref 79–97)
MONOCYTES # BLD AUTO: 0.11 10*3/MM3 (ref 0.1–0.9)
MONOCYTES NFR BLD AUTO: 2.2 % (ref 5–12)
NEUTROPHILS NFR BLD AUTO: 4.07 10*3/MM3 (ref 1.7–7)
NEUTROPHILS NFR BLD AUTO: 83 % (ref 42.7–76)
NRBC BLD AUTO-RTO: 0 /100 WBC (ref 0–0.2)
PLATELET # BLD AUTO: 80 10*3/MM3 (ref 140–450)
PMV BLD AUTO: 10.5 FL (ref 6–12)
RBC # BLD AUTO: 3.24 10*6/MM3 (ref 4.14–5.8)
WBC NRBC COR # BLD: 4.91 10*3/MM3 (ref 3.4–10.8)

## 2023-11-06 PROCEDURE — 85025 COMPLETE CBC W/AUTO DIFF WBC: CPT

## 2023-11-06 PROCEDURE — 36415 COLL VENOUS BLD VENIPUNCTURE: CPT

## 2023-11-07 LAB
CYTO UR: NORMAL
DX PRELIMINARY: NORMAL
LAB AP CASE REPORT: NORMAL
LAB AP CLINICAL INFORMATION: NORMAL
LAB AP FLOW CYTOMETRY SUMMARY: NORMAL
LAB AP SPECIAL STAINS: NORMAL
Lab: NORMAL
PATH REPORT.ADDENDUM SPEC: NORMAL
PATH REPORT.FINAL DX SPEC: NORMAL
PATH REPORT.GROSS SPEC: NORMAL

## 2023-11-09 ENCOUNTER — LAB (OUTPATIENT)
Dept: LAB | Facility: HOSPITAL | Age: 78
End: 2023-11-09
Payer: MEDICARE

## 2023-11-09 DIAGNOSIS — C91.40 HAIRY CELL LEUKEMIA NOT HAVING ACHIEVED REMISSION: ICD-10-CM

## 2023-11-09 LAB
BASOPHILS # BLD AUTO: 0.05 10*3/MM3 (ref 0–0.2)
BASOPHILS NFR BLD AUTO: 1.1 % (ref 0–1.5)
DEPRECATED RDW RBC AUTO: 51.8 FL (ref 37–54)
EOSINOPHIL # BLD AUTO: 0.02 10*3/MM3 (ref 0–0.4)
EOSINOPHIL NFR BLD AUTO: 0.4 % (ref 0.3–6.2)
ERYTHROCYTE [DISTWIDTH] IN BLOOD BY AUTOMATED COUNT: 14.2 % (ref 12.3–15.4)
HCT VFR BLD AUTO: 30.9 % (ref 37.5–51)
HGB BLD-MCNC: 11 G/DL (ref 13–17.7)
IMM GRANULOCYTES # BLD AUTO: 0.06 10*3/MM3 (ref 0–0.05)
IMM GRANULOCYTES NFR BLD AUTO: 1.3 % (ref 0–0.5)
LYMPHOCYTES # BLD AUTO: 0.57 10*3/MM3 (ref 0.7–3.1)
LYMPHOCYTES NFR BLD AUTO: 12.7 % (ref 19.6–45.3)
MCH RBC QN AUTO: 35.9 PG (ref 26.6–33)
MCHC RBC AUTO-ENTMCNC: 35.6 G/DL (ref 31.5–35.7)
MCV RBC AUTO: 101 FL (ref 79–97)
MONOCYTES # BLD AUTO: 0.24 10*3/MM3 (ref 0.1–0.9)
MONOCYTES NFR BLD AUTO: 5.3 % (ref 5–12)
NEUTROPHILS NFR BLD AUTO: 3.56 10*3/MM3 (ref 1.7–7)
NEUTROPHILS NFR BLD AUTO: 79.2 % (ref 42.7–76)
NRBC BLD AUTO-RTO: 0 /100 WBC (ref 0–0.2)
PLATELET # BLD AUTO: 97 10*3/MM3 (ref 140–450)
PMV BLD AUTO: 10.9 FL (ref 6–12)
RBC # BLD AUTO: 3.06 10*6/MM3 (ref 4.14–5.8)
WBC NRBC COR # BLD: 4.5 10*3/MM3 (ref 3.4–10.8)

## 2023-11-09 PROCEDURE — 36415 COLL VENOUS BLD VENIPUNCTURE: CPT

## 2023-11-09 PROCEDURE — 85025 COMPLETE CBC W/AUTO DIFF WBC: CPT

## 2023-11-10 ENCOUNTER — TELEPHONE (OUTPATIENT)
Dept: ONCOLOGY | Facility: CLINIC | Age: 78
End: 2023-11-10
Payer: MEDICARE

## 2023-11-10 DIAGNOSIS — C91.40 HAIRY CELL LEUKEMIA NOT HAVING ACHIEVED REMISSION: ICD-10-CM

## 2023-11-10 RX ORDER — SULFAMETHOXAZOLE AND TRIMETHOPRIM 800; 160 MG/1; MG/1
1 TABLET ORAL 3 TIMES WEEKLY
Qty: 30 TABLET | Refills: 0 | Status: SHIPPED | OUTPATIENT
Start: 2023-11-10

## 2023-11-10 NOTE — TELEPHONE ENCOUNTER
His counts are better but he has not recovered entirely from treatments.  The Bactrim is prophylaxis and should continue at this point.  ZACHARY        Called the patient to let him know the above information. He requested Bactrim be sent in #30. I let him know I would send it to Dr. Jeffers.

## 2023-11-10 NOTE — TELEPHONE ENCOUNTER
His counts are better but he has not recovered entirely from treatments.  The Bactrim is prophylaxis and should continue at this point.  ZACHARY

## 2023-11-10 NOTE — TELEPHONE ENCOUNTER
Caller: Angel Remy    Relationship: Self    Best call back number: [phone]    What is the best time to reach you: ANY    Who are you requesting to speak with (clinical staff, provider,  specific staff member): CLINICAL     What was the call regarding: ANGEL WILL BE DONE WITH HIS sulfamethoxazole-trimethoprim (BACTRIM DS,SEPTRA DS) 800-160 MG per tablet ON SATURDAY  HE IS NON SYMPTOMATIC AND HE IS WANTING TO KNOW IF HE NEEDS TO GET A REFILL ON THE MEDICATION AND CONTINUE TAKING IT       PLEASE ADVISE

## 2023-11-13 ENCOUNTER — LAB (OUTPATIENT)
Dept: LAB | Facility: HOSPITAL | Age: 78
End: 2023-11-13
Payer: MEDICARE

## 2023-11-13 DIAGNOSIS — C91.40 HAIRY CELL LEUKEMIA NOT HAVING ACHIEVED REMISSION: ICD-10-CM

## 2023-11-13 LAB
BASOPHILS # BLD AUTO: 0.02 10*3/MM3 (ref 0–0.2)
BASOPHILS NFR BLD AUTO: 0.5 % (ref 0–1.5)
DEPRECATED RDW RBC AUTO: 52.3 FL (ref 37–54)
EOSINOPHIL # BLD AUTO: 0.01 10*3/MM3 (ref 0–0.4)
EOSINOPHIL NFR BLD AUTO: 0.2 % (ref 0.3–6.2)
ERYTHROCYTE [DISTWIDTH] IN BLOOD BY AUTOMATED COUNT: 14.6 % (ref 12.3–15.4)
HCT VFR BLD AUTO: 32 % (ref 37.5–51)
HGB BLD-MCNC: 11.2 G/DL (ref 13–17.7)
IMM GRANULOCYTES # BLD AUTO: 0.04 10*3/MM3 (ref 0–0.05)
IMM GRANULOCYTES NFR BLD AUTO: 0.9 % (ref 0–0.5)
LYMPHOCYTES # BLD AUTO: 0.43 10*3/MM3 (ref 0.7–3.1)
LYMPHOCYTES NFR BLD AUTO: 9.9 % (ref 19.6–45.3)
MCH RBC QN AUTO: 35.1 PG (ref 26.6–33)
MCHC RBC AUTO-ENTMCNC: 35 G/DL (ref 31.5–35.7)
MCV RBC AUTO: 100.3 FL (ref 79–97)
MONOCYTES # BLD AUTO: 0.07 10*3/MM3 (ref 0.1–0.9)
MONOCYTES NFR BLD AUTO: 1.6 % (ref 5–12)
NEUTROPHILS NFR BLD AUTO: 3.76 10*3/MM3 (ref 1.7–7)
NEUTROPHILS NFR BLD AUTO: 86.9 % (ref 42.7–76)
NRBC BLD AUTO-RTO: 0 /100 WBC (ref 0–0.2)
PLATELET # BLD AUTO: 114 10*3/MM3 (ref 140–450)
PMV BLD AUTO: 10.8 FL (ref 6–12)
RBC # BLD AUTO: 3.19 10*6/MM3 (ref 4.14–5.8)
WBC NRBC COR # BLD: 4.33 10*3/MM3 (ref 3.4–10.8)

## 2023-11-13 PROCEDURE — 36415 COLL VENOUS BLD VENIPUNCTURE: CPT

## 2023-11-13 PROCEDURE — 85025 COMPLETE CBC W/AUTO DIFF WBC: CPT

## 2023-11-16 ENCOUNTER — LAB (OUTPATIENT)
Dept: LAB | Facility: HOSPITAL | Age: 78
End: 2023-11-16
Payer: MEDICARE

## 2023-11-16 DIAGNOSIS — C91.40 HAIRY CELL LEUKEMIA NOT HAVING ACHIEVED REMISSION: ICD-10-CM

## 2023-11-16 LAB
BASOPHILS # BLD AUTO: 0.02 10*3/MM3 (ref 0–0.2)
BASOPHILS NFR BLD AUTO: 0.4 % (ref 0–1.5)
DEPRECATED RDW RBC AUTO: 55.5 FL (ref 37–54)
EOSINOPHIL # BLD AUTO: 0.03 10*3/MM3 (ref 0–0.4)
EOSINOPHIL NFR BLD AUTO: 0.6 % (ref 0.3–6.2)
ERYTHROCYTE [DISTWIDTH] IN BLOOD BY AUTOMATED COUNT: 14.7 % (ref 12.3–15.4)
HCT VFR BLD AUTO: 34.2 % (ref 37.5–51)
HGB BLD-MCNC: 11.9 G/DL (ref 13–17.7)
IMM GRANULOCYTES # BLD AUTO: 0.06 10*3/MM3 (ref 0–0.05)
IMM GRANULOCYTES NFR BLD AUTO: 1.2 % (ref 0–0.5)
LYMPHOCYTES # BLD AUTO: 0.62 10*3/MM3 (ref 0.7–3.1)
LYMPHOCYTES NFR BLD AUTO: 12.6 % (ref 19.6–45.3)
MCH RBC QN AUTO: 35.4 PG (ref 26.6–33)
MCHC RBC AUTO-ENTMCNC: 34.8 G/DL (ref 31.5–35.7)
MCV RBC AUTO: 101.8 FL (ref 79–97)
MONOCYTES # BLD AUTO: 0.16 10*3/MM3 (ref 0.1–0.9)
MONOCYTES NFR BLD AUTO: 3.2 % (ref 5–12)
NEUTROPHILS NFR BLD AUTO: 4.04 10*3/MM3 (ref 1.7–7)
NEUTROPHILS NFR BLD AUTO: 82 % (ref 42.7–76)
NRBC BLD AUTO-RTO: 0 /100 WBC (ref 0–0.2)
PLATELET # BLD AUTO: 133 10*3/MM3 (ref 140–450)
PMV BLD AUTO: 10.3 FL (ref 6–12)
RBC # BLD AUTO: 3.36 10*6/MM3 (ref 4.14–5.8)
WBC NRBC COR # BLD AUTO: 4.93 10*3/MM3 (ref 3.4–10.8)

## 2023-11-16 PROCEDURE — 85025 COMPLETE CBC W/AUTO DIFF WBC: CPT

## 2023-11-16 PROCEDURE — 36415 COLL VENOUS BLD VENIPUNCTURE: CPT

## 2023-11-19 NOTE — PROGRESS NOTES
Subjective    patient with continued excellent performance status                                                                                                              REASONS FOR FOLLOWUP:   Hairy cell leukemia.   Admission 05/11/2012 through 05/17/12 for 7 day continuous infusion cladribine (2-CdA) without complication.   Patient seen 5 months post treatment in complete remission.   Patient seen 8 months post treatment with continued complete remission, every 3 month reassessment per CBC planned, 6 month review by MD scheduled.   Patient seen at 15 months without evidence of recurrent disease, every 6 month followup planned.   The patient was seen 02/05/2014 with no evidence of recurrent disease, 6-month followup planned.   Patient seen 07/31/2014, stable with no evidence of recurrent disease, every 6 month followup.   Patient seen 01/15/2015, stable without recurrent disease, 6-month CBC planned, yearly followup scheduled.   Patient seen 02/04/2016, stable, ?early peripheral neuropathy developing distally per feet, no evidence for recurrent disease per hairy cell leukemia.  Patient reviewed September 02, 2016, previous July CBC with mild thrombocytopenia developing, recheck September 2 normalized, every 6 month follow-ups anticipated  Patient reviewed February 02, 2017, status post negative prostate biopsy, mild thrombocytopenia again recognized  Patient reviewed July 20, 2017, hematologically stable, every 6 month follow-up CBCs planned, yearly M.D. Assessment  Patient seen July 26, 2018 after recent airline travel any cold symptoms  Patient reviewed January 10, 2019, no additional symptoms, hematologically stable  Patient reviewed July 25, 2019, stable, six-month follow-up as planned  Patient viewed January 16, 2020, stable, every 6 months CBC, MD assessment yearly planned  Reassessment January 21, 2021, no evidence of recurrence disease  Patient assessed 2/10/2022, evidence of continued remission  noted both on clinical examination, peripheral blood smear review and flow cytometric evaluation.  Patient review 2/23/2023 mild thrombocytopenia, no new abnormalities per peripheral smear, close for follow-up planned  Patient assessed 9/14/2023, concern for early relapse.  Subsequent marrow 10/12/2023-variably hypercellular with diffuse involvement-a 90%-hairy cell leukemia relapse.  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 He is usually followed by Dr. Vuong for a history of hypertension, hyperlipidemia, minimal right carotid plaque as well as elevated PSA.       He had exams done on 1-09-12 for routine followup. This included normal serum chemistry including LFTs, cholesterol 131, triglyceride 34, HDL 51, and LDL 73. CBC revealed H&H 13.3, 37.9%, WBC 3300, platelet count 110,000, 26% polys and 74% lymphs with A NC of 0.9.       Dr. Remy provides information when initially seen with studies from as far back as 1- at which time hemoglobin and hematocrit was 14.4 and 42.1%, WBC 6950, platelets 227,000 with 47% polys, 42% lymph; this was automated. The additional test includ es approximately every year to every other year thereafter though it was noted in January 2010 WBC dropped from an average of approximately 5,000-6,000 to 3600. Hemoglobin and hematocrit 13.6 and 39.7%. Platelets 150,000-170,000 with ANC beginning in Ja nuary 2010 at 1900.       We were contacted per the above findings and asked Dr. Remy to undergo further assessment including flow cytometric exam per peripheral blood. This revealed no evidence of abnormal myeloid maturation increased blast population. No evidence of lymphopro l iferative disorder. The patient was asked to be seen formally in the  office now and comes in today for further assessment. Dr. Remy indicates that he has taken a number of medications in an attempt to improve his health. Several homeopathic medication s include vitamin C, folate, flaxseed oil, fish oil, vitamin E, selenium, pomegranate tabs, Co-enzyme Q, vitamin D, niacin, and red yeast rice daily. He has discontinued these and stays on those described below.       He feels well with no additional problem s and states that he has no little or no symptoms that he can detail, whether this is just ill health in anyway, though he does have occasional hemorrhoidal pain. He also notes rare palpitations and occasional epistaxis when the ambient air has dried sub stantially. No fevers, chills, weight loss, night sweats, or other constitutional symptoms.       As a result of the above, the patient was asked to undergo a series of studies. This went onto include rheumatoid factor of 6, MARYURI screen negative, negative s misael protein electrophoresis, normal quantitative immunoglobulins, CMV IgG of 2.9, IgM less than .9, EBV IgM of less than .9 and BCA IgG antibody of 4.6. These are consistent with previous exposures. HIV was negative, CRSP less than .1, sedimentation ra t e of 4, iron 125, TIBC 297, 14% saturation, and ferritin of 77. Peripheral blood flow cytometry was negative for evidence of abnormal myeloid maturation, increased blast population or lymphoproliferative disorder. As the patient is seen back today, he i s continuing to feel well though is obviously greatly concerned about his followup counts. Reviews in the office had been planned to follow him briefly recheck performance 3/29/12 with an H&H of 12.9, 36.1, WBC 3900, 32 polys, 62 lymphs, platelet count 94 , 000, IPF slightly elevated at 8.8. The patient was therefore asked to return and to be further assessed with bone marrow aspirate and biopsy. He now presents to do so. He has had no additional symptoms since last seen. He has  discontinued his Crestor use.       As a result of his review the patient underwent bone marrow aspiration and biopsy. This revealed evidence of lymphocytic infiltrate reviewed here in the office. 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 Eventually cytogenetics was found to be negative with no evidence of cyclin-D1/IgH or BCL-2/IgH rearrangement. Additional tissue was requested. As a result, the patient was notifi e d of these findings by personal visit, and plans were made to proceed with endoscopy, ? mantle cell involvement. At the time of this dictation, this was not yet performed. He was also asked to undergo CT of the neck, chest, abdomen and pelvis which show ed no evidence of abnormality. PET scan 04/13/12 showed increased activity within the marrow, but no additional abnormalities including the spleen, with no background bowel or gastric activity as well.       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 hairy cell leukemia. This has been discussed in great detail with the alexander thakur at [facility] and additional stains are pending as is the patient's GI assessment now to be pursued through Dr. Ray.       His case was again reviewed over quite a period of time, and ultimately his marrow was signed out as 80% involved with hairy cell leukemia - normal cytogenetics, no evidence of cyclin D1/IgH or BCL-2/IgH rearrangement. Dr. Remy was advised of the treatment options which include followup with no immediate therapy, and/or the use of cladribine or pentostatin. After numerous di s cussion, he ultimately elected to try to proceed with treatment when he is feeling as well as he is to improve his ability to  withstand the therapy itself. We therefore began to discuss the treatment schedule, which also could be somewhat variable, inclu d ing 7-day infusions, 2-hr infusion q.d. x five days or every other week infusion. After discussion he wishes to proceed with 7-day infusion. When we attempted to do this as an outpatient we found out thereafter that this would not be covered as an outpa tient infusion through his insurance. Therefore we have made plans for him to be admitted after he is seen in office 05/10/12.       The patient was thereafter admitted 05/11-05/17/12. PICC line was placed and he initiated treatment at 0.1 mg/kg or 7.7 mg IV in fusion over seven days. The patient fortunately had no serious issues at all during the seven days and continued to work out on a daily basis. He was seen by his internist as well with some of his meds adjusted including his HCTZ and Ziac. He was stabl e at discharge, but was given Neulasta 6 mg subcutaneously 24 hours after he completed treatment. He has been having counts done on a regular basis, and returns back today with only minimal evidence of neutropenia at his becca, and has an excellent periph eral smear today - normal findings. His performance status remains excellent as well.       The patient when seen on 6/7/12 was felt to have improved substantially. We followed him for weekly counts and plans at that point were for him to take a trip out of the country. He did actually take this trip, which had him eventually hiking at 11-12,000 feet without any complication or ill effect. As he returns back today on 7/19 he feels well and again with an excellent performance status. He has had no fever, c hills or suggestion of infection or complications thus far. He remains again, with an excellent performance status.       The patient thereafter is asked to have his counts done on an every six weeks basis, now seen three months later with a normal CBC as well as examination. He  again feels quite well with a completely normal performance status.       The patient was asked to return for followup studies that included normal serum chemistries, unchanged lipid profile and stable hematologic findings. He is now seen on 2/7/13, 4 months from his last appointment. He continues to do well with unchanged performance status.       The patient was asked to have counts done q3 months seeing the physician in six months. He is now seen back 08/22/2013 feeling well having [place] with that. He had no additional fevers, sweats, chills, significant change in weight although he has tried to lose some additional pounds and we see this today. He continues to exercise regularl y and dietary program. Review of his smears again continues to demonstrate a complete remission.       Today, the patient was asked to be seen back in 6 months for followup, and is now seen 02/05/2014, continuing to feel well. Again, review of his smears and physical examination fortunately demonstrates no evidence of recurrent disease.       The patient was asked to be seen back in 6 months for followup and is now seen on 07/31/2014. Again he feels well with an excellent performance status and no change since last reviewed.       The patient thereafter was now seen back 2015, 2016 stable at both visits with no evidence of recurrence of his hairy cell leukemia. He feels well but when now reviewed 02/04/2016, he has had some degree of peripheral neuropathy? This improv es with activity and it is certainly not limiting his action or his function.   Patient now reviewed back again September 02, 2016, continues to have an excellent performance status and hematologically remain stable brother had some concern about thrombocytopenia last visit.    The patient is next seen February 02, 2017.  He has not had any medical issues since last seen and remains physically quite well.  He does  describe following with urology and undergoing a repeat biopsy recently when his PSA was above 7.  His findings evidently were negative for malignancy though he did have post procedure hematuria.  This is also now abated.  The patient is next seen July 28, 2017.  He continues to have an excellent performance status and no particular difficulty in day-to-day function and/or pursuing active vacations.      The patient is next seen July 26, 2018.  He had recently returned from a trip to [place].  After experiencing an airplane ride with multiple coughing passengers he himself developed a cold, particularly severe over the last 3-4 days with cough and congestion.  He's had low-grade fever which is now beginning to resolve but is clearly uncomfortable.    The patient's next seen January 10, 2019.  He has continued to travel without issues and is feeling well when reviewed today.  Plans were made for usual follow-up and the patient is next seen July 25, 2019.  His performance status remains excellent though he has lost some weight and indicates he did this per altering his diet.  His stamina and other usual activities are continued unabated.  The patient is next seen January 16, 2020.  He continues an active practice and lifestyle.  He has lost some additional weight on purpose through diet.    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    The patient's follow-up laboratory studies demonstrated by late September degree of leukopenia, unchanged thrombocytopenia and this was followed with repeat analysis within the last several weeks as he seen February 10, 2022. Flow cytometry failed to demonstrate any new process.  As you seen February 10, 2020 his performance status remains excellent, stable weight, appetite, no additional fever, chills, rash, night sweats.    The patient is  next evaluated 2/23/2023 with an unchanged performance status for mild thrombocytopenia.  He has had some exposure to viral illnesses through his grandchildren as of late but is otherwise felt fine with a unchanged performance status and work schedule.    He required admission 5/23-27/2023 had return from [place] where he developed nausea and vomiting and difficulty with keeping p.o.  He had further nausea, vomiting and dizziness and was seen in the emergency department and was found to have a sodium 115.  He was seen by nephrology with concern of hyponatremia related to hydrochlorothiazide along with SIDH, treated with fluid restriction and salt tablets and p.o. Lasix.  His medications were adjusted per renal medicine.  At that point he continued to demonstrate chronic pancytopenia that did not accelerate in any particular way.     Subsequent CBC 6/1/2023 include H&H 11.3 and 32.2 with white count of 2840, platelet count 93,000, ANC of 1610.    The patient is next evaluated 8/3/2020 3 repeat CBC includes a white count of 2250, H&H 11.8 and 33.6, platelet count of 88,000-differential of 44% polys, 52% lymphs, 3% monocytes.    Patient contacted about flow cytometry findings of small population potentially suggestive of recurrence versus secondary process.  Plan to reassess in follow-up visits 9/7/2023.  Please note that if treatment is necessary then repeat treatment with cladribine could be given in a variety of of schedules including 0.15 mg/kg/day over 2 hours x 5 days followed by rituximab or 5.6 mg/m² over 2 hours also for 5 days and also followed by rituximab therapy.      The patient was seen back 9/14/2023.Flow cytometry assessed on 2 visits includes a CD10 positive monoclonal lambda B-cell population representing 0.3% of total events 8/3/2023 and repeat 9/7/2023 demonstrates a similar B-cell population also 0.3% total events-phenotype positive CD10 bright, CD19 bright, CD20 bright, CD25, CD45,    melvin.  Discussed symptoms usually due to splenomegaly, fatigue, weakness, weight loss, bruising, recurrent infections, periodic vasculitis.  He is clear that he is not having any of the symptoms and feels that his overall performance status is as good as its ever been.  We have discussed that additional issues include possible organomegaly and cytopenias developing with his follow-up CBC in office today including H&H of 12.2 and 33.4, white count of 3250 and platelet count of 92,000.  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    We had the patient return for periodic blood counts, had him undergo testing for hepatitis and reviewed the various regimens including 5-day cladribine followed by Rituxan therapy.  10/5/2023 scans revealed no substantial abnormalities including lack of splenomegaly or lymphadenopathy.    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  Additionally the patient's hepatitis B surface antibody was equivocal and he went on to have additional vaccinations   -influenza, COVID-19, RSV and hepatitis B.  Further vascular surgeon was contacted and the patient underwent a right internal jugular Mediport placement 10/18/2023 and teaching concerning cladribine day 1 through 5 and subsequent Rituxan therapy.    After further discussion of available treatment options plans made to offer 5-day cladribine and, review of literature, indicates that  Rituxan is associated with further immunosuppression.  Available guidance suggests proceeding with initial cladribine and then 4-6 weekly doses of Rituxan consolidation starting 4 to 6 weeks after initial treatment.    The patient proceeded with cladribine taking therapy daily-days 1-5 through 10/27/2023 and given Neulasta 10/30/2023.  He is next seen 11/2/2023.  Fortunately he has had no untoward effects of treatment as far and continues to work unabated.  His follow-up CBC does not demonstrate significant development of neutropenia anemia or thrombocytopenia.  We discussed assessing him regularly over the next several weeks and anticipate starting Rituxan approximately 3 weeks from now.    The patient's subsequent laboratory studies demonstrated gradual improvement in his white count to normalization, including degree of neutrophilia by 11/16/2023, improvement in his thrombocytopenia as well.  He has not had any additional side effects including lack of fever, chills, night sweats since he completed therapy.    He is seen back formally 11/20/2023 and we anticipate initiation of his rituximab therapy 11/22/2023.  Again he is done very well and his peripheral blood counts remain quite acceptable without evidence of deterioration.  He has had no fever, chills, night sweats, infectious episodes or change in performance status.  We have agreed that he will start his consolidation rituximab therapy 11/22/2023 and that we would schedule subsequent Thursday treatments x3 also.             Past Medical History:   Diagnosis Date    BCC (basal cell carcinoma of skin) 06/22/2022    NOSE    Benign prostate hyperplasia     H/O Elevated PSA     H/O Hairy cell leukemia (CMS/HCC) 2012    H/O Internal thrombosed hemorrhoids     H/O minimal right carotid plaque     H/O peripheral neuropathy     Hyperlipidemia     Hypertension     Primary open-angle glaucoma, bilateral, mild stage        ONCOLOGIC HISTORY:  (History from previous  dates can be found in the separate document.)    Current Outpatient Medications on File Prior to Visit   Medication Sig Dispense Refill    acyclovir (Zovirax) 400 MG tablet Take 1 tablet by mouth 2 (Two) Times a Day. 60 tablet 7    amLODIPine (NORVASC) 2.5 MG tablet Take 1 tablet by mouth Daily. 90 tablet 3    bimatoprost (LUMIGAN) 0.03 % ophthalmic drops Administer 1 drop to both eyes Every Night.      bisoprolol (ZEBeta) 5 MG tablet Take 1 tablet by mouth Daily. 90 tablet 3    Calcium-Magnesium-Vitamin D (CALCIUM 1200+D3 PO) Take  by mouth.      chlorhexidine (PERIDEX) 0.12 % solution Rinse with 15 mL by mouth after breakfast and at bedtime for 30 seconds, then spit. No food or drink for 30 minutes after rinse. 473 mL 3    finasteride (PROSCAR) 5 MG tablet Take 1 tablet by mouth Daily. 90 tablet 3    Magnesium 500 MG tablet Take 1 tablet by mouth 2 (two) times a day.      niacin 500 MG tablet Take 1 tablet by mouth Every Night.      propafenone (RYTHMOL) 150 MG tablet Take 1 tablet by mouth 3 (Three) Times a Day. 270 tablet 3    rosuvastatin (CRESTOR) 10 MG tablet Take 1 tablet by mouth every night at bedtime. 90 tablet 2    sulfamethoxazole-trimethoprim (BACTRIM DS,SEPTRA DS) 800-160 MG per tablet Take 1 tablet by mouth 3 (Three) Times a Week. 30 tablet 0    tamsulosin (FLOMAX) 0.4 MG capsule 24 hr capsule Take 2 capsules by mouth Daily. 180 capsule 3    vitamin C (ASCORBIC ACID) 250 MG tablet Take 4 tablets by mouth Daily. With madisyn hips      vitamin D3 125 MCG (5000 UT) capsule capsule Take 1 capsule by mouth Daily.      HYDROcodone-acetaminophen (NORCO) 5-325 MG per tablet Take 1 tablet by mouth Every 4 (Four) Hours As Needed (Pain). (Patient not taking: Reported on 10/23/2023) 20 tablet 0    ondansetron (ZOFRAN) 8 MG tablet Take 1 tablet by mouth 3 (Three) Times a Day As Needed for Nausea or Vomiting. 30 tablet 5     No current facility-administered medications on file prior to visit.       ALLERGIES:   No  "Known Allergies    Social History     Socioeconomic History    Marital status:      Spouse name: Hanna   Tobacco Use    Smoking status: Former     Packs/day: .5     Types: Cigarettes    Smokeless tobacco: Never    Tobacco comments:     Quit smoking many years ago.   Vaping Use    Vaping Use: Never used   Substance and Sexual Activity    Alcohol use: Yes     Comment: 1 glass of wine or beer per day    Drug use: No    Sexual activity: Defer         Cancer-related family history includes Cancer in his father; Cancer (age of onset: 90) in his mother.      Review of Systems   Constitutional:  Negative for fatigue.   HENT: Negative.     Eyes: Negative.    Respiratory:  Negative for chest tightness, shortness of breath and wheezing.    Gastrointestinal:  Negative for constipation, diarrhea, nausea and vomiting.   Endocrine: Negative.    Musculoskeletal: Negative.    Skin: Negative.    Neurological: Negative.  Negative for weakness.        Vitals:    11/20/23 1234   BP: 172/64   Pulse: 64   Resp: 16   Temp: 98 °F (36.7 °C)   TempSrc: Temporal   SpO2: 100%   Weight: 73.8 kg (162 lb 12.8 oz)   Height: 177 cm (69.69\")   PainSc: 0-No pain               11/20/2023    12:35 PM   Current Status   ECOG score 0       Physical Exam      GENERAL: Well-developed, well-nourished male in no acute distress.   SKIN: Warm, dry, without new rash or lesion  HEAD: Normocephalic.   EYES: Pupils equal, round and reactive to light, EOMs intact. Conjunctivae normal.   EARS: Hearing intact.   NOSE: Septum midline. No excoriations or nasal discharge.   MOUTH: Tongue is well-papillated; no stomatitis or ulcers. Lips normal.  dry mucous membranes, clear sinus drainage noted  THROAT: Oropharynx without lesions or exudates.   NECK: Supple with good range of motion; no thyromegaly or masses, no JVD or bruits.   LYMPHATICS: No cervical, supraclavicular, axillary or inguinal adenopathy.   CHEST: Lungs clear to percussion and auscultation. "   CARDIAC: Regular rate and rhythm without murmurs, rubs or gallops.   ABDOMEN: Soft, nontender with no evidence hepatosplenomegaly or masses  EXTREMITIES: No clubbing, cyanosis or edema.   NEUROLOGICAL: No focal neurological deficits.       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 He was treated 5/11-5/17/ 12 with cladribine being given as continuous infusion at 0.1 mg/kg or 7.7 mg/24-hours x7 days. He did well during hospitalization with little toxicity and minimal myelosuppression. He has gone onto obtain a complete remission and this continues to be th e case at a 6 month visit now 07/31/2014. The patient has been now assessed on a regular basis every 6 months with no evidence of hematologic deterioration. As he is seen today, 02/04/2016, and now again September 02, 2016 he remains clinically stable as well.      He is now assessed February 2 and overall doing well without any additional issues except for the prostate described as above.  He is now recognized there is also possibly hypertensive and I'll contact him about this is afternoon though during our conversation he had few if any symptoms.?.  Pending our review will ask him to have a repeat CBC at 3 months to see that physician in 6 months.    The patient indicates when called about his blood pressure that he often experiences white coat hypertension.  He is going to recheck his blood pressure night and notify me if it remains elevated.      He is next seen July 28, 2017, hematologically stable, remaining quite active.      As he is again reviewed July 2018 he has had recent viral exposure and is slowly working through a cold.  This appears to have suppressed his marrow very modestly though review of the smear shows no evidence of hairy cells though there are atypical lymphocytes thought to be virally activated.  At this point we'll have a follow-up CBC in 4 weeks and have him see the physician again in 6 months .    The patient is next seen January 10, 2019 doing well with no hematologic deterioration.  Plan to see back in 6 months for follow-up.  The patient is next seen July 25, 2019.  He does continue to do extremely well and we discussed his weight loss which appears to be dietarily produced.  His exam is not indicative of progressive disease or new disorder developing.  We have agreed to have him reassessed in 6-month follow-ups.  As he is seen back January 16, 2020 there are no findings of concern though he has lost weight purposely.  It is felt that we can reduce his visits for MD assessment but continue every 6 month CBCs.  This remains the case as he is assessed January 21, 2021.     The patient's follow-up laboratory studies demonstrated by late September 2021, a degree of leukopenia, unchanged thrombocytopenia and this was followed with repeat analysis within the last several weeks as he seen February 10, 2022. Flow cytometry failed to demonstrate any new process.  As you seen February 10, 2020 his performance status remains excellent, stable weight, appetite, no additional fever, chills, rash, night sweats.  As he is reexamined including peripheral blood smears 2/10/2022 there is no suggestion of additional lymphadenopathy hepatosplenomegaly.  We have discussed a fourth COVID 19 vaccination considering his history and will clarify his status by COVID 19 antibodies in office today.    Patient's subsequent exams did demonstrate  responsiveness to COVID-19 vaccinations.  He is now reviewed at 6 and 12 months and with some degree of mild thrombocytopenia noted 2/23/2023 after recent viral exposures.  Peripheral smear does not demonstrate any suggestion of relapse though there are activated lymphocytes noted.    He required admission 5/23-27/2023 had return from [place] where he developed nausea and vomiting and difficulty with keeping p.o.  He had further nausea, vomiting and dizziness and was seen in the emergency department and was found to have a sodium 115.  He was seen by nephrology with concern of hyponatremia related to hydrochlorothiazide along with SIDH, treated with fluid restriction and salt tablets and p.o. Lasix.  His medications were adjusted per renal medicine.  At that point he continued to demonstrate chronic pancytopenia that did not accelerate in any particular way.     Subsequent CBC 6/1/2023 include H&H 11.3 and 32.2 with white count of 2840, platelet count 93,000, ANC of 1610.    The patient is next evaluated 8/3/2020 3 repeat CBC includes a white count of 2250, H&H 11.8 and 33.6, platelet count of 88,000-differential of 44% polys, 52% lymphs, 3% monocytes.    Patient contacted about flow cytometry findings of small population potentially suggestive of recurrence versus secondary process.  Plan to reassess in follow-up visits 9/7/2023.  Please note that if treatment is necessary then repeat treatment with cladribine could be given in a variety of of schedules including 0.15 mg/kg/day over 2 hours x 5 days followed by rituximab or 5.6 mg/m² over 2 hours also for 5 days and also followed by rituximab therapy.    The patient was seen back 9/14/2023.Flow cytometry assessed on 2 visits includes a CD10 positive monoclonal lambda B-cell population representing 0.3% of total events 8/3/2023 and repeat 9/7/2023 demonstrates a similar B-cell population also 0.3% total events-phenotype positive CD10 bright, CD19 bright, CD20  bright, CD25, CD45,   slambda.  Discussed symptoms usually due to splenomegaly, fatigue, weakness, weight loss, bruising, recurrent infections, periodic vasculitis.  He is clear that he is not having any of the symptoms and feels that his overall performance status is as good as its ever been.  We have discussed that additional issues include possible organomegaly and cytopenias developing with his follow-up CBC in office today including H&H of 12.2 and 33.4, white count of 3250 and platelet count of 92,000.  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    We had the patient return for periodic blood counts, had him undergo testing for hepatitis and reviewed the various regimens including 5-day cladribine followed by Rituxan therapy.  10/5/2023 scans revealed no substantial abnormalities including lack of splenomegaly or lymphadenopathy.    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  Additionally the patient's hepatitis B surface antibody was equivocal and he went on to have additional vaccinations   -influenza, COVID-19, RSV and hepatitis B.  Further vascular surgeon was contacted and the patient underwent a right internal jugular Mediport placement 10/18/2023 and teaching concerning cladribine day 1 through 5 and subsequent Rituxan therapy.    After further discussion of available treatment options plans made to offer 5-day cladribine and, review of  literature, indicates that Rituxan is associated with further immunosuppression.  Available guidance suggests proceeding with initial cladribine and then 4 weekly doses of Rituxan consolidation starting 4 to 6 weeks after initial treatment.  At this point the patient has completed all vaccinations including hepatitis B.    The patient proceeded with cladribine taking therapy daily-days 1-5 through 10/27/2023 and given Neulasta 10/30/2023.  He is next seen 11/2/2023.  Fortunately he has had no untoward effects of treatment as far and continues to work unabated.  His follow-up CBC does not demonstrate significant development of neutropenia anemia or thrombocytopenia.  We discussed assessing him regularly over the next several weeks and anticipate starting Rituxan approximately 3 weeks from now.    The patient's subsequent laboratory studies demonstrated gradual improvement in his white count to normalization, including degree of neutrophilia by 11/16/2023, improvement in his thrombocytopenia as well.  He has not had any additional side effects including lack of fever, chills, night sweats since he completed therapy.    He is seen back formally 11/20/2023 and we anticipate initiation of his rituximab therapy 11/22/2023.  Again he is done very well and his peripheral blood counts remain quite acceptable without evidence of deterioration.  He has had no fever, chills, night sweats, infectious episodes or change in performance status.  We have agreed that he will start his consolidation rituximab therapy 11/22/2023 and that we would schedule subsequent Thursday treatments x3 also.            Plan:    * Patient to continue current medication list including acyclovir and Septra prophylaxis    *Return 11/22/2023 for Rituxan infusion      *Schedule subsequent Rituxan infusions-11/30/2023 with NP, 12/7/2023 with MD, 12/14/2023 with SHOAIB

## 2023-11-20 ENCOUNTER — OFFICE VISIT (OUTPATIENT)
Dept: ONCOLOGY | Facility: CLINIC | Age: 78
End: 2023-11-20
Payer: MEDICARE

## 2023-11-20 ENCOUNTER — LAB (OUTPATIENT)
Dept: LAB | Facility: HOSPITAL | Age: 78
End: 2023-11-20
Payer: MEDICARE

## 2023-11-20 VITALS
HEART RATE: 64 BPM | TEMPERATURE: 98 F | HEIGHT: 70 IN | DIASTOLIC BLOOD PRESSURE: 64 MMHG | BODY MASS INDEX: 23.31 KG/M2 | WEIGHT: 162.8 LBS | RESPIRATION RATE: 16 BRPM | OXYGEN SATURATION: 100 % | SYSTOLIC BLOOD PRESSURE: 172 MMHG

## 2023-11-20 DIAGNOSIS — C91.40 HAIRY CELL LEUKEMIA NOT HAVING ACHIEVED REMISSION: ICD-10-CM

## 2023-11-20 DIAGNOSIS — C91.41 HAIRY CELL LEUKEMIA, IN REMISSION: Primary | ICD-10-CM

## 2023-11-20 LAB
BASOPHILS # BLD AUTO: 0.03 10*3/MM3 (ref 0–0.2)
BASOPHILS NFR BLD AUTO: 0.6 % (ref 0–1.5)
DEPRECATED RDW RBC AUTO: 56.5 FL (ref 37–54)
EOSINOPHIL # BLD AUTO: 0.01 10*3/MM3 (ref 0–0.4)
EOSINOPHIL NFR BLD AUTO: 0.2 % (ref 0.3–6.2)
ERYTHROCYTE [DISTWIDTH] IN BLOOD BY AUTOMATED COUNT: 15 % (ref 12.3–15.4)
HCT VFR BLD AUTO: 34.2 % (ref 37.5–51)
HGB BLD-MCNC: 11.8 G/DL (ref 13–17.7)
IMM GRANULOCYTES # BLD AUTO: 0.05 10*3/MM3 (ref 0–0.05)
IMM GRANULOCYTES NFR BLD AUTO: 1 % (ref 0–0.5)
LYMPHOCYTES # BLD AUTO: 0.55 10*3/MM3 (ref 0.7–3.1)
LYMPHOCYTES NFR BLD AUTO: 11 % (ref 19.6–45.3)
MCH RBC QN AUTO: 35.4 PG (ref 26.6–33)
MCHC RBC AUTO-ENTMCNC: 34.5 G/DL (ref 31.5–35.7)
MCV RBC AUTO: 102.7 FL (ref 79–97)
MONOCYTES # BLD AUTO: 0.26 10*3/MM3 (ref 0.1–0.9)
MONOCYTES NFR BLD AUTO: 5.2 % (ref 5–12)
NEUTROPHILS NFR BLD AUTO: 4.11 10*3/MM3 (ref 1.7–7)
NEUTROPHILS NFR BLD AUTO: 82 % (ref 42.7–76)
NRBC BLD AUTO-RTO: 0 /100 WBC (ref 0–0.2)
PLATELET # BLD AUTO: 111 10*3/MM3 (ref 140–450)
PMV BLD AUTO: 11.8 FL (ref 6–12)
RBC # BLD AUTO: 3.33 10*6/MM3 (ref 4.14–5.8)
WBC NRBC COR # BLD AUTO: 5.01 10*3/MM3 (ref 3.4–10.8)

## 2023-11-20 PROCEDURE — 85025 COMPLETE CBC W/AUTO DIFF WBC: CPT

## 2023-11-20 PROCEDURE — 3077F SYST BP >= 140 MM HG: CPT | Performed by: INTERNAL MEDICINE

## 2023-11-20 PROCEDURE — 3078F DIAST BP <80 MM HG: CPT | Performed by: INTERNAL MEDICINE

## 2023-11-20 PROCEDURE — 36415 COLL VENOUS BLD VENIPUNCTURE: CPT

## 2023-11-20 PROCEDURE — 1126F AMNT PAIN NOTED NONE PRSNT: CPT | Performed by: INTERNAL MEDICINE

## 2023-11-20 RX ORDER — ACETAMINOPHEN 325 MG/1
650 TABLET ORAL ONCE
Status: CANCELLED | OUTPATIENT
Start: 2023-11-22

## 2023-11-20 RX ORDER — DIPHENHYDRAMINE HYDROCHLORIDE 50 MG/ML
50 INJECTION INTRAMUSCULAR; INTRAVENOUS AS NEEDED
Status: CANCELLED | OUTPATIENT
Start: 2023-11-22

## 2023-11-20 RX ORDER — MEPERIDINE HYDROCHLORIDE 25 MG/ML
25 INJECTION INTRAMUSCULAR; INTRAVENOUS; SUBCUTANEOUS
Status: CANCELLED | OUTPATIENT
Start: 2023-11-22

## 2023-11-20 RX ORDER — FAMOTIDINE 10 MG/ML
20 INJECTION, SOLUTION INTRAVENOUS ONCE
Status: CANCELLED | OUTPATIENT
Start: 2023-11-22

## 2023-11-20 RX ORDER — FAMOTIDINE 10 MG/ML
20 INJECTION, SOLUTION INTRAVENOUS AS NEEDED
Status: CANCELLED | OUTPATIENT
Start: 2023-11-22

## 2023-11-20 RX ORDER — SODIUM CHLORIDE 9 MG/ML
250 INJECTION, SOLUTION INTRAVENOUS ONCE
Status: CANCELLED | OUTPATIENT
Start: 2023-11-22

## 2023-11-22 ENCOUNTER — INFUSION (OUTPATIENT)
Dept: ONCOLOGY | Facility: HOSPITAL | Age: 78
End: 2023-11-22
Payer: MEDICARE

## 2023-11-22 VITALS
RESPIRATION RATE: 16 BRPM | DIASTOLIC BLOOD PRESSURE: 79 MMHG | BODY MASS INDEX: 23.72 KG/M2 | HEART RATE: 57 BPM | TEMPERATURE: 97.5 F | OXYGEN SATURATION: 98 % | SYSTOLIC BLOOD PRESSURE: 149 MMHG | WEIGHT: 163.8 LBS

## 2023-11-22 DIAGNOSIS — Z45.2 FITTING AND ADJUSTMENT OF VASCULAR CATHETER: ICD-10-CM

## 2023-11-22 DIAGNOSIS — C91.40 HAIRY CELL LEUKEMIA NOT HAVING ACHIEVED REMISSION: Primary | ICD-10-CM

## 2023-11-22 LAB
ALBUMIN SERPL-MCNC: 3.9 G/DL (ref 3.5–5.2)
ALBUMIN/GLOB SERPL: 2.1 G/DL
ALP SERPL-CCNC: 46 U/L (ref 39–117)
ALT SERPL W P-5'-P-CCNC: 43 U/L (ref 1–41)
ANION GAP SERPL CALCULATED.3IONS-SCNC: 8.4 MMOL/L (ref 5–15)
AST SERPL-CCNC: 26 U/L (ref 1–40)
BASOPHILS # BLD AUTO: 0.01 10*3/MM3 (ref 0–0.2)
BASOPHILS NFR BLD AUTO: 0.4 % (ref 0–1.5)
BILIRUB SERPL-MCNC: 0.5 MG/DL (ref 0–1.2)
BUN SERPL-MCNC: 21 MG/DL (ref 8–23)
BUN/CREAT SERPL: 22.6 (ref 7–25)
CALCIUM SPEC-SCNC: 8.7 MG/DL (ref 8.6–10.5)
CHLORIDE SERPL-SCNC: 102 MMOL/L (ref 98–107)
CO2 SERPL-SCNC: 28.6 MMOL/L (ref 22–29)
CREAT SERPL-MCNC: 0.93 MG/DL (ref 0.7–1.3)
DEPRECATED RDW RBC AUTO: 56.8 FL (ref 37–54)
EGFRCR SERPLBLD CKD-EPI 2021: 84 ML/MIN/1.73
EOSINOPHIL # BLD AUTO: 0.02 10*3/MM3 (ref 0–0.4)
EOSINOPHIL NFR BLD AUTO: 0.8 % (ref 0.3–6.2)
ERYTHROCYTE [DISTWIDTH] IN BLOOD BY AUTOMATED COUNT: 14.8 % (ref 12.3–15.4)
GLOBULIN UR ELPH-MCNC: 1.9 GM/DL
GLUCOSE SERPL-MCNC: 114 MG/DL (ref 65–99)
HCT VFR BLD AUTO: 32.9 % (ref 37.5–51)
HGB BLD-MCNC: 11.1 G/DL (ref 13–17.7)
IMM GRANULOCYTES # BLD AUTO: 0.02 10*3/MM3 (ref 0–0.05)
IMM GRANULOCYTES NFR BLD AUTO: 0.8 % (ref 0–0.5)
LYMPHOCYTES # BLD AUTO: 0.43 10*3/MM3 (ref 0.7–3.1)
LYMPHOCYTES NFR BLD AUTO: 17.5 % (ref 19.6–45.3)
MCH RBC QN AUTO: 35.1 PG (ref 26.6–33)
MCHC RBC AUTO-ENTMCNC: 33.7 G/DL (ref 31.5–35.7)
MCV RBC AUTO: 104.1 FL (ref 79–97)
MONOCYTES # BLD AUTO: 0.23 10*3/MM3 (ref 0.1–0.9)
MONOCYTES NFR BLD AUTO: 9.3 % (ref 5–12)
NEUTROPHILS NFR BLD AUTO: 1.75 10*3/MM3 (ref 1.7–7)
NEUTROPHILS NFR BLD AUTO: 71.2 % (ref 42.7–76)
NRBC BLD AUTO-RTO: 0 /100 WBC (ref 0–0.2)
PLATELET # BLD AUTO: 138 10*3/MM3 (ref 140–450)
PMV BLD AUTO: 9.5 FL (ref 6–12)
POTASSIUM SERPL-SCNC: 3.9 MMOL/L (ref 3.5–5.2)
PROT SERPL-MCNC: 5.8 G/DL (ref 6–8.5)
RBC # BLD AUTO: 3.16 10*6/MM3 (ref 4.14–5.8)
SODIUM SERPL-SCNC: 139 MMOL/L (ref 136–145)
WBC NRBC COR # BLD AUTO: 2.46 10*3/MM3 (ref 3.4–10.8)

## 2023-11-22 PROCEDURE — 96415 CHEMO IV INFUSION ADDL HR: CPT

## 2023-11-22 PROCEDURE — 25810000003 SODIUM CHLORIDE 0.9 % SOLUTION 250 ML FLEX CONT: Performed by: INTERNAL MEDICINE

## 2023-11-22 PROCEDURE — 63710000001 ACETAMINOPHEN 325 MG TABLET: Performed by: INTERNAL MEDICINE

## 2023-11-22 PROCEDURE — 25010000002 RITUXIMAB 10 MG/ML SOLUTION 10 ML VIAL: Performed by: INTERNAL MEDICINE

## 2023-11-22 PROCEDURE — 25010000002 RITUXIMAB 10 MG/ML SOLUTION 50 ML VIAL: Performed by: INTERNAL MEDICINE

## 2023-11-22 PROCEDURE — 85025 COMPLETE CBC W/AUTO DIFF WBC: CPT

## 2023-11-22 PROCEDURE — 96413 CHEMO IV INFUSION 1 HR: CPT

## 2023-11-22 PROCEDURE — 25010000002 DIPHENHYDRAMINE PER 50 MG: Performed by: INTERNAL MEDICINE

## 2023-11-22 PROCEDURE — 96375 TX/PRO/DX INJ NEW DRUG ADDON: CPT

## 2023-11-22 PROCEDURE — A9270 NON-COVERED ITEM OR SERVICE: HCPCS | Performed by: INTERNAL MEDICINE

## 2023-11-22 PROCEDURE — 80053 COMPREHEN METABOLIC PANEL: CPT

## 2023-11-22 PROCEDURE — 25810000003 SODIUM CHLORIDE 0.9 % SOLUTION: Performed by: INTERNAL MEDICINE

## 2023-11-22 RX ORDER — HEPARIN SODIUM (PORCINE) LOCK FLUSH IV SOLN 100 UNIT/ML 100 UNIT/ML
500 SOLUTION INTRAVENOUS AS NEEDED
OUTPATIENT
Start: 2023-11-22

## 2023-11-22 RX ORDER — SODIUM CHLORIDE 0.9 % (FLUSH) 0.9 %
10 SYRINGE (ML) INJECTION AS NEEDED
OUTPATIENT
Start: 2023-11-22

## 2023-11-22 RX ORDER — HEPARIN SODIUM (PORCINE) LOCK FLUSH IV SOLN 100 UNIT/ML 100 UNIT/ML
500 SOLUTION INTRAVENOUS AS NEEDED
Status: DISCONTINUED | OUTPATIENT
Start: 2023-11-22 | End: 2023-11-22 | Stop reason: HOSPADM

## 2023-11-22 RX ORDER — SODIUM CHLORIDE 0.9 % (FLUSH) 0.9 %
10 SYRINGE (ML) INJECTION AS NEEDED
Status: DISCONTINUED | OUTPATIENT
Start: 2023-11-22 | End: 2023-11-22 | Stop reason: HOSPADM

## 2023-11-22 RX ORDER — FAMOTIDINE 10 MG/ML
20 INJECTION, SOLUTION INTRAVENOUS ONCE
Status: COMPLETED | OUTPATIENT
Start: 2023-11-22 | End: 2023-11-22

## 2023-11-22 RX ORDER — SODIUM CHLORIDE 9 MG/ML
250 INJECTION, SOLUTION INTRAVENOUS ONCE
Status: COMPLETED | OUTPATIENT
Start: 2023-11-22 | End: 2023-11-22

## 2023-11-22 RX ORDER — ACETAMINOPHEN 325 MG/1
650 TABLET ORAL ONCE
Status: COMPLETED | OUTPATIENT
Start: 2023-11-22 | End: 2023-11-22

## 2023-11-22 RX ADMIN — DIPHENHYDRAMINE HYDROCHLORIDE 50 MG: 50 INJECTION, SOLUTION INTRAMUSCULAR; INTRAVENOUS at 08:35

## 2023-11-22 RX ADMIN — FAMOTIDINE 20 MG: 10 INJECTION INTRAVENOUS at 08:35

## 2023-11-22 RX ADMIN — RITUXIMAB 710 MG: 10 INJECTION, SOLUTION INTRAVENOUS at 09:20

## 2023-11-22 RX ADMIN — SODIUM CHLORIDE 250 ML: 9 INJECTION, SOLUTION INTRAVENOUS at 08:35

## 2023-11-22 RX ADMIN — ACETAMINOPHEN 650 MG: 325 TABLET ORAL at 08:35

## 2023-11-30 ENCOUNTER — OFFICE VISIT (OUTPATIENT)
Dept: ONCOLOGY | Facility: CLINIC | Age: 78
End: 2023-11-30
Payer: MEDICARE

## 2023-11-30 ENCOUNTER — INFUSION (OUTPATIENT)
Dept: ONCOLOGY | Facility: HOSPITAL | Age: 78
End: 2023-11-30
Payer: MEDICARE

## 2023-11-30 VITALS
WEIGHT: 164.9 LBS | BODY MASS INDEX: 23.61 KG/M2 | HEIGHT: 70 IN | RESPIRATION RATE: 16 BRPM | DIASTOLIC BLOOD PRESSURE: 85 MMHG | TEMPERATURE: 97.7 F | HEART RATE: 64 BPM | OXYGEN SATURATION: 99 % | SYSTOLIC BLOOD PRESSURE: 178 MMHG

## 2023-11-30 VITALS
BODY MASS INDEX: 23.89 KG/M2 | RESPIRATION RATE: 16 BRPM | HEART RATE: 55 BPM | SYSTOLIC BLOOD PRESSURE: 147 MMHG | OXYGEN SATURATION: 99 % | WEIGHT: 165 LBS | DIASTOLIC BLOOD PRESSURE: 75 MMHG | TEMPERATURE: 97.7 F

## 2023-11-30 DIAGNOSIS — Z79.899 HIGH RISK MEDICATION USE: ICD-10-CM

## 2023-11-30 DIAGNOSIS — C91.40 HAIRY CELL LEUKEMIA NOT HAVING ACHIEVED REMISSION: Primary | ICD-10-CM

## 2023-11-30 LAB
ALBUMIN SERPL-MCNC: 3.8 G/DL (ref 3.5–5.2)
ALBUMIN/GLOB SERPL: 1.7 G/DL
ALP SERPL-CCNC: 45 U/L (ref 39–117)
ALT SERPL W P-5'-P-CCNC: 31 U/L (ref 1–41)
ANION GAP SERPL CALCULATED.3IONS-SCNC: 9.9 MMOL/L (ref 5–15)
AST SERPL-CCNC: 28 U/L (ref 1–40)
BASOPHILS # BLD AUTO: 0.01 10*3/MM3 (ref 0–0.2)
BASOPHILS NFR BLD AUTO: 0.2 % (ref 0–1.5)
BILIRUB SERPL-MCNC: 0.6 MG/DL (ref 0–1.2)
BUN SERPL-MCNC: 26 MG/DL (ref 8–23)
BUN/CREAT SERPL: 32.9 (ref 7–25)
CALCIUM SPEC-SCNC: 8.3 MG/DL (ref 8.6–10.5)
CHLORIDE SERPL-SCNC: 105 MMOL/L (ref 98–107)
CO2 SERPL-SCNC: 25.1 MMOL/L (ref 22–29)
CREAT SERPL-MCNC: 0.79 MG/DL (ref 0.7–1.3)
DEPRECATED RDW RBC AUTO: 52.9 FL (ref 37–54)
EGFRCR SERPLBLD CKD-EPI 2021: 90.9 ML/MIN/1.73
EOSINOPHIL # BLD AUTO: 0.03 10*3/MM3 (ref 0–0.4)
EOSINOPHIL NFR BLD AUTO: 0.7 % (ref 0.3–6.2)
ERYTHROCYTE [DISTWIDTH] IN BLOOD BY AUTOMATED COUNT: 13.9 % (ref 12.3–15.4)
GLOBULIN UR ELPH-MCNC: 2.2 GM/DL
GLUCOSE SERPL-MCNC: 159 MG/DL (ref 65–99)
HCT VFR BLD AUTO: 34 % (ref 37.5–51)
HGB BLD-MCNC: 11.7 G/DL (ref 13–17.7)
IMM GRANULOCYTES # BLD AUTO: 0.03 10*3/MM3 (ref 0–0.05)
IMM GRANULOCYTES NFR BLD AUTO: 0.7 % (ref 0–0.5)
LYMPHOCYTES # BLD AUTO: 0.55 10*3/MM3 (ref 0.7–3.1)
LYMPHOCYTES NFR BLD AUTO: 13.4 % (ref 19.6–45.3)
MCH RBC QN AUTO: 35.2 PG (ref 26.6–33)
MCHC RBC AUTO-ENTMCNC: 34.4 G/DL (ref 31.5–35.7)
MCV RBC AUTO: 102.4 FL (ref 79–97)
MONOCYTES # BLD AUTO: 0.44 10*3/MM3 (ref 0.1–0.9)
MONOCYTES NFR BLD AUTO: 10.7 % (ref 5–12)
NEUTROPHILS NFR BLD AUTO: 3.05 10*3/MM3 (ref 1.7–7)
NEUTROPHILS NFR BLD AUTO: 74.3 % (ref 42.7–76)
NRBC BLD AUTO-RTO: 0 /100 WBC (ref 0–0.2)
PLATELET # BLD AUTO: 145 10*3/MM3 (ref 140–450)
PMV BLD AUTO: 10 FL (ref 6–12)
POTASSIUM SERPL-SCNC: 4.4 MMOL/L (ref 3.5–5.2)
PROT SERPL-MCNC: 6 G/DL (ref 6–8.5)
RBC # BLD AUTO: 3.32 10*6/MM3 (ref 4.14–5.8)
SODIUM SERPL-SCNC: 140 MMOL/L (ref 136–145)
WBC NRBC COR # BLD AUTO: 4.11 10*3/MM3 (ref 3.4–10.8)

## 2023-11-30 PROCEDURE — 25010000002 RITUXIMAB 10 MG/ML SOLUTION 50 ML VIAL: Performed by: NURSE PRACTITIONER

## 2023-11-30 PROCEDURE — 96375 TX/PRO/DX INJ NEW DRUG ADDON: CPT

## 2023-11-30 PROCEDURE — 25010000002 RITUXIMAB 10 MG/ML SOLUTION 10 ML VIAL: Performed by: NURSE PRACTITIONER

## 2023-11-30 PROCEDURE — 96413 CHEMO IV INFUSION 1 HR: CPT

## 2023-11-30 PROCEDURE — 63710000001 ACETAMINOPHEN 325 MG TABLET: Performed by: NURSE PRACTITIONER

## 2023-11-30 PROCEDURE — 25810000003 SODIUM CHLORIDE 0.9 % SOLUTION: Performed by: NURSE PRACTITIONER

## 2023-11-30 PROCEDURE — 25810000003 SODIUM CHLORIDE 0.9 % SOLUTION 250 ML FLEX CONT: Performed by: NURSE PRACTITIONER

## 2023-11-30 PROCEDURE — 96415 CHEMO IV INFUSION ADDL HR: CPT

## 2023-11-30 PROCEDURE — A9270 NON-COVERED ITEM OR SERVICE: HCPCS | Performed by: NURSE PRACTITIONER

## 2023-11-30 PROCEDURE — 25010000002 DIPHENHYDRAMINE PER 50 MG: Performed by: NURSE PRACTITIONER

## 2023-11-30 PROCEDURE — 85025 COMPLETE CBC W/AUTO DIFF WBC: CPT

## 2023-11-30 PROCEDURE — 80053 COMPREHEN METABOLIC PANEL: CPT

## 2023-11-30 RX ORDER — ACETAMINOPHEN 325 MG/1
650 TABLET ORAL ONCE
Status: COMPLETED | OUTPATIENT
Start: 2023-11-30 | End: 2023-11-30

## 2023-11-30 RX ORDER — ACETAMINOPHEN 325 MG/1
650 TABLET ORAL ONCE
Status: CANCELLED | OUTPATIENT
Start: 2023-11-30

## 2023-11-30 RX ORDER — SODIUM CHLORIDE 9 MG/ML
250 INJECTION, SOLUTION INTRAVENOUS ONCE
Status: CANCELLED | OUTPATIENT
Start: 2023-11-30

## 2023-11-30 RX ORDER — SODIUM CHLORIDE 9 MG/ML
250 INJECTION, SOLUTION INTRAVENOUS ONCE
Status: COMPLETED | OUTPATIENT
Start: 2023-11-30 | End: 2023-11-30

## 2023-11-30 RX ORDER — FAMOTIDINE 10 MG/ML
20 INJECTION, SOLUTION INTRAVENOUS AS NEEDED
Status: CANCELLED | OUTPATIENT
Start: 2023-11-30

## 2023-11-30 RX ORDER — FAMOTIDINE 10 MG/ML
20 INJECTION, SOLUTION INTRAVENOUS ONCE
Status: CANCELLED | OUTPATIENT
Start: 2023-11-30

## 2023-11-30 RX ORDER — DIPHENHYDRAMINE HYDROCHLORIDE 50 MG/ML
50 INJECTION INTRAMUSCULAR; INTRAVENOUS AS NEEDED
Status: CANCELLED | OUTPATIENT
Start: 2023-11-30

## 2023-11-30 RX ORDER — FAMOTIDINE 10 MG/ML
20 INJECTION, SOLUTION INTRAVENOUS ONCE
Status: COMPLETED | OUTPATIENT
Start: 2023-11-30 | End: 2023-11-30

## 2023-11-30 RX ADMIN — FAMOTIDINE 20 MG: 10 INJECTION INTRAVENOUS at 12:36

## 2023-11-30 RX ADMIN — SODIUM CHLORIDE 250 ML: 9 INJECTION, SOLUTION INTRAVENOUS at 12:35

## 2023-11-30 RX ADMIN — RITUXIMAB 700 MG: 10 INJECTION, SOLUTION INTRAVENOUS at 13:25

## 2023-11-30 RX ADMIN — ACETAMINOPHEN 650 MG: 325 TABLET ORAL at 12:37

## 2023-11-30 RX ADMIN — DIPHENHYDRAMINE HYDROCHLORIDE 25 MG: 50 INJECTION, SOLUTION INTRAMUSCULAR; INTRAVENOUS at 12:37

## 2023-11-30 NOTE — PROGRESS NOTES
Subjective                                                                                                        REASONS FOR FOLLOWUP:   Hairy cell leukemia.   Admission 05/11/2012 through 05/17/12 for 7 day continuous infusion cladribine (2-CdA) without complication.   Patient seen 5 months post treatment in complete remission.   Patient seen 8 months post treatment with continued complete remission, every 3 month reassessment per CBC planned, 6 month review by MD scheduled.   Patient seen at 15 months without evidence of recurrent disease, every 6 month followup planned.   The patient was seen 02/05/2014 with no evidence of recurrent disease, 6-month followup planned.   Patient seen 07/31/2014, stable with no evidence of recurrent disease, every 6 month followup.   Patient seen 01/15/2015, stable without recurrent disease, 6-month CBC planned, yearly followup scheduled.   Patient seen 02/04/2016, stable, ?early peripheral neuropathy developing distally per feet, no evidence for recurrent disease per hairy cell leukemia.  Patient reviewed September 02, 2016, previous July CBC with mild thrombocytopenia developing, recheck September 2 normalized, every 6 month follow-ups anticipated  Patient reviewed February 02, 2017, status post negative prostate biopsy, mild thrombocytopenia again recognized  Patient reviewed July 20, 2017, hematologically stable, every 6 month follow-up CBCs planned, yearly M.D. Assessment  Patient seen July 26, 2018 after recent airline travel any cold symptoms  Patient reviewed January 10, 2019, no additional symptoms, hematologically stable  Patient reviewed July 25, 2019, stable, six-month follow-up as planned  Patient viewed January 16, 2020, stable, every 6 months CBC, MD assessment yearly planned  Reassessment January 21, 2021, no evidence of recurrence disease  Patient assessed 2/10/2022, evidence of continued remission noted both on clinical examination, peripheral blood smear review  and flow cytometric evaluation.  Patient review 2/23/2023 mild thrombocytopenia, no new abnormalities per peripheral smear, close for follow-up planned  Patient assessed 9/14/2023, concern for early relapse.  Subsequent marrow 10/12/2023-variably hypercellular with diffuse involvement-a 90%-hairy cell leukemia relapse.  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 He is usually followed by Dr. Vuong for a history of hypertension, hyperlipidemia, minimal right carotid plaque as well as elevated PSA.       He had exams done on 1-09-12 for routine followup. This included normal serum chemistry including LFTs, cholesterol 131, triglyceride 34, HDL 51, and LDL 73. CBC revealed H&H 13.3, 37.9%, WBC 3300, platelet count 110,000, 26% polys and 74% lymphs with A NC of 0.9.       Dr. Remy provides information when initially seen with studies from as far back as 1- at which time hemoglobin and hematocrit was 14.4 and 42.1%, WBC 6950, platelets 227,000 with 47% polys, 42% lymph; this was automated. The additional test includ es approximately every year to every other year thereafter though it was noted in January 2010 WBC dropped from an average of approximately 5,000-6,000 to 3600. Hemoglobin and hematocrit 13.6 and 39.7%. Platelets 150,000-170,000 with ANC beginning in Ja nuary 2010 at 1900.       We were contacted per the above findings and asked Dr. Remy to undergo further assessment including flow cytometric exam per peripheral blood. This revealed no evidence of abnormal myeloid maturation increased blast population. No evidence of lymphopro l iferative disorder. The patient was asked to be seen formally in the office now and comes in today for further assessment. Dr. Remy  indicates that he has taken a number of medications in an attempt to improve his health. Several homeopathic medication s include vitamin C, folate, flaxseed oil, fish oil, vitamin E, selenium, pomegranate tabs, Co-enzyme Q, vitamin D, niacin, and red yeast rice daily. He has discontinued these and stays on those described below.       He feels well with no additional problem s and states that he has no little or no symptoms that he can detail, whether this is just ill health in anyway, though he does have occasional hemorrhoidal pain. He also notes rare palpitations and occasional epistaxis when the ambient air has dried sub stantially. No fevers, chills, weight loss, night sweats, or other constitutional symptoms.       As a result of the above, the patient was asked to undergo a series of studies. This went onto include rheumatoid factor of 6, MARYURI screen negative, negative s misael protein electrophoresis, normal quantitative immunoglobulins, CMV IgG of 2.9, IgM less than .9, EBV IgM of less than .9 and BCA IgG antibody of 4.6. These are consistent with previous exposures. HIV was negative, CRSP less than .1, sedimentation ra t e of 4, iron 125, TIBC 297, 14% saturation, and ferritin of 77. Peripheral blood flow cytometry was negative for evidence of abnormal myeloid maturation, increased blast population or lymphoproliferative disorder. As the patient is seen back today, he i s continuing to feel well though is obviously greatly concerned about his followup counts. Reviews in the office had been planned to follow him briefly recheck performance 3/29/12 with an H&H of 12.9, 36.1, WBC 3900, 32 polys, 62 lymphs, platelet count 94 , 000, IPF slightly elevated at 8.8. The patient was therefore asked to return and to be further assessed with bone marrow aspirate and biopsy. He now presents to do so. He has had no additional symptoms since last seen. He has discontinued his Crestor use.       As a result of his review  the patient underwent bone marrow aspiration and biopsy. This revealed evidence of lymphocytic infiltrate reviewed here in the office. 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 Eventually cytogenetics was found to be negative with no evidence of cyclin-D1/IgH or BCL-2/IgH rearrangement. Additional tissue was requested. As a result, the patient was notifi e edward of these findings by personal visit, and plans were made to proceed with endoscopy, ? mantle cell involvement. At the time of this dictation, this was not yet performed. He was also asked to undergo CT of the neck, chest, abdomen and pelvis which show ed no evidence of abnormality. PET scan 04/13/12 showed increased activity within the marrow, but no additional abnormalities including the spleen, with no background bowel or gastric activity as well.       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 hairy cell leukemia. This has been discussed in great detail with the alexander thakur at [employer] and additional stains are pending as is the patient's GI assessment now to be pursued through Dr. Ray.       His case was again reviewed over quite a period of time, and ultimately his marrow was signed out as 80% involved with hairy cell leukemia - normal cytogenetics, no evidence of cyclin D1/IgH or BCL-2/IgH rearrangement. Dr. Remy was advised of the treatment options which include followup with no immediate therapy, and/or the use of cladribine or pentostatin. After numerous di s cussion, he ultimately elected to try to proceed with treatment when he is feeling as well as he is to improve his ability to withstand the therapy itself. We therefore began to discuss  the treatment schedule, which also could be somewhat variable, inclu d ing 7-day infusions, 2-hr infusion q.d. x five days or every other week infusion. After discussion he wishes to proceed with 7-day infusion. When we attempted to do this as an outpatient we found out thereafter that this would not be covered as an outpa tient infusion through his insurance. Therefore we have made plans for him to be admitted after he is seen in office 05/10/12.       The patient was thereafter admitted 05/11-05/17/12. PICC line was placed and he initiated treatment at 0.1 mg/kg or 7.7 mg IV in fusion over seven days. The patient fortunately had no serious issues at all during the seven days and continued to work out on a daily basis. He was seen by his internist as well with some of his meds adjusted including his HCTZ and Ziac. He was stabl e at discharge, but was given Neulasta 6 mg subcutaneously 24 hours after he completed treatment. He has been having counts done on a regular basis, and returns back today with only minimal evidence of neutropenia at his becca, and has an excellent periph eral smear today - normal findings. His performance status remains excellent as well.       The patient when seen on 6/7/12 was felt to have improved substantially. We followed him for weekly counts and plans at that point were for him to take a trip out of the country. He did actually take this trip, which had him eventually hiking at 11-12,000 feet without any complication or ill effect. As he returns back today on 7/19 he feels well and again with an excellent performance status. He has had no fever, c hills or suggestion of infection or complications thus far. He remains again, with an excellent performance status.       The patient thereafter is asked to have his counts done on an every six weeks basis, now seen three months later with a normal CBC as well as examination. He again feels quite well with a completely normal performance  status.       The patient was asked to return for followup studies that included normal serum chemistries, unchanged lipid profile and stable hematologic findings. He is now seen on 2/7/13, 4 months from his last appointment. He continues to do well with unchanged performance status.       The patient was asked to have counts done q3 months seeing the physician in six months. He is now seen back 08/22/2013 feeling well having [place] with that. He had no additional fevers, sweats, chills, significant change in weight although he has tried to lose some additional pounds and we see this today. He continues to exercise regularl y and dietary program. Review of his smears again continues to demonstrate a complete remission.       Today, the patient was asked to be seen back in 6 months for followup, and is now seen 02/05/2014, continuing to feel well. Again, review of his smears and physical examination fortunately demonstrates no evidence of recurrent disease.       The patient was asked to be seen back in 6 months for followup and is now seen on 07/31/2014. Again he feels well with an excellent performance status and no change since last reviewed.       The patient thereafter was now seen back 2015, 2016 stable at both visits with no evidence of recurrence of his hairy cell leukemia. He feels well but when now reviewed 02/04/2016, he has had some degree of peripheral neuropathy? This improv es with activity and it is certainly not limiting his action or his function.   Patient now reviewed back again September 02, 2016, continues to have an excellent performance status and hematologically remain stable brother had some concern about thrombocytopenia last visit.    The patient is next seen February 02, 2017.  He has not had any medical issues since last seen and remains physically quite well.  He does describe following with urology and undergoing a repeat  biopsy recently when his PSA was above 7.  His findings evidently were negative for malignancy though he did have post procedure hematuria.  This is also now abated.  The patient is next seen July 28, 2017.  He continues to have an excellent performance status and no particular difficulty in day-to-day function and/or pursuing active vacations.      The patient is next seen July 26, 2018.  He had recently returned from a trip to [place].  After experiencing an airplane ride with multiple coughing passengers he himself developed a cold, particularly severe over the last 3-4 days with cough and congestion.  He's had low-grade fever which is now beginning to resolve but is clearly uncomfortable.    The patient's next seen January 10, 2019.  He has continued to travel without issues and is feeling well when reviewed today.  Plans were made for usual follow-up and the patient is next seen July 25, 2019.  His performance status remains excellent though he has lost some weight and indicates he did this per altering his diet.  His stamina and other usual activities are continued unabated.  The patient is next seen January 16, 2020.  He continues an active practice and lifestyle.  He has lost some additional weight on purpose through diet.    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    The patient's follow-up laboratory studies demonstrated by late September degree of leukopenia, unchanged thrombocytopenia and this was followed with repeat analysis within the last several weeks as he seen February 10, 2022. Flow cytometry failed to demonstrate any new process.  As you seen February 10, 2020 his performance status remains excellent, stable weight, appetite, no additional fever, chills, rash, night sweats.    The patient is next evaluated 2/23/2023 with an unchanged performance  status for mild thrombocytopenia.  He has had some exposure to viral illnesses through his grandchildren as of late but is otherwise felt fine with a unchanged performance status and work schedule.    He required admission 5/23-27/2023 had return from [place] where he developed nausea and vomiting and difficulty with keeping p.o.  He had further nausea, vomiting and dizziness and was seen in the emergency department and was found to have a sodium 115.  He was seen by nephrology with concern of hyponatremia related to hydrochlorothiazide along with SIDH, treated with fluid restriction and salt tablets and p.o. Lasix.  His medications were adjusted per renal medicine.  At that point he continued to demonstrate chronic pancytopenia that did not accelerate in any particular way.     Subsequent CBC 6/1/2023 include H&H 11.3 and 32.2 with white count of 2840, platelet count 93,000, ANC of 1610.    The patient is next evaluated 8/3/2020 3 repeat CBC includes a white count of 2250, H&H 11.8 and 33.6, platelet count of 88,000-differential of 44% polys, 52% lymphs, 3% monocytes.    Patient contacted about flow cytometry findings of small population potentially suggestive of recurrence versus secondary process.  Plan to reassess in follow-up visits 9/7/2023.  Please note that if treatment is necessary then repeat treatment with cladribine could be given in a variety of of schedules including 0.15 mg/kg/day over 2 hours x 5 days followed by rituximab or 5.6 mg/m² over 2 hours also for 5 days and also followed by rituximab therapy.      The patient was seen back 9/14/2023.Flow cytometry assessed on 2 visits includes a CD10 positive monoclonal lambda B-cell population representing 0.3% of total events 8/3/2023 and repeat 9/7/2023 demonstrates a similar B-cell population also 0.3% total events-phenotype positive CD10 bright, CD19 bright, CD20 bright, CD25, CD45,   slambda.  Discussed symptoms usually due to splenomegaly,  fatigue, weakness, weight loss, bruising, recurrent infections, periodic vasculitis.  He is clear that he is not having any of the symptoms and feels that his overall performance status is as good as its ever been.  We have discussed that additional issues include possible organomegaly and cytopenias developing with his follow-up CBC in office today including H&H of 12.2 and 33.4, white count of 3250 and platelet count of 92,000.  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    We had the patient return for periodic blood counts, had him undergo testing for hepatitis and reviewed the various regimens including 5-day cladribine followed by Rituxan therapy.  10/5/2023 scans revealed no substantial abnormalities including lack of splenomegaly or lymphadenopathy.    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  Additionally the patient's hepatitis B surface antibody was equivocal and he went on to have additional vaccinations   -influenza, COVID-19, RSV and hepatitis B.  Further vascular surgeon was contacted and the patient underwent a right internal jugular Mediport placement 10/18/2023 and teaching concerning cladribine day 1 through 5 and subsequent Rituxan therapy.    After further discussion of available treatment options plans made to offer 5-day cladribine and, review of literature, indicates that Rituxan is associated with further immunosuppression.   Available guidance suggests proceeding with initial cladribine and then 4-6 weekly doses of Rituxan consolidation starting 4 to 6 weeks after initial treatment.    The patient proceeded with cladribine taking therapy daily-days 1-5 through 10/27/2023 and given Neulasta 10/30/2023.  He is next seen 11/2/2023.  Fortunately he has had no untoward effects of treatment as far and continues to work unabated.  His follow-up CBC does not demonstrate significant development of neutropenia anemia or thrombocytopenia.  We discussed assessing him regularly over the next several weeks and anticipate starting Rituxan approximately 3 weeks from now.    The patient's subsequent laboratory studies demonstrated gradual improvement in his white count to normalization, including degree of neutrophilia by 11/16/2023, improvement in his thrombocytopenia as well.  He has not had any additional side effects including lack of fever, chills, night sweats since he completed therapy.    He is seen back formally 11/20/2023 and we anticipate initiation of his rituximab therapy 11/22/2023.  Again he is done very well and his peripheral blood counts remain quite acceptable without evidence of deterioration.  He has had no fever, chills, night sweats, infectious episodes or change in performance status.  We have agreed that he will start his consolidation rituximab therapy 11/22/2023 and that we would schedule subsequent Thursday treatments x3 also.    Interval Follow-Up:  Patient is seen back today in follow-up.  Last week he initiated rituximab therapy with plans to give weekly x4.  He reports good tolerance of his first treatment without any difficulty.  Counts are all slightly improved today and he was provided a copy.  He denies any new concerns this time.         Past Medical History:   Diagnosis Date    BCC (basal cell carcinoma of skin) 06/22/2022    NOSE    Benign prostate hyperplasia     H/O Elevated PSA     H/O Hairy cell leukemia  (CMS/[facility]) 2012    H/O Internal thrombosed hemorrhoids     H/O minimal right carotid plaque     H/O peripheral neuropathy     Hyperlipidemia     Hypertension     Primary open-angle glaucoma, bilateral, mild stage        ONCOLOGIC HISTORY:  (History from previous dates can be found in the separate document.)    Current Outpatient Medications on File Prior to Visit   Medication Sig Dispense Refill    acyclovir (Zovirax) 400 MG tablet Take 1 tablet by mouth 2 (Two) Times a Day. 60 tablet 7    amLODIPine (NORVASC) 2.5 MG tablet Take 1 tablet by mouth Daily. 90 tablet 3    bimatoprost (LUMIGAN) 0.03 % ophthalmic drops Administer 1 drop to both eyes Every Night.      bisoprolol (ZEBeta) 5 MG tablet Take 1 tablet by mouth Daily. 90 tablet 3    Calcium-Magnesium-Vitamin D (CALCIUM 1200+D3 PO) Take  by mouth.      chlorhexidine (PERIDEX) 0.12 % solution Rinse with 15 mL by mouth after breakfast and at bedtime for 30 seconds, then spit. No food or drink for 30 minutes after rinse. 473 mL 3    finasteride (PROSCAR) 5 MG tablet Take 1 tablet by mouth Daily. 90 tablet 3    Magnesium 500 MG tablet Take 1 tablet by mouth 2 (two) times a day.      metroNIDAZOLE (METROGEL) 1 % gel Apply 1 application  topically to the appropriate area as directed Daily. 60 g 11    niacin 500 MG tablet Take 1 tablet by mouth Every Night.      ondansetron (ZOFRAN) 8 MG tablet Take 1 tablet by mouth 3 (Three) Times a Day As Needed for Nausea or Vomiting. 30 tablet 5    propafenone (RYTHMOL) 150 MG tablet Take 1 tablet by mouth 3 (Three) Times a Day. 270 tablet 3    rosuvastatin (CRESTOR) 10 MG tablet Take 1 tablet by mouth every night at bedtime. 90 tablet 2    sulfamethoxazole-trimethoprim (BACTRIM DS,SEPTRA DS) 800-160 MG per tablet Take 1 tablet by mouth 3 (Three) Times a Week. 30 tablet 0    tamsulosin (FLOMAX) 0.4 MG capsule 24 hr capsule Take 2 capsules by mouth Daily. 180 capsule 3    vitamin C (ASCORBIC ACID) 250 MG tablet Take 4 tablets by  "mouth Daily. With madisyn hips      vitamin D3 125 MCG (5000 UT) capsule capsule Take 1 capsule by mouth Daily.      [DISCONTINUED] HYDROcodone-acetaminophen (NORCO) 5-325 MG per tablet Take 1 tablet by mouth Every 4 (Four) Hours As Needed (Pain). (Patient not taking: Reported on 10/23/2023) 20 tablet 0     No current facility-administered medications on file prior to visit.       ALLERGIES:   No Known Allergies    Social History     Socioeconomic History    Marital status:      Spouse name: Hanna   Tobacco Use    Smoking status: Former     Packs/day: .5     Types: Cigarettes    Smokeless tobacco: Never    Tobacco comments:     Quit smoking many years ago.   Vaping Use    Vaping Use: Never used   Substance and Sexual Activity    Alcohol use: Yes     Comment: 1 glass of wine or beer per day    Drug use: No    Sexual activity: Defer         Cancer-related family history includes Cancer in his father; Cancer (age of onset: 90) in his mother.      Review of Systems   Constitutional:  Negative for fatigue.   HENT: Negative.     Eyes: Negative.    Respiratory:  Negative for chest tightness, shortness of breath and wheezing.    Gastrointestinal:  Negative for constipation, diarrhea, nausea and vomiting.   Endocrine: Negative.    Musculoskeletal: Negative.    Skin: Negative.    Neurological: Negative.  Negative for weakness.        Vitals:    11/30/23 1229   BP: 178/85   Pulse: 64   Resp: 16   Temp: 97.7 °F (36.5 °C)   SpO2: 99%   Weight: 74.8 kg (164 lb 14.5 oz)   Height: 177 cm (69.69\")   PainSc: 0-No pain           11/30/2023    12:06 PM   Current Status   ECOG score 0       Physical Exam      GENERAL: Well-developed, well-nourished male in no acute distress.   SKIN: Warm, dry, without new rash or lesion  HEAD: Normocephalic.   EYES: Pupils equal, round and reactive to light, EOMs intact. Conjunctivae normal.   EARS: Hearing intact.   NOSE: Septum midline. No excoriations or nasal discharge.   MOUTH: Tongue is " well-papillated; no stomatitis or ulcers. Lips normal.  dry mucous membranes, clear sinus drainage noted  THROAT: Oropharynx without lesions or exudates.   NECK: Supple with good range of motion; no thyromegaly or masses, no JVD or bruits.   LYMPHATICS: No cervical, supraclavicular, axillary or inguinal adenopathy.   CHEST: Lungs clear to auscultation bilaterally.   CARDIAC: Regular rate and rhythm without murmurs, rubs or gallops.   ABDOMEN: Soft, nontender with no evidence hepatosplenomegaly or masses  EXTREMITIES: No clubbing, cyanosis or edema.   NEUROLOGICAL: No focal neurological deficits.       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 He was treated 5/11-5/17/ 12 with cladribine being given as continuous infusion at 0.1 mg/kg or 7.7 mg/24-hours x7 days. He did well during hospitalization with little toxicity and minimal myelosuppression. He has gone onto obtain a complete remission and this continues to be th e case at a 6 month visit now 07/31/2014. The patient has been now assessed on a regular basis every 6 months with no evidence of hematologic deterioration. As he is seen today, 02/04/2016, and now again September 02, 2016 he remains clinically stable as well.      He is now assessed February 2 and overall doing well without any additional issues except for the prostate described as above.  He is now recognized there is also possibly hypertensive and I'll contact him about this is afternoon though during our conversation he had few if any symptoms.?.  Pending our review will ask him to have a repeat CBC at 3 months to see that physician in 6 months.    The patient indicates when called about his blood pressure that he often experiences white coat  hypertension.  He is going to recheck his blood pressure night and notify me if it remains elevated.     He is next seen July 28, 2017, hematologically stable, remaining quite active.      As he is again reviewed July 2018 he has had recent viral exposure and is slowly working through a cold.  This appears to have suppressed his marrow very modestly though review of the smear shows no evidence of hairy cells though there are atypical lymphocytes thought to be virally activated.  At this point we'll have a follow-up CBC in 4 weeks and have him see the physician again in 6 months .    The patient is next seen January 10, 2019 doing well with no hematologic deterioration.  Plan to see back in 6 months for follow-up.  The patient is next seen July 25, 2019.  He does continue to do extremely well and we discussed his weight loss which appears to be dietarily produced.  His exam is not indicative of progressive disease or new disorder developing.  We have agreed to have him reassessed in 6-month follow-ups.  As he is seen back January 16, 2020 there are no findings of concern though he has lost weight purposely.  It is felt that we can reduce his visits for MD assessment but continue every 6 month CBCs.  This remains the case as he is assessed January 21, 2021.     The patient's follow-up laboratory studies demonstrated by late September 2021, a degree of leukopenia, unchanged thrombocytopenia and this was followed with repeat analysis within the last several weeks as he seen February 10, 2022. Flow cytometry failed to demonstrate any new process.  As you seen February 10, 2020 his performance status remains excellent, stable weight, appetite, no additional fever, chills, rash, night sweats.  As he is reexamined including peripheral blood smears 2/10/2022 there is no suggestion of additional lymphadenopathy hepatosplenomegaly.  We have discussed a fourth COVID 19 vaccination considering his history and will clarify his  status by COVID 19 antibodies in office today.    Patient's subsequent exams did demonstrate responsiveness to COVID-19 vaccinations.  He is now reviewed at 6 and 12 months and with some degree of mild thrombocytopenia noted 2/23/2023 after recent viral exposures.  Peripheral smear does not demonstrate any suggestion of relapse though there are activated lymphocytes noted.    He required admission 5/23-27/2023 had return from [place] where he developed nausea and vomiting and difficulty with keeping p.o.  He had further nausea, vomiting and dizziness and was seen in the emergency department and was found to have a sodium 115.  He was seen by nephrology with concern of hyponatremia related to hydrochlorothiazide along with SIDH, treated with fluid restriction and salt tablets and p.o. Lasix.  His medications were adjusted per renal medicine.  At that point he continued to demonstrate chronic pancytopenia that did not accelerate in any particular way.     Subsequent CBC 6/1/2023 include H&H 11.3 and 32.2 with white count of 2840, platelet count 93,000, ANC of 1610.    The patient is next evaluated 8/3/2020 3 repeat CBC includes a white count of 2250, H&H 11.8 and 33.6, platelet count of 88,000-differential of 44% polys, 52% lymphs, 3% monocytes.    Patient contacted about flow cytometry findings of small population potentially suggestive of recurrence versus secondary process.  Plan to reassess in follow-up visits 9/7/2023.  Please note that if treatment is necessary then repeat treatment with cladribine could be given in a variety of of schedules including 0.15 mg/kg/day over 2 hours x 5 days followed by rituximab or 5.6 mg/m² over 2 hours also for 5 days and also followed by rituximab therapy.    The patient was seen back 9/14/2023.Flow cytometry assessed on 2 visits includes a CD10 positive monoclonal lambda B-cell population representing 0.3% of total events 8/3/2023 and repeat 9/7/2023 demonstrates a similar  B-cell population also 0.3% total events-phenotype positive CD10 bright, CD19 bright, CD20 bright, CD25, CD45,   slambda.  Discussed symptoms usually due to splenomegaly, fatigue, weakness, weight loss, bruising, recurrent infections, periodic vasculitis.  He is clear that he is not having any of the symptoms and feels that his overall performance status is as good as its ever been.  We have discussed that additional issues include possible organomegaly and cytopenias developing with his follow-up CBC in office today including H&H of 12.2 and 33.4, white count of 3250 and platelet count of 92,000.  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    We had the patient return for periodic blood counts, had him undergo testing for hepatitis and reviewed the various regimens including 5-day cladribine followed by Rituxan therapy.  10/5/2023 scans revealed no substantial abnormalities including lack of splenomegaly or lymphadenopathy.    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  Additionally the patient's hepatitis B surface antibody was equivocal and he went on to have additional vaccinations   -influenza, COVID-19, RSV and hepatitis B.  Further vascular surgeon was contacted and the patient underwent a right internal jugular Mediport placement 10/18/2023 and teaching concerning cladribine day 1 through 5 and subsequent Rituxan therapy.    After further  discussion of available treatment options plans made to offer 5-day cladribine and, review of literature, indicates that Rituxan is associated with further immunosuppression.  Available guidance suggests proceeding with initial cladribine and then 4 weekly doses of Rituxan consolidation starting 4 to 6 weeks after initial treatment.  At this point the patient has completed all vaccinations including hepatitis B.    The patient proceeded with cladribine taking therapy daily-days 1-5 through 10/27/2023 and given Neulasta 10/30/2023.  He is next seen 11/2/2023.  Fortunately he has had no untoward effects of treatment as far and continues to work unabated.  His follow-up CBC does not demonstrate significant development of neutropenia anemia or thrombocytopenia.  We discussed assessing him regularly over the next several weeks and anticipate starting Rituxan approximately 3 weeks from now.    The patient's subsequent laboratory studies demonstrated gradual improvement in his white count to normalization, including degree of neutrophilia by 11/16/2023, improvement in his thrombocytopenia as well.  He has not had any additional side effects including lack of fever, chills, night sweats since he completed therapy.    He is seen back formally 11/20/2023 and we anticipate initiation of his rituximab therapy 11/22/2023.  Again he has done very well and his peripheral blood counts remain quite acceptable without evidence of deterioration.  He has had no fever, chills, night sweats, infectious episodes or change in performance status.  We have agreed that he will start his consolidation rituximab therapy 11/22/2023 and that we would schedule subsequent Thursday treatments x3 also.    11/30/2023 due today for week 2 rituximab.  Tolerating well thus far.  Continue.    Plan:    * Patient to continue current medication list including acyclovir and Septra prophylaxis    *Return weekly for MD/NP follow-up and Rituxan therapy.  Patient  is scheduled through the end of therapy, 12/14/2023.        This patient is on high risk drug therapy requiring intensive monitoring for toxicity.

## 2023-12-06 NOTE — PROGRESS NOTES
Subjective    patient with continued excellent performance status                                                                                                              REASONS FOR FOLLOWUP:   Hairy cell leukemia.   Admission 05/11/2012 through 05/17/12 for 7 day continuous infusion cladribine (2-CdA) without complication.   Patient seen 5 months post treatment in complete remission.   Patient seen 8 months post treatment with continued complete remission, every 3 month reassessment per CBC planned, 6 month review by MD scheduled.   Patient seen at 15 months without evidence of recurrent disease, every 6 month followup planned.   The patient was seen 02/05/2014 with no evidence of recurrent disease, 6-month followup planned.   Patient seen 07/31/2014, stable with no evidence of recurrent disease, every 6 month followup.   Patient seen 01/15/2015, stable without recurrent disease, 6-month CBC planned, yearly followup scheduled.   Patient seen 02/04/2016, stable, ?early peripheral neuropathy developing distally per feet, no evidence for recurrent disease per hairy cell leukemia.  Patient reviewed September 02, 2016, previous July CBC with mild thrombocytopenia developing, recheck September 2 normalized, every 6 month follow-ups anticipated  Patient reviewed February 02, 2017, status post negative prostate biopsy, mild thrombocytopenia again recognized  Patient reviewed July 20, 2017, hematologically stable, every 6 month follow-up CBCs planned, yearly M.D. Assessment  Patient seen July 26, 2018 after recent airline travel any cold symptoms  Patient reviewed January 10, 2019, no additional symptoms, hematologically stable  Patient reviewed July 25, 2019, stable, six-month follow-up as planned  Patient viewed January 16, 2020, stable, every 6 months CBC, MD assessment yearly planned  Reassessment January 21, 2021, no evidence of recurrence disease  Patient assessed 2/10/2022, evidence of continued remission  noted both on clinical examination, peripheral blood smear review and flow cytometric evaluation.  Patient review 2/23/2023 mild thrombocytopenia, no new abnormalities per peripheral smear, close for follow-up planned  Patient assessed 9/14/2023, concern for early relapse.  Subsequent marrow 10/12/2023-variably hypercellular with diffuse involvement-a 90%-hairy cell leukemia relapse.  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. He is usually followed by Dr. Vuong for a history of hypertension, hyperlipidemia, minimal right carotid plaque as well as elevated PSA.       He had exams done on 1-09-12 for routine followup. This included normal serum chemistry including LFTs, cholesterol 131, triglyceride 34, HDL 51, and LDL 73. CBC revealed H&H 13.3, 37.9%, WBC 3300, platelet count 110,000, 26% polys and 74% lymphs with A NC of 0.9.       Dr. Remy provides information when initially seen with studies from as far back as 1- at which time hemoglobin and hematocrit was 14.4 and 42.1%, WBC 6950, platelets 227,000 with 47% polys, 42% lymph; this was automated. The additional test includ es approximately every year to every other year thereafter though it was noted in January 2010 WBC dropped from an average of approximately 5,000-6,000 to 3600. Hemoglobin and hematocrit 13.6 and 39.7%. Platelets 150,000-170,000 with ANC beginning in Ja nuary 2010 at 1900.       We were contacted per the above findings and asked Dr. Remy to undergo further assessment including flow cytometric exam per peripheral blood. This revealed no evidence of abnormal myeloid maturation increased blast population. No  evidence of lymphopro l iferative disorder. The patient was asked to be seen formally in the office now and comes in today for further assessment. Dr. Remy indicates that he has taken a number of medications in an attempt to improve his health. Several homeopathic medication s include vitamin C, folate, flaxseed oil, fish oil, vitamin E, selenium, pomegranate tabs, Co-enzyme Q, vitamin D, niacin, and red yeast rice daily. He has discontinued these and stays on those described below.       He feels well with no additional problem s and states that he has no little or no symptoms that he can detail, whether this is just ill health in anyway, though he does have occasional hemorrhoidal pain. He also notes rare palpitations and occasional epistaxis when the ambient air has dried sub stantially. No fevers, chills, weight loss, night sweats, or other constitutional symptoms.       As a result of the above, the patient was asked to undergo a series of studies. This went onto include rheumatoid factor of 6, MARYURI screen negative, negative s misael protein electrophoresis, normal quantitative immunoglobulins, CMV IgG of 2.9, IgM less than .9, EBV IgM of less than .9 and BCA IgG antibody of 4.6. These are consistent with previous exposures. HIV was negative, CRSP less than .1, sedimentation ra t e of 4, iron 125, TIBC 297, 14% saturation, and ferritin of 77. Peripheral blood flow cytometry was negative for evidence of abnormal myeloid maturation, increased blast population or lymphoproliferative disorder. As the patient is seen back today, he i s continuing to feel well though is obviously greatly concerned about his followup counts. Reviews in the office had been planned to follow him briefly recheck performance 3/29/12 with an H&H of 12.9, 36.1, WBC 3900, 32 polys, 62 lymphs, platelet count 94 , 000, IPF slightly elevated at 8.8. The patient was therefore asked to return and to be further assessed with bone marrow aspirate and  biopsy. He now presents to do so. He has had no additional symptoms since last seen. He has discontinued his Crestor use.       As a result of his review the patient underwent bone marrow aspiration and biopsy. This revealed evidence of lymphocytic infiltrate reviewed here in the office. 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 Eventually cytogenetics was found to be negative with no evidence of cyclin-D1/IgH or BCL-2/IgH rearrangement. Additional tissue was requested. As a result, the patient was notifi e d of these findings by personal visit, and plans were made to proceed with endoscopy, ? mantle cell involvement. At the time of this dictation, this was not yet performed. He was also asked to undergo CT of the neck, chest, abdomen and pelvis which show ed no evidence of abnormality. PET scan 04/13/12 showed increased activity within the marrow, but no additional abnormalities including the spleen, with no background bowel or gastric activity as well.       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 hairy cell leukemia. This has been discussed in great detail with the alexander thakur at [employer] and additional stains are pending as is the patient's GI assessment now to be pursued through Dr. Ray.       His case was again reviewed over quite a period of time, and ultimately his marrow was signed out as 80% involved with hairy cell leukemia - normal cytogenetics, no evidence of cyclin D1/IgH or BCL-2/IgH rearrangement. Dr. Remy was advised of the treatment options which include followup with no immediate therapy, and/or the use of cladribine or pentostatin. After numerous di s cussion, he ultimately elected to  try to proceed with treatment when he is feeling as well as he is to improve his ability to withstand the therapy itself. We therefore began to discuss the treatment schedule, which also could be somewhat variable, inclu d ing 7-day infusions, 2-hr infusion q.d. x five days or every other week infusion. After discussion he wishes to proceed with 7-day infusion. When we attempted to do this as an outpatient we found out thereafter that this would not be covered as an outpa tient infusion through his insurance. Therefore we have made plans for him to be admitted after he is seen in office 05/10/12.       The patient was thereafter admitted 05/11-05/17/12. PICC line was placed and he initiated treatment at 0.1 mg/kg or 7.7 mg IV in fusion over seven days. The patient fortunately had no serious issues at all during the seven days and continued to work out on a daily basis. He was seen by his internist as well with some of his meds adjusted including his HCTZ and Ziac. He was stabl e at discharge, but was given Neulasta 6 mg subcutaneously 24 hours after he completed treatment. He has been having counts done on a regular basis, and returns back today with only minimal evidence of neutropenia at his becca, and has an excellent periph eral smear today - normal findings. His performance status remains excellent as well.       The patient when seen on 6/7/12 was felt to have improved substantially. We followed him for weekly counts and plans at that point were for him to take a trip out of the country. He did actually take this trip, which had him eventually hiking at 11-12,000 feet without any complication or ill effect. As he returns back today on 7/19 he feels well and again with an excellent performance status. He has had no fever, c hills or suggestion of infection or complications thus far. He remains again, with an excellent performance status.       The patient thereafter is asked to have his counts done on an every  six weeks basis, now seen three months later with a normal CBC as well as examination. He again feels quite well with a completely normal performance status.       The patient was asked to return for followup studies that included normal serum chemistries, unchanged lipid profile and stable hematologic findings. He is now seen on 2/7/13, 4 months from his last appointment. He continues to do well with unchanged performance status.       The patient was asked to have counts done q3 months seeing the physician in six months. He is now seen back 08/22/2013 feeling well having [place] with that. He had no additional fevers, sweats, chills, significant change in weight although he has tried to lose some additional pounds and we see this today. He continues to exercise regularl y and dietary program. Review of his smears again continues to demonstrate a complete remission.       Today, the patient was asked to be seen back in 6 months for followup, and is now seen 02/05/2014, continuing to feel well. Again, review of his smears and physical examination fortunately demonstrates no evidence of recurrent disease.       The patient was asked to be seen back in 6 months for followup and is now seen on 07/31/2014. Again he feels well with an excellent performance status and no change since last reviewed.       The patient thereafter was now seen back 2015, 2016 stable at both visits with no evidence of recurrence of his hairy cell leukemia. He feels well but when now reviewed 02/04/2016, he has had some degree of peripheral neuropathy? This improv es with activity and it is certainly not limiting his action or his function.   Patient now reviewed back again September 02, 2016, continues to have an excellent performance status and hematologically remain stable brother had some concern about thrombocytopenia last visit.    The patient is next seen February 02, 2017.  He  has not had any medical issues since last seen and remains physically quite well.  He does describe following with urology and undergoing a repeat biopsy recently when his PSA was above 7.  His findings evidently were negative for malignancy though he did have post procedure hematuria.  This is also now abated.  The patient is next seen July 28, 2017.  He continues to have an excellent performance status and no particular difficulty in day-to-day function and/or pursuing active vacations.      The patient is next seen July 26, 2018.  He had recently returned from a trip to [place].  After experiencing an airplane ride with multiple coughing passengers he himself developed a cold, particularly severe over the last 3-4 days with cough and congestion.  He's had low-grade fever which is now beginning to resolve but is clearly uncomfortable.    The patient's next seen January 10, 2019.  He has continued to travel without issues and is feeling well when reviewed today.  Plans were made for usual follow-up and the patient is next seen July 25, 2019.  His performance status remains excellent though he has lost some weight and indicates he did this per altering his diet.  His stamina and other usual activities are continued unabated.  The patient is next seen January 16, 2020.  He continues an active practice and lifestyle.  He has lost some additional weight on purpose through diet.    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    The patient's follow-up laboratory studies demonstrated by late September degree of leukopenia, unchanged thrombocytopenia and this was followed with repeat analysis within the last several weeks as he seen February 10, 2022. Flow cytometry failed to demonstrate any new process.  As you seen February 10, 2020 his performance status remains excellent,  stable weight, appetite, no additional fever, chills, rash, night sweats.    The patient is next evaluated 2/23/2023 with an unchanged performance status for mild thrombocytopenia.  He has had some exposure to viral illnesses through his grandchildren as of late but is otherwise felt fine with a unchanged performance status and work schedule.    He required admission 5/23-27/2023 had return from [place] where he developed nausea and vomiting and difficulty with keeping p.o.  He had further nausea, vomiting and dizziness and was seen in the emergency department and was found to have a sodium 115.  He was seen by nephrology with concern of hyponatremia related to hydrochlorothiazide along with SIDH, treated with fluid restriction and salt tablets and p.o. Lasix.  His medications were adjusted per renal medicine.  At that point he continued to demonstrate chronic pancytopenia that did not accelerate in any particular way.     Subsequent CBC 6/1/2023 include H&H 11.3 and 32.2 with white count of 2840, platelet count 93,000, ANC of 1610.    The patient is next evaluated 8/3/2020 3 repeat CBC includes a white count of 2250, H&H 11.8 and 33.6, platelet count of 88,000-differential of 44% polys, 52% lymphs, 3% monocytes.    Patient contacted about flow cytometry findings of small population potentially suggestive of recurrence versus secondary process.  Plan to reassess in follow-up visits 9/7/2023.  Please note that if treatment is necessary then repeat treatment with cladribine could be given in a variety of of schedules including 0.15 mg/kg/day over 2 hours x 5 days followed by rituximab or 5.6 mg/m² over 2 hours also for 5 days and also followed by rituximab therapy.      The patient was seen back 9/14/2023.Flow cytometry assessed on 2 visits includes a CD10 positive monoclonal lambda B-cell population representing 0.3% of total events 8/3/2023 and repeat 9/7/2023 demonstrates a similar B-cell population also 0.3% total  events-phenotype positive CD10 bright, CD19 bright, CD20 bright, CD25, CD45,   slambda.  Discussed symptoms usually due to splenomegaly, fatigue, weakness, weight loss, bruising, recurrent infections, periodic vasculitis.  He is clear that he is not having any of the symptoms and feels that his overall performance status is as good as its ever been.  We have discussed that additional issues include possible organomegaly and cytopenias developing with his follow-up CBC in office today including H&H of 12.2 and 33.4, white count of 3250 and platelet count of 92,000.  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    We had the patient return for periodic blood counts, had him undergo testing for hepatitis and reviewed the various regimens including 5-day cladribine followed by Rituxan therapy.  10/5/2023 scans revealed no substantial abnormalities including lack of splenomegaly or lymphadenopathy.    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  Additionally the patient's hepatitis B surface antibody was equivocal and he went on to have additional vaccinations   -influenza, COVID-19, RSV and hepatitis B.  Further vascular surgeon was contacted and the patient underwent a right internal jugular Mediport placement 10/18/2023 and teaching concerning cladribine day 1 through 5 and subsequent Rituxan therapy.    After further discussion of available treatment  options plans made to offer 5-day cladribine and, review of literature, indicates that Rituxan is associated with further immunosuppression.  Available guidance suggests proceeding with initial cladribine and then 4-6 weekly doses of Rituxan consolidation starting 4 to 6 weeks after initial treatment.    The patient proceeded with cladribine taking therapy daily-days 1-5 through 10/27/2023 and given Neulasta 10/30/2023.  He is next seen 11/2/2023.  Fortunately he has had no untoward effects of treatment as far and continues to work unabated.  His follow-up CBC does not demonstrate significant development of neutropenia anemia or thrombocytopenia.  We discussed assessing him regularly over the next several weeks and anticipate starting Rituxan approximately 3 weeks from now.    The patient's subsequent laboratory studies demonstrated gradual improvement in his white count to normalization, including degree of neutrophilia by 11/16/2023, improvement in his thrombocytopenia as well.  He has not had any additional side effects including lack of fever, chills, night sweats since he completed therapy.    He is seen back formally 11/20/2023 and we anticipate initiation of his rituximab therapy 11/22/2023.  Again he is done very well and his peripheral blood counts remain quite acceptable without evidence of deterioration.  He has had no fever, chills, night sweats, infectious episodes or change in performance status.  We have agreed that he will start his consolidation rituximab therapy 11/22/2023 and that we would schedule subsequent Thursday treatments x3 also.    The patient is seen 12/7/2023.  He underwent Rituxan therapy 11/22/2023 11/30/2023 without complication and is seen 12/7 H&H up to 12.0 and 34.6, white count of 4000, platelet 122,000 and normal automated differential except for mild lymphopenia.  He states that he will be taking a trip to [place] at the end of the month.             Past Medical History:    Diagnosis Date    BCC (basal cell carcinoma of skin) 06/22/2022    NOSE    Benign prostate hyperplasia     H/O Elevated PSA     H/O Hairy cell leukemia (CMS/HCC) 2012    H/O Internal thrombosed hemorrhoids     H/O minimal right carotid plaque     H/O peripheral neuropathy     Hyperlipidemia     Hypertension     Primary open-angle glaucoma, bilateral, mild stage        ONCOLOGIC HISTORY:  (History from previous dates can be found in the separate document.)    Current Outpatient Medications on File Prior to Visit   Medication Sig Dispense Refill    acyclovir (Zovirax) 400 MG tablet Take 1 tablet by mouth 2 (Two) Times a Day. 60 tablet 7    amLODIPine (NORVASC) 2.5 MG tablet Take 1 tablet by mouth Daily. 90 tablet 3    bimatoprost (LUMIGAN) 0.03 % ophthalmic drops Administer 1 drop to both eyes Every Night.      bisoprolol (ZEBeta) 5 MG tablet Take 1 tablet by mouth Daily. 90 tablet 3    Calcium-Magnesium-Vitamin D (CALCIUM 1200+D3 PO) Take  by mouth.      chlorhexidine (PERIDEX) 0.12 % solution Rinse with 15 mL by mouth after breakfast and at bedtime for 30 seconds, then spit. No food or drink for 30 minutes after rinse. 473 mL 3    finasteride (PROSCAR) 5 MG tablet Take 1 tablet by mouth Daily. 90 tablet 3    Magnesium 500 MG tablet Take 1 tablet by mouth 2 (two) times a day.      metroNIDAZOLE (METROGEL) 1 % gel Apply 1 application  topically to the appropriate area as directed Daily. 60 g 11    niacin 500 MG tablet Take 1 tablet by mouth Every Night.      ondansetron (ZOFRAN) 8 MG tablet Take 1 tablet by mouth 3 (Three) Times a Day As Needed for Nausea or Vomiting. 30 tablet 5    propafenone (RYTHMOL) 150 MG tablet Take 1 tablet by mouth 3 (Three) Times a Day. 270 tablet 3    rosuvastatin (CRESTOR) 10 MG tablet Take 1 tablet by mouth every night at bedtime. 90 tablet 2    sulfamethoxazole-trimethoprim (BACTRIM DS,SEPTRA DS) 800-160 MG per tablet Take 1 tablet by mouth 3 (Three) Times a Week. 30 tablet 0     tamsulosin (FLOMAX) 0.4 MG capsule 24 hr capsule Take 2 capsules by mouth Daily. 180 capsule 3    vitamin C (ASCORBIC ACID) 250 MG tablet Take 4 tablets by mouth Daily. With madisyn hips      vitamin D3 125 MCG (5000 UT) capsule capsule Take 1 capsule by mouth Daily.       Current Facility-Administered Medications on File Prior to Visit   Medication Dose Route Frequency Provider Last Rate Last Admin    [COMPLETED] acetaminophen (TYLENOL) tablet 650 mg  650 mg Oral Once Paco Jeffers MD   650 mg at 12/07/23 1235    [COMPLETED] diphenhydrAMINE (BENADRYL) IVPB 25 mg  25 mg Intravenous Once Paco Jeffers MD   Stopped at 12/07/23 1253    [COMPLETED] famotidine (PEPCID) injection 20 mg  20 mg Intravenous Once Paco Jeffers MD   20 mg at 12/07/23 1235    riTUXimab (RITUXAN) 700 mg in sodium chloride 0.9 % 320 mL IVPB  700 mg Intravenous Once Michelle Helms APRN        [COMPLETED] sodium chloride 0.9 % infusion 250 mL  250 mL Intravenous Once Paco Jeffers MD 20 mL/hr at 12/07/23 1235 250 mL at 12/07/23 1235       ALLERGIES:   No Known Allergies    Social History     Socioeconomic History    Marital status:      Spouse name: Hanna   Tobacco Use    Smoking status: Former     Packs/day: .5     Types: Cigarettes    Smokeless tobacco: Never    Tobacco comments:     Quit smoking many years ago.   Vaping Use    Vaping Use: Never used   Substance and Sexual Activity    Alcohol use: Yes     Comment: 1 glass of wine or beer per day    Drug use: No    Sexual activity: Defer         Cancer-related family history includes Cancer in his father; Cancer (age of onset: 90) in his mother.      Review of Systems   Constitutional:  Negative for fatigue.   HENT: Negative.     Eyes: Negative.    Respiratory:  Negative for chest tightness, shortness of breath and wheezing.    Gastrointestinal:  Negative for constipation, diarrhea, nausea and vomiting.   Endocrine: Negative.    Musculoskeletal: Negative.   "  Skin: Negative.    Neurological: Negative.  Negative for weakness.        Vitals:    12/07/23 1231   BP: 175/85   Pulse: 66   Resp: 16   Temp: 97.5 °F (36.4 °C)   SpO2: 99%   Weight: 74.4 kg (164 lb)   Height: 177 cm (69.69\")                 12/7/2023    12:07 PM   Current Status   ECOG score 0       Physical Exam      GENERAL: Well-developed, well-nourished male in no acute distress.   SKIN: Warm, dry, without new rash or lesion  HEAD: Normocephalic.   EYES: Pupils equal, round and reactive to light, EOMs intact. Conjunctivae normal.   EARS: Hearing intact.   NOSE: Septum midline. No excoriations or nasal discharge.   MOUTH: Tongue is well-papillated; no stomatitis or ulcers. Lips normal.  dry mucous membranes, clear sinus drainage noted  THROAT: Oropharynx without lesions or exudates.   NECK: Supple with good range of motion; no thyromegaly or masses, no JVD or bruits.   LYMPHATICS: No cervical, supraclavicular, axillary or inguinal adenopathy.   CHEST: Lungs clear to percussion and auscultation.   CARDIAC: Regular rate and rhythm without murmurs, rubs or gallops.   ABDOMEN: Soft, nontender with no evidence hepatosplenomegaly or masses  EXTREMITIES: No clubbing, cyanosis or edema.   NEUROLOGICAL: No focal neurological deficits.       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 He was treated 5/11-5/17/ 12 with cladribine being given as continuous infusion at 0.1 mg/kg or 7.7 mg/24-hours x7 days. He " did well during hospitalization with little toxicity and minimal myelosuppression. He has gone onto obtain a complete remission and this continues to be th e case at a 6 month visit now 07/31/2014. The patient has been now assessed on a regular basis every 6 months with no evidence of hematologic deterioration. As he is seen today, 02/04/2016, and now again September 02, 2016 he remains clinically stable as well.      He is now assessed February 2 and overall doing well without any additional issues except for the prostate described as above.  He is now recognized there is also possibly hypertensive and I'll contact him about this is afternoon though during our conversation he had few if any symptoms.?.  Pending our review will ask him to have a repeat CBC at 3 months to see that physician in 6 months.    The patient indicates when called about his blood pressure that he often experiences white coat hypertension.  He is going to recheck his blood pressure night and notify me if it remains elevated.     He is next seen July 28, 2017, hematologically stable, remaining quite active.      As he is again reviewed July 2018 he has had recent viral exposure and is slowly working through a cold.  This appears to have suppressed his marrow very modestly though review of the smear shows no evidence of hairy cells though there are atypical lymphocytes thought to be virally activated.  At this point we'll have a follow-up CBC in 4 weeks and have him see the physician again in 6 months .    The patient is next seen January 10, 2019 doing well with no hematologic deterioration.  Plan to see back in 6 months for follow-up.  The patient is next seen July 25, 2019.  He does continue to do extremely well and we discussed his weight loss which appears to be dietarily produced.  His exam is not indicative of progressive disease or new disorder developing.  We have agreed to have him reassessed in 6-month follow-ups.  As he is seen back  January 16, 2020 there are no findings of concern though he has lost weight purposely.  It is felt that we can reduce his visits for MD assessment but continue every 6 month CBCs.  This remains the case as he is assessed January 21, 2021.     The patient's follow-up laboratory studies demonstrated by late September 2021, a degree of leukopenia, unchanged thrombocytopenia and this was followed with repeat analysis within the last several weeks as he seen February 10, 2022. Flow cytometry failed to demonstrate any new process.  As you seen February 10, 2020 his performance status remains excellent, stable weight, appetite, no additional fever, chills, rash, night sweats.  As he is reexamined including peripheral blood smears 2/10/2022 there is no suggestion of additional lymphadenopathy hepatosplenomegaly.  We have discussed a fourth COVID 19 vaccination considering his history and will clarify his status by COVID 19 antibodies in office today.    Patient's subsequent exams did demonstrate responsiveness to COVID-19 vaccinations.  He is now reviewed at 6 and 12 months and with some degree of mild thrombocytopenia noted 2/23/2023 after recent viral exposures.  Peripheral smear does not demonstrate any suggestion of relapse though there are activated lymphocytes noted.    He required admission 5/23-27/2023 had return from [place] where he developed nausea and vomiting and difficulty with keeping p.o.  He had further nausea, vomiting and dizziness and was seen in the emergency department and was found to have a sodium 115.  He was seen by nephrology with concern of hyponatremia related to hydrochlorothiazide along with SIDH, treated with fluid restriction and salt tablets and p.o. Lasix.  His medications were adjusted per renal medicine.  At that point he continued to demonstrate chronic pancytopenia that did not accelerate in any particular way.     Subsequent CBC 6/1/2023 include H&H 11.3 and 32.2 with white count of  2840, platelet count 93,000, ANC of 1610.    The patient is next evaluated 8/3/2020 3 repeat CBC includes a white count of 2250, H&H 11.8 and 33.6, platelet count of 88,000-differential of 44% polys, 52% lymphs, 3% monocytes.    Patient contacted about flow cytometry findings of small population potentially suggestive of recurrence versus secondary process.  Plan to reassess in follow-up visits 9/7/2023.  Please note that if treatment is necessary then repeat treatment with cladribine could be given in a variety of of schedules including 0.15 mg/kg/day over 2 hours x 5 days followed by rituximab or 5.6 mg/m² over 2 hours also for 5 days and also followed by rituximab therapy.    The patient was seen back 9/14/2023.Flow cytometry assessed on 2 visits includes a CD10 positive monoclonal lambda B-cell population representing 0.3% of total events 8/3/2023 and repeat 9/7/2023 demonstrates a similar B-cell population also 0.3% total events-phenotype positive CD10 bright, CD19 bright, CD20 bright, CD25, CD45,   slambda.  Discussed symptoms usually due to splenomegaly, fatigue, weakness, weight loss, bruising, recurrent infections, periodic vasculitis.  He is clear that he is not having any of the symptoms and feels that his overall performance status is as good as its ever been.  We have discussed that additional issues include possible organomegaly and cytopenias developing with his follow-up CBC in office today including H&H of 12.2 and 33.4, white count of 3250 and platelet count of 92,000.  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    We had the patient return for periodic blood counts, had him undergo testing for hepatitis and reviewed the various regimens including 5-day cladribine followed by Rituxan therapy.  10/5/2023 scans revealed no substantial  abnormalities including lack of splenomegaly or lymphadenopathy.    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  Additionally the patient's hepatitis B surface antibody was equivocal and he went on to have additional vaccinations   -influenza, COVID-19, RSV and hepatitis B.  Further vascular surgeon was contacted and the patient underwent a right internal jugular Mediport placement 10/18/2023 and teaching concerning cladribine day 1 through 5 and subsequent Rituxan therapy.    After further discussion of available treatment options plans made to offer 5-day cladribine and, review of literature, indicates that Rituxan is associated with further immunosuppression.  Available guidance suggests proceeding with initial cladribine and then 4 weekly doses of Rituxan consolidation starting 4 to 6 weeks after initial treatment.  At this point the patient has completed all vaccinations including hepatitis B.    The patient proceeded with cladribine taking therapy daily-days 1-5 through 10/27/2023 and given Neulasta 10/30/2023.  He is next seen 11/2/2023.  Fortunately he has had no untoward effects of treatment as far and continues to work unabated.  His follow-up CBC does not demonstrate significant development of neutropenia anemia or thrombocytopenia.  We discussed assessing him regularly over the next several weeks and anticipate starting Rituxan approximately 3 weeks from now.    The patient's subsequent laboratory studies demonstrated gradual improvement in his white count to normalization, including degree of  neutrophilia by 11/16/2023, improvement in his thrombocytopenia as well.  He has not had any additional side effects including lack of fever, chills, night sweats since he completed therapy.    He is seen back formally 11/20/2023 and we anticipate initiation of his rituximab therapy 11/22/2023.  Again he is done very well and his peripheral blood counts remain quite acceptable without evidence of deterioration.  He has had no fever, chills, night sweats, infectious episodes or change in performance status.  We have agreed that he will start his consolidation rituximab therapy 11/22/2023 and that we would schedule subsequent Thursday treatments x3 also.    The patient is seen 12/7/2023.  He underwent Rituxan therapy 11/22/2023 11/30/2023 without complication and is seen 12/7 H&H up to 12.0 and 34.6, white count of 4000, platelet 122,000 and normal automated differential except for mild lymphopenia.    Plan:    * Patient to continue current medication list including acyclovir and Septra prophylaxis    *Proceed with Rituxan, 3/4    *Now scheduled 12/14/2023 with NP, final Rituxan therapy        *MD follow-up first week January 2024

## 2023-12-07 ENCOUNTER — INFUSION (OUTPATIENT)
Dept: ONCOLOGY | Facility: HOSPITAL | Age: 78
End: 2023-12-07
Payer: MEDICARE

## 2023-12-07 ENCOUNTER — OFFICE VISIT (OUTPATIENT)
Dept: ONCOLOGY | Facility: CLINIC | Age: 78
End: 2023-12-07
Payer: MEDICARE

## 2023-12-07 VITALS
HEART RATE: 66 BPM | SYSTOLIC BLOOD PRESSURE: 175 MMHG | OXYGEN SATURATION: 99 % | RESPIRATION RATE: 16 BRPM | WEIGHT: 164 LBS | TEMPERATURE: 97.5 F | DIASTOLIC BLOOD PRESSURE: 85 MMHG | HEIGHT: 70 IN | BODY MASS INDEX: 23.48 KG/M2

## 2023-12-07 VITALS
OXYGEN SATURATION: 99 % | BODY MASS INDEX: 23.8 KG/M2 | WEIGHT: 164.4 LBS | RESPIRATION RATE: 16 BRPM | DIASTOLIC BLOOD PRESSURE: 85 MMHG | TEMPERATURE: 97.5 F | SYSTOLIC BLOOD PRESSURE: 175 MMHG | HEART RATE: 66 BPM

## 2023-12-07 DIAGNOSIS — C91.40 HAIRY CELL LEUKEMIA NOT HAVING ACHIEVED REMISSION: Primary | ICD-10-CM

## 2023-12-07 DIAGNOSIS — C91.40 HAIRY CELL LEUKEMIA NOT HAVING ACHIEVED REMISSION: ICD-10-CM

## 2023-12-07 DIAGNOSIS — C91.41 HAIRY CELL LEUKEMIA, IN REMISSION: Primary | ICD-10-CM

## 2023-12-07 LAB
ALBUMIN SERPL-MCNC: 4 G/DL (ref 3.5–5.2)
ALBUMIN/GLOB SERPL: 1.8 G/DL
ALP SERPL-CCNC: 44 U/L (ref 39–117)
ALT SERPL W P-5'-P-CCNC: 28 U/L (ref 1–41)
ANION GAP SERPL CALCULATED.3IONS-SCNC: 10.1 MMOL/L (ref 5–15)
AST SERPL-CCNC: 29 U/L (ref 1–40)
BASOPHILS # BLD AUTO: 0.02 10*3/MM3 (ref 0–0.2)
BASOPHILS NFR BLD AUTO: 0.5 % (ref 0–1.5)
BILIRUB SERPL-MCNC: 0.6 MG/DL (ref 0–1.2)
BUN SERPL-MCNC: 21 MG/DL (ref 8–23)
BUN/CREAT SERPL: 27.3 (ref 7–25)
CALCIUM SPEC-SCNC: 8.5 MG/DL (ref 8.6–10.5)
CHLORIDE SERPL-SCNC: 104 MMOL/L (ref 98–107)
CO2 SERPL-SCNC: 25.9 MMOL/L (ref 22–29)
CREAT SERPL-MCNC: 0.77 MG/DL (ref 0.76–1.27)
DEPRECATED RDW RBC AUTO: 49.1 FL (ref 37–54)
EGFRCR SERPLBLD CKD-EPI 2021: 91.6 ML/MIN/1.73
EOSINOPHIL # BLD AUTO: 0.05 10*3/MM3 (ref 0–0.4)
EOSINOPHIL NFR BLD AUTO: 1.2 % (ref 0.3–6.2)
ERYTHROCYTE [DISTWIDTH] IN BLOOD BY AUTOMATED COUNT: 13.2 % (ref 12.3–15.4)
GLOBULIN UR ELPH-MCNC: 2.2 GM/DL
GLUCOSE SERPL-MCNC: 109 MG/DL (ref 65–99)
HCT VFR BLD AUTO: 34.6 % (ref 37.5–51)
HGB BLD-MCNC: 12 G/DL (ref 13–17.7)
IMM GRANULOCYTES # BLD AUTO: 0.02 10*3/MM3 (ref 0–0.05)
IMM GRANULOCYTES NFR BLD AUTO: 0.5 % (ref 0–0.5)
LYMPHOCYTES # BLD AUTO: 0.58 10*3/MM3 (ref 0.7–3.1)
LYMPHOCYTES NFR BLD AUTO: 14.5 % (ref 19.6–45.3)
MCH RBC QN AUTO: 34.9 PG (ref 26.6–33)
MCHC RBC AUTO-ENTMCNC: 34.7 G/DL (ref 31.5–35.7)
MCV RBC AUTO: 100.6 FL (ref 79–97)
MONOCYTES # BLD AUTO: 0.51 10*3/MM3 (ref 0.1–0.9)
MONOCYTES NFR BLD AUTO: 12.7 % (ref 5–12)
NEUTROPHILS NFR BLD AUTO: 2.83 10*3/MM3 (ref 1.7–7)
NEUTROPHILS NFR BLD AUTO: 70.6 % (ref 42.7–76)
NRBC BLD AUTO-RTO: 0 /100 WBC (ref 0–0.2)
PLATELET # BLD AUTO: 122 10*3/MM3 (ref 140–450)
PMV BLD AUTO: 10 FL (ref 6–12)
POTASSIUM SERPL-SCNC: 4.3 MMOL/L (ref 3.5–5.2)
PROT SERPL-MCNC: 6.2 G/DL (ref 6–8.5)
RBC # BLD AUTO: 3.44 10*6/MM3 (ref 4.14–5.8)
SODIUM SERPL-SCNC: 140 MMOL/L (ref 136–145)
WBC NRBC COR # BLD AUTO: 4.01 10*3/MM3 (ref 3.4–10.8)

## 2023-12-07 PROCEDURE — 25010000002 RITUXIMAB 10 MG/ML SOLUTION 50 ML VIAL: Performed by: NURSE PRACTITIONER

## 2023-12-07 PROCEDURE — 25010000002 RITUXIMAB 10 MG/ML SOLUTION 10 ML VIAL: Performed by: NURSE PRACTITIONER

## 2023-12-07 PROCEDURE — 25810000003 SODIUM CHLORIDE 0.9 % SOLUTION: Performed by: INTERNAL MEDICINE

## 2023-12-07 PROCEDURE — 96415 CHEMO IV INFUSION ADDL HR: CPT

## 2023-12-07 PROCEDURE — 3077F SYST BP >= 140 MM HG: CPT | Performed by: INTERNAL MEDICINE

## 2023-12-07 PROCEDURE — 85025 COMPLETE CBC W/AUTO DIFF WBC: CPT

## 2023-12-07 PROCEDURE — 96375 TX/PRO/DX INJ NEW DRUG ADDON: CPT

## 2023-12-07 PROCEDURE — 80053 COMPREHEN METABOLIC PANEL: CPT

## 2023-12-07 PROCEDURE — 96413 CHEMO IV INFUSION 1 HR: CPT

## 2023-12-07 PROCEDURE — 25810000003 SODIUM CHLORIDE 0.9 % SOLUTION 250 ML FLEX CONT: Performed by: NURSE PRACTITIONER

## 2023-12-07 PROCEDURE — 99213 OFFICE O/P EST LOW 20 MIN: CPT | Performed by: INTERNAL MEDICINE

## 2023-12-07 PROCEDURE — A9270 NON-COVERED ITEM OR SERVICE: HCPCS | Performed by: INTERNAL MEDICINE

## 2023-12-07 PROCEDURE — 1126F AMNT PAIN NOTED NONE PRSNT: CPT | Performed by: INTERNAL MEDICINE

## 2023-12-07 PROCEDURE — 63710000001 ACETAMINOPHEN 325 MG TABLET: Performed by: INTERNAL MEDICINE

## 2023-12-07 PROCEDURE — 3079F DIAST BP 80-89 MM HG: CPT | Performed by: INTERNAL MEDICINE

## 2023-12-07 PROCEDURE — 25010000002 DIPHENHYDRAMINE PER 50 MG: Performed by: INTERNAL MEDICINE

## 2023-12-07 RX ORDER — FAMOTIDINE 10 MG/ML
20 INJECTION, SOLUTION INTRAVENOUS ONCE
Status: COMPLETED | OUTPATIENT
Start: 2023-12-07 | End: 2023-12-07

## 2023-12-07 RX ORDER — DIPHENHYDRAMINE HYDROCHLORIDE 50 MG/ML
50 INJECTION INTRAMUSCULAR; INTRAVENOUS AS NEEDED
Status: CANCELLED | OUTPATIENT
Start: 2023-12-07

## 2023-12-07 RX ORDER — ACETAMINOPHEN 325 MG/1
650 TABLET ORAL ONCE
Status: COMPLETED | OUTPATIENT
Start: 2023-12-07 | End: 2023-12-07

## 2023-12-07 RX ORDER — FAMOTIDINE 10 MG/ML
20 INJECTION, SOLUTION INTRAVENOUS AS NEEDED
Status: CANCELLED | OUTPATIENT
Start: 2023-12-07

## 2023-12-07 RX ORDER — SODIUM CHLORIDE 9 MG/ML
250 INJECTION, SOLUTION INTRAVENOUS ONCE
Status: COMPLETED | OUTPATIENT
Start: 2023-12-07 | End: 2023-12-07

## 2023-12-07 RX ADMIN — RITUXIMAB 700 MG: 10 INJECTION, SOLUTION INTRAVENOUS at 13:26

## 2023-12-07 RX ADMIN — ACETAMINOPHEN 650 MG: 325 TABLET ORAL at 12:35

## 2023-12-07 RX ADMIN — SODIUM CHLORIDE 250 ML: 9 INJECTION, SOLUTION INTRAVENOUS at 12:35

## 2023-12-07 RX ADMIN — DIPHENHYDRAMINE HYDROCHLORIDE 25 MG: 50 INJECTION, SOLUTION INTRAMUSCULAR; INTRAVENOUS at 12:38

## 2023-12-07 RX ADMIN — FAMOTIDINE 20 MG: 10 INJECTION INTRAVENOUS at 12:35

## 2023-12-14 ENCOUNTER — INFUSION (OUTPATIENT)
Dept: ONCOLOGY | Facility: HOSPITAL | Age: 78
End: 2023-12-14
Payer: MEDICARE

## 2023-12-14 ENCOUNTER — TELEPHONE (OUTPATIENT)
Dept: ONCOLOGY | Facility: CLINIC | Age: 78
End: 2023-12-14
Payer: MEDICARE

## 2023-12-14 ENCOUNTER — OFFICE VISIT (OUTPATIENT)
Dept: ONCOLOGY | Facility: CLINIC | Age: 78
End: 2023-12-14
Payer: MEDICARE

## 2023-12-14 VITALS
TEMPERATURE: 98 F | DIASTOLIC BLOOD PRESSURE: 95 MMHG | HEART RATE: 58 BPM | OXYGEN SATURATION: 99 % | RESPIRATION RATE: 16 BRPM | SYSTOLIC BLOOD PRESSURE: 188 MMHG | WEIGHT: 163.9 LBS | HEIGHT: 70 IN | BODY MASS INDEX: 23.46 KG/M2

## 2023-12-14 DIAGNOSIS — C91.40 HAIRY CELL LEUKEMIA NOT HAVING ACHIEVED REMISSION: ICD-10-CM

## 2023-12-14 DIAGNOSIS — Z79.899 HIGH RISK MEDICATION USE: ICD-10-CM

## 2023-12-14 DIAGNOSIS — C91.40 HAIRY CELL LEUKEMIA NOT HAVING ACHIEVED REMISSION: Primary | ICD-10-CM

## 2023-12-14 LAB
ALBUMIN SERPL-MCNC: 4 G/DL (ref 3.5–5.2)
ALBUMIN/GLOB SERPL: 1.7 G/DL
ALP SERPL-CCNC: 44 U/L (ref 39–117)
ALT SERPL W P-5'-P-CCNC: 26 U/L (ref 1–41)
ANION GAP SERPL CALCULATED.3IONS-SCNC: 9.1 MMOL/L (ref 5–15)
AST SERPL-CCNC: 27 U/L (ref 1–40)
BASOPHILS # BLD AUTO: 0.01 10*3/MM3 (ref 0–0.2)
BASOPHILS NFR BLD AUTO: 0.2 % (ref 0–1.5)
BILIRUB SERPL-MCNC: 0.6 MG/DL (ref 0–1.2)
BUN SERPL-MCNC: 24 MG/DL (ref 8–23)
BUN/CREAT SERPL: 27.9 (ref 7–25)
CALCIUM SPEC-SCNC: 8.7 MG/DL (ref 8.6–10.5)
CHLORIDE SERPL-SCNC: 104 MMOL/L (ref 98–107)
CO2 SERPL-SCNC: 25.9 MMOL/L (ref 22–29)
CREAT SERPL-MCNC: 0.86 MG/DL (ref 0.76–1.27)
DEPRECATED RDW RBC AUTO: 47.8 FL (ref 37–54)
EGFRCR SERPLBLD CKD-EPI 2021: 88.6 ML/MIN/1.73
EOSINOPHIL # BLD AUTO: 0.1 10*3/MM3 (ref 0–0.4)
EOSINOPHIL NFR BLD AUTO: 2.1 % (ref 0.3–6.2)
ERYTHROCYTE [DISTWIDTH] IN BLOOD BY AUTOMATED COUNT: 12.9 % (ref 12.3–15.4)
GLOBULIN UR ELPH-MCNC: 2.3 GM/DL
GLUCOSE SERPL-MCNC: 119 MG/DL (ref 65–99)
HCT VFR BLD AUTO: 36.8 % (ref 37.5–51)
HGB BLD-MCNC: 12.8 G/DL (ref 13–17.7)
IMM GRANULOCYTES # BLD AUTO: 0.01 10*3/MM3 (ref 0–0.05)
IMM GRANULOCYTES NFR BLD AUTO: 0.2 % (ref 0–0.5)
LYMPHOCYTES # BLD AUTO: 0.67 10*3/MM3 (ref 0.7–3.1)
LYMPHOCYTES NFR BLD AUTO: 14.4 % (ref 19.6–45.3)
MCH RBC QN AUTO: 34.9 PG (ref 26.6–33)
MCHC RBC AUTO-ENTMCNC: 34.8 G/DL (ref 31.5–35.7)
MCV RBC AUTO: 100.3 FL (ref 79–97)
MONOCYTES # BLD AUTO: 0.63 10*3/MM3 (ref 0.1–0.9)
MONOCYTES NFR BLD AUTO: 13.5 % (ref 5–12)
NEUTROPHILS NFR BLD AUTO: 3.24 10*3/MM3 (ref 1.7–7)
NEUTROPHILS NFR BLD AUTO: 69.6 % (ref 42.7–76)
NRBC BLD AUTO-RTO: 0 /100 WBC (ref 0–0.2)
PLATELET # BLD AUTO: 125 10*3/MM3 (ref 140–450)
PMV BLD AUTO: 10.1 FL (ref 6–12)
POTASSIUM SERPL-SCNC: 4.7 MMOL/L (ref 3.5–5.2)
PROT SERPL-MCNC: 6.3 G/DL (ref 6–8.5)
RBC # BLD AUTO: 3.67 10*6/MM3 (ref 4.14–5.8)
SODIUM SERPL-SCNC: 139 MMOL/L (ref 136–145)
WBC NRBC COR # BLD AUTO: 4.66 10*3/MM3 (ref 3.4–10.8)

## 2023-12-14 PROCEDURE — 96415 CHEMO IV INFUSION ADDL HR: CPT

## 2023-12-14 PROCEDURE — 80053 COMPREHEN METABOLIC PANEL: CPT

## 2023-12-14 PROCEDURE — 25810000003 SODIUM CHLORIDE 0.9 % SOLUTION 250 ML FLEX CONT: Performed by: NURSE PRACTITIONER

## 2023-12-14 PROCEDURE — A9270 NON-COVERED ITEM OR SERVICE: HCPCS | Performed by: NURSE PRACTITIONER

## 2023-12-14 PROCEDURE — 25010000002 RITUXIMAB 10 MG/ML SOLUTION 50 ML VIAL: Performed by: NURSE PRACTITIONER

## 2023-12-14 PROCEDURE — 63710000001 ACETAMINOPHEN 325 MG TABLET: Performed by: NURSE PRACTITIONER

## 2023-12-14 PROCEDURE — 85025 COMPLETE CBC W/AUTO DIFF WBC: CPT

## 2023-12-14 PROCEDURE — 96375 TX/PRO/DX INJ NEW DRUG ADDON: CPT

## 2023-12-14 PROCEDURE — 25810000003 SODIUM CHLORIDE 0.9 % SOLUTION: Performed by: NURSE PRACTITIONER

## 2023-12-14 PROCEDURE — 25010000002 DIPHENHYDRAMINE PER 50 MG: Performed by: NURSE PRACTITIONER

## 2023-12-14 PROCEDURE — 25010000002 RITUXIMAB 10 MG/ML SOLUTION 10 ML VIAL: Performed by: NURSE PRACTITIONER

## 2023-12-14 PROCEDURE — 96413 CHEMO IV INFUSION 1 HR: CPT

## 2023-12-14 RX ORDER — FAMOTIDINE 10 MG/ML
20 INJECTION, SOLUTION INTRAVENOUS ONCE
Status: CANCELLED | OUTPATIENT
Start: 2023-12-14

## 2023-12-14 RX ORDER — DIPHENHYDRAMINE HYDROCHLORIDE 50 MG/ML
50 INJECTION INTRAMUSCULAR; INTRAVENOUS AS NEEDED
Status: CANCELLED | OUTPATIENT
Start: 2023-12-14

## 2023-12-14 RX ORDER — ACETAMINOPHEN 325 MG/1
650 TABLET ORAL ONCE
Status: CANCELLED | OUTPATIENT
Start: 2023-12-14

## 2023-12-14 RX ORDER — SODIUM CHLORIDE 9 MG/ML
250 INJECTION, SOLUTION INTRAVENOUS ONCE
Status: CANCELLED | OUTPATIENT
Start: 2023-12-14

## 2023-12-14 RX ORDER — ACETAMINOPHEN 325 MG/1
650 TABLET ORAL ONCE
Status: COMPLETED | OUTPATIENT
Start: 2023-12-14 | End: 2023-12-14

## 2023-12-14 RX ORDER — FAMOTIDINE 10 MG/ML
20 INJECTION, SOLUTION INTRAVENOUS AS NEEDED
Status: CANCELLED | OUTPATIENT
Start: 2023-12-14

## 2023-12-14 RX ORDER — SODIUM CHLORIDE 9 MG/ML
250 INJECTION, SOLUTION INTRAVENOUS ONCE
Status: COMPLETED | OUTPATIENT
Start: 2023-12-14 | End: 2023-12-14

## 2023-12-14 RX ORDER — FAMOTIDINE 10 MG/ML
20 INJECTION, SOLUTION INTRAVENOUS ONCE
Status: COMPLETED | OUTPATIENT
Start: 2023-12-14 | End: 2023-12-14

## 2023-12-14 RX ADMIN — DIPHENHYDRAMINE HYDROCHLORIDE 25 MG: 50 INJECTION, SOLUTION INTRAMUSCULAR; INTRAVENOUS at 12:16

## 2023-12-14 RX ADMIN — FAMOTIDINE 20 MG: 10 INJECTION INTRAVENOUS at 12:15

## 2023-12-14 RX ADMIN — RITUXIMAB 700 MG: 10 INJECTION, SOLUTION INTRAVENOUS at 13:06

## 2023-12-14 RX ADMIN — SODIUM CHLORIDE 250 ML: 9 INJECTION, SOLUTION INTRAVENOUS at 12:15

## 2023-12-14 RX ADMIN — ACETAMINOPHEN 650 MG: 325 TABLET ORAL at 12:13

## 2023-12-14 NOTE — NURSING NOTE
Per Dr. Jeffers, okay for next treatment and following treatments to be Rapid Rituxan infusion since pt has tolerated all other txs. Pt agreeable to plan. Staff message sent to Carolyne Freire RN to adjust future tx plans.

## 2023-12-14 NOTE — TELEPHONE ENCOUNTER
Plan updated to rapid rituxan infusion.     ----- Message from Ivanna Ann RN sent at 12/14/2023  1:25 PM EST -----  Regarding: Rituxan Plan  I spoke with Dr. Jeffers, and he says we can now switch Dr. Remy to a rapid Rituxan infusion for his next treatments since he has tolerated all previous ones well. I will go ahead with today's original dosing, but if you can change all the following ones, that would be great. Thank you!

## 2023-12-18 NOTE — PROGRESS NOTES
Subjective   REASONS FOR FOLLOWUP:   Hairy cell leukemia.   Admission 05/11/2012 through 05/17/12 for 7 day continuous infusion cladribine (2-CdA) without complication.   Patient seen 5 months post treatment in complete remission.   Patient seen 8 months post treatment with continued complete remission, every 3 month reassessment per CBC planned, 6 month review by MD scheduled.   Patient seen at 15 months without evidence of recurrent disease, every 6 month followup planned.   The patient was seen 02/05/2014 with no evidence of recurrent disease, 6-month followup planned.   Patient seen 07/31/2014, stable with no evidence of recurrent disease, every 6 month followup.   Patient seen 01/15/2015, stable without recurrent disease, 6-month CBC planned, yearly followup scheduled.   Patient seen 02/04/2016, stable, ?early peripheral neuropathy developing distally per feet, no evidence for recurrent disease per hairy cell leukemia.  Patient reviewed September 02, 2016, previous July CBC with mild thrombocytopenia developing, recheck September 2 normalized, every 6 month follow-ups anticipated  Patient reviewed February 02, 2017, status post negative prostate biopsy, mild thrombocytopenia again recognized  Patient reviewed July 20, 2017, hematologically stable, every 6 month follow-up CBCs planned, yearly M.D. Assessment  Patient seen July 26, 2018 after recent airline travel any cold symptoms  Patient reviewed January 10, 2019, no additional symptoms, hematologically stable  Patient reviewed July 25, 2019, stable, six-month follow-up as planned  Patient viewed January 16, 2020, stable, every 6 months CBC, MD assessment yearly planned  Reassessment January 21, 2021, no evidence of recurrence disease  Patient assessed 2/10/2022, evidence of continued remission noted both on clinical examination, peripheral blood smear review and flow cytometric evaluation.  Patient review 2/23/2023 mild thrombocytopenia, no new  abnormalities per peripheral smear, close for follow-up planned  Patient assessed 9/14/2023, concern for early relapse.  Subsequent marrow 10/12/2023-variably hypercellular with diffuse involvement-a 90%-hairy cell leukemia relapse.  Patient seen 10/22/2023 for initiation of cladribine-outpatient regimen to be followed by Rituxan-28 days later 4 to 6 weeks         History of Present Illness     The patient is a 78 y.o. male with the above-mentioned history, who returns the office today in anticipation of his fourth infusion of Rituxan.  He reports today he is feeling very well.  He has no new issues.  He continues to work a full schedule remaining busy and physically active.  He is eating and drinking adequately.  He has no fevers or chills, signs or symptoms of infection.  He has no arthralgias.  He has no new concerns today.    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 He is usually followed by Dr. Vuong for a history of hypertension, hyperlipidemia, minimal right carotid plaque as well as elevated PSA.       He had exams done on 1-09-12 for routine followup. This included normal serum chemistry including LFTs, cholesterol 131, triglyceride 34, HDL 51, and LDL 73. CBC revealed H&H 13.3, 37.9%, WBC 3300, platelet count 110,000, 26% polys and 74% lymphs with A NC of 0.9.       Dr. Remy provides information when initially seen with studies from as far back as 1- at which time hemoglobin and hematocrit was 14.4 and 42.1%, WBC 6950, platelets 227,000 with 47% polys, 42% lymph; this was automated. The additional test includ es approximately every year to every other year thereafter though it was noted in January 2010 WBC dropped from an average of approximately 5,000-6,000 to 3600. Hemoglobin and hematocrit 13.6 and 39.7%. Platelets 150,000-170,000 with ANC beginning in Ja nuary 2010 at 1900.       We were  contacted per the above findings and asked Dr. Remy to undergo further assessment including flow cytometric exam per peripheral blood. This revealed no evidence of abnormal myeloid maturation increased blast population. No evidence of lymphopro l iferative disorder. The patient was asked to be seen formally in the office now and comes in today for further assessment. Dr. Remy indicates that he has taken a number of medications in an attempt to improve his health. Several homeopathic medication s include vitamin C, folate, flaxseed oil, fish oil, vitamin E, selenium, pomegranate tabs, Co-enzyme Q, vitamin D, niacin, and red yeast rice daily. He has discontinued these and stays on those described below.       He feels well with no additional problem s and states that he has no little or no symptoms that he can detail, whether this is just ill health in anyway, though he does have occasional hemorrhoidal pain. He also notes rare palpitations and occasional epistaxis when the ambient air has dried sub stantially. No fevers, chills, weight loss, night sweats, or other constitutional symptoms.       As a result of the above, the patient was asked to undergo a series of studies. This went onto include rheumatoid factor of 6, MARYURI screen negative, negative s misael protein electrophoresis, normal quantitative immunoglobulins, CMV IgG of 2.9, IgM less than .9, EBV IgM of less than .9 and BCA IgG antibody of 4.6. These are consistent with previous exposures. HIV was negative, CRSP less than .1, sedimentation ra t e of 4, iron 125, TIBC 297, 14% saturation, and ferritin of 77. Peripheral blood flow cytometry was negative for evidence of abnormal myeloid maturation, increased blast population or lymphoproliferative disorder. As the patient is seen back today, he i s continuing to feel well though is obviously greatly concerned about his followup counts. Reviews in the office had been planned to follow him briefly recheck  performance 3/29/12 with an H&H of 12.9, 36.1, WBC 3900, 32 polys, 62 lymphs, platelet count 94 , 000, IPF slightly elevated at 8.8. The patient was therefore asked to return and to be further assessed with bone marrow aspirate and biopsy. He now presents to do so. He has had no additional symptoms since last seen. He has discontinued his Crestor use.       As a result of his review the patient underwent bone marrow aspiration and biopsy. This revealed evidence of lymphocytic infiltrate reviewed here in the office. 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 Eventually cytogenetics was found to be negative with no evidence of cyclin-D1/IgH or BCL-2/IgH rearrangement. Additional tissue was requested. As a result, the patient was notifi e edward of these findings by personal visit, and plans were made to proceed with endoscopy, ? mantle cell involvement. At the time of this dictation, this was not yet performed. He was also asked to undergo CT of the neck, chest, abdomen and pelvis which show ed no evidence of abnormality. PET scan 04/13/12 showed increased activity within the marrow, but no additional abnormalities including the spleen, with no background bowel or gastric activity as well.       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 hairy cell leukemia. This has been discussed in great detail with the alexander thakur at [facility] and additional stains are pending as is the patient's GI assessment now to be pursued through Dr. Ray.       His case was again reviewed over quite a period of time, and ultimately his marrow was signed out as 80% involved with hairy cell leukemia - normal cytogenetics, no evidence of  cyclin D1/IgH or BCL-2/IgH rearrangement. Dr. Remy was advised of the treatment options which include followup with no immediate therapy, and/or the use of cladribine or pentostatin. After numerous di s cussion, he ultimately elected to try to proceed with treatment when he is feeling as well as he is to improve his ability to withstand the therapy itself. We therefore began to discuss the treatment schedule, which also could be somewhat variable, inclu d ing 7-day infusions, 2-hr infusion q.d. x five days or every other week infusion. After discussion he wishes to proceed with 7-day infusion. When we attempted to do this as an outpatient we found out thereafter that this would not be covered as an outpa tient infusion through his insurance. Therefore we have made plans for him to be admitted after he is seen in office 05/10/12.       The patient was thereafter admitted 05/11-05/17/12. PICC line was placed and he initiated treatment at 0.1 mg/kg or 7.7 mg IV in fusion over seven days. The patient fortunately had no serious issues at all during the seven days and continued to work out on a daily basis. He was seen by his internist as well with some of his meds adjusted including his HCTZ and Ziac. He was stabl e at discharge, but was given Neulasta 6 mg subcutaneously 24 hours after he completed treatment. He has been having counts done on a regular basis, and returns back today with only minimal evidence of neutropenia at his becca, and has an excellent periph eral smear today - normal findings. His performance status remains excellent as well.       The patient when seen on 6/7/12 was felt to have improved substantially. We followed him for weekly counts and plans at that point were for him to take a trip out of the country. He did actually take this trip, which had him eventually hiking at 11-12,000 feet without any complication or ill effect. As he returns back today on 7/19 he feels well and again with an  excellent performance status. He has had no fever, c hills or suggestion of infection or complications thus far. He remains again, with an excellent performance status.       The patient thereafter is asked to have his counts done on an every six weeks basis, now seen three months later with a normal CBC as well as examination. He again feels quite well with a completely normal performance status.       The patient was asked to return for followup studies that included normal serum chemistries, unchanged lipid profile and stable hematologic findings. He is now seen on 2/7/13, 4 months from his last appointment. He continues to do well with unchanged performance status.       The patient was asked to have counts done q3 months seeing the physician in six months. He is now seen back 08/22/2013 feeling well having [place] with that. He had no additional fevers, sweats, chills, significant change in weight although he has tried to lose some additional pounds and we see this today. He continues to exercise regularl y and dietary program. Review of his smears again continues to demonstrate a complete remission.       Today, the patient was asked to be seen back in 6 months for followup, and is now seen 02/05/2014, continuing to feel well. Again, review of his smears and physical examination fortunately demonstrates no evidence of recurrent disease.       The patient was asked to be seen back in 6 months for followup and is now seen on 07/31/2014. Again he feels well with an excellent performance status and no change since last reviewed.       The patient thereafter was now seen back 2015, 2016 stable at both visits with no evidence of recurrence of his hairy cell leukemia. He feels well but when now reviewed 02/04/2016, he has had some degree of peripheral neuropathy? This improv es with activity and it is certainly not limiting his action or his function.    Patient now reviewed back again September 02, 2016, continues to have an excellent performance status and hematologically remain stable brother had some concern about thrombocytopenia last visit.    The patient is next seen February 02, 2017.  He has not had any medical issues since last seen and remains physically quite well.  He does describe following with urology and undergoing a repeat biopsy recently when his PSA was above 7.  His findings evidently were negative for malignancy though he did have post procedure hematuria.  This is also now abated.  The patient is next seen July 28, 2017.  He continues to have an excellent performance status and no particular difficulty in day-to-day function and/or pursuing active vacations.      The patient is next seen July 26, 2018.  He had recently returned from a trip to [place].  After experiencing an airplane ride with multiple coughing passengers he himself developed a cold, particularly severe over the last 3-4 days with cough and congestion.  He's had low-grade fever which is now beginning to resolve but is clearly uncomfortable.    The patient's next seen January 10, 2019.  He has continued to travel without issues and is feeling well when reviewed today.  Plans were made for usual follow-up and the patient is next seen July 25, 2019.  His performance status remains excellent though he has lost some weight and indicates he did this per altering his diet.  His stamina and other usual activities are continued unabated.  The patient is next seen January 16, 2020.  He continues an active practice and lifestyle.  He has lost some additional weight on purpose through diet.    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    The patient's follow-up laboratory studies demonstrated by late September degree of leukopenia, unchanged  thrombocytopenia and this was followed with repeat analysis within the last several weeks as he seen February 10, 2022. Flow cytometry failed to demonstrate any new process.  As you seen February 10, 2020 his performance status remains excellent, stable weight, appetite, no additional fever, chills, rash, night sweats.    The patient is next evaluated 2/23/2023 with an unchanged performance status for mild thrombocytopenia.  He has had some exposure to viral illnesses through his grandchildren as of late but is otherwise felt fine with a unchanged performance status and work schedule.    He required admission 5/23-27/2023 had return from [place] where he developed nausea and vomiting and difficulty with keeping p.o.  He had further nausea, vomiting and dizziness and was seen in the emergency department and was found to have a sodium 115.  He was seen by nephrology with concern of hyponatremia related to hydrochlorothiazide along with SIDH, treated with fluid restriction and salt tablets and p.o. Lasix.  His medications were adjusted per renal medicine.  At that point he continued to demonstrate chronic pancytopenia that did not accelerate in any particular way.     Subsequent CBC 6/1/2023 include H&H 11.3 and 32.2 with white count of 2840, platelet count 93,000, ANC of 1610.    The patient is next evaluated 8/3/2020 3 repeat CBC includes a white count of 2250, H&H 11.8 and 33.6, platelet count of 88,000-differential of 44% polys, 52% lymphs, 3% monocytes.    Patient contacted about flow cytometry findings of small population potentially suggestive of recurrence versus secondary process.  Plan to reassess in follow-up visits 9/7/2023.  Please note that if treatment is necessary then repeat treatment with cladribine could be given in a variety of of schedules including 0.15 mg/kg/day over 2 hours x 5 days followed by rituximab or 5.6 mg/m² over 2 hours also for 5 days and also followed by rituximab therapy.      The  patient was seen back 9/14/2023.Flow cytometry assessed on 2 visits includes a CD10 positive monoclonal lambda B-cell population representing 0.3% of total events 8/3/2023 and repeat 9/7/2023 demonstrates a similar B-cell population also 0.3% total events-phenotype positive CD10 bright, CD19 bright, CD20 bright, CD25, CD45,   slambda.  Discussed symptoms usually due to splenomegaly, fatigue, weakness, weight loss, bruising, recurrent infections, periodic vasculitis.  He is clear that he is not having any of the symptoms and feels that his overall performance status is as good as its ever been.  We have discussed that additional issues include possible organomegaly and cytopenias developing with his follow-up CBC in office today including H&H of 12.2 and 33.4, white count of 3250 and platelet count of 92,000.  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    We had the patient return for periodic blood counts, had him undergo testing for hepatitis and reviewed the various regimens including 5-day cladribine followed by Rituxan therapy.  10/5/2023 scans revealed no substantial abnormalities including lack of splenomegaly or lymphadenopathy.    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  Additionally the patient's hepatitis B surface antibody was equivocal and he went on to have additional vaccinations   -influenza, COVID-19, RSV and hepatitis B.  Further  vascular surgeon was contacted and the patient underwent a right internal jugular Mediport placement 10/18/2023 and teaching concerning cladribine day 1 through 5 and subsequent Rituxan therapy.    After further discussion of available treatment options plans made to offer 5-day cladribine and, review of literature, indicates that Rituxan is associated with further immunosuppression.  Available guidance suggests proceeding with initial cladribine and then 4-6 weekly doses of Rituxan consolidation starting 4 to 6 weeks after initial treatment.    The patient proceeded with cladribine taking therapy daily-days 1-5 through 10/27/2023 and given Neulasta 10/30/2023.  He is next seen 11/2/2023.  Fortunately he has had no untoward effects of treatment as far and continues to work unabated.  His follow-up CBC does not demonstrate significant development of neutropenia anemia or thrombocytopenia.  We discussed assessing him regularly over the next several weeks and anticipate starting Rituxan approximately 3 weeks from now.    The patient's subsequent laboratory studies demonstrated gradual improvement in his white count to normalization, including degree of neutrophilia by 11/16/2023, improvement in his thrombocytopenia as well.  He has not had any additional side effects including lack of fever, chills, night sweats since he completed therapy.    He is seen back formally 11/20/2023 and we anticipate initiation of his rituximab therapy 11/22/2023.  Again he is done very well and his peripheral blood counts remain quite acceptable without evidence of deterioration.  He has had no fever, chills, night sweats, infectious episodes or change in performance status.  We have agreed that he will start his consolidation rituximab therapy 11/22/2023 and that we would schedule subsequent Thursday treatments x3 also.    The patient is seen 12/7/2023.  He underwent Rituxan therapy 11/22/2023 11/30/2023 without complication and is seen  12/7 H&H up to 12.0 and 34.6, white count of 4000, platelet 122,000 and normal automated differential except for mild lymphopenia.  He states that he will be taking a trip to [place] at the end of the month.         Past Medical History:   Diagnosis Date    BCC (basal cell carcinoma of skin) 06/22/2022    NOSE    Benign prostate hyperplasia     H/O Elevated PSA     H/O Hairy cell leukemia (CMS/HCC) 2012    H/O Internal thrombosed hemorrhoids     H/O minimal right carotid plaque     H/O peripheral neuropathy     Hyperlipidemia     Hypertension     Primary open-angle glaucoma, bilateral, mild stage        ONCOLOGIC HISTORY:  (History from previous dates can be found in the separate document.)    Current Outpatient Medications on File Prior to Visit   Medication Sig Dispense Refill    acyclovir (Zovirax) 400 MG tablet Take 1 tablet by mouth 2 (Two) Times a Day. 60 tablet 7    amLODIPine (NORVASC) 2.5 MG tablet Take 1 tablet by mouth Daily. 90 tablet 3    bimatoprost (LUMIGAN) 0.03 % ophthalmic drops Administer 1 drop to both eyes Every Night.      bisoprolol (ZEBeta) 5 MG tablet Take 1 tablet by mouth Daily. 90 tablet 3    Calcium-Magnesium-Vitamin D (CALCIUM 1200+D3 PO) Take  by mouth.      chlorhexidine (PERIDEX) 0.12 % solution Rinse with 15 mL by mouth after breakfast and at bedtime for 30 seconds, then spit. No food or drink for 30 minutes after rinse. 473 mL 3    finasteride (PROSCAR) 5 MG tablet Take 1 tablet by mouth Daily. 90 tablet 3    Magnesium 500 MG tablet Take 1 tablet by mouth 2 (two) times a day.      metroNIDAZOLE (METROGEL) 1 % gel Apply 1 application  topically to the appropriate area as directed Daily. 60 g 11    niacin 500 MG tablet Take 1 tablet by mouth Every Night.      ondansetron (ZOFRAN) 8 MG tablet Take 1 tablet by mouth 3 (Three) Times a Day As Needed for Nausea or Vomiting. 30 tablet 5    propafenone (RYTHMOL) 150 MG tablet Take 1 tablet by mouth 3 (Three) Times a Day. 270 tablet 3     "rosuvastatin (CRESTOR) 10 MG tablet Take 1 tablet by mouth every night at bedtime. 90 tablet 2    sulfamethoxazole-trimethoprim (BACTRIM DS,SEPTRA DS) 800-160 MG per tablet Take 1 tablet by mouth 3 (Three) Times a Week. 30 tablet 0    tamsulosin (FLOMAX) 0.4 MG capsule 24 hr capsule Take 2 capsules by mouth Daily. 180 capsule 3    vitamin C (ASCORBIC ACID) 250 MG tablet Take 4 tablets by mouth Daily. With madisyn hips      vitamin D3 125 MCG (5000 UT) capsule capsule Take 1 capsule by mouth Daily.       No current facility-administered medications on file prior to visit.       ALLERGIES:   No Known Allergies    Social History     Socioeconomic History    Marital status:      Spouse name: Hanna   Tobacco Use    Smoking status: Former     Packs/day: .5     Types: Cigarettes    Smokeless tobacco: Never    Tobacco comments:     Quit smoking many years ago.   Vaping Use    Vaping Use: Never used   Substance and Sexual Activity    Alcohol use: Yes     Comment: 1 glass of wine or beer per day    Drug use: No    Sexual activity: Defer         Cancer-related family history includes Cancer in his father; Cancer (age of onset: 90) in his mother.      Review of Systems   ROS as per HPI    Vitals:    12/14/23 1143   BP: (!) 188/95   Pulse: 58   Resp: 16   Temp: 98 °F (36.7 °C)   TempSrc: Temporal   SpO2: 99%   Weight: 74.3 kg (163 lb 14.4 oz)   Height: 177 cm (69.69\")   PainSc: 0-No pain           12/14/2023    11:46 AM   Current Status   ECOG score 0       Physical Exam    GENERAL: Well-developed, well-nourished male in no acute distress.   SKIN: Warm, dry, without new rash or lesion  HEAD: Normocephalic.   EYES: Pupils equal, round and reactive to light, EOMs intact. Conjunctivae normal.   EARS: Hearing intact.   NOSE: Septum midline. No excoriations or nasal discharge.   NECK: Supple with good range of motion; no masses, no JVD or bruits.   LYMPHATICS: No cervical, supraclavicular adenopathy.   CHEST: Lungs clear to " percussion and auscultation.   CARDIAC: Regular rate and rhythm without murmurs, rubs or gallops.   ABDOMEN: Soft, nontender, bowel sounds present.  EXTREMITIES: No clubbing, cyanosis or edema.   NEUROLOGICAL: No focal neurological deficits.       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   He was treated 5/11-5/17/ 12 with cladribine being given as continuous infusion at 0.1 mg/kg or 7.7 mg/24-hours x7 days.   He did well during hospitalization with little toxicity and minimal myelosuppression.   He obtained a complete remission.   July 2018, marrow suppression noted dose smear with no evidence of hairy cell leukemia.  Atypical lymphocytes thought to be secondary to viral infection.   He again remained in observation with MD assessment every 6 months  September 2021 degree of leukopenia and thrombocytopenia though flow cytometry fails to demonstrate any new processes.  Follow-up 8/3/2023 with CBC noting WBC 2250, hemoglobin 11.8, hematocrit 33.6%, platelet 88,000.  Differential with 44% polys, 52% lymphs and 3% monocytes.  Flow cytometry findings with a small population potentially just of of early recurrence versus secondary process  9/7/2020 3 repeat flow cytometry includes a CD10 positive monoclonal lambda B-cell population representing 0.3% of total events 8/3/2023 and repeat 9/7/2023 demonstrates a similar B-cell population also 0.3% total events-phenotype positive  CD10 bright, CD19 bright, CD20 bright, CD25, CD45,   slambda.   We had the patient return for periodic blood counts, had him undergo testing for hepatitis and reviewed the various regimens including 5-day cladribine followed by Rituxan therapy.  10/5/2023 scans revealed no substantial abnormalities including lack of splenomegaly or lymphadenopathy.  10/12/2023 CT-guided bone marrow biopsy and aspirate found to be variably hypercellular with diffuse involvement by his known hairy cell leukemia involving 89% of marrow cellularity.  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   10/18/2023 Mediport placed by vascular surgery, Dr. Fan  Patient completed all vaccinations prior to initiation of therapy including hepatitis B  Plans for treatment with cladribine 0.15 mg/kg/day over 2 hours x 5 days followed by rituximab or 5.6 mg/m² over 2 hours also for 5 days and also followed by rituximab therapy.  Cladribine initiated 10/23/2023 through 10/27/2023 with Neulasta support 10/30/2023  Excellent tolerance to cladribine  Consolidation therapy with weekly Rituxan initiated 11/22/2023 with plans for 4 weeks of therapy.  Today, 12/14/2023 counts continue to overall recover from previous cladribine with WBC 4.66, hemoglobin 12.8, platelets 125,000.  Proceed with fourth and final dose of weekly Rituxan.        2.  Hyponatremia  Admission 5/23-27/2023 had return from [place] where he developed nausea and vomiting and difficulty with keeping p.o.  He had further nausea, vomiting and dizziness and was seen in the emergency department and was found to have a sodium 115.  He was seen by nephrology with concern of hyponatremia related to hydrochlorothiazide along with SIDH, treated with fluid restriction and salt tablets and p.o. Lasix.  Sodium is normal today at 130      3.  Prophylaxis  Continuing on  acyclovir 400 mg twice daily and Bactrim DS 3 times weekly    Plan:  Proceed today with fourth and final planned dose of weekly Rituxan  Continue prophylaxis including acyclovir and Bactrim  MD follow-up with Dr. Jeffers 1/4/2024 for CBC and discussion of further plan of care    The patient is on a high risk medication requiring close monitoring for toxicity    Sri Arnold, APRN   12/14/2023

## 2024-01-03 NOTE — PROGRESS NOTES
REASONS FOR FOLLOWUP:   Hairy cell leukemia.   Admission 05/11/2012 through 05/17/12 for 7 day continuous infusion cladribine (2-CdA) without complication.   Patient seen 5 months post treatment in complete remission.   Patient seen 8 months post treatment with continued complete remission, every 3 month reassessment per CBC planned, 6 month review by MD scheduled.   Patient seen at 15 months without evidence of recurrent disease, every 6 month followup planned.   The patient was seen 02/05/2014 with no evidence of recurrent disease, 6-month followup planned.   Patient seen 07/31/2014, stable with no evidence of recurrent disease, every 6 month followup.   Patient seen 01/15/2015, stable without recurrent disease, 6-month CBC planned, yearly followup scheduled.   Patient seen 02/04/2016, stable, ?early peripheral neuropathy developing distally per feet, no evidence for recurrent disease per hairy cell leukemia.  Patient reviewed September 02, 2016, previous July CBC with mild thrombocytopenia developing, recheck September 2 normalized, every 6 month follow-ups anticipated  Patient reviewed February 02, 2017, status post negative prostate biopsy, mild thrombocytopenia again recognized  Patient reviewed July 20, 2017, hematologically stable, every 6 month follow-up CBCs planned, yearly M.D. Assessment  Patient seen July 26, 2018 after recent airline travel any cold symptoms  Patient reviewed January 10, 2019, no additional symptoms, hematologically stable  Patient reviewed July 25, 2019, stable, six-month follow-up as planned  Patient viewed January 16, 2020, stable, every 6 months CBC, MD assessment yearly planned  Reassessment January 21, 2021, no evidence of recurrence disease  Patient assessed 2/10/2022, evidence of continued remission noted both on clinical examination, peripheral blood smear review and flow cytometric evaluation.  Patient review 2/23/2023 mild thrombocytopenia, no new abnormalities per  peripheral smear, close for follow-up planned  Patient assessed 9/14/2023, concern for early relapse.  Subsequent marrow 10/12/2023-variably hypercellular with diffuse involvement-a 90%-hairy cell leukemia relapse.  Patient seen 10/22/2023 for initiation of cladribine-outpatient regimen to be followed by Rituxan-28 days later 4 to 6 weeks  Patient proceeded through Rituxan treatments weekly beginning 11/22/2023 and completed 12/14/2023.  Patient with findings consistent with remission 1/4/2024         History of Present Illness     The patient is a 78 y.o. male with the above-mentioned history, who completed therapy with cladribine and subsequent consolidative Rituxan weekly x 4 with his last treatment given 12/14/2023.  He is now seen back in office again feeling well with no additional issues.  He continues to work a full schedule and, wonderfully, was able to proceed to therapy almost entirely without interruption to his usual schedule.   He is eating and drinking adequately.  He has no fevers or chills, signs or symptoms of infection.  He has no arthralgias.  He has no new concerns today.     We discussed his peripheral smear today that demonstrates normalization and, fortunately, he developed no additional symptoms that would direct us otherwise.  We have agreed, however, that he would maintain his port over the next year until it is clear that he no longer needs this access.    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 He is usually followed by Dr. Vuong for a history of hypertension, hyperlipidemia, minimal right carotid plaque as well as elevated PSA.       He had exams done on 1-09-12 for routine followup. This included normal serum chemistry including LFTs, cholesterol 131, triglyceride 34, HDL 51, and LDL 73. CBC revealed H&H 13.3, 37.9%, WBC 3300, platelet count 110,000, 26% polys and 74% lymphs with A  NC of 0.9.       Dr. Remy provides information when initially seen with studies from as far back as 1- at which time hemoglobin and hematocrit was 14.4 and 42.1%, WBC 6950, platelets 227,000 with 47% polys, 42% lymph; this was automated. The additional test includ es approximately every year to every other year thereafter though it was noted in January 2010 WBC dropped from an average of approximately 5,000-6,000 to 3600. Hemoglobin and hematocrit 13.6 and 39.7%. Platelets 150,000-170,000 with ANC beginning in Ja nuary 2010 at 1900.       We were contacted per the above findings and asked Dr. Remy to undergo further assessment including flow cytometric exam per peripheral blood. This revealed no evidence of abnormal myeloid maturation increased blast population. No evidence of lymphopro l iferative disorder. The patient was asked to be seen formally in the office now and comes in today for further assessment. Dr. Remy indicates that he has taken a number of medications in an attempt to improve his health. Several homeopathic medication s include vitamin C, folate, flaxseed oil, fish oil, vitamin E, selenium, pomegranate tabs, Co-enzyme Q, vitamin D, niacin, and red yeast rice daily. He has discontinued these and stays on those described below.       He feels well with no additional problem s and states that he has no little or no symptoms that he can detail, whether this is just ill health in anyway, though he does have occasional hemorrhoidal pain. He also notes rare palpitations and occasional epistaxis when the ambient air has dried sub stantially. No fevers, chills, weight loss, night sweats, or other constitutional symptoms.       As a result of the above, the patient was asked to undergo a series of studies. This went onto include rheumatoid factor of 6, MARYURI screen negative, negative s misael protein electrophoresis, normal quantitative immunoglobulins, CMV IgG of 2.9, IgM less than .9, EBV IgM of less  than .9 and BCA IgG antibody of 4.6. These are consistent with previous exposures. HIV was negative, CRSP less than .1, sedimentation ra t e of 4, iron 125, TIBC 297, 14% saturation, and ferritin of 77. Peripheral blood flow cytometry was negative for evidence of abnormal myeloid maturation, increased blast population or lymphoproliferative disorder. As the patient is seen back today, he i s continuing to feel well though is obviously greatly concerned about his followup counts. Reviews in the office had been planned to follow him briefly recheck performance 3/29/12 with an H&H of 12.9, 36.1, WBC 3900, 32 polys, 62 lymphs, platelet count 94 , 000, IPF slightly elevated at 8.8. The patient was therefore asked to return and to be further assessed with bone marrow aspirate and biopsy. He now presents to do so. He has had no additional symptoms since last seen. He has discontinued his Crestor use.       As a result of his review the patient underwent bone marrow aspiration and biopsy. This revealed evidence of lymphocytic infiltrate reviewed here in the office. 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 Eventually cytogenetics was found to be negative with no evidence of cyclin-D1/IgH or BCL-2/IgH rearrangement. Additional tissue was requested. As a result, the patient was notifi e edward of these findings by personal visit, and plans were made to proceed with endoscopy, ? mantle cell involvement. At the time of this dictation, this was not yet performed. He was also asked to undergo CT of the neck, chest, abdomen and pelvis which show ed no evidence of abnormality. PET scan 04/13/12 showed increased activity within the marrow, but no additional abnormalities including the spleen, with no background bowel or gastric activity as well.       The patient returned 04/19/12 with these findings, a nd it seems certain  he has a lymphoproliferative disorder, likely low grade, involving the marrow though it may be an atypical chronic leukemia also involving the marrow as well, ? hairy cell leukemia. This has been discussed in great detail with the alexander thakur at [employer] and additional stains are pending as is the patient's GI assessment now to be pursued through Dr. Ray.       His case was again reviewed over quite a period of time, and ultimately his marrow was signed out as 80% involved with hairy cell leukemia - normal cytogenetics, no evidence of cyclin D1/IgH or BCL-2/IgH rearrangement. Dr. Remy was advised of the treatment options which include followup with no immediate therapy, and/or the use of cladribine or pentostatin. After numerous di s cussion, he ultimately elected to try to proceed with treatment when he is feeling as well as he is to improve his ability to withstand the therapy itself. We therefore began to discuss the treatment schedule, which also could be somewhat variable, inclu d ing 7-day infusions, 2-hr infusion q.d. x five days or every other week infusion. After discussion he wishes to proceed with 7-day infusion. When we attempted to do this as an outpatient we found out thereafter that this would not be covered as an outpa tient infusion through his insurance. Therefore we have made plans for him to be admitted after he is seen in office 05/10/12.       The patient was thereafter admitted 05/11-05/17/12. PICC line was placed and he initiated treatment at 0.1 mg/kg or 7.7 mg IV in fusion over seven days. The patient fortunately had no serious issues at all during the seven days and continued to work out on a daily basis. He was seen by his internist as well with some of his meds adjusted including his HCTZ and Ziac. He was stabl e at discharge, but was given Neulasta 6 mg subcutaneously 24 hours after he completed treatment. He has been having counts done on a regular basis, and returns back today  with only minimal evidence of neutropenia at his becca, and has an excellent periph eral smear today - normal findings. His performance status remains excellent as well.       The patient when seen on 6/7/12 was felt to have improved substantially. We followed him for weekly counts and plans at that point were for him to take a trip out of the country. He did actually take this trip, which had him eventually hiking at 11-12,000 feet without any complication or ill effect. As he returns back today on 7/19 he feels well and again with an excellent performance status. He has had no fever, c hills or suggestion of infection or complications thus far. He remains again, with an excellent performance status.       The patient thereafter is asked to have his counts done on an every six weeks basis, now seen three months later with a normal CBC as well as examination. He again feels quite well with a completely normal performance status.       The patient was asked to return for followup studies that included normal serum chemistries, unchanged lipid profile and stable hematologic findings. He is now seen on 2/7/13, 4 months from his last appointment. He continues to do well with unchanged performance status.       The patient was asked to have counts done q3 months seeing the physician in six months. He is now seen back 08/22/2013 feeling well having [place] with that. He had no additional fevers, sweats, chills, significant change in weight although he has tried to lose some additional pounds and we see this today. He continues to exercise regularl y and dietary program. Review of his smears again continues to demonstrate a complete remission.       Today, the patient was asked to be seen back in 6 months for followup, and is now seen 02/05/2014, continuing to feel well. Again, review of his smears and physical examination fortunately demonstrates no evidence of  recurrent disease.       The patient was asked to be seen back in 6 months for followup and is now seen on 07/31/2014. Again he feels well with an excellent performance status and no change since last reviewed.       The patient thereafter was now seen back 2015, 2016 stable at both visits with no evidence of recurrence of his hairy cell leukemia. He feels well but when now reviewed 02/04/2016, he has had some degree of peripheral neuropathy? This improv es with activity and it is certainly not limiting his action or his function.   Patient now reviewed back again September 02, 2016, continues to have an excellent performance status and hematologically remain stable brother had some concern about thrombocytopenia last visit.    The patient is next seen February 02, 2017.  He has not had any medical issues since last seen and remains physically quite well.  He does describe following with urology and undergoing a repeat biopsy recently when his PSA was above 7.  His findings evidently were negative for malignancy though he did have post procedure hematuria.  This is also now abated.  The patient is next seen July 28, 2017.  He continues to have an excellent performance status and no particular difficulty in day-to-day function and/or pursuing active vacations.      The patient is next seen July 26, 2018.  He had recently returned from a trip to [place].  After experiencing an airplane ride with multiple coughing passengers he himself developed a cold, particularly severe over the last 3-4 days with cough and congestion.  He's had low-grade fever which is now beginning to resolve but is clearly uncomfortable.    The patient's next seen January 10, 2019.  He has continued to travel without issues and is feeling well when reviewed today.  Plans were made for usual follow-up and the patient is next seen July 25, 2019.  His performance status remains excellent though he has lost some weight and indicates he did this per  altering his diet.  His stamina and other usual activities are continued unabated.  The patient is next seen January 16, 2020.  He continues an active practice and lifestyle.  He has lost some additional weight on purpose through diet.    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    The patient's follow-up laboratory studies demonstrated by late September degree of leukopenia, unchanged thrombocytopenia and this was followed with repeat analysis within the last several weeks as he seen February 10, 2022. Flow cytometry failed to demonstrate any new process.  As you seen February 10, 2020 his performance status remains excellent, stable weight, appetite, no additional fever, chills, rash, night sweats.    The patient is next evaluated 2/23/2023 with an unchanged performance status for mild thrombocytopenia.  He has had some exposure to viral illnesses through his grandchildren as of late but is otherwise felt fine with a unchanged performance status and work schedule.    He required admission 5/23-27/2023 had return from [place] where he developed nausea and vomiting and difficulty with keeping p.o.  He had further nausea, vomiting and dizziness and was seen in the emergency department and was found to have a sodium 115.  He was seen by nephrology with concern of hyponatremia related to hydrochlorothiazide along with SIDH, treated with fluid restriction and salt tablets and p.o. Lasix.  His medications were adjusted per renal medicine.  At that point he continued to demonstrate chronic pancytopenia that did not accelerate in any particular way.     Subsequent CBC 6/1/2023 include H&H 11.3 and 32.2 with white count of 2840, platelet count 93,000, ANC of 1610.    The patient is next evaluated 8/3/2020 3 repeat CBC includes a white count of 2250, H&H 11.8 and 33.6, platelet  count of 88,000-differential of 44% polys, 52% lymphs, 3% monocytes.    Patient contacted about flow cytometry findings of small population potentially suggestive of recurrence versus secondary process.  Plan to reassess in follow-up visits 9/7/2023.  Please note that if treatment is necessary then repeat treatment with cladribine could be given in a variety of of schedules including 0.15 mg/kg/day over 2 hours x 5 days followed by rituximab or 5.6 mg/m² over 2 hours also for 5 days and also followed by rituximab therapy.      The patient was seen back 9/14/2023.Flow cytometry assessed on 2 visits includes a CD10 positive monoclonal lambda B-cell population representing 0.3% of total events 8/3/2023 and repeat 9/7/2023 demonstrates a similar B-cell population also 0.3% total events-phenotype positive CD10 bright, CD19 bright, CD20 bright, CD25, CD45,   slambda.  Discussed symptoms usually due to splenomegaly, fatigue, weakness, weight loss, bruising, recurrent infections, periodic vasculitis.  He is clear that he is not having any of the symptoms and feels that his overall performance status is as good as its ever been.  We have discussed that additional issues include possible organomegaly and cytopenias developing with his follow-up CBC in office today including H&H of 12.2 and 33.4, white count of 3250 and platelet count of 92,000.  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    We had the patient return for periodic blood counts, had him undergo testing for hepatitis and reviewed the various regimens including 5-day cladribine followed by Rituxan therapy.  10/5/2023 scans revealed no substantial abnormalities including lack of splenomegaly or lymphadenopathy.    He was then asked to undergo a CT-guided bone marrow aspirate and biopsy obtained 10/12/2023 that  was discussed with pathology on several occasions but, eventually, to be variably hypercellular with diffuse involvement by his known hairy cell leukemia involving 89% of marrow cellularity.  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  Additionally the patient's hepatitis B surface antibody was equivocal and he went on to have additional vaccinations   -influenza, COVID-19, RSV and hepatitis B.  Further vascular surgeon was contacted and the patient underwent a right internal jugular Mediport placement 10/18/2023 and teaching concerning cladribine day 1 through 5 and subsequent Rituxan therapy.    After further discussion of available treatment options plans made to offer 5-day cladribine and, review of literature, indicates that Rituxan is associated with further immunosuppression.  Available guidance suggests proceeding with initial cladribine and then 4-6 weekly doses of Rituxan consolidation starting 4 to 6 weeks after initial treatment.    The patient proceeded with cladribine taking therapy daily-days 1-5 through 10/27/2023 and given Neulasta 10/30/2023.  He is next seen 11/2/2023.  Fortunately he has had no untoward effects of treatment as far and continues to work unabated.  His follow-up CBC does not demonstrate significant development of neutropenia anemia or thrombocytopenia.  We discussed assessing him regularly over the next several weeks and anticipate starting Rituxan approximately 3 weeks from now.    The patient's subsequent laboratory studies demonstrated gradual improvement in his white count to normalization, including degree of neutrophilia by 11/16/2023, improvement in his thrombocytopenia as well.  He has not had any additional side effects including lack of fever, chills, night sweats since he completed therapy.    He is seen back formally 11/20/2023 and we  anticipate initiation of his rituximab therapy 11/22/2023.  Again he is done very well and his peripheral blood counts remain quite acceptable without evidence of deterioration.  He has had no fever, chills, night sweats, infectious episodes or change in performance status.  We have agreed that he will start his consolidation rituximab therapy 11/22/2023 and that we would schedule subsequent Thursday treatments x3 also.    The patient is seen 12/7/2023.  He underwent Rituxan therapy 11/22/2023 11/30/2023 without complication and is seen 12/7 H&H up to 12.0 and 34.6, white count of 4000, platelet 122,000 and normal automated differential except for mild lymphopenia.  He states that he will be taking a trip to [place] at the end of the month.      Patient seen back 1/4/2024 with normal peripheral blood smear including H&H of 13.3 and 38.3, white count is 6170 and platelet count of 159,000 with a normal automated differential. We discussed his peripheral smear today that demonstrates normalization and, fortunately, he developed no additional symptoms that would direct us otherwise.  We have agreed, however, that he would maintain his port over the next year until it is clear that he no longer needs this access.             Past Medical History:   Diagnosis Date    BCC (basal cell carcinoma of skin) 06/22/2022    NOSE    Benign prostate hyperplasia     H/O Elevated PSA     H/O Hairy cell leukemia (CMS/HCC) 2012    H/O Internal thrombosed hemorrhoids     H/O minimal right carotid plaque     H/O peripheral neuropathy     Hyperlipidemia     Hypertension     Primary open-angle glaucoma, bilateral, mild stage        ONCOLOGIC HISTORY:  (History from previous dates can be found in the separate document.)    Current Outpatient Medications on File Prior to Visit   Medication Sig Dispense Refill    acyclovir (Zovirax) 400 MG tablet Take 1 tablet by mouth 2 (Two) Times a Day. 60 tablet 7    acyclovir (ZOVIRAX) 400 MG tablet  Take 1 tablet by mouth 2 (Two) Times a Day. 180 tablet 3    amLODIPine (NORVASC) 2.5 MG tablet Take 1 tablet by mouth Daily. 90 tablet 3    bimatoprost (LUMIGAN) 0.03 % ophthalmic drops Administer 1 drop to both eyes Every Night.      bisoprolol (ZEBeta) 5 MG tablet Take 1 tablet by mouth Daily. 90 tablet 3    Calcium-Magnesium-Vitamin D (CALCIUM 1200+D3 PO) Take  by mouth.      chlorhexidine (PERIDEX) 0.12 % solution Rinse with 15 mL by mouth after breakfast and at bedtime for 30 seconds, then spit. No food or drink for 30 minutes after rinse. 473 mL 3    finasteride (PROSCAR) 5 MG tablet Take 1 tablet by mouth Daily. 90 tablet 3    Magnesium 500 MG tablet Take 1 tablet by mouth 2 (two) times a day.      metroNIDAZOLE (METROGEL) 1 % gel Apply 1 application  topically to the appropriate area as directed Daily. 60 g 11    niacin 500 MG tablet Take 1 tablet by mouth Every Night.      ondansetron (ZOFRAN) 8 MG tablet Take 1 tablet by mouth 3 (Three) Times a Day As Needed for Nausea or Vomiting. 30 tablet 5    propafenone (RYTHMOL) 150 MG tablet Take 1 tablet by mouth 3 (Three) Times a Day. 270 tablet 3    rosuvastatin (CRESTOR) 10 MG tablet Take 1 tablet by mouth every night at bedtime. 90 tablet 2    sulfamethoxazole-trimethoprim (BACTRIM DS,SEPTRA DS) 800-160 MG per tablet Take 1 tablet by mouth 3 (Three) Times a Week. 30 tablet 0    tamsulosin (FLOMAX) 0.4 MG capsule 24 hr capsule Take 2 capsules by mouth Daily. 180 capsule 3    vitamin C (ASCORBIC ACID) 250 MG tablet Take 4 tablets by mouth Daily. With madisyn hips      vitamin D3 125 MCG (5000 UT) capsule capsule Take 1 capsule by mouth Daily.       No current facility-administered medications on file prior to visit.       ALLERGIES:   No Known Allergies    Social History     Socioeconomic History    Marital status:      Spouse name: Hanna   Tobacco Use    Smoking status: Former     Packs/day: .5     Types: Cigarettes    Smokeless tobacco: Never    Tobacco  "comments:     Quit smoking many years ago.   Vaping Use    Vaping Use: Never used   Substance and Sexual Activity    Alcohol use: Yes     Comment: 1 glass of wine or beer per day    Drug use: No    Sexual activity: Defer         Cancer-related family history includes Cancer in his father; Cancer (age of onset: 90) in his mother.      Review of Systems   ROS as per HPI    Vitals:    01/04/24 1237   BP: 172/96   Pulse: 62   Resp: 16   Temp: 98.1 °F (36.7 °C)   TempSrc: Temporal   SpO2: 98%   Weight: 72.9 kg (160 lb 11.2 oz)   Height: 177 cm (69.69\")   PainSc: 0-No pain             1/4/2024    12:39 PM   Current Status   ECOG score 0       Physical Exam    GENERAL: Well-developed, well-nourished male in no acute distress.   SKIN: Warm, dry, without new rash or lesion  HEAD: Normocephalic.   EYES: Pupils equal, round and reactive to light, EOMs intact. Conjunctivae normal.   EARS: Hearing intact.   NOSE: Septum midline. No excoriations or nasal discharge.   NECK: Supple with good range of motion; no masses, no JVD or bruits.   LYMPHATICS: No cervical, supraclavicular adenopathy.   CHEST: Lungs clear to percussion and auscultation.   CARDIAC: Regular rate and rhythm without murmurs, rubs or gallops.   ABDOMEN: Soft, nontender, bowel sounds present.  EXTREMITIES: No clubbing, cyanosis or edema.   NEUROLOGICAL: No focal neurological deficits.       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   He was treated 5/11-5/17/ 12 with cladribine being given as continuous infusion at 0.1 mg/kg or 7.7 mg/24-hours x7 days.   He did well during hospitalization with little toxicity and minimal myelosuppression.   He obtained a complete remission.   July 2018, marrow suppression noted dose smear with no " evidence of hairy cell leukemia.  Atypical lymphocytes thought to be secondary to viral infection.   He again remained in observation with MD assessment every 6 months  September 2021 degree of leukopenia and thrombocytopenia though flow cytometry fails to demonstrate any new processes.  Follow-up 8/3/2023 with CBC noting WBC 2250, hemoglobin 11.8, hematocrit 33.6%, platelet 88,000.  Differential with 44% polys, 52% lymphs and 3% monocytes.  Flow cytometry findings with a small population potentially just of of early recurrence versus secondary process  9/7/2020 3 repeat flow cytometry includes a CD10 positive monoclonal lambda B-cell population representing 0.3% of total events 8/3/2023 and repeat 9/7/2023 demonstrates a similar B-cell population also 0.3% total events-phenotype positive CD10 bright, CD19 bright, CD20 bright, CD25, CD45,   slambda.   We had the patient return for periodic blood counts, had him undergo testing for hepatitis and reviewed the various regimens including 5-day cladribine followed by Rituxan therapy.  10/5/2023 scans revealed no substantial abnormalities including lack of splenomegaly or lymphadenopathy.  10/12/2023 CT-guided bone marrow biopsy and aspirate found to be variably hypercellular with diffuse involvement by his known hairy cell leukemia involving 89% of marrow cellularity.  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   10/18/2023 Mediport placed by vascular surgery, Dr. Fan  Patient completed all vaccinations prior to initiation of therapy including hepatitis B  Plans for treatment with cladribine 0.15 mg/kg/day over 2 hours x 5 days followed by rituximab or 5.6 mg/m² over 2 hours also for 5 days and also followed by rituximab therapy.  Cladribine initiated 10/23/2023 through 10/27/2023 with Neulasta support 10/30/2023  Excellent tolerance to  cladribine  Consolidation therapy with weekly Rituxan initiated 11/22/2023 with plans for 4 weeks of therapy.  Today, 12/14/2023 counts continue to overall recover from previous cladribine with WBC 4.66, hemoglobin 12.8, platelets 125,000.  Proceed with fourth and final dose of weekly Rituxan.  Follow-up peripheral smear 1/4/2024 demonstrates normalization (likely remission) and, fortunately, he has developed no additional symptoms that would direct us otherwise.  We have agreed, however, that he would maintain his port over the next year until it is clear that he no longer needs this access.  Plan to maintain port every 6 weeks, CBC 3 months, MD 6 months        2.  Hyponatremia  Admission 5/23-27/2023 had return from [place] where he developed nausea and vomiting and difficulty with keeping p.o.  He had further nausea, vomiting and dizziness and was seen in the emergency department and was found to have a sodium 115.  He was seen by nephrology with concern of hyponatremia related to hydrochlorothiazide along with SIDH, treated with fluid restriction and salt tablets and p.o. Lasix.  CMP including serum sodium 12/14/2023-139      3.  Prophylaxis  Discontinue Bactrim, reduce dosing of acyclovir to 400 mg daily, other medications reviewed and continued  Every 6 weeks port flush, 3-month CBC, 6 months MD Paco Jeffers MD   01/04/2024

## 2024-01-04 ENCOUNTER — INFUSION (OUTPATIENT)
Dept: ONCOLOGY | Facility: HOSPITAL | Age: 79
End: 2024-01-04
Payer: MEDICARE

## 2024-01-04 ENCOUNTER — OFFICE VISIT (OUTPATIENT)
Dept: ONCOLOGY | Facility: CLINIC | Age: 79
End: 2024-01-04
Payer: MEDICARE

## 2024-01-04 VITALS
HEIGHT: 70 IN | WEIGHT: 160.7 LBS | TEMPERATURE: 98.1 F | OXYGEN SATURATION: 98 % | SYSTOLIC BLOOD PRESSURE: 172 MMHG | BODY MASS INDEX: 23.01 KG/M2 | HEART RATE: 62 BPM | RESPIRATION RATE: 16 BRPM | DIASTOLIC BLOOD PRESSURE: 96 MMHG

## 2024-01-04 DIAGNOSIS — Z45.2 FITTING AND ADJUSTMENT OF VASCULAR CATHETER: Primary | ICD-10-CM

## 2024-01-04 DIAGNOSIS — C91.41 HAIRY CELL LEUKEMIA, IN REMISSION: Primary | ICD-10-CM

## 2024-01-04 DIAGNOSIS — C91.42 HAIRY CELL LEUKEMIA, IN RELAPSE: ICD-10-CM

## 2024-01-04 LAB
BASOPHILS # BLD AUTO: 0.01 10*3/MM3 (ref 0–0.2)
BASOPHILS NFR BLD AUTO: 0.2 % (ref 0–1.5)
DEPRECATED RDW RBC AUTO: 42.6 FL (ref 37–54)
EOSINOPHIL # BLD AUTO: 0.11 10*3/MM3 (ref 0–0.4)
EOSINOPHIL NFR BLD AUTO: 1.8 % (ref 0.3–6.2)
ERYTHROCYTE [DISTWIDTH] IN BLOOD BY AUTOMATED COUNT: 11.9 % (ref 12.3–15.4)
HCT VFR BLD AUTO: 38.3 % (ref 37.5–51)
HGB BLD-MCNC: 13.3 G/DL (ref 13–17.7)
IMM GRANULOCYTES # BLD AUTO: 0.04 10*3/MM3 (ref 0–0.05)
IMM GRANULOCYTES NFR BLD AUTO: 0.6 % (ref 0–0.5)
LYMPHOCYTES # BLD AUTO: 0.93 10*3/MM3 (ref 0.7–3.1)
LYMPHOCYTES NFR BLD AUTO: 15.1 % (ref 19.6–45.3)
MCH RBC QN AUTO: 33.9 PG (ref 26.6–33)
MCHC RBC AUTO-ENTMCNC: 34.7 G/DL (ref 31.5–35.7)
MCV RBC AUTO: 97.7 FL (ref 79–97)
MONOCYTES # BLD AUTO: 0.62 10*3/MM3 (ref 0.1–0.9)
MONOCYTES NFR BLD AUTO: 10 % (ref 5–12)
NEUTROPHILS NFR BLD AUTO: 4.46 10*3/MM3 (ref 1.7–7)
NEUTROPHILS NFR BLD AUTO: 72.3 % (ref 42.7–76)
NRBC BLD AUTO-RTO: 0 /100 WBC (ref 0–0.2)
PLATELET # BLD AUTO: 159 10*3/MM3 (ref 140–450)
PMV BLD AUTO: 9.9 FL (ref 6–12)
RBC # BLD AUTO: 3.92 10*6/MM3 (ref 4.14–5.8)
WBC NRBC COR # BLD AUTO: 6.17 10*3/MM3 (ref 3.4–10.8)

## 2024-01-04 PROCEDURE — 36591 DRAW BLOOD OFF VENOUS DEVICE: CPT

## 2024-01-04 PROCEDURE — 85025 COMPLETE CBC W/AUTO DIFF WBC: CPT

## 2024-01-04 PROCEDURE — 1126F AMNT PAIN NOTED NONE PRSNT: CPT | Performed by: INTERNAL MEDICINE

## 2024-01-04 PROCEDURE — 99213 OFFICE O/P EST LOW 20 MIN: CPT | Performed by: INTERNAL MEDICINE

## 2024-01-04 PROCEDURE — 3080F DIAST BP >= 90 MM HG: CPT | Performed by: INTERNAL MEDICINE

## 2024-01-04 PROCEDURE — 3077F SYST BP >= 140 MM HG: CPT | Performed by: INTERNAL MEDICINE

## 2024-01-04 PROCEDURE — 25010000002 HEPARIN LOCK FLUSH PER 10 UNITS: Performed by: INTERNAL MEDICINE

## 2024-01-04 RX ORDER — SODIUM CHLORIDE 0.9 % (FLUSH) 0.9 %
10 SYRINGE (ML) INJECTION AS NEEDED
Status: DISCONTINUED | OUTPATIENT
Start: 2024-01-04 | End: 2024-01-04 | Stop reason: HOSPADM

## 2024-01-04 RX ORDER — SODIUM CHLORIDE 0.9 % (FLUSH) 0.9 %
10 SYRINGE (ML) INJECTION AS NEEDED
OUTPATIENT
Start: 2024-01-04

## 2024-01-04 RX ORDER — HEPARIN SODIUM (PORCINE) LOCK FLUSH IV SOLN 100 UNIT/ML 100 UNIT/ML
500 SOLUTION INTRAVENOUS AS NEEDED
Status: DISCONTINUED | OUTPATIENT
Start: 2024-01-04 | End: 2024-01-04 | Stop reason: HOSPADM

## 2024-01-04 RX ORDER — HEPARIN SODIUM (PORCINE) LOCK FLUSH IV SOLN 100 UNIT/ML 100 UNIT/ML
500 SOLUTION INTRAVENOUS AS NEEDED
OUTPATIENT
Start: 2024-01-04

## 2024-01-04 RX ADMIN — Medication 500 UNITS: at 12:22

## 2024-01-04 RX ADMIN — Medication 10 ML: at 12:22

## 2024-01-04 NOTE — LETTER
January 4, 2024     Damián Vuong Jr., MD  [address]  [address] [address]    Patient: Angel Remy   YOB: 1945   Date of Visit: 1/4/2024     Dear Damián Vuong Jr., MD:       Thank you for referring Angel Remy to me for evaluation. Below are the relevant portions of my assessment and plan of care.    If you have questions, please do not hesitate to call me. I look forward to following Angel along with you.         Sincerely,        Paco Jeffers MD        CC: No Recipients    Paco Jeffers MD  01/04/24 1304  Sign when Signing Visit      REASONS FOR FOLLOWUP:   Hairy cell leukemia.   Admission 05/11/2012 through 05/17/12 for 7 day continuous infusion cladribine (2-CdA) without complication.   Patient seen 5 months post treatment in complete remission.   Patient seen 8 months post treatment with continued complete remission, every 3 month reassessment per CBC planned, 6 month review by MD scheduled.   Patient seen at 15 months without evidence of recurrent disease, every 6 month followup planned.   The patient was seen 02/05/2014 with no evidence of recurrent disease, 6-month followup planned.   Patient seen 07/31/2014, stable with no evidence of recurrent disease, every 6 month followup.   Patient seen 01/15/2015, stable without recurrent disease, 6-month CBC planned, yearly followup scheduled.   Patient seen 02/04/2016, stable, ?early peripheral neuropathy developing distally per feet, no evidence for recurrent disease per hairy cell leukemia.  Patient reviewed September 02, 2016, previous July CBC with mild thrombocytopenia developing, recheck September 2 normalized, every 6 month follow-ups anticipated  Patient reviewed February 02, 2017, status post negative prostate biopsy, mild thrombocytopenia again recognized  Patient reviewed July 20, 2017, hematologically stable, every 6 month follow-up CBCs planned, yearly M.D. Assessment  Patient seen July 26,  2018 after recent airline travel any cold symptoms  Patient reviewed January 10, 2019, no additional symptoms, hematologically stable  Patient reviewed July 25, 2019, stable, six-month follow-up as planned  Patient viewed January 16, 2020, stable, every 6 months CBC, MD assessment yearly planned  Reassessment January 21, 2021, no evidence of recurrence disease  Patient assessed 2/10/2022, evidence of continued remission noted both on clinical examination, peripheral blood smear review and flow cytometric evaluation.  Patient review 2/23/2023 mild thrombocytopenia, no new abnormalities per peripheral smear, close for follow-up planned  Patient assessed 9/14/2023, concern for early relapse.  Subsequent marrow 10/12/2023-variably hypercellular with diffuse involvement-a 90%-hairy cell leukemia relapse.  Patient seen 10/22/2023 for initiation of cladribine-outpatient regimen to be followed by Rituxan-28 days later 4 to 6 weeks  Patient proceeded through Rituxan treatments weekly beginning 11/22/2023 and completed 12/14/2023.  Patient with findings consistent with remission 1/4/2024         History of Present Illness     The patient is a 78 y.o. male with the above-mentioned history, who completed therapy with cladribine and subsequent consolidative Rituxan weekly x 4 with his last treatment given 12/14/2023.  He is now seen back in office again feeling well with no additional issues.  He continues to work a full schedule and, wonderfully, was able to proceed to therapy almost entirely without interruption to his usual schedule.   He is eating and drinking adequately.  He has no fevers or chills, signs or symptoms of infection.  He has no arthralgias.  He has no new concerns today.     We discussed his peripheral smear today that demonstrates normalization and, fortunately, he developed no additional symptoms that would direct us otherwise.  We have agreed, however, that he would maintain his port over the next year  until it is clear that he no longer needs this access.    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. He is usually followed by Dr. Vuong for a history of hypertension, hyperlipidemia, minimal right carotid plaque as well as elevated PSA.       He had exams done on 1-09-12 for routine followup. This included normal serum chemistry including LFTs, cholesterol 131, triglyceride 34, HDL 51, and LDL 73. CBC revealed H&H 13.3, 37.9%, WBC 3300, platelet count 110,000, 26% polys and 74% lymphs with A NC of 0.9.       Dr. Remy provides information when initially seen with studies from as far back as 1- at which time hemoglobin and hematocrit was 14.4 and 42.1%, WBC 6950, platelets 227,000 with 47% polys, 42% lymph; this was automated. The additional test includ es approximately every year to every other year thereafter though it was noted in January 2010 WBC dropped from an average of approximately 5,000-6,000 to 3600. Hemoglobin and hematocrit 13.6 and 39.7%. Platelets 150,000-170,000 with ANC beginning in Ja nuary 2010 at 1900.       We were contacted per the above findings and asked Dr. Remy to undergo further assessment including flow cytometric exam per peripheral blood. This revealed no evidence of abnormal myeloid maturation increased blast population. No evidence of lymphopro l iferative disorder. The patient was asked to be seen formally in the office now and comes in today for further assessment. Dr. Remy indicates that he has taken a number of medications in an attempt to improve his health. Several homeopathic medication s include vitamin C, folate, flaxseed oil, fish oil, vitamin E, selenium, pomegranate tabs, Co-enzyme Q, vitamin D, niacin, and red yeast rice daily. He has discontinued these and stays on those described below.       He feels well with no additional problem s and states that he has no  little or no symptoms that he can detail, whether this is just ill health in anyway, though he does have occasional hemorrhoidal pain. He also notes rare palpitations and occasional epistaxis when the ambient air has dried sub stantially. No fevers, chills, weight loss, night sweats, or other constitutional symptoms.       As a result of the above, the patient was asked to undergo a series of studies. This went onto include rheumatoid factor of 6, MARYURI screen negative, negative s misael protein electrophoresis, normal quantitative immunoglobulins, CMV IgG of 2.9, IgM less than .9, EBV IgM of less than .9 and BCA IgG antibody of 4.6. These are consistent with previous exposures. HIV was negative, CRSP less than .1, sedimentation ra t e of 4, iron 125, TIBC 297, 14% saturation, and ferritin of 77. Peripheral blood flow cytometry was negative for evidence of abnormal myeloid maturation, increased blast population or lymphoproliferative disorder. As the patient is seen back today, he i s continuing to feel well though is obviously greatly concerned about his followup counts. Reviews in the office had been planned to follow him briefly recheck performance 3/29/12 with an H&H of 12.9, 36.1, WBC 3900, 32 polys, 62 lymphs, platelet count 94 , 000, IPF slightly elevated at 8.8. The patient was therefore asked to return and to be further assessed with bone marrow aspirate and biopsy. He now presents to do so. He has had no additional symptoms since last seen. He has discontinued his Crestor use.       As a result of his review the patient underwent bone marrow aspiration and biopsy. This revealed evidence of lymphocytic infiltrate reviewed here in the office. 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 Eventually cytogenetics was found to be negative with no evidence of cyclin-D1/IgH or BCL-2/IgH rearrangement.  Additional tissue was requested. As a result, the patient was notifi alesha leon of these findings by personal visit, and plans were made to proceed with endoscopy, ? mantle cell involvement. At the time of this dictation, this was not yet performed. He was also asked to undergo CT of the neck, chest, abdomen and pelvis which show ed no evidence of abnormality. PET scan 04/13/12 showed increased activity within the marrow, but no additional abnormalities including the spleen, with no background bowel or gastric activity as well.       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 hairy cell leukemia. This has been discussed in great detail with the alexander thakur at [facility] and additional stains are pending as is the patient's GI assessment now to be pursued through Dr. Ray.       His case was again reviewed over quite a period of time, and ultimately his marrow was signed out as 80% involved with hairy cell leukemia - normal cytogenetics, no evidence of cyclin D1/IgH or BCL-2/IgH rearrangement. Dr. Remy was advised of the treatment options which include followup with no immediate therapy, and/or the use of cladribine or pentostatin. After numerous di s cussion, he ultimately elected to try to proceed with treatment when he is feeling as well as he is to improve his ability to withstand the therapy itself. We therefore began to discuss the treatment schedule, which also could be somewhat variable, inclu d ing 7-day infusions, 2-hr infusion q.d. x five days or every other week infusion. After discussion he wishes to proceed with 7-day infusion. When we attempted to do this as an outpatient we found out thereafter that this would not be covered as an outpa tient infusion through his insurance. Therefore we have made plans for him to be  admitted after he is seen in office 05/10/12.       The patient was thereafter admitted 05/11-05/17/12. PICC line was placed and he initiated treatment at 0.1 mg/kg or 7.7 mg IV in fusion over seven days. The patient fortunately had no serious issues at all during the seven days and continued to work out on a daily basis. He was seen by his internist as well with some of his meds adjusted including his HCTZ and Ziac. He was stabl e at discharge, but was given Neulasta 6 mg subcutaneously 24 hours after he completed treatment. He has been having counts done on a regular basis, and returns back today with only minimal evidence of neutropenia at his becca, and has an excellent periph eral smear today - normal findings. His performance status remains excellent as well.       The patient when seen on 6/7/12 was felt to have improved substantially. We followed him for weekly counts and plans at that point were for him to take a trip out of the country. He did actually take this trip, which had him eventually hiking at 11-12,000 feet without any complication or ill effect. As he returns back today on 7/19 he feels well and again with an excellent performance status. He has had no fever, c hills or suggestion of infection or complications thus far. He remains again, with an excellent performance status.       The patient thereafter is asked to have his counts done on an every six weeks basis, now seen three months later with a normal CBC as well as examination. He again feels quite well with a completely normal performance status.       The patient was asked to return for followup studies that included normal serum chemistries, unchanged lipid profile and stable hematologic findings. He is now seen on 2/7/13, 4 months from his last appointment. He continues to do well with unchanged performance status.       The patient was asked to have counts done q3 months seeing the physician in six months. He is now seen back  08/22/2013 feeling well having [place] doing [place] with that. He had no additional fevers, sweats, chills, significant change in weight although he has tried to lose some additional pounds and we see this today. He continues to exercise regularl y and dietary program. Review of his smears again continues to demonstrate a complete remission.       Today, the patient was asked to be seen back in 6 months for followup, and is now seen 02/05/2014, continuing to feel well. Again, review of his smears and physical examination fortunately demonstrates no evidence of recurrent disease.       The patient was asked to be seen back in 6 months for followup and is now seen on 07/31/2014. Again he feels well with an excellent performance status and no change since last reviewed.       The patient thereafter was now seen back 2015, 2016 stable at both visits with no evidence of recurrence of his hairy cell leukemia. He feels well but when now reviewed 02/04/2016, he has had some degree of peripheral neuropathy? This improv es with activity and it is certainly not limiting his action or his function.   Patient now reviewed back again September 02, 2016, continues to have an excellent performance status and hematologically remain stable brother had some concern about thrombocytopenia last visit.    The patient is next seen February 02, 2017.  He has not had any medical issues since last seen and remains physically quite well.  He does describe following with urology and undergoing a repeat biopsy recently when his PSA was above 7.  His findings evidently were negative for malignancy though he did have post procedure hematuria.  This is also now abated.  The patient is next seen July 28, 2017.  He continues to have an excellent performance status and no particular difficulty in day-to-day function and/or pursuing active vacations.      The patient is next seen July 26, 2018.  He had  recently returned from a trip to [place].  After experiencing an airplane ride with multiple coughing passengers he himself developed a cold, particularly severe over the last 3-4 days with cough and congestion.  He's had low-grade fever which is now beginning to resolve but is clearly uncomfortable.    The patient's next seen January 10, 2019.  He has continued to travel without issues and is feeling well when reviewed today.  Plans were made for usual follow-up and the patient is next seen July 25, 2019.  His performance status remains excellent though he has lost some weight and indicates he did this per altering his diet.  His stamina and other usual activities are continued unabated.  The patient is next seen January 16, 2020.  He continues an active practice and lifestyle.  He has lost some additional weight on purpose through diet.    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    The patient's follow-up laboratory studies demonstrated by late September degree of leukopenia, unchanged thrombocytopenia and this was followed with repeat analysis within the last several weeks as he seen February 10, 2022. Flow cytometry failed to demonstrate any new process.  As you seen February 10, 2020 his performance status remains excellent, stable weight, appetite, no additional fever, chills, rash, night sweats.    The patient is next evaluated 2/23/2023 with an unchanged performance status for mild thrombocytopenia.  He has had some exposure to viral illnesses through his grandchildren as of late but is otherwise felt fine with a unchanged performance status and work schedule.    He required admission 5/23-27/2023 had return from [place] where he developed nausea and vomiting and difficulty with keeping p.o.  He had further nausea, vomiting and dizziness and was seen in the  emergency department and was found to have a sodium 115.  He was seen by nephrology with concern of hyponatremia related to hydrochlorothiazide along with SIDH, treated with fluid restriction and salt tablets and p.o. Lasix.  His medications were adjusted per renal medicine.  At that point he continued to demonstrate chronic pancytopenia that did not accelerate in any particular way.     Subsequent CBC 6/1/2023 include H&H 11.3 and 32.2 with white count of 2840, platelet count 93,000, ANC of 1610.    The patient is next evaluated 8/3/2020 3 repeat CBC includes a white count of 2250, H&H 11.8 and 33.6, platelet count of 88,000-differential of 44% polys, 52% lymphs, 3% monocytes.    Patient contacted about flow cytometry findings of small population potentially suggestive of recurrence versus secondary process.  Plan to reassess in follow-up visits 9/7/2023.  Please note that if treatment is necessary then repeat treatment with cladribine could be given in a variety of of schedules including 0.15 mg/kg/day over 2 hours x 5 days followed by rituximab or 5.6 mg/m² over 2 hours also for 5 days and also followed by rituximab therapy.      The patient was seen back 9/14/2023.Flow cytometry assessed on 2 visits includes a CD10 positive monoclonal lambda B-cell population representing 0.3% of total events 8/3/2023 and repeat 9/7/2023 demonstrates a similar B-cell population also 0.3% total events-phenotype positive CD10 bright, CD19 bright, CD20 bright, CD25, CD45,   slambda.  Discussed symptoms usually due to splenomegaly, fatigue, weakness, weight loss, bruising, recurrent infections, periodic vasculitis.  He is clear that he is not having any of the symptoms and feels that his overall performance status is as good as its ever been.  We have discussed that additional issues include possible organomegaly and cytopenias developing with his follow-up CBC in office today including H&H of 12.2 and 33.4, white count of 3250  and platelet count of 92,000.  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    We had the patient return for periodic blood counts, had him undergo testing for hepatitis and reviewed the various regimens including 5-day cladribine followed by Rituxan therapy.  10/5/2023 scans revealed no substantial abnormalities including lack of splenomegaly or lymphadenopathy.    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  Additionally the patient's hepatitis B surface antibody was equivocal and he went on to have additional vaccinations   -influenza, COVID-19, RSV and hepatitis B.  Further vascular surgeon was contacted and the patient underwent a right internal jugular Mediport placement 10/18/2023 and teaching concerning cladribine day 1 through 5 and subsequent Rituxan therapy.    After further discussion of available treatment options plans made to offer 5-day cladribine and, review of literature, indicates that Rituxan is associated with further immunosuppression.  Available guidance suggests proceeding with initial cladribine and then 4-6 weekly doses of Rituxan consolidation starting 4 to 6 weeks after initial treatment.    The patient proceeded with cladribine taking therapy daily-days 1-5 through 10/27/2023 and given Neulasta 10/30/2023.  He is next seen 11/2/2023.  Fortunately he has had no untoward effects of treatment as far and continues to work unabated.  His  follow-up CBC does not demonstrate significant development of neutropenia anemia or thrombocytopenia.  We discussed assessing him regularly over the next several weeks and anticipate starting Rituxan approximately 3 weeks from now.    The patient's subsequent laboratory studies demonstrated gradual improvement in his white count to normalization, including degree of neutrophilia by 11/16/2023, improvement in his thrombocytopenia as well.  He has not had any additional side effects including lack of fever, chills, night sweats since he completed therapy.    He is seen back formally 11/20/2023 and we anticipate initiation of his rituximab therapy 11/22/2023.  Again he is done very well and his peripheral blood counts remain quite acceptable without evidence of deterioration.  He has had no fever, chills, night sweats, infectious episodes or change in performance status.  We have agreed that he will start his consolidation rituximab therapy 11/22/2023 and that we would schedule subsequent Thursday treatments x3 also.    The patient is seen 12/7/2023.  He underwent Rituxan therapy 11/22/2023 11/30/2023 without complication and is seen 12/7 H&H up to 12.0 and 34.6, white count of 4000, platelet 122,000 and normal automated differential except for mild lymphopenia.  He states that he will be taking a trip to [place] at the end of the month.      Patient seen back 1/4/2024 with normal peripheral blood smear including H&H of 13.3 and 38.3, white count is 6170 and platelet count of 159,000 with a normal automated differential. We discussed his peripheral smear today that demonstrates normalization and, fortunately, he developed no additional symptoms that would direct us otherwise.  We have agreed, however, that he would maintain his port over the next year until it is clear that he no longer needs this access.             Past Medical History:   Diagnosis Date   • BCC (basal cell carcinoma of skin) 06/22/2022    NOSE   •  Benign prostate hyperplasia    • H/O Elevated PSA    • H/O Hairy cell leukemia (CMS/HCC) 2012   • H/O Internal thrombosed hemorrhoids    • H/O minimal right carotid plaque    • H/O peripheral neuropathy    • Hyperlipidemia    • Hypertension    • Primary open-angle glaucoma, bilateral, mild stage        ONCOLOGIC HISTORY:  (History from previous dates can be found in the separate document.)    Current Outpatient Medications on File Prior to Visit   Medication Sig Dispense Refill   • acyclovir (Zovirax) 400 MG tablet Take 1 tablet by mouth 2 (Two) Times a Day. 60 tablet 7   • acyclovir (ZOVIRAX) 400 MG tablet Take 1 tablet by mouth 2 (Two) Times a Day. 180 tablet 3   • amLODIPine (NORVASC) 2.5 MG tablet Take 1 tablet by mouth Daily. 90 tablet 3   • bimatoprost (LUMIGAN) 0.03 % ophthalmic drops Administer 1 drop to both eyes Every Night.     • bisoprolol (ZEBeta) 5 MG tablet Take 1 tablet by mouth Daily. 90 tablet 3   • Calcium-Magnesium-Vitamin D (CALCIUM 1200+D3 PO) Take  by mouth.     • chlorhexidine (PERIDEX) 0.12 % solution Rinse with 15 mL by mouth after breakfast and at bedtime for 30 seconds, then spit. No food or drink for 30 minutes after rinse. 473 mL 3   • finasteride (PROSCAR) 5 MG tablet Take 1 tablet by mouth Daily. 90 tablet 3   • Magnesium 500 MG tablet Take 1 tablet by mouth 2 (two) times a day.     • metroNIDAZOLE (METROGEL) 1 % gel Apply 1 application  topically to the appropriate area as directed Daily. 60 g 11   • niacin 500 MG tablet Take 1 tablet by mouth Every Night.     • ondansetron (ZOFRAN) 8 MG tablet Take 1 tablet by mouth 3 (Three) Times a Day As Needed for Nausea or Vomiting. 30 tablet 5   • propafenone (RYTHMOL) 150 MG tablet Take 1 tablet by mouth 3 (Three) Times a Day. 270 tablet 3   • rosuvastatin (CRESTOR) 10 MG tablet Take 1 tablet by mouth every night at bedtime. 90 tablet 2   • sulfamethoxazole-trimethoprim (BACTRIM DS,SEPTRA DS) 800-160 MG per tablet Take 1 tablet by mouth 3  "(Three) Times a Week. 30 tablet 0   • tamsulosin (FLOMAX) 0.4 MG capsule 24 hr capsule Take 2 capsules by mouth Daily. 180 capsule 3   • vitamin C (ASCORBIC ACID) 250 MG tablet Take 4 tablets by mouth Daily. With madisyn hips     • vitamin D3 125 MCG (5000 UT) capsule capsule Take 1 capsule by mouth Daily.       No current facility-administered medications on file prior to visit.       ALLERGIES:   No Known Allergies    Social History     Socioeconomic History   • Marital status:      Spouse name: Hanna   Tobacco Use   • Smoking status: Former     Packs/day: .5     Types: Cigarettes   • Smokeless tobacco: Never   • Tobacco comments:     Quit smoking many years ago.   Vaping Use   • Vaping Use: Never used   Substance and Sexual Activity   • Alcohol use: Yes     Comment: 1 glass of wine or beer per day   • Drug use: No   • Sexual activity: Defer         Cancer-related family history includes Cancer in his father; Cancer (age of onset: 90) in his mother.      Review of Systems   ROS as per HPI    Vitals:    01/04/24 1237   BP: 172/96   Pulse: 62   Resp: 16   Temp: 98.1 °F (36.7 °C)   TempSrc: Temporal   SpO2: 98%   Weight: 72.9 kg (160 lb 11.2 oz)   Height: 177 cm (69.69\")   PainSc: 0-No pain             1/4/2024    12:39 PM   Current Status   ECOG score 0       Physical Exam    GENERAL: Well-developed, well-nourished male in no acute distress.   SKIN: Warm, dry, without new rash or lesion  HEAD: Normocephalic.   EYES: Pupils equal, round and reactive to light, EOMs intact. Conjunctivae normal.   EARS: Hearing intact.   NOSE: Septum midline. No excoriations or nasal discharge.   NECK: Supple with good range of motion; no masses, no JVD or bruits.   LYMPHATICS: No cervical, supraclavicular adenopathy.   CHEST: Lungs clear to percussion and auscultation.   CARDIAC: Regular rate and rhythm without murmurs, rubs or gallops.   ABDOMEN: Soft, nontender, bowel sounds present.  EXTREMITIES: No clubbing, cyanosis or edema. "   NEUROLOGICAL: No focal neurological deficits.       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   He was treated 5/11-5/17/ 12 with cladribine being given as continuous infusion at 0.1 mg/kg or 7.7 mg/24-hours x7 days.   He did well during hospitalization with little toxicity and minimal myelosuppression.   He obtained a complete remission.   July 2018, marrow suppression noted dose smear with no evidence of hairy cell leukemia.  Atypical lymphocytes thought to be secondary to viral infection.   He again remained in observation with MD assessment every 6 months  September 2021 degree of leukopenia and thrombocytopenia though flow cytometry fails to demonstrate any new processes.  Follow-up 8/3/2023 with CBC noting WBC 2250, hemoglobin 11.8, hematocrit 33.6%, platelet 88,000.  Differential with 44% polys, 52% lymphs and 3% monocytes.  Flow cytometry findings with a small population potentially just of of early recurrence versus secondary process  9/7/2020 3 repeat flow cytometry includes a CD10 positive monoclonal lambda B-cell population representing 0.3% of total events 8/3/2023 and repeat 9/7/2023 demonstrates a similar B-cell population also 0.3% total events-phenotype positive CD10 bright, CD19 bright, CD20 bright, CD25, CD45,   slambda.   We had the patient return for periodic blood counts, had him undergo testing for hepatitis and reviewed the various regimens including 5-day cladribine followed by Rituxan therapy.  10/5/2023 scans revealed no substantial abnormalities including lack of splenomegaly or lymphadenopathy.  10/12/2023 CT-guided bone marrow biopsy and aspirate found to be variably hypercellular with diffuse involvement by his known hairy cell leukemia involving  89% of marrow cellularity.  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   10/18/2023 Mediport placed by vascular surgery, Dr. Fan  Patient completed all vaccinations prior to initiation of therapy including hepatitis B  Plans for treatment with cladribine 0.15 mg/kg/day over 2 hours x 5 days followed by rituximab or 5.6 mg/m² over 2 hours also for 5 days and also followed by rituximab therapy.  Cladribine initiated 10/23/2023 through 10/27/2023 with Neulasta support 10/30/2023  Excellent tolerance to cladribine  Consolidation therapy with weekly Rituxan initiated 11/22/2023 with plans for 4 weeks of therapy.  Today, 12/14/2023 counts continue to overall recover from previous cladribine with WBC 4.66, hemoglobin 12.8, platelets 125,000.  Proceed with fourth and final dose of weekly Rituxan.  Follow-up peripheral smear 1/4/2024 demonstrates normalization (likely remission) and, fortunately, he has developed no additional symptoms that would direct us otherwise.  We have agreed, however, that he would maintain his port over the next year until it is clear that he no longer needs this access.  Plan to maintain port every 6 weeks, CBC 3 months, MD 6 months        2.  Hyponatremia  Admission 5/23-27/2023 had return from [place] where he developed nausea and vomiting and difficulty with keeping p.o.  He had further nausea, vomiting and dizziness and was seen in the emergency department and was found to have a sodium 115.  He was seen by nephrology with concern of hyponatremia related to hydrochlorothiazide along with SIDH, treated with fluid restriction and salt tablets and p.o. Lasix.  CMP including serum sodium 12/14/2023-139      3.  Prophylaxis  Discontinue Bactrim, reduce dosing of acyclovir to 400 mg daily, other medications reviewed and continued  Every 6 weeks port flush, 3-month CBC, 6  months MD Paco Jeffers MD   01/04/2024

## 2024-02-04 ENCOUNTER — HOSPITAL ENCOUNTER (INPATIENT)
Facility: HOSPITAL | Age: 79
LOS: 3 days | End: 2024-02-07
Attending: STUDENT IN AN ORGANIZED HEALTH CARE EDUCATION/TRAINING PROGRAM | Admitting: INTERNAL MEDICINE
Payer: MEDICARE

## 2024-02-04 ENCOUNTER — APPOINTMENT (OUTPATIENT)
Dept: GENERAL RADIOLOGY | Facility: HOSPITAL | Age: 79
End: 2024-02-04
Payer: MEDICARE

## 2024-02-04 DIAGNOSIS — S82.032A CLOSED DISPLACED TRANSVERSE FRACTURE OF LEFT PATELLA, INITIAL ENCOUNTER: Primary | ICD-10-CM

## 2024-02-04 DIAGNOSIS — S82.031A CLOSED DISPLACED TRANSVERSE FRACTURE OF RIGHT PATELLA, INITIAL ENCOUNTER: ICD-10-CM
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
## 2024-02-04 PROBLEM — I48.0 PAF (PAROXYSMAL ATRIAL FIBRILLATION): Status: ACTIVE | Noted: 2024-02-04
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
## 2024-02-04 PROBLEM — Z86.79 HISTORY OF LEFT BUNDLE BRANCH BLOCK (LBBB): Status: ACTIVE | Noted: 2024-02-04

## 2024-02-04 PROBLEM — D69.6 THROMBOCYTOPENIA: Status: ACTIVE | Noted: 2024-02-04

## 2024-02-04 PROBLEM — S82.009A PATELLA FRACTURE: Status: ACTIVE | Noted: 2024-02-04

## 2024-02-04 PROBLEM — R03.0 ELEVATED BLOOD PRESSURE READING: Status: ACTIVE | Noted: 2024-02-04

## 2024-02-04 LAB
ALBUMIN SERPL-MCNC: 4.1 G/DL (ref 3.5–5.2)
ALBUMIN/GLOB SERPL: 2.4 G/DL
ALP SERPL-CCNC: 43 U/L (ref 39–117)
ALT SERPL W P-5'-P-CCNC: 23 U/L (ref 1–41)
ANION GAP SERPL CALCULATED.3IONS-SCNC: 10 MMOL/L (ref 5–15)
AST SERPL-CCNC: 19 U/L (ref 1–40)
BASOPHILS # BLD AUTO: 0.01 10*3/MM3 (ref 0–0.2)
BASOPHILS NFR BLD AUTO: 0.2 % (ref 0–1.5)
BILIRUB SERPL-MCNC: 0.6 MG/DL (ref 0–1.2)
BUN SERPL-MCNC: 24 MG/DL (ref 8–23)
BUN/CREAT SERPL: 26.4 (ref 7–25)
CALCIUM SPEC-SCNC: 8.7 MG/DL (ref 8.6–10.5)
CHLORIDE SERPL-SCNC: 104 MMOL/L (ref 98–107)
CO2 SERPL-SCNC: 26 MMOL/L (ref 22–29)
CREAT SERPL-MCNC: 0.91 MG/DL (ref 0.76–1.27)
DEPRECATED RDW RBC AUTO: 43.2 FL (ref 37–54)
EGFRCR SERPLBLD CKD-EPI 2021: 86.3 ML/MIN/1.73
EOSINOPHIL # BLD AUTO: 0.13 10*3/MM3 (ref 0–0.4)
EOSINOPHIL NFR BLD AUTO: 2.6 % (ref 0.3–6.2)
ERYTHROCYTE [DISTWIDTH] IN BLOOD BY AUTOMATED COUNT: 12.1 % (ref 12.3–15.4)
GLOBULIN UR ELPH-MCNC: 1.7 GM/DL
GLUCOSE SERPL-MCNC: 116 MG/DL (ref 65–99)
HCT VFR BLD AUTO: 39.3 % (ref 37.5–51)
HGB BLD-MCNC: 13.1 G/DL (ref 13–17.7)
LYMPHOCYTES # BLD AUTO: 0.52 10*3/MM3 (ref 0.7–3.1)
LYMPHOCYTES NFR BLD AUTO: 10.3 % (ref 19.6–45.3)
MCH RBC QN AUTO: 32 PG (ref 26.6–33)
MCHC RBC AUTO-ENTMCNC: 33.3 G/DL (ref 31.5–35.7)
MCV RBC AUTO: 96.1 FL (ref 79–97)
MONOCYTES # BLD AUTO: 0.45 10*3/MM3 (ref 0.1–0.9)
MONOCYTES NFR BLD AUTO: 8.9 % (ref 5–12)
NEUTROPHILS NFR BLD AUTO: 3.91 10*3/MM3 (ref 1.7–7)
NEUTROPHILS NFR BLD AUTO: 77.6 % (ref 42.7–76)
PLATELET # BLD AUTO: 117 10*3/MM3 (ref 140–450)
PMV BLD AUTO: 10.6 FL (ref 6–12)
POTASSIUM SERPL-SCNC: 4 MMOL/L (ref 3.5–5.2)
PROT SERPL-MCNC: 5.8 G/DL (ref 6–8.5)
QT INTERVAL: 461 MS
QTC INTERVAL: 476 MS
RBC # BLD AUTO: 4.09 10*6/MM3 (ref 4.14–5.8)
SODIUM SERPL-SCNC: 140 MMOL/L (ref 136–145)
WBC NRBC COR # BLD AUTO: 5.04 10*3/MM3 (ref 3.4–10.8)

## 2024-02-04 PROCEDURE — 93010 ELECTROCARDIOGRAM REPORT: CPT | Performed by: INTERNAL MEDICINE

## 2024-02-04 PROCEDURE — 73560 X-RAY EXAM OF KNEE 1 OR 2: CPT

## 2024-02-04 PROCEDURE — 93005 ELECTROCARDIOGRAM TRACING: CPT | Performed by: PHYSICIAN ASSISTANT

## 2024-02-04 PROCEDURE — 80053 COMPREHEN METABOLIC PANEL: CPT | Performed by: PHYSICIAN ASSISTANT

## 2024-02-04 PROCEDURE — 85025 COMPLETE CBC W/AUTO DIFF WBC: CPT | Performed by: PHYSICIAN ASSISTANT

## 2024-02-04 PROCEDURE — 0LSR0ZZ REPOSITION LEFT KNEE TENDON, OPEN APPROACH: ICD-10-PCS | Performed by: ORTHOPAEDIC SURGERY

## 2024-02-04 PROCEDURE — 99285 EMERGENCY DEPT VISIT HI MDM: CPT

## 2024-02-04 PROCEDURE — 0LSQ0ZZ REPOSITION RIGHT KNEE TENDON, OPEN APPROACH: ICD-10-PCS | Performed by: ORTHOPAEDIC SURGERY

## 2024-02-04 RX ORDER — SODIUM CHLORIDE 0.9 % (FLUSH) 0.9 %
10 SYRINGE (ML) INJECTION AS NEEDED
Status: DISCONTINUED | OUTPATIENT
Start: 2024-02-04 | End: 2024-02-07 | Stop reason: HOSPADM

## 2024-02-04 RX ORDER — MELATONIN
5000 DAILY
Status: DISCONTINUED | OUTPATIENT
Start: 2024-02-05 | End: 2024-02-07 | Stop reason: HOSPADM

## 2024-02-04 RX ORDER — ONDANSETRON 2 MG/ML
4 INJECTION INTRAMUSCULAR; INTRAVENOUS EVERY 6 HOURS PRN
Status: DISCONTINUED | OUTPATIENT
Start: 2024-02-04 | End: 2024-02-05 | Stop reason: SDUPTHER

## 2024-02-04 RX ORDER — BISOPROLOL FUMARATE 5 MG/1
5 TABLET, FILM COATED ORAL DAILY
Status: DISCONTINUED | OUTPATIENT
Start: 2024-02-04 | End: 2024-02-07 | Stop reason: HOSPADM

## 2024-02-04 RX ORDER — SODIUM CHLORIDE 0.9 % (FLUSH) 0.9 %
3-10 SYRINGE (ML) INJECTION AS NEEDED
Status: DISCONTINUED | OUTPATIENT
Start: 2024-02-04 | End: 2024-02-07 | Stop reason: HOSPADM

## 2024-02-04 RX ORDER — HYDROCODONE BITARTRATE AND ACETAMINOPHEN 5; 325 MG/1; MG/1
1 TABLET ORAL EVERY 4 HOURS PRN
Status: DISCONTINUED | OUTPATIENT
Start: 2024-02-04 | End: 2024-02-05 | Stop reason: SDUPTHER

## 2024-02-04 RX ORDER — DOCUSATE SODIUM 100 MG/1
100 CAPSULE, LIQUID FILLED ORAL 2 TIMES DAILY PRN
Status: DISCONTINUED | OUTPATIENT
Start: 2024-02-04 | End: 2024-02-07 | Stop reason: HOSPADM

## 2024-02-04 RX ORDER — AMLODIPINE BESYLATE 2.5 MG/1
2.5 TABLET ORAL DAILY
Status: DISCONTINUED | OUTPATIENT
Start: 2024-02-04 | End: 2024-02-07 | Stop reason: HOSPADM

## 2024-02-04 RX ORDER — ACYCLOVIR 400 MG/1
400 TABLET ORAL DAILY
Status: DISCONTINUED | OUTPATIENT
Start: 2024-02-04 | End: 2024-02-07 | Stop reason: HOSPADM

## 2024-02-04 RX ORDER — FINASTERIDE 5 MG/1
5 TABLET, FILM COATED ORAL DAILY
Status: DISCONTINUED | OUTPATIENT
Start: 2024-02-04 | End: 2024-02-07 | Stop reason: HOSPADM

## 2024-02-04 RX ORDER — ACETAMINOPHEN 650 MG/1
650 SUPPOSITORY RECTAL EVERY 4 HOURS PRN
Status: DISCONTINUED | OUTPATIENT
Start: 2024-02-04 | End: 2024-02-07 | Stop reason: HOSPADM

## 2024-02-04 RX ORDER — SODIUM CHLORIDE 9 MG/ML
40 INJECTION, SOLUTION INTRAVENOUS AS NEEDED
Status: DISCONTINUED | OUTPATIENT
Start: 2024-02-04 | End: 2024-02-07 | Stop reason: HOSPADM

## 2024-02-04 RX ORDER — PROPAFENONE HYDROCHLORIDE 150 MG/1
150 TABLET, COATED ORAL 3 TIMES DAILY
Status: DISCONTINUED | OUTPATIENT
Start: 2024-02-04 | End: 2024-02-07 | Stop reason: HOSPADM

## 2024-02-04 RX ORDER — UREA 10 %
3 LOTION (ML) TOPICAL NIGHTLY PRN
Status: DISCONTINUED | OUTPATIENT
Start: 2024-02-04 | End: 2024-02-07 | Stop reason: HOSPADM

## 2024-02-04 RX ORDER — ACETAMINOPHEN 160 MG/5ML
650 SOLUTION ORAL EVERY 4 HOURS PRN
Status: DISCONTINUED | OUTPATIENT
Start: 2024-02-04 | End: 2024-02-07 | Stop reason: HOSPADM

## 2024-02-04 RX ORDER — TAMSULOSIN HYDROCHLORIDE 0.4 MG/1
0.8 CAPSULE ORAL 2 TIMES DAILY
Status: DISCONTINUED | OUTPATIENT
Start: 2024-02-04 | End: 2024-02-04

## 2024-02-04 RX ORDER — ACETAMINOPHEN 325 MG/1
650 TABLET ORAL EVERY 4 HOURS PRN
Status: DISCONTINUED | OUTPATIENT
Start: 2024-02-04 | End: 2024-02-07 | Stop reason: HOSPADM

## 2024-02-04 RX ORDER — ASCORBIC ACID 500 MG
1000 TABLET ORAL DAILY
Status: DISCONTINUED | OUTPATIENT
Start: 2024-02-04 | End: 2024-02-07 | Stop reason: HOSPADM

## 2024-02-04 RX ORDER — ASPIRIN 81 MG/1
81 TABLET ORAL DAILY
Status: DISCONTINUED | OUTPATIENT
Start: 2024-02-05 | End: 2024-02-07

## 2024-02-04 RX ORDER — ONDANSETRON 4 MG/1
4 TABLET, ORALLY DISINTEGRATING ORAL EVERY 6 HOURS PRN
Status: DISCONTINUED | OUTPATIENT
Start: 2024-02-04 | End: 2024-02-05 | Stop reason: SDUPTHER

## 2024-02-04 RX ORDER — AMLODIPINE BESYLATE 2.5 MG/1
2.5 TABLET ORAL DAILY
Status: DISCONTINUED | OUTPATIENT
Start: 2024-02-04 | End: 2024-02-04

## 2024-02-04 RX ORDER — LATANOPROST 50 UG/ML
1 SOLUTION/ DROPS OPHTHALMIC NIGHTLY
Status: DISCONTINUED | OUTPATIENT
Start: 2024-02-04 | End: 2024-02-07 | Stop reason: HOSPADM

## 2024-02-04 RX ORDER — ROSUVASTATIN CALCIUM 10 MG/1
10 TABLET, COATED ORAL NIGHTLY
Status: DISCONTINUED | OUTPATIENT
Start: 2024-02-04 | End: 2024-02-07 | Stop reason: HOSPADM

## 2024-02-04 RX ORDER — SODIUM CHLORIDE 0.9 % (FLUSH) 0.9 %
3 SYRINGE (ML) INJECTION EVERY 12 HOURS SCHEDULED
Status: DISCONTINUED | OUTPATIENT
Start: 2024-02-04 | End: 2024-02-07 | Stop reason: HOSPADM

## 2024-02-04 RX ORDER — FAMOTIDINE 20 MG/1
10 TABLET, FILM COATED ORAL 2 TIMES DAILY PRN
Status: DISCONTINUED | OUTPATIENT
Start: 2024-02-04 | End: 2024-02-07 | Stop reason: HOSPADM

## 2024-02-04 RX ORDER — FINASTERIDE 5 MG/1
5 TABLET, FILM COATED ORAL DAILY
Status: DISCONTINUED | OUTPATIENT
Start: 2024-02-04 | End: 2024-02-04

## 2024-02-04 RX ORDER — TAMSULOSIN HYDROCHLORIDE 0.4 MG/1
0.4 CAPSULE ORAL 2 TIMES DAILY
Status: DISCONTINUED | OUTPATIENT
Start: 2024-02-04 | End: 2024-02-07 | Stop reason: HOSPADM

## 2024-02-04 RX ORDER — TAMSULOSIN HYDROCHLORIDE 0.4 MG/1
0.8 CAPSULE ORAL DAILY
Status: DISCONTINUED | OUTPATIENT
Start: 2024-02-05 | End: 2024-02-04

## 2024-02-04 RX ADMIN — TAMSULOSIN HYDROCHLORIDE 0.4 MG: 0.4 CAPSULE ORAL at 21:48

## 2024-02-04 RX ADMIN — LATANOPROST 1 DROP: 50 SOLUTION/ DROPS OPHTHALMIC at 21:48

## 2024-02-04 RX ADMIN — PROPAFENONE HYDROCHLORIDE 150 MG: 150 TABLET, COATED ORAL at 21:48

## 2024-02-04 RX ADMIN — AMLODIPINE BESYLATE 2.5 MG: 2.5 TABLET ORAL at 21:48

## 2024-02-04 RX ADMIN — FINASTERIDE 5 MG: 5 TABLET, FILM COATED ORAL at 21:48

## 2024-02-04 RX ADMIN — OXYCODONE HYDROCHLORIDE AND ACETAMINOPHEN 1000 MG: 500 TABLET ORAL at 16:08

## 2024-02-04 RX ADMIN — ROSUVASTATIN CALCIUM 10 MG: 10 TABLET, FILM COATED ORAL at 21:48

## 2024-02-04 RX ADMIN — Medication 3 ML: at 16:12

## 2024-02-04 RX ADMIN — PROPAFENONE HYDROCHLORIDE 150 MG: 150 TABLET, COATED ORAL at 16:08

## 2024-02-04 RX ADMIN — Medication 3 ML: at 21:51

## 2024-02-04 NOTE — ED PROVIDER NOTES
EMERGENCY DEPARTMENT MD ATTESTATION NOTE    SHARED VISIT: This visit was performed by BOTH a physician and an APC. The substantive portion of the medical decision making was performed by this attesting physician who made or approved the management plan and takes responsibility for patient management. All studies documented in the APC note (if performed) were independently interpreted by me.    The KIA[facility] and I have discussed this patient's history, physical exam, MDM, and treatment plan.  I have reviewed the documentation and personally had a face to face interaction with the patient. The attached note describes my personal findings.        Room Number:  29/29  PCP: Damián Vuong Jr., MD  Independent Historians: Patient and Family    HPI:    Context: Angel Remy is a 78 y.o. male with a medical history of hairy cell leukemia, hypertension, LBP who presents to the ED c/o acute bilateral knee injury.  Patient had a mechanical fall while attempting to get down from a ladder.  Patient landed on the anterior portion of both knees.  Patient did not hit his head.  Patient is not experiencing any pain but is unable to bear weight.    Review of prior external notes (non-ED) -and- Review of prior external test results outside of this encounter: Office visit with oncology from 1/4/2024 reviewed and notable for visit secondary to hairy cell leukemia.  Patient had completed cladribine and Rituxan and is noted to be in remission 1/4/2024.  Plan to follow-up in 6 months.      PHYSICAL EXAM    I have reviewed the triage vital signs and nursing notes.    ED Triage Vitals [02/04/24 1123]   Temp Heart Rate Resp BP SpO2   98.1 °F (36.7 °C) 58 16 169/70 99 %      Temp src Heart Rate Source Patient Position BP Location FiO2 (%)   Tympanic Monitor -- -- --       Physical Exam  GENERAL: alert, no acute distress  SKIN: Warm, dry  HENT: Normocephalic, atraumatic  EYES: no scleral icterus  CV: regular rhythm, regular  rate  RESPIRATORY: normal effort, lungs clear  ABDOMEN: soft, nontender, nondistended  MUSCULOSKELETAL: Swelling with obvious deformity to bilateral anterior knees.  NEURO: alert, moves all extremities, follows commands      MEDICATIONS GIVEN IN ER  Medications   sodium chloride 0.9 % flush 10 mL (has no administration in time range)   sodium chloride 0.9 % flush 3 mL (has no administration in time range)   sodium chloride 0.9 % flush 3-10 mL (has no administration in time range)   sodium chloride 0.9 % infusion 40 mL (has no administration in time range)   acetaminophen (TYLENOL) tablet 650 mg (has no administration in time range)     Or   acetaminophen (TYLENOL) 160 MG/5ML oral solution 650 mg (has no administration in time range)     Or   acetaminophen (TYLENOL) suppository 650 mg (has no administration in time range)   famotidine (PEPCID) tablet 10 mg (has no administration in time range)   ondansetron ODT (ZOFRAN-ODT) disintegrating tablet 4 mg (has no administration in time range)     Or   ondansetron (ZOFRAN) injection 4 mg (has no administration in time range)   melatonin tablet 3 mg (has no administration in time range)   Potassium Replacement - Follow Nurse / BPA Driven Protocol (has no administration in time range)   Magnesium Low Dose Replacement - Follow Nurse / BPA Driven Protocol (has no administration in time range)   Phosphorus Replacement - Follow Nurse / BPA Driven Protocol (has no administration in time range)   Calcium Replacement - Follow Nurse / BPA Driven Protocol (has no administration in time range)   HYDROcodone-acetaminophen (NORCO) 5-325 MG per tablet 1 tablet (has no administration in time range)   docusate sodium (COLACE) capsule 100 mg (has no administration in time range)   acyclovir (ZOVIRAX) tablet 400 mg (has no administration in time range)   amLODIPine (NORVASC) tablet 2.5 mg (has no administration in time range)   bisoprolol (ZEBeta) tablet 5 mg (has no administration in time  range)   latanoprost (XALATAN) 0.005 % ophthalmic solution 1 drop (has no administration in time range)   finasteride (PROSCAR) tablet 5 mg (has no administration in time range)   tamsulosin (FLOMAX) 24 hr capsule 0.8 mg (has no administration in time range)   rosuvastatin (CRESTOR) tablet 10 mg (has no administration in time range)   propafenone (RYTHMOL) tablet 150 mg (has no administration in time range)   ascorbic acid (VITAMIN C) tablet 1,000 mg (has no administration in time range)   vitamin D3 capsule 5,000 Units (has no administration in time range)   magnesium oxide (MAG-OX) tablet 400 mg (has no administration in time range)   aspirin EC tablet 81 mg (has no administration in time range)         ORDERS PLACED DURING THIS VISIT:  Orders Placed This Encounter   Procedures    XR Knee 1 or 2 View Bilateral    Comprehensive Metabolic Panel    CBC Auto Differential    NPO Diet NPO Type: Sips with Meds    Vital Signs    Notify Provider (With Default Parameters)    Intake & Output    Weigh Patient    Oral Care    Saline Lock & Maintain IV Access    Activity - Ad Keely    Up With Assistance    Code Status and Medical Interventions:    LHA (on-call MD unless specified) Details    Ortho (on-call MD unless specified)    Oxygen Therapy- Nasal Cannula; Titrate 1-6 LPM Per SpO2; 90 - 95%    ECG 12 Lead Pre-Op / Pre-Procedure    ECG 12 Lead Bradycardia    Insert Peripheral IV    Insert Peripheral IV    Inpatient Admission    CBC & Differential       PROGRESS, DATA ANALYSIS, CONSULTS, AND MEDICAL DECISION MAKING  All labs have been independently interpreted by me.  All radiology studies have been reviewed by me. All EKG's have been independently viewed and interpreted by me.  Discussion below represents my analysis of pertinent findings related to patient's condition, differential diagnosis, treatment plan and final disposition.    Differential diagnosis includes but is not limited to knee dislocation, patellar fracture,  femur fracture.    Clinical Scores:                  ED Course as of 02/04/24 1336   Sun Feb 04, 2024   1138 Patient presents with bilateral knee pain after mechanical fall prior to arrival.  On exam patient has deformities of his patellae bilaterally.  I suspect likely bilateral fractures.  Will plan for x-rays and admission.  He declines pain medicine at this time. [EE]   1224 Bilateral knee x-rays independently interpreted myself show patella fractures bilaterally. [EE]   1232 WBC: 5.04 [EE]   1232 Hemoglobin: 13.1 [EE]   1232 I discussed the case with Dr. Sagastume, [facility].  He agrees to admit. [EE]   1250 EKG interpreted by me demonstrates sinus rhythm, rate of 64, no QT prolongation, no ST elevation, supraventricular bigeminy, left bundle branch block [MW]   1256 Creatinine: 0.91 [EE]   1256 Potassium: 4.0 [EE]   1259 I discussed the case with Dr. Roberson, orthopedics.  He  agrees to consult.  He plans for surgery tomorrow. [EE]   1259 Patient has been updated.  He has been placed in knee immobilizers bilaterally.  He understands plan for admission. [EE]      ED Course User Index  [EE] Alvaro Miles PA  [MW] Randy Oshea MD       MDM: 78-year-old male presenting for evaluation of bilateral knee injuries.  Patient had mechanical fall stepping off a ladder and landed on his knees.  X-ray imaging shows bilateral patella fractures.  Orthopedics consulted.  Patient denies any pain at this time.  Patient placed in bilateral immobilizers with plan for operative intervention tomorrow.  Patient will be admitted for further evaluation and management.      COMPLEXITY OF CARE  The patient requires admission.    Please note that portions of this document were completed with a voice recognition program.    Note Disclaimer: At [facility], we believe that sharing information builds trust and better relationships. You are receiving this note because you recently visited [facility]. It is possible you will see [facility]  information before a provider has talked with you about it. This kind of information can be easy to misunderstand. To help you fully understand what it means for your health, we urge you to discuss this note with your provider.         Randy Oshea MD  02/04/24 [id number]

## 2024-02-04 NOTE — ED TRIAGE NOTES
Patient to ED per EMS from home w/ reports of bilateral knee pain after mechanical fall on bottom step.

## 2024-02-04 NOTE — H&P
[facility] Medicine Services  HISTORY AND PHYSICAL    Patient Name: Angel Remy  : 1945  MRN: [id number]  Primary Care Physician: Damián Vuong Jr., MD  Date of admission: 2024    Subjective   Subjective   Chief Complaint:  Fall, knee pain, difficulty walking    HPI:  Angel Remy is a 78 y.o. male with past medical history as listed below presents to the hospital after a fall at home that occurred when he was walking downstairs.  Reportedly when patient got to the last stair he lost his footing and slipped and went down on both knees anteriorly.  He had minimal pain in the patellar region but significant swelling and he was unable to bear weight on his legs.  Patient denies trauma to any other region of the body.  In the emergency room he was found to have bilateral patellar injuries and is being hospitalized for orthopedic surgery evaluation.  Patient notes he is very athletic and able to walk up a flight of stairs or around the block without any palpitations or chest pain.  He denies any recent cardiac symptoms and notes his last stress test was completely normal.  He is eager to get surgery soon as possible so that he can get home and back to walking.      Review of Systems   Constitutional: Negative.    HENT: Negative.     Eyes: Negative.    Respiratory: Negative.     Cardiovascular: Negative.    Gastrointestinal: Negative.    Endocrine: Negative.    Genitourinary: Negative.    Musculoskeletal:         As per HPI difficulty walking after fall   Skin: Negative.    Allergic/Immunologic: Negative.    Neurological: Negative.    Hematological: Negative.    Psychiatric/Behavioral: Negative.            All other systems reviewed and are negative.     Personal History     Past Medical History:   Diagnosis Date    BCC (basal cell carcinoma of skin) 2022    NOSE    Benign prostate hyperplasia     H/O Elevated PSA     H/O Hairy cell leukemia (CMS/HCC)     H/O Internal thrombosed  hemorrhoids     H/O minimal right carotid plaque     H/O peripheral neuropathy     Hyperlipidemia     Hypertension     Primary open-angle glaucoma, bilateral, mild stage        Past Surgical History:   Procedure Laterality Date    BONE MARROW BIOPSY W/ ASPIRATION      COLONOSCOPY      COLONOSCOPY N/A 9/25/2021    Procedure: COLONOSCOPY TO CECUM AND INTO TI WITH COLD BIOPSY POLYPECTOMIES;  Surgeon: Juan Jenkins MD;  Location: [facility] ENDOSCOPY;  Service: Gastroenterology;  Laterality: N/A;  pre: family history of colon cancer  post: polyps, diverticulosis  and internal hemorrhoids    EXCISION MASS HEAD/NECK N/A 6/23/2022    Procedure: EXCISION BASAL CELL CARCINOMA NOSE WITH FROZEN SECTION FULL THICKNESS GRAFT;  Surgeon: Arturo Weiss MD;  Location: [facility] OR [facility];  Service: Plastics;  Laterality: N/A;    HERNIA REPAIR Right 02/2000    Inguinal     OTHER SURGICAL HISTORY  1989    Median nerve foreign body excision    PROSTATE BIOPSY      March 2010 and October 2011 whan PSA had acceleration    SIGMOIDOSCOPY  1999    With small thrombosed internal hemorrhoid.    TONSILLECTOMY      VENOUS ACCESS DEVICE (PORT) INSERTION Right 10/18/2023    Procedure: MEDIPORT PLACEMENTWITH SUPERIOR VENACAVAGRAM;  Surgeon: Randy Fan MD;  Location: [facility] MAIN OR;  Service: Vascular;  Laterality: Right;    WISDOM TOOTH EXTRACTION  1961       Family History: family history includes Cancer in his father; Cancer (age of onset: 90) in his mother; Hypertension in his father, mother, and sister. Other pertinent FHx was reviewed and unremarkable.     Social History:  reports that he has quit smoking. His smoking use included cigarettes. He smoked an average of .5 packs per day. He has never used smokeless tobacco. He reports current alcohol use. He reports that he does not use drugs.      Medications:  Available home medication information reviewed.    No Known Allergies    Objective   Objective   Vital Signs:   Temp:  [98.1 °F  (36.7 °C)] 98.1 °F (36.7 °C)  Heart Rate:  [58-84] 84  Resp:  [16] 16  BP: (127-169)/(68-76) 127/68        Physical Exam   Constitutional: Awake, alert, no acute distress  Eyes: PERRLA, sclerae anicteric, no conjunctival injection  HENT: NCAT, mucous membranes moist  Neck: Supple, no thyromegaly, no lymphadenopathy, trachea midline  Respiratory: No cough or wheezes, good inspiration, nonlabored respirations   Cardiovascular: Pulse rate is normal, palpable radial pulses bilaterally  Gastrointestinal:  Soft, nontender, nondistended  Musculoskeletal:  Normal musculature for age, no lower extremity edema, BMI 23  Psychiatric: Appropriate affect, cooperative, conversational  Neurologic: No slurred speech or facial droop, follows commands  Skin: No rashes or jaundice, warm      Results from last 7 days   Lab Units 02/04/24  1204   WBC 10*3/mm3 5.04   HEMOGLOBIN g/dL 13.1   HEMATOCRIT % 39.3   PLATELETS 10*3/mm3 117*     Results from last 7 days   Lab Units 02/04/24  1204   SODIUM mmol/L 140   POTASSIUM mmol/L 4.0   CHLORIDE mmol/L 104   CO2 mmol/L 26.0   BUN mg/dL 24*   CREATININE mg/dL 0.91   GLUCOSE mg/dL 116*   CALCIUM mg/dL 8.7   ALK PHOS U/L 43   ALT (SGPT) U/L 23   AST (SGOT) U/L 19     Estimated Creatinine Clearance: 69.6 mL/min (by C-G formula based on SCr of 0.91 mg/dL).  Brief Urine Lab Results  (Last result in the past 365 days)        Color   Clarity   Blood   Leuk Est   Nitrite   Protein   CREAT   Urine HCG        05/23/23 1617 Yellow   Clear   Small (1+)   Negative   Negative   Negative                 Imaging Results (Last 24 Hours)       Procedure Component Value Units Date/Time    XR Knee 1 or 2 View Bilateral [[id number]] Resulted: 02/04/24 1237     Updated: 02/04/24 1237          Results for orders placed during the hospital encounter of 05/23/23    Adult Transthoracic Echo Complete W/ Cont if Necessary Per Protocol    Interpretation Summary    Left ventricular systolic function is normal. Calculated  left ventricular EF = 60.5%    Left ventricular diastolic function was indeterminate.    The left atrial cavity is moderately dilated.    Left atrial volume is moderately increased.    Estimated right ventricular systolic pressure from tricuspid regurgitation is normal (<35 mmHg). Calculated right ventricular systolic pressure from tricuspid regurgitation is 34 mmHg.      Assessment & Plan   Assessment & Plan     Active Hospital Problems    Diagnosis  POA    **Patella fracture [S82.009A]  Yes    Fall at home, initial encounter [W19.XXXA, Y92.009]  Not Applicable    Thrombocytopenia [D69.6]  Yes    History of left bundle branch block (LBBB) [Z86.79]  Not Applicable    PAF (paroxysmal atrial fibrillation) history [I48.0]  Yes    Supine hypertension [I10]  Yes    Hairy cell leukemia [C91.40]  Yes     78-year-old male presents after mechanical fall at home with bilateral knee contusion and bilateral patellar fracture.    Bilateral knee contusion, bilateral patellar fracture, difficulty walking:  Consult orthopedic surgery  Defer weightbearing status to surgical team.  Tylenol for mild pain, Norco for more severe pain.  Patient denying much pain at rest currently.  Patient is able to complete greater than 4 METS of activities without any cardiac symptoms, he exercises regularly and denies any recent chest pain or palpitations.  His previous stress test was negative.  I have ordered a perioperative EKG and he has known left bundle branch block that is chronic.  Based on current perioperative assessment guidelines he is considered low cardiac risk for this surgical intervention and wishes to proceed.    History of paroxysmal atrial fibrillation, history of hypertension:  Continue home blood pressure medication.  Continue home rhythm control.  Patient is not on anticoagulant for his atrial fibrillation but does take aspirin which we can continue.    Thrombocytopenia and history of hairy cell leukemia:  Patient reports he is  currently remission from his opinion on current treatment.  He reports chronically low platelet counts and denies any active bleeding.    Continue eyedrops for glaucoma.    Continue finasteride and Flomax for BPH.    Continue Crestor for lipids.    Otherwise plan for supportive care and symptomatic treatment.    Treatment plan discussed with patient who is in agreement.    DVT prophylaxis: Relatively low Padua score, defer to surgical service    CODE STATUS:    Code Status and Medical Interventions:   Ordered at: 02/04/24 1241     Level Of Support Discussed With:    Patient     Code Status (Patient has no pulse and is not breathing):    CPR (Attempt to Resuscitate)     Medical Interventions (Patient has pulse or is breathing):    Full Support       Angel Sagastume MD  02/04/24

## 2024-02-04 NOTE — ED PROVIDER NOTES
Continue thyroid medication  TSH elevated, T4 normal EMERGENCY DEPARTMENT ENCOUNTER    Room Number:  P490/1  Date of encounter:  2/4/2024  PCP: Damián Vuong Jr., MD  Historian: Patient, spouse  Chronic or social conditions impacting care (social determinants of health): None    HPI:  Chief Complaint: Fall  A complete HPI/ROS/PMH/PSH/SH/FH are unobtainable due to: Nothing    Context: Angel Remy is a 78 y.o. male with a history of leukemia, hyponatremia.  He presents to the ED c/o acute bilateral knee pain after mechanical fall prior to arrival.  Patient reports slipping on the last step going down to his basement.  Patient fell on bilateral flexed knees.  He denies any head, neck, trunk injuries.  He has been unable to stand or extend his knees fully.    Review of prior external notes (non-ED):   I reviewed oncology office visit from 1/4/2024.  Patient seen for hairy cell leukemia, which is in remission.    Review of prior external test results outside of this encounter:  I reviewed a CMP from 12/14/2023.  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    Anemia 05/23/2023    Macrocytosis 05/23/2023    LBBB (left bundle branch block) 05/24/2023    Prolonged P-R interval 05/24/2023    Pancytopenia 05/24/2023    Fitting and adjustment of vascular catheter 10/19/2023    Elevated blood pressure reading 02/04/2024     Resolved Ambulatory Problems     Diagnosis Date Noted    No Resolved Ambulatory Problems     Past Medical History:   Diagnosis Date    BCC (basal cell carcinoma of skin) 06/22/2022    Benign prostate hyperplasia     H/O Elevated PSA     H/O Internal thrombosed hemorrhoids     H/O minimal right carotid plaque     H/O peripheral neuropathy     Hyperlipidemia     Hypertension     Primary open-angle glaucoma, bilateral, mild stage           PAST SURGICAL HISTORY  Past Surgical History:   Procedure Laterality Date    BONE MARROW BIOPSY W/ ASPIRATION      COLONOSCOPY      COLONOSCOPY N/A 9/25/2021    Procedure: COLONOSCOPY TO CECUM AND INTO TI WITH COLD BIOPSY POLYPECTOMIES;  Surgeon: Juan Jenkins MD;  Location: [facility] ENDOSCOPY;  Service: Gastroenterology;  Laterality: N/A;  pre: family history of colon cancer  post: polyps, diverticulosis  and internal hemorrhoids    EXCISION MASS HEAD/NECK N/A 6/23/2022    Procedure: EXCISION BASAL CELL CARCINOMA NOSE WITH FROZEN SECTION FULL THICKNESS GRAFT;  Surgeon: Arturo Weiss MD;  Location: [facility] OR OSC;  Service: Plastics;  Laterality: N/A;    HERNIA REPAIR Right 02/2000    Inguinal     OTHER SURGICAL HISTORY  1989    Median nerve foreign body excision    PROSTATE BIOPSY      March 2010 and October 2011 whan PSA had acceleration    SIGMOIDOSCOPY  1999    With small thrombosed internal hemorrhoid.    TONSILLECTOMY      VENOUS ACCESS DEVICE (PORT) INSERTION Right 10/18/2023    Procedure: MEDIPORT PLACEMENTWITH SUPERIOR VENACAVAGRAM;  Surgeon: Randy Fan MD;  Location: [facility] MAIN OR;  Service: Vascular;  Laterality: Right;    WISDOM TOOTH EXTRACTION  1961         FAMILY HISTORY  Family History   Problem Relation Age of Onset    Cancer Mother 90        Breast     Hypertension Mother     Cancer Father         Unknown primary melanoma    Hypertension Father     Hypertension Sister     Malig Hyperthermia Neg Hx          SOCIAL HISTORY  Social History     Socioeconomic History    Marital status:      Spouse name: Hanna   Tobacco Use    Smoking status: Former     Packs/day: .5     Types: Cigarettes    Smokeless tobacco: Never    Tobacco comments:     Quit smoking many years ago.   Vaping Use    Vaping Use: Never used   Substance and Sexual Activity    Alcohol use: Yes     Comment: 1/2 glass of wine or beer per day    Drug use: No    Sexual activity: Defer         ALLERGIES  Patient  has no known allergies.        REVIEW OF SYSTEMS  All systems reviewed and negative except for those discussed in HPI.       PHYSICAL EXAM    I have reviewed the triage vital signs and nursing notes.    ED Triage Vitals [02/04/24 1123]   Temp Heart Rate Resp BP SpO2   98.1 °F (36.7 °C) 58 16 169/70 99 %      Temp src Heart Rate Source Patient Position BP Location FiO2 (%)   Tympanic Monitor -- -- --       Physical Exam  GENERAL: Alert, oriented, not distressed  HENT: head atraumatic, no nuchal rigidity  EYES: no scleral icterus, EOMI  CV: regular rhythm, regular rate, no murmur  RESPIRATORY: normal effort, CTA  ABDOMEN: soft, nontender  MUSCULOSKELETAL: Patient unable to extend knees.  Bilateral knees edematous and tender.  Palpable deformities of the bilateral patella.  Neurovascularly intact distally.  NEURO: alert, moves all extremities, follows commands  SKIN: warm, dry        LAB RESULTS  Recent Results (from the past 24 hour(s))   Comprehensive Metabolic Panel    Collection Time: 02/04/24 12:04 PM    Specimen: Blood   Result Value Ref Range    Glucose 116 (H) 65 - 99 mg/dL    BUN 24 (H) 8 - 23 mg/dL    Creatinine 0.91 0.76 - 1.27 mg/dL    Sodium 140 136 - 145 mmol/L    Potassium 4.0 3.5 - 5.2 mmol/L    Chloride 104 98 - 107 mmol/L    CO2 26.0 22.0 - 29.0 mmol/L    Calcium 8.7 8.6 - 10.5 mg/dL    Total Protein 5.8 (L) 6.0 - 8.5 g/dL    Albumin 4.1 3.5 - 5.2 g/dL    ALT (SGPT) 23 1 - 41 U/L    AST (SGOT) 19 1 - 40 U/L    Alkaline Phosphatase 43 39 - 117 U/L    Total Bilirubin 0.6 0.0 - 1.2 mg/dL    Globulin 1.7 gm/dL    A/G Ratio 2.4 g/dL    BUN/Creatinine Ratio 26.4 (H) 7.0 - 25.0    Anion Gap 10.0 5.0 - 15.0 mmol/L    eGFR 86.3 >60.0 mL/min/1.73   CBC Auto Differential    Collection Time: 02/04/24 12:04 PM    Specimen: Blood   Result Value Ref Range    WBC 5.04 3.40 - 10.80 10*3/mm3    RBC 4.09 (L) 4.14 - 5.80 10*6/mm3    Hemoglobin 13.1 13.0 - 17.7 g/dL    Hematocrit 39.3 37.5 - 51.0 %    MCV 96.1 79.0 - 97.0  fL    MCH 32.0 26.6 - 33.0 pg    MCHC 33.3 31.5 - 35.7 g/dL    RDW 12.1 (L) 12.3 - 15.4 %    RDW-SD 43.2 37.0 - 54.0 fl    MPV 10.6 6.0 - 12.0 fL    Platelets 117 (L) 140 - 450 10*3/mm3    Neutrophil % 77.6 (H) 42.7 - 76.0 %    Lymphocyte % 10.3 (L) 19.6 - 45.3 %    Monocyte % 8.9 5.0 - 12.0 %    Eosinophil % 2.6 0.3 - 6.2 %    Basophil % 0.2 0.0 - 1.5 %    Neutrophils, Absolute 3.91 1.70 - 7.00 10*3/mm3    Lymphocytes, Absolute 0.52 (L) 0.70 - 3.10 10*3/mm3    Monocytes, Absolute 0.45 0.10 - 0.90 10*3/mm3    Eosinophils, Absolute 0.13 0.00 - 0.40 10*3/mm3    Basophils, Absolute 0.01 0.00 - 0.20 10*3/mm3   ECG 12 Lead Pre-Op / Pre-Procedure    Collection Time: 02/04/24 12:44 PM   Result Value Ref Range    QT Interval 461 ms    QTC Interval 476 ms       Ordered the above labs and independently reviewed the results.        RADIOLOGY  XR Knee 1 or 2 View Bilateral    Result Date: 2/4/2024  Right knee radiograph  HISTORY: Fall, pain  TECHNIQUE: AP and lateral radiographs of the bilateral knees  COMPARISON: None      FINDINGS AND IMPRESSION: There are severely displaced, comminuted fractures of the bilateral patella. There is separation of major fracture fragments by 3.2 cm on the left and 3.3 cm on the right.   This report was finalized on 2/4/2024 2:44 PM by Dr. Elgin Sherman M.D on Workstation: [id number]       I ordered the above noted radiological studies. Reviewed by me and discussed with radiologist.  See dictation for official radiology interpretation.      MEDICATIONS GIVEN IN ER    Medications   sodium chloride 0.9 % flush 10 mL (has no administration in time range)   sodium chloride 0.9 % flush 3 mL (3 mL Intravenous Given 2/4/24 1612)   sodium chloride 0.9 % flush 3-10 mL (has no administration in time range)   sodium chloride 0.9 % infusion 40 mL (has no administration in time range)   acetaminophen (TYLENOL) tablet 650 mg (has no administration in time range)     Or   acetaminophen (TYLENOL) 160 MG/5ML oral  solution 650 mg (has no administration in time range)     Or   acetaminophen (TYLENOL) suppository 650 mg (has no administration in time range)   famotidine (PEPCID) tablet 10 mg (has no administration in time range)   ondansetron ODT (ZOFRAN-ODT) disintegrating tablet 4 mg (has no administration in time range)     Or   ondansetron (ZOFRAN) injection 4 mg (has no administration in time range)   melatonin tablet 3 mg (has no administration in time range)   Potassium Replacement - Follow Nurse / BPA Driven Protocol (has no administration in time range)   Magnesium Low Dose Replacement - Follow Nurse / BPA Driven Protocol (has no administration in time range)   Phosphorus Replacement - Follow Nurse / BPA Driven Protocol (has no administration in time range)   Calcium Replacement - Follow Nurse / BPA Driven Protocol (has no administration in time range)   HYDROcodone-acetaminophen (NORCO) 5-325 MG per tablet 1 tablet (has no administration in time range)   docusate sodium (COLACE) capsule 100 mg (has no administration in time range)   acyclovir (ZOVIRAX) tablet 400 mg (400 mg Oral Not Given 2/4/24 1321)   bisoprolol (ZEBeta) tablet 5 mg (5 mg Oral Not Given 2/4/24 1319)   latanoprost (XALATAN) 0.005 % ophthalmic solution 1 drop (has no administration in time range)   rosuvastatin (CRESTOR) tablet 10 mg (has no administration in time range)   propafenone (RYTHMOL) tablet 150 mg (150 mg Oral Given 2/4/24 1608)   ascorbic acid (VITAMIN C) tablet 1,000 mg (1,000 mg Oral Given 2/4/24 1608)   cholecalciferol (VITAMIN D3) tablet 5,000 Units (5,000 Units Oral Not Given 2/4/24 1321)   magnesium oxide (MAG-OX) tablet 400 mg (400 mg Oral Not Given 2/4/24 1320)   aspirin EC tablet 81 mg (has no administration in time range)   amLODIPine (NORVASC) tablet 2.5 mg (has no administration in time range)   tamsulosin (FLOMAX) 24 hr capsule 0.4 mg (has no administration in time range)   finasteride (PROSCAR) tablet 5 mg (has no  administration in time range)         ADDITIONAL ORDERS CONSIDERED BUT NOT ORDERED:  Nothing      PROGRESS, DATA ANALYSIS, CONSULTS, AND MEDICAL DECISION MAKING    All labs have been independently interpreted by myself.  All radiology studies have been independently interpreted by myself and discussed with radiologist dictating the report.   EKG's independently interpreted by myself.  Discussion below represents my analysis of pertinent findings related to patient's condition, differential diagnosis, treatment plan and final disposition.    I have discussed case with Dr. Oshea, emergency room physician.  He has performed his own bedside examination and agrees with treatment plan.    ED Course as of 02/04/24 2024   Sun Feb 04, 2024   1138 Patient presents with bilateral knee pain after mechanical fall prior to arrival.  On exam patient has deformities of his patellae bilaterally.  I suspect likely bilateral fractures.  Will plan for x-rays and admission.  He declines pain medicine at this time. [EE]   1224 Bilateral knee x-rays independently interpreted myself show patella fractures bilaterally. [EE]   1232 WBC: 5.04 [EE]   1232 Hemoglobin: 13.1 [EE]   1232 I discussed the case with Dr. Sagastume, [facility].  He agrees to admit. [EE]   1250 EKG interpreted by me demonstrates sinus rhythm, rate of 64, no QT prolongation, no ST elevation, supraventricular bigeminy, left bundle branch block [MW]   1256 Creatinine: 0.91 [EE]   1256 Potassium: 4.0 [EE]   1259 I discussed the case with Dr. Roberson, orthopedics.  He  agrees to consult.  He plans for surgery tomorrow. [EE]   1259 Patient has been updated.  He has been placed in knee immobilizers bilaterally.  He understands plan for admission. [EE]      ED Course User Index  [EE] Alvaro Miles PA  [MW] Randy Oshea MD       AS OF 20:24 EST VITALS:    BP - 104/51  HR - (!) 48  TEMP - 98.2 °F (36.8 °C) (Oral)  O2 SATS - 96%        DIAGNOSIS  Final diagnoses:   Closed displaced  transverse fracture of left patella, initial encounter   Closed displaced transverse fracture of right patella, initial encounter         DISPOSITION  Admitted    Dictated utilizing Dragon dictation     Alvaro Miles PA  02/04/24 2024

## 2024-02-04 NOTE — ED NOTES
"Nursing report ED to floor  Angel Remy  78 y.o.  male    HPI :   Chief Complaint   Patient presents with    Knee Injury    Fall       Admitting doctor:   Angel Sagastume MD    Admitting diagnosis:   The primary encounter diagnosis was Closed displaced transverse fracture of left patella, initial encounter. A diagnosis of Closed displaced transverse fracture of right patella, initial encounter was also pertinent to this visit.    Code status:   Current Code Status       Date Active Code Status Order ID Comments User Context       2/4/2024 1241 CPR (Attempt to Resuscitate) [id number]  Angel Sagastume MD ED        Question Answer    Code Status (Patient has no pulse and is not breathing) CPR (Attempt to Resuscitate)    Medical Interventions (Patient has pulse or is breathing) Full Support    Level Of Support Discussed With Patient                    Allergies:   Patient has no known allergies.    Isolation:   No active isolations    Intake and Output  No intake or output data in the 24 hours ending 02/04/24 1246    Weight:       02/04/24  1123   Weight: 73.5 kg (162 lb)       Most recent vitals:   Vitals:    02/04/24 1123   BP: 169/70   Pulse: 58   Resp: 16   Temp: 98.1 °F (36.7 °C)   TempSrc: Tympanic   SpO2: 99%   Weight: 73.5 kg (162 lb)   Height: 177.8 cm (70\")       Active LDAs/IV Access:   Lines, Drains & Airways       Active LDAs       Name Placement date Placement time Site Days    Peripheral IV 02/04/24 1204 Posterior;Right Forearm 02/04/24  1204  Forearm  less than 1    Single Lumen Implantable Port 10/18/23 Right Chest 10/18/23  1657  Chest  108                    Labs (abnormal labs have a star):   Labs Reviewed   COMPREHENSIVE METABOLIC PANEL - Abnormal; Notable for the following components:       Result Value    Glucose 116 (*)     BUN 24 (*)     Total Protein 5.8 (*)     BUN/Creatinine Ratio 26.4 (*)     All other components within normal limits    Narrative:     GFR Normal " >60  Chronic Kidney Disease <60  Kidney Failure <15    The GFR formula is only valid for adults with stable renal function between ages 18 and 70.   CBC WITH AUTO DIFFERENTIAL - Abnormal; Notable for the following components:    RBC 4.09 (*)     RDW 12.1 (*)     Platelets 117 (*)     Neutrophil % 77.6 (*)     Lymphocyte % 10.3 (*)     Lymphocytes, Absolute 0.52 (*)     All other components within normal limits   CBC AND DIFFERENTIAL    Narrative:     The following orders were created for panel order CBC & Differential.  Procedure                               Abnormality         Status                     ---------                               -----------         ------                     CBC Auto Differential[[id number]]        Abnormal            Final result                 Please view results for these tests on the individual orders.       EKG:   ECG 12 Lead Pre-Op / Pre-Procedure   Preliminary Result   HEART RATE= 64  bpm   RR Interval= 938  ms   MT Interval= 208  ms   P Horizontal Axis= 48  deg   P Front Axis= -28  deg   QRSD Interval= 151  ms   QT Interval= 461  ms   QTcB= 476  ms   QRS Axis= 6  deg   T Wave Axis= 73  deg   - ABNORMAL ECG -   Sinus rhythm   Supraventricular bigeminy   IVCD, consider atypical LBBB   Electronically Signed By:    Date and Time of Study: 2024-02-04 12:44:38      ECG 12 Lead Bradycardia    (Results Pending)       Meds given in ED:   Medications   sodium chloride 0.9 % flush 10 mL (has no administration in time range)   sodium chloride 0.9 % flush 3 mL (has no administration in time range)   sodium chloride 0.9 % flush 3-10 mL (has no administration in time range)   sodium chloride 0.9 % infusion 40 mL (has no administration in time range)   acetaminophen (TYLENOL) tablet 650 mg (has no administration in time range)     Or   acetaminophen (TYLENOL) 160 MG/5ML oral solution 650 mg (has no administration in time range)     Or   acetaminophen (TYLENOL) suppository 650 mg (has no  administration in time range)   famotidine (PEPCID) tablet 10 mg (has no administration in time range)   ondansetron ODT (ZOFRAN-ODT) disintegrating tablet 4 mg (has no administration in time range)     Or   ondansetron (ZOFRAN) injection 4 mg (has no administration in time range)   melatonin tablet 3 mg (has no administration in time range)   Potassium Replacement - Follow Nurse / BPA Driven Protocol (has no administration in time range)   Magnesium Low Dose Replacement - Follow Nurse / BPA Driven Protocol (has no administration in time range)   Phosphorus Replacement - Follow Nurse / BPA Driven Protocol (has no administration in time range)   Calcium Replacement - Follow Nurse / BPA Driven Protocol (has no administration in time range)   HYDROcodone-acetaminophen (NORCO) 5-325 MG per tablet 1 tablet (has no administration in time range)   docusate sodium (COLACE) capsule 100 mg (has no administration in time range)   acyclovir (ZOVIRAX) tablet 400 mg (has no administration in time range)   amLODIPine (NORVASC) tablet 2.5 mg (has no administration in time range)   bisoprolol (ZEBeta) tablet 5 mg (has no administration in time range)   latanoprost (XALATAN) 0.005 % ophthalmic solution 1 drop (has no administration in time range)   finasteride (PROSCAR) tablet 5 mg (has no administration in time range)   tamsulosin (FLOMAX) 24 hr capsule 0.8 mg (has no administration in time range)   rosuvastatin (CRESTOR) tablet 10 mg (has no administration in time range)   propafenone (RYTHMOL) tablet 150 mg (has no administration in time range)   ascorbic acid (VITAMIN C) tablet 1,000 mg (has no administration in time range)   vitamin D3 capsule 5,000 Units (has no administration in time range)   magnesium oxide (MAG-OX) tablet 400 mg (has no administration in time range)   aspirin EC tablet 81 mg (has no administration in time range)       Imaging results:  No radiology results for the last day    Ambulatory status:   - Unable to  ambulate at this time.    Social issues:   Social History     Socioeconomic History    Marital status:      Spouse name: Hanna   Tobacco Use    Smoking status: Former     Packs/day: .5     Types: Cigarettes    Smokeless tobacco: Never    Tobacco comments:     Quit smoking many years ago.   Vaping Use    Vaping Use: Never used   Substance and Sexual Activity    Alcohol use: Yes     Comment: 1/2 glass of wine or beer per day    Drug use: No    Sexual activity: Defer       NIH Stroke Scale:         Janie Leo RN  02/04/24 12:46 EST

## 2024-02-04 NOTE — ED NOTES
Patient states he fell from the last step and landed on both of his knees. Patient states that he is unable to stand at this point.

## 2024-02-05 ENCOUNTER — APPOINTMENT (OUTPATIENT)
Dept: GENERAL RADIOLOGY | Facility: HOSPITAL | Age: 79
End: 2024-02-05
Payer: MEDICARE

## 2024-02-05 ENCOUNTER — ANESTHESIA EVENT (OUTPATIENT)
Dept: PERIOP | Facility: HOSPITAL | Age: 79
End: 2024-02-05
Payer: MEDICARE

## 2024-02-05 ENCOUNTER — ANESTHESIA (OUTPATIENT)
Dept: PERIOP | Facility: HOSPITAL | Age: 79
End: 2024-02-05
Payer: MEDICARE

## 2024-02-05 PROCEDURE — 25810000003 LACTATED RINGERS PER 1000 ML: Performed by: ANESTHESIOLOGY

## 2024-02-05 PROCEDURE — 25010000002 CEFAZOLIN IN DEXTROSE 2-4 GM/100ML-% SOLUTION: Performed by: ORTHOPAEDIC SURGERY

## 2024-02-05 PROCEDURE — 25010000002 CEFAZOLIN IN DEXTROSE 2000 MG/ 100 ML SOLUTION: Performed by: ORTHOPAEDIC SURGERY

## 2024-02-05 PROCEDURE — 25010000002 DEXAMETHASONE SODIUM PHOSPHATE 20 MG/5ML SOLUTION: Performed by: NURSE ANESTHETIST, CERTIFIED REGISTERED

## 2024-02-05 PROCEDURE — 25010000002 PROPOFOL 10 MG/ML EMULSION: Performed by: NURSE ANESTHETIST, CERTIFIED REGISTERED

## 2024-02-05 PROCEDURE — 25010000002 SUGAMMADEX 200 MG/2ML SOLUTION: Performed by: NURSE ANESTHETIST, CERTIFIED REGISTERED

## 2024-02-05 PROCEDURE — 99222 1ST HOSP IP/OBS MODERATE 55: CPT | Performed by: ORTHOPAEDIC SURGERY

## 2024-02-05 PROCEDURE — 25010000002 PROPOFOL 200 MG/20ML EMULSION: Performed by: NURSE ANESTHETIST, CERTIFIED REGISTERED

## 2024-02-05 PROCEDURE — C1713 ANCHOR/SCREW BN/BN,TIS/BN: HCPCS | Performed by: ORTHOPAEDIC SURGERY

## 2024-02-05 PROCEDURE — L1830 KO IMMOB CANVAS LONG PRE OTS: HCPCS | Performed by: ORTHOPAEDIC SURGERY

## 2024-02-05 PROCEDURE — 76000 FLUOROSCOPY <1 HR PHYS/QHP: CPT | Performed by: ORTHOPAEDIC SURGERY

## 2024-02-05 PROCEDURE — 76000 FLUOROSCOPY <1 HR PHYS/QHP: CPT

## 2024-02-05 PROCEDURE — 27524 TREAT KNEECAP FRACTURE: CPT | Performed by: ORTHOPAEDIC SURGERY

## 2024-02-05 PROCEDURE — 25810000003 LACTATED RINGERS PER 1000 ML: Performed by: ORTHOPAEDIC SURGERY

## 2024-02-05 PROCEDURE — 25010000002 FENTANYL CITRATE (PF) 50 MCG/ML SOLUTION: Performed by: NURSE ANESTHETIST, CERTIFIED REGISTERED

## 2024-02-05 PROCEDURE — 25010000002 ONDANSETRON PER 1 MG: Performed by: NURSE ANESTHETIST, CERTIFIED REGISTERED

## 2024-02-05 DEVICE — SUT/ANCH SHLDR SUTURETAPE X/PTRN 1.3MM WHT/BLK WHT/BLU: Type: IMPLANTABLE DEVICE | Site: PATELLA | Status: FUNCTIONAL

## 2024-02-05 DEVICE — SUT TAPE W/TPR END 1/2 CIR TPR NDL 1.3MM 36IN: Type: IMPLANTABLE DEVICE | Site: PATELLA | Status: FUNCTIONAL

## 2024-02-05 DEVICE — DEV CONTRL TISS STRATAFIX SPIRAL MNCRYL UD 3/0 PLS 30CM: Type: IMPLANTABLE DEVICE | Site: PATELLA | Status: FUNCTIONAL

## 2024-02-05 RX ORDER — DEXAMETHASONE SODIUM PHOSPHATE 4 MG/ML
INJECTION, SOLUTION INTRA-ARTICULAR; INTRALESIONAL; INTRAMUSCULAR; INTRAVENOUS; SOFT TISSUE AS NEEDED
Status: DISCONTINUED | OUTPATIENT
Start: 2024-02-05 | End: 2024-02-05 | Stop reason: SURG

## 2024-02-05 RX ORDER — ONDANSETRON 4 MG/1
4 TABLET, ORALLY DISINTEGRATING ORAL EVERY 6 HOURS PRN
Status: DISCONTINUED | OUTPATIENT
Start: 2024-02-05 | End: 2024-02-07 | Stop reason: HOSPADM

## 2024-02-05 RX ORDER — PROPOFOL 10 MG/ML
INJECTION, EMULSION INTRAVENOUS AS NEEDED
Status: DISCONTINUED | OUTPATIENT
Start: 2024-02-05 | End: 2024-02-05 | Stop reason: SURG

## 2024-02-05 RX ORDER — SODIUM CHLORIDE 0.9 % (FLUSH) 0.9 %
3 SYRINGE (ML) INJECTION EVERY 12 HOURS SCHEDULED
Status: DISCONTINUED | OUTPATIENT
Start: 2024-02-05 | End: 2024-02-07 | Stop reason: HOSPADM

## 2024-02-05 RX ORDER — POLYETHYLENE GLYCOL 3350 17 G/17G
17 POWDER, FOR SOLUTION ORAL DAILY
Status: DISCONTINUED | OUTPATIENT
Start: 2024-02-06 | End: 2024-02-07 | Stop reason: HOSPADM

## 2024-02-05 RX ORDER — HYDROMORPHONE HYDROCHLORIDE 1 MG/ML
0.5 INJECTION, SOLUTION INTRAMUSCULAR; INTRAVENOUS; SUBCUTANEOUS
Status: DISCONTINUED | OUTPATIENT
Start: 2024-02-05 | End: 2024-02-07 | Stop reason: HOSPADM

## 2024-02-05 RX ORDER — DROPERIDOL 2.5 MG/ML
0.62 INJECTION, SOLUTION INTRAMUSCULAR; INTRAVENOUS
Status: DISCONTINUED | OUTPATIENT
Start: 2024-02-05 | End: 2024-02-05 | Stop reason: HOSPADM

## 2024-02-05 RX ORDER — LABETALOL HYDROCHLORIDE 5 MG/ML
5 INJECTION, SOLUTION INTRAVENOUS
Status: DISCONTINUED | OUTPATIENT
Start: 2024-02-05 | End: 2024-02-05 | Stop reason: HOSPADM

## 2024-02-05 RX ORDER — LIDOCAINE HYDROCHLORIDE 10 MG/ML
0.5 INJECTION, SOLUTION INFILTRATION; PERINEURAL ONCE AS NEEDED
Status: DISCONTINUED | OUTPATIENT
Start: 2024-02-05 | End: 2024-02-05 | Stop reason: HOSPADM

## 2024-02-05 RX ORDER — HYDROCODONE BITARTRATE AND ACETAMINOPHEN 5; 325 MG/1; MG/1
1 TABLET ORAL ONCE AS NEEDED
Status: DISCONTINUED | OUTPATIENT
Start: 2024-02-05 | End: 2024-02-05 | Stop reason: HOSPADM

## 2024-02-05 RX ORDER — ROCURONIUM BROMIDE 10 MG/ML
INJECTION, SOLUTION INTRAVENOUS AS NEEDED
Status: DISCONTINUED | OUTPATIENT
Start: 2024-02-05 | End: 2024-02-05 | Stop reason: SURG

## 2024-02-05 RX ORDER — SODIUM CHLORIDE 0.9 % (FLUSH) 0.9 %
3 SYRINGE (ML) INJECTION EVERY 12 HOURS SCHEDULED
Status: DISCONTINUED | OUTPATIENT
Start: 2024-02-05 | End: 2024-02-05 | Stop reason: HOSPADM

## 2024-02-05 RX ORDER — AMOXICILLIN 250 MG
2 CAPSULE ORAL 2 TIMES DAILY
Status: DISCONTINUED | OUTPATIENT
Start: 2024-02-05 | End: 2024-02-07 | Stop reason: HOSPADM

## 2024-02-05 RX ORDER — LIDOCAINE HYDROCHLORIDE 20 MG/ML
INJECTION, SOLUTION INFILTRATION; PERINEURAL AS NEEDED
Status: DISCONTINUED | OUTPATIENT
Start: 2024-02-05 | End: 2024-02-05 | Stop reason: SURG

## 2024-02-05 RX ORDER — FAMOTIDINE 10 MG/ML
20 INJECTION, SOLUTION INTRAVENOUS ONCE
Status: COMPLETED | OUTPATIENT
Start: 2024-02-05 | End: 2024-02-05

## 2024-02-05 RX ORDER — ACETAMINOPHEN 500 MG
500 TABLET ORAL 2 TIMES DAILY PRN
Status: DISCONTINUED | OUTPATIENT
Start: 2024-02-05 | End: 2024-02-07 | Stop reason: HOSPADM

## 2024-02-05 RX ORDER — EPHEDRINE SULFATE 50 MG/ML
5 INJECTION, SOLUTION INTRAVENOUS ONCE AS NEEDED
Status: DISCONTINUED | OUTPATIENT
Start: 2024-02-05 | End: 2024-02-05 | Stop reason: HOSPADM

## 2024-02-05 RX ORDER — FENTANYL CITRATE 50 UG/ML
INJECTION, SOLUTION INTRAMUSCULAR; INTRAVENOUS AS NEEDED
Status: DISCONTINUED | OUTPATIENT
Start: 2024-02-05 | End: 2024-02-05 | Stop reason: SURG

## 2024-02-05 RX ORDER — ONDANSETRON 2 MG/ML
4 INJECTION INTRAMUSCULAR; INTRAVENOUS EVERY 6 HOURS PRN
Status: DISCONTINUED | OUTPATIENT
Start: 2024-02-05 | End: 2024-02-07 | Stop reason: HOSPADM

## 2024-02-05 RX ORDER — FENTANYL CITRATE 50 UG/ML
50 INJECTION, SOLUTION INTRAMUSCULAR; INTRAVENOUS ONCE AS NEEDED
Status: DISCONTINUED | OUTPATIENT
Start: 2024-02-05 | End: 2024-02-05 | Stop reason: HOSPADM

## 2024-02-05 RX ORDER — BISACODYL 10 MG
10 SUPPOSITORY, RECTAL RECTAL DAILY PRN
Status: DISCONTINUED | OUTPATIENT
Start: 2024-02-05 | End: 2024-02-07 | Stop reason: HOSPADM

## 2024-02-05 RX ORDER — SODIUM CHLORIDE 0.9 % (FLUSH) 0.9 %
3-10 SYRINGE (ML) INJECTION AS NEEDED
Status: DISCONTINUED | OUTPATIENT
Start: 2024-02-05 | End: 2024-02-05 | Stop reason: HOSPADM

## 2024-02-05 RX ORDER — PHENYLEPHRINE HCL IN 0.9% NACL 1 MG/10 ML
SYRINGE (ML) INTRAVENOUS AS NEEDED
Status: DISCONTINUED | OUTPATIENT
Start: 2024-02-05 | End: 2024-02-05 | Stop reason: SURG

## 2024-02-05 RX ORDER — KETOROLAC TROMETHAMINE 30 MG/ML
15 INJECTION, SOLUTION INTRAMUSCULAR; INTRAVENOUS EVERY 6 HOURS PRN
Status: DISCONTINUED | OUTPATIENT
Start: 2024-02-05 | End: 2024-02-07 | Stop reason: HOSPADM

## 2024-02-05 RX ORDER — HYDROCODONE BITARTRATE AND ACETAMINOPHEN 7.5; 325 MG/1; MG/1
2 TABLET ORAL EVERY 4 HOURS PRN
Status: DISCONTINUED | OUTPATIENT
Start: 2024-02-05 | End: 2024-02-06

## 2024-02-05 RX ORDER — DOCUSATE SODIUM 100 MG/1
100 CAPSULE, LIQUID FILLED ORAL 2 TIMES DAILY
Status: DISCONTINUED | OUTPATIENT
Start: 2024-02-05 | End: 2024-02-07 | Stop reason: HOSPADM

## 2024-02-05 RX ORDER — FLUMAZENIL 0.1 MG/ML
0.2 INJECTION INTRAVENOUS AS NEEDED
Status: DISCONTINUED | OUTPATIENT
Start: 2024-02-05 | End: 2024-02-05 | Stop reason: HOSPADM

## 2024-02-05 RX ORDER — DIPHENHYDRAMINE HYDROCHLORIDE 50 MG/ML
12.5 INJECTION INTRAMUSCULAR; INTRAVENOUS
Status: DISCONTINUED | OUTPATIENT
Start: 2024-02-05 | End: 2024-02-05 | Stop reason: HOSPADM

## 2024-02-05 RX ORDER — SODIUM CHLORIDE, SODIUM LACTATE, POTASSIUM CHLORIDE, CALCIUM CHLORIDE 600; 310; 30; 20 MG/100ML; MG/100ML; MG/100ML; MG/100ML
100 INJECTION, SOLUTION INTRAVENOUS CONTINUOUS
Status: DISCONTINUED | OUTPATIENT
Start: 2024-02-05 | End: 2024-02-06

## 2024-02-05 RX ORDER — HYDRALAZINE HYDROCHLORIDE 20 MG/ML
5 INJECTION INTRAMUSCULAR; INTRAVENOUS
Status: DISCONTINUED | OUTPATIENT
Start: 2024-02-05 | End: 2024-02-05 | Stop reason: HOSPADM

## 2024-02-05 RX ORDER — HYDROMORPHONE HYDROCHLORIDE 1 MG/ML
0.25 INJECTION, SOLUTION INTRAMUSCULAR; INTRAVENOUS; SUBCUTANEOUS
Status: DISCONTINUED | OUTPATIENT
Start: 2024-02-05 | End: 2024-02-05 | Stop reason: HOSPADM

## 2024-02-05 RX ORDER — HYDROCODONE BITARTRATE AND ACETAMINOPHEN 7.5; 325 MG/1; MG/1
1 TABLET ORAL EVERY 4 HOURS PRN
Status: DISCONTINUED | OUTPATIENT
Start: 2024-02-05 | End: 2024-02-06

## 2024-02-05 RX ORDER — MIDAZOLAM HYDROCHLORIDE 1 MG/ML
0.5 INJECTION INTRAMUSCULAR; INTRAVENOUS
Status: DISCONTINUED | OUTPATIENT
Start: 2024-02-05 | End: 2024-02-05 | Stop reason: HOSPADM

## 2024-02-05 RX ORDER — NALOXONE HCL 0.4 MG/ML
0.2 VIAL (ML) INJECTION AS NEEDED
Status: DISCONTINUED | OUTPATIENT
Start: 2024-02-05 | End: 2024-02-05 | Stop reason: HOSPADM

## 2024-02-05 RX ORDER — CEFAZOLIN SODIUM 2 G/100ML
2 INJECTION, SOLUTION INTRAVENOUS EVERY 8 HOURS
Qty: 200 ML | Refills: 0 | Status: COMPLETED | OUTPATIENT
Start: 2024-02-05 | End: 2024-02-06

## 2024-02-05 RX ORDER — IPRATROPIUM BROMIDE AND ALBUTEROL SULFATE 2.5; .5 MG/3ML; MG/3ML
3 SOLUTION RESPIRATORY (INHALATION) ONCE AS NEEDED
Status: DISCONTINUED | OUTPATIENT
Start: 2024-02-05 | End: 2024-02-05 | Stop reason: HOSPADM

## 2024-02-05 RX ORDER — HYDROCODONE BITARTRATE AND ACETAMINOPHEN 7.5; 325 MG/1; MG/1
1 TABLET ORAL EVERY 4 HOURS PRN
Status: DISCONTINUED | OUTPATIENT
Start: 2024-02-05 | End: 2024-02-05 | Stop reason: HOSPADM

## 2024-02-05 RX ORDER — BUPIVACAINE HYDROCHLORIDE AND EPINEPHRINE 2.5; 5 MG/ML; UG/ML
INJECTION, SOLUTION INFILTRATION; PERINEURAL AS NEEDED
Status: DISCONTINUED | OUTPATIENT
Start: 2024-02-05 | End: 2024-02-05 | Stop reason: HOSPADM

## 2024-02-05 RX ORDER — NALOXONE HCL 0.4 MG/ML
0.1 VIAL (ML) INJECTION
Status: DISCONTINUED | OUTPATIENT
Start: 2024-02-05 | End: 2024-02-07 | Stop reason: HOSPADM

## 2024-02-05 RX ORDER — SODIUM CHLORIDE, SODIUM LACTATE, POTASSIUM CHLORIDE, CALCIUM CHLORIDE 600; 310; 30; 20 MG/100ML; MG/100ML; MG/100ML; MG/100ML
9 INJECTION, SOLUTION INTRAVENOUS CONTINUOUS
Status: DISCONTINUED | OUTPATIENT
Start: 2024-02-05 | End: 2024-02-07 | Stop reason: HOSPADM

## 2024-02-05 RX ORDER — PROMETHAZINE HYDROCHLORIDE 25 MG/1
25 TABLET ORAL ONCE AS NEEDED
Status: DISCONTINUED | OUTPATIENT
Start: 2024-02-05 | End: 2024-02-05 | Stop reason: HOSPADM

## 2024-02-05 RX ORDER — PROMETHAZINE HYDROCHLORIDE 25 MG/1
25 SUPPOSITORY RECTAL ONCE AS NEEDED
Status: DISCONTINUED | OUTPATIENT
Start: 2024-02-05 | End: 2024-02-05 | Stop reason: HOSPADM

## 2024-02-05 RX ORDER — FENTANYL CITRATE 50 UG/ML
25 INJECTION, SOLUTION INTRAMUSCULAR; INTRAVENOUS
Status: DISCONTINUED | OUTPATIENT
Start: 2024-02-05 | End: 2024-02-05 | Stop reason: HOSPADM

## 2024-02-05 RX ORDER — ONDANSETRON 2 MG/ML
4 INJECTION INTRAMUSCULAR; INTRAVENOUS ONCE AS NEEDED
Status: DISCONTINUED | OUTPATIENT
Start: 2024-02-05 | End: 2024-02-05 | Stop reason: HOSPADM

## 2024-02-05 RX ORDER — SODIUM CHLORIDE 9 MG/ML
40 INJECTION, SOLUTION INTRAVENOUS AS NEEDED
Status: DISCONTINUED | OUTPATIENT
Start: 2024-02-05 | End: 2024-02-07 | Stop reason: HOSPADM

## 2024-02-05 RX ORDER — MAGNESIUM HYDROXIDE 1200 MG/15ML
LIQUID ORAL AS NEEDED
Status: DISCONTINUED | OUTPATIENT
Start: 2024-02-05 | End: 2024-02-05 | Stop reason: HOSPADM

## 2024-02-05 RX ORDER — MELOXICAM 15 MG/1
15 TABLET ORAL DAILY
Status: DISCONTINUED | OUTPATIENT
Start: 2024-02-06 | End: 2024-02-07

## 2024-02-05 RX ORDER — EPHEDRINE SULFATE 50 MG/ML
INJECTION, SOLUTION INTRAVENOUS AS NEEDED
Status: DISCONTINUED | OUTPATIENT
Start: 2024-02-05 | End: 2024-02-05 | Stop reason: SURG

## 2024-02-05 RX ORDER — ONDANSETRON 2 MG/ML
INJECTION INTRAMUSCULAR; INTRAVENOUS AS NEEDED
Status: DISCONTINUED | OUTPATIENT
Start: 2024-02-05 | End: 2024-02-05 | Stop reason: SURG

## 2024-02-05 RX ORDER — CEFAZOLIN SODIUM 2 G/100ML
2000 INJECTION, SOLUTION INTRAVENOUS ONCE
Status: COMPLETED | OUTPATIENT
Start: 2024-02-05 | End: 2024-02-05

## 2024-02-05 RX ORDER — SODIUM CHLORIDE 0.9 % (FLUSH) 0.9 %
10 SYRINGE (ML) INJECTION AS NEEDED
Status: DISCONTINUED | OUTPATIENT
Start: 2024-02-05 | End: 2024-02-07 | Stop reason: HOSPADM

## 2024-02-05 RX ADMIN — Medication 3 MG: at 21:00

## 2024-02-05 RX ADMIN — CEFAZOLIN SODIUM 2000 MG: 2 INJECTION, SOLUTION INTRAVENOUS at 13:46

## 2024-02-05 RX ADMIN — Medication 5000 UNITS: at 09:10

## 2024-02-05 RX ADMIN — FINASTERIDE 5 MG: 5 TABLET, FILM COATED ORAL at 22:55

## 2024-02-05 RX ADMIN — PROPOFOL 150 MG: 10 INJECTION, EMULSION INTRAVENOUS at 14:04

## 2024-02-05 RX ADMIN — EPHEDRINE SULFATE 15 MG: 50 INJECTION INTRAVENOUS at 14:15

## 2024-02-05 RX ADMIN — EPHEDRINE SULFATE 10 MG: 50 INJECTION INTRAVENOUS at 14:29

## 2024-02-05 RX ADMIN — ROSUVASTATIN CALCIUM 10 MG: 10 TABLET, FILM COATED ORAL at 21:49

## 2024-02-05 RX ADMIN — FAMOTIDINE 20 MG: 10 INJECTION INTRAVENOUS at 13:20

## 2024-02-05 RX ADMIN — PROPAFENONE HYDROCHLORIDE 150 MG: 150 TABLET, COATED ORAL at 21:50

## 2024-02-05 RX ADMIN — TAMSULOSIN HYDROCHLORIDE 0.4 MG: 0.4 CAPSULE ORAL at 21:49

## 2024-02-05 RX ADMIN — LIDOCAINE HYDROCHLORIDE 40 MG: 20 INJECTION, SOLUTION INFILTRATION; PERINEURAL at 14:04

## 2024-02-05 RX ADMIN — SUGAMMADEX 200 MG: 100 INJECTION, SOLUTION INTRAVENOUS at 16:52

## 2024-02-05 RX ADMIN — Medication 3 ML: at 22:16

## 2024-02-05 RX ADMIN — CEFAZOLIN SODIUM 2 G: 2 INJECTION, SOLUTION INTRAVENOUS at 21:01

## 2024-02-05 RX ADMIN — OXYCODONE HYDROCHLORIDE AND ACETAMINOPHEN 1000 MG: 500 TABLET ORAL at 09:11

## 2024-02-05 RX ADMIN — ONDANSETRON 4 MG: 2 INJECTION INTRAMUSCULAR; INTRAVENOUS at 16:48

## 2024-02-05 RX ADMIN — AMLODIPINE BESYLATE 2.5 MG: 2.5 TABLET ORAL at 21:50

## 2024-02-05 RX ADMIN — SENNOSIDES AND DOCUSATE SODIUM 2 TABLET: 50; 8.6 TABLET ORAL at 09:10

## 2024-02-05 RX ADMIN — LATANOPROST 1 DROP: 50 SOLUTION/ DROPS OPHTHALMIC at 22:55

## 2024-02-05 RX ADMIN — ASPIRIN 81 MG: 81 TABLET, COATED ORAL at 09:10

## 2024-02-05 RX ADMIN — BISOPROLOL FUMARATE 5 MG: 5 TABLET, FILM COATED ORAL at 09:11

## 2024-02-05 RX ADMIN — DOCUSATE SODIUM 100 MG: 100 CAPSULE, LIQUID FILLED ORAL at 20:59

## 2024-02-05 RX ADMIN — Medication 300 MCG: at 14:09

## 2024-02-05 RX ADMIN — Medication 300 MCG: at 14:11

## 2024-02-05 RX ADMIN — TAMSULOSIN HYDROCHLORIDE 0.4 MG: 0.4 CAPSULE ORAL at 09:12

## 2024-02-05 RX ADMIN — ROCURONIUM BROMIDE 20 MG: 10 INJECTION, SOLUTION INTRAVENOUS at 15:00

## 2024-02-05 RX ADMIN — Medication 3 ML: at 09:16

## 2024-02-05 RX ADMIN — ACYCLOVIR 400 MG: 400 TABLET ORAL at 09:12

## 2024-02-05 RX ADMIN — SENNOSIDES AND DOCUSATE SODIUM 2 TABLET: 50; 8.6 TABLET ORAL at 21:00

## 2024-02-05 RX ADMIN — EPHEDRINE SULFATE 15 MG: 50 INJECTION INTRAVENOUS at 14:10

## 2024-02-05 RX ADMIN — PROPOFOL 25 MCG/KG/MIN: 10 INJECTION, EMULSION INTRAVENOUS at 14:33

## 2024-02-05 RX ADMIN — FENTANYL CITRATE 50 MCG: 50 INJECTION, SOLUTION INTRAMUSCULAR; INTRAVENOUS at 14:04

## 2024-02-05 RX ADMIN — DEXAMETHASONE SODIUM PHOSPHATE 8 MG: 4 INJECTION, SOLUTION INTRAMUSCULAR; INTRAVENOUS at 14:26

## 2024-02-05 RX ADMIN — SODIUM CHLORIDE, POTASSIUM CHLORIDE, SODIUM LACTATE AND CALCIUM CHLORIDE 100 ML/HR: 600; 310; 30; 20 INJECTION, SOLUTION INTRAVENOUS at 21:52

## 2024-02-05 RX ADMIN — MAGNESIUM OXIDE 400 MG (241.3 MG MAGNESIUM) TABLET 400 MG: TABLET at 09:11

## 2024-02-05 RX ADMIN — SODIUM CHLORIDE, POTASSIUM CHLORIDE, SODIUM LACTATE AND CALCIUM CHLORIDE 9 ML/HR: 600; 310; 30; 20 INJECTION, SOLUTION INTRAVENOUS at 12:47

## 2024-02-05 RX ADMIN — PROPAFENONE HYDROCHLORIDE 150 MG: 150 TABLET, COATED ORAL at 09:12

## 2024-02-05 RX ADMIN — ROCURONIUM BROMIDE 50 MG: 10 INJECTION, SOLUTION INTRAVENOUS at 14:04

## 2024-02-05 RX ADMIN — Medication 200 MCG: at 14:19

## 2024-02-05 RX ADMIN — Medication 300 MCG: at 14:05

## 2024-02-05 NOTE — BRIEF OP NOTE
PATELLA OPEN REDUCTION INTERNAL FIXATION  Progress Note    Angel Remy  2/5/2024    Pre-op Diagnosis:   Bilateral patella fractures       Post-Op Diagnosis Codes:   same    Procedure/CPT® Codes:        Procedure(s):  PATELLA OPEN REDUCTION INTERNAL FIXATION              Surgeon(s):  Adrián Roberson MD    Anesthesia: General    Staff:   Circulator: Calry Scruggs RN; Sri Baltazar RN  Radiology Technologist: Bayron Coulter  Scrub Person: Cceelia Narvaez; Mike Mojica; Zachariah Hernandez  Vendor Representative: [employer]  Assistant: Paulette Posada CSA  Assistant: Paulette Posada CSA      Estimated Blood Loss: 100ml    Urine Voided: * No values recorded between 2/5/2024  1:50 PM and 2/5/2024  4:14 PM *    Specimens:                None          Drains: * No LDAs found *    Findings: see dictation        Complications: none    Assistant: Paulette Posada CSA  was responsible for performing the following activities: Retraction and their skilled assistance was necessary for the success of this case.    Adrián Roberson MD     Date: 2/5/2024  Time: 16:32 EST

## 2024-02-05 NOTE — CONSULTS
History & Physical     Patient: Angel Remy    YOB: 1945    Medical Record Number: [id number]    Chief Complaints: Bilateral knee injury    History of Present Illness: 78 y.o. male seen for evaluation of bilateral patella fractures.  The injury was sustained in a fall directly onto the front of both knees yesterday.  He was brought to the emergency room and diagnosed with bilateral displaced patella fractures.  He reports normal use and function of the knees prior to this injury.  He is very active.  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      Allergies: No Known Allergies    Home Medications:      Current Facility-Administered Medications:     [MAR Hold] acetaminophen (TYLENOL) tablet 650 mg, 650 mg, Oral, Q4H PRN **OR** [MAR Hold] acetaminophen (TYLENOL) 160 MG/5ML oral solution 650 mg, 650 mg, Oral, Q4H PRN **OR** [MAR Hold] acetaminophen (TYLENOL) suppository 650 mg, 650 mg, Rectal, Q4H PRN, Angel Sagastume MD    acyclovir (ZOVIRAX) tablet 400 mg, 400 mg, Oral, Daily, Angel Sagastume MD, 400 mg at 02/05/24 0912    amLODIPine (NORVASC) tablet 2.5 mg, 2.5 mg, Oral, Daily, Angel Sagastume MD, 2.5 mg at 02/04/24 2148    [MAR Hold] ascorbic acid (VITAMIN C) tablet 1,000 mg, 1,000 mg, Oral, Daily, Angel Sagastume MD, 1,000 mg at 02/05/24 0911    [MAR Hold] aspirin EC tablet 81 mg, 81 mg, Oral, Daily, Angel Sagastume MD, 81 mg at 02/05/24 0910    bisoprolol (ZEBeta) tablet 5 mg, 5 mg, Oral, Daily, Angel Sagastume MD, 5 mg at 02/05/24 0911    [MAR Hold] Calcium Replacement - Follow Nurse / BPA Driven Protocol, , Does not apply, PRN, Angel Sagastume MD    [MAR Hold] cholecalciferol (VITAMIN D3) tablet 5,000 Units, 5,000 Units, Oral, Daily, Angel Sagastume MD, 5,000 Units at 02/05/24 0910    [MAR Hold] docusate sodium (COLACE) capsule 100 mg, 100 mg, Oral, BID PRN, Angel Sagastume,  MD    famotidine (PEPCID) tablet 10 mg, 10 mg, Oral, BID PRN, Angel Sagastume MD    fentaNYL citrate (PF) (SUBLIMAZE) injection 50 mcg, 50 mcg, Intravenous, Once PRN, Ron Torres MD    [MAR Hold] finasteride (PROSCAR) tablet 5 mg, 5 mg, Oral, Daily, Angel Sagastume MD, 5 mg at 02/04/24 2148    [MAR Hold] HYDROcodone-acetaminophen (NORCO) 5-325 MG per tablet 1 tablet, 1 tablet, Oral, Q4H PRN, Angel Sagastume MD    lactated ringers infusion, 9 mL/hr, Intravenous, Continuous, Ron Torres MD, Last Rate: 9 mL/hr at 02/05/24 1247, Restarted at 02/05/24 1350    [MAR Hold] latanoprost (XALATAN) 0.005 % ophthalmic solution 1 drop, 1 drop, Both Eyes, Nightly, Angel Sagastume MD, 1 drop at 02/04/24 2148    lidocaine (XYLOCAINE) 1 % injection 0.5 mL, 0.5 mL, Intradermal, Once PRN, Ron Torres MD    [MAR Hold] Magnesium Low Dose Replacement - Follow Nurse / BPA Driven Protocol, , Does not apply, PRN, Angel Sagastume MD    [MAR Hold] magnesium oxide (MAG-OX) tablet 400 mg, 400 mg, Oral, Daily, Angel Sagastume MD, 400 mg at 02/05/24 0911    [MAR Hold] melatonin tablet 3 mg, 3 mg, Oral, Nightly PRN, Angel Sagastume MD    midazolam (VERSED) injection 0.5 mg, 0.5 mg, Intravenous, Q5 Min PRN, Ron Torres MD    [MAR Hold] ondansetron ODT (ZOFRAN-ODT) disintegrating tablet 4 mg, 4 mg, Oral, Q6H PRN **OR** [MAR Hold] ondansetron (ZOFRAN) injection 4 mg, 4 mg, Intravenous, Q6H PRN, Angel Sagastume MD    [MAR Hold] Phosphorus Replacement - Follow Nurse / BPA Driven Protocol, , Does not apply, PRN, Mitesh, Angel Randy, MD    Potassium Replacement - Follow Nurse / BPA Driven Protocol, , Does not apply, Mitesh LIAO Stephen Matthew, MD    propafenone (RYTHMOL) tablet 150 mg, 150 mg, Oral, TID, Angel Sagastume MD, 150 mg at 02/05/24 0912    [MAR Hold] rosuvastatin (CRESTOR) tablet 10 mg, 10 mg, Oral, Nightly, Angel Sagastume  MD Randy, 10 mg at 02/04/24 2148    [MAR Hold] sennosides-docusate (PERICOLACE) 8.6-50 MG per tablet 2 tablet, 2 tablet, Oral, BID, Angel Sagastume MD, 2 tablet at 02/05/24 0910    sodium chloride (NS) irrigation solution, , , PRN, Adrián Roberson MD, 1,000 mL at 02/05/24 1534    [COMPLETED] Insert Peripheral IV, , , Once **AND** [MAR Hold] sodium chloride 0.9 % flush 10 mL, 10 mL, Intravenous, PRN, Alvaro Milse PA    [MAR Hold] sodium chloride 0.9 % flush 3 mL, 3 mL, Intravenous, Q12H, Angel Sagastume MD, 3 mL at 02/05/24 0916    sodium chloride 0.9 % flush 3 mL, 3 mL, Intravenous, Q12H, Ron Torres MD    [MAR Hold] sodium chloride 0.9 % flush 3-10 mL, 3-10 mL, Intravenous, PRN, Angel Sagastume MD    sodium chloride 0.9 % flush 3-10 mL, 3-10 mL, Intravenous, PRN, Ron Torres MD    [MAR Hold] sodium chloride 0.9 % infusion 40 mL, 40 mL, Intravenous, PRN, Angel Sagastume MD    [MAR Hold] tamsulosin (FLOMAX) 24 hr capsule 0.4 mg, 0.4 mg, Oral, BID, Angel Sagastume MD, 0.4 mg at 02/05/24 0912    Facility-Administered Medications Ordered in Other Encounters:     ePHEDrine injection, , Intravenous, PRN, Rosangela Fiona, CRNA, 10 mg at 02/05/24 1429    fentaNYL citrate (PF) (SUBLIMAZE) injection, , Intravenous, PRN, Rosangela, Fiona, CRNA, 50 mcg at 02/05/24 1404    lidocaine (XYLOCAINE) 2% injection, , Infiltration, PRN, Rosangela, Fiona, CRNA, 40 mg at 02/05/24 1404    Phenylephrine HCl (Pressors) solution prefilled syringe, , Intravenous, PRN, Rosangela, Fiona, CRNA, 200 mcg at 02/05/24 1419    propofol (DIPRIVAN) infusion 10 mg/mL 100 mL, , Intravenous, Continuous PRN, Rosangela, Fiona, CRNA, Last Rate: 11.025 mL/hr at 02/05/24 1433, 25 mcg/kg/min at 02/05/24 1433    Propofol (DIPRIVAN) injection, , Intravenous, PRN, Rosangela, Fiona, CRNA, 150 mg at 02/05/24 1404    Past Medical History:   Diagnosis Date    BCC (basal cell carcinoma of skin) 06/22/2022    NOSE     Benign prostate hyperplasia     H/O Elevated PSA     H/O Hairy cell leukemia (CMS/HCC) 2012    H/O Internal thrombosed hemorrhoids     H/O minimal right carotid plaque     H/O peripheral neuropathy     Hyperlipidemia     Hypertension     Primary open-angle glaucoma, bilateral, mild stage           Past Surgical History:   Procedure Laterality Date    BONE MARROW BIOPSY W/ ASPIRATION      COLONOSCOPY      COLONOSCOPY N/A 9/25/2021    Procedure: COLONOSCOPY TO CECUM AND INTO TI WITH COLD BIOPSY POLYPECTOMIES;  Surgeon: Juan Jenkins MD;  Location: [facility] ENDOSCOPY;  Service: Gastroenterology;  Laterality: N/A;  pre: family history of colon cancer  post: polyps, diverticulosis  and internal hemorrhoids    EXCISION MASS HEAD/NECK N/A 6/23/2022    Procedure: EXCISION BASAL CELL CARCINOMA NOSE WITH FROZEN SECTION FULL THICKNESS GRAFT;  Surgeon: Arturo Weiss MD;  Location: [facility] OR OSC;  Service: Plastics;  Laterality: N/A;    HERNIA REPAIR Right 02/2000    Inguinal     OTHER SURGICAL HISTORY  1989    Median nerve foreign body excision    PROSTATE BIOPSY      March 2010 and October 2011 whan PSA had acceleration    SIGMOIDOSCOPY  1999    With small thrombosed internal hemorrhoid.    TONSILLECTOMY      VENOUS ACCESS DEVICE (PORT) INSERTION Right 10/18/2023    Procedure: MEDIPORT PLACEMENTWITH SUPERIOR VENACAVAGRAM;  Surgeon: Randy Fan MD;  Location: [facility] MAIN OR;  Service: Vascular;  Laterality: Right;    WISDOM TOOTH EXTRACTION  1961          Social History     Occupational History    Occupation: Dermatologist     Employer: [employer]   Tobacco Use    Smoking status: Former     Packs/day: .5     Types: Cigarettes    Smokeless tobacco: Never    Tobacco comments:     Quit smoking many years ago.   Vaping Use    Vaping Use: Never used   Substance and Sexual Activity    Alcohol use: Yes     Comment: 1/2 glass of wine or beer per day    Drug use: No    Sexual activity: Defer      Social History      Social History Narrative    Not on file          Family History   Problem Relation Age of Onset    Cancer Mother 90        Breast     Hypertension Mother     Cancer Father         Unknown primary melanoma    Hypertension Father     Hypertension Sister     Malig Hyperthermia Neg Hx        Review of Systems:      Constitutional: Denies fever, shaking or chills   Eyes: Denies change in visual acuity   HEENT: Denies nasal congestion or sore throat   Respiratory: Denies cough or shortness of breath   Cardiovascular: Denies chest pain or edema  Endocrine: Denies tremors, palpitations, intolerance of heat or cold, polyuria, polydipsia.  GI: Denies abdominal pain, nausea, vomiting, bloody stools or diarrhea  : Denies frequency, urgency, incontinence, retention, or nocturia.  Musculoskeletal: Denies numbness tingling or loss of motor function except as above  Integument: Denies rash, lesion or ulceration   Neurologic: Denies headache or focal weakness, deficits  Heme: Denies epistaxis, spontaneous or excessive bleeding, epistaxis, hematuria, melena, fatigue, enlarged or tender lymph nodes.      All other pertinent positives and negatives as noted above in HPI.    Physical Exam: 78 y.o. male    Vitals:    02/04/24 2213 02/05/24 0436 02/05/24 0900 02/05/24 1223   BP: 97/58 104/59 113/59 120/57   BP Location: Left arm Left arm Left arm Left arm   Patient Position: Lying Lying Lying Lying   Pulse: 64 65 65 63   Resp: 16 16 16 16   Temp: 98.9 °F (37.2 °C) 98.1 °F (36.7 °C) 97.3 °F (36.3 °C) 98.8 °F (37.1 °C)   TempSrc: Oral Oral Oral Oral   SpO2: 96% 96% 99% 99%   Weight:       Height:           General:  Patient is awake and alert.  Appears in no acute distress or discomfort.    Psych:  Affect and demeanor are appropriate.    Eyes:  Conjunctiva and sclera appear grossly normal.  Eyes track well and EOM seem to be intact.    Dentition:  No gross abnormalities noted.    Ears:  No gross abnormalities.  Hearing adequate for  "the exam.    Cardiovascular:  Regular rate and rhythm.    Lungs:  Good chest expansion.  Breathing unlabored.    Lymph:  No palpable masses or adenopathy in the affected extremity    Bilateral lower extremity:  Braces were in place and partially removed.  There is anterior edema and ecchymosis bilaterally.  He has a small healing abrasion on the anterior aspect of the right knee.  Otherwise his skin appears benign bilaterally.  Focal tenderness noted over the anterior aspect the knees with a palpable defect in the patella bilaterally.  No palpable masses or adenopathy.  Compartments soft in the calf and foot.  Painful, limited ROM of the knees.  Unable to actively extend the knee.  Could not assess stability due to discomfort with motion.  Good strength in the ankle and toes toes with plantar flexion and dorsiflexion.  Intact sensation although slightly diminished which he says is his baseline due to peripheral neuropathy.  Brisk cap refill.  Palpable pedal pulses.    Diagnostic Tests:  Lab Results   Component Value Date    GLUCOSE 116 (H) 02/04/2024    CALCIUM 8.7 02/04/2024     02/04/2024    K 4.0 02/04/2024    CO2 26.0 02/04/2024     02/04/2024    BUN 24 (H) 02/04/2024    CREATININE 0.91 02/04/2024    EGFRIFNONA 108 09/25/2021    BCR 26.4 (H) 02/04/2024    ANIONGAP 10.0 02/04/2024     Lab Results   Component Value Date    WBC 5.04 02/04/2024    HGB 13.1 02/04/2024    HCT 39.3 02/04/2024    MCV 96.1 02/04/2024     (L) 02/04/2024     No results found for: \"INR\", \"PROTIME\"    Imaging:  Outside AP and lateral views of both knees are reviewed along with the associated reports.  He has displaced patella fractures bilaterally.  These appear to be very distal pole fractures with significant displacement.    Assessment:  Displaced bilateral patella fractures    Plan:  We had a thorough discussion regarding his options and the risks of non-surgical management versus surgery.  Given his good preoperative " function and mobility, I certainly recommend he consider surgical repair.  We discussed that I would plan to try to fix the fractures but these are very low fractures and I think there is a good chance we may have to proceed with tendon advancement.  I explained that this would be an intraoperative decision.  I explained that surgical risks include infection, hematoma, hardware related complications including failure of fixation, cutout of the fixation, failure of the tendon or patella to heal, nonunion, malunion, arthrofibrosis, persistent pain and/or loss of motion, iatrogenic nerve and/or blood vessel injury resulting in permanent weakness, numbness or dysfunction, RSD, DVT, PE, positioning related neuropraxia, and anesthesia related complications resulting in death.    Given the displacement and his good premorbid function, I consider that this injury warrants surgical intervention.  He voiced understanding of the risks, benefits, and alternative forms of treatment that were discussed and consents to proceed.  Thank you for the consultation.  Please call with any questions or concerns.    Date: 2/5/2024    Adrián Roberson MD

## 2024-02-05 NOTE — PROGRESS NOTES
[facility] Medicine Services  PROGRESS NOTE    Patient Name: Angel Remy  : 1945  MRN: [id number]    Date of Admission: 2024  Primary Care Physician: Damián Vuong Jr., MD    Subjective   Subjective     CC:  Follow-up knee injury    Subjective: Patient still notes pain is somewhat minimal.  He feels it is adequately controlled.  He is waiting for surgical intervention today and very eager to have his knees fixed.  He says he is very motivated to get better and wants to get back to exercising.  No new complaints.  No current reported fevers or chills, shortness of breath or cough, nausea or diarrhea, chest pain or palpitations.        Objective   Objective     Vital Signs:   Temp:  [97.3 °F (36.3 °C)-98.9 °F (37.2 °C)] 97.3 °F (36.3 °C)  Heart Rate:  [48-84] 65  Resp:  [16] 16  BP: ()/(51-76) 113/59        Physical Exam:  Constitutional: Awake, alert, no acute distress  Eyes: PERRLA, sclerae anicteric, no conjunctival injection  HENT: NCAT, mucous membranes moist  Neck: Supple, no thyromegaly, no lymphadenopathy, trachea midline  Respiratory: No cough or wheezes, good inspiration, nonlabored respirations   Cardiovascular: Pulse rate is normal, palpable radial pulses bilaterally  Gastrointestinal:  Soft, nontender, nondistended  Musculoskeletal: Bilateral patellar swelling and some tenderness with palpation, normal musculature for age, no lower extremity edema, BMI 23  Psychiatric: Appropriate affect, cooperative, conversational  Neurologic: No slurred speech or facial droop, follows commands  Skin: No rashes or jaundice, warm    Results Reviewed:  Results from last 7 days   Lab Units 24  1204   WBC 10*3/mm3 5.04   HEMOGLOBIN g/dL 13.1   HEMATOCRIT % 39.3   PLATELETS 10*3/mm3 117*     Results from last 7 days   Lab Units 24  1204   SODIUM mmol/L 140   POTASSIUM mmol/L 4.0   CHLORIDE mmol/L 104   CO2 mmol/L 26.0   BUN mg/dL 24*   CREATININE mg/dL 0.91   GLUCOSE mg/dL 116*    CALCIUM mg/dL 8.7   ALK PHOS U/L 43   ALT (SGPT) U/L 23   AST (SGOT) U/L 19     Estimated Creatinine Clearance: 69.6 mL/min (by C-G formula based on SCr of 0.91 mg/dL).    Microbiology Results Abnormal       None            Imaging Results (Last 24 Hours)       Procedure Component Value Units Date/Time    XR Knee 1 or 2 View Bilateral [[id number]] Collected: 02/04/24 1440     Updated: 02/04/24 1447    Narrative:      Right knee radiograph     HISTORY: Fall, pain     TECHNIQUE: AP and lateral radiographs of the bilateral knees     COMPARISON: None       Impression:      FINDINGS AND IMPRESSION:  There are severely displaced, comminuted fractures of the bilateral  patella. There is separation of major fracture fragments by 3.2 cm on  the left and 3.3 cm on the right.        This report was finalized on 2/4/2024 2:44 PM by Dr. Elgin Sherman M.D  on Workstation: [id number]               Results for orders placed during the hospital encounter of 05/23/23    Adult Transthoracic Echo Complete W/ Cont if Necessary Per Protocol    Interpretation Summary    Left ventricular systolic function is normal. Calculated left ventricular EF = 60.5%    Left ventricular diastolic function was indeterminate.    The left atrial cavity is moderately dilated.    Left atrial volume is moderately increased.    Estimated right ventricular systolic pressure from tricuspid regurgitation is normal (<35 mmHg). Calculated right ventricular systolic pressure from tricuspid regurgitation is 34 mmHg.      I have reviewed the medications:  Scheduled Meds:acyclovir, 400 mg, Oral, Daily  amLODIPine, 2.5 mg, Oral, Daily  vitamin C, 1,000 mg, Oral, Daily  aspirin, 81 mg, Oral, Daily  bisoprolol, 5 mg, Oral, Daily  cholecalciferol, 5,000 Units, Oral, Daily  finasteride, 5 mg, Oral, Daily  latanoprost, 1 drop, Both Eyes, Nightly  magnesium oxide, 400 mg, Oral, Daily  propafenone, 150 mg, Oral, TID  rosuvastatin, 10 mg, Oral, Nightly  senna-docusate sodium,  2 tablet, Oral, BID  sodium chloride, 3 mL, Intravenous, Q12H  tamsulosin, 0.4 mg, Oral, BID      Continuous Infusions:   PRN Meds:.  acetaminophen **OR** acetaminophen **OR** acetaminophen    Calcium Replacement - Follow Nurse / BPA Driven Protocol    docusate sodium    famotidine    HYDROcodone-acetaminophen    Magnesium Low Dose Replacement - Follow Nurse / BPA Driven Protocol    melatonin    ondansetron ODT **OR** ondansetron    Phosphorus Replacement - Follow Nurse / BPA Driven Protocol    Potassium Replacement - Follow Nurse / BPA Driven Protocol    [COMPLETED] Insert Peripheral IV **AND** sodium chloride    sodium chloride    sodium chloride    Assessment & Plan   Assessment & Plan     Active Hospital Problems    Diagnosis  POA    **Patella fracture [S82.009A]  Yes    Fall at home, initial encounter [W19.XXXA, Y92.009]  Not Applicable    Thrombocytopenia [D69.6]  Yes    History of left bundle branch block (LBBB) [Z86.79]  Not Applicable    PAF (paroxysmal atrial fibrillation) history [I48.0]  Yes    Supine hypertension [I10]  Yes    Hairy cell leukemia [C91.40]  Yes      Resolved Hospital Problems   No resolved problems to display.        Brief Hospital Course to date:  Angel Remy is a 78 y.o. male  after mechanical fall at home with bilateral knee contusion and bilateral patellar fracture.     Discussion/plan for today:  Pain is adequately controlled.  Patient is eager to get surgery soon as possible.  N.p.o. for today.  Timing of medications were adjusted to match patient's home dosing.  Otherwise patient is stable and treatment plan remains relatively unchanged compared to yesterday.  I will be available as needed if there is any perioperative issues.  plan discussed with nursing today and plan to follow-up with surgery and potential operation findings today.    Bilateral knee contusion, bilateral patellar fracture, difficulty walking:  Consult orthopedic surgery  Defer weightbearing status to  surgical team.  Tylenol for mild pain, Norco for more severe pain.  Patient denying much pain at rest currently.  Patient is able to complete greater than 4 METS of activities without any cardiac symptoms, he exercises regularly and denies any recent chest pain or palpitations.  His previous stress test was negative.  I have ordered a perioperative EKG and he has known left bundle branch block that is chronic.  Based on current perioperative assessment guidelines he is considered low cardiac risk for this surgical intervention and wishes to proceed.     History of paroxysmal atrial fibrillation, history of hypertension:  Continue home blood pressure medication.  Continue home rhythm control.  Patient is not on anticoagulant for his atrial fibrillation but does take aspirin which we can continue.     Thrombocytopenia and history of hairy cell leukemia:  Patient reports he is currently remission from his opinion on current treatment.  He reports chronically low platelet counts and denies any active bleeding.     Continue eyedrops for glaucoma.     Continue finasteride and Flomax for BPH.     Continue Crestor for lipids.     Otherwise plan for supportive care and symptomatic treatment.     Treatment plan discussed with patient who is in agreement.     DVT prophylaxis: Relatively low Padua score, defer to surgical service      Disposition: I expect the patient to be discharged likely home soon pending improvement.    CODE STATUS:   Code Status and Medical Interventions:   Ordered at: 02/04/24 1241     Level Of Support Discussed With:    Patient     Code Status (Patient has no pulse and is not breathing):    CPR (Attempt to Resuscitate)     Medical Interventions (Patient has pulse or is breathing):    Full Support       Angel Sagastume MD  02/05/24

## 2024-02-05 NOTE — PROGRESS NOTES
Discharge Planning Assessment  [facility]     Patient Name: Angel Remy  MRN: [id number]  Today's Date: 2/5/2024    Admit Date: 2/4/2024    Plan: to [facility] [place] [facility] at discharge   Discharge Needs Assessment       Row Name 02/05/24 1406       Living Environment    People in Home spouse    Current Living Arrangements home    Potentially Unsafe Housing Conditions none    In the past 12 months has the electric, gas, oil, or water company threatened to shut off services in your home? No    Primary Care Provided by self    Provides Primary Care For no one    Family Caregiver if Needed spouse    Quality of Family Relationships helpful;involved;supportive    Able to Return to Prior Arrangements no    Living Arrangement Comments needs acute rehab at discharge per spouse       Resource/Environmental Concerns    Resource/Environmental Concerns none    Transportation Concerns none       Transportation Needs    In the past 12 months, has lack of transportation kept you from medical appointments or from getting medications? no    In the past 12 months, has lack of transportation kept you from meetings, work, or from getting things needed for daily living? No       Food Insecurity    Within the past 12 months, you worried that your food would run out before you got the money to buy more. Never true    Within the past 12 months, the food you bought just didn't last and you didn't have money to get more. Never true       Transition Planning    Patient/Family Anticipates Transition to inpatient rehabilitation facility    Transportation Anticipated health plan transportation       Discharge Needs Assessment    Readmission Within the Last 30 Days no previous admission in last 30 days    Current Outpatient/Agency/Support Group inpatient rehabilitation facility    Equipment Currently Used at Home none    Concerns to be Addressed decision-making    Anticipated Changes Related to Illness inability to care for self    Equipment  Needed After Discharge other (see comments)  will follow    Outpatient/Agency/Support Group Needs inpatient rehabilitation facility    Discharge Facility/Level of Care Needs rehabilitation facility    Provided Post Acute Provider List? Yes    Post Acute Provider List Inpatient Rehab    Provided Post Acute Provider Quality & Resource List? Yes    Post Acute Provider Quality and Resource List Inpatient Rehab    Delivered To Support Person    Method of Delivery In person    Patient's Choice of Community Agency(s) patient's spouse states [facility] rehab    Current Discharge Risk physical impairment                   Discharge Plan       Row Name 02/05/24 1418       Plan    Plan Comments Spoke with Sandra/[facility] re[facility] and she will evaluate and follow. CHERIE Layton RN, CCP.      Row Name 02/05/24 1410       Plan    Plan to [facility] at discharge    Roadmap to Recovery Yes    Plan Comments The patient was transferred to [facility] from ER on 2/4/24. The patient is gone to surgery at this time. His spouse asked for [facility] to evaluate and follow for inpatient rehab at discharge. CCP will send the referral. CHERIE Layton RN, CCP.                  Continued Care and Services - Admitted Since 2/4/2024       Destination       Service Provider Request Status Selected Services Address Phone Fax Patient Preferred    [facility] ([facility] Pending - No Request Sent N/A [address] [address] [phone] -- --                     Demographic Summary       Row Name 02/05/24 1406       General Information    Admission Type inpatient    Arrived From emergency department    Referral Source admission list    Reason for Consult discharge planning    Preferred Language English       Contact Information    Permission Granted to Share Info With     Contact Information Obtained for                    Functional Status       Row Name 02/05/24 1414        Functional Status    Usual Activity Tolerance good    Current Activity Tolerance poor       Physical Activity    On average, how many days per week do you engage in moderate to strenuous exercise (like a brisk walk)? 7 days    On average, how many minutes do you engage in exercise at this level? 30 min    Number of minutes of exercise per week 210       Assessment of Health Literacy    How often do you have someone help you read hospital materials? Never    How often do you have a problem understanding what is told to you about your medical condition? Never    How confident are you filling out medical forms by yourself? Extremely    Health Literacy Excellent       Functional Status, IADL    Medications independent    Meal Preparation independent    Housekeeping independent    Laundry independent    Shopping independent       Mental Status    General Appearance WDL WDL       Mental Status Summary    Recent Changes in Mental Status/Cognitive Functioning no changes       Employment/    Employment Status employed full-time    Shift Worked first shift    Current or Previous Occupation healthcare                   Psychosocial    No documentation.                  Abuse/Neglect    No documentation.                  Legal    No documentation.                  Substance Abuse    No documentation.                  Patient Forms    No documentation.                     Traci Layton RN

## 2024-02-05 NOTE — DISCHARGE PLACEMENT REQUEST
"Angel Remy (78 y.o. Male)       Date of Birth   1945    Social Security Number       Address   [address] [place], [place] [address]    Home Phone   [phone]    MRN   [id number]       Catholic   None    Marital Status                               Admission Date   2/4/24    Admission Type   Emergency    Admitting Provider   Angel Sagastume MD    Attending Provider   Angel Sagastume MD    Department, Room/Bed   [facility] MAIN OR, [facility] Main OR/MAIN OR       Discharge Date       Discharge Disposition       Discharge Destination                                 Attending Provider: Angel Sagastume MD    Allergies: No Known Allergies    Isolation: None   Infection: None   Code Status: CPR    Ht: 177.8 cm (70\")   Wt: 73.5 kg (162 lb)    Admission Cmt: None   Principal Problem: Patella fracture [S82.009A]                   Active Insurance as of 2/4/2024       Primary Coverage       Payor Plan Insurance Group Employer/Plan Group    MEDICARE MEDICARE A & B        Payor Plan Address Payor Plan Phone Number Payor Plan Fax Number Effective Dates    [address] [phone]  11/1/2010 - None Entered    [facility] [address]         Subscriber Name Subscriber Birth Date Member ID       ANGEL REMY 1945 [id number]               Secondary Coverage       Payor Plan Insurance Group Employer/Plan Group    ANTH BLUE CROSS [facility] SUPP [id number]       Payor Plan Address Payor Plan Phone Number Payor Plan Fax Number Effective Dates    [address]   12/1/2016 - None Entered    [facility] [address]         Subscriber Name Subscriber Birth Date Member ID       ANGEL REMY 1945 [id number]                     Emergency Contacts        (Rel.) Home Phone Work Phone Mobile Phone    RemyHanna (Spouse) [phone]    RemyAlex (Son) [phone]    Princess Nye " (Daughter) -- -- [phone]

## 2024-02-05 NOTE — ANESTHESIA PROCEDURE NOTES
Airway  Urgency: elective    Date/Time: 2/5/2024 2:06 PM  Airway not difficult    General Information and Staff    Patient location during procedure: OR  Anesthesiologist: Linda Ceron MD  CRNA/CAA: Fiona Adames CRNA    Indications and Patient Condition  Indications for airway management: airway protection    Preoxygenated: yes  MILS not maintained throughout  Mask difficulty assessment: 1 - vent by mask    Final Airway Details  Final airway type: endotracheal airway      Successful airway: ETT  Cuffed: yes   Successful intubation technique: direct laryngoscopy  Facilitating devices/methods: intubating stylet  Endotracheal tube insertion site: oral  Blade: Markus  Blade size: 3  ETT size (mm): 8.0  Cormack-Lehane Classification: grade I - full view of glottis  Placement verified by: chest auscultation   Cuff volume (mL): 9  Measured from: lips  Number of attempts at approach: 1  Assessment: lips, teeth, and gum same as pre-op and atraumatic intubation    Additional Comments  PreO2 100% face mask, IV induction, easy mask, DVL x1, cords noted, tube through, cuff up, EBBSH, +etCO2, = chest movement, tube secured in place, atraumatic, teeth and lips intact as preop.

## 2024-02-05 NOTE — OP NOTE
Orthopaedic Operative Note    Facility: [facility]    Patient: Angel Remy    Medical Record Number: [id number]    YOB: 1945    Dictating Surgeon: Adrián Roberson M.D.*    Primary Care Physician: Damián Vuong Jr., MD    Date of Operation: 2/5/2024    Pre-Operative Diagnosis: Displaced bilateral patellar fractures    Post-Operative Diagnosis: Displaced bilateral patellar fractures    Procedure Performed: Bilateral open excision of distal pole of patella with patellar tendon advancement and repair    Surgeon: Adrián Roberson MD     Assistant: Paulette Posada whose assistance was critical for help with patient positioning, suctioning and irrigation, retraction, manipulation of the extremity for insertion of the implants, wound closure and application of the bandages.  Her assistance was critical to the success of this case.      Anesthesia: Regional followed by Gen.    Complications: None.     Estimated Blood Loss: Less than 50 mL.     Specimens: * No orders in the log *    Implants: Multiple #2 Arthrex suture tape sutures for transosseous repair of patellar tendon bilaterally    Brief Operative Indication:  Jonel presented to the hospital with displaced bilateral patella fractures.  The risks, benefits, and alternatives to reduction, internal fixation versus patellar tendon advancement were discussed in detail and he consented to proceed.    Description of the procedure in detail:  The patient and operative site were identified in the preoperative holding area.  The surgical site was marked.   Remy was then taken to the operating room and placed in the supine position.  A timeout was taken.  Preoperative antibiotics were administered.    Both lower extremities were prepped and draped in the standard sterile fashion.  I cleaned the extremities with an alcohol solution.  A Hibiclens scrub was performed.  The extremities were then prepped with 2 ChloraPrep preps.  I  allowed those to dry for 3 minutes before the draping procedure was carried out.  I elected to begin with the left side.  The extremity was exsanguinated with an Esmarch bandage and the tourniquet insufflated to 250 mmHg.    Next, an approximately 8 cm incision was fashioned over the anterior aspect of the left knee, centered over the patellar tendon.  Full-thickness medial and lateral skin flaps were developed and the extensor mechanism exposed.  As expected, there was a displaced fracture of the distal pole.  I had hoped to try to repair this.  Unfortunately, this fragment was quite small and was comminuted.  I tried to reduce it with a clamp.  The clamp simply could not hold all of the fragmentation.  The distal pole was broken into 3 separate pieces such that fixation of the fracture would have been very difficult and prone to failure.  I considered that he was better off with a patellar tendon advancement.   There was a large hematoma which was evacuated.  The fragmented distal pole the patella was carefully skeletonized and removed at this point, taking care to preserve all of the length of the patellar tendon itself.  The wound was copiously irrigated out with sterile saline and then I directed my attention to the repair.    2 #2 Arthrex suture tape sutures were placed in the patellar tendon in a [place] type fashion.  I was able to get a good bite of the tissue both stitches.  3 transosseous tunnels were then drilled in the patella from caudal to cephalad following debridement of the inferior pole of the patella back to bleeding, healthy-appearing bone to create a healing response.  The sutures from the patellar tendon were then carefully shuttled through these transosseous tunnels.  The leg was taken out into full extension.  The sutures were tunneled beneath the quadriceps tendon proximally and then tied using 6 throw surgeon's knots.  This afforded an excellent repair of the tendon with good approximation  of the tissue down to the inferior pole of the patella.      At this point, multiple 0 Vicryl sutures were used to repair the defects in the retinaculum, both medially and laterally.  I also placed multiple epitendinous stitches to further supplement the tendon repair.  I had what seemed to be an excellent repair the tendon.  I could easily flex the knee down to about 30° without any excessive tension across the repair site.  No further pathology was identified.  I therefore directed my attention to closure.    The wound was copiously irrigated out with sterile saline.  The deep tissues were reapproximated using Vicryl suture.  Vicryl was used to reapproximate the subcutaneous tissues and then a running subcuticular stitch used to close the skin followed by a Zipline adhesive.  I infiltrated the wound with 10 cc of quarter percent bupivacaine with epinephrine.  Sterile dressings were applied.  The tourniquet was deflated and I directed my attention to the other side.  This leg was kept in extension while I fixed his right side.      Again, the right lower extremity was exsanguinated with an Esmarch bandage.  The tourniquet was inflated to 250 mmHg.  I fashioned another approximately 8 cm incision over the anterior aspect of the knee, centered over the fracture site and patellar tendon.  Full-thickness skin flaps were developed.  Again, he had a large hematoma was was evacuated.    This fracture was even more distal than the other.  There was just a thin rim of intact bone on the patellar tendon.  Again, I consider that an advancement was indicated as repair of the small rim of bone was impossible.  In identical fashion to the other side, I carefully skeletonized the bony fragments and remove those, preserving the tendon.  I debrided the distal pole the patella to create a healing response and good bleeding surface for the repair.      The repair on the side was performed in identical fashion to the left.  3  transosseous tunnels were again drilled and 2 #2 Arthrex suture tape sutures were passed through the patellar tendon in a [place] type fashion.  Again the sutures were shuttled through the transosseous tunnels.  I brought the knee out into full extension.  The sutures were tied beneath the quadriceps tendon using 6 throw surgeon's knots.  Again, I got an excellent repair of the tendon back down to the bone.  Multiple Vicryl sutures were used to repair the retinacular defects in the epitendinous tissues to supplement the repair.  I was able to get a great repair on this side as well.  The wound was irrigated out with a liter of sterile saline.  The wound was then closed in identical fashion to the left.  Again, I infiltrated the soft tissues with 10 cc of quarter percent bupivacaine with epinephrine.  Sterile dressings were applied.  The tourniquet was deflated.    Both legs were placed in an immobilizer.  The drapes withdrawn.  Dr. Remy was awakened and taken to the recovery room in good condition.    Adrián Roberson MD  02/05/24

## 2024-02-05 NOTE — PLAN OF CARE
Problem: Adult Inpatient Plan of Care  Goal: Plan of Care Review  Outcome: Ongoing, Progressing  Flowsheets (Taken 2/5/2024 4431)  Progress: no change  Plan of Care Reviewed With: patient  Outcome Evaluation: Patient is alert, oriented and on strict bed rest. Ice pack placed to bilateral knees last night. Bilateral leg immobilizers in place. Patient denies pain at this time. VSS. NPO since midnight. No family at bedside. For surgery this afternoon. Continue with plan of care.   Goal Outcome Evaluation:  Plan of Care Reviewed With: patient        Progress: no change  Outcome Evaluation: Patient is alert, oriented and on strict bed rest. Ice pack placed to bilateral knees last night. Bilateral leg immobilizers in place. Patient denies pain at this time. VSS. NPO since midnight. No family at bedside. For surgery this afternoon. Continue with plan of care.

## 2024-02-05 NOTE — PLAN OF CARE
Goal Outcome Evaluation:  Plan of Care Reviewed With: patient, spouse        Progress: no change  Outcome Evaluation: VSS, BP a little low, pt states this is normal. Pt reports no pain and denies pain meds. Room air. AxOx4. Spouse at bedside for short time. regualr diet now, NPO at midnight. Possible surgery tomorrow. No needs at this time.

## 2024-02-05 NOTE — ANESTHESIA PREPROCEDURE EVALUATION
Anesthesia Evaluation     Patient summary reviewed and Nursing notes reviewed   no history of anesthetic complications:   NPO Solid Status: > 8 hours  NPO Liquid Status: > 2 hours           Airway   Mallampati: II  TM distance: >3 FB  Neck ROM: full  Dental - normal exam     Pulmonary - normal exam   (+) a smoker Former,  (-) COPD, asthma  Cardiovascular   Exercise tolerance: good (4-7 METS)    ECG reviewed  Patient on routine beta blocker and Beta blocker given within 24 hours of surgery  Rhythm: regular    (+) hypertension, dysrhythmias (h/o a fib), hyperlipidemia,  carotid artery disease (mild R carotid plaque) right carotid  (-) past MI, angina, cardiac stents    ROS comment: Echo 07/2023:   ·  Left ventricular systolic function is normal. Calculated left ventricular EF = 60.5%  ·  Left ventricular diastolic function was indeterminate.  ·  The left atrial cavity is moderately dilated.  ·  Left atrial volume is moderately increased.  ·  Estimated right ventricular systolic pressure from tricuspid regurgitation is normal (<35 mmHg). Calculated right ventricular systolic pressure from tricuspid regurgitation is 34 mmHg.    Normal Stress 07/2023    Neuro/Psych  (-) seizures, CVA    ROS Comment: Glaucoma  GI/Hepatic/Renal/Endo    (-) GERD, liver disease, no renal disease    ROS Comment: H/o SIADH - controlled    Musculoskeletal     Abdominal    Substance History - negative use     OB/GYN          Other   blood dyscrasia (Pancytopenia; Hairy cell leukemia),   history of cancer (Hairy T-cell leukemia)                      Anesthesia Plan    ASA 3     general     intravenous induction     Anesthetic plan, risks, benefits, and alternatives have been provided, discussed and informed consent has been obtained with: patient.  Pre-procedure education provided      CODE STATUS:    Level Of Support Discussed With: Patient  Code Status (Patient has no pulse and is not breathing): CPR (Attempt to Resuscitate)  Medical  Interventions (Patient has pulse or is breathing): Full Support

## 2024-02-05 NOTE — DISCHARGE PLACEMENT REQUEST
"Angel Remy (78 y.o. Male)       Date of Birth   1945    Social Security Number       Address   [address] [place], [place] [address]    Home Phone   [phone]    MRN   [id number]       Voodoo   None    Marital Status                               Admission Date   2/4/24    Admission Type   Emergency    Admitting Provider   Angel Sagastume MD    Attending Provider   Angel Sagastume MD    Department, Room/Bed   [facility] MAIN OR, [facility] Main OR/MAIN OR       Discharge Date       Discharge Disposition       Discharge Destination                                 Attending Provider: Angel Sagastume MD    Allergies: No Known Allergies    Isolation: None   Infection: None   Code Status: CPR    Ht: 177.8 cm (70\")   Wt: 73.5 kg (162 lb)    Admission Cmt: None   Principal Problem: Patella fracture [S82.009A]                   Active Insurance as of 2/4/2024       Primary Coverage       Payor Plan Insurance Group Employer/Plan Group    MEDICARE MEDICARE A & B        Payor Plan Address Payor Plan Phone Number Payor Plan Fax Number Effective Dates    [address] [phone]  11/1/2010 - None Entered    [facility] [address]         Subscriber Name Subscriber Birth Date Member ID       ANGEL REMY 1945 [id number]               Secondary Coverage       Payor Plan Insurance Group Employer/Plan Group    ANTH BLUE CROSS [facility] SUPP [id number]       Payor Plan Address Payor Plan Phone Number Payor Plan Fax Number Effective Dates    [address]   12/1/2016 - None Entered    [facility] [address]         Subscriber Name Subscriber Birth Date Member ID       ANGEL REMY 1945 [id number]                     Emergency Contacts        (Rel.) Home Phone Work Phone Mobile Phone    RemyHanna (Spouse) [phone]    RemyAlex (Son) [phone]    Princess Nye " (Daughter) -- -- [phone]

## 2024-02-06 LAB
FERRITIN SERPL-MCNC: 92.9 NG/ML (ref 30–400)
HCT VFR BLD AUTO: 26.5 % (ref 37.5–51)
HGB BLD-MCNC: 8.8 G/DL (ref 13–17.7)
IRON 24H UR-MRATE: 29 MCG/DL (ref 59–158)
IRON SATN MFR SERPL: 10 % (ref 20–50)
RETICS # AUTO: 0.06 10*6/MM3 (ref 0.02–0.13)
RETICS/RBC NFR AUTO: 2.1 % (ref 0.7–1.9)
TIBC SERPL-MCNC: 297 MCG/DL (ref 298–536)
TRANSFERRIN SERPL-MCNC: 199 MG/DL (ref 200–360)

## 2024-02-06 PROCEDURE — 84466 ASSAY OF TRANSFERRIN: CPT | Performed by: INTERNAL MEDICINE

## 2024-02-06 PROCEDURE — 97110 THERAPEUTIC EXERCISES: CPT

## 2024-02-06 PROCEDURE — 85045 AUTOMATED RETICULOCYTE COUNT: CPT | Performed by: INTERNAL MEDICINE

## 2024-02-06 PROCEDURE — 83540 ASSAY OF IRON: CPT | Performed by: INTERNAL MEDICINE

## 2024-02-06 PROCEDURE — 85018 HEMOGLOBIN: CPT | Performed by: ORTHOPAEDIC SURGERY

## 2024-02-06 PROCEDURE — 82728 ASSAY OF FERRITIN: CPT | Performed by: INTERNAL MEDICINE

## 2024-02-06 PROCEDURE — 97162 PT EVAL MOD COMPLEX 30 MIN: CPT

## 2024-02-06 PROCEDURE — 85014 HEMATOCRIT: CPT | Performed by: ORTHOPAEDIC SURGERY

## 2024-02-06 PROCEDURE — 25010000002 CEFAZOLIN IN DEXTROSE 2-4 GM/100ML-% SOLUTION: Performed by: ORTHOPAEDIC SURGERY

## 2024-02-06 RX ORDER — TRAMADOL HYDROCHLORIDE 50 MG/1
50 TABLET ORAL EVERY 6 HOURS PRN
Status: DISCONTINUED | OUTPATIENT
Start: 2024-02-06 | End: 2024-02-06

## 2024-02-06 RX ORDER — HYDROCODONE BITARTRATE AND ACETAMINOPHEN 7.5; 325 MG/1; MG/1
1 TABLET ORAL EVERY 4 HOURS PRN
Status: DISCONTINUED | OUTPATIENT
Start: 2024-02-06 | End: 2024-02-07 | Stop reason: HOSPADM

## 2024-02-06 RX ORDER — TRAMADOL HYDROCHLORIDE 50 MG/1
50 TABLET ORAL EVERY 4 HOURS PRN
Status: DISCONTINUED | OUTPATIENT
Start: 2024-02-06 | End: 2024-02-07 | Stop reason: HOSPADM

## 2024-02-06 RX ADMIN — DOCUSATE SODIUM 100 MG: 100 CAPSULE, LIQUID FILLED ORAL at 21:28

## 2024-02-06 RX ADMIN — FINASTERIDE 5 MG: 5 TABLET, FILM COATED ORAL at 21:28

## 2024-02-06 RX ADMIN — TAMSULOSIN HYDROCHLORIDE 0.4 MG: 0.4 CAPSULE ORAL at 09:38

## 2024-02-06 RX ADMIN — PROPAFENONE HYDROCHLORIDE 150 MG: 150 TABLET, COATED ORAL at 21:28

## 2024-02-06 RX ADMIN — MELOXICAM 15 MG: 15 TABLET ORAL at 09:38

## 2024-02-06 RX ADMIN — ACYCLOVIR 400 MG: 400 TABLET ORAL at 09:38

## 2024-02-06 RX ADMIN — Medication 5000 UNITS: at 09:38

## 2024-02-06 RX ADMIN — PROPAFENONE HYDROCHLORIDE 150 MG: 150 TABLET, COATED ORAL at 16:18

## 2024-02-06 RX ADMIN — Medication 3 ML: at 09:39

## 2024-02-06 RX ADMIN — MAGNESIUM OXIDE 400 MG (241.3 MG MAGNESIUM) TABLET 400 MG: TABLET at 09:38

## 2024-02-06 RX ADMIN — OXYCODONE HYDROCHLORIDE AND ACETAMINOPHEN 1000 MG: 500 TABLET ORAL at 09:38

## 2024-02-06 RX ADMIN — LATANOPROST 1 DROP: 50 SOLUTION/ DROPS OPHTHALMIC at 21:38

## 2024-02-06 RX ADMIN — APIXABAN 2.5 MG: 2.5 TABLET, FILM COATED ORAL at 09:38

## 2024-02-06 RX ADMIN — SENNOSIDES AND DOCUSATE SODIUM 2 TABLET: 50; 8.6 TABLET ORAL at 21:28

## 2024-02-06 RX ADMIN — PROPAFENONE HYDROCHLORIDE 150 MG: 150 TABLET, COATED ORAL at 09:38

## 2024-02-06 RX ADMIN — CEFAZOLIN SODIUM 2 G: 2 INJECTION, SOLUTION INTRAVENOUS at 04:40

## 2024-02-06 RX ADMIN — TRAMADOL HYDROCHLORIDE 50 MG: 50 TABLET ORAL at 21:28

## 2024-02-06 RX ADMIN — BISOPROLOL FUMARATE 5 MG: 5 TABLET, FILM COATED ORAL at 09:38

## 2024-02-06 RX ADMIN — TRAMADOL HYDROCHLORIDE 50 MG: 50 TABLET ORAL at 16:48

## 2024-02-06 RX ADMIN — TRAMADOL HYDROCHLORIDE 50 MG: 50 TABLET ORAL at 10:50

## 2024-02-06 RX ADMIN — DOCUSATE SODIUM 100 MG: 100 CAPSULE, LIQUID FILLED ORAL at 09:38

## 2024-02-06 RX ADMIN — APIXABAN 2.5 MG: 2.5 TABLET, FILM COATED ORAL at 21:28

## 2024-02-06 RX ADMIN — Medication 3 ML: at 21:32

## 2024-02-06 RX ADMIN — SENNOSIDES AND DOCUSATE SODIUM 2 TABLET: 50; 8.6 TABLET ORAL at 09:38

## 2024-02-06 RX ADMIN — ROSUVASTATIN CALCIUM 10 MG: 10 TABLET, FILM COATED ORAL at 21:28

## 2024-02-06 RX ADMIN — AMLODIPINE BESYLATE 2.5 MG: 2.5 TABLET ORAL at 21:28

## 2024-02-06 RX ADMIN — TAMSULOSIN HYDROCHLORIDE 0.4 MG: 0.4 CAPSULE ORAL at 21:28

## 2024-02-06 NOTE — PROGRESS NOTES
He seems to be doing well today.  Bandages are clean and dry.  Everything is working normally in his feet.  We had a lengthy discussion regarding the surgery, postop rehab and expected recovery.  We are working on getting him over to the [facility] inpatient rehab.  We discussed his pain management and he wants to try tramadol.  I have ordered that for him as per his request.  We will check him again tomorrow.    Adrián Roberson MD

## 2024-02-06 NOTE — THERAPY EVALUATION
Patient Name: Angel Remy  : 1945    MRN: [id number]                              Today's Date: 2024       Admit Date: 2024    Visit Dx:     ICD-10-CM ICD-9-CM   1. Closed displaced transverse fracture of left patella, initial encounter  S82.032A 822.0   2. Closed displaced transverse fracture of right patella, initial encounter  S82.031A 822.0     Patient Active Problem List   Diagnosis    Hairy cell leukemia    FH: colon cancer    Hyponatremia    Orthostatic hypotension    Supine hypertension    Anemia    Macrocytosis    LBBB (left bundle branch block)    Prolonged P-R interval    Pancytopenia    Fitting and adjustment of vascular catheter    Patella fracture    Elevated blood pressure reading    Fall at home, initial encounter    Thrombocytopenia    History of left bundle branch block (LBBB)    PAF (paroxysmal atrial fibrillation) history     Past Medical History:   Diagnosis Date    BCC (basal cell carcinoma of skin) 2022    NOSE    Benign prostate hyperplasia     H/O Elevated PSA     H/O Hairy cell leukemia (CMS/HCC)     H/O Internal thrombosed hemorrhoids     H/O minimal right carotid plaque     H/O peripheral neuropathy     Hyperlipidemia     Hypertension     Primary open-angle glaucoma, bilateral, mild stage      Past Surgical History:   Procedure Laterality Date    BONE MARROW BIOPSY W/ ASPIRATION      COLONOSCOPY      COLONOSCOPY N/A 2021    Procedure: COLONOSCOPY TO CECUM AND INTO TI WITH COLD BIOPSY POLYPECTOMIES;  Surgeon: Juan Jenkins MD;  Location: [facility] ENDOSCOPY;  Service: Gastroenterology;  Laterality: N/A;  pre: family history of colon cancer  post: polyps, diverticulosis  and internal hemorrhoids    EXCISION MASS HEAD/NECK N/A 2022    Procedure: EXCISION BASAL CELL CARCINOMA NOSE WITH FROZEN SECTION FULL THICKNESS GRAFT;  Surgeon: Arturo Weiss MD;  [place] [facility] [place] [facility] [place]  Service: Plastics;  Laterality: N/A;    HERNIA REPAIR Right 2000     Inguinal     OTHER SURGICAL HISTORY  1989    Median nerve foreign body excision    PROSTATE BIOPSY      March 2010 and October 2011 whruby PSA had acceleration    SIGMOIDOSCOPY  1999    With small thrombosed internal hemorrhoid.    TONSILLECTOMY      VENOUS ACCESS DEVICE (PORT) INSERTION Right 10/18/2023    Procedure: MEDIPORT PLACEMENTWITH SUPERIOR VENACAVAGRAM;  Surgeon: Randy Fan MD;  Location: [facility] OR;  Service: Vascular;  Laterality: Right;    WISDOM TOOTH EXTRACTION  1961      General Information       Row Name 02/06/24 1554          Physical Therapy Time and Intention    Document Type evaluation  -ZB     Mode of Treatment individual therapy;physical therapy  -ZB       Row Name 02/06/24 1554          General Information    Patient Profile Reviewed yes  -ZB     Prior Level of Function independent:  -ZB     Existing Precautions/Restrictions other (see comments)  pt in bilateral knee immobilizers and per nsg is NWB; need further clarification from ortho  -ZB     Barriers to Rehab physical barrier  -ZB       Row Name 02/06/24 1550          Living Environment    People in Home spouse  -ZB       Row Name 02/06/24 1554          Home Main Entrance    Number of Stairs, Main Entrance three;other (see comments)  working on a route to enter without stairs  -ZB       Row Name 02/06/24 1554          Stairs Within Home, Primary    Number of Stairs, Within Home, Primary other (see comments)  multiple story home but able to stay on ground floor  -ZB       Row Name 02/06/24 1556          Cognition    Orientation Status (Cognition) oriented x 4  -ZB       Row Name 02/06/24 1555          Safety Issues, Functional Mobility    Impairments Affecting Function (Mobility) range of motion (ROM);pain;strength;endurance/activity tolerance  -ZB               User Key  (r) = Recorded By, (t) = Taken By, (c) = Cosigned By      Initials Name Provider Type    ZB Grey Rooney, PT Physical Therapist                   Mobility        Row Name 02/06/24 1556          Bed Mobility    Bed Mobility rolling left;rolling right;scooting/bridging  -ZB     Rolling Left [place] (Bed Mobility) standby assist  -ZB     Rolling Right [place] (Bed Mobility) standby assist  -ZB     Scooting/Bridging [place] (Bed Mobility) standby assist  -ZB     Assistive Device (Bed Mobility) head of bed elevated  -ZB       Row Name 02/06/24 1556          Transfers    Comment, (Transfers) unable to assess transfers this date  -ZB       Row Name 02/06/24 1556          Bed-Chair Transfer    Bed-Chair [place] (Transfers) unable to assess  -ZB       Row Name 02/06/24 1556          Sit-Stand Transfer    Sit-Stand [place] (Transfers) unable to assess  -ZB       Row Name 02/06/24 1556          Gait/Stairs (Locomotion)    [place] Level (Gait) unable to assess  -ZB               User Key  (r) = Recorded By, (t) = Taken By, (c) = Cosigned By      Initials Name Provider Type    ZB Grey Rooney, PT Physical Therapist                   Obj/Interventions       Row Name 02/06/24 1557          Range of Motion Comprehensive    Comment, General Range of Motion B LE in knee immobilizers  -ZB       Row Name 02/06/24 1557          Strength Comprehensive (MMT)    Comment, General Manual Muscle Testing (MMT) Assessment unable to assess LE MMT  -ZB       Row Name 02/06/24 1557          Sensory Assessment (Somatosensory)    Sensory Assessment (Somatosensory) LE sensation intact  -ZB               User Key  (r) = Recorded By, (t) = Taken By, (c) = Cosigned By      Initials Name Provider Type    ZB Grey Rooney, PT Physical Therapist                   Goals/Plan       Row Name 02/06/24 1606          Bed Mobility Goal 1 (PT)    Activity/Assistive Device (Bed Mobility Goal 1, PT) bed mobility activities, all  -ZB     [place] Level/Cues Needed (Bed Mobility Goal 1, PT) independent  -ZB     Time Frame (Bed Mobility Goal 1, PT) short term goal (STG);1 week  -ZB       Nicole  Name 02/06/24 1606          Transfer Goal 1 (PT)    Activity/Assistive Device (Transfer Goal 1, PT) bed-to-chair/chair-to-bed;commode chair  -ZB     [place] Level/Cues Needed (Transfer Goal 1, PT) contact guard required  -ZB     Time Frame (Transfer Goal 1, PT) short term goal (STG);1 week  -ZB       Row Name 02/06/24 1606          Therapy Assessment/Plan (PT)    Planned Therapy Interventions (PT) balance training;bed mobility training;gait training;home exercise program;strengthening;patient/family education;transfer training  -ZB               User Key  (r) = Recorded By, (t) = Taken By, (c) = Cosigned By      Initials Name Provider Type    ZB Grey Rooney, PT Physical Therapist                   Clinical Impression       Row Name 02/06/24 1269          Pain    Pretreatment Pain Rating 6/10  -ZB     Posttreatment Pain Rating 6/10  -ZB     Pain Location - Side/Orientation Bilateral  -ZB     Pain Location - knee  -ZB     Pain Intervention(s) Repositioned;Rest  -ZB       Row Name 02/06/24 7774          Plan of Care Review    Plan of Care Reviewed With patient;spouse  -ZB     Outcome Evaluation Dr. 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 The patient lives with his spouse in a multistory home with numerous steps, and at baseline is (I) ADLs and mobility without use of any DME. Discussed at length with patient and spouse regarding anticipated DME needs for DC, tactics for performing ADLs, and DC recommendations. The patient's spouse reported that she is looking into having a company place a path leading to the side patio that will allow them to enter without navigating any stairs. Discussed likely need for wheelchair with removable arm rests, walker, 3-in-1, and shower chair. Answered numerous logistical questions as they arose. The patient would benefit from a stay in acute rehab to work towards greater independence with functional  mobility, as he is unsafe to return home at this juncture. PT will continue to monitor and progress as tolerated.   -ZB       Row Name 02/06/24 1557          Therapy Assessment/Plan (PT)    Rehab Potential (PT) good, to achieve stated therapy goals  -ZB     Criteria for Skilled Interventions Met (PT) yes  -ZB     Therapy Frequency (PT) 5 times/wk  -ZB       Row Name 02/06/24 1557          Positioning and Restraints    Pre-Treatment Position in bed  -ZB     Post Treatment Position bed  -ZB     In Bed notified nsg;fowlers;call light within reach;encouraged to call for assist;exit alarm on  -ZB               User Key  (r) = Recorded By, (t) = Taken By, (c) = Cosigned By      Initials Name Provider Type    Grey Lynch PT Physical Therapist                   Outcome Measures       Row Name 02/06/24 1607 02/06/24 0900       How much help from another person do you currently need...    Turning from your back to your side while in flat bed without using bedrails? 3  -ZB 2  -ST    Moving from lying on back to sitting on the side of a flat bed without bedrails? 3  -ZB 2  -ST    Moving to and from a bed to a chair (including a wheelchair)? 2  -ZB 1  -ST    Standing up from a chair using your arms (e.g., wheelchair, bedside chair)? 1  -ZB 1  -ST    Climbing 3-5 steps with a railing? 1  -ZB 1  -ST    To walk in hospital room? 1  -ZB 1  -ST    AM-PAC 6 Clicks Score (PT) 11  -ZB 8  -ST    Highest Level of Mobility Goal 4 --> Transfer to chair/commode  -ZB 3 --> Sit at edge of bed  -ST      Row Name 02/06/24 1607          Functional Assessment    Outcome Measure Options AM-PAC 6 Clicks Basic Mobility (PT)  -ZB               User Key  (r) = Recorded By, (t) = Taken By, (c) = Cosigned By      Initials Name Provider Type    Sri Matson RN Registered Nurse    Grey Lynch PT Physical Therapist                                 Physical Therapy Education       Title: PT OT SLP Therapies (Done)       Topic: Physical  Therapy (Done)       Point: Mobility training (Done)       Learning Progress Summary             Patient Acceptance, E,D, VU,DU by STONE at 2/6/2024 1607                         Point: Home exercise program (Done)       Learning Progress Summary             Patient Acceptance, E,D, VU,DU by STONE at 2/6/2024 1607                         Point: Body mechanics (Done)       Learning Progress Summary             Patient Acceptance, E,D, VU,DU by STONE at 2/6/2024 1607                         Point: Precautions (Done)       Learning Progress Summary             Patient Acceptance, E,D, VU,DU by STONE at 2/6/2024 1607                                         User Key       Initials Effective Dates Name Provider Type Discipline    STONE 08/30/23 -  Grey Rooney, PT Physical Therapist PT                  PT Recommendation and Plan  Planned Therapy Interventions (PT): balance training, bed mobility training, gait training, home exercise program, strengthening, patient/family education, transfer training  Plan of Care Reviewed With: patient, spouse  Outcome Evaluation: (S) Dr. Remy is a 77 y/o male evaluated by therapy s/p bilateral patella ORIF with patellar tendon advancement and repair. The patient lives with his spouse in a multistory home with numerous steps, and at baseline is (I) ADLs and mobility without use of any DME. Discussed at length with patient and spouse regarding anticipated DME needs for DC, tactics for performing ADLs, and DC recommendations. The patient's spouse reported that she is looking into having a company place a path leading to the side patio that will allow them to enter without navigating any stairs. Discussed likely need for wheelchair with removable arm rests, walker, 3-in-1, and shower chair. Answered numerous logistical questions as they arose. The patient would benefit from a stay in acute rehab to work towards greater independence with functional mobility, as he is unsafe to return home at this  juncture. PT will continue to monitor and progress as tolerated.     Time Calculation:         PT Charges       Row Name 02/06/24 1610             Time Calculation    Start Time 1445  -ZB      Stop Time 1516  -ZB      Time Calculation (min) 31 min  -ZB      PT Received On 02/06/24  -ZB      PT - Next Appointment 02/07/24  -ZB      PT Goal Re-Cert Due Date 02/20/24  -ZB         Time Calculation- PT    Total Timed Code Minutes- PT 20 minute(s)  -ZB         Timed Charges    98014 - PT Therapeutic Exercise Minutes 20  -ZB         Total Minutes    Timed Charges Total Minutes 20  -ZB       Total Minutes 20  -ZB                User Key  (r) = Recorded By, (t) = Taken By, (c) = Cosigned By      Initials Name Provider Type    Grey Lynch, TEENA Physical Therapist                  Therapy Charges for Today       Code Description Service Date Service Provider Modifiers Qty    09559007295 HC PT EVAL MOD COMPLEXITY 3 2/6/2024 Grey Rooney, PT GP 1    98747992835 HC PT THER PROC EA 15 MIN 2/6/2024 Grey Rooney, PT GP 1            PT G-Codes  Outcome Measure Options: AM-PAC 6 Clicks Basic Mobility (PT)  AM-PAC 6 Clicks Score (PT): 11  PT Discharge Summary  Anticipated Discharge Disposition (PT): inpatient rehabilitation facility    Emmanuelle Rooney PT  2/6/2024

## 2024-02-06 NOTE — PLAN OF CARE
Goal Outcome Evaluation:  Plan of Care Reviewed With: patient           Pt POD1 ORIF samantha patella,  VSS, alert & oriented x4, room air, bilateral knees in immobilizers,  urinal, tramadol for pain.

## 2024-02-06 NOTE — PROGRESS NOTES
[facility] Acute Inpt Rehab    Referral received and workup in progress. Will need therapy evaluations to complete workup.    1230 Permission given to speak with wife present.  Discussed acute rehab with patient and wife.  Patient very active and motivated to return to normal activities.  Will review with Dr. Hamlin once therapies evaluations complete.    Thank you,   Sandra Jade, RN  Rehab Admission Nurse

## 2024-02-06 NOTE — PLAN OF CARE
Goal Outcome Evaluation:  Plan of Care Reviewed With: patient        Progress: no change  Outcome Evaluation: VSS, alert & oriented x4, pt refuses pain medication, 2L NC, bilateral knees in immobilizers, pt able to log roll well, No issues during the night.

## 2024-02-06 NOTE — PLAN OF CARE
Goal Outcome Evaluation:  Plan of Care Reviewed With: patient, spouse           Outcome Evaluation: (S) Dr. 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 The patient lives with his spouse in a multistory home with numerous steps, and at baseline is (I) ADLs and mobility without use of any DME. Discussed at length with patient and spouse regarding anticipated DME needs for DC, tactics for performing ADLs, and DC recommendations. The patient's spouse reported that she is looking into having a company place a path leading to the side patio that will allow them to enter without navigating any stairs. Discussed likely need for wheelchair with removable arm rests, walker, 3-in-1, and shower chair. Answered numerous logistical questions as they arose. The patient would benefit from a stay in acute rehab to work towards greater independence with functional mobility, as he is unsafe to return home at this juncture. PT will continue to monitor and progress as tolerated.

## 2024-02-06 NOTE — PROGRESS NOTES
[facility] Medicine Services  PROGRESS NOTE    Patient Name: Angel Remy  : 1945  MRN: [id number]    Date of Admission: 2024  Primary Care Physician: Damián Vuong Jr., MD    Subjective   Subjective     CC:  Follow-up knee injury    Subjective:   Patient having some increased pain postoperatively.  Initially he was wanting to treat it with Tylenol only but ultimately he decided he wanted to try tramadol.  No current reported fevers or chills, shortness of breath or cough, nausea or diarrhea, chest pain or palpitations.        Objective   Objective     Vital Signs:   Temp:  [97.2 °F (36.2 °C)-99.2 °F (37.3 °C)] 98.3 °F (36.8 °C)  Heart Rate:  [65-77] 74  Resp:  [14-16] 16  BP: ()/(48-80) 160/75        Physical Exam:  Constitutional: Awake, alert, no acute distress  Eyes: PERRLA, sclerae anicteric, no conjunctival injection  HENT: NCAT, mucous membranes moist  Neck: Supple, no thyromegaly, no lymphadenopathy, trachea midline  Respiratory: No cough or wheezes, good inspiration, nonlabored respirations   Cardiovascular: Pulse rate is normal, palpable radial pulses bilaterally  Gastrointestinal:  Soft, nontender, nondistended  Musculoskeletal: Bilateral patellar swelling and some tenderness with palpation, normal musculature for age, no lower extremity edema, BMI 23  Psychiatric: Appropriate affect, cooperative, conversational  Neurologic: No slurred speech or facial droop, follows commands  Skin: No rashes or jaundice, warm    Results Reviewed:  Results from last 7 days   Lab Units 24  0522 24  1204   WBC 10*3/mm3  --  5.04   HEMOGLOBIN g/dL 8.8* 13.1   HEMATOCRIT % 26.5* 39.3   PLATELETS 10*3/mm3  --  117*     Results from last 7 days   Lab Units 24  1204   SODIUM mmol/L 140   POTASSIUM mmol/L 4.0   CHLORIDE mmol/L 104   CO2 mmol/L 26.0   BUN mg/dL 24*   CREATININE mg/dL 0.91   GLUCOSE mg/dL 116*   CALCIUM mg/dL 8.7   ALK PHOS U/L 43   ALT (SGPT) U/L 23   AST (SGOT)  U/L 19     Estimated Creatinine Clearance: 69.6 mL/min (by C-G formula based on SCr of 0.91 mg/dL).    Microbiology Results Abnormal       None            Imaging Results (Last 24 Hours)       Procedure Component Value Units Date/Time    FL C Arm During Surgery [[id number]] Resulted: 02/05/24 1648     Updated: 02/05/24 1648    Narrative:      This procedure was auto-finalized with no dictation required.            Results for orders placed during the hospital encounter of 05/23/23    Adult Transthoracic Echo Complete W/ Cont if Necessary Per Protocol    Interpretation Summary    Left ventricular systolic function is normal. Calculated left ventricular EF = 60.5%    Left ventricular diastolic function was indeterminate.    The left atrial cavity is moderately dilated.    Left atrial volume is moderately increased.    Estimated right ventricular systolic pressure from tricuspid regurgitation is normal (<35 mmHg). Calculated right ventricular systolic pressure from tricuspid regurgitation is 34 mmHg.      I have reviewed the medications:  Scheduled Meds:acyclovir, 400 mg, Oral, Daily  amLODIPine, 2.5 mg, Oral, Daily  apixaban, 2.5 mg, Oral, Q12H  vitamin C, 1,000 mg, Oral, Daily  aspirin, 81 mg, Oral, Daily  bisoprolol, 5 mg, Oral, Daily  cholecalciferol, 5,000 Units, Oral, Daily  docusate sodium, 100 mg, Oral, BID  finasteride, 5 mg, Oral, Daily  latanoprost, 1 drop, Both Eyes, Nightly  magnesium oxide, 400 mg, Oral, Daily  meloxicam, 15 mg, Oral, Daily  polyethylene glycol, 17 g, Oral, Daily  propafenone, 150 mg, Oral, TID  rosuvastatin, 10 mg, Oral, Nightly  senna-docusate sodium, 2 tablet, Oral, BID  sodium chloride, 3 mL, Intravenous, Q12H  sodium chloride, 3 mL, Intravenous, Q12H  tamsulosin, 0.4 mg, Oral, BID      Continuous Infusions:lactated ringers, 9 mL/hr, Last Rate: 9 mL/hr (02/05/24 1247)  lactated ringers, 100 mL/hr, Last Rate: 100 mL/hr (02/05/24 2152)      PRN Meds:.  acetaminophen **OR** acetaminophen  **OR** acetaminophen    acetaminophen    bisacodyl    Calcium Replacement - Follow Nurse / BPA Driven Protocol    docusate sodium    famotidine    HYDROcodone-acetaminophen    HYDROmorphone **AND** naloxone    ketorolac    Magnesium Low Dose Replacement - Follow Nurse / BPA Driven Protocol    melatonin    ondansetron ODT **OR** ondansetron    Phosphorus Replacement - Follow Nurse / BPA Driven Protocol    Potassium Replacement - Follow Nurse / BPA Driven Protocol    [COMPLETED] Insert Peripheral IV **AND** sodium chloride    sodium chloride    sodium chloride    sodium chloride    sodium chloride    traMADol    Assessment & Plan   Assessment & Plan     Active Hospital Problems    Diagnosis  POA    **Patella fracture [S82.009A]  Yes    Fall at home, initial encounter [W19.XXXA, Y92.009]  Not Applicable    Thrombocytopenia [D69.6]  Yes    History of left bundle branch block (LBBB) [Z86.79]  Not Applicable    PAF (paroxysmal atrial fibrillation) history [I48.0]  Yes    Supine hypertension [I10]  Yes    Hairy cell leukemia [C91.40]  Yes      Resolved Hospital Problems   No resolved problems to display.        Brief Hospital Course to date:  Angel Remy is a 78 y.o. male  after mechanical fall at home with bilateral knee contusion and bilateral patellar fracture.     Discussion/plan for today:  Anemia is significantly lower at this morning.  Probably partially this is delusional and some is expected due to intraoperative blood loss.  Check iron studies, tomorrow morning we will check folic acid and B12.  Replete if needed.  Stop IV fluid and encourage oral intake.  Supportive care and symptom treatment.  Operative report reviewed.  Previous records reviewed.    Continue supportive care and symptom treatment perioperatively.  I have added tramadol for pain control.  Patient has Norco and IV medication available.  Encourage regular Tylenol as well.      Bilateral knee contusion, bilateral patellar fracture, difficulty  walking:  Status post surgical intervention by orthopedic surgery on 2/5/2024  Physical medicine consulted for possible discharge to acute rehab  PT has been consulted.  I have added OT consult.  Case discussed with case management.  Otherwise per orthopedic surgery recommendations.  Orthopedic surgery has ordered postoperative Eliquis for DVT prophylaxis     History of paroxysmal atrial fibrillation, history of hypertension:  Continue home blood pressure medication.  Continue home rhythm control.  Patient is not on anticoagulant for his atrial fibrillation but does take aspirin.  Hold aspirin while on Eliquis prophylaxis perioperatively.     Thrombocytopenia and history of hairy cell leukemia:  Patient reports he is currently remission from his opinion on current treatment.  He reports chronically low platelet counts and denies any active bleeding.     Continue eyedrops for glaucoma.     Continue finasteride and Flomax for BPH.     Continue Crestor for lipids.     Otherwise plan for supportive care and symptomatic treatment.     Treatment plan discussed with patient who is in agreement.     DVT prophylaxis: Eliquis      Disposition: I expect the patient to be discharged likely home soon pending improvement.    CODE STATUS:   Code Status and Medical Interventions:   Ordered at: 02/05/24 5063     Code Status (Patient has no pulse and is not breathing):    CPR (Attempt to Resuscitate)     Medical Interventions (Patient has pulse or is breathing):    Full       Angel Sagastume MD  02/06/24

## 2024-02-06 NOTE — ANESTHESIA POSTPROCEDURE EVALUATION
"Patient: Angel Remy    Procedure Summary       Date: 02/05/24 Room / Location: [facility] OR [address] [place] [address] MAIN OR    Anesthesia Start: 1350 Anesthesia Stop: 1713    Procedure: PATELLA OPEN REDUCTION INTERNAL FIXATION (Bilateral: Knee) Diagnosis:     Surgeons: Adrián Roberson MD Provider: Zaira Nick MD    Anesthesia Type: general ASA Status: 3            Anesthesia Type: general    Vitals  Vitals Value Taken Time   /52 02/05/24 1830   Temp 37.3 °C (99.2 °F) 02/05/24 1710   Pulse 68 02/05/24 1832   Resp 16 02/05/24 1815   SpO2 98 % 02/05/24 1831   Vitals shown include unfiled device data.        Post Anesthesia Care and Evaluation    Patient location during evaluation: bedside  Patient participation: complete - patient participated  Level of consciousness: awake and alert  Pain management: adequate    Airway patency: patent  Anesthetic complications: No anesthetic complications    Cardiovascular status: acceptable  Respiratory status: acceptable  Hydration status: acceptable    Comments: /80 (BP Location: Left arm, Patient Position: Lying)   Pulse 66   Temp 36.2 °C (97.2 °F) (Oral)   Resp 16   Ht 177.8 cm (70\")   Wt 73.5 kg (162 lb)   SpO2 98%   BMI 23.24 kg/m²     "

## 2024-02-06 NOTE — CASE MANAGEMENT/SOCIAL WORK
Continued Stay Note  [facility]     Patient Name: Angel Remy  MRN: [id number]  Today's Date: 2/6/2024    Admit Date: 2/4/2024    Plan: BAR -- Pending.   Discharge Plan       Row Name 02/06/24 1126       Plan    Plan BAR -- Pending.    Patient/Family in Agreement with Plan yes    Plan Comments Spoke with Sandra/BRANDAN who requested an OT Eval for this patient. Updated YAHAIRA De Jesus who will discuss with A. CCP to follow.                   Discharge Codes    No documentation.                       Princess PEÑA RN

## 2024-02-07 ENCOUNTER — HOSPITAL ENCOUNTER (INPATIENT)
Facility: HOSPITAL | Age: 79
DRG: 560 | End: 2024-02-07
Attending: PHYSICAL MEDICINE & REHABILITATION | Admitting: PHYSICAL MEDICINE & REHABILITATION
Payer: MEDICARE

## 2024-02-07 VITALS
OXYGEN SATURATION: 95 % | WEIGHT: 162 LBS | RESPIRATION RATE: 16 BRPM | SYSTOLIC BLOOD PRESSURE: 146 MMHG | DIASTOLIC BLOOD PRESSURE: 75 MMHG | HEART RATE: 70 BPM | BODY MASS INDEX: 23.19 KG/M2 | HEIGHT: 70 IN | TEMPERATURE: 99.2 F

## 2024-02-07 DIAGNOSIS — S82.031D CLOSED DISPLACED TRANSVERSE FRACTURE OF RIGHT PATELLA WITH ROUTINE HEALING, SUBSEQUENT ENCOUNTER: Primary | ICD-10-CM

## 2024-02-07 DIAGNOSIS — S82.032D CLOSED DISPLACED TRANSVERSE FRACTURE OF LEFT PATELLA WITH ROUTINE HEALING, SUBSEQUENT ENCOUNTER: ICD-10-CM
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
## 2024-02-07 PROBLEM — S82.009A PATELLA FRACTURE: Status: RESOLVED | Noted: 2024-02-04 | Resolved: 2024-02-07

## 2024-02-07 PROBLEM — S82.031A CLOSED DISPLACED TRANSVERSE FRACTURE OF RIGHT PATELLA: Status: ACTIVE | Noted: 2024-02-07

## 2024-02-07 PROBLEM — S82.032A CLOSED DISPLACED TRANSVERSE FRACTURE OF LEFT PATELLA: Status: ACTIVE | Noted: 2024-02-07
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
## 2024-02-07 PROBLEM — S82.009A PATELLA FRACTURE: Status: ACTIVE | Noted: 2024-02-07

## 2024-02-07 LAB
ABO GROUP BLD: NORMAL
ANION GAP SERPL CALCULATED.3IONS-SCNC: 7 MMOL/L (ref 5–15)
BLD GP AB SCN SERPL QL: NEGATIVE
BUN SERPL-MCNC: 18 MG/DL (ref 8–23)
BUN/CREAT SERPL: 27.7 (ref 7–25)
CALCIUM SPEC-SCNC: 7.9 MG/DL (ref 8.6–10.5)
CHLORIDE SERPL-SCNC: 103 MMOL/L (ref 98–107)
CO2 SERPL-SCNC: 26 MMOL/L (ref 22–29)
CREAT SERPL-MCNC: 0.65 MG/DL (ref 0.76–1.27)
DEPRECATED RDW RBC AUTO: 41.5 FL (ref 37–54)
EGFRCR SERPLBLD CKD-EPI 2021: 96.4 ML/MIN/1.73
ERYTHROCYTE [DISTWIDTH] IN BLOOD BY AUTOMATED COUNT: 12 % (ref 12.3–15.4)
FOLATE SERPL-MCNC: 13.5 NG/ML (ref 4.78–24.2)
GLUCOSE SERPL-MCNC: 123 MG/DL (ref 65–99)
HCT VFR BLD AUTO: 23.8 % (ref 37.5–51)
HGB BLD-MCNC: 8 G/DL (ref 13–17.7)
MCH RBC QN AUTO: 32 PG (ref 26.6–33)
MCHC RBC AUTO-ENTMCNC: 33.6 G/DL (ref 31.5–35.7)
MCV RBC AUTO: 95.2 FL (ref 79–97)
PLATELET # BLD AUTO: 113 10*3/MM3 (ref 140–450)
PMV BLD AUTO: 10.1 FL (ref 6–12)
POTASSIUM SERPL-SCNC: 4 MMOL/L (ref 3.5–5.2)
RBC # BLD AUTO: 2.5 10*6/MM3 (ref 4.14–5.8)
RH BLD: POSITIVE
SODIUM SERPL-SCNC: 136 MMOL/L (ref 136–145)
T&S EXPIRATION DATE: NORMAL
VIT B12 BLD-MCNC: 476 PG/ML (ref 211–946)
WBC NRBC COR # BLD AUTO: 6.33 10*3/MM3 (ref 3.4–10.8)

## 2024-02-07 PROCEDURE — 86901 BLOOD TYPING SEROLOGIC RH(D): CPT | Performed by: INTERNAL MEDICINE

## 2024-02-07 PROCEDURE — 86900 BLOOD TYPING SEROLOGIC ABO: CPT

## 2024-02-07 PROCEDURE — 97166 OT EVAL MOD COMPLEX 45 MIN: CPT

## 2024-02-07 PROCEDURE — 82746 ASSAY OF FOLIC ACID SERUM: CPT | Performed by: INTERNAL MEDICINE

## 2024-02-07 PROCEDURE — 86850 RBC ANTIBODY SCREEN: CPT | Performed by: INTERNAL MEDICINE

## 2024-02-07 PROCEDURE — P9016 RBC LEUKOCYTES REDUCED: HCPCS

## 2024-02-07 PROCEDURE — 97535 SELF CARE MNGMENT TRAINING: CPT

## 2024-02-07 PROCEDURE — 82607 VITAMIN B-12: CPT | Performed by: INTERNAL MEDICINE

## 2024-02-07 PROCEDURE — 36430 TRANSFUSION BLD/BLD COMPNT: CPT

## 2024-02-07 PROCEDURE — 86901 BLOOD TYPING SEROLOGIC RH(D): CPT

## 2024-02-07 PROCEDURE — 80048 BASIC METABOLIC PNL TOTAL CA: CPT | Performed by: INTERNAL MEDICINE

## 2024-02-07 PROCEDURE — 86900 BLOOD TYPING SEROLOGIC ABO: CPT | Performed by: INTERNAL MEDICINE

## 2024-02-07 PROCEDURE — 86923 COMPATIBILITY TEST ELECTRIC: CPT

## 2024-02-07 PROCEDURE — 85027 COMPLETE CBC AUTOMATED: CPT | Performed by: INTERNAL MEDICINE

## 2024-02-07 RX ORDER — ASCORBIC ACID 500 MG
1000 TABLET ORAL DAILY
Status: CANCELLED | OUTPATIENT
Start: 2024-02-08

## 2024-02-07 RX ORDER — ONDANSETRON 4 MG/1
4 TABLET, ORALLY DISINTEGRATING ORAL EVERY 6 HOURS PRN
Status: DISCONTINUED | OUTPATIENT
Start: 2024-02-07 | End: 2024-02-15

## 2024-02-07 RX ORDER — AMOXICILLIN 250 MG
2 CAPSULE ORAL 2 TIMES DAILY
Status: CANCELLED | OUTPATIENT
Start: 2024-02-07

## 2024-02-07 RX ORDER — LATANOPROST 50 UG/ML
1 SOLUTION/ DROPS OPHTHALMIC NIGHTLY
Status: DISCONTINUED | OUTPATIENT
Start: 2024-02-07 | End: 2024-02-15 | Stop reason: HOSPADM

## 2024-02-07 RX ORDER — BISACODYL 10 MG
10 SUPPOSITORY, RECTAL RECTAL DAILY PRN
Status: DISCONTINUED | OUTPATIENT
Start: 2024-02-07 | End: 2024-02-15

## 2024-02-07 RX ORDER — ROSUVASTATIN CALCIUM 10 MG/1
10 TABLET, COATED ORAL NIGHTLY
Status: CANCELLED | OUTPATIENT
Start: 2024-02-07

## 2024-02-07 RX ORDER — POLYETHYLENE GLYCOL 3350 17 G/17G
17 POWDER, FOR SOLUTION ORAL DAILY
Status: CANCELLED | OUTPATIENT
Start: 2024-02-08

## 2024-02-07 RX ORDER — BISOPROLOL FUMARATE 5 MG/1
5 TABLET, FILM COATED ORAL DAILY
Status: CANCELLED | OUTPATIENT
Start: 2024-02-08

## 2024-02-07 RX ORDER — ONDANSETRON 4 MG/1
4 TABLET, ORALLY DISINTEGRATING ORAL EVERY 6 HOURS PRN
Status: CANCELLED | OUTPATIENT
Start: 2024-02-07

## 2024-02-07 RX ORDER — FINASTERIDE 5 MG/1
5 TABLET, FILM COATED ORAL DAILY
Status: DISCONTINUED | OUTPATIENT
Start: 2024-02-07 | End: 2024-02-15 | Stop reason: HOSPADM

## 2024-02-07 RX ORDER — ACYCLOVIR 400 MG/1
400 TABLET ORAL DAILY
Status: DISCONTINUED | OUTPATIENT
Start: 2024-02-08 | End: 2024-02-09

## 2024-02-07 RX ORDER — ACETAMINOPHEN 650 MG/1
650 SUPPOSITORY RECTAL EVERY 4 HOURS PRN
Status: DISCONTINUED | OUTPATIENT
Start: 2024-02-07 | End: 2024-02-15

## 2024-02-07 RX ORDER — ONDANSETRON 2 MG/ML
4 INJECTION INTRAMUSCULAR; INTRAVENOUS EVERY 6 HOURS PRN
Status: CANCELLED | OUTPATIENT
Start: 2024-02-07

## 2024-02-07 RX ORDER — HEPARIN SODIUM (PORCINE) LOCK FLUSH IV SOLN 100 UNIT/ML 100 UNIT/ML
5 SOLUTION INTRAVENOUS AS NEEDED
Status: DISCONTINUED | OUTPATIENT
Start: 2024-02-07 | End: 2024-02-07 | Stop reason: HOSPADM

## 2024-02-07 RX ORDER — AMLODIPINE BESYLATE 2.5 MG/1
2.5 TABLET ORAL DAILY
Status: CANCELLED | OUTPATIENT
Start: 2024-02-07

## 2024-02-07 RX ORDER — SODIUM CHLORIDE 0.9 % (FLUSH) 0.9 %
10 SYRINGE (ML) INJECTION AS NEEDED
Status: DISCONTINUED | OUTPATIENT
Start: 2024-02-07 | End: 2024-02-07 | Stop reason: HOSPADM

## 2024-02-07 RX ORDER — SODIUM CHLORIDE 0.9 % (FLUSH) 0.9 %
10 SYRINGE (ML) INJECTION EVERY 12 HOURS SCHEDULED
Status: DISCONTINUED | OUTPATIENT
Start: 2024-02-07 | End: 2024-02-07 | Stop reason: HOSPADM

## 2024-02-07 RX ORDER — DOCUSATE SODIUM 100 MG/1
100 CAPSULE, LIQUID FILLED ORAL 2 TIMES DAILY
Status: DISCONTINUED | OUTPATIENT
Start: 2024-02-07 | End: 2024-02-15

## 2024-02-07 RX ORDER — MELATONIN
5000 DAILY
Status: CANCELLED | OUTPATIENT
Start: 2024-02-08

## 2024-02-07 RX ORDER — TRAMADOL HYDROCHLORIDE 50 MG/1
50 TABLET ORAL EVERY 4 HOURS PRN
Status: CANCELLED | OUTPATIENT
Start: 2024-02-07 | End: 2024-02-11

## 2024-02-07 RX ORDER — PROPAFENONE HYDROCHLORIDE 150 MG/1
150 TABLET, COATED ORAL 3 TIMES DAILY
Status: DISCONTINUED | OUTPATIENT
Start: 2024-02-07 | End: 2024-02-15 | Stop reason: HOSPADM

## 2024-02-07 RX ORDER — DOCUSATE SODIUM 100 MG/1
100 CAPSULE, LIQUID FILLED ORAL 2 TIMES DAILY
Status: CANCELLED | OUTPATIENT
Start: 2024-02-07

## 2024-02-07 RX ORDER — PROPAFENONE HYDROCHLORIDE 150 MG/1
150 TABLET, COATED ORAL 3 TIMES DAILY
Status: CANCELLED | OUTPATIENT
Start: 2024-02-07

## 2024-02-07 RX ORDER — FERROUS SULFATE 325(65) MG
325 TABLET ORAL
Status: DISCONTINUED | OUTPATIENT
Start: 2024-02-07 | End: 2024-02-07 | Stop reason: HOSPADM

## 2024-02-07 RX ORDER — ASCORBIC ACID 500 MG
1000 TABLET ORAL DAILY
Status: DISCONTINUED | OUTPATIENT
Start: 2024-02-08 | End: 2024-02-15 | Stop reason: HOSPADM

## 2024-02-07 RX ORDER — ACETAMINOPHEN 650 MG/1
650 SUPPOSITORY RECTAL EVERY 4 HOURS PRN
Status: CANCELLED | OUTPATIENT
Start: 2024-02-07

## 2024-02-07 RX ORDER — AMLODIPINE BESYLATE 2.5 MG/1
2.5 TABLET ORAL DAILY
Status: DISCONTINUED | OUTPATIENT
Start: 2024-02-07 | End: 2024-02-15 | Stop reason: HOSPADM

## 2024-02-07 RX ORDER — TAMSULOSIN HYDROCHLORIDE 0.4 MG/1
0.4 CAPSULE ORAL 2 TIMES DAILY
Status: DISCONTINUED | OUTPATIENT
Start: 2024-02-07 | End: 2024-02-15 | Stop reason: HOSPADM

## 2024-02-07 RX ORDER — BISACODYL 10 MG
10 SUPPOSITORY, RECTAL RECTAL DAILY PRN
Status: CANCELLED | OUTPATIENT
Start: 2024-02-07

## 2024-02-07 RX ORDER — AMOXICILLIN 250 MG
2 CAPSULE ORAL 2 TIMES DAILY
Status: DISCONTINUED | OUTPATIENT
Start: 2024-02-07 | End: 2024-02-15 | Stop reason: HOSPADM

## 2024-02-07 RX ORDER — LATANOPROST 50 UG/ML
1 SOLUTION/ DROPS OPHTHALMIC NIGHTLY
Status: CANCELLED | OUTPATIENT
Start: 2024-02-07

## 2024-02-07 RX ORDER — FINASTERIDE 5 MG/1
5 TABLET, FILM COATED ORAL DAILY
Status: CANCELLED | OUTPATIENT
Start: 2024-02-07

## 2024-02-07 RX ORDER — ROSUVASTATIN CALCIUM 10 MG/1
10 TABLET, COATED ORAL NIGHTLY
Status: DISCONTINUED | OUTPATIENT
Start: 2024-02-07 | End: 2024-02-15 | Stop reason: HOSPADM

## 2024-02-07 RX ORDER — BISOPROLOL FUMARATE 5 MG/1
5 TABLET, FILM COATED ORAL DAILY
Status: DISCONTINUED | OUTPATIENT
Start: 2024-02-08 | End: 2024-02-15 | Stop reason: HOSPADM

## 2024-02-07 RX ORDER — FERROUS SULFATE 325(65) MG
325 TABLET ORAL
Status: DISCONTINUED | OUTPATIENT
Start: 2024-02-08 | End: 2024-02-15 | Stop reason: HOSPADM

## 2024-02-07 RX ORDER — TRAMADOL HYDROCHLORIDE 50 MG/1
50 TABLET ORAL EVERY 4 HOURS PRN
Status: DISPENSED | OUTPATIENT
Start: 2024-02-07 | End: 2024-02-11

## 2024-02-07 RX ORDER — ACETAMINOPHEN 325 MG/1
650 TABLET ORAL EVERY 4 HOURS PRN
Status: DISCONTINUED | OUTPATIENT
Start: 2024-02-07 | End: 2024-02-15 | Stop reason: HOSPADM

## 2024-02-07 RX ORDER — ACETAMINOPHEN 325 MG/1
650 TABLET ORAL EVERY 4 HOURS PRN
Status: CANCELLED | OUTPATIENT
Start: 2024-02-07

## 2024-02-07 RX ORDER — ONDANSETRON 2 MG/ML
4 INJECTION INTRAMUSCULAR; INTRAVENOUS EVERY 6 HOURS PRN
Status: DISCONTINUED | OUTPATIENT
Start: 2024-02-07 | End: 2024-02-15

## 2024-02-07 RX ORDER — POLYETHYLENE GLYCOL 3350 17 G/17G
17 POWDER, FOR SOLUTION ORAL DAILY
Status: DISCONTINUED | OUTPATIENT
Start: 2024-02-08 | End: 2024-02-15

## 2024-02-07 RX ORDER — SODIUM CHLORIDE 9 MG/ML
40 INJECTION, SOLUTION INTRAVENOUS AS NEEDED
Status: DISCONTINUED | OUTPATIENT
Start: 2024-02-07 | End: 2024-02-07 | Stop reason: HOSPADM

## 2024-02-07 RX ORDER — ACYCLOVIR 400 MG/1
400 TABLET ORAL DAILY
Status: CANCELLED | OUTPATIENT
Start: 2024-02-08 | End: 2024-02-14

## 2024-02-07 RX ORDER — ACETAMINOPHEN 160 MG/5ML
650 SOLUTION ORAL EVERY 4 HOURS PRN
Status: CANCELLED | OUTPATIENT
Start: 2024-02-07

## 2024-02-07 RX ORDER — FERROUS SULFATE 325(65) MG
325 TABLET ORAL
Status: CANCELLED | OUTPATIENT
Start: 2024-02-08

## 2024-02-07 RX ORDER — TAMSULOSIN HYDROCHLORIDE 0.4 MG/1
0.4 CAPSULE ORAL 2 TIMES DAILY
Status: CANCELLED | OUTPATIENT
Start: 2024-02-07

## 2024-02-07 RX ORDER — MELATONIN
5000 DAILY
Status: DISCONTINUED | OUTPATIENT
Start: 2024-02-08 | End: 2024-02-15 | Stop reason: HOSPADM

## 2024-02-07 RX ORDER — ACETAMINOPHEN 160 MG/5ML
650 SOLUTION ORAL EVERY 4 HOURS PRN
Status: DISCONTINUED | OUTPATIENT
Start: 2024-02-07 | End: 2024-02-15

## 2024-02-07 RX ORDER — SODIUM CHLORIDE 0.9 % (FLUSH) 0.9 %
20 SYRINGE (ML) INJECTION AS NEEDED
Status: DISCONTINUED | OUTPATIENT
Start: 2024-02-07 | End: 2024-02-07 | Stop reason: HOSPADM

## 2024-02-07 RX ADMIN — TRAMADOL HYDROCHLORIDE 50 MG: 50 TABLET ORAL at 09:22

## 2024-02-07 RX ADMIN — LATANOPROST 1 DROP: 50 SOLUTION/ DROPS OPHTHALMIC at 22:50

## 2024-02-07 RX ADMIN — SENNOSIDES AND DOCUSATE SODIUM 2 TABLET: 50; 8.6 TABLET ORAL at 09:21

## 2024-02-07 RX ADMIN — APIXABAN 2.5 MG: 2.5 TABLET, FILM COATED ORAL at 22:48

## 2024-02-07 RX ADMIN — APIXABAN 2.5 MG: 2.5 TABLET, FILM COATED ORAL at 09:21

## 2024-02-07 RX ADMIN — PROPAFENONE HYDROCHLORIDE 150 MG: 150 TABLET, COATED ORAL at 22:43

## 2024-02-07 RX ADMIN — TAMSULOSIN HYDROCHLORIDE 0.4 MG: 0.4 CAPSULE ORAL at 09:21

## 2024-02-07 RX ADMIN — DOCUSATE SODIUM 50MG AND SENNOSIDES 8.6MG 2 TABLET: 8.6; 5 TABLET, FILM COATED ORAL at 22:42

## 2024-02-07 RX ADMIN — PROPAFENONE HYDROCHLORIDE 150 MG: 150 TABLET, COATED ORAL at 09:21

## 2024-02-07 RX ADMIN — ROSUVASTATIN CALCIUM 10 MG: 10 TABLET, FILM COATED ORAL at 22:43

## 2024-02-07 RX ADMIN — POLYETHYLENE GLYCOL 3350 17 G: 17 POWDER, FOR SOLUTION ORAL at 09:21

## 2024-02-07 RX ADMIN — FERROUS SULFATE TAB 325 MG (65 MG ELEMENTAL FE) 325 MG: 325 (65 FE) TAB at 11:19

## 2024-02-07 RX ADMIN — BISOPROLOL FUMARATE 5 MG: 5 TABLET, FILM COATED ORAL at 09:21

## 2024-02-07 RX ADMIN — MAGNESIUM OXIDE 400 MG (241.3 MG MAGNESIUM) TABLET 400 MG: TABLET at 09:21

## 2024-02-07 RX ADMIN — FINASTERIDE 5 MG: 5 TABLET, FILM COATED ORAL at 22:42

## 2024-02-07 RX ADMIN — Medication 5000 UNITS: at 09:22

## 2024-02-07 RX ADMIN — ACYCLOVIR 400 MG: 400 TABLET ORAL at 09:21

## 2024-02-07 RX ADMIN — AMLODIPINE BESYLATE 2.5 MG: 2.5 TABLET ORAL at 22:48

## 2024-02-07 RX ADMIN — PROPAFENONE HYDROCHLORIDE 150 MG: 150 TABLET, COATED ORAL at 16:28

## 2024-02-07 RX ADMIN — OXYCODONE HYDROCHLORIDE AND ACETAMINOPHEN 1000 MG: 500 TABLET ORAL at 09:21

## 2024-02-07 RX ADMIN — Medication 3 ML: at 09:21

## 2024-02-07 RX ADMIN — TAMSULOSIN HYDROCHLORIDE 0.4 MG: 0.4 CAPSULE ORAL at 22:43

## 2024-02-07 RX ADMIN — DOCUSATE SODIUM 100 MG: 100 CAPSULE, LIQUID FILLED ORAL at 09:21

## 2024-02-07 RX ADMIN — DOCUSATE SODIUM 100 MG: 100 CAPSULE, LIQUID FILLED ORAL at 22:44

## 2024-02-07 RX ADMIN — TRAMADOL HYDROCHLORIDE 50 MG: 50 TABLET ORAL at 22:44

## 2024-02-07 NOTE — H&P
[facility]   HISTORY AND PHYSICAL    Patient Name: Angel Remy  : 1945  MRN: [id number]  Primary Care Physician:  Damián Vuong Jr., MD  Date of admission: (Not on file)    Subjective   Subjective     Chief Complaint:   Bilateral patella fractures - tendon advancement repair 2024  Non-weightbearing except may do toe-touch during transfers.  Knee immobilizers at all times. No quad sets/straight leg raise/knee flexion.  Impaired mobility/ impaired self care  Hairy cell leukemia  Anemia - transfusion on   DVT prophylaxis -  apixaban/ foot venous compression devices  H/O Paroxysmal atrial fibrillation  HTN  BPH- Flomax and finasteride  Pain management - occasional tramadol. JANNET report reviewed.     History of Present Illness  78 y.o. male presents 2024 to the hospital with bilateral transverse patellar fractures.  He was going down stairs at home when on the last step his slippers slipped and then caught on the steps, causing him to fall forward onto both knees. Unable to weightbear or stand. Diagnosed with bilateral inferior pole patella fractures. On  he underwent surgical repair with bilateral patella tendon advancement.      He had significant postoperative worsening of anemia that is complicated by his bone marrow suppression with history of hairy cell leukemia.  He was evaluated by his hematology oncologist who recommended blood transfusion on  as it was felt his hemoglobin would be slow to recover with his recent treatment in Nov/dec for hairy cell leukemia. .  He was also started on oral iron for iron deficiency anemia and anemia of chronic disease.  His thrombocytopenia likely contributed to additional bleeding around the time of his fracture.          Eliquis for DVT prophylaxis.  He was on ASA at home, history of PAF..  Patient is to remain primarily nonweightbearing however he may be toe-touch weightbearing for transfers from bed to chair or chair to  toilet.  Absolutely no flexion or straight leg raises or quad sets of either knee.  Knee braces are to remain on at all times.  Encourage patient to work on ankle pumps aggressively throughout the day.  Also incentive spirometer.  Patient will require a wheelchair with elevated leg rests and a 3 in 1 commode to use at home.    Functionally, in good shape premorbidly, exercised multiple days a week. PT worked on bed mobility on Feb 6. With OT on Feb 7, pt is instructed on LB dressing strategies using AE, mod-maxA provided. He is able to sit EOB with Zach for BLE mgt, sits supported for multiple mins to increase sitting tolerance .    Given his functional impairments and co-morbidities, now admit for acute inpatient rehab.   Review of Systems     Personal History     Past Medical History:   Diagnosis Date    BCC (basal cell carcinoma of skin) 06/22/2022    NOSE    Benign prostate hyperplasia     H/O Elevated PSA     H/O Hairy cell leukemia (CMS/HCC) 2012    H/O Internal thrombosed hemorrhoids     H/O minimal right carotid plaque     H/O peripheral neuropathy     Hyperlipidemia     Hypertension     Primary open-angle glaucoma, bilateral, mild stage        Past Surgical History:   Procedure Laterality Date    BONE MARROW BIOPSY W/ ASPIRATION      COLONOSCOPY      COLONOSCOPY N/A 9/25/2021    Procedure: COLONOSCOPY TO CECUM AND INTO TI WITH COLD BIOPSY POLYPECTOMIES;  Surgeon: Juan Jenkins MD;  Location: [facility] ENDOSCOPY;  Service: Gastroenterology;  Laterality: N/A;  pre: family history of colon cancer  post: polyps, diverticulosis  and internal hemorrhoids    EXCISION MASS HEAD/NECK N/A 6/23/2022    Procedure: EXCISION BASAL CELL CARCINOMA NOSE WITH FROZEN SECTION FULL THICKNESS GRAFT;  Surgeon: Arturo Weiss MD;  Location: [facility] OR [facility];  Service: Plastics;  Laterality: N/A;    HERNIA REPAIR Right 02/2000    Inguinal     OTHER SURGICAL HISTORY  1989    Median nerve foreign body excision    PATELLA OPEN  REDUCTION INTERNAL FIXATION Bilateral 2/5/2024    Procedure: PATELLA OPEN REDUCTION INTERNAL FIXATION;  Surgeon: Adrián Roberson MD;  Location: [facility];  Service: Orthopedics;  Laterality: Bilateral;    PROSTATE BIOPSY      March 2010 and October 2011 whruby PSA had acceleration    SIGMOIDOSCOPY  1999    With small thrombosed internal hemorrhoid.    TONSILLECTOMY      VENOUS ACCESS DEVICE (PORT) INSERTION Right 10/18/2023    Procedure: MEDIPORT PLACEMENTWITH SUPERIOR VENACAVAGRAM;  Surgeon: Randy Fan MD;  Location: [facility];  Service: Vascular;  Laterality: Right;    WISDOM TOOTH EXTRACTION  1961       Family History: family history includes Cancer in his father; Cancer (age of onset: 90) in his mother; Hypertension in his father, mother, and sister. Otherwise pertinent FHx was reviewed and not pertinent to current issue.    Social History:  reports that he has quit smoking. His smoking use included cigarettes. He smoked an average of .5 packs per day. He has never used smokeless tobacco. He reports current alcohol use. He reports that he does not use drugs.  . Lives in a home with a few steps to enter. To have ramp installed. Can use walkout basement with bedroom/bathroom but needs solid path laid as currently gravel like path. Dermatologist.   Home Medications:  Calcium-Magnesium-Vitamin D, Magnesium, acyclovir, amLODIPine, bimatoprost, bisoprolol, chlorhexidine, finasteride, metroNIDAZOLE, niacin, ondansetron, propafenone, rosuvastatin, tamsulosin, vitamin C, and vitamin D3    Allergies:  No Known Allergies  Inter-facility transfer medications (From admission, onward)                    acyclovir (ZOVIRAX) tablet 400 mg  Daily               amLODIPine (NORVASC) tablet 2.5 mg  Daily               apixaban (ELIQUIS) tablet 2.5 mg  (Apixaban - VTE Prophylaxis (Start Tomorrow))  Every 12 Hours Scheduled               ascorbic acid (VITAMIN C) tablet 1,000 mg  Daily               bisoprolol  (ZEBeta) tablet 5 mg  Daily               cholecalciferol (VITAMIN D3) tablet 5,000 Units  Daily               docusate sodium (COLACE) capsule 100 mg  2 Times Daily               ferrous sulfate tablet 325 mg  Daily With Breakfast               finasteride (PROSCAR) tablet 5 mg  Daily               latanoprost (XALATAN) 0.005 % ophthalmic solution 1 drop  Nightly               magnesium oxide (MAG-OX) tablet 400 mg  Daily               polyethylene glycol (MIRALAX) packet 17 g  Daily               propafenone (RYTHMOL) tablet 150 mg  3 Times Daily               rosuvastatin (CRESTOR) tablet 10 mg  Nightly               sennosides-docusate (PERICOLACE) 8.6-50 MG per tablet 2 tablet  2 Times Daily               tamsulosin (FLOMAX) 24 hr capsule 0.4 mg  2 Times Daily               acetaminophen (TYLENOL) tablet 650 mg  (Acetaminophin Oral/Rectal)  Every 4 Hours PRN        See Hyperspace for full Linked Orders Report.          acetaminophen (TYLENOL) 160 MG/5ML oral solution 650 mg  (Acetaminophin Oral/Rectal)  Every 4 Hours PRN        See Hyperspace for full Linked Orders Report.          acetaminophen (TYLENOL) suppository 650 mg  (Acetaminophin Oral/Rectal)  Every 4 Hours PRN        See Hyperspace for full Linked Orders Report.          bisacodyl (DULCOLAX) suppository 10 mg  Daily PRN               ondansetron ODT (ZOFRAN-ODT) disintegrating tablet 4 mg  (ondansetron (ZOFRAN) IV - Oral COR / SEB / JENIFFER / LAG / NIKKO / MAD / PAD / FRANCES / BR SC)  Every 6 Hours PRN        See Hyperspace for full Linked Orders Report.          ondansetron (ZOFRAN) injection 4 mg  (ondansetron (ZOFRAN) IV - Oral COR / SEB / JENIFFER / LAG / NIKKO / MAD / PAD / FRANCES / BR SC)  Every 6 Hours PRN        See Hyperspace for full Linked Orders Report.          traMADol (ULTRAM) tablet 50 mg  Every 4 Hours PRN               Objective    Objective     Vitals:   BP: ()/()   Arterial Line BP: ()/()     Physical Exam    MENTAL STATUS -  AWAKE / ALERT  HEENT-  NCAT, PUPILS EQUALLY ROUND, SCLERAE ANICTERIC, CONJUNCTIVAE PINK, OP MOIST, NO JVD, EARS UNREMARKABLE EXTERNALLY  LUNGS - CTA, NO WHEEZES, RALES OR RHONCHI  HEART- RRR, NO RUB, MURMUR, OR GALLOP  ABD - NORMOACTIVE BOWEL SOUNDS, SOFT, NT. NO HEPATOSPLENOMEGALY APPRECIATED  EXT - NO EDEMA OR CYANOSIS  Ecchymosis right thenar eminence but no tenderness and good strength and ROM.  Bilateral lower extremities - ACE wraps and knee immobilizers  NEURO - Ox4. Proprioception intact B great toes. Light touch intact distal BLE.   MOTOR EXAM - RUE/LUE 5/5. B ADF/EV 5/5      Result Review    Result Review:    Results from last 7 days   Lab Units 02/07/24  0548 02/06/24  0522 02/04/24  1204   WBC 10*3/mm3 6.33  --  5.04   HEMOGLOBIN g/dL 8.0* 8.8* 13.1   HEMATOCRIT % 23.8* 26.5* 39.3   PLATELETS 10*3/mm3 113*  --  117*     Results from last 7 days   Lab Units 02/07/24  0548 02/04/24  1204   SODIUM mmol/L 136 140   POTASSIUM mmol/L 4.0 4.0   CHLORIDE mmol/L 103 104   CO2 mmol/L 26.0 26.0   BUN mg/dL 18 24*   CREATININE mg/dL 0.65* 0.91   CALCIUM mg/dL 7.9* 8.7   BILIRUBIN mg/dL  --  0.6   ALK PHOS U/L  --  43   ALT (SGPT) U/L  --  23   AST (SGOT) U/L  --  19   GLUCOSE mg/dL 123* 116*         Assessment & Plan   Assessment / Plan      Bilateral patella fractures - tendon advancement repair Feb 5, 2024  Non-weightbearing except may do toe-touch during transfers.  Knee immobilizers at all times. No quad sets/straight leg raise/knee flexion.  Impaired mobility/ impaired self care  Hairy cell leukemia  Anemia - transfusion on Feb 7  DVT prophylaxis -  apixaban/ foot venous compression devices  H/O Paroxysmal atrial fibrillation  HTN  BPH- Flomax and finasteride  Pain management - occasional tramadol. JANNET report reviewed.      Now admit for comprehensive acute inpatient rehabilitation .  This would be an interdisciplinary program with physical therapy 1.5 hour,  occupational therapy 1.5 hour,  5 days a week.  Rehabilitation nursing  for carryover, monitoring of  incisions, anemia  status, bowel and bladder, and skin  Ongoing physician follow-up.  Weekly team conferences.  Goals are to achieve a level of modificed independent with  transfers for mobility and self-care   .   Rehabilitation prognosis good.  Medical prognosis fair- defer to Orthopedics.  Estimated length of stay is approximately one week , but is only an estimation.   The patient's functional status and clinical status is unchanged from preadmission assessment and the patient continues appropriate for acute inpatient rehabilitation.  Goal is for home with  home health   therapies.  Barrier to discharge:  impaired mobility and self care  - work on  transfers and ADLS with adaptive equipment and techniques  to overcome.       CODE STATUS:       Admission Status:  I believe this patient meets inpatient status.    Alan Hamlin MD

## 2024-02-07 NOTE — DISCHARGE SUMMARY
"    [facility] Medicine Services  DISCHARGE SUMMARY    Patient Name: Angel Remy  : 1945  MRN: [id number]    Date of Admission: 2024 11:24 AM  Date of Discharge: 2024  Primary Care Physician: Damián Vuong Jr., MD    Consults       Date and Time Order Name Status Description    2024 10:14 AM Hematology & Oncology Inpatient Consult      2024 12:20 PM Ortho (on-call MD unless specified)      2024 12:20 PM [facility] (on-call MD unless specified) Details              Hospital Course       Active Hospital Problems    Diagnosis  POA    Closed displaced transverse fracture of left patella [S82.032A]  Yes    Closed displaced transverse fracture of right patella [S82.031A]  Yes    Thrombocytopenia [D69.6]  Yes    History of left bundle branch block (LBBB) [Z86.79]  Not Applicable    PAF (paroxysmal atrial fibrillation) history [I48.0]  Yes    Supine hypertension [I10]  Yes    Hairy cell leukemia [C91.40]  Yes      Resolved Hospital Problems    Diagnosis Date Resolved POA    **Patella fracture [S82.009A] 2024 Yes    Fall at home, initial encounter [W19.XXXA, Y92.009] 2024 Not Applicable      Chief Complaint:  Fall, knee pain, difficulty walking     HPI: As written on the day of admission 2024  \"Angel Remy is a 78 y.o. male with past medical history as listed below presents to the hospital after a fall at home that occurred when he was walking downstairs.  Reportedly when patient got to the last stair he lost his footing and slipped and went down on both knees anteriorly.  He had minimal pain in the patellar region but significant swelling and he was unable to bear weight on his legs.  Patient denies trauma to any other region of the body.  In the emergency room he was found to have bilateral patellar injuries and is being hospitalized for orthopedic surgery evaluation.  Patient notes he is very athletic and able to walk up a flight of stairs or around the block without " "any palpitations or chest pain.  He denies any recent cardiac symptoms and notes his last stress test was completely normal.  He is eager to get surgery soon as possible so that he can get home and back to walking.\"       Hospital Course:  Angel Remy is a 78 y.o. male presents the hospital with bilateral transverse patellar fractures.  He is status post surgical intervention.  Please see operative report from 2/5/2024 for full operative details.  Patient received postoperative supportive care and symptom treatment.  He had significant postoperative worsening of anemia that is complicated by his bone marrow suppression with history of hairy cell leukemia.  He was evaluated by his hematology oncologist who recommended blood transfusion.  He was also started on oral iron for iron deficiency anemia and anemia of chronic disease.  His thrombocytopenia likely contributed to additional bleeding around the time of his fracture.  Please see orthopedic surgery recommendations from their note for additional details.    His symptoms are currently well-controlled but he is debilitated from his injuries.  He has been working with PT OT who recommended transfer to [facility] acute rehab.  He was accepted to [facility] acute rehab today and will transfer for further medical management.    Assessment and recommendations per orthopedic surgery note on 2/7/2024:  \"Acute postop blood loss anemia.  I will continue to monitor his hemoglobin.  At present patient is asymptomatic and may require transfusion if his hemoglobin continues to drop or he becomes symptomatic.  Eliquis for DVT prophylaxis and history of A-fib.  Patient is to remain primarily nonweightbearing however he may be toe-touch weightbearing for transfers from bed to chair or chair to toilet.  Absolutely no flexion or straight leg raises or quad sets of either knee.  Knee braces are to remain on at all times.  Encourage patient to work on ankle pumps aggressively throughout " "the day.  Also incentive spirometer.  Patient is planning to go to [facility] pending insurance approval.  Patient will require a wheelchair with elevated leg rests and a 3 in 1 commode to use at home.   Date: 2/7/2024   Madelyn Silva RN   Addendum:   Wheelchair with elevated leg rests:    Patient is unable to safely use a cane or a walker due to (DX) & a wheelchair is needed to assist with daily living activities inside the home. Patient has upper body strength and the mental capabilities to propel the wheelchair inside the home.    3-in-1 commode chair:  Patient is incapable of using regular toilet facilities.    Madelyn Silva RN\"    Please note that this note was made using Dragon voice recognition software      Please note I recommend follow-up with his primary care provider within 1 week after discharge from the hospital for general hospital follow-up.  He will need to follow-up with orthopedic surgery as instructed and with his oncologist as instructed.    Day of Discharge     Subjective: Patient is eager to go to [facility] rehab.  He is eager to get stronger.  He is somewhat frustrated by his injuries.  He says his pain is controlled.  No other new complaints.    Vital Signs:   Temp:  [99.2 °F (37.3 °C)-99.5 °F (37.5 °C)] 99.3 °F (37.4 °C)  Heart Rate:  [69-73] 69  Resp:  [16] 16  BP: (118-133)/(71-74) 131/72     Physical Exam:  Constitutional:Awake, alert, no acute distress  HENT: NCAT, mucous membranes moist, neck supple  Respiratory: nonlabored breathing   Cardiovascular: Pulse rate is normal  Musculoskeletal: Postoperative changes to the knees currently with dressing in place, normal musculature for age, no lower extremity edema, BMI 23  Psychiatric: Appropriate affect, cooperative, conversational  Neurologic: No slurred speech or facial droop, follows commands  Skin: Occasional bruises, no rashes or jaundice, warm      Pertinent  and/or Most Recent Results     Results from last 7 days " "  Lab Units 02/07/24  0548 02/06/24  0522 02/04/24  1204   WBC 10*3/mm3 6.33  --  5.04   HEMOGLOBIN g/dL 8.0* 8.8* 13.1   HEMATOCRIT % 23.8* 26.5* 39.3   PLATELETS 10*3/mm3 113*  --  117*   SODIUM mmol/L 136  --  140   POTASSIUM mmol/L 4.0  --  4.0   CHLORIDE mmol/L 103  --  104   CO2 mmol/L 26.0  --  26.0   BUN mg/dL 18  --  24*   CREATININE mg/dL 0.65*  --  0.91   GLUCOSE mg/dL 123*  --  116*   CALCIUM mg/dL 7.9*  --  8.7     Results from last 7 days   Lab Units 02/04/24  1204   BILIRUBIN mg/dL 0.6   ALK PHOS U/L 43   ALT (SGPT) U/L 23   AST (SGOT) U/L 19           Invalid input(s): \"TG\", \"LDLCALC\", \"LDLREALC\"        Brief Urine Lab Results  (Last result in the past 365 days)        Color   Clarity   Blood   Leuk Est   Nitrite   Protein   CREAT   Urine HCG        05/23/23 1617 Yellow   Clear   Small (1+)   Negative   Negative   Negative                 Imaging Results (All)       Procedure Component Value Units Date/Time    FL C Arm During Surgery [[id number]] Resulted: 02/05/24 1648     Updated: 02/05/24 1648    Narrative:      This procedure was auto-finalized with no dictation required.    XR Knee 1 or 2 View Bilateral [[id number]] Collected: 02/04/24 1440     Updated: 02/04/24 1447    Narrative:      Right knee radiograph     HISTORY: Fall, pain     TECHNIQUE: AP and lateral radiographs of the bilateral knees     COMPARISON: None       Impression:      FINDINGS AND IMPRESSION:  There are severely displaced, comminuted fractures of the bilateral  patella. There is separation of major fracture fragments by 3.2 cm on  the left and 3.3 cm on the right.        This report was finalized on 2/4/2024 2:44 PM by Dr. Elgin Sherman M.D  on Workstation: [id number]                 Results for orders placed during the hospital encounter of 05/23/23    Adult Transthoracic Echo Complete W/ Cont if Necessary Per Protocol    Interpretation Summary    Left ventricular systolic function is normal. Calculated left ventricular EF = " 60.5%    Left ventricular diastolic function was indeterminate.    The left atrial cavity is moderately dilated.    Left atrial volume is moderately increased.    Estimated right ventricular systolic pressure from tricuspid regurgitation is normal (<35 mmHg). Calculated right ventricular systolic pressure from tricuspid regurgitation is 34 mmHg.        Discharge Details     Discharge medicines: Please see discharge readmission orders      No Known Allergies      Discharge Disposition:  Rehab Facility or Unit ([facility])    Diet:  Hospital:  Diet Order   Procedures    Diet: Regular/House Diet; Texture: Regular Texture (IDDSI 7); Fluid Consistency: Thin (IDDSI 0)            CODE STATUS:    Code Status and Medical Interventions:   Ordered at: 02/05/24 9122     Code Status (Patient has no pulse and is not breathing):    CPR (Attempt to Resuscitate)     Medical Interventions (Patient has pulse or is breathing):    Full       Future Appointments   Date Time Provider Department Center   2/15/2024  1:00 PM [facility] PORT CHAIR  [facility]   3/28/2024  1:00 PM [facility] PORT CHAIR  [facility]   5/9/2024  1:00 PM [facility] PORT CHAIR  [facility]   6/20/2024  1:00 PM [facility] PORT CHAIR  [facility]   6/20/2024  1:20 PM Paco Jeffers MD [facility]            Follow-up Information       Damián Vuong Jr., MD .    Specialty: Internal Medicine  Contact information:  [address] [place] [address] [phone]               Provider, No Known .    Contact information:  [facility] [address]                                 Angel Sagastume MD  02/07/24      Time Spent on Discharge:  I spent greater than 35 minutes on this discharge activity which included: face-to-face encounter with the patient, reviewing the data in the system, coordination of the care with the nursing staff as well as consultants, documentation, and  entering orders.

## 2024-02-07 NOTE — PLAN OF CARE
Goal Outcome Evaluation:  Plan of Care Reviewed With: patient        Progress: improving  Outcome Evaluation: Patient states he is ready to go to rehab and figure out what that plan is ahead of him.

## 2024-02-07 NOTE — CASE MANAGEMENT/SOCIAL WORK
Continued Stay Note  [facility]     Patient Name: Angel Remy  MRN: [id number]  Today's Date: 2/7/2024    Admit Date: 2/4/2024    Plan: [facility] Acute Rehab -- Accepted.   Discharge Plan       Row Name 02/07/24 1128       Plan    Plan [facility] Acute Rehab -- Accepted.    Patient/Family in Agreement with Plan yes    Plan Comments Spoke with Sandra/BRANDAN who said Dr. Hamlin has accepted the patient and has a rehab room available for him today. Updated YAHAIRA De Jesus who will discuss with the A MD. Await discharge orders when medically appropriate.                   Discharge Codes    No documentation.                 Expected Discharge Date and Time       Expected Discharge Date Expected Discharge Time    Feb 8, 2024               Princess PEÑA RN

## 2024-02-07 NOTE — PLAN OF CARE
"Goal Outcome Evaluation:  Plan of Care Reviewed With: patient, spouse           Outcome Evaluation: Pt is a 77 y/o M admitted after fall resulting in samantha patella fxs s/p ORIF, in samantha KIs. Per ortho: \"Patient is to remain primarily nonweightbearing however he may be toe-touch weightbearing for transfers from bed to chair or chair to toilet\". He is indep at baseline w/o AD. Multiple steps to enter home and w/in home but wife is planning to install a ramp. Today pt is instructed on LB dressing strategies using AE, mod-maxA provided. He is able to sit EOB with Zach for BLE mgt, sits supported for multiple mins to increase sitting tolerance. Family and pt educated on DME needs, pt plans to go to rehab at d/c.      Anticipated Discharge Disposition (OT): inpatient rehabilitation facility                        "

## 2024-02-07 NOTE — PROGRESS NOTES
Orthopedic Progress Note      Patient: Angel Remy    YOB: 1945    Medical Record Number: [id number]    Attending Physician: Angel Sagastume,*    Date of Admission: 2/4/2024 11:24 AM    Admitting Dx:  Patella fracture [S82.009A]  Closed displaced transverse fracture of left patella, initial encounter [S82.032A]  Closed displaced transverse fracture of right patella, initial encounter [S82.031A]    Status Post: Bilateral open excision of distal pole of patella with patellar tendon advancement and repair     Post Operative Day Number: 2    Current Problem List:   Patella fracture    Hairy cell leukemia    Supine hypertension    Fall at home, initial encounter    Thrombocytopenia    History of left bundle branch block (LBBB)    PAF (paroxysmal atrial fibrillation) history      Past Medical History:   Diagnosis Date    BCC (basal cell carcinoma of skin) 06/22/2022    NOSE    Benign prostate hyperplasia     H/O Elevated PSA     H/O Hairy cell leukemia (CMS/HCC) 2012    H/O Internal thrombosed hemorrhoids     H/O minimal right carotid plaque     H/O peripheral neuropathy     Hyperlipidemia     Hypertension     Primary open-angle glaucoma, bilateral, mild stage        Current Medications:  Scheduled Meds:acyclovir, 400 mg, Oral, Daily  amLODIPine, 2.5 mg, Oral, Daily  apixaban, 2.5 mg, Oral, Q12H  vitamin C, 1,000 mg, Oral, Daily  bisoprolol, 5 mg, Oral, Daily  cholecalciferol, 5,000 Units, Oral, Daily  docusate sodium, 100 mg, Oral, BID  ferrous sulfate, 325 mg, Oral, Daily With Breakfast  finasteride, 5 mg, Oral, Daily  latanoprost, 1 drop, Both Eyes, Nightly  magnesium oxide, 400 mg, Oral, Daily  polyethylene glycol, 17 g, Oral, Daily  propafenone, 150 mg, Oral, TID  rosuvastatin, 10 mg, Oral, Nightly  senna-docusate sodium, 2 tablet, Oral, BID  sodium chloride, 3 mL, Intravenous, Q12H  sodium chloride, 3 mL, Intravenous, Q12H  tamsulosin, 0.4 mg, Oral, BID      PRN Meds:.  acetaminophen  **OR** acetaminophen **OR** acetaminophen    acetaminophen    bisacodyl    Calcium Replacement - Follow Nurse / BPA Driven Protocol    docusate sodium    famotidine    HYDROcodone-acetaminophen    HYDROmorphone **AND** naloxone    ketorolac    Magnesium Low Dose Replacement - Follow Nurse / BPA Driven Protocol    melatonin    ondansetron ODT **OR** ondansetron    Phosphorus Replacement - Follow Nurse / BPA Driven Protocol    Potassium Replacement - Follow Nurse / BPA Driven Protocol    [COMPLETED] Insert Peripheral IV **AND** sodium chloride    sodium chloride    sodium chloride    sodium chloride    sodium chloride    traMADol    SUBJECTIVE: 78 y.o.  male. Awake and alert.  No complaints at present.      OBJECTIVE:   Vitals:    02/06/24 1419 02/06/24 1825 02/06/24 2112 02/07/24 0547   BP: 133/74 130/71 118/73 128/73   BP Location: Left arm Left arm Left arm Left arm   Patient Position: Lying Lying Lying Lying   Pulse: 71 70 72 73   Resp: 16 16 16 16   Temp: 99.4 °F (37.4 °C) 99.2 °F (37.3 °C) 99.5 °F (37.5 °C) 99.3 °F (37.4 °C)   TempSrc: Oral Oral Oral Oral   SpO2: 99% 100% 99% 98%   Weight:       Height:         I/O last 3 completed shifts:  In: 960 [P.O.:960]  Out: 2500 [Urine:2500]    Diagnostic Tests:  Lab Results (last 72 hours)       Procedure Component Value Units Date/Time    Vitamin B12 [[id number]]  (Normal) Collected: 02/07/24 0548    Specimen: Blood Updated: 02/07/24 0643     Vitamin B-12 476 pg/mL     Narrative:      Results may be falsely increased if patient taking Biotin.      Folate [[id number]]  (Normal) Collected: 02/07/24 0548    Specimen: Blood Updated: 02/07/24 0643     Folate 13.50 ng/mL     Narrative:      Results may be falsely increased if patient taking Biotin.      Basic Metabolic Panel [[id number]]  (Abnormal) Collected: 02/07/24 0548    Specimen: Blood Updated: 02/07/24 0626     Glucose 123 mg/dL      BUN 18 mg/dL      Creatinine 0.65 mg/dL      Sodium 136 mmol/L      Potassium 4.0  mmol/L      Chloride 103 mmol/L      CO2 26.0 mmol/L      Calcium 7.9 mg/dL      BUN/Creatinine Ratio 27.7     Anion Gap 7.0 mmol/L      eGFR 96.4 mL/min/1.73     Narrative:      GFR Normal >60  Chronic Kidney Disease <60  Kidney Failure <15    The GFR formula is only valid for adults with stable renal function between ages 18 and 70.    CBC (No Diff) [[id number]]  (Abnormal) Collected: 02/07/24 0548    Specimen: Blood Updated: 02/07/24 0605     WBC 6.33 10*3/mm3      RBC 2.50 10*6/mm3      Hemoglobin 8.0 g/dL      Hematocrit 23.8 %      MCV 95.2 fL      MCH 32.0 pg      MCHC 33.6 g/dL      RDW 12.0 %      RDW-SD 41.5 fl      MPV 10.1 fL      Platelets 113 10*3/mm3     Ferritin [[id number]]  (Normal) Collected: 02/06/24 1548    Specimen: Blood Updated: 02/06/24 1631     Ferritin 92.90 ng/mL     Narrative:      Results may be falsely decreased if patient taking Biotin.      Iron Profile [[id number]]  (Abnormal) Collected: 02/06/24 1548    Specimen: Blood Updated: 02/06/24 1623     Iron 29 mcg/dL      Iron Saturation (TSAT) 10 %      Transferrin 199 mg/dL      TIBC 297 mcg/dL     Reticulocytes [[id number]]  (Abnormal) Collected: 02/06/24 1548    Specimen: Blood Updated: 02/06/24 1604     Reticulocyte % 2.10 %      Reticulocyte Absolute 0.0563 10*6/mm3     Hemoglobin & Hematocrit, Blood [[id number]]  (Abnormal) Collected: 02/06/24 0522    Specimen: Blood Updated: 02/06/24 0603     Hemoglobin 8.8 g/dL      Hematocrit 26.5 %     Comprehensive Metabolic Panel [[id number]]  (Abnormal) Collected: 02/04/24 1204    Specimen: Blood Updated: 02/04/24 1245     Glucose 116 mg/dL      BUN 24 mg/dL      Creatinine 0.91 mg/dL      Sodium 140 mmol/L      Potassium 4.0 mmol/L      Chloride 104 mmol/L      CO2 26.0 mmol/L      Calcium 8.7 mg/dL      Total Protein 5.8 g/dL      Albumin 4.1 g/dL      ALT (SGPT) 23 U/L      AST (SGOT) 19 U/L      Alkaline Phosphatase 43 U/L      Total Bilirubin 0.6 mg/dL      Globulin 1.7 gm/dL      A/G Ratio  2.4 g/dL      BUN/Creatinine Ratio 26.4     Anion Gap 10.0 mmol/L      eGFR 86.3 mL/min/1.73     Narrative:      GFR Normal >60  Chronic Kidney Disease <60  Kidney Failure <15    The GFR formula is only valid for adults with stable renal function between ages 18 and 70.    CBC & Differential [[id number]]  (Abnormal) Collected: 02/04/24 1204    Specimen: Blood Updated: 02/04/24 1232    Narrative:      The following orders were created for panel order CBC & Differential.  Procedure                               Abnormality         Status                     ---------                               -----------         ------                     CBC Auto Differential[[id number]]        Abnormal            Final result                 Please view results for these tests on the individual orders.    CBC Auto Differential [[id number]]  (Abnormal) Collected: 02/04/24 1204    Specimen: Blood Updated: 02/04/24 1232     WBC 5.04 10*3/mm3      RBC 4.09 10*6/mm3      Hemoglobin 13.1 g/dL      Hematocrit 39.3 %      MCV 96.1 fL      MCH 32.0 pg      MCHC 33.3 g/dL      RDW 12.1 %      RDW-SD 43.2 fl      MPV 10.6 fL      Platelets 117 10*3/mm3      Neutrophil % 77.6 %      Lymphocyte % 10.3 %      Monocyte % 8.9 %      Eosinophil % 2.6 %      Basophil % 0.2 %      Neutrophils, Absolute 3.91 10*3/mm3      Lymphocytes, Absolute 0.52 10*3/mm3      Monocytes, Absolute 0.45 10*3/mm3      Eosinophils, Absolute 0.13 10*3/mm3      Basophils, Absolute 0.01 10*3/mm3              PHYSICAL EXAM: Bilateral lower extremity dressings and knee immobilizer in place.  Does have some swelling to both ankles.  Calves are soft and nontender.  He has good motion sensation to his foot and ankles.  Signs remained stable.  Hemoglobin is 8.0.  Patient denies any chest pain or shortness of breath.  Voiding well.  Pain is well-controlled with the tramadol.     ASSESSMENT & PLAN:  Acute postop blood loss anemia.  I will continue to monitor his hemoglobin.  At  present patient is asymptomatic and may require transfusion if his hemoglobin continues to drop or he becomes symptomatic.  Eliquis for DVT prophylaxis and history of A-fib.  Patient is to remain primarily nonweightbearing however he may be toe-touch weightbearing for transfers from bed to chair or chair to toilet.  Absolutely no flexion or straight leg raises or quad sets of either knee.  Knee braces are to remain on at all times.  Encourage patient to work on ankle pumps aggressively throughout the day.  Also incentive spirometer.  Patient is planning to go to [facility] pending insurance approval.  Patient will require a wheelchair with elevated leg rests and a 3 in 1 commode to use at home.    Date: 2/7/2024    Madelyn Silva RN    Addendum:   Wheelchair with elevated leg rests:    Patient is unable to safely use a cane or a walker due to (DX) & a wheelchair is needed to assist with daily living activities inside the home. Patient has upper body strength and the mental capabilities to propel the wheelchair inside the home.    3-in-1 commode chair:  Patient is incapable of using regular toilet facilities.     Madelyn Silva RN

## 2024-02-07 NOTE — PROGRESS NOTES
REASONS FOR FOLLOWUP:   Hairy cell leukemia.   Admission 05/11/2012 through 05/17/12 for 7 day continuous infusion cladribine (2-CdA) without complication.   Patient seen 5 months post treatment in complete remission.   Patient seen 8 months post treatment with continued complete remission, every 3 month reassessment per CBC planned, 6 month review by MD scheduled.   Patient seen at 15 months without evidence of recurrent disease, every 6 month followup planned.   The patient was seen 02/05/2014 with no evidence of recurrent disease, 6-month followup planned.   Patient seen 07/31/2014, stable with no evidence of recurrent disease, every 6 month followup.   Patient seen 01/15/2015, stable without recurrent disease, 6-month CBC planned, yearly followup scheduled.   Patient seen 02/04/2016, stable, ?early peripheral neuropathy developing distally per feet, no evidence for recurrent disease per hairy cell leukemia.  Patient reviewed September 02, 2016, previous July CBC with mild thrombocytopenia developing, recheck September 2 normalized, every 6 month follow-ups anticipated  Patient reviewed February 02, 2017, status post negative prostate biopsy, mild thrombocytopenia again recognized  Patient reviewed July 20, 2017, hematologically stable, every 6 month follow-up CBCs planned, yearly M.D. Assessment  Patient seen July 26, 2018 after recent airline travel any cold symptoms  Patient reviewed January 10, 2019, no additional symptoms, hematologically stable  Patient reviewed July 25, 2019, stable, six-month follow-up as planned  Patient viewed January 16, 2020, stable, every 6 months CBC, MD assessment yearly planned  Reassessment January 21, 2021, no evidence of recurrence disease  Patient assessed 2/10/2022, evidence of continued remission noted both on clinical examination, peripheral blood smear review and flow cytometric evaluation.  Patient review 2/23/2023 mild thrombocytopenia, no new abnormalities per  peripheral smear, close for follow-up planned  Patient assessed 9/14/2023, concern for early relapse.  Subsequent marrow 10/12/2023-variably hypercellular with diffuse involvement-a 90%-hairy cell leukemia relapse.  Patient seen 10/22/2023 for initiation of cladribine-outpatient regimen to be followed by Rituxan-28 days later 4 to 6 weeks  Patient proceeded through Rituxan treatments weekly beginning 11/22/2023 and completed 12/14/2023.  Patient with findings consistent with remission 1/4/2024  Patient hospitalized 2/4/2024 status post bilateral patellar fractures         History of Present Illness     The patient is a 78 y.o. male with the above-mentioned history, who completed therapy with cladribine and subsequent consolidative Rituxan weekly x 4 with his last treatment given 12/14/2023.  He is now seen back in office again feeling well with no additional issues.  He continues to work a full schedule and, wonderfully, was able to proceed to therapy almost entirely without interruption to his usual schedule.   He is eating and drinking adequately.  He has no fevers or chills, signs or symptoms of infection.  He has no arthralgias.  He has no new concerns today.     We discussed his peripheral smear today that demonstrates normalization and, fortunately, he developed no additional symptoms that would direct us otherwise.  We have agreed, however, that he would maintain his port over the next year until it is clear that he no longer needs this access.    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 He is usually followed by Dr. Vuong for a history of hypertension, hyperlipidemia, minimal right carotid plaque as well as elevated PSA.       He had exams done on 1-09-12 for routine followup. This included normal serum chemistry including LFTs, cholesterol 131, triglyceride 34, HDL 51, and LDL 73. CBC revealed H&H 13.3,  37.9%, WBC 3300, platelet count 110,000, 26% polys and 74% lymphs with A NC of 0.9.       Dr. Remy provides information when initially seen with studies from as far back as 1- at which time hemoglobin and hematocrit was 14.4 and 42.1%, WBC 6950, platelets 227,000 with 47% polys, 42% lymph; this was automated. The additional test includ es approximately every year to every other year thereafter though it was noted in January 2010 WBC dropped from an average of approximately 5,000-6,000 to 3600. Hemoglobin and hematocrit 13.6 and 39.7%. Platelets 150,000-170,000 with ANC beginning in Ja nuary 2010 at 1900.       We were contacted per the above findings and asked Dr. Remy to undergo further assessment including flow cytometric exam per peripheral blood. This revealed no evidence of abnormal myeloid maturation increased blast population. No evidence of lymphopro l iferative disorder. The patient was asked to be seen formally in the office now and comes in today for further assessment. Dr. Remy indicates that he has taken a number of medications in an attempt to improve his health. Several homeopathic medication s include vitamin C, folate, flaxseed oil, fish oil, vitamin E, selenium, pomegranate tabs, Co-enzyme Q, vitamin D, niacin, and red yeast rice daily. He has discontinued these and stays on those described below.       He feels well with no additional problem s and states that he has no little or no symptoms that he can detail, whether this is just ill health in anyway, though he does have occasional hemorrhoidal pain. He also notes rare palpitations and occasional epistaxis when the ambient air has dried sub stantially. No fevers, chills, weight loss, night sweats, or other constitutional symptoms.       As a result of the above, the patient was asked to undergo a series of studies. This went onto include rheumatoid factor of 6, MARYURI screen negative, negative s misael protein electrophoresis, normal  quantitative immunoglobulins, CMV IgG of 2.9, IgM less than .9, EBV IgM of less than .9 and BCA IgG antibody of 4.6. These are consistent with previous exposures. HIV was negative, CRSP less than .1, sedimentation ra t e of 4, iron 125, TIBC 297, 14% saturation, and ferritin of 77. Peripheral blood flow cytometry was negative for evidence of abnormal myeloid maturation, increased blast population or lymphoproliferative disorder. As the patient is seen back today, he i s continuing to feel well though is obviously greatly concerned about his followup counts. Reviews in the office had been planned to follow him briefly recheck performance 3/29/12 with an H&H of 12.9, 36.1, WBC 3900, 32 polys, 62 lymphs, platelet count 94 , 000, IPF slightly elevated at 8.8. The patient was therefore asked to return and to be further assessed with bone marrow aspirate and biopsy. He now presents to do so. He has had no additional symptoms since last seen. He has discontinued his Crestor use.       As a result of his review the patient underwent bone marrow aspiration and biopsy. This revealed evidence of lymphocytic infiltrate reviewed here in the office. 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 Eventually cytogenetics was found to be negative with no evidence of cyclin-D1/IgH or BCL-2/IgH rearrangement. Additional tissue was requested. As a result, the patient was notifi e d of these findings by personal visit, and plans were made to proceed with endoscopy, ? mantle cell involvement. At the time of this dictation, this was not yet performed. He was also asked to undergo CT of the neck, chest, abdomen and pelvis which show ed no evidence of abnormality. PET scan 04/13/12 showed increased activity within the marrow, but no additional abnormalities including the spleen, with no background bowel or gastric activity as  well.       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 hairy cell leukemia. This has been discussed in great detail with the alexander thakur at [facility] and additional stains are pending as is the patient's GI assessment now to be pursued through Dr. Ray.       His case was again reviewed over quite a period of time, and ultimately his marrow was signed out as 80% involved with hairy cell leukemia - normal cytogenetics, no evidence of cyclin D1/IgH or BCL-2/IgH rearrangement. Dr. Remy was advised of the treatment options which include followup with no immediate therapy, and/or the use of cladribine or pentostatin. After numerous di s cussion, he ultimately elected to try to proceed with treatment when he is feeling as well as he is to improve his ability to withstand the therapy itself. We therefore began to discuss the treatment schedule, which also could be somewhat variable, inclu d ing 7-day infusions, 2-hr infusion q.d. x five days or every other week infusion. After discussion he wishes to proceed with 7-day infusion. When we attempted to do this as an outpatient we found out thereafter that this would not be covered as an outpa tient infusion through his insurance. Therefore we have made plans for him to be admitted after he is seen in office 05/10/12.       The patient was thereafter admitted 05/11-05/17/12. PICC line was placed and he initiated treatment at 0.1 mg/kg or 7.7 mg IV in fusion over seven days. The patient fortunately had no serious issues at all during the seven days and continued to work out on a daily basis. He was seen by his internist as well with some of his meds adjusted including his HCTZ and Ziac. He was stabl e at discharge, but was given Neulasta 6 mg subcutaneously 24 hours after he completed  treatment. He has been having counts done on a regular basis, and returns back today with only minimal evidence of neutropenia at his becca, and has an excellent periph eral smear today - normal findings. His performance status remains excellent as well.       The patient when seen on 6/7/12 was felt to have improved substantially. We followed him for weekly counts and plans at that point were for him to take a trip out of the country. He did actually take this trip, which had him eventually hiking at 11-12,000 feet without any complication or ill effect. As he returns back today on 7/19 he feels well and again with an excellent performance status. He has had no fever, c hills or suggestion of infection or complications thus far. He remains again, with an excellent performance status.       The patient thereafter is asked to have his counts done on an every six weeks basis, now seen three months later with a normal CBC as well as examination. He again feels quite well with a completely normal performance status.       The patient was asked to return for followup studies that included normal serum chemistries, unchanged lipid profile and stable hematologic findings. He is now seen on 2/7/13, 4 months from his last appointment. He continues to do well with unchanged performance status.       The patient was asked to have counts done q3 months seeing the physician in six months. He is now seen back 08/22/2013 feeling well having [place] with [place]. He had no additional fevers, sweats, chills, significant change in weight although he has tried to lose some additional pounds and we see this today. He continues to exercise regularl y and dietary program. Review of his smears again continues to demonstrate a complete remission.       Today, the patient was asked to be seen back in 6 months for followup, and is now seen 02/05/2014, continuing to feel well. Again,  review of his smears and physical examination fortunately demonstrates no evidence of recurrent disease.       The patient was asked to be seen back in 6 months for followup and is now seen on 07/31/2014. Again he feels well with an excellent performance status and no change since last reviewed.       The patient thereafter was now seen back 2015, 2016 stable at both visits with no evidence of recurrence of his hairy cell leukemia. He feels well but when now reviewed 02/04/2016, he has had some degree of peripheral neuropathy? This improv es with activity and it is certainly not limiting his action or his function.   Patient now reviewed back again September 02, 2016, continues to have an excellent performance status and hematologically remain stable brother had some concern about thrombocytopenia last visit.    The patient is next seen February 02, 2017.  He has not had any medical issues since last seen and remains physically quite well.  He does describe following with urology and undergoing a repeat biopsy recently when his PSA was above 7.  His findings evidently were negative for malignancy though he did have post procedure hematuria.  This is also now abated.  The patient is next seen July 28, 2017.  He continues to have an excellent performance status and no particular difficulty in day-to-day function and/or pursuing active vacations.      The patient is next seen July 26, 2018.  He had recently returned from a trip to [place].  After experiencing an airplane ride with multiple coughing passengers he himself developed a cold, particularly severe over the last 3-4 days with cough and congestion.  He's had low-grade fever which is now beginning to resolve but is clearly uncomfortable.    The patient's next seen January 10, 2019.  He has continued to travel without issues and is feeling well when reviewed today.  Plans were made for usual follow-up and the patient is next seen July 25, 2019.  His performance  status remains excellent though he has lost some weight and indicates he did this per altering his diet.  His stamina and other usual activities are continued unabated.  The patient is next seen January 16, 2020.  He continues an active practice and lifestyle.  He has lost some additional weight on purpose through diet.    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    The patient's follow-up laboratory studies demonstrated by late September degree of leukopenia, unchanged thrombocytopenia and this was followed with repeat analysis within the last several weeks as he seen February 10, 2022. Flow cytometry failed to demonstrate any new process.  As you seen February 10, 2020 his performance status remains excellent, stable weight, appetite, no additional fever, chills, rash, night sweats.    The patient is next evaluated 2/23/2023 with an unchanged performance status for mild thrombocytopenia.  He has had some exposure to viral illnesses through his grandchildren as of late but is otherwise felt fine with a unchanged performance status and work schedule.    He required admission 5/23-27/2023 had return from [place] where he developed nausea and vomiting and difficulty with keeping p.o.  He had further nausea, vomiting and dizziness and was seen in the emergency department and was found to have a sodium 115.  He was seen by nephrology with concern of hyponatremia related to hydrochlorothiazide along with SIDH, treated with fluid restriction and salt tablets and p.o. Lasix.  His medications were adjusted per renal medicine.  At that point he continued to demonstrate chronic pancytopenia that did not accelerate in any particular way.     Subsequent CBC 6/1/2023 include H&H 11.3 and 32.2 with white count of 2840, platelet count 93,000, ANC of 1610.    The patient is next evaluated  8/3/2020 3 repeat CBC includes a white count of 2250, H&H 11.8 and 33.6, platelet count of 88,000-differential of 44% polys, 52% lymphs, 3% monocytes.    Patient contacted about flow cytometry findings of small population potentially suggestive of recurrence versus secondary process.  Plan to reassess in follow-up visits 9/7/2023.  Please note that if treatment is necessary then repeat treatment with cladribine could be given in a variety of of schedules including 0.15 mg/kg/day over 2 hours x 5 days followed by rituximab or 5.6 mg/m² over 2 hours also for 5 days and also followed by rituximab therapy.      The patient was seen back 9/14/2023.Flow cytometry assessed on 2 visits includes a CD10 positive monoclonal lambda B-cell population representing 0.3% of total events 8/3/2023 and repeat 9/7/2023 demonstrates a similar B-cell population also 0.3% total events-phenotype positive CD10 bright, CD19 bright, CD20 bright, CD25, CD45,   slambda.  Discussed symptoms usually due to splenomegaly, fatigue, weakness, weight loss, bruising, recurrent infections, periodic vasculitis.  He is clear that he is not having any of the symptoms and feels that his overall performance status is as good as its ever been.  We have discussed that additional issues include possible organomegaly and cytopenias developing with his follow-up CBC in office today including H&H of 12.2 and 33.4, white count of 3250 and platelet count of 92,000.  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    We had the patient return for periodic blood counts, had him undergo testing for hepatitis and reviewed the various regimens including 5-day cladribine followed by Rituxan therapy.  10/5/2023 scans revealed no substantial abnormalities including lack of splenomegaly or lymphadenopathy.    He was then asked  to undergo a CT-guided bone marrow aspirate and biopsy obtained 10/12/2023 that was discussed with pathology on several occasions but, eventually, to be variably hypercellular with diffuse involvement by his known hairy cell leukemia involving 89% of marrow cellularity.  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  Additionally the patient's hepatitis B surface antibody was equivocal and he went on to have additional vaccinations   -influenza, COVID-19, RSV and hepatitis B.  Further vascular surgeon was contacted and the patient underwent a right internal jugular Mediport placement 10/18/2023 and teaching concerning cladribine day 1 through 5 and subsequent Rituxan therapy.    After further discussion of available treatment options plans made to offer 5-day cladribine and, review of literature, indicates that Rituxan is associated with further immunosuppression.  Available guidance suggests proceeding with initial cladribine and then 4-6 weekly doses of Rituxan consolidation starting 4 to 6 weeks after initial treatment.    The patient proceeded with cladribine taking therapy daily-days 1-5 through 10/27/2023 and given Neulasta 10/30/2023.  He is next seen 11/2/2023.  Fortunately he has had no untoward effects of treatment as far and continues to work unabated.  His follow-up CBC does not demonstrate significant development of neutropenia anemia or thrombocytopenia.  We discussed assessing him regularly over the next several weeks and anticipate starting Rituxan approximately 3 weeks from now.    The patient's subsequent laboratory studies demonstrated gradual improvement in his white count to normalization, including degree of neutrophilia by 11/16/2023, improvement in his thrombocytopenia as well.  He has not had any additional side effects including lack of fever, chills, night sweats  since he completed therapy.    He is seen back formally 11/20/2023 and we anticipate initiation of his rituximab therapy 11/22/2023.  Again he is done very well and his peripheral blood counts remain quite acceptable without evidence of deterioration.  He has had no fever, chills, night sweats, infectious episodes or change in performance status.  We have agreed that he will start his consolidation rituximab therapy 11/22/2023 and that we would schedule subsequent Thursday treatments x3 also.    The patient is seen 12/7/2023.  He underwent Rituxan therapy 11/22/2023 11/30/2023 without complication and is seen 12/7 H&H up to 12.0 and 34.6, white count of 4000, platelet 122,000 and normal automated differential except for mild lymphopenia.  He states that he will be taking a trip to [place] at the end of the month.      Patient seen back 1/4/2024 with normal peripheral blood smear including H&H of 13.3 and 38.3, white count is 6170 and platelet count of 159,000 with a normal automated differential. We discussed his peripheral smear today that demonstrates normalization and, fortunately, he developed no additional symptoms that would direct us otherwise.  We have agreed, however, that he would maintain his port over the next year until it is clear that he no longer needs this access.      Patient admitted 2/4/2024 status post displaced bilateral patellar fractures following a fall.  We have kept in contact while he is in the hospital and he has proceeded to surgery 2/5/2024 undergoing bilateral open excision of distal pole the patella with patellar tendon advancement and repair.    Interval history:  2/7/2024  T99.3, pulse 73, respirations 16, /73  Patient postop, discussed initial transfusion after P3 staff access his port.  H&H 8.0 and 23.8, white count of 6330, platelet count of 113,000.  Studies included iron of 29, 10% saturation, TIBC 297, ferritin of 92.9.  Acute postop blood loss anemia to be discussed  with orthopedic staff-patient should be transfused considering his marrow response will be quite slow after previous chemotherapy in terms of recovery.  Past Medical History:   Diagnosis Date    BCC (basal cell carcinoma of skin) 06/22/2022    NOSE    Benign prostate hyperplasia     H/O Elevated PSA     H/O Hairy cell leukemia (CMS/HCC) 2012    H/O Internal thrombosed hemorrhoids     H/O minimal right carotid plaque     H/O peripheral neuropathy     Hyperlipidemia     Hypertension     Primary open-angle glaucoma, bilateral, mild stage        ONCOLOGIC HISTORY:  (History from previous dates can be found in the separate document.)    No current facility-administered medications on file prior to encounter.     Current Outpatient Medications on File Prior to Encounter   Medication Sig Dispense Refill    acyclovir (Zovirax) 400 MG tablet Take 1 tablet by mouth 2 (Two) Times a Day. 60 tablet 7    amLODIPine (NORVASC) 2.5 MG tablet Take 1 tablet by mouth Daily. 90 tablet 3    bimatoprost (LUMIGAN) 0.03 % ophthalmic drops Administer 1 drop to both eyes Every Night.      bisoprolol (ZEBeta) 5 MG tablet Take 1 tablet by mouth Daily. 90 tablet 3    Calcium-Magnesium-Vitamin D (CALCIUM 1200+D3 PO) Take  by mouth.      chlorhexidine (PERIDEX) 0.12 % solution Rinse with 15 mL by mouth after breakfast and at bedtime for 30 seconds, then spit. No food or drink for 30 minutes after rinse. 473 mL 3    finasteride (PROSCAR) 5 MG tablet Take 1 tablet by mouth Daily. 90 tablet 3    Magnesium 500 MG tablet Take 1 tablet by mouth 2 (two) times a day.      metroNIDAZOLE (METROGEL) 1 % gel Apply 1 application  topically to the appropriate area as directed Daily. 60 g 11    niacin 500 MG tablet Take 1 tablet by mouth Every Night.      ondansetron (ZOFRAN) 8 MG tablet Take 1 tablet by mouth 3 (Three) Times a Day As Needed for Nausea or Vomiting. 30 tablet 5    propafenone (RYTHMOL) 150 MG tablet Take 1 tablet by mouth 3 (Three) Times a Day.  270 tablet 3    rosuvastatin (Crestor) 10 MG tablet Take 1 tablet by mouth Every Night. 90 tablet 3    tamsulosin (FLOMAX) 0.4 MG capsule 24 hr capsule Take 2 capsules by mouth Daily. 180 capsule 3    vitamin C (ASCORBIC ACID) 250 MG tablet Take 4 tablets by mouth Daily. With madisyn hips      vitamin D3 125 MCG (5000 UT) capsule capsule Take 1 capsule by mouth Daily.         ALLERGIES:   No Known Allergies    Social History     Socioeconomic History    Marital status:      Spouse name: Hanna   Tobacco Use    Smoking status: Former     Packs/day: .5     Types: Cigarettes    Smokeless tobacco: Never    Tobacco comments:     Quit smoking many years ago.   Vaping Use    Vaping Use: Never used   Substance and Sexual Activity    Alcohol use: Yes     Comment: 1/2 glass of wine or beer per day    Drug use: No    Sexual activity: Defer         Cancer-related family history includes Cancer in his father; Cancer (age of onset: 90) in his mother.      Review of Systems   ROS as per HPI    Vitals:    02/06/24 1419 02/06/24 1825 02/06/24 2112 02/07/24 0547   BP: 133/74 130/71 118/73 128/73   BP Location: Left arm Left arm Left arm Left arm   Patient Position: Lying Lying Lying Lying   Pulse: 71 70 72 73   Resp: 16 16 16 16   Temp: 99.4 °F (37.4 °C) 99.2 °F (37.3 °C) 99.5 °F (37.5 °C) 99.3 °F (37.4 °C)   TempSrc: Oral Oral Oral Oral   SpO2: 99% 100% 99% 98%   Weight:       Height:                 1/4/2024    12:39 PM   Current Status   ECOG score 0       Physical Exam    GENERAL: Well-developed, well-nourished male in no acute distress.   SKIN: Warm, dry, without new rash or lesion  HEAD: Normocephalic.   EYES: Pupils equal, round and reactive to light, EOMs intact. Conjunctivae normal.   EARS: Hearing intact.   NOSE: Septum midline. No excoriations or nasal discharge.   NECK: Supple with good range of motion; no masses, no JVD or bruits.   LYMPHATICS: No cervical, supraclavicular adenopathy.   CHEST: Lungs clear to  percussion and auscultation.   CARDIAC: Regular rate and rhythm without murmurs, rubs or gallops.   ABDOMEN: Soft, nontender, bowel sounds present.  EXTREMITIES: Lower extremity dressings and knee immobilizers in place  NEUROLOGICAL: No focal neurological deficits.       RECENT LABS:  Results from last 7 days   Lab Units 02/07/24  0548 02/06/24  0522 02/04/24  1204   WBC 10*3/mm3 6.33  --  5.04   NEUTROS ABS 10*3/mm3  --   --  3.91   HEMOGLOBIN g/dL 8.0* 8.8* 13.1   HEMATOCRIT % 23.8* 26.5* 39.3   PLATELETS 10*3/mm3 113*  --  117*       Results from last 7 days   Lab Units 02/07/24  0548 02/06/24  1548 02/04/24  1204   SODIUM mmol/L 136  --  140   POTASSIUM mmol/L 4.0  --  4.0   CHLORIDE mmol/L 103  --  104   CO2 mmol/L 26.0  --  26.0   BUN mg/dL 18  --  24*   CREATININE mg/dL 0.65*  --  0.91   CALCIUM mg/dL 7.9*  --  8.7   ALBUMIN g/dL  --   --  4.1   BILIRUBIN mg/dL  --   --  0.6   ALK PHOS U/L  --   --  43   ALT (SGPT) U/L  --   --  23   AST (SGOT) U/L  --   --  19   GLUCOSE mg/dL 123*  --  116*   FERRITIN ng/mL  --  92.90  --    IRON mcg/dL  --  29*  --    TIBC mcg/dL  --  297*  --              Assessment & Plan     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   He was treated 5/11-5/17/ 12 with cladribine being given as continuous infusion at 0.1 mg/kg or 7.7 mg/24-hours x7 days.   He did well during hospitalization with little toxicity and minimal myelosuppression.   He obtained a complete remission.   July 2018, marrow suppression noted dose smear with no evidence of hairy cell leukemia.  Atypical lymphocytes thought to be secondary to viral infection.   He again remained in observation with MD assessment every 6 months  September 2021 degree of leukopenia and thrombocytopenia though flow cytometry fails to demonstrate any new processes.  Follow-up 8/3/2023 with CBC noting WBC 2250, hemoglobin 11.8, hematocrit 33.6%, platelet 88,000.  Differential with 44% polys, 52% lymphs and 3%  monocytes.  Flow cytometry findings with a small population potentially just of of early recurrence versus secondary process  9/7/2020 3 repeat flow cytometry includes a CD10 positive monoclonal lambda B-cell population representing 0.3% of total events 8/3/2023 and repeat 9/7/2023 demonstrates a similar B-cell population also 0.3% total events-phenotype positive CD10 bright, CD19 bright, CD20 bright, CD25, CD45,   slambda.   We had the patient return for periodic blood counts, had him undergo testing for hepatitis and reviewed the various regimens including 5-day cladribine followed by Rituxan therapy.  10/5/2023 scans revealed no substantial abnormalities including lack of splenomegaly or lymphadenopathy.  10/12/2023 CT-guided bone marrow biopsy and aspirate found to be variably hypercellular with diffuse involvement by his known hairy cell leukemia involving 89% of marrow cellularity.  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   10/18/2023 Mediport placed by vascular surgery, Dr. Fan  Patient completed all vaccinations prior to initiation of therapy including hepatitis B  Plans for treatment with cladribine 0.15 mg/kg/day over 2 hours x 5 days followed by rituximab or 5.6 mg/m² over 2 hours also for 5 days and also followed by rituximab therapy.  Cladribine initiated 10/23/2023 through 10/27/2023 with Neulasta support 10/30/2023  Excellent tolerance to cladribine  Consolidation therapy with weekly Rituxan initiated 11/22/2023 with plans for 4 weeks of therapy.  Today, 12/14/2023 counts continue to overall recover from previous cladribine with WBC 4.66, hemoglobin 12.8, platelets 125,000.  Proceed with fourth and final dose of weekly Rituxan.  Follow-up peripheral smear 1/4/2024 demonstrates normalization (likely remission) and, fortunately, he has developed no additional symptoms  that would direct us otherwise.  We have agreed, however, that he would maintain his port over the next year until it is clear that he no longer needs this access.  Plan to maintain port every 6 weeks, CBC 3 months, MD 6 months    2.  Hyponatremia  Admission 5/23-27/2023 had return from [place] where he developed nausea and vomiting and difficulty with keeping p.o.  He had further nausea, vomiting and dizziness and was seen in the emergency department and was found to have a sodium 115.  He was seen by nephrology with concern of hyponatremia related to hydrochlorothiazide along with SIDH, treated with fluid restriction and salt tablets and p.o. Lasix.  CMP including serum sodium 12/14/2023-139  Assessed 2/7/2024, sodium 136.      3.  Prophylaxis  Discontinue Bactrim, reduce dosing of acyclovir to 400 mg daily, other medications reviewed and continued  Every 6 weeks port flush, 3-month CBC, 6 months MD      4.  Patient admitted 2/4/2024 status post displaced bilateral patellar fractures following a fall.  We have kept in contact while he is in the hospital and he has proceeded to surgery 2/5/2024 undergoing bilateral open excision of distal pole the patella with patellar tendon advancement and repair.  H&H 8.0 and 23.8, white count of 6330, platelet count of 113,000.  Studies included iron of 29, 10% saturation, TIBC 297, ferritin of 92.9  Supportive transfusion planned.    Paco Jeffers MD   02/04/2024

## 2024-02-07 NOTE — THERAPY EVALUATION
Patient Name: Angel Remy  : 1945    MRN: [id number]                              Today's Date: 2024       Admit Date: 2024    Visit Dx:     ICD-10-CM ICD-9-CM   1. Closed displaced transverse fracture of left patella, initial encounter  S82.032A 822.0   2. Closed displaced transverse fracture of right patella, initial encounter  S82.031A 822.0     Patient Active Problem List   Diagnosis    Hairy cell leukemia    FH: colon cancer    Hyponatremia    Orthostatic hypotension    Supine hypertension    Anemia    Macrocytosis    LBBB (left bundle branch block)    Prolonged P-R interval    Pancytopenia    Fitting and adjustment of vascular catheter    Patella fracture    Elevated blood pressure reading    Fall at home, initial encounter    Thrombocytopenia    History of left bundle branch block (LBBB)    PAF (paroxysmal atrial fibrillation) history     Past Medical History:   Diagnosis Date    BCC (basal cell carcinoma of skin) 2022    NOSE    Benign prostate hyperplasia     H/O Elevated PSA     H/O Hairy cell leukemia (CMS/HCC)     H/O Internal thrombosed hemorrhoids     H/O minimal right carotid plaque     H/O peripheral neuropathy     Hyperlipidemia     Hypertension     Primary open-angle glaucoma, bilateral, mild stage      Past Surgical History:   Procedure Laterality Date    BONE MARROW BIOPSY W/ ASPIRATION      COLONOSCOPY      COLONOSCOPY N/A 2021    Procedure: COLONOSCOPY TO CECUM AND INTO TI WITH COLD BIOPSY POLYPECTOMIES;  Surgeon: Juan Jenkins MD;  Location: [facility] ENDOSCOPY;  Service: Gastroenterology;  Laterality: N/A;  pre: family history of colon cancer  post: polyps, diverticulosis  and internal hemorrhoids    EXCISION MASS HEAD/NECK N/A 2022    Procedure: EXCISION BASAL CELL CARCINOMA NOSE WITH FROZEN SECTION FULL THICKNESS GRAFT;  Surgeon: Arturo Weiss MD;  Location: [facility] OR [facility] [place];  Service: Plastics;  Laterality: N/A;    HERNIA REPAIR Right 2000     "Inguinal     OTHER SURGICAL HISTORY  1989    Median nerve foreign body excision    PATELLA OPEN REDUCTION INTERNAL FIXATION Bilateral 2/5/2024    Procedure: PATELLA OPEN REDUCTION INTERNAL FIXATION;  Surgeon: Adrián Roberson MD;  Location: [facility];  Service: Orthopedics;  Laterality: Bilateral;    PROSTATE BIOPSY      March 2010 and October 2011 whan PSA had acceleration    SIGMOIDOSCOPY  1999    With small thrombosed internal hemorrhoid.    TONSILLECTOMY      VENOUS ACCESS DEVICE (PORT) INSERTION Right 10/18/2023    Procedure: MEDIPORT PLACEMENTWITH SUPERIOR VENACAVAGRAM;  Surgeon: Randy Fan MD;  Location: [facility];  Service: Vascular;  Laterality: Right;    WISDOM TOOTH EXTRACTION  1961      General Information       Row Name 02/07/24 1101          OT Time and Intention    Document Type evaluation  -     Mode of Treatment occupational therapy  -       Row Name 02/07/24 1101          General Information    Patient Profile Reviewed yes  -     Prior Level of Function independent:;ADL's;all household mobility;work  -     Existing Precautions/Restrictions non-weight bearing;fall;other (see comments)  NWATTILA Branch, [place]  -       Row Name 02/07/24 1101          Living Environment    People in Home spouse  -       Row Name 02/07/24 1101          Home Main Entrance    Number of Stairs, Main Entrance three  difficult terrain to enter back of home into \"walk-out\" basement. Basement has walk-in shower  -       Row Name 02/07/24 1101          Cognition    Orientation Status (Cognition) oriented x 4  -       Row Name 02/07/24 1101          Safety Issues, Functional Mobility    Impairments Affecting Function (Mobility) range of motion (ROM);pain;endurance/activity tolerance  -               User Key  (r) = Recorded By, (t) = Taken By, (c) = Cosigned By      Initials Name Provider Type     Gloria Pedroza OT Occupational Therapist                     Mobility/ADL's       Row Name 02/07/24 1105 "          Bed Mobility    Bed Mobility supine-sit;sit-supine  -     Scooting/Bridging Letts (Bed Mobility) standby assist  -     Supine-Sit Letts (Bed Mobility) minimum assist (75% patient effort)  -     Sit-Supine Letts (Bed Mobility) minimum assist (75% patient effort)  -     Bed Mobility, Safety Issues decreased use of legs for bridging/pushing  -     Comment, (Bed Mobility) Marvin fr BLE mgt in/out of bed  -       Row Name 02/07/24 1105          Activities of Daily Living    BADL Assessment/Intervention lower body dressing  -       Row Name 02/07/24 1105          Lower Body Dressing Assessment/Training    Letts Level (Lower Body Dressing) moderate assist (50% patient effort);maximum assist (25% patient effort)  -     Assistive Devices (Lower Body Dressing) reacher  -     Position (Lower Body Dressing) supine  -     Comment, (Lower Body Dressing) instructed on LB dressing using AE at bed level  -               User Key  (r) = Recorded By, (t) = Taken By, (c) = Cosigned By      Initials Name Provider Type    Gloria Medina OT Occupational Therapist                   Obj/Interventions       Row Name 02/07/24 1106          Range of Motion Comprehensive    General Range of Motion bilateral upper extremity ROM WNL  -       Row Name 02/07/24 1106          Strength Comprehensive (MMT)    Comment, General Manual Muscle Testing (MMT) Assessment UEs WFL  -[facility] Name 02/07/24 1106          Balance    Balance Assessment sitting static balance  -     Static Sitting Balance standby assist  -     Position, Sitting Balance sitting edge of bed  -     Balance Interventions sitting  -               User Key  (r) = Recorded By, (t) = Taken By, (c) = Cosigned By      Initials Name Provider Type    Gloria Medina OT Occupational Therapist                   Goals/Plan       Row Name 02/07/24 1111          Transfer Goal 1 (OT)    Activity/Assistive Device (Transfer Goal  1, OT) transfers, all  -JW     [place] Level/Cues Needed (Transfer Goal 1, OT) minimum assist (75% or more patient effort)  -     Time Frame (Transfer Goal 1, OT) short term goal (STG);2 weeks  -     Strategies/Barriers (Transfers Goal 1, OT) slide TF into WC/BSC with drop arm.  -JW     Progress/Outcome (Transfer Goal 1, OT) goal ongoing  -       Row Name 02/07/24 1111          Dressing Goal 1 (OT)    Activity/Device (Dressing Goal 1, OT) lower body dressing;elastic laces;long-handled shoe horn;reacher;sock-aid  -     [place]/Cues Needed (Dressing Goal 1, OT) minimum assist (75% or more patient effort)  -     Time Frame (Dressing Goal 1, OT) short term goal (STG);2 weeks  -     Progress/Outcome (Dressing Goal 1, OT) goal ongoing  -       Row Name 02/07/24 1111          Toileting Goal 1 (OT)    Activity/Device (Toileting Goal 1, OT) toileting skills, all;commode, bedside with drop arms  -     [place] Level/Cues Needed (Toileting Goal 1, OT) moderate assist (50-74% patient effort)  -     Time Frame (Toileting Goal 1, OT) short term goal (STG);2 weeks  -     Progress/Outcome (Toileting Goal 1, OT) goal ongoing  -       Row Name 02/07/24 1111          Grooming Goal 1 (OT)    Activity/Device (Grooming Goal 1, OT) grooming skills, all  -     [place] (Grooming Goal 1, OT) set-up required  -     Time Frame (Grooming Goal 1, OT) short term goal (STG);2 weeks  -     Progress/Outcome (Grooming Goal 1, OT) goal ongoing  -       Row Name 02/07/24 1111          Strength Goal 1 (OT)    Strength Goal 1 (OT) Pt will participate in UE therex to maintain strength for ADls and mobility  -     Time Frame (Strength Goal 1, OT) short term goal (STG);2 weeks  -     Progress/Outcome (Strength Goal 1, OT) goal ongoing  -       Row Name 02/07/24 1111          Therapy Assessment/Plan (OT)    Planned Therapy Interventions (OT) activity tolerance training;adaptive equipment training;BADL  "retraining;functional balance retraining;occupation/activity based interventions;patient/caregiver education/training;transfer/mobility retraining;strengthening exercise  -               User Key  (r) = Recorded By, (t) = Taken By, (c) = Cosigned By      Initials Name Provider Type    Gloria Medina OT Occupational Therapist                   Clinical Impression       Row Name 02/07/24 1106          Pain Assessment    Pretreatment Pain Rating 0/10 - no pain  -     Posttreatment Pain Rating 2/10  -     Pain Location - Side/Orientation Bilateral  -     Pain Location - knee  -       Row Name 02/07/24 1106          Plan of Care Review    Plan of Care Reviewed With patient;spouse  -     Outcome Evaluation Pt is a 77 y/o M admitted after fall resulting in samantha patella fxs s/p ORIF, in samantha KIs. Per ortho: \"Patient is to remain primarily nonweightbearing however he may be toe-touch weightbearing for transfers from bed to chair or chair to toilet\". He is indep at baseline w/o AD. Multiple steps to enter home and w/in home but wife is planning to install a ramp. Today pt is instructed on LB dressing strategies using AE, mod-maxA provided. He is able to sit EOB with Zach for BLE mgt, sits supported for multiple mins to increase sitting tolerance. Family and pt educated on DME needs, pt plans to go to rehab at d/c.  -       Row Name 02/07/24 1106          Therapy Assessment/Plan (OT)    Rehab Potential (OT) good, to achieve stated therapy goals  -     Criteria for Skilled Therapeutic Interventions Met (OT) yes  -     Therapy Frequency (OT) 5 times/wk  -       Row Name 02/07/24 1106          Therapy Plan Review/Discharge Plan (OT)    Equipment Needs Upon Discharge (OT) --  drop arm 3-in-1, drop arm WC with elevating leg rests  -     Anticipated Discharge Disposition (OT) inpatient rehabilitation facility  -       Row Name 02/07/24 1106          Positioning and Restraints    Pre-Treatment Position in bed  " -JW     Post Treatment Position bed  -JW     In Bed fowlers;call light within reach;encouraged to call for assist;exit alarm on  -JW               User Key  (r) = Recorded By, (t) = Taken By, (c) = Cosigned By      Initials Name Provider Type    Gloria Medina OT Occupational Therapist                   Outcome Measures       Row Name 02/07/24 1113          How much help from another is currently needed...    Putting on and taking off regular lower body clothing? 2  -JW     Bathing (including washing, rinsing, and drying) 2  -JW     Toileting (which includes using toilet bed pan or urinal) 2  -JW     Putting on and taking off regular upper body clothing 3  -JW     Taking care of personal grooming (such as brushing teeth) 3  -JW     Eating meals 4  -JW     AM-PAC 6 Clicks Score (OT) 16  -JW       Row Name 02/07/24 1000          How much help from another person do you currently need...    Turning from your back to your side while in flat bed without using bedrails? 3  -EV     Moving from lying on back to sitting on the side of a flat bed without bedrails? 2  -EV     Moving to and from a bed to a chair (including a wheelchair)? 2  -EV     Standing up from a chair using your arms (e.g., wheelchair, bedside chair)? 2  -EV     Climbing 3-5 steps with a railing? 2  -EV     To walk in hospital room? 2  -EV     AM-PAC 6 Clicks Score (PT) 13  -EV     Highest Level of Mobility Goal 4 --> Transfer to chair/commode  -EV       Row Name 02/07/24 1113          Functional Assessment    Outcome Measure Options AM-PAC 6 Clicks Daily Activity (OT)  -               User Key  (r) = Recorded By, (t) = Taken By, (c) = Cosigned By      Initials Name Provider Type    Dakotah Soria RN Registered Nurse    Gloria Medina OT Occupational Therapist                    Occupational Therapy Education       Title: PT OT SLP Therapies (Done)       Topic: Occupational Therapy (Done)       Point: ADL training (Done)       Description:  "  Instruct learner(s) on proper safety adaptation and remediation techniques during self care or transfers.   Instruct in proper use of assistive devices.                  Learning Progress Summary             Patient Acceptance, E,D, VU,NR by  at 2/7/2024 1113                         Point: Home exercise program (Done)       Description:   Instruct learner(s) on appropriate technique for monitoring, assisting and/or progressing therapeutic exercises/activities.                  Learning Progress Summary             Patient Acceptance, E,D, VU,NR by  at 2/7/2024 1113                         Point: Precautions (Done)       Description:   Instruct learner(s) on prescribed precautions during self-care and functional transfers.                  Learning Progress Summary             Patient Acceptance, E,D, VU,NR by  at 2/7/2024 1113                         Point: Body mechanics (Done)       Description:   Instruct learner(s) on proper positioning and spine alignment during self-care, functional mobility activities and/or exercises.                  Learning Progress Summary             Patient Acceptance, E,D, VU,NR by  at 2/7/2024 1113                                         User Key       Initials Effective Dates Name Provider Type Discipline     06/10/21 -  Gloria Pedroza OT Occupational Therapist OT                  OT Recommendation and Plan  Planned Therapy Interventions (OT): activity tolerance training, adaptive equipment training, BADL retraining, functional balance retraining, occupation/activity based interventions, patient/caregiver education/training, transfer/mobility retraining, strengthening exercise  Therapy Frequency (OT): 5 times/wk  Plan of Care Review  Plan of Care Reviewed With: patient, spouse  Outcome Evaluation: Pt is a 77 y/o M admitted after fall resulting in samantha patella fxs s/p ORIF, in samantha KIs. Per ortho: \"Patient is to remain primarily nonweightbearing however he may be toe-touch " "weightbearing for transfers from bed to chair or chair to toilet\". He is indep at baseline w/o AD. Multiple steps to enter home and w/in home but wife is planning to install a ramp. Today pt is instructed on LB dressing strategies using AE, mod-maxA provided. He is able to sit EOB with Zach for BLE mgt, sits supported for multiple mins to increase sitting tolerance. Family and pt educated on DME needs, pt plans to go to rehab at d/c.     Time Calculation:   Evaluation Complexity (OT)  Review Occupational Profile/Medical/Therapy History Complexity: expanded/moderate complexity  Assessment, Occupational Performance/Identification of Deficit Complexity: 3-5 performance deficits  Clinical Decision Making Complexity (OT): detailed assessment/moderate complexity  Overall Complexity of Evaluation (OT): moderate complexity     Time Calculation- OT       Row Name 02/07/24 1114             Time Calculation- OT    OT Start Time 1026  -      OT Stop Time 1050  -      OT Time Calculation (min) 24 min  -      Total Timed Code Minutes- OT 14 minute(s)  -      OT Received On 02/07/24  -      OT - Next Appointment 02/08/24  -      OT Goal Re-Cert Due Date 02/21/24  -         Timed Charges    26358 - OT Self Care/Mgmt Minutes 14  -         Untimed Charges    OT Eval/Re-eval Minutes 10  -JW         Total Minutes    Timed Charges Total Minutes 14  -      Untimed Charges Total Minutes 10  -JW       Total Minutes 24  -                User Key  (r) = Recorded By, (t) = Taken By, (c) = Cosigned By      Initials Name Provider Type     Gloria Pedroza OT Occupational Therapist                  Therapy Charges for Today       Code Description Service Date Service Provider Modifiers Qty    [id number]  OT SELF CARE/MGMT/TRAIN EA 15 MIN 2/7/2024 Gloria Pedroza OT GO 1    [id number]  OT EVAL MOD COMPLEXITY 3 2/7/2024 Gloria Pedroza OT GO 1                 Gloria Pedroza OT  2/7/2024  "

## 2024-02-07 NOTE — PROGRESS NOTES
[facility] Acute Inpt Rehab    Reviewed with Dr. Hamlin.  Patient accepted to acute inpt rehab.  Bed available today if patient medically stable.  CCP notified.    Thank you,  Sandra Jade, RN  Rehab Admission Nurse

## 2024-02-08 LAB
ANION GAP SERPL CALCULATED.3IONS-SCNC: 8.1 MMOL/L (ref 5–15)
BASOPHILS # BLD AUTO: 0 10*3/MM3 (ref 0–0.2)
BASOPHILS NFR BLD AUTO: 0 % (ref 0–1.5)
BH BB BLOOD EXPIRATION DATE: NORMAL
BH BB BLOOD TYPE BARCODE: 8400
BH BB DISPENSE STATUS: NORMAL
BH BB PRODUCT CODE: NORMAL
BH BB UNIT NUMBER: NORMAL
BUN SERPL-MCNC: 14 MG/DL (ref 8–23)
BUN/CREAT SERPL: 19.7 (ref 7–25)
CALCIUM SPEC-SCNC: 7.9 MG/DL (ref 8.6–10.5)
CHLORIDE SERPL-SCNC: 102 MMOL/L (ref 98–107)
CO2 SERPL-SCNC: 24.9 MMOL/L (ref 22–29)
CREAT SERPL-MCNC: 0.71 MG/DL (ref 0.76–1.27)
CROSSMATCH INTERPRETATION: NORMAL
DEPRECATED RDW RBC AUTO: 48.3 FL (ref 37–54)
EGFRCR SERPLBLD CKD-EPI 2021: 93.9 ML/MIN/1.73
EOSINOPHIL # BLD AUTO: 0 10*3/MM3 (ref 0–0.4)
EOSINOPHIL NFR BLD AUTO: 0 % (ref 0.3–6.2)
ERYTHROCYTE [DISTWIDTH] IN BLOOD BY AUTOMATED COUNT: 14.1 % (ref 12.3–15.4)
GLUCOSE SERPL-MCNC: 111 MG/DL (ref 65–99)
HCT VFR BLD AUTO: 26.3 % (ref 37.5–51)
HGB BLD-MCNC: 8.9 G/DL (ref 13–17.7)
LYMPHOCYTES # BLD AUTO: 0.54 10*3/MM3 (ref 0.7–3.1)
LYMPHOCYTES NFR BLD AUTO: 9.1 % (ref 19.6–45.3)
MCH RBC QN AUTO: 31.4 PG (ref 26.6–33)
MCHC RBC AUTO-ENTMCNC: 33.8 G/DL (ref 31.5–35.7)
MCV RBC AUTO: 92.9 FL (ref 79–97)
MONOCYTES # BLD AUTO: 0.89 10*3/MM3 (ref 0.1–0.9)
MONOCYTES NFR BLD AUTO: 15 % (ref 5–12)
NEUTROPHILS NFR BLD AUTO: 4.47 10*3/MM3 (ref 1.7–7)
NEUTROPHILS NFR BLD AUTO: 75.4 % (ref 42.7–76)
PLATELET # BLD AUTO: 115 10*3/MM3 (ref 140–450)
PMV BLD AUTO: 10.6 FL (ref 6–12)
POTASSIUM SERPL-SCNC: 4 MMOL/L (ref 3.5–5.2)
RBC # BLD AUTO: 2.83 10*6/MM3 (ref 4.14–5.8)
SODIUM SERPL-SCNC: 135 MMOL/L (ref 136–145)
UNIT  ABO: NORMAL
UNIT  RH: NORMAL
WBC NRBC COR # BLD AUTO: 5.93 10*3/MM3 (ref 3.4–10.8)

## 2024-02-08 PROCEDURE — 97110 THERAPEUTIC EXERCISES: CPT

## 2024-02-08 PROCEDURE — 97530 THERAPEUTIC ACTIVITIES: CPT

## 2024-02-08 PROCEDURE — 97166 OT EVAL MOD COMPLEX 45 MIN: CPT

## 2024-02-08 PROCEDURE — 85025 COMPLETE CBC W/AUTO DIFF WBC: CPT | Performed by: PHYSICAL MEDICINE & REHABILITATION

## 2024-02-08 PROCEDURE — 97535 SELF CARE MNGMENT TRAINING: CPT

## 2024-02-08 PROCEDURE — 25010000002 HEPARIN LOCK FLUSH PER 10 UNITS: Performed by: INTERNAL MEDICINE

## 2024-02-08 PROCEDURE — 97161 PT EVAL LOW COMPLEX 20 MIN: CPT

## 2024-02-08 PROCEDURE — 80048 BASIC METABOLIC PNL TOTAL CA: CPT | Performed by: PHYSICAL MEDICINE & REHABILITATION

## 2024-02-08 RX ORDER — HEPARIN SODIUM (PORCINE) LOCK FLUSH IV SOLN 100 UNIT/ML 100 UNIT/ML
5 SOLUTION INTRAVENOUS AS NEEDED
Status: DISCONTINUED | OUTPATIENT
Start: 2024-02-08 | End: 2024-02-15

## 2024-02-08 RX ADMIN — TRAMADOL HYDROCHLORIDE 50 MG: 50 TABLET ORAL at 09:24

## 2024-02-08 RX ADMIN — LATANOPROST 1 DROP: 50 SOLUTION/ DROPS OPHTHALMIC at 20:31

## 2024-02-08 RX ADMIN — DOCUSATE SODIUM 50MG AND SENNOSIDES 8.6MG 2 TABLET: 8.6; 5 TABLET, FILM COATED ORAL at 08:59

## 2024-02-08 RX ADMIN — PROPAFENONE HYDROCHLORIDE 150 MG: 150 TABLET, COATED ORAL at 15:29

## 2024-02-08 RX ADMIN — ACYCLOVIR 400 MG: 400 TABLET ORAL at 08:59

## 2024-02-08 RX ADMIN — APIXABAN 2.5 MG: 2.5 TABLET, FILM COATED ORAL at 09:00

## 2024-02-08 RX ADMIN — FERROUS SULFATE TAB 325 MG (65 MG ELEMENTAL FE) 325 MG: 325 (65 FE) TAB at 08:59

## 2024-02-08 RX ADMIN — DOCUSATE SODIUM 100 MG: 100 CAPSULE, LIQUID FILLED ORAL at 09:00

## 2024-02-08 RX ADMIN — APIXABAN 2.5 MG: 2.5 TABLET, FILM COATED ORAL at 20:29

## 2024-02-08 RX ADMIN — HEPARIN 500 UNITS: 100 SYRINGE at 16:03

## 2024-02-08 RX ADMIN — MAGNESIUM OXIDE 400 MG (241.3 MG MAGNESIUM) TABLET 400 MG: TABLET at 08:59

## 2024-02-08 RX ADMIN — FINASTERIDE 5 MG: 5 TABLET, FILM COATED ORAL at 20:29

## 2024-02-08 RX ADMIN — BISOPROLOL FUMARATE 5 MG: 5 TABLET, FILM COATED ORAL at 08:59

## 2024-02-08 RX ADMIN — DOCUSATE SODIUM 100 MG: 100 CAPSULE, LIQUID FILLED ORAL at 20:29

## 2024-02-08 RX ADMIN — Medication 5000 UNITS: at 09:00

## 2024-02-08 RX ADMIN — OXYCODONE HYDROCHLORIDE AND ACETAMINOPHEN 1000 MG: 500 TABLET ORAL at 08:59

## 2024-02-08 RX ADMIN — PROPAFENONE HYDROCHLORIDE 150 MG: 150 TABLET, COATED ORAL at 09:04

## 2024-02-08 RX ADMIN — TRAMADOL HYDROCHLORIDE 50 MG: 50 TABLET ORAL at 15:29

## 2024-02-08 RX ADMIN — TAMSULOSIN HYDROCHLORIDE 0.4 MG: 0.4 CAPSULE ORAL at 08:59

## 2024-02-08 RX ADMIN — PROPAFENONE HYDROCHLORIDE 150 MG: 150 TABLET, COATED ORAL at 20:28

## 2024-02-08 RX ADMIN — TAMSULOSIN HYDROCHLORIDE 0.4 MG: 0.4 CAPSULE ORAL at 20:28

## 2024-02-08 RX ADMIN — ROSUVASTATIN CALCIUM 10 MG: 10 TABLET, FILM COATED ORAL at 20:28

## 2024-02-08 RX ADMIN — DOCUSATE SODIUM 50MG AND SENNOSIDES 8.6MG 2 TABLET: 8.6; 5 TABLET, FILM COATED ORAL at 20:28

## 2024-02-08 RX ADMIN — TRAMADOL HYDROCHLORIDE 50 MG: 50 TABLET ORAL at 20:28

## 2024-02-08 NOTE — PROGRESS NOTES
Inpatient Rehabilitation Admission  Section A. Transportation  Issues Due to Lack of Transportation:   No    Signed by: MARCELA Graves

## 2024-02-08 NOTE — THERAPY EVALUATION
Inpatient Rehabilitation - Occupational Therapy Initial Evaluation    [facility]     Patient Name: Angel Remy  : 1945  MRN: [id number]    Today's Date: 2024                 Admit Date: 2024       No diagnosis found.    Patient Active Problem List   Diagnosis    Hairy cell leukemia    FH: colon cancer    Hyponatremia    Orthostatic hypotension    Supine hypertension    Anemia    Macrocytosis    LBBB (left bundle branch block)    Prolonged P-R interval    Pancytopenia    Fitting and adjustment of vascular catheter    Elevated blood pressure reading    Thrombocytopenia    History of left bundle branch block (LBBB)    PAF (paroxysmal atrial fibrillation) history    Closed displaced transverse fracture of left patella    Closed displaced transverse fracture of right patella    Patella fracture       Past Medical History:   Diagnosis Date    BCC (basal cell carcinoma of skin) 2022    NOSE    Benign prostate hyperplasia     Elevated cholesterol     H/O Elevated PSA     H/O Hairy cell leukemia (CMS/HCC)     H/O Internal thrombosed hemorrhoids     H/O minimal right carotid plaque     H/O peripheral neuropathy     History of transfusion     Hyperlipidemia     Hypertension     Primary open-angle glaucoma, bilateral, mild stage        Past Surgical History:   Procedure Laterality Date    BONE MARROW BIOPSY W/ ASPIRATION      COLONOSCOPY      COLONOSCOPY N/A 2021    Procedure: COLONOSCOPY TO CECUM AND INTO TI WITH COLD BIOPSY POLYPECTOMIES;  Surgeon: Jaun Jenkins MD;  Location: [facility] ENDOSCOPY;  Service: Gastroenterology;  Laterality: N/A;  pre: family history of colon cancer  post: polyps, diverticulosis  and internal hemorrhoids    EXCISION MASS HEAD/NECK N/A 2022    Procedure: EXCISION BASAL CELL CARCINOMA NOSE WITH FROZEN SECTION FULL THICKNESS GRAFT;  Surgeon: Arturo Weiss MD;  [place] [facility] OR [facility] [place];  Service: Plastics;  Laterality: N/A;    HERNIA REPAIR Right  02/2000    Inguinal     OTHER SURGICAL HISTORY  1989    Median nerve foreign body excision    PATELLA OPEN REDUCTION INTERNAL FIXATION Bilateral 2/5/2024    Procedure: PATELLA OPEN REDUCTION INTERNAL FIXATION;  Surgeon: Adrián Roberson MD;  Location: [facility];  Service: Orthopedics;  Laterality: Bilateral;    PROSTATE BIOPSY      March 2010 and October 2011 whan PSA had acceleration    SIGMOIDOSCOPY  1999    With small thrombosed internal hemorrhoid.    TONSILLECTOMY      VENOUS ACCESS DEVICE (PORT) INSERTION Right 10/18/2023    Procedure: MEDIPORT PLACEMENTWITH SUPERIOR VENACAVAGRAM;  Surgeon: Randy Fan MD;  Location: [facility];  Service: Vascular;  Laterality: Right;    WISDOM TOOTH EXTRACTION  1961             IRF OT ASSESSMENT FLOWSHEET (last 12 hours)       IRF OT Evaluation and Treatment       Row Name 02/08/24 0900          OT Time and Intention    Document Type initial evaluation  -SM     Mode of Treatment occupational therapy;individual therapy  -SM     Patient Effort good  -SM     Symptoms Noted During/After Treatment none  -SM       Row Name 02/08/24 0900          General Information    Patient Profile Reviewed yes  -SM     Patient/Family/Caregiver Comments/Observations Pt up in bed, BLE KI in place, pt ready to start OT in AM session and up in chair at PM session/  -SM     Existing Precautions/Restrictions brace on at all times;weight bearing  NWB BLE, KI at all times, TTWB for transfers only  -SM       Row Name 02/08/24 0900          Previous Level of Function/Home Environm    Bathing/Grooming, Premorbid Functional Level independent  -SM     Dressing, Premorbid Functional Level independent  -SM     Eating/Feeding, Premorbid Functional Level independent  -SM     Toileting, Premorbid Functional Level independent  -SM     BADLs, Premorbid Functional Level independent  -SM     IADLs, Premorbid Functional Level independent  -SM     Bed Mobility, Premorbid Functional Level independent  -SM      Transfers, Premorbid Functional Level independent  -     Household Ambulation, Premorbid Functional Level independent  -     Stairs, Premorbid Functional Level independent  -     Community Ambulation, Premorbid Functional Level independent  -     Functional Cognition, 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 Pt plans to stay in basement of home at [place].  -[facility] Name 02/08/24 0900          Home Use of Assistive/Adaptive Equipment    Equipment Currently Used at Home none  -SM       Row Name 02/08/24 0900          Occupational Profile    Reason for Services/Referral (Occupational Profile) Pt had a fall down the stairs where his slipper (which he doesnt usually wear) was caught resulting in him falling on bilateral knees. He is now s/p ORIF, samantha patella tendon repair on 2/5. Pt is NWB BLE and would benefit from OT for ADL training, AE training, ADL transfer training while safely maintaining precautions  -[facility] Name 02/08/24 0900          Pain Assessment    Pretreatment Pain Rating 0/10 - no pain  -     Posttreatment Pain Rating 0/10 - no pain  -SM       Row Name 02/08/24 0900          Cognition/Psychosocial    Orientation Status (Cognition) oriented x 4  -     Follows Commands (Cognition) WFL  -[facility] Name 02/08/24 0900          Range of Motion (ROM)    Range of Motion bilateral upper extremities  -SM       Row Name 02/08/24 0900          Range of Motion Comprehensive    General Range of Motion  bilateral upper extremity ROM WNL  -SM       Row Name 02/08/24 0900          Strength Comprehensive (MMT)    General Manual Muscle Testing (MMT) Assessment --  BUE 4+/5 MMT  -SM       Row Name 02/08/24 0900          Sensory Assessment (Somatosensory)    Sensory Assessment (Somatosensory) --  neuropathy bilateral feet  -SM       Row Name 02/08/24 0900          Basic Activities of Daily Living (BADLs)    Basic Activities of Daily Living bathing;upper body dressing;lower body dressing;grooming;toileting  -SM       Row Name 02/08/24 0900          Bathing    [place] Level (Bathing) upper body;standby assist;lower body;maximum assist (25% patient effort)  -     Position (Bathing) edge of bed sitting;supine  -     Set-up Assistance (Bathing) obtain supplies  -     Comment (Bathing) UB at EOB, LB supine  -SM       Row Name 02/08/24 0900          Upper Body Dressing    [place] Level (Upper Body Dressing) doff;don;pajama/robe;set up assistance  -     Position (Upper Body Dressing) edge of bed sitting  -     Set-up Assistance (Upper Body Dressing) obtain clothing  -SM       Row Name 02/08/24 0900          Lower Body Dressing    [place] Level (Lower Body Dressing) don;pants/bottoms;maximum assist (25% patient effort);socks;shoes/slippers;dependent (less than 25% patient effort)  -     Position (Lower Body Dressing) edge of bed sitting;supine  -     Comment (Lower Body Dressing) worked on EOB sitting to rock side to side to pull up over hips such as would be needed for toileting tasks.  -SM       Row Name 02/08/24 0900          Grooming    [place] Level (Grooming) grooming skills;set up  -     Position (Grooming) edge of bed sitting;sitting up in bed  -     Set-up Assistance (Grooming) obtain supplies  -SM       Row Name 02/08/24 0900          Toileting    [place] Level (Toileting) moderate assist (50% patient effort);adjust/manage clothing  -     Assistive Device Use (Toileting)  commode chair  -     Comment (Toileting) [place] [facility] voided in urinal at start of session with set up but completed simulated toile[place] [facility] [place] needs  -       Row Name 02/08/24 0900          Bed Mobility    Supine-Sit [place] (Bed Mobility) minimum assist (75% patient effort)  -     Sit-Supine [place] (Bed Mobility) minimum assist (75% patient effort)  -     Bed Mobility, Safety Issues decreased use of legs for bridging/pushing  -     Assistive Device (Bed Mobility) head of bed elevated  -[facility] Name 02/08/24 0900          Transfer Assessment/Treatment    Transfers toilet transfer;shower transfer  -SM       Row Name 02/08/24 0900          Sit-Stand Transfer    Sit-Stand [place] (Transfers) unable to assess  not medically appropriate  -SM       Row Name 02/08/24 0900          Toilet Transfer    Type (Toilet Transfer) lateral  -     [place] Level (Toilet Transfer) minimum assist (75% patient effort);moderate assist (50% patient effort)  -     Assistive Device (Toilet Transfer) transfer board;commode, bedside with drop arms  -     Comment, (Toilet Transfer) does better on way back to bed with cues for technique.  -[facility] Name 02/08/24 0900          Shower Transfer    Comment, (Shower Transfer) TBD when medically ready  -SM       Row Name 02/08/24 0900          Wheelchair Mobility/Management    Distance Propelled in Feet (Wheelchair) Pt completed mobility around therapy gym w/c level working on turns and navigating tight corners collecting large pegs from various surface heights using reacher.  -       Row Name 02/08/24 0900          Motor Skills    Therapeutic Exercise shoulder;elbow/forearm  -[facility] Name 02/08/24 0900          Shoulder (Therapeutic Exercise)    Shoulder (Therapeutic Exercise) strengthening exercise  -     Shoulder Strengthening (Therapeutic Exercise) bilateral;flexion;extension;aBduction;aDduction;4 lb free  weight;3 sets;10 repetitions  chest press  -       Row Name 02/08/24 0900          Elbow/Forearm (Therapeutic Exercise)    Elbow/Forearm (Therapeutic Exercise) strengthening exercise  -     Elbow/Forearm Strengthening (Therapeutic Exercise) bilateral;flexion;extension;4 lb free weight;3 sets;10 repetitions  -       Row Name 02/08/24 0900          Balance    Balance Assessment sitting dynamic balance  -     Static Sitting Balance independent  -     Dynamic Sitting Balance standby assist  -       Row Name 02/08/24 0900          Positioning and Restraints    Pre-Treatment Position in bed  -     Post Treatment Position chair  AM session  -     In Bed fowlers;call light within reach;with nsg  -     In Chair exit alarm on;encouraged to call for assist;call light within reach  in PM session  -       Row Name 02/08/24 0900          Therapy Assessment/Plan (OT)    OT Diagnosis Bilateral patella fractures  -     Rehab Potential/Prognosis (OT) good, to achieve stated therapy goals  -     Frequency of Treatment (OT) 5 times per week;90 minutes per session  -     Estimated Duration of Therapy (OT) 1 week;2 weeks  -     Problem List (OT) problems related to;mobility;strength;pain  -     Activity Limitations Related to Problem List (OT) unable to ambulate safely;BADLs not performed adequately or safely;IADLs not performed adequately or safely;community activities not performed adequately or safely;home management activity not performed adequately or safely  -     Planned Therapy Interventions (OT) adaptive equipment training;BADL retraining;IADL retraining;occupation/activity based interventions;patient/caregiver education/training;strengthening exercise;transfer/mobility retraining  -       Row Name 02/08/24 0900          Evaluation Complexity (OT)    Review Occupational Profile/Medical/Therapy History Complexity expanded/moderate complexity  -     Assessment, Occupational  Performance/Identification of Deficit Complexity 3-5 performance deficits  -SM     Clinical Decision Making Complexity (OT) detailed assessment/moderate complexity  -SM     Overall Complexity of Evaluation (OT) moderate complexity  -SM       Row Name 02/08/24 0900          Therapy Plan Review/Discharge Plan (OT)    Therapy Plan Review (OT) evaluation/treatment results reviewed;care plan/treatment goals reviewed;risks/benefits reviewed;participants voiced agreement with care plan;participants included;patient;spouse/significant other  discussed with spouse via phone on improved clothing options to don pants with KI in place  -     Anticipated Equipment Needs At Discharge (OT) commode, bedside with drop arms;wheelchair  -SM     Anticipated Discharge Disposition (OT) home with assist;home with home health  -       Row Name 02/08/24 0900          IRF OT Goals    Transfer Goal Selection (OT-IRF) transfers, OT goal 1;transfers, OT goal 2  -SM     Bathing Goal Selection (OT-IRF) bathing, OT goal 1;bathing, OT goal 2  -SM     UB Dressing Goal Selection (OT-IRF) UB dressing, OT goal 2  -SM     LB Dressing Goal Selection (OT-IRF) LB dressing, OT goal 1;LB dressing, OT goal 2  -SM     Grooming Goal Selection (OT-IRF) grooming, OT goal 2  -SM     Toileting Goal Selection (OT-IRF) toileting, OT goal 1;toileting, OT goal 2  -SM     Strength Goal Selection (OT-IRF) strength, OT goal 2 (free text)  -SM     Caregiver Training Goal Selection (OT-IRF) caregiver training, OT goal 2  -SM       Row Name 02/08/24 0900          Transfer Goal 1 (OT-IRF)    Activity/Assistive Device (Transfer Goal 1, OT-IRF) commode, bedside with drop arms;shower chair with a back;walk-in shower  -     [place] Level (Transfer Goal 1, OT-IRF) contact guard required  -SM     Time Frame (Transfer Goal 1, OT-IRF) short-term goal (STG);1 week  -SM     Progress/Outcomes (Transfer Goal 1, OT-IRF) goal ongoing;new goal  -       Row Name 02/08/24 0900           Transfer Goal 2 (OT-IRF)    Activity/Assistive Device (Transfer Goal 2, OT-IRF) walk-in shower;shower chair;commode chair  -SM     [place] Level (Transfer Goal 2, OT-IRF) --  SBA  -SM     Time Frame (Transfer Goal 2, OT-IRF) long-term goal (LTG);by discharge  -SM     Progress/Outcomes (Transfer Goal 2, OT-IRF) goal ongoing;new goal  -SM       Row Name 02/08/24 0900          Bathing Goal 1 (OT-IRF)    Activity/Device (Bathing Goal 1, OT-IRF) lower body bathing  -SM     [place] Level (Bathing Goal 1, OT-IRF) minimum assist (75% or more patient effort)  -SM     Time Frame (Bathing Goal 1, OT-IRF) short-term goal (STG);1 week  -SM     Strategies/Barriers (Bathing Goal 1, OT-IRF) with AE,  -SM     Progress/Outcomes (Bathing Goal 1, OT-IRF) goal ongoing;new goal  -SM       Row Name 02/08/24 0900          Bathing Goal 2 (OT-IRF)    Activity/Device (Bathing Goal 2, OT-IRF) lower body bathing  -SM     [place] Level (Bathing Goal 2, OT-IRF) set-up required;standby assist  -SM     Time Frame (Bathing Goal 2, OT-IRF) long-term goal (LTG);by discharge  -SM     Strategies/Barriers (Bathing Goal 2, OT - IRF) with AE  -SM     Progress/Outcomes (Bathing Goal 2, OT-IRF) goal ongoing;new goal  -SM       Row Name 02/08/24 0900          UB Dressing Goal 2 (OT-IRF)    Activity/Device (UB Dressing Goal 2, OT-IRF) upper body dressing  -SM     [place] (UB Dress Goal 2, OT-IRF) modified independence  w/c level  -SM     Time Frame (UB Dressing Goal 2, OT-IRF) long-term goal (LTG);by discharge  -SM     Progress/Outcomes (UB Dressing Goal 2, OT-IRF) goal ongoing;new goal  -SM       Row Name 02/08/24 0900          LB Dressing Goal 1 (OT-IRF)    Activity/Device (LB Dressing Goal 1, OT-IRF) lower body dressing  -SM     [place] (LB Dressing Goal 1, OT-IRF) moderate assist (50-74% patient effort)  -SM     Time Frame (LB Dressing Goal 1, OT-IRF) short-term goal (STG);1 week  -SM     Progress/Outcomes (LB Dressing Goal  1, OT-IRF) goal ongoing;new goal  -SM       Row Name 02/08/24 0900          LB Dressing Goal 2 (OT-IRF)    Activity/Device (LB Dressing Goal 2, OT-IRF) lower body dressing  -SM     [place] (LB Dressing Goal 2, OT-IRF) moderate assist (50-74% patient effort)  -SM     Time Frame (LB Dressing Goal 2, OT-IRF) long-term goal (LTG);by discharge  -SM     Progress/Outcomes (LB Dressing Goal 2, OT-IRF) goal ongoing;new goal  -SM       Row Name 02/08/24 0900          Grooming Goal 2 (OT-IRF)    Activity/Device (Grooming Goal 2, OT-IRF) grooming skills, all  -SM     [place] (Grooming Goal 2, OT-IRF) modified independence  -SM     Time Frame (Grooming Goal 2, OT-IRF) long-term goal (LTG);by discharge  -SM     Strategies/Barriers (Grooming Goal 2, OT-IRF) w/c level  -SM       Row Name 02/08/24 0900          Toileting Goal 1 (OT-IRF)    Activity/Device (Toileting Goal 1, OT-IRF) toileting skills, all  -SM     [place] Level (Toileting Goal 1, OT-IRF) minimum assist (75% or more patient effort)  -SM     Progress/Outcomes (Toileting Goal 1, OT-IRF) goal ongoing;new goal  -SM     Time Frame (Toileting Goal 1, OT-IRF) short-term goal (STG);1 week  -SM       Row Name 02/08/24 0900          Toileting Goal 2 (OT-IRF)    Activity/Device (Toileting Goal 2, OT-IRF) toileting skills, all  -SM     [place] Level (Toileting Goal 2, OT-IRF) contact guard required  -SM     Progress/Outcomes (Toileting Goal 2, OT-IRF) goal ongoing;new goal  -SM     Time Frame (Toileting Goal 2, OT-IRF) long-term goal (LTG);by discharge  -SM       Row Name 02/08/24 0900          Strength Goal 2 (OT-IRF)    Strength Goal 2 (OT-IRF) Pt to be independent with BUE strengthening HEP for assistance with transfer skills/ADLs.  -SM     Time Frame (Strength Goal 2, OT-IRF) long-term goal (LTG);by discharge  -SM     Progress/Outcomes (Strength Goal 2, OT-IRF) goal ongoing;new goal  -SM       Row Name 02/08/24 0900          Caregiver Training Goal 2  (OT-IRF)    Caregiver Training Goal 2 (OT-IRF) Caregiver transfer/ADL/precautions training to be completed to carryover ADL in home enviroment safely.  -     Time Frame (Caregiver Training Goal 2, OT-IRF) long-term goal (LTG);by discharge  -     Progress/Outcomes (Caregiver Training Goal 2, OT-IRF) goal ongoing;new goal  -               User Key  (r) = Recorded By, (t) = Taken By, (c) = Cosigned By      Initials Name Effective Dates     Lainey Trent OT 04/02/20 -                      Occupational Therapy Education       Title: PT OT SLP Therapies (In Progress)       Topic: Occupational Therapy (In Progress)       Point: ADL training (Done)       Description:   Instruct learner(s) on proper safety adaptation and remediation techniques during self care or transfers.   Instruct in proper use of assistive devices.                  Learning Progress Summary             Patient Acceptance, E,TB,D, VU,DU by  at 2/8/2024 1200    Comment: precautions, brace wearing, ADL goals/POC, clothing choices                         Point: Home exercise program (Not Started)       Description:   Instruct learner(s) on appropriate technique for monitoring, assisting and/or progressing therapeutic exercises/activities.                  Learner Progress:  Not documented in this visit.              Point: Precautions (Done)       Description:   Instruct learner(s) on prescribed precautions during self-care and functional transfers.                  Learning Progress Summary             Patient Acceptance, E,TB,D, VU,DU by  at 2/8/2024 1200    Comment: precautions, brace wearing, ADL goals/POC, clothing choices                         Point: Body mechanics (Not Started)       Description:   Instruct learner(s) on proper positioning and spine alignment during self-care, functional mobility activities and/or exercises.                  Learner Progress:  Not documented in this visit.                              User Key        Initials Effective Dates Name Provider Type Discipline     04/02/20 -  Lainey Trent OT Occupational Therapist OT                        OT Recommendation and Plan    Planned Therapy Interventions (OT): adaptive equipment training, BADL retraining, IADL retraining, occupation/activity based interventions, patient/caregiver education/training, strengthening exercise, transfer/mobility retraining                    Time Calculation:      Time Calculation- OT       Row Name 02/08/24 1625 02/08/24 1201          Time Calculation- OT    OT Start Time 1230  - 0800  -     OT Stop Time 1300  -SM 0900  -     OT Time Calculation (min) 30 min  -SM 60 min  -               User Key  (r) = Recorded By, (t) = Taken By, (c) = Cosigned By      Initials Name Provider Type     Lainey Trent OT Occupational Therapist                  Therapy Charges for Today       Code Description Service Date Service Provider Modifiers Qty    27803497426 HC OT EVAL MOD COMPLEXITY 3 2/8/2024 Lainey Trent OT GO 1    48112064676 HC OT THER PROC EA 15 MIN 2/8/2024 Lainey Trent OT GO 1    42070062638 HC OT THERAPEUTIC ACT EA 15 MIN 2/8/2024 Lainey rTent OT GO 1    19838549917 HC OT SELF CARE/MGMT/TRAIN EA 15 MIN 2/8/2024 Lainey Trent OT GO 2                     Lainey Trent OT  2/8/2024

## 2024-02-08 NOTE — PLAN OF CARE
Goal Outcome Evaluation:  Plan of Care Reviewed With: patient             Problem: Rehabilitation (IRF) Plan of Care  Goal: Plan of Care Review  Outcome: Ongoing, Progressing  Flowsheets (Taken 2/8/2024 0242)  Plan of Care Reviewed With: patient  Outcome Evaluation:  Pt is alert and oriented x4. Pleasant, friendly and driven. Takes all meds whole PO with thins. Continent of bowel and bladder. Urinal at bedside; pt able to put self on bedpan. Small slit noted to coccyx between glutes. Foam dressing applied. Home knees wrapped with ace wraps and braces in place. Venous pumps in place to BLE and propped on pillow. Pt likes all 4 side rails up while in bed for easier self positioning. encouraged to reposition q2. Accumax pump in place to mattress. NWB with exception to TTWB during transfers. PRN Tramadol administered r/t right knee discomfort. No unsafe behaviors. Call light within reach.

## 2024-02-08 NOTE — PROGRESS NOTES
Inpatient Rehabilitation Plan of Care Note    Plan of Care  Care Plan Reviewed - No updates at this time.    Sphincter Control    Performed Intervention(s)  Urinal and bedpan within reach  Incontinence products in use      Safety    Performed Intervention(s)  Wearing samantha knee braces at all times  Falls precautions in place  Hourly rounding      Psychosocial    Performed Intervention(s)  Allow patient to verbalize concerns regarding health  Provide calm enviroment      Pain    Performed Intervention(s)  Offer pain medication when available  encourage repositioning q2    Signed by: Quiana Agustin RN

## 2024-02-08 NOTE — PROGRESS NOTES
LOS: 1 day   Patient Care Team:  Damián Vuong Jr., MD as PCP - General (Internal Medicine)  Provider, No Known as PCP - Family Medicine  Paco Jeffers MD as Consulting Physician (Hematology and Oncology)  Angel Remy MD as Referring Physician (Dermatology)      ANGEL REMY  1945      ADMITTING DIAGNOSIS:    Bilateral patella fractures - tendon advancement repair Feb 5, 2024  Non-weightbearing except may do toe-touch during transfers.  Knee immobilizers at all times. No quad sets/straight leg raise/knee flexion.  Impaired mobility/ impaired self care       Subjective      No significant pain complaints.  Tolerating initial therapies.  Maintaining toe-touch weight-bearing when he does transfers.  Has some left ankle bruising but good active range of motion take resistance and reviewed this appears to be related to hematoma tracking down under the skin to the ankle as he does not have any pain or weakness  or limitations with active range of motion with the ankle    Objective     Vitals:    02/08/24 0859   BP: 117/64   Pulse: 76   Resp:    Temp:    SpO2:        Intake/Output Summary (Last 24 hours) at 2/8/2024 1213  Last data filed at 2/8/2024 0812  Gross per 24 hour   Intake --   Output 1225 ml   Net -1225 ml     PHYSICAL EXAM:     MENTAL STATUS -  AWAKE / ALERT  HEENT-    LUNGS - CTA, NO WHEEZES, RALES OR RHONCHI  HEART- RRR, NO RUB, MURMUR, OR GALLOP  ABD - NORMOACTIVE BOWEL SOUNDS, SOFT, NT.   EXT - NO EDEMA OR CYANOSIS  Ecchymosis right thenar eminence    Bilateral lower extremities - ACE wraps and knee immobilizers  NEURO - Ox4.    MOTOR EXAM - RUE/LUE 5/5. B ADF/EV 5/5              MEDICATIONS  Scheduled Meds:acyclovir, 400 mg, Oral, Daily  amLODIPine, 2.5 mg, Oral, Daily  apixaban, 2.5 mg, Oral, Q12H  vitamin C, 1,000 mg, Oral, Daily  bisoprolol, 5 mg, Oral, Daily  cholecalciferol, 5,000 Units, Oral, Daily  docusate sodium, 100 mg, Oral, BID  ferrous sulfate, 325 mg, Oral, Daily  "With Breakfast  finasteride, 5 mg, Oral, Daily  latanoprost, 1 drop, Both Eyes, Nightly  magnesium oxide, 400 mg, Oral, Daily  polyethylene glycol, 17 g, Oral, Daily  propafenone, 150 mg, Oral, TID  rosuvastatin, 10 mg, Oral, Nightly  senna-docusate sodium, 2 tablet, Oral, BID  tamsulosin, 0.4 mg, Oral, BID      Continuous Infusions:   PRN Meds:.  acetaminophen **OR** acetaminophen **OR** acetaminophen    bisacodyl    ondansetron ODT **OR** ondansetron    traMADol      RESULTS  No results found for: \"POCGLU\"  Results from last 7 days   Lab Units 02/08/24  0650 02/07/24  0548 02/06/24  0522 02/04/24  1204   WBC 10*3/mm3 5.93 6.33  --  5.04   HEMOGLOBIN g/dL 8.9* 8.0* 8.8* 13.1   HEMATOCRIT % 26.3* 23.8* 26.5* 39.3   PLATELETS 10*3/mm3 115* 113*  --  117*     Results from last 7 days   Lab Units 02/08/24  0650 02/07/24  0548 02/04/24  1204   SODIUM mmol/L 135* 136 140   POTASSIUM mmol/L 4.0 4.0 4.0   CHLORIDE mmol/L 102 103 104   CO2 mmol/L 24.9 26.0 26.0   BUN mg/dL 14 18 24*   CREATININE mg/dL 0.71* 0.65* 0.91   CALCIUM mg/dL 7.9* 7.9* 8.7   BILIRUBIN mg/dL  --   --  0.6   ALK PHOS U/L  --   --  43   ALT (SGPT) U/L  --   --  23   AST (SGOT) U/L  --   --  19   GLUCOSE mg/dL 111* 123* 116*           ASSESSMENT and PLAN    Patella fracture    Bilateral patella fractures - tendon advancement repair Feb 5, 2024  Non-weightbearing except may do toe-touch during transfers.  Knee immobilizers at all times. No quad sets/straight leg raise/knee flexion.    Impaired mobility/ impaired self care    Hairy cell leukemia    Anemia - transfusion on Feb 7 February 8-hemoglobin 8.9-increased approximately 1 point after transfusion of 1 unit    DVT prophylaxis -  apixaban/ foot venous compression devices    H/O Paroxysmal atrial fibrillation    HTN    BPH- Flomax and finasteride    Pain management - occasional tramadol. JANNET report reviewed.      Now admit for comprehensive acute inpatient rehabilitation .  This would be an " interdisciplinary program with physical therapy 1.5 hour,  occupational therapy 1.5 hour,  5 days a week.  Rehabilitation nursing for carryover, monitoring of  incisions, anemia  status, bowel and bladder, and skin  Ongoing physician follow-up.  Weekly team conferences.  Goals are to achieve a level of modificed independent with  transfers for mobility and self-care   .   Rehabilitation prognosis good.  Medical prognosis fair- defer to Orthopedics.  Estimated length of stay is approximately one week , but is only an estimation.   The patient's functional status and clinical status is unchanged from preadmission assessment and the patient continues appropriate for acute inpatient rehabilitation.  Goal is for home with  home health   therapies.  Barrier to discharge:  impaired mobility and self care  - work on  transfers and ADLS with adaptive equipment and techniques  to overcome.           Alan Hamlin MD      Appropriate PPE was worn during the entire visit.  Hand hygiene was completed before and after.

## 2024-02-08 NOTE — PROGRESS NOTES
SECTION GG    Eating Performance: Bingham sets up or cleans up; patient completes activity.  Bingham assists only prior to or following the activity.    Eating Discharge Goals: Patient completed the activities by themself with no  assistance from a helper.    Section B. Hearing and Vision  Ability to Hear:  Adequate - no difficulty in normal conversation, social  interaction, listening to TV  Ability to See in Adequate Light:  Adequate - sees fine detail, such as regular  print in newspapers/books    Section B. Health Literacy  Frequency of Needing Assistance Reading:  Never    Section D. Mood  Presence of little interest or pleasure in doing things:   No  Frequency of having little interest or pleasure in doing things:   Never or 1  day  Presence of feeling down, depressed, or hopeless:   No  Frequency of feeling down, depressed, or hopeless:   Never or 1 day   Interview Ended. Above responses do not meet criteria to continue.  Total Severity Score:   0    Section D. Social Isolation  Frequecy of Feeling Lonely or Isolated:  Never    Section J. Health Conditions (Pain Effect on Sleep)  Pain Effect on Sleep:   Rarely or not at all    Signed by: Maura Oro RN

## 2024-02-08 NOTE — CASE MANAGEMENT/SOCIAL WORK
Case Management Discharge Note      Final Note: dc'd to [facility]    Provided Post Acute Provider List?: Yes  Post Acute Provider List: Inpatient Rehab  Provided Post Acute Provider Quality & Resource List?: Yes  Post Acute Provider Quality and Resource List: Inpatient Rehab  Delivered To: Support Person  Method of Delivery: In person    Selected Continued Care - Discharged on 2/7/2024 Admission date: 2/4/2024 - Discharge disposition: Rehab Facility or Unit ([facility])      Destination    No services have been selected for the patient.                Durable Medical Equipment    No services have been selected for the patient.                Dialysis/Infusion    No services have been selected for the patient.                Home Medical Care    No services have been selected for the patient.                Therapy    No services have been selected for the patient.                Community Resources    No services have been selected for the patient.                Community & DME    No services have been selected for the patient.                         Final Discharge Disposition Code: 62 - inpatient rehab facility

## 2024-02-08 NOTE — PROGRESS NOTES
Case Management  Inpatient Rehabilitation Plan of Care and Discharge Plan Note    Rehabilitation Diagnosis:  Branch  Date of Onset:  Branch    Medical Summary:  Branch  Past Medical History: Branch    Plan of Care  Updated Problems/Interventions  Field    Expected Intensity:  Branch  Interdisciplinary Team:  Maurice  Estimated Length of Stay/Anticipated Discharge Date: Branch  Anticipated Discharge Destination:  Anticipated discharge destination from inpatient rehabilitation is community  discharge with assistance. Patient lives with wife in 2 story home with walk out  basement. Patient will d/c home with wife who is available to assist. Will plan  to stay in basement level of home since will go home at wheelchair level. Wife  arranging for ramp to entrance of .      Based on the patient's medical and functional status, their prognosis and  expected level of functional improvement is:  Branch    Signed by: MARCELA Graves

## 2024-02-08 NOTE — THERAPY EVALUATION
Inpatient Rehabilitation - Physical Therapy Initial Evaluation       Psychiatric     Patient Name: Angel Remy  : 1945  MRN: [id number]    Today's Date: 2024                    Admit Date: 2024      Visit Dx:   No diagnosis found.    Patient Active Problem List   Diagnosis    Hairy cell leukemia    FH: colon cancer    Hyponatremia    Orthostatic hypotension    Supine hypertension    Anemia    Macrocytosis    LBBB (left bundle branch block)    Prolonged P-R interval    Pancytopenia    Fitting and adjustment of vascular catheter    Elevated blood pressure reading    Thrombocytopenia    History of left bundle branch block (LBBB)    PAF (paroxysmal atrial fibrillation) history    Closed displaced transverse fracture of left patella    Closed displaced transverse fracture of right patella    Patella fracture       Past Medical History:   Diagnosis Date    BCC (basal cell carcinoma of skin) 2022    NOSE    Benign prostate hyperplasia     Elevated cholesterol     H/O Elevated PSA     H/O Hairy cell leukemia (CMS/HCC)     H/O Internal thrombosed hemorrhoids     H/O minimal right carotid plaque     H/O peripheral neuropathy     History of transfusion     Hyperlipidemia     Hypertension     Primary open-angle glaucoma, bilateral, mild stage        Past Surgical History:   Procedure Laterality Date    BONE MARROW BIOPSY W/ ASPIRATION      COLONOSCOPY      COLONOSCOPY N/A 2021    Procedure: COLONOSCOPY TO CECUM AND INTO TI WITH COLD BIOPSY POLYPECTOMIES;  Surgeon: Juan Jenkins MD;  Location: [facility] ENDOSCOPY;  Service: Gastroenterology;  Laterality: N/A;  pre: family history of colon cancer  post: polyps, diverticulosis  and internal hemorrhoids    EXCISION MASS HEAD/NECK N/A 2022    Procedure: EXCISION BASAL CELL CARCINOMA NOSE WITH FROZEN SECTION FULL THICKNESS GRAFT;  Surgeon: Arturo Weiss MD;  Location: [facility] OR [facility];  Service: Plastics;  Laterality: N/A;    HERNIA  REPAIR Right 02/2000    Inguinal     OTHER SURGICAL HISTORY  1989    Median nerve foreign body excision    PATELLA OPEN REDUCTION INTERNAL FIXATION Bilateral 2/5/2024    Procedure: PATELLA OPEN REDUCTION INTERNAL FIXATION;  Surgeon: Adrián Roberson MD;  Location: [facility];  Service: Orthopedics;  Laterality: Bilateral;    PROSTATE BIOPSY      March 2010 and October 2011 whan PSA had acceleration    SIGMOIDOSCOPY  1999    With small thrombosed internal hemorrhoid.    TONSILLECTOMY      VENOUS ACCESS DEVICE (PORT) INSERTION Right 10/18/2023    Procedure: MEDIPORT PLACEMENTWITH SUPERIOR VENACAVAGRAM;  Surgeon: Randy Fan MD;  Location: [facility];  Service: Vascular;  Laterality: Right;    WISDOM TOOTH EXTRACTION  1961       PT ASSESSMENT (last 12 hours)       IRF PT Evaluation and Treatment       Row Name 02/08/24 1000          PT Time and Intention    Document Type initial evaluation  -MD     Mode of Treatment physical therapy  -MD     Patient/Family/Caregiver Comments/Observations Pt supine in bed showing no signs of acute distress.  -MD       Row Name 02/08/24 1000          General Information    Patient Profile Reviewed yes  -MD     Existing Precautions/Restrictions fall  KI on B at all times.  TTWBing B LE.  No quad sets, SLR and/or knee flexion  -MD       Row Name 02/08/24 1000          Previous Level of Function/Home Environm    Bed Mobility, Premorbid Functional Level independent  -MD     Transfers, Premorbid Functional Level independent  -MD     Household Ambulation, Premorbid Functional Level independent  -MD     Stairs, Premorbid Functional Level independent  -MD     Community Ambulation, Premorbid Functional Level independent  -MD       Row Name 02/08/24 1000          Living Environment    Primary Care Provided by self  -MD       Row Name 02/08/24 1000          Pain Assessment    Pretreatment Pain Rating 0/10 - no pain  -MD     Posttreatment Pain Rating 0/10 - no pain  -MD       Row Name  02/08/24 1000          Cognition/Psychosocial    Orientation Status (Cognition) oriented x 4  -MD     Follows Commands (Cognition) WFL  -MD     Personal Safety Interventions fall prevention program maintained;gait belt;nonskid shoes/slippers when out of bed  -MD       Row Name 02/08/24 1000          Range of Motion (ROM)    Range of Motion other (see comments)  Per MD pt not to participate in SLR, knee flexion or quad sets at this time.  Pt in B KI.  AROM in 75% limited B.  -MD       Row Name 02/08/24 1000          Strength (Manual Muscle Testing)    Strength (Manual Muscle Testing) other (see comments)  -MD       Row Name 02/08/24 1000          Strength Comprehensive (MMT)    Comment, General Manual Muscle Testing (MMT) Assessment Per MD pt not to participate in SLR, knee flexion or quad sets at this time. Pt in B KI.  Pt is 3/5 w B ankle DF/PF  -MD       Row Name 02/08/24 1000          Mobility    Advanced Gait Activity rough/uneven surfaces;curb negotiation  -MD     Additional Documentation Advanced Gait Activity (Row);Wheelchair Mobility/Management (Group)  -MD       Row Name 02/08/24 1000          Bed Mobility    Bed Mobility rolling left;rolling right;supine-sit;sit-supine  -MD     Rolling Left [place] (Bed Mobility) standby assist  -MD     Rolling Right [place] (Bed Mobility) standby assist  -MD     Scooting/Bridging [place] (Bed Mobility) standby assist  -MD     Supine-Sit [place] (Bed Mobility) standby assist  -MD     Sit-Supine [place] (Bed Mobility) verbal cues;moderate assist (50% patient effort)  -MD       Row Name 02/08/24 1000          Transfer Assessment/Treatment    Transfers bed-chair transfer;chair-bed transfer;sit-stand transfer;stand-sit transfer;car transfer  -MD       Row Name 02/08/24 1000          Bed-Chair Transfer    Bed-Chair [place] (Transfers) verbal cues;contact guard  -MD     Assistive Device (Bed-Chair Transfers) transfer board;wheelchair  -MD Rivera  Name 02/08/24 1000          Chair-Bed Transfer    Chair-Bed [place] (Transfers) verbal cues;contact guard  -MD     Assistive Device (Chair-Bed Transfers) transfer board;wheelchair  -MD       Row Name 02/08/24 1000          Sit-Stand Transfer    Comment, (Sit-Stand Transfer) not appropriate due to B TTWBing  -MD       Row Name 02/08/24 1000          Wheelchair Mobility/Management    Method of Wheelchair Locomotion (Mobility) bimanual (upper extremity) propulsion  -MD     Mobility Activities (Wheelchair) forward propulsion;turning  -MD     Forward Propulsion [place] (Wheelchair) standby assist  -MD     Turning [place] (Wheelchair) standby assist  -MD     Distance Propelled in Feet (Wheelchair) 200'  -MD       Row Name 02/08/24 1000          Balance    Balance Assessment sitting static balance;sitting dynamic balance;standing static balance;standing dynamic balance  -MD       Row Name 02/08/24 1000          Motor Skills    Therapeutic Exercise hip;ankle;knee;core strength;other (see comments)  WC push ups 3 sets of 5  -MD     Additional Documentation Advanced Stepping/Walking Interventions (Group)  -MD       Row Name 02/08/24 1000          Hip (Therapeutic Exercise)    Hip (Therapeutic Exercise) AROM (active range of motion);isometric exercises;strengthening exercise  -MD     Hip Isometrics (Therapeutic Exercise) bilateral;gluteal sets;10 repetitions;3 second hold  -MD       Row Name 02/08/24 1000          Knee (Therapeutic Exercise)    Knee (Therapeutic Exercise) AROM (active range of motion);isometric exercises;strengthening exercise  -MD       Row Name 02/08/24 1000          Ankle (Therapeutic Exercise)    Ankle (Therapeutic Exercise) AROM (active range of motion);strengthening exercise  -MD     Ankle Strengthening (Therapeutic Exercise) bilateral;dorsiflexion;plantarflexion;sitting;10 repetitions  -MD       Row Name 02/08/24 1000          Advanced Stepping/Walking Interventions    Stepping/Walking  Interventions side stepping;backward walking  -MD       Row Name 02/08/24 1000          Positioning and Restraints    Pre-Treatment Position in bed  -MD     Post Treatment Position bed  -MD     In Bed supine;call light within reach;exit alarm on;with family/caregiver  -MD       Row Name 02/08/24 1000          Therapy Assessment/Plan (PT)    Functional Level at Time of Evaluation (PT) CGA-SBA  -MD     PT Diagnosis (PT) limited mobility due to post op restrictions  -MD     Rehab Potential/Prognosis (PT) good, to achieve stated therapy goals  -MD     Frequency of Treatment (PT) 5 times per week;90 minutes per session  -MD     Estimated Duration of Therapy (PT) 1 week  -MD     Problem List (PT) problems related to;balance;mobility;strength;postural control  -MD     Activity Limitations Related to Problem List (PT) unable to transfer safely;community activities not performed adequately or safely;home management activity not performed adequately or safely  -MD     Comment, Therapy Assessment/Plan (PT) Pt is 77 yo male presenting s/p fall leading to B patellar fractures.  Pt is now s/p IM nailing and is TTWBing B.  Pt is in B KI and per MD is not permitted to perform SLR, quad sets, and/or knee flexion exercises at this time.  Prior to admission pt was very independent and working FT.  At time of PT eval pt reqired CGA for transfers using a SB to maintain compliance w B TTWBing status.  Pt will benifit from skilled PT to address mobility deficits and increase safety and independence w functional mobility.  -MD       Row Name 02/08/24 1000          IRF PT Goals    Bed Mobility Goal Selection (PT-IRF) bed mobility, PT goal 1;bed mobility, PT goal 2  -MD     Transfer Goal Selection (PT-IRF) transfers, PT goal 1;transfers, PT goal 2  -MD     Gait (Walking Locomotion) Goal Selection (PT-IRF) gait, PT goal 1;gait, PT goal 2  -MD     Wheelchair Locomotion Goal Selection (PT-IRF) wheelchair locomotion, PT goal 1  -MD     Stairs  Goal Selection (PT-IRF) stairs, PT goal 1  -MD       Row Name 02/08/24 1000          Bed Mobility Goal 1 (PT-IRF)    Activity/Assistive Device (Bed Mobility Goal 1, PT-IRF) sit to supine/supine to sit  -MD     [place] Level (Bed Mobility Goal 1, PT-IRF) independent  -MD     Time Frame (Bed Mobility Goal 1, PT-IRF) 1 week  -MD       Row Name 02/08/24 1000          Transfer Goal 1 (PT-IRF)    Activity/Assistive Device (Transfer Goal 1, PT-IRF) bed-to-chair/chair-to-bed  SB  -MD     [place] Level (Transfer Goal 1, PT-IRF) modified independence  -MD     Time Frame (Transfer Goal 1, PT-IRF) 1 week  -MD       Row Name 02/08/24 1000          Wheelchair Locomotion Goal 1 (PT-IRF)    Activity (Wheelchair Locomotion Goal 1, PT-IRF) wheelchair mobility skills, all  -MD     [place] Level (Wheelchair Locomotion Goal 1, PT-IRF) modified independence  -MD     Distance Goal 1 (Wheelchair Locomotion, PT-IRF) 200'  -MD     Time Frame (Wheelchair Locomotion Goal 1, PT-IRF) 1 week  -MD               User Key  (r) = Recorded By, (t) = Taken By, (c) = Cosigned By      Initials Name Provider Type    Berna Rico, PT Physical Therapist                  Wound 02/05/24 1433 Left anterior knee Incision (Active)   Dressing Appearance dry;intact 02/07/24 2244   Closure Unable to assess 02/08/24 0924   Base dressing in place, unable to visualize 02/07/24 2244   Drainage Amount none 02/07/24 2244   Dressing Care dressing reinforced;elastic bandage 02/07/24 2244       Wound 02/05/24 1539 Right anterior knee Incision (Active)   Dressing Appearance dry;intact 02/07/24 2244   Closure Unable to assess 02/08/24 0924   Base dressing in place, unable to visualize 02/07/24 2244   Drainage Amount none 02/07/24 2244   Dressing Care dressing reinforced;elastic bandage 02/07/24 2244       Wound 02/07/24 2244 coccyx Other (comment) (Active)   Wound Image   02/07/24 2244   Dressing Appearance open to air 02/07/24 2244   Closure None 02/07/24  2244   Base blanchable;clean;moist;red 02/07/24 2244   Drainage Amount none 02/07/24 2244   Care, Wound cleansed with;soap and water 02/07/24 2244   Dressing Care dressing applied;foam 02/07/24 2244     Physical Therapy Education       Title: PT OT SLP Therapies (In Progress)       Topic: Physical Therapy (In Progress)       Point: Mobility training (Not Started)       Learner Progress:  Not documented in this visit.              Point: Home exercise program (Not Started)       Learner Progress:  Not documented in this visit.              Point: Body mechanics (Not Started)       Learner Progress:  Not documented in this visit.              Point: Precautions (Done)       Learning Progress Summary             Patient Acceptance, E, VU by MD at 2/8/2024 1226                                         User Key       Initials Effective Dates Name Provider Type Discipline    MD 06/16/21 -  Berna Rivera, PT Physical Therapist PT                    PT Recommendation and Plan    Planned Therapy Interventions (PT): home exercise program, patient/family education, transfer training, bed mobility training  Frequency of Treatment (PT): 5 times per week, 90 minutes per session                     Time Calculation:      PT Charges       Row Name 02/08/24 1413 02/08/24 1051          Time Calculation    Start Time 1330  -MD 0930  -MD     Stop Time 1400  -MD 1030  -MD     Time Calculation (min) 30 min  -MD 60 min  -MD     PT Received On -- 02/08/24  -MD     PT - Next Appointment -- 02/09/24  -MD     PT Goal Re-Cert Due Date -- 02/15/24  -MD               User Key  (r) = Recorded By, (t) = Taken By, (c) = Cosigned By      Initials Name Provider Type    Berna Rico, PT Physical Therapist                    Therapy Charges for Today       Code Description Service Date Service Provider Modifiers Qty    88315339712 HC PT EVAL LOW COMPLEXITY 3 2/8/2024 Berna Rivera, PT GP 1    81545716938 HC PT THER PROC EA 15 MIN 2/8/2024 Berna Rivera, PT GP 3     [id number]  PT THERAPEUTIC ACT EA 15 MIN 2/8/2024 Berna Rivera, PT GP 2              PT G-Codes  AM-PAC 6 Clicks Score (PT): 9      Berna Rivera, PT  2/8/2024

## 2024-02-08 NOTE — PROGRESS NOTES
Physical Medicine and Rehabilitation  Inpatient Rehabilitation Interdisciplinary Plan of Care    Demographics            Age: 78Y            Gender: Male    Admission Date: 2/7/2024 8:33:00 PM  Rehabilitation Diagnosis:  Bilateral patella fractures - tendon advancement  repair Feb 5, 2024    Bilateral patella fractures - tendon advancement repair Feb 5, 2024  Non-weightbearing except may do toe-touch during transfers.  Knee immobilizers at all times. No quad sets/straight leg raise/knee flexion.    Impaired mobility/ impaired self care    Hairy cell leukemia    Anemia - transfusion on Feb 7 February 8-hemoglobin 8.9-increased approximately 1 point after transfusion of 1  unit    DVT prophylaxis -  apixaban/ foot venous compression devices    H/O Paroxysmal atrial fibrillation    HTN    BPH- Flomax and finasteride    Pain management - occasional tramadol. JANNET report reviewed.    Plan of Care  Anticipated Discharge Date/Estimated Length of Stay: 1 week  Anticipated Discharge Destination: Community discharge with assistance  Discharge Plan : Home with spouse who is available 24/7  Medical Necessity Expected Level Rationale: Goals are to achieve a level of  modificed independent with  transfers for mobility and self-care   .  Rehabilitation prognosis good.  Medical prognosis fair- defer to Orthopedics.  Intensity and Duration: an average of 3 hours/5 days per week  Medical Supervision and 24 Hour Rehab Nursing: x  Physical Therapy: x  PT Intensity/Duration: 1.5 hours / day, 5 days / week, for approximately 1 week  Occupational Therapy: x  OT Intensity/Duration: 1.5 hours / day, 5 days / week, for approximately 1 week  Social Work: x  Therapeutic Recreation: x  Registered Dietician: x  Updated (if changes indicated)  No changes to plan.    Based on the patient's medical and functional status, their prognosis and  expected level of functional improvement is: Goals are to achieve a level of  modificed independent with   transfers for mobility and self-care   .  Rehabilitation prognosis good.  Medical prognosis fair- defer to Orthopedics.    Interdisciplinary Problem/Goals/Status  Sphincter Control    [RN] Bladder Management(Active)  Current Status(02/08/2024): Continent 100%  Weekly Goal(02/14/2024): Continent 100%  Discharge Goal: Continent 100%    [RN] Bowel Management(Active)  Current Status(02/08/2024): Continent 100%; pt able to put self on bedpan  Weekly Goal(02/14/2024): Continent 100%  Discharge Goal: Continent 100%        Safety    [RN] Potential for Injury(Active)  Current Status(02/08/2024): At risk for falls r/t samantha knee fractures; on bedrest  at this time  Weekly Goal(02/14/2024): will use call light  Discharge Goal: No falls        Psychosocial    [RN] Coping/Adjustment(Active)  Current Status(02/08/2024): Pt coping well; motivated to start therapy; has  supportive wife  Weekly Goal(02/14/2024): Verbalizes needs and concerns  Discharge Goal: Demonstrates adequate coping        Pain    [RN] Pain Management(Active)  Current Status(02/08/2024): Pain to samantha knees; PRN Tramadol and Tylenol  available  Weekly Goal(02/14/2024): will be able to tolerate therapies  Discharge Goal: pain controlled      Comments:    Signed by: Alan Hamlin MD

## 2024-02-08 NOTE — PROGRESS NOTES
Nutrition Services    Patient Name:  Angel Remy  YOB: 1945  MRN: [id number]  Admit Date:  2/7/2024    Assessment Date:  02/08/24    Summary: Rehab Admission Assessment    Pt is a 78 year old male admitted to acute rehab following hospitalization for bilateral patella fractures after fall at home, s/p tendon advancement repair 2/5/24.     Pt's current diet is Regular. Appetite is good, intake at meals 50-75 %. Labs reviewed, Na 135. Skin status reviewed, coccyx- small tear, bilateral knee surgical incisions. Weight is 171 lbs.     Plan/Recommend:  Continue regular diet.  Monitor intake, labs, weight and skin status.   RD to follow.      CLINICAL NUTRITION ASSESSMENT      Reason for Assessment Other: New Rehab Admit      Admitting Diagnosis   Patella fracture       Medical/Surgical History Past Medical History:   Diagnosis Date    BCC (basal cell carcinoma of skin) 06/22/2022    NOSE    Benign prostate hyperplasia     Elevated cholesterol     H/O Elevated PSA     H/O Hairy cell leukemia (CMS/HCC) 2012    H/O Internal thrombosed hemorrhoids     H/O minimal right carotid plaque     H/O peripheral neuropathy     History of transfusion     Hyperlipidemia     Hypertension     Primary open-angle glaucoma, bilateral, mild stage        Past Surgical History:   Procedure Laterality Date    BONE MARROW BIOPSY W/ ASPIRATION      COLONOSCOPY      COLONOSCOPY N/A 9/25/2021    Procedure: COLONOSCOPY TO CECUM AND INTO TI WITH COLD BIOPSY POLYPECTOMIES;  Surgeon: Juan Jenkins MD;  Location: [facility] ENDOSCOPY;  Service: Gastroenterology;  Laterality: N/A;  pre: family history of colon cancer  post: polyps, diverticulosis  and internal hemorrhoids    EXCISION MASS HEAD/NECK N/A 6/23/2022    Procedure: EXCISION BASAL CELL CARCINOMA NOSE WITH FROZEN SECTION FULL THICKNESS GRAFT;  Surgeon: Arturo Weiss MD;  [place] [facility] [place] [facility] [place]  Service: Plastics;  Laterality: N/A;    HERNIA REPAIR Right 02/2000     "Inguinal     OTHER SURGICAL HISTORY  1989    Median nerve foreign body excision    PATELLA OPEN REDUCTION INTERNAL FIXATION Bilateral 2/5/2024    Procedure: PATELLA OPEN REDUCTION INTERNAL FIXATION;  Surgeon: Adrián Roberson MD;  Location: [facility];  Service: Orthopedics;  Laterality: Bilateral;    PROSTATE BIOPSY      March 2010 and October 2011 whan PSA had acceleration    SIGMOIDOSCOPY  1999    With small thrombosed internal hemorrhoid.    TONSILLECTOMY      VENOUS ACCESS DEVICE (PORT) INSERTION Right 10/18/2023    Procedure: MEDIPORT PLACEMENTWITH SUPERIOR VENACAVAGRAM;  Surgeon: Randy Fan MD;  Location: [facility];  Service: Vascular;  Laterality: Right;    WISDOM TOOTH EXTRACTION  1961        Current Nutrition Orders & Evaluation of Intake       Oral Nutrition      Food Allergies NKFA    Current PO Diet Diet: Regular/House Diet; Texture: Regular Texture (IDDSI 7); Fluid Consistency: Thin (IDDSI 0)    Supplement    PO Evaluation      PO Intake % 50-75%     Factors Affecting Intake No factors at this time   --  Anthropometrics        Current Height  Current Weight (CBW)  BMI kg/m2 Height: 177 cm (69.69\")  Weight: 77.7 kg (171 lb 4.8 oz) (02/07/24 2035)  Body mass index is 24.8 kg/m².   BMI Category Normal/Healthy (18.4 - 24.9)   Ideal Weight (IBW) 159 lb   Usual Weight (UBW) 162 lb   Weight Trend Stable   Weight History Wt Readings from Last 15 Encounters:   02/07/24 2035 77.7 kg (171 lb 4.8 oz)   02/04/24 1123 73.5 kg (162 lb)   01/04/24 1237 72.9 kg (160 lb 11.2 oz)   12/14/23 1143 74.3 kg (163 lb 14.4 oz)   12/07/23 1231 74.4 kg (164 lb)   12/07/23 1206 74.6 kg (164 lb 6.4 oz)   11/30/23 1229 74.8 kg (164 lb 14.5 oz)   11/30/23 1206 74.8 kg (165 lb)   11/22/23 0805 74.3 kg (163 lb 12.8 oz)   11/20/23 1234 73.8 kg (162 lb 12.8 oz)   11/02/23 1220 73.7 kg (162 lb 6.4 oz)   11/02/23 1225 73.7 kg (162 lb 7.7 oz)   10/27/23 1411 74 kg (163 lb 3.2 oz)   10/26/23 1407 73.8 kg (162 lb 12.8 oz) "   10/25/23 1356 72.5 kg (159 lb 12.8 oz)      --  Physical Findings          General Appearance alert   Oral/Mouth Cavity tooth or teeth missing   Edema  lower extremity , 1+ (trace), 2+ (mild)   Gastrointestinal non-distended , normoactive, last bowel movement: 2/7   Skin  pressure injury: coccyx- small tear, surgical incision: bilateral knees   Tubes/Drains/Lines none   NFPE Not indicated at this time       Medications & Labs           Scheduled Medications acyclovir, 400 mg, Oral, Daily  amLODIPine, 2.5 mg, Oral, Daily  apixaban, 2.5 mg, Oral, Q12H  vitamin C, 1,000 mg, Oral, Daily  bisoprolol, 5 mg, Oral, Daily  cholecalciferol, 5,000 Units, Oral, Daily  docusate sodium, 100 mg, Oral, BID  ferrous sulfate, 325 mg, Oral, Daily With Breakfast  finasteride, 5 mg, Oral, Daily  latanoprost, 1 drop, Both Eyes, Nightly  magnesium oxide, 400 mg, Oral, Daily  polyethylene glycol, 17 g, Oral, Daily  propafenone, 150 mg, Oral, TID  rosuvastatin, 10 mg, Oral, Nightly  senna-docusate sodium, 2 tablet, Oral, BID  tamsulosin, 0.4 mg, Oral, BID       Infusions     PRN Medications   acetaminophen **OR** acetaminophen **OR** acetaminophen    bisacodyl    heparin    ondansetron ODT **OR** ondansetron    traMADol            Pertinent Labs   Results from last 7 days   Lab Units 02/08/24  0650 02/07/24  0548 02/04/24  1204   SODIUM mmol/L 135* 136 140   POTASSIUM mmol/L 4.0 4.0 4.0   CHLORIDE mmol/L 102 103 104   CO2 mmol/L 24.9 26.0 26.0   BUN mg/dL 14 18 24*   CREATININE mg/dL 0.71* 0.65* 0.91   CALCIUM mg/dL 7.9* 7.9* 8.7   BILIRUBIN mg/dL  --   --  0.6   ALK PHOS U/L  --   --  43   ALT (SGPT) U/L  --   --  23   AST (SGOT) U/L  --   --  19   GLUCOSE mg/dL 111* 123* 116*     Results from last 7 days   Lab Units 02/08/24  0650 02/06/24  0522 02/04/24  1204   HEMOGLOBIN g/dL 8.9*   < > 13.1   HEMATOCRIT % 26.3*   < > 39.3   WBC 10*3/mm3 5.93   < > 5.04   ALBUMIN g/dL  --   --  4.1    < > = values in this interval not displayed.  "    Results from last 7 days   Lab Units 02/08/24  0650 02/07/24  0548 02/04/24  1204   PLATELETS 10*3/mm3 115* 113* 117*     No results found for: \"HGBA1C\"     Nutrition Diagnosis        Nutrition Dx Problem  Problem: Nutrition Appropriate for Condition at this Time  Etiology: Goals of Care   Signs/Symptoms: PO intake       NUTRITION INTERVENTION / PLAN OF CARE  Goals        Intervention Goal(s) Maintain nutrition status, Meet estimated needs, Disease management/therapy, and Maintain intake     Nutrition Intervention        RD Action Continue to monitor and Care plan reviewed     Prescription        Diet Prescription    Supplement Prescription    Snack Prescription    EN Prescription    New Prescription Ordered? No changes at this time     Monitor/Evaluation        Monitor Per protocol   Discharge Needs No discharge needs identified at this time     RD to follow up per protocol.  "

## 2024-02-08 NOTE — PROGRESS NOTES
Recreational Therapy Note    Patient Name: Angel Remy   MRN: [id number]    Therapeutic Recreation Eval and Treat (last 12 hours)       Therapeutic Recreation Eval & Treat       Row Name 02/08/24 1326       Lifestyle/Recreational History/Interest    Current Living Situation with family  -    Transportation Situation car, drives self  -SS      Row Name 02/08/24 1326       Recreational History and Interests    How Important is Recreation to You? high importance  -    Satisfied With Leisure Lifestyle? yes  -SS    Participate Regularly in Leisure Activity? yes  -SS    Current Hobbies/Interests exercise/fitness;group/social activities;reading  -    Group/Social Activities dining out  -    Reading books;magazines/newspapers;e-reader;audio books  -      Row Name 02/08/24 1326       Coping/Wellbeing    Current State of Wellbeing calm  -    Factors Impacting Current State of Wellbeing no significant issues  -      Row Name 02/08/24 1326       Therapeutic Recreation Participation    Orientation to Therapeutic Recreation patient  -      Row Name 02/08/24 1326       Therapeutic Recreation Assessment/Plan    Recreation Plan independent leisure participation  -              User Key  (r) = Recorded By, (t) = Taken By, (c) = Cosigned By      Initials Name Provider Type    SS Ofelia Tan, CTRS Recreational Therapist                      DONNIE Fine  2/8/2024

## 2024-02-08 NOTE — PROGRESS NOTES
Inpatient Rehabilitation Plan of Care Note    Plan of Care  Care Plan Reviewed - No updates at this time.    Sphincter Control    [RN] Bladder Management(Active)  Current Status(02/08/2024): Continent 100%  Weekly Goal(02/14/2024): Continent 100%  Discharge Goal: Continent 100%    [RN] Bowel Management(Active)  Current Status(02/08/2024): Continent 100%; pt able to put self on bedpan  Weekly Goal(02/14/2024): Continent 100%  Discharge Goal: Continent 100%        Safety    [RN] Potential for Injury(Active)  Current Status(02/08/2024): At risk for falls r/t samantha knee fractures; on bedrest  at this time  Weekly Goal(02/14/2024): will use call light  Discharge Goal: No falls        Psychosocial    [RN] Coping/Adjustment(Active)  Current Status(02/08/2024): Pt coping well; motivated to start therapy; has  supportive wife  Weekly Goal(02/14/2024): Verbalizes needs and concerns  Discharge Goal: Demonstrates adequate coping        Pain    [RN] Pain Management(Active)  Current Status(02/08/2024): Pain to samantha knees; PRN Tramadol and Tylenol  available  Weekly Goal(02/14/2024): will be able to tolerate therapies  Discharge Goal: pain controlled    Signed by: Maura Oro RN

## 2024-02-08 NOTE — PROGRESS NOTES
Discharge Planning Assessment  [facility]     Patient Name: Angel Remy  MRN: [id number]  Today's Date: 2/8/2024    Admit Date: 2/7/2024    Plan: Patient will d/c home with wife who is available to be home with him. Will arrange further therapy as needed.   Discharge Needs Assessment    No documentation.                  Discharge Plan       Row Name 02/08/24 1204       Plan    Plan Patient will d/c home with wife who is available to be home with him. Will arrange further therapy as needed.    Patient/Family in Agreement with Plan yes    Plan Comments Completed assessment with patient and wife, by phone. Patient lives with wife in 2 story home with walk out basement. Patient's bedroom is on second floor. They are planning for patient to stay in walkout basement level where he has bedrrom, full bathroom and other accomodations. Discussed possibly needing to use BSC free standing if cannot get wheelchair close to commode in bathroom. Patient to go home at wheelchair level since is NWB both LE's.  Wife sending pictures of bathroom. Has walk in shower with glass doors. Wife has talked with one ramp company regarding ramp to cover area to get to walkout level entrance. She stated they told her could take 2-3 weeks. Discussed having [employer] come out to assess and give quote as another option. They would like to do this, so contacted Arnulfo Orozco with [employer]. He will call wife to set up time to go to home. 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 He is in solo practice so trying to work out care of his patients. He does see patients via telehealth at times. Wife is retired and will be home with patient. D/C plan is home with wife. Discussed SW role, possible DME for home, team conference and family teaching. Will follow and assist with plans.                  Continued Care and Services - Admitted Since 2/7/2024    Coordination has not been started for this  encounter.       Selected Continued Care - Prior Encounters Includes continued care and service providers with selected services from prior encounters from 11/9/2023 to 2/8/2024      Discharged on 2/7/2024 Admission date: 2/4/2024 - Discharge disposition: Rehab Facility or Unit ([facility] - [facility])      Destination       Service Provider Selected Services Address Phone Fax Patient Preferred    [facility] ([facility]) Inpatient Rehabilitation [address] [address] [phone] -- --                             Demographic Summary    No documentation.                  Functional Status       Row Name 02/08/24 1136       Functional Status    Usual Activity Tolerance excellent    Current Activity Tolerance moderate    Functional Status Comments Patient was active and independent prior to hospitalization.       Physical Activity    On average, how many days per week do you engage in moderate to strenuous exercise (like a brisk walk)? 4 days       Functional Status, IADL    Medications independent    Meal Preparation independent    Housekeeping independent    Laundry independent    Shopping independent       Mental Status    General Appearance WDL WDL       Mental Status Summary    Recent Changes in Mental Status/Cognitive Functioning mood    Mental Status Comments Patient feeling overwhelmed currently but denies any depression       Employment/    Employment Status employed full-time    Current or Previous Occupation healthcare    Employment/ Comments Patient has full time dermatology practice                   Psychosocial       Row Name 02/08/24 1138       Values/Beliefs    Spiritual, Cultural Beliefs, Spiritism Practices, Values that Affect Care no       Behavior WDL    Behavior WDL interactions    Interactions appropriate to situation;cooperative;eye contact appropriate       Emotion Mood WDL    Emotion/Mood/Affect WDL emotion mood    Emotion/Mood appropriate to  situation       Speech WDL    Speech WDL WDL       Perceptual State WDL    Perceptual State WDL WDL       Thought Process WDL    Thought Process WDL WDL       Intellectual Performance WDL    Intellectual Performance WDL WDL       Coping/Stress    Major Change/Loss/Stressor medical condition/diagnosis;loss of independence    Patient Personal Strengths able to adapt;assertive;expressive of emotions;expressive of needs;motivated;successful coping history;strong support system;positive attitude    Sources of Support adult child(magdalena);spouse;friend(s)    Techniques to [place] with Loss/Stress/Change diversional activities;exercise    Reaction to Health Status adjusting;motivated;hopeful    Understanding of Condition and Treatment adequate understanding of medical condition;adequate understanding of treatment    Coping/Stress Comments Patient feels a bit overwhelmed with situation, especially since is still practicing physician, but stated he is strong minded and wii cope ok       Developmental Stage (Eriksson's)    Developmental Stage Stage 8 (65 years-death/Late Adulthood) Integrity vs. Despair                   Abuse/Neglect    No documentation.                  Legal    No documentation.                  Substance Abuse    No documentation.                  Patient Forms    No documentation.                     YUNIOR Urena

## 2024-02-08 NOTE — PROGRESS NOTES
Case Management  Inpatient Rehabilitation Plan of Care and Discharge Plan Note    Rehabilitation Diagnosis:  Bilateral patella fractures - tendon advancement  repair Feb 5, 2024  Date of Onset:  02/05/2024    Medical Summary:  78 y.o. male presents Feb 4, 2024 to the hospital with  bilateral transverse patellar fractures.  He was going down stairs at home when  on the last step his slippers slipped and then caught on the steps, causing him  to fall forward onto both knees. Unable to weightbear or stand. Diagnosed with  bilateral inferior pole patella fractures. On Feb 5 he underwent surgical repair  with bilateral patella tendon advancement.     He had significant postoperative worsening of anemia that is complicated by his  bone marrow suppression with history of hairy cell leukemia.  He was evaluated  by his hematology oncologist who recommended blood transfusion on Feb 7 as it  was felt his hemoglobin would be slow to recover with his recent treatment in  Nov/dec for hairy cell leukemia. .  He was also started on oral iron for iron  deficiency anemia and anemia of chronic disease.  His thrombocytopenia likely  contributed to additional bleeding around the time of his fracture.      Eliquis for DVT prophylaxis.  He was on ASA at home, history of PAF..  Patient is to remain primarily nonweightbearing however he may be toe-touch  weightbearing for transfers from bed to chair or chair to toilet.  Absolutely no  flexion or straight leg raises or quad sets of either knee.  Knee braces are to  remain on at all times.  Encourage patient to work on ankle pumps aggressively throughout the day.  Also  incentive spirometer.  Patient will require a wheelchair with elevated leg rests and a 3 in 1 commode  to use at home.    Functionally, in good shape premorbidly, exercised multiple days a week. PT  worked on bed mobility on Feb 6. With OT on Feb 7, pt is instructed on LB  dressing strategies using AE, mod-maxA provided. He is  able to sit EOB with Zach  for BLE mgt, sits supported for multiple mins to increase sitting tolerance .  Past Medical History: Past Medical History:  Diagnosis Date  ? BCC (basal cell carcinoma of skin) 06/22/2022    NOSE  ? Benign prostate hyperplasia  ? H/O Elevated PSA  ? H/O Hairy cell leukemia (CMS/HCC) 2012  ? H/O Internal thrombosed hemorrhoids  ? H/O minimal right carotid plaque  ? H/O peripheral neuropathy  ? Hyperlipidemia  ? Hypertension  ? Primary open-angle glaucoma, bilateral, mild stage        Surgical History  Past Surgical History:  Procedure Laterality Date  ? BONE MARROW BIOPSY W/ ASPIRATION  ? COLONOSCOPY  ? COLONOSCOPY N/A 9/25/2021    Procedure: COLONOSCOPY TO CECUM AND INTO TI WITH COLD BIOPSY POLYPECTOMIES;  Surgeon: Juan Jenkins MD;  Location: [facility] ENDOSCOPY;  Service:  Gastroenterology;  Laterality: N/A;  pre: family history of colon cancer  post: polyps, diverticulosis  and internal hemorrhoids  ? EXCISION MASS HEAD/NECK N/A 6/23/2022    Procedure: EXCISION BASAL CELL CARCINOMA NOSE WITH FROZEN SECTION FULL  THICKNESS GRAFT;  Surgeon: Arturo Weiss MD;  Location: [facility] [place], operated by [facility];   Service: Plastics;  Laterality: N/A;  ? HERNIA REPAIR Right 02/2000    Inguinal  ? OTHER SURGICAL HISTORY   1989    Median nerve foreign body excision  ? PATELLA OPEN REDUCTION INTERNAL FIXATION Bilateral 2/5/2024    Procedure: PATELLA OPEN REDUCTION INTERNAL FIXATION;  Surgeon: Adrián Roberson MD;  Location: [facility] OR;  Service: Orthopedics;  Laterality: Bilateral;  ? PROSTATE BIOPSY    March 2010 and October 2011 whan PSA had acceleration  ? SIGMOIDOSCOPY   1999    With small thrombosed internal hemorrhoid.  ? TONSILLECTOMY  ? VENOUS ACCESS DEVICE (PORT) INSERTION Right 10/18/2023    Procedure: MEDIPORT PLACEMENTWITH SUPERIOR VENACAVAGRAM;  Surgeon: Randy Fan MD;  Location: [facility] OR;  Service: Vascular;  Laterality:  Right;  ? WISDOM TOOTH EXTRACTION   1961    Plan of  Care  Updated Problems/Interventions  Field    Expected Intensity:  Average of 3 hours of therapy 5 days/week.  Interdisciplinary Team:  Interdisciplinary Team: Medical Supervision and 24 Hour Rehabilitation Nursing.,  Physical Therapy:, Occupational Therapy:, Social Work, Therapeutic Recreation.,  Registered Dietitian.  Physical Therapy Intensity/Duration: 1.5 hours / day, 5 days / week, for  approximately 1 week  Occupational Therapy Intensity/Duration: 1.5 hours / day, 5 days / week, for  approximately 1 week  Estimated Length of Stay/Anticipated Discharge Date: 1 week  Anticipated Discharge Destination:  Anticipated discharge destination from inpatient rehabilitation is community  discharge with assistance. Home with spouse who is available 24/7      Based on the patient's medical and functional status, their prognosis and  expected level of functional improvement is:  Goals are to achieve a level of  modificed independent with  transfers for mobility and self-care   .  Rehabilitation prognosis good.  Medical prognosis fair- defer to Orthopedics.    Signed by: Gabriella Barragan RN

## 2024-02-09 LAB
BASOPHILS # BLD AUTO: 0 10*3/MM3 (ref 0–0.2)
BASOPHILS NFR BLD AUTO: 0 % (ref 0–1.5)
DEPRECATED RDW RBC AUTO: 46.6 FL (ref 37–54)
EOSINOPHIL # BLD AUTO: 0.04 10*3/MM3 (ref 0–0.4)
EOSINOPHIL NFR BLD AUTO: 0.9 % (ref 0.3–6.2)
ERYTHROCYTE [DISTWIDTH] IN BLOOD BY AUTOMATED COUNT: 13.8 % (ref 12.3–15.4)
HCT VFR BLD AUTO: 24.5 % (ref 37.5–51)
HGB BLD-MCNC: 8.4 G/DL (ref 13–17.7)
IMM GRANULOCYTES # BLD AUTO: 0.02 10*3/MM3 (ref 0–0.05)
IMM GRANULOCYTES NFR BLD AUTO: 0.4 % (ref 0–0.5)
LYMPHOCYTES # BLD AUTO: 0.43 10*3/MM3 (ref 0.7–3.1)
LYMPHOCYTES NFR BLD AUTO: 9.6 % (ref 19.6–45.3)
MCH RBC QN AUTO: 31.9 PG (ref 26.6–33)
MCHC RBC AUTO-ENTMCNC: 34.3 G/DL (ref 31.5–35.7)
MCV RBC AUTO: 93.2 FL (ref 79–97)
MONOCYTES # BLD AUTO: 0.66 10*3/MM3 (ref 0.1–0.9)
MONOCYTES NFR BLD AUTO: 14.7 % (ref 5–12)
NEUTROPHILS NFR BLD AUTO: 3.35 10*3/MM3 (ref 1.7–7)
NEUTROPHILS NFR BLD AUTO: 74.4 % (ref 42.7–76)
NRBC BLD AUTO-RTO: 0 /100 WBC (ref 0–0.2)
PLATELET # BLD AUTO: 125 10*3/MM3 (ref 140–450)
PMV BLD AUTO: 10.5 FL (ref 6–12)
RBC # BLD AUTO: 2.63 10*6/MM3 (ref 4.14–5.8)
WBC NRBC COR # BLD AUTO: 4.5 10*3/MM3 (ref 3.4–10.8)

## 2024-02-09 PROCEDURE — 97110 THERAPEUTIC EXERCISES: CPT

## 2024-02-09 PROCEDURE — 97110 THERAPEUTIC EXERCISES: CPT | Performed by: OCCUPATIONAL THERAPIST

## 2024-02-09 PROCEDURE — 85025 COMPLETE CBC W/AUTO DIFF WBC: CPT | Performed by: INTERNAL MEDICINE

## 2024-02-09 PROCEDURE — 97530 THERAPEUTIC ACTIVITIES: CPT

## 2024-02-09 PROCEDURE — 97535 SELF CARE MNGMENT TRAINING: CPT | Performed by: OCCUPATIONAL THERAPIST

## 2024-02-09 RX ORDER — ACYCLOVIR 400 MG/1
400 TABLET ORAL 2 TIMES DAILY
Status: DISCONTINUED | OUTPATIENT
Start: 2024-02-09 | End: 2024-02-15 | Stop reason: HOSPADM

## 2024-02-09 RX ORDER — SULFAMETHOXAZOLE AND TRIMETHOPRIM 800; 160 MG/1; MG/1
1 TABLET ORAL 3 TIMES WEEKLY
Status: DISCONTINUED | OUTPATIENT
Start: 2024-02-09 | End: 2024-02-15 | Stop reason: HOSPADM

## 2024-02-09 RX ADMIN — TAMSULOSIN HYDROCHLORIDE 0.4 MG: 0.4 CAPSULE ORAL at 21:27

## 2024-02-09 RX ADMIN — FERROUS SULFATE TAB 325 MG (65 MG ELEMENTAL FE) 325 MG: 325 (65 FE) TAB at 08:18

## 2024-02-09 RX ADMIN — APIXABAN 2.5 MG: 2.5 TABLET, FILM COATED ORAL at 08:17

## 2024-02-09 RX ADMIN — DOCUSATE SODIUM 50MG AND SENNOSIDES 8.6MG 2 TABLET: 8.6; 5 TABLET, FILM COATED ORAL at 08:17

## 2024-02-09 RX ADMIN — TRAMADOL HYDROCHLORIDE 50 MG: 50 TABLET ORAL at 21:27

## 2024-02-09 RX ADMIN — PROPAFENONE HYDROCHLORIDE 150 MG: 150 TABLET, COATED ORAL at 21:27

## 2024-02-09 RX ADMIN — TRAMADOL HYDROCHLORIDE 50 MG: 50 TABLET ORAL at 08:17

## 2024-02-09 RX ADMIN — SULFAMETHOXAZOLE AND TRIMETHOPRIM 1 TABLET: 800; 160 TABLET ORAL at 12:38

## 2024-02-09 RX ADMIN — DOCUSATE SODIUM 50MG AND SENNOSIDES 8.6MG 2 TABLET: 8.6; 5 TABLET, FILM COATED ORAL at 21:27

## 2024-02-09 RX ADMIN — ROSUVASTATIN CALCIUM 10 MG: 10 TABLET, FILM COATED ORAL at 21:27

## 2024-02-09 RX ADMIN — APIXABAN 2.5 MG: 2.5 TABLET, FILM COATED ORAL at 21:27

## 2024-02-09 RX ADMIN — DOCUSATE SODIUM 100 MG: 100 CAPSULE, LIQUID FILLED ORAL at 08:17

## 2024-02-09 RX ADMIN — BISOPROLOL FUMARATE 5 MG: 5 TABLET, FILM COATED ORAL at 08:17

## 2024-02-09 RX ADMIN — FINASTERIDE 5 MG: 5 TABLET, FILM COATED ORAL at 21:27

## 2024-02-09 RX ADMIN — ACYCLOVIR 400 MG: 400 TABLET ORAL at 21:27

## 2024-02-09 RX ADMIN — PROPAFENONE HYDROCHLORIDE 150 MG: 150 TABLET, COATED ORAL at 15:09

## 2024-02-09 RX ADMIN — Medication 5000 UNITS: at 11:46

## 2024-02-09 RX ADMIN — LATANOPROST 1 DROP: 50 SOLUTION/ DROPS OPHTHALMIC at 21:29

## 2024-02-09 RX ADMIN — AMLODIPINE BESYLATE 2.5 MG: 2.5 TABLET ORAL at 21:27

## 2024-02-09 RX ADMIN — ACYCLOVIR 400 MG: 400 TABLET ORAL at 08:18

## 2024-02-09 RX ADMIN — OXYCODONE HYDROCHLORIDE AND ACETAMINOPHEN 1000 MG: 500 TABLET ORAL at 08:18

## 2024-02-09 RX ADMIN — PROPAFENONE HYDROCHLORIDE 150 MG: 150 TABLET, COATED ORAL at 08:18

## 2024-02-09 RX ADMIN — TAMSULOSIN HYDROCHLORIDE 0.4 MG: 0.4 CAPSULE ORAL at 08:17

## 2024-02-09 RX ADMIN — MAGNESIUM OXIDE 400 MG (241.3 MG MAGNESIUM) TABLET 400 MG: TABLET at 08:17

## 2024-02-09 NOTE — PROGRESS NOTES
SECTION GG    Self Care Performance:   Oral Hygiene: [place] sets up or cleans up; patient completes activity. [place]  assists only prior to or following the activity.   Toileting Hygiene: [place] does less than half the effort. [place] lifts, holds  or supports trunk or limbs but provides less than half the effort.   Shower/Bathe Self: [place] does less than half the effort. [place] lifts, holds  or supports trunk or limbs but provides less than half the effort.   Upper Body Dressing: [place] sets up or cleans up; patient completes activity.  [place] assists only prior to or following the activity.   Lower Body Dressing: [place] does more than half the effort. [place] lifts or  holds trunk or limbs and provides more than half the effort.   Putting On/Taking Off Footwear: [place] does all of the effort. Patient does  none of the effort to complete the activity. Or, the assistance of 2 or more  helpers is required for the patient to complete the activity.    Self Care Discharge Goals:   Oral Hygiene: Patient completed the activities by themself with no assistance  from a helper.   Toileting Hygiene: [place] provides verbal cues or touching/steadying assistance  as patient completes activity.   Shower/Bathe Self: [place] provides verbal cues or touching/steadying assistance  as patient completes activity.   Upper Body Dressing: Patient completed the activities by themself with no  assistance from a helper.   Lower Body Dressing: [place] does less than half the effort. [place] lifts, holds  or supports trunk or limbs but provides less than half the effort.   Putting On/Taking Off Footwear: [place] does less than half the effort. [place]  lifts, holds or supports trunk or limbs but provides less than half the effort.    Mobility Toilet Transfer Performance: [place] does less than half the effort.  [place] lifts, holds or supports trunk or limbs but provides less than half the  effort.    Mobility Toilet Transfer Discharge Goal: [place]  provides verbal cues or  touching/steadying assistance as patient completes activity.    Signed by: Lainey Trent OT

## 2024-02-09 NOTE — PLAN OF CARE
Goal Outcome Evaluation:  Plan of Care Reviewed With: patient             Problem: Rehabilitation (IRF) Plan of Care  Goal: Plan of Care Review  Outcome: Ongoing, Progressing  Flowsheets (Taken 2/9/2024 7209)  Plan of Care Reviewed With: patient  Outcome Evaluation:  Alert and oriented x 4. Very appreciative and pleasant with staff during care. Takes all meds whole PO with thins. Continent of bowel and bladder. Uses urinal at bedside  sliding board to BSC or bedpan if needed. Able to make adjustments while in bed using all 4 side rails, per pt request for easier self positioning. Home knees wrapped with ACE and braces in place. Home heels propped on pillow. Encouraged to use IS. Venous pumps applied. PRN Tramadol administered at HS r/t right knee discomfort. No unsafe behaviors. Small slit between glutes  covered with foam dressing. Call light within reach.

## 2024-02-09 NOTE — THERAPY TREATMENT NOTE
Inpatient Rehabilitation - Occupational Therapy Treatment Note    [facility]     Patient Name: Angel Remy  : 1945  MRN: [id number]    Today's Date: 2024                 Admit Date: 2024       No diagnosis found.    Patient Active Problem List   Diagnosis    Hairy cell leukemia    FH: colon cancer    Hyponatremia    Orthostatic hypotension    Supine hypertension    Anemia    Macrocytosis    LBBB (left bundle branch block)    Prolonged P-R interval    Pancytopenia    Fitting and adjustment of vascular catheter    Elevated blood pressure reading    Thrombocytopenia    History of left bundle branch block (LBBB)    PAF (paroxysmal atrial fibrillation) history    Closed displaced transverse fracture of left patella    Closed displaced transverse fracture of right patella    Patella fracture       Past Medical History:   Diagnosis Date    BCC (basal cell carcinoma of skin) 2022    NOSE    Benign prostate hyperplasia     Elevated cholesterol     H/O Elevated PSA     H/O Hairy cell leukemia (CMS/HCC)     H/O Internal thrombosed hemorrhoids     H/O minimal right carotid plaque     H/O peripheral neuropathy     History of transfusion     Hyperlipidemia     Hypertension     Primary open-angle glaucoma, bilateral, mild stage        Past Surgical History:   Procedure Laterality Date    BONE MARROW BIOPSY W/ ASPIRATION      COLONOSCOPY      COLONOSCOPY N/A 2021    Procedure: COLONOSCOPY TO CECUM AND INTO TI WITH COLD BIOPSY POLYPECTOMIES;  Surgeon: uJan Jenkins MD;  Location: [facility] ENDOSCOPY;  Service: Gastroenterology;  Laterality: N/A;  pre: family history of colon cancer  post: polyps, diverticulosis  and internal hemorrhoids    EXCISION MASS HEAD/NECK N/A 2022    Procedure: EXCISION BASAL CELL CARCINOMA NOSE WITH FROZEN SECTION FULL THICKNESS GRAFT;  Surgeon: Arturo Weiss MD;  [place] [facility] [place] [facility] [place]  Service: Plastics;  Laterality: N/A;    HERNIA REPAIR Right 2000     Inguinal     OTHER SURGICAL HISTORY  1989    Median nerve foreign body excision    PATELLA OPEN REDUCTION INTERNAL FIXATION Bilateral 2/5/2024    Procedure: PATELLA OPEN REDUCTION INTERNAL FIXATION;  Surgeon: Adrián Roberson MD;  Location: [facility];  Service: Orthopedics;  Laterality: Bilateral;    PROSTATE BIOPSY      March 2010 and October 2011 whan PSA had acceleration    SIGMOIDOSCOPY  1999    With small thrombosed internal hemorrhoid.    TONSILLECTOMY      VENOUS ACCESS DEVICE (PORT) INSERTION Right 10/18/2023    Procedure: MEDIPORT PLACEMENTWITH SUPERIOR VENACAVAGRAM;  Surgeon: Randy Fan MD;  Location: [facility];  Service: Vascular;  Laterality: Right;    WISDOM TOOTH EXTRACTION  1961             IRF OT ASSESSMENT FLOWSHEET (last 12 hours)       IRF OT Evaluation and Treatment       Row Name 02/09/24 1506          OT Time and Intention    Document Type daily treatment  -     Mode of Treatment individual therapy;occupational therapy  -KP     Patient Effort good  -KP     Symptoms Noted During/After Treatment none  -       Row Name 02/09/24 1506          General Information    Patient/Family/Caregiver Comments/Observations pt in wc am and pm  -KP     Existing Precautions/Restrictions fall;weight bearing  B LE TTWB cannot stand due to wb status and straight leg braces on 24/7  -KP       Row Name 02/09/24 1506          Pain Assessment    Pretreatment Pain Rating 0/10 - no pain  -     Posttreatment Pain Rating 0/10 - no pain  -       Row Name 02/09/24 1506          Cognition/Psychosocial    Orientation Status (Cognition) oriented x 4  -KP     Follows Commands (Cognition) WFL  -     Personal Safety Interventions fall prevention program maintained;gait belt;muscle strengthening facilitated;nonskid shoes/slippers when out of bed  -       Row Name 02/09/24 1506          Bathing    [place] Level (Bathing) bathing skills;upper body;set up;standby assist;lower body;maximum assist  (25% patient effort)  feet. pt able to wash thighs above braces and noemy/buttocks  -     Position (Bathing) sitting up in bed  [facility]     Set-up Assistance (Bathing) obtain supplies  [facility]       Row Name 02/09/24 1506          Upper Body Dressing    [place] Level (Upper Body Dressing) upper body dressing skills;doff;don;pull over garment;set up assistance;supervision  -     Position (Upper Body Dressing) sitting up in bed  [facility]     Set-up Assistance (Upper Body Dressing) obtain clothing  [facility]       Row Name 02/09/24 1506          Lower Body Dressing    [place] Level (Lower Body Dressing) don;doff;socks;underwear;set up;maximum assist (25% patient effort);dependent (less than 25% patient effort)  -     Position (Lower Body Dressing) sitting up in bed  [facility]     Set-up Assistance (Lower Body Dressing) obtain clothing  [facility]       Row Name 02/09/24 1506          Grooming    [place] Level (Grooming) grooming skills;deodorant application;hair care, combing/brushing;oral care regimen;shave face;wash face, hands;set up;standby assist  -     Position (Grooming) supported sitting  -     Set-up Assistance (Grooming) obtain supplies  -KP     Comment (Grooming) in wc  [facility]       Row Name 02/09/24 1506          Bed Mobility    Sit-Supine [place] (Bed Mobility) set up;standby assist  -       Row Name 02/09/24 1506          Bed-Chair Transfer    Bed-Chair [place] (Transfers) set up;standby assist;verbal cues  -     Assistive Device (Bed-Chair Transfers) wheelchair;transfer board  -     Comment, (Bed-Chair Transfer) bed elevated. am and pm  -       Row Name 02/09/24 1506          Motor Skills    Therapeutic Exercise elbow/forearm;shoulder;wrist;hand;aerobic  -       Row Name 02/09/24 1506          Shoulder (Therapeutic Exercise)    Shoulder Strengthening (Therapeutic Exercise) bilateral;flexion;extension;scapular stabilization;sitting;10 repetitions;3 sets  4# dowel nury. gwen 45#  -[facility]  Name 02/09/24 1506          Elbow/Forearm (Therapeutic Exercise)    Elbow/Forearm Strengthening (Therapeutic Exercise) right;left;extension;supination;pronation;sitting;4 lb free weight;10 repetitions;2 sets  -       Row Name 02/09/24 1506          Wrist (Therapeutic Exercise)    Wrist (Therapeutic Exercise) strengthening exercise  -     Wrist Strengthening (Therapeutic Exercise) right;left;flexion;extension;4 lb free weight;10 repetitions;2 sets  -       Row Name 02/09/24 1506          Balance    Static Sitting Balance independent  -     Dynamic Sitting Balance standby assist  -     Position, Sitting Balance sitting in chair;sitting edge of bed  -     Balance Interventions sitting  -       Row Name 02/09/24 1506          Positioning and Restraints    Pre-Treatment Position sitting in chair/recliner  -     Post Treatment Position bed  -     In Bed supine;call light within reach;encouraged to call for assist;exit alarm on  am and pm. notified nsg. and pt ashlie FLORENTINO in am  -       Row Name 02/09/24 1506          Daily Progress Summary (OT)    Overall Progress Toward Functional Goals (OT) progressing toward functional goals as expected  -               User Key  (r) = Recorded By, (t) = Taken By, (c) = Cosigned By      Initials Name Effective Dates     nAdria Starks, OTR 07/11/23 -                      Occupational Therapy Education       Title: PT OT SLP Therapies (In Progress)       Topic: Occupational Therapy (In Progress)       Point: ADL training (Done)       Description:   Instruct learner(s) on proper safety adaptation and remediation techniques during self care or transfers.   Instruct in proper use of assistive devices.                  Learning Progress Summary             Patient Acceptance, E,TB,D, VU,DU by  at 2/8/2024 1200    Comment: precautions, brace wearing, ADL goals/POC, clothing choices                         Point: Home exercise program (Not Started)        Description:   Instruct learner(s) on appropriate technique for monitoring, assisting and/or progressing therapeutic exercises/activities.                  Learner Progress:  Not documented in this visit.              Point: Precautions (Done)       Description:   Instruct learner(s) on prescribed precautions during self-care and functional transfers.                  Learning Progress Summary             Patient Acceptance, E,TB,D, VU,DU by  at 2/8/2024 1200    Comment: precautions, brace wearing, ADL goals/POC, clothing choices                         Point: Body mechanics (Not Started)       Description:   Instruct learner(s) on proper positioning and spine alignment during self-care, functional mobility activities and/or exercises.                  Learner Progress:  Not documented in this visit.                              User Key       Initials Effective Dates Name Provider Type Discipline     04/02/20 -  Lainey Trent OT Occupational Therapist OT                        OT Recommendation and Plan            Daily Progress Summary (OT)  Overall Progress Toward Functional Goals (OT): progressing toward functional goals as expected            Time Calculation:      Time Calculation- OT       Row Name 02/09/24 1511 02/09/24 1223 02/09/24 1030       Time Calculation- OT    OT Start Time 1430  -KP -- 1030  -KP    OT Stop Time 1500  -KP -- 1130  -KP    OT Time Calculation (min) 30 min  -KP -- 60 min  -KP    OT Goal Re-Cert Due Date -- 02/15/24  - --              User Key  (r) = Recorded By, (t) = Taken By, (c) = Cosigned By      Initials Name Provider Type     Andria Starks OTR Occupational Therapist                  Therapy Charges for Today       Code Description Service Date Service Provider Modifiers Qty    26679190038 HC OT SELF CARE/MGMT/TRAIN EA 15 MIN 2/9/2024 Andria Starks OTR GO 4    42518172837 HC OT THER PROC EA 15 MIN 2/9/2024 Andria Starks OTR GO 2                      Andria Starks, OTR  2/9/2024

## 2024-02-09 NOTE — NURSING NOTE
"Wound/ostomy - consult received for skin concerns/discoloration to gluteal cleft region. 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 Patient very pleasant, upon entry he tells me \"I have intertriginous dermatitis of the gluteal cleft\". Patient was assisted to roll onto his side, there is some redness, blanchable at gluteal cleft, skin is intact, he denies any pain, redness is consistent with intertrigo, he states that he prefers to use aquaphore and he has his own tubes at the bedside. We discussed use of zinc barrier cream as another option for skin protectant and he is not opposed to use but he really prefers use of aquaphore. Due to location and between the gluteals a sacral optifoam will likely be bulky and come dislodged with rolling. We discuss to continue to use the aquaphore and if area becomes more sensitive can try to use a sacral optifoam. Discuss with patient nurse that a waffle overlay mattress would be helpful at pressure redistribution if one could be ordered through [employer]. I think a low air loss mattress would create difficult with independent position changes in the bed.   "

## 2024-02-09 NOTE — PROGRESS NOTES
Bilateral knee wounds inspected with knees maintained in extension.    Old dried blood at the left >> ight knee.  Ecchymosis at the right anterior knee.  Gauze dressings changed to Optifoam, Curlex, Ace wrap-to remain in place until follow-up in orthopedic clinic. Bilateral knee immobilizers    Alan Hamlin MD

## 2024-02-09 NOTE — PROGRESS NOTES
REASONS FOR FOLLOWUP:   Hairy cell leukemia.   Admission 05/11/2012 through 05/17/12 for 7 day continuous infusion cladribine (2-CdA) without complication.   Patient seen 5 months post treatment in complete remission.   Patient seen 8 months post treatment with continued complete remission, every 3 month reassessment per CBC planned, 6 month review by MD scheduled.   Patient seen at 15 months without evidence of recurrent disease, every 6 month followup planned.   The patient was seen 02/05/2014 with no evidence of recurrent disease, 6-month followup planned.   Patient seen 07/31/2014, stable with no evidence of recurrent disease, every 6 month followup.   Patient seen 01/15/2015, stable without recurrent disease, 6-month CBC planned, yearly followup scheduled.   Patient seen 02/04/2016, stable, ?early peripheral neuropathy developing distally per feet, no evidence for recurrent disease per hairy cell leukemia.  Patient reviewed September 02, 2016, previous July CBC with mild thrombocytopenia developing, recheck September 2 normalized, every 6 month follow-ups anticipated  Patient reviewed February 02, 2017, status post negative prostate biopsy, mild thrombocytopenia again recognized  Patient reviewed July 20, 2017, hematologically stable, every 6 month follow-up CBCs planned, yearly M.D. Assessment  Patient seen July 26, 2018 after recent airline travel any cold symptoms  Patient reviewed January 10, 2019, no additional symptoms, hematologically stable  Patient reviewed July 25, 2019, stable, six-month follow-up as planned  Patient viewed January 16, 2020, stable, every 6 months CBC, MD assessment yearly planned  Reassessment January 21, 2021, no evidence of recurrence disease  Patient assessed 2/10/2022, evidence of continued remission noted both on clinical examination, peripheral blood smear review and flow cytometric evaluation.  Patient review 2/23/2023 mild thrombocytopenia, no new abnormalities per  peripheral smear, close for follow-up planned  Patient assessed 9/14/2023, concern for early relapse.  Subsequent marrow 10/12/2023-variably hypercellular with diffuse involvement-a 90%-hairy cell leukemia relapse.  Patient seen 10/22/2023 for initiation of cladribine-outpatient regimen to be followed by Rituxan-28 days later 4 to 6 weeks  Patient proceeded through Rituxan treatments weekly beginning 11/22/2023 and completed 12/14/2023.  Patient with findings consistent with remission 1/4/2024  Patient hospitalized 2/4/2024 status post bilateral patellar fractures, transition to rehab at [facility] 2/7/2024         History of Present Illness     The patient is a 78 y.o. male with the above-mentioned history, who completed therapy with cladribine and subsequent consolidative Rituxan weekly x 4 with his last treatment given 12/14/2023.  He is now seen back in office again feeling well with no additional issues.  He continues to work a full schedule and, wonderfully, was able to proceed to therapy almost entirely without interruption to his usual schedule.   He is eating and drinking adequately.  He has no fevers or chills, signs or symptoms of infection.  He has no arthralgias.  He has no new concerns today.     We discussed his peripheral smear today that demonstrates normalization and, fortunately, he developed no additional symptoms that would direct us otherwise.  We have agreed, however, that he would maintain his port over the next year until it is clear that he no longer needs this access.    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 He is usually followed by Dr. Vuong for a history of hypertension, hyperlipidemia, minimal right carotid plaque as well as elevated PSA.       He had exams done on 1-09-12 for routine followup. This included normal serum chemistry including LFTs, cholesterol 131, triglyceride 34, HDL 51,  and LDL 73. CBC revealed H&H 13.3, 37.9%, WBC 3300, platelet count 110,000, 26% polys and 74% lymphs with A NC of 0.9.       Dr. Remy provides information when initially seen with studies from as far back as 1- at which time hemoglobin and hematocrit was 14.4 and 42.1%, WBC 6950, platelets 227,000 with 47% polys, 42% lymph; this was automated. The additional test includ es approximately every year to every other year thereafter though it was noted in January 2010 WBC dropped from an average of approximately 5,000-6,000 to 3600. Hemoglobin and hematocrit 13.6 and 39.7%. Platelets 150,000-170,000 with ANC beginning in Ja nuary 2010 at 1900.       We were contacted per the above findings and asked Dr. Remy to undergo further assessment including flow cytometric exam per peripheral blood. This revealed no evidence of abnormal myeloid maturation increased blast population. No evidence of lymphopro l iferative disorder. The patient was asked to be seen formally in the office now and comes in today for further assessment. Dr. Remy indicates that he has taken a number of medications in an attempt to improve his health. Several homeopathic medication s include vitamin C, folate, flaxseed oil, fish oil, vitamin E, selenium, pomegranate tabs, Co-enzyme Q, vitamin D, niacin, and red yeast rice daily. He has discontinued these and stays on those described below.       He feels well with no additional problem s and states that he has no little or no symptoms that he can detail, whether this is just ill health in anyway, though he does have occasional hemorrhoidal pain. He also notes rare palpitations and occasional epistaxis when the ambient air has dried sub stantially. No fevers, chills, weight loss, night sweats, or other constitutional symptoms.       As a result of the above, the patient was asked to undergo a series of studies. This went onto include rheumatoid factor of 6, MARYURI screen negative, negative s misael  protein electrophoresis, normal quantitative immunoglobulins, CMV IgG of 2.9, IgM less than .9, EBV IgM of less than .9 and BCA IgG antibody of 4.6. These are consistent with previous exposures. HIV was negative, CRSP less than .1, sedimentation ra t e of 4, iron 125, TIBC 297, 14% saturation, and ferritin of 77. Peripheral blood flow cytometry was negative for evidence of abnormal myeloid maturation, increased blast population or lymphoproliferative disorder. As the patient is seen back today, he i s continuing to feel well though is obviously greatly concerned about his followup counts. Reviews in the office had been planned to follow him briefly recheck performance 3/29/12 with an H&H of 12.9, 36.1, WBC 3900, 32 polys, 62 lymphs, platelet count 94 , 000, IPF slightly elevated at 8.8. The patient was therefore asked to return and to be further assessed with bone marrow aspirate and biopsy. He now presents to do so. He has had no additional symptoms since last seen. He has discontinued his Crestor use.       As a result of his review the patient underwent bone marrow aspiration and biopsy. This revealed evidence of lymphocytic infiltrate reviewed here in the office. 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 Eventually cytogenetics was found to be negative with no evidence of cyclin-D1/IgH or BCL-2/IgH rearrangement. Additional tissue was requested. As a result, the patient was notifi e edward of these findings by personal visit, and plans were made to proceed with endoscopy, ? mantle cell involvement. At the time of this dictation, this was not yet performed. He was also asked to undergo CT of the neck, chest, abdomen and pelvis which show ed no evidence of abnormality. PET scan 04/13/12 showed increased activity within the marrow, but no additional abnormalities including the spleen, with no background  bowel or gastric activity as well.       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 hairy cell leukemia. This has been discussed in great detail with the alexander thakur at [facility] and additional stains are pending as is the patient's GI assessment now to be pursued through Dr. Ray.       His case was again reviewed over quite a period of time, and ultimately his marrow was signed out as 80% involved with hairy cell leukemia - normal cytogenetics, no evidence of cyclin D1/IgH or BCL-2/IgH rearrangement. Dr. Remy was advised of the treatment options which include followup with no immediate therapy, and/or the use of cladribine or pentostatin. After numerous di s cussion, he ultimately elected to try to proceed with treatment when he is feeling as well as he is to improve his ability to withstand the therapy itself. We therefore began to discuss the treatment schedule, which also could be somewhat variable, inclu d ing 7-day infusions, 2-hr infusion q.d. x five days or every other week infusion. After discussion he wishes to proceed with 7-day infusion. When we attempted to do this as an outpatient we found out thereafter that this would not be covered as an outpa tient infusion through his insurance. Therefore we have made plans for him to be admitted after he is seen in office 05/10/12.       The patient was thereafter admitted 05/11-05/17/12. PICC line was placed and he initiated treatment at 0.1 mg/kg or 7.7 mg IV in fusion over seven days. The patient fortunately had no serious issues at all during the seven days and continued to work out on a daily basis. He was seen by his internist as well with some of his meds adjusted including his HCTZ and Ziac. He was stabl e at discharge, but was given Neulasta 6 mg subcutaneously  24 hours after he completed treatment. He has been having counts done on a regular basis, and returns back today with only minimal evidence of neutropenia at his becca, and has an excellent periph eral smear today - normal findings. His performance status remains excellent as well.       The patient when seen on 6/7/12 was felt to have improved substantially. We followed him for weekly counts and plans at that point were for him to take a trip out of the country. He did actually take this trip, which had him eventually hiking at 11-12,000 feet without any complication or ill effect. As he returns back today on 7/19 he feels well and again with an excellent performance status. He has had no fever, c hills or suggestion of infection or complications thus far. He remains again, with an excellent performance status.       The patient thereafter is asked to have his counts done on an every six weeks basis, now seen three months later with a normal CBC as well as examination. He again feels quite well with a completely normal performance status.       The patient was asked to return for followup studies that included normal serum chemistries, unchanged lipid profile and stable hematologic findings. He is now seen on 2/7/13, 4 months from his last appointment. He continues to do well with unchanged performance status.       The patient was asked to have counts done q3 months seeing the physician in six months. He is now seen back 08/22/2013 feeling well having [place]. He had no additional fevers, sweats, chills, significant change in weight although he has tried to lose some additional pounds and we see this today. He continues to exercise regularl y and dietary program. Review of his smears again continues to demonstrate a complete remission.       Today, the patient was asked to be seen back in 6 months for followup, and is now seen 02/05/2014,  continuing to feel well. Again, review of his smears and physical examination fortunately demonstrates no evidence of recurrent disease.       The patient was asked to be seen back in 6 months for followup and is now seen on 07/31/2014. Again he feels well with an excellent performance status and no change since last reviewed.       The patient thereafter was now seen back 2015, 2016 stable at both visits with no evidence of recurrence of his hairy cell leukemia. He feels well but when now reviewed 02/04/2016, he has had some degree of peripheral neuropathy? This improv es with activity and it is certainly not limiting his action or his function.   Patient now reviewed back again September 02, 2016, continues to have an excellent performance status and hematologically remain stable brother had some concern about thrombocytopenia last visit.    The patient is next seen February 02, 2017.  He has not had any medical issues since last seen and remains physically quite well.  He does describe following with urology and undergoing a repeat biopsy recently when his PSA was above 7.  His findings evidently were negative for malignancy though he did have post procedure hematuria.  This is also now abated.  The patient is next seen July 28, 2017.  He continues to have an excellent performance status and no particular difficulty in day-to-day function and/or pursuing active vacations.      The patient is next seen July 26, 2018.  He had recently returned from a trip to [place].  After experiencing an airplane ride with multiple coughing passengers he himself developed a cold, particularly severe over the last 3-4 days with cough and congestion.  He's had low-grade fever which is now beginning to resolve but is clearly uncomfortable.    The patient's next seen January 10, 2019.  He has continued to travel without issues and is feeling well when reviewed today.  Plans were made for usual follow-up and the patient is next seen  July 25, 2019.  His performance status remains excellent though he has lost some weight and indicates he did this per altering his diet.  His stamina and other usual activities are continued unabated.  The patient is next seen January 16, 2020.  He continues an active practice and lifestyle.  He has lost some additional weight on purpose through diet.    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    The patient's follow-up laboratory studies demonstrated by late September degree of leukopenia, unchanged thrombocytopenia and this was followed with repeat analysis within the last several weeks as he seen February 10, 2022. Flow cytometry failed to demonstrate any new process.  As you seen February 10, 2020 his performance status remains excellent, stable weight, appetite, no additional fever, chills, rash, night sweats.    The patient is next evaluated 2/23/2023 with an unchanged performance status for mild thrombocytopenia.  He has had some exposure to viral illnesses through his grandchildren as of late but is otherwise felt fine with a unchanged performance status and work schedule.    He required admission 5/23-27/2023 had return from [place] where he developed nausea and vomiting and difficulty with keeping p.o.  He had further nausea, vomiting and dizziness and was seen in the emergency department and was found to have a sodium 115.  He was seen by nephrology with concern of hyponatremia related to hydrochlorothiazide along with SIDH, treated with fluid restriction and salt tablets and p.o. Lasix.  His medications were adjusted per renal medicine.  At that point he continued to demonstrate chronic pancytopenia that did not accelerate in any particular way.     Subsequent CBC 6/1/2023 include H&H 11.3 and 32.2 with white count of 2840, platelet count 93,000, ANC of  1610.    The patient is next evaluated 8/3/2020 3 repeat CBC includes a white count of 2250, H&H 11.8 and 33.6, platelet count of 88,000-differential of 44% polys, 52% lymphs, 3% monocytes.    Patient contacted about flow cytometry findings of small population potentially suggestive of recurrence versus secondary process.  Plan to reassess in follow-up visits 9/7/2023.  Please note that if treatment is necessary then repeat treatment with cladribine could be given in a variety of of schedules including 0.15 mg/kg/day over 2 hours x 5 days followed by rituximab or 5.6 mg/m² over 2 hours also for 5 days and also followed by rituximab therapy.      The patient was seen back 9/14/2023.Flow cytometry assessed on 2 visits includes a CD10 positive monoclonal lambda B-cell population representing 0.3% of total events 8/3/2023 and repeat 9/7/2023 demonstrates a similar B-cell population also 0.3% total events-phenotype positive CD10 bright, CD19 bright, CD20 bright, CD25, CD45,   slambda.  Discussed symptoms usually due to splenomegaly, fatigue, weakness, weight loss, bruising, recurrent infections, periodic vasculitis.  He is clear that he is not having any of the symptoms and feels that his overall performance status is as good as its ever been.  We have discussed that additional issues include possible organomegaly and cytopenias developing with his follow-up CBC in office today including H&H of 12.2 and 33.4, white count of 3250 and platelet count of 92,000.  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    We had the patient return for periodic blood counts, had him undergo testing for hepatitis and reviewed the various regimens including 5-day cladribine followed by Rituxan therapy.  10/5/2023 scans revealed no substantial abnormalities including lack of splenomegaly  or lymphadenopathy.    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  Additionally the patient's hepatitis B surface antibody was equivocal and he went on to have additional vaccinations   -influenza, COVID-19, RSV and hepatitis B.  Further vascular surgeon was contacted and the patient underwent a right internal jugular Mediport placement 10/18/2023 and teaching concerning cladribine day 1 through 5 and subsequent Rituxan therapy.    After further discussion of available treatment options plans made to offer 5-day cladribine and, review of literature, indicates that Rituxan is associated with further immunosuppression.  Available guidance suggests proceeding with initial cladribine and then 4-6 weekly doses of Rituxan consolidation starting 4 to 6 weeks after initial treatment.    The patient proceeded with cladribine taking therapy daily-days 1-5 through 10/27/2023 and given Neulasta 10/30/2023.  He is next seen 11/2/2023.  Fortunately he has had no untoward effects of treatment as far and continues to work unabated.  His follow-up CBC does not demonstrate significant development of neutropenia anemia or thrombocytopenia.  We discussed assessing him regularly over the next several weeks and anticipate starting Rituxan approximately 3 weeks from now.    The patient's subsequent laboratory studies demonstrated gradual improvement in his white count to normalization, including degree of neutrophilia by 11/16/2023, improvement in his thrombocytopenia as well.  He has not had any additional side effects  including lack of fever, chills, night sweats since he completed therapy.    He is seen back formally 11/20/2023 and we anticipate initiation of his rituximab therapy 11/22/2023.  Again he is done very well and his peripheral blood counts remain quite acceptable without evidence of deterioration.  He has had no fever, chills, night sweats, infectious episodes or change in performance status.  We have agreed that he will start his consolidation rituximab therapy 11/22/2023 and that we would schedule subsequent Thursday treatments x3 also.    The patient is seen 12/7/2023.  He underwent Rituxan therapy 11/22/2023 11/30/2023 without complication and is seen 12/7 H&H up to 12.0 and 34.6, white count of 4000, platelet 122,000 and normal automated differential except for mild lymphopenia.  He states that he will be taking a trip to [place] at the end of the month.      Patient seen back 1/4/2024 with normal peripheral blood smear including H&H of 13.3 and 38.3, white count is 6170 and platelet count of 159,000 with a normal automated differential. We discussed his peripheral smear today that demonstrates normalization and, fortunately, he developed no additional symptoms that would direct us otherwise.  We have agreed, however, that he would maintain his port over the next year until it is clear that he no longer needs this access.      Patient admitted 2/4/2024 status post displaced bilateral patellar fractures following a fall.  We have kept in contact while he is in the hospital and he has proceeded to surgery 2/5/2024 undergoing bilateral open excision of distal pole the patella with patellar tendon advancement and repair.    Interval history:  2/7/2024  T99.3, pulse 73, respirations 16, /73  Patient postop, discussed initial transfusion after P3 staff access his port.  H&H 8.0 and 23.8, white count of 6330, platelet count of 113,000.  Studies included iron of 29, 10% saturation, TIBC 297, ferritin of  92.9.  Acute postop blood loss anemia to be discussed with orthopedic staff-patient should be transfused considering his marrow response will be quite slow after previous chemotherapy in terms of recovery.    2/9/2024  T97.6, pulse 72, respirations 18, /60, SpO2 98%  Patient able to participate in all rehab activities without additional fatigue or weakness developing.  H&H 8.4 and 24.5, white count of 4500, platelet count 1 25,000  Plan to restart prophylaxis with Bactrim and acyclovir,         Past Medical History:   Diagnosis Date    BCC (basal cell carcinoma of skin) 06/22/2022    NOSE    Benign prostate hyperplasia     Elevated cholesterol     H/O Elevated PSA     H/O Hairy cell leukemia (CMS/HCC) 2012    H/O Internal thrombosed hemorrhoids     H/O minimal right carotid plaque     H/O peripheral neuropathy     History of transfusion     Hyperlipidemia     Hypertension     Primary open-angle glaucoma, bilateral, mild stage        ONCOLOGIC HISTORY:  (History from previous dates can be found in the separate document.)    No current facility-administered medications on file prior to encounter.     Current Outpatient Medications on File Prior to Encounter   Medication Sig Dispense Refill    acyclovir (Zovirax) 400 MG tablet Take 1 tablet by mouth 2 (Two) Times a Day. 60 tablet 7    amLODIPine (NORVASC) 2.5 MG tablet Take 1 tablet by mouth Daily. 90 tablet 3    bimatoprost (LUMIGAN) 0.03 % ophthalmic drops Administer 1 drop to both eyes Every Night.      bisoprolol (ZEBeta) 5 MG tablet Take 1 tablet by mouth Daily. 90 tablet 3    Calcium-Magnesium-Vitamin D (CALCIUM 1200+D3 PO) Take 1 tablet by mouth Daily.      chlorhexidine (PERIDEX) 0.12 % solution Rinse with 15 mL by mouth after breakfast and at bedtime for 30 seconds, then spit. No food or drink for 30 minutes after rinse. 473 mL 3    finasteride (PROSCAR) 5 MG tablet Take 1 tablet by mouth Daily. 90 tablet 3    Magnesium 500 MG tablet Take 1 tablet by  mouth 2 (two) times a day.      niacin 500 MG tablet Take 1 tablet by mouth Every Night.      ondansetron (ZOFRAN) 8 MG tablet Take 1 tablet by mouth 3 (Three) Times a Day As Needed for Nausea or Vomiting. 30 tablet 5    propafenone (RYTHMOL) 150 MG tablet Take 1 tablet by mouth 3 (Three) Times a Day. 270 tablet 3    rosuvastatin (Crestor) 10 MG tablet Take 1 tablet by mouth Every Night. 90 tablet 3    tamsulosin (FLOMAX) 0.4 MG capsule 24 hr capsule Take 2 capsules by mouth Daily. 180 capsule 3    vitamin C (ASCORBIC ACID) 250 MG tablet Take 4 tablets by mouth Daily. With madisyn hips      vitamin D3 125 MCG (5000 UT) capsule capsule Take 1 capsule by mouth Daily.      metroNIDAZOLE (METROGEL) 1 % gel Apply 1 application  topically to the appropriate area as directed Daily. 60 g 11       ALLERGIES:   No Known Allergies    Social History     Socioeconomic History    Marital status:      Spouse name: Hanna   Tobacco Use    Smoking status: Former     Packs/day: .5     Types: Cigarettes    Smokeless tobacco: Never    Tobacco comments:     Quit smoking many years ago.   Vaping Use    Vaping Use: Never used   Substance and Sexual Activity    Alcohol use: Yes     Alcohol/week: 7.0 standard drinks of alcohol     Types: 7 Glasses of wine per week     Comment: 1/2 glass of wine or beer per day    Drug use: No    Sexual activity: Defer         Cancer-related family history includes Cancer in his father; Cancer (age of onset: 90) in his mother.      Review of Systems   ROS as per HPI    Vitals:    02/08/24 2011 02/08/24 2032 02/09/24 0500 02/09/24 0817   BP: 118/57 114/53 118/55 131/61   BP Location: Left arm Left arm Left arm    Patient Position: Lying Sitting Lying    Pulse: 73 74 72 73   Resp: 18  18    Temp: 97.6 °F (36.4 °C)  97.6 °F (36.4 °C)    TempSrc: Oral  Oral    SpO2: 98%  98%    Weight:       Height:                 1/4/2024    12:39 PM   Current Status   ECOG score 0       Physical Exam    GENERAL:  Well-developed, well-nourished male in no acute distress.   SKIN: Warm, dry, without new rash or lesion  HEAD: Normocephalic.   EYES: Pupils equal, round and reactive to light, EOMs intact. Conjunctivae normal.   EARS: Hearing intact.   NOSE: Septum midline. No excoriations or nasal discharge.   NECK: Supple with good range of motion; no masses, no JVD or bruits.   LYMPHATICS: No cervical, supraclavicular adenopathy.   CHEST: Lungs clear to percussion and auscultation.   CARDIAC: Regular rate and rhythm without murmurs, rubs or gallops.   ABDOMEN: Soft, nontender, bowel sounds present.  EXTREMITIES: Lower extremity dressings and knee immobilizers in place  NEUROLOGICAL: No focal neurological deficits.       RECENT LABS:  Results from last 7 days   Lab Units 02/09/24  0526 02/08/24  0650 02/07/24  0548 02/06/24  0522 02/04/24  1204   WBC 10*3/mm3 4.50 5.93 6.33  --  5.04   NEUTROS ABS 10*3/mm3 3.35 4.47  --   --  3.91   HEMOGLOBIN g/dL 8.4* 8.9* 8.0*   < > 13.1   HEMATOCRIT % 24.5* 26.3* 23.8*   < > 39.3   PLATELETS 10*3/mm3 125* 115* 113*  --  117*    < > = values in this interval not displayed.       Results from last 7 days   Lab Units 02/08/24  0650 02/07/24  0548 02/06/24  1548 02/04/24  1204   SODIUM mmol/L 135* 136  --  140   POTASSIUM mmol/L 4.0 4.0  --  4.0   CHLORIDE mmol/L 102 103  --  104   CO2 mmol/L 24.9 26.0  --  26.0   BUN mg/dL 14 18  --  24*   CREATININE mg/dL 0.71* 0.65*  --  0.91   CALCIUM mg/dL 7.9* 7.9*  --  8.7   ALBUMIN g/dL  --   --   --  4.1   BILIRUBIN mg/dL  --   --   --  0.6   ALK PHOS U/L  --   --   --  43   ALT (SGPT) U/L  --   --   --  23   AST (SGOT) U/L  --   --   --  19   GLUCOSE mg/dL 111* 123*  --  116*   FERRITIN ng/mL  --   --  92.90  --    IRON mcg/dL  --   --  29*  --    TIBC mcg/dL  --   --  297*  --              Assessment & Plan     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   He was treated 5/11-5/17/ 12 with cladribine being given as continuous infusion at  0.1 mg/kg or 7.7 mg/24-hours x7 days.   He did well during hospitalization with little toxicity and minimal myelosuppression.   He obtained a complete remission.   July 2018, marrow suppression noted dose smear with no evidence of hairy cell leukemia.  Atypical lymphocytes thought to be secondary to viral infection.   He again remained in observation with MD assessment every 6 months  September 2021 degree of leukopenia and thrombocytopenia though flow cytometry fails to demonstrate any new processes.  Follow-up 8/3/2023 with CBC noting WBC 2250, hemoglobin 11.8, hematocrit 33.6%, platelet 88,000.  Differential with 44% polys, 52% lymphs and 3% monocytes.  Flow cytometry findings with a small population potentially just of of early recurrence versus secondary process  9/7/2020 3 repeat flow cytometry includes a CD10 positive monoclonal lambda B-cell population representing 0.3% of total events 8/3/2023 and repeat 9/7/2023 demonstrates a similar B-cell population also 0.3% total events-phenotype positive CD10 bright, CD19 bright, CD20 bright, CD25, CD45,   slambda.   We had the patient return for periodic blood counts, had him undergo testing for hepatitis and reviewed the various regimens including 5-day cladribine followed by Rituxan therapy.  10/5/2023 scans revealed no substantial abnormalities including lack of splenomegaly or lymphadenopathy.  10/12/2023 CT-guided bone marrow biopsy and aspirate found to be variably hypercellular with diffuse involvement by his known hairy cell leukemia involving 89% of marrow cellularity.  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   10/18/2023 Mediport placed by vascular surgery, Dr. Fan  Patient completed all vaccinations prior to initiation of therapy including hepatitis B  Plans for treatment with cladribine 0.15 mg/kg/day over 2 hours x 5  days followed by rituximab or 5.6 mg/m² over 2 hours also for 5 days and also followed by rituximab therapy.  Cladribine initiated 10/23/2023 through 10/27/2023 with Neulasta support 10/30/2023  Excellent tolerance to cladribine  Consolidation therapy with weekly Rituxan initiated 11/22/2023 with plans for 4 weeks of therapy.  Today, 12/14/2023 counts continue to overall recover from previous cladribine with WBC 4.66, hemoglobin 12.8, platelets 125,000.  Proceed with fourth and final dose of weekly Rituxan.  Follow-up peripheral smear 1/4/2024 demonstrates normalization (likely remission) and, fortunately, he has developed no additional symptoms that would direct us otherwise.  We have agreed, however, that he would maintain his port over the next year until it is clear that he no longer needs this access.  Plan to maintain port every 6 weeks, CBC 3 months, MD 6 months    2.  Hyponatremia  Admission 5/23-27/2023 had return from [place] where he developed nausea and vomiting and difficulty with keeping p.o.  He had further nausea, vomiting and dizziness and was seen in the emergency department and was found to have a sodium 115.  He was seen by nephrology with concern of hyponatremia related to hydrochlorothiazide along with SIDH, treated with fluid restriction and salt tablets and p.o. Lasix.  CMP including serum sodium 12/14/2023-139  Assessed 2/7/2024, sodium 136.      3.  Prophylaxis  Discontinue Bactrim, reduce dosing of acyclovir to 400 mg daily, other medications reviewed and continued  Every 6 weeks port flush, 3-month CBC, 6 months MD      4.  Patient admitted 2/4/2024 status post displaced bilateral patellar fractures following a fall.  We have kept in contact while he is in the hospital and he has proceeded to surgery 2/5/2024 undergoing bilateral open excision of distal pole the patella with patellar tendon advancement and repair.  H&H 8.0 and 23.8, white count of 6330, platelet count of 113,000.  Studies  included iron of 29, 10% saturation, TIBC 297, ferritin of 92.9  Supportive transfusion planned.  Plan to follow the patient during his rehab stay with at least assessments Mondays and Thursdays.  Considering his current physical status it is not expected he will be inpatient for prolonged period.    Paco Jeffers MD   02/07/2024

## 2024-02-09 NOTE — PROGRESS NOTES
Inpatient Rehabilitation Plan of Care Note    Plan of Care  Care Plan Reviewed - Updates as Follows    Sphincter Control    [RN] Bladder Management(Active)  Current Status(02/08/2024): Continent 100%  Weekly Goal(02/14/2024): Continent 100%  Discharge Goal: Continent 100%    [RN] Bowel Management(Active)  Current Status(02/08/2024): Continent 100%; pt able to put self on bedpan; per  PT can use CGA with SB to BSC  Weekly Goal(02/14/2024): Continent 100%  Discharge Goal: Continent 100%    Performed Intervention(s)  Urinal and bedpan within reach  Incontinence products in use      Safety    [RN] Potential for Injury(Active)  Current Status(02/08/2024): At risk for falls r/t samantha knee fractures; NWB except  TTWB when transferring  Weekly Goal(02/14/2024): will use call light  Discharge Goal: No falls    Performed Intervention(s)  Wearing samantha knee braces at all times  Falls precautions in place  Hourly rounding      Psychosocial    [RN] Coping/Adjustment(Active)  Current Status(02/08/2024): Pt coping well; motivated to start therapy; has  supportive wife  Weekly Goal(02/14/2024): Verbalizes needs and concerns  Discharge Goal: Demonstrates adequate coping    Performed Intervention(s)  Allow patient to verbalize concerns regarding health  Provide calm enviroment      Pain    [RN] Pain Management(Active)  Current Status(02/08/2024): Pain to samantha knees; PRN Tramadol and Tylenol  available  Weekly Goal(02/14/2024): will be able to tolerate therapies  Discharge Goal: pain controlled    Performed Intervention(s)  Offer pain medication when available  encourage repositioning q2    Signed by: Maura Oro RN

## 2024-02-09 NOTE — THERAPY TREATMENT NOTE
Inpatient Rehabilitation - Physical Therapy Treatment Note       [facility]     Patient Name: Angel Remy  : 1945  MRN: [id number]    Today's Date: 2024                    Admit Date: 2024      Visit Dx:   No diagnosis found.    Patient Active Problem List   Diagnosis    Hairy cell leukemia    FH: colon cancer    Hyponatremia    Orthostatic hypotension    Supine hypertension    Anemia    Macrocytosis    LBBB (left bundle branch block)    Prolonged P-R interval    Pancytopenia    Fitting and adjustment of vascular catheter    Elevated blood pressure reading    Thrombocytopenia    History of left bundle branch block (LBBB)    PAF (paroxysmal atrial fibrillation) history    Closed displaced transverse fracture of left patella    Closed displaced transverse fracture of right patella    Patella fracture       Past Medical History:   Diagnosis Date    BCC (basal cell carcinoma of skin) 2022    NOSE    Benign prostate hyperplasia     Elevated cholesterol     H/O Elevated PSA     H/O Hairy cell leukemia (CMS/HCC)     H/O Internal thrombosed hemorrhoids     H/O minimal right carotid plaque     H/O peripheral neuropathy     History of transfusion     Hyperlipidemia     Hypertension     Primary open-angle glaucoma, bilateral, mild stage        Past Surgical History:   Procedure Laterality Date    BONE MARROW BIOPSY W/ ASPIRATION      COLONOSCOPY      COLONOSCOPY N/A 2021    Procedure: COLONOSCOPY TO CECUM AND INTO TI WITH COLD BIOPSY POLYPECTOMIES;  Surgeon: Juan Jenkins MD;  Location: [facility] ENDOSCOPY;  Service: Gastroenterology;  Laterality: N/A;  pre: family history of colon cancer  post: polyps, diverticulosis  and internal hemorrhoids    EXCISION MASS HEAD/NECK N/A 2022    Procedure: EXCISION BASAL CELL CARCINOMA NOSE WITH FROZEN SECTION FULL THICKNESS GRAFT;  Surgeon: Arturo Weiss MD;  Location: [facility] OR [facility];  Service: Plastics;  Laterality: N/A;    HERNIA REPAIR  Right 02/2000    Inguinal     OTHER SURGICAL HISTORY  1989    Median nerve foreign body excision    PATELLA OPEN REDUCTION INTERNAL FIXATION Bilateral 2/5/2024    Procedure: PATELLA OPEN REDUCTION INTERNAL FIXATION;  Surgeon: Adrián Roberson MD;  Location: [facility];  Service: Orthopedics;  Laterality: Bilateral;    PROSTATE BIOPSY      March 2010 and October 2011 whan PSA had acceleration    SIGMOIDOSCOPY  1999    With small thrombosed internal hemorrhoid.    TONSILLECTOMY      VENOUS ACCESS DEVICE (PORT) INSERTION Right 10/18/2023    Procedure: MEDIPORT PLACEMENTWITH SUPERIOR VENACAVAGRAM;  Surgeon: Randy Fan MD;  Location: [facility];  Service: Vascular;  Laterality: Right;    WISDOM TOOTH EXTRACTION  1961       PT ASSESSMENT (last 12 hours)       IRF PT Evaluation and Treatment       Row Name 02/09/24 1007          PT Time and Intention    Document Type daily treatment  -MD     Mode of Treatment physical therapy  -MD     Patient/Family/Caregiver Comments/Observations Pt supine in bed showing no signs of acute distress.  -MD       Row Name 02/09/24 1007          General Information    Existing Precautions/Restrictions fall  -MD       Row Name 02/09/24 1007          Pain Assessment    Pretreatment Pain Rating 2/10  -MD     Pain Location - Side/Orientation Right  -MD     Pain Location - knee  -MD       Row Name 02/09/24 1007          Pain Scale: Word Pre/Post-Treatment    Pain Intervention(s) Repositioned;Ambulation/increased activity  -MD       Row Name 02/09/24 1007          Cognition/Psychosocial    Orientation Status (Cognition) oriented x 4  -MD     Follows Commands (Cognition) WFL  -MD     Personal Safety Interventions fall prevention program maintained;gait belt  -MD       Row Name 02/09/24 1007          Mobility    Left Lower Extremity (Weight-bearing Status) non weight-bearing (NWB)  -MD     Right Lower Extremity (Weight-bearing Status) non weight-bearing (NWB)  -MD       Row Name  "02/09/24 1007          Bed Mobility    Supine-Sit [place] (Bed Mobility) standby assist;verbal cues  -MD     Sit-Supine [place] (Bed Mobility) verbal cues;standby assist  -MD     Bed Mobility, Safety Issues decreased use of legs for bridging/pushing  -MD     Assistive Device (Bed Mobility) leg ;head of bed elevated  -MD       Row Name 02/09/24 1007          Bed-Chair Transfer    Bed-Chair [place] (Transfers) verbal cues;standby assist;contact guard  -MD     Assistive Device (Bed-Chair Transfers) transfer board;wheelchair  -MD     Comment, (Bed-Chair Transfer) mat height elevated to 24\" to practice for home bed height  -MD       Row Name 02/09/24 1007          Chair-Bed Transfer    Chair-Bed [place] (Transfers) verbal cues;standby assist;contact guard  -MD     Assistive Device (Chair-Bed Transfers) transfer board;wheelchair  -MD     Comment, (Chair-Bed Transfer) VC to weight shift forward when transferring to prevent sliding forward on SB  -MD       Row Name 02/09/24 1007          Sit-Stand Transfer    Sit-Stand [place] (Transfers) unable to assess  -MD     Comment, (Sit-Stand Transfer) NA due to B TTWB  -MD       Row Name 02/09/24 1007          Toilet Transfer    Type (Toilet Transfer) lateral  -MD     Ziebach Level (Toilet Transfer) standby assist;contact guard  -MD     Assistive Device (Toilet Transfer) transfer board;commode, bedside without drop arms  -MD       Row Name 02/09/24 1007          Gait/Stairs (Locomotion)    [place]iebach Level (Gait) unable to assess  -MD       Row Name 02/09/24 1007          Wheelchair Mobility/Management    Method of Wheelchair Locomotion (Mobility) bimanual (upper extremity) propulsion  -MD     Mobility Activities (Wheelchair) forward propulsion;turning  -MD     Forward Propulsion Ziebach (Wheelchair) standby assist  -MD     Turning [place]iebach (Wheelchair) standby assist  -MD     Distance Propelled in Feet (Wheelchair) --  200'  -MD       " Row Name 02/09/24 1007          Motor Skills    Therapeutic Exercise other (see comments)  WC push ups 3 sets of 5 reps  -MD       Row Name 02/09/24 1007          Hip (Therapeutic Exercise)    Hip Isometrics (Therapeutic Exercise) bilateral;10 repetitions;gluteal sets  -MD     Hip Strengthening (Therapeutic Exercise) bilateral;external rotation;internal rotation;aBduction;aDduction;10 repetitions  -MD       Row Name 02/09/24 1007          Ankle (Therapeutic Exercise)    Ankle Strengthening (Therapeutic Exercise) bilateral;dorsiflexion;plantarflexion;10 repetitions  -MD       Row Name 02/09/24 1007          Positioning and Restraints    Pre-Treatment Position in bed  -MD     Post Treatment Position wheelchair  -MD     In Bed sitting;call light within reach;encouraged to call for assist;exit alarm on  -MD               User Key  (r) = Recorded By, (t) = Taken By, (c) = Cosigned By      Initials Name Provider Type    Berna Rico, PT Physical Therapist                  Wound 02/05/24 1433 Left anterior knee Incision (Active)   Dressing Appearance intact;dry 02/08/24 2028   Closure Unable to assess 02/09/24 0730   Base dressing in place, unable to visualize 02/08/24 2028   Drainage Amount none 02/08/24 2028   Dressing Care dressing reinforced;elastic bandage 02/08/24 2028       Wound 02/05/24 1539 Right anterior knee Incision (Active)   Dressing Appearance dry;intact 02/08/24 2028   Closure Unable to assess 02/09/24 0730   Base dressing in place, unable to visualize 02/08/24 2028   Drainage Amount none 02/08/24 2028   Dressing Care dressing reinforced;elastic bandage 02/08/24 2028       Wound 02/07/24 2244 coccyx Other (comment) (Active)   Dressing Appearance open to air 02/08/24 2028   Closure None 02/08/24 2028   Base blanchable;clean;moist;red 02/08/24 2028   Drainage Amount none 02/09/24 0730   Care, Wound cleansed with;soap and water 02/08/24 2028   Dressing Care dressing changed;foam 02/08/24 2028     Physical  Therapy Education       Title: PT OT SLP Therapies (In Progress)       Topic: Physical Therapy (In Progress)       Point: Mobility training (Not Started)       Learner Progress:  Not documented in this visit.              Point: Home exercise program (Not Started)       Learner Progress:  Not documented in this visit.              Point: Body mechanics (Not Started)       Learner Progress:  Not documented in this visit.              Point: Precautions (Done)       Learning Progress Summary             Patient Acceptance, E, VU by MD at 2/9/2024 1052    Acceptance, E, VU by MD at 2/8/2024 1226                                         User Key       Initials Effective Dates Name Provider Type Discipline    MD 06/16/21 -  Berna Rivera PT Physical Therapist PT                    PT Recommendation and Plan    Planned Therapy Interventions (PT): home exercise program, patient/family education, transfer training, bed mobility training  Frequency of Treatment (PT): 5 times per week, 90 minutes per session                     Time Calculation:      PT Charges       Row Name 02/09/24 1454 02/09/24 1007          Time Calculation    Start Time 1330  -MD 0930  -MD     Stop Time 1400  -MD 1030  -MD     Time Calculation (min) 30 min  -MD 60 min  -MD     PT Received On -- 02/09/24  -MD     PT - Next Appointment -- 02/10/24  -MD               User Key  (r) = Recorded By, (t) = Taken By, (c) = Cosigned By      Initials Name Provider Type    Berna Rico PT Physical Therapist                    Therapy Charges for Today       Code Description Service Date Service Provider Modifiers Qty    11435201816 HC PT EVAL LOW COMPLEXITY 3 2/8/2024 Berna Rivera, PT GP 1    30406769036 HC PT THER PROC EA 15 MIN 2/8/2024 Berna Rivera PT GP 3    13729125351 HC PT THERAPEUTIC ACT EA 15 MIN 2/8/2024 Berna Rivera PT GP 2    84053703047 HC PT THERAPEUTIC ACT EA 15 MIN 2/9/2024 Berna Rivera, PT GP 4    41160026070 HC PT THER PROC EA 15 MIN 2/9/2024 Berna Rivera  PT GP 2              PT G-Codes  AM-PAC 6 Clicks Score (PT): 11      Berna Rivera, PT  2/9/2024

## 2024-02-09 NOTE — PLAN OF CARE
Goal Outcome Evaluation:  Plan of Care Reviewed With: patient           Outcome Evaluation: Alert and oriented x4, assist x1 with sliding board in therapy today, and medication with water. He is continent of b/b, chest port removed today, and pain medication x2. He is pleasant, cooperative, and very appreciative.

## 2024-02-09 NOTE — PROGRESS NOTES
"Section B. Hearing, Speech, Vision: Expression of Ideas and Wants: Expresses  complex messages without difficulty and with speech that is clear and easy to  understand.  Understanding Verbal and Non-Verbal Content: Understands: Clear comprehension  without cues or repetitions.    Section C. Cognitive Patterns: Brief Interview for Mental Status (BIMS) was  conducted.  Repetition of Three Words: Three words  Able to report correct year: Correct  Able to report correct month: Accurate within 5 days  Able to report correct day of the week: Correct  Able to recall \"sock\": Yes, no cue required  Able to recall \"blue\": Yes, no cue required  Able to recall \"bed\": Yes, no cue required    BIMS SUMMARY SCORE: 15 Cognitively intact Patient was able to complete the Brief  Interview for Mental Status    Section C. Signs and Symptoms of Delirium (from CAM): Evidence of Acute Change  in Mental Status:   No  Inattention: Behavior not present  Thinking Disorganized or Incoherent:   Behavior not present  Altered Level of Consciousness:   Behavior not present    Signed by: Lainey Trent OT    "

## 2024-02-09 NOTE — PROGRESS NOTES
Inpatient Rehabilitation Plan of Care Note    Plan of Care  Care Plan Reviewed - No updates at this time.    Sphincter Control    Performed Intervention(s)  Urinal and bedpan within reach  SB to BSC      Safety    Performed Intervention(s)  Wearing samantha knee braces at all times  Falls precautions in place  Hourly rounding      Psychosocial    Performed Intervention(s)  Allow patient to verbalize concerns regarding health  Provide calm enviroment      Pain    Performed Intervention(s)  Offer pain medication when available  encourage repositioning q2    Signed by: Quiana Agustin RN

## 2024-02-09 NOTE — PROGRESS NOTES
LOS: 2 days   Patient Care Team:  Damián Vuong Jr., MD as PCP - General (Internal Medicine)  Provider, No Known as PCP - Family Medicine  Paco Jeffers MD as Consulting Physician (Hematology and Oncology)  Angel Remy MD as Referring Physician (Dermatology)      ANGEL REMY  1945      ADMITTING DIAGNOSIS:    Bilateral patella fractures - tendon advancement repair Feb 5, 2024  Non-weightbearing except may do toe-touch during transfers.  Knee immobilizers at all times. No quad sets/straight leg raise/knee flexion.  Impaired mobility/ impaired self care       Subjective     Denies significant pain complaints.  Maintaining weightbearing restrictions on the lower extremities.  Not doing any flexion of the knees and continues with the knee immobilizers.  Tolerating therapies.  Making progress with toilet transfer      Objective     Vitals:    02/09/24 0817   BP: 131/61   Pulse: 73   Resp:    Temp:    SpO2:        Intake/Output Summary (Last 24 hours) at 2/9/2024 1020  Last data filed at 2/9/2024 0817  Gross per 24 hour   Intake 240 ml   Output 1300 ml   Net -1060 ml     PHYSICAL EXAM:     MENTAL STATUS -  AWAKE / ALERT  HEENT-    LUNGS - CTA, NO WHEEZES, RALES OR RHONCHI  HEART- RRR, NO RUB, MURMUR, OR GALLOP  ABD - NORMOACTIVE BOWEL SOUNDS, SOFT, NT.   EXT - NO EDEMA OR CYANOSIS  Bilateral lower extremities - ACE wraps and knee immobilizers  NEURO - Ox4.    MOTOR EXAM -  . B ADF/EV 5/5              MEDICATIONS  Scheduled Meds:acyclovir, 400 mg, Oral, BID  amLODIPine, 2.5 mg, Oral, Daily  apixaban, 2.5 mg, Oral, Q12H  vitamin C, 1,000 mg, Oral, Daily  bisoprolol, 5 mg, Oral, Daily  cholecalciferol, 5,000 Units, Oral, Daily  docusate sodium, 100 mg, Oral, BID  ferrous sulfate, 325 mg, Oral, Daily With Breakfast  finasteride, 5 mg, Oral, Daily  latanoprost, 1 drop, Both Eyes, Nightly  magnesium oxide, 400 mg, Oral, Daily  polyethylene glycol, 17 g, Oral, Daily  propafenone, 150 mg, Oral,  "TID  rosuvastatin, 10 mg, Oral, Nightly  senna-docusate sodium, 2 tablet, Oral, BID  sulfamethoxazole-trimethoprim, 1 tablet, Oral, Once per day on Monday Wednesday Friday  tamsulosin, 0.4 mg, Oral, BID      Continuous Infusions:   PRN Meds:.  acetaminophen **OR** acetaminophen **OR** acetaminophen    bisacodyl    heparin    ondansetron ODT **OR** ondansetron    traMADol      RESULTS  No results found for: \"POCGLU\"  Results from last 7 days   Lab Units 02/09/24  0526 02/08/24  0650 02/07/24  0548   WBC 10*3/mm3 4.50 5.93 6.33   HEMOGLOBIN g/dL 8.4* 8.9* 8.0*   HEMATOCRIT % 24.5* 26.3* 23.8*   PLATELETS 10*3/mm3 125* 115* 113*     Results from last 7 days   Lab Units 02/08/24  0650 02/07/24  0548 02/04/24  1204   SODIUM mmol/L 135* 136 140   POTASSIUM mmol/L 4.0 4.0 4.0   CHLORIDE mmol/L 102 103 104   CO2 mmol/L 24.9 26.0 26.0   BUN mg/dL 14 18 24*   CREATININE mg/dL 0.71* 0.65* 0.91   CALCIUM mg/dL 7.9* 7.9* 8.7   BILIRUBIN mg/dL  --   --  0.6   ALK PHOS U/L  --   --  43   ALT (SGPT) U/L  --   --  23   AST (SGOT) U/L  --   --  19   GLUCOSE mg/dL 111* 123* 116*           ASSESSMENT and PLAN    Patella fracture    Bilateral patella fractures - tendon advancement repair Feb 5, 2024  Non-weightbearing except may do toe-touch during transfers.  Knee immobilizers at all times. No quad sets/straight leg raise/knee flexion.    Per orthopedic service-February 9-no (Zero) flexion of the knees during any dressing change, legs must remain straight given the concern for tendon repairs. If needed, can remove ACE wraps to inspect legs and replace dressing with an Optifoarm, but that should then remain in place until they see him in the office. They want to limit dressing changes given concern for the tendon repairs. Needs to remain in knee immobilizers at all times.   - may inspect with nursing later today with ACE wraps off to make sure no significant drainage through dressings, but if dressing are dry would leave in place as " sterile from the OR.     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 JANNET report reviewed.      Now admit for comprehensive acute inpatient rehabilitation .  This would be an interdisciplinary program with physical therapy 1.5 hour,  occupational therapy 1.5 hour,  5 days a week.  Rehabilitation nursing for carryover, monitoring of  incisions, anemia  status, bowel and bladder, and skin  Ongoing physician follow-up.  Weekly team conferences.  Goals are to achieve a level of modificed independent with  transfers for mobility and self-care   .   Rehabilitation prognosis good.  Medical prognosis fair- defer to Orthopedics.  Estimated length of stay is approximately one week , but is only an estimation.   The patient's functional status and clinical status is unchanged from preadmission assessment and the patient continues appropriate for acute inpatient rehabilitation.  Goal is for home with  home health   therapies.  Barrier to discharge:  impaired mobility and self care  - work on  transfers and ADLS with adaptive equipment and techniques  to overcome.           Alan Hamlin MD      Appropriate PPE was worn during the entire visit.  Hand hygiene was completed before and after.

## 2024-02-10 LAB
BASOPHILS # BLD AUTO: 0.01 10*3/MM3 (ref 0–0.2)
BASOPHILS NFR BLD AUTO: 0.2 % (ref 0–1.5)
DEPRECATED RDW RBC AUTO: 46.1 FL (ref 37–54)
EOSINOPHIL # BLD AUTO: 0.04 10*3/MM3 (ref 0–0.4)
EOSINOPHIL NFR BLD AUTO: 0.8 % (ref 0.3–6.2)
ERYTHROCYTE [DISTWIDTH] IN BLOOD BY AUTOMATED COUNT: 13.7 % (ref 12.3–15.4)
HCT VFR BLD AUTO: 25.6 % (ref 37.5–51)
HGB BLD-MCNC: 8.5 G/DL (ref 13–17.7)
IMM GRANULOCYTES # BLD AUTO: 0.04 10*3/MM3 (ref 0–0.05)
IMM GRANULOCYTES NFR BLD AUTO: 0.8 % (ref 0–0.5)
LYMPHOCYTES # BLD AUTO: 0.49 10*3/MM3 (ref 0.7–3.1)
LYMPHOCYTES NFR BLD AUTO: 10.3 % (ref 19.6–45.3)
MCH RBC QN AUTO: 30.9 PG (ref 26.6–33)
MCHC RBC AUTO-ENTMCNC: 33.2 G/DL (ref 31.5–35.7)
MCV RBC AUTO: 93.1 FL (ref 79–97)
MONOCYTES # BLD AUTO: 0.7 10*3/MM3 (ref 0.1–0.9)
MONOCYTES NFR BLD AUTO: 14.6 % (ref 5–12)
NEUTROPHILS NFR BLD AUTO: 3.5 10*3/MM3 (ref 1.7–7)
NEUTROPHILS NFR BLD AUTO: 73.3 % (ref 42.7–76)
NRBC BLD AUTO-RTO: 0 /100 WBC (ref 0–0.2)
PLATELET # BLD AUTO: 174 10*3/MM3 (ref 140–450)
PMV BLD AUTO: 10.2 FL (ref 6–12)
RBC # BLD AUTO: 2.75 10*6/MM3 (ref 4.14–5.8)
WBC NRBC COR # BLD AUTO: 4.78 10*3/MM3 (ref 3.4–10.8)

## 2024-02-10 PROCEDURE — 97110 THERAPEUTIC EXERCISES: CPT

## 2024-02-10 PROCEDURE — 97530 THERAPEUTIC ACTIVITIES: CPT

## 2024-02-10 PROCEDURE — 97535 SELF CARE MNGMENT TRAINING: CPT

## 2024-02-10 PROCEDURE — 85025 COMPLETE CBC W/AUTO DIFF WBC: CPT | Performed by: INTERNAL MEDICINE

## 2024-02-10 RX ADMIN — PROPAFENONE HYDROCHLORIDE 150 MG: 150 TABLET, COATED ORAL at 16:26

## 2024-02-10 RX ADMIN — FERROUS SULFATE TAB 325 MG (65 MG ELEMENTAL FE) 325 MG: 325 (65 FE) TAB at 10:02

## 2024-02-10 RX ADMIN — BISOPROLOL FUMARATE 5 MG: 5 TABLET, FILM COATED ORAL at 10:01

## 2024-02-10 RX ADMIN — PROPAFENONE HYDROCHLORIDE 150 MG: 150 TABLET, COATED ORAL at 10:00

## 2024-02-10 RX ADMIN — DOCUSATE SODIUM 100 MG: 100 CAPSULE, LIQUID FILLED ORAL at 21:13

## 2024-02-10 RX ADMIN — Medication 5000 UNITS: at 10:02

## 2024-02-10 RX ADMIN — DOCUSATE SODIUM 100 MG: 100 CAPSULE, LIQUID FILLED ORAL at 09:59

## 2024-02-10 RX ADMIN — APIXABAN 2.5 MG: 2.5 TABLET, FILM COATED ORAL at 21:14

## 2024-02-10 RX ADMIN — ACYCLOVIR 400 MG: 400 TABLET ORAL at 22:42

## 2024-02-10 RX ADMIN — AMLODIPINE BESYLATE 2.5 MG: 2.5 TABLET ORAL at 21:14

## 2024-02-10 RX ADMIN — LATANOPROST 1 DROP: 50 SOLUTION/ DROPS OPHTHALMIC at 21:14

## 2024-02-10 RX ADMIN — TAMSULOSIN HYDROCHLORIDE 0.4 MG: 0.4 CAPSULE ORAL at 10:00

## 2024-02-10 RX ADMIN — ROSUVASTATIN CALCIUM 10 MG: 10 TABLET, FILM COATED ORAL at 21:14

## 2024-02-10 RX ADMIN — TRAMADOL HYDROCHLORIDE 50 MG: 50 TABLET ORAL at 21:59

## 2024-02-10 RX ADMIN — MAGNESIUM OXIDE 400 MG (241.3 MG MAGNESIUM) TABLET 400 MG: TABLET at 10:01

## 2024-02-10 RX ADMIN — FINASTERIDE 5 MG: 5 TABLET, FILM COATED ORAL at 21:14

## 2024-02-10 RX ADMIN — TAMSULOSIN HYDROCHLORIDE 0.4 MG: 0.4 CAPSULE ORAL at 22:42

## 2024-02-10 RX ADMIN — APIXABAN 2.5 MG: 2.5 TABLET, FILM COATED ORAL at 10:00

## 2024-02-10 RX ADMIN — ACYCLOVIR 400 MG: 400 TABLET ORAL at 10:01

## 2024-02-10 RX ADMIN — PROPAFENONE HYDROCHLORIDE 150 MG: 150 TABLET, COATED ORAL at 21:14

## 2024-02-10 RX ADMIN — DOCUSATE SODIUM 50MG AND SENNOSIDES 8.6MG 2 TABLET: 8.6; 5 TABLET, FILM COATED ORAL at 22:32

## 2024-02-10 RX ADMIN — OXYCODONE HYDROCHLORIDE AND ACETAMINOPHEN 1000 MG: 500 TABLET ORAL at 10:00

## 2024-02-10 NOTE — PROGRESS NOTES
Inpatient Rehabilitation Plan of Care Note    Plan of Care  Care Plan Reviewed - No updates at this time.    Sphincter Control    Performed Intervention(s)  Urinal and bedpan within reach  Incontinence products in use  SB to BSC      Safety    Performed Intervention(s)  Wearing samantha knee braces at all times  Falls precautions in place  Hourly rounding      Psychosocial    Performed Intervention(s)  Allow patient to verbalize concerns regarding health  Provide calm enviroment      Pain    Performed Intervention(s)  Offer pain medication when available  encourage repositioning q2    Signed by: Sandy Elliott RN

## 2024-02-10 NOTE — THERAPY TREATMENT NOTE
Inpatient Rehabilitation - Occupational Therapy Treatment Note    [facility]     Patient Name: Angel Remy  : 1945  MRN: [id number]    Today's Date: 2/10/2024                 Admit Date: 2024       No diagnosis found.    Patient Active Problem List   Diagnosis    Hairy cell leukemia    FH: colon cancer    Hyponatremia    Orthostatic hypotension    Supine hypertension    Anemia    Macrocytosis    LBBB (left bundle branch block)    Prolonged P-R interval    Pancytopenia    Fitting and adjustment of vascular catheter    Elevated blood pressure reading    Thrombocytopenia    History of left bundle branch block (LBBB)    PAF (paroxysmal atrial fibrillation) history    Closed displaced transverse fracture of left patella    Closed displaced transverse fracture of right patella    Patella fracture       Past Medical History:   Diagnosis Date    BCC (basal cell carcinoma of skin) 2022    NOSE    Benign prostate hyperplasia     Elevated cholesterol     H/O Elevated PSA     H/O Hairy cell leukemia (CMS/HCC)     H/O Internal thrombosed hemorrhoids     H/O minimal right carotid plaque     H/O peripheral neuropathy     History of transfusion     Hyperlipidemia     Hypertension     Primary open-angle glaucoma, bilateral, mild stage        Past Surgical History:   Procedure Laterality Date    BONE MARROW BIOPSY W/ ASPIRATION      COLONOSCOPY      COLONOSCOPY N/A 2021    Procedure: COLONOSCOPY TO CECUM AND INTO TI WITH COLD BIOPSY POLYPECTOMIES;  Surgeon: Juan Jenkins MD;  Location: [facility] ENDOSCOPY;  Service: Gastroenterology;  Laterality: N/A;  pre: family history of colon cancer  post: polyps, diverticulosis  and internal hemorrhoids    EXCISION MASS HEAD/NECK N/A 2022    Procedure: EXCISION BASAL CELL CARCINOMA NOSE WITH FROZEN SECTION FULL THICKNESS GRAFT;  Surgeon: Arturo Weiss MD;  [place] [facility] [place] [facility] [place];  Service: Plastics;  Laterality: N/A;    HERNIA REPAIR Right 2000     Inguinal     OTHER SURGICAL HISTORY  1989    Median nerve foreign body excision    PATELLA OPEN REDUCTION INTERNAL FIXATION Bilateral 2/5/2024    Procedure: PATELLA OPEN REDUCTION INTERNAL FIXATION;  Surgeon: Adrián Roberson MD;  Location: [facility];  Service: Orthopedics;  Laterality: Bilateral;    PROSTATE BIOPSY      March 2010 and October 2011 whan PSA had acceleration    SIGMOIDOSCOPY  1999    With small thrombosed internal hemorrhoid.    TONSILLECTOMY      VENOUS ACCESS DEVICE (PORT) INSERTION Right 10/18/2023    Procedure: MEDIPORT PLACEMENTWITH SUPERIOR VENACAVAGRAM;  Surgeon: Randy Fan MD;  Location: [facility];  Service: Vascular;  Laterality: Right;    WISDOM TOOTH EXTRACTION  1961             IRF OT ASSESSMENT FLOWSHEET (last 12 hours)       IRF OT Evaluation and Treatment       Row Name 02/10/24 1139 02/10/24 1005       OT Time and Intention    Document Type daily treatment  -MW daily treatment  -MW    Mode of Treatment individual therapy;occupational therapy  -MW individual therapy;occupational therapy  -MW    Patient Effort good  -MW good  -MW    Symptoms Noted During/After Treatment none  -MW none  -MW      Row Name 02/10/24 1139 02/10/24 1005       General Information    Patient Profile Reviewed -- yes  -MW    Patient/Family/Caregiver Comments/Observations AM 2nd session, up in w/c NAD  -MW AM first session-supine in bed  -MW    Existing Precautions/Restrictions -- fall;weight bearing  -MW      Row Name 02/10/24 1139 02/10/24 1005       Pain Assessment    Pretreatment Pain Rating 0/10 - no pain  -MW 0/10 - no pain  -MW    Posttreatment Pain Rating 0/10 - no pain  -MW 0/10 - no pain  -MW      Row Name 02/10/24 1139 02/10/24 1005       Cognition/Psychosocial    Orientation Status (Cognition) oriented x 4  -MW oriented x 4  -MW    Follows Commands (Cognition) WFL  -MW WFL  -MW    Personal Safety Interventions fall prevention program maintained;gait belt;muscle strengthening  facilitated  - gait belt;fall prevention program maintained  -[facility] Name 02/10/24 1005          Bathing    [place] Level (Bathing) bathing skills;upper body;set up;standby assist;lower body;maximum assist (25% patient effort)  -     Position (Bathing) sitting up in bed  -     Set-up Assistance (Bathing) obtain supplies  -     Comment (Bathing) LB supine  -[facility] Name 02/10/24 1005          Upper Body Dressing    [place] Level (Upper Body Dressing) upper body dressing skills;doff;don;pull over garment;set up assistance;supervision  -     Position (Upper Body Dressing) sitting up in bed  -     Set-up Assistance (Upper Body Dressing) obtain clothing  -       Row Name 02/10/24 1005          Lower Body Dressing    [place] Level (Lower Body Dressing) don;doff;socks;underwear;set up;maximum assist (25% patient effort);dependent (less than 25% patient effort)  -     Position (Lower Body Dressing) sitting up in bed  -     Set-up Assistance (Lower Body Dressing) obtain clothing  -     Comment (Lower Body Dressing) able to weight shift sitting up in bed, assist with pulling up over hips  -[facility] Name 02/10/24 1005          Grooming    [place] Level (Grooming) grooming skills;deodorant application;hair care, combing/brushing;oral care regimen;shave face;wash face, hands;set up;standby assist  -     Position (Grooming) supported sitting  -     Set-up Assistance (Grooming) obtain supplies  -[facility] Name 02/10/24 1005          Toileting    Comment (Toileting) urinal use prior to entry, lyssa'edward x2 trials of BSC transfer however declined need to void at time of session  -[facility] Name 02/10/24 1005          Bed-Chair Transfer    Bed-Chair [place] (Transfers) set up;standby assist;verbal cues  -     Assistive Device (Bed-Chair Transfers) wheelchair;transfer board  -     Comment, (Bed-Chair Transfer) descending transfer  -[facility] Name 02/10/24 1005           Chair-Bed Transfer    Chair-Bed [place] (Transfers) verbal cues;standby assist;contact guard  -     Assistive Device (Chair-Bed Transfers) transfer board;wheelchair  -       Row Name 02/10/24 1005          Sit-Stand Transfer    Sit-Stand [place] (Transfers) unable to assess  -       Row Name 02/10/24 1005          Toilet Transfer    Type (Toilet Transfer) lateral  -MW     [place] Level (Toilet Transfer) standby assist;contact guard  -     Assistive Device (Toilet Transfer) transfer board;commode, bedside without drop arms  -MW     Comment, (Toilet Transfer) completed x2 this date bed <>BSC <>bed <>BSC <> bed  rest breaks required  -       Row Name 02/10/24 1139          Shoulder (Therapeutic Exercise)    Shoulder (Therapeutic Exercise) strengthening exercise  -     Shoulder Strengthening (Therapeutic Exercise) bilateral;aBduction;aDduction;sitting;4 lb free weight  ricksaw 4x10 reps 55#  -       Row Name 02/10/24 1139          Elbow/Forearm (Therapeutic Exercise)    Elbow/Forearm (Therapeutic Exercise) strengthening exercise  -     Elbow/Forearm Strengthening (Therapeutic Exercise) flexion;extension;bilateral;4 lb free weight;sitting  -       Row Name 02/10/24 1139          Wrist (Therapeutic Exercise)    Wrist (Therapeutic Exercise) strengthening exercise  -     Wrist Strengthening (Therapeutic Exercise) bilateral;flexion;extension  -       Row Name 02/10/24 1139 02/10/24 1005       Balance    Static Sitting Balance independent  -MW independent  -MW    Dynamic Sitting Balance standby assist  -MW standby assist  -MW    Position, Sitting Balance -- unsupported;supported  -    Balance Interventions -- sitting  -      Row Name 02/10/24 1139 02/10/24 1005       Positioning and Restraints    Pre-Treatment Position sitting in chair/recliner  -MW in bed  -MW    Post Treatment Position wheelchair  -MW wheelchair  -MW    In Wheelchair notified nsg;sitting;call light within  reach;encouraged to call for assist;exit alarm on;with nsg  -MW sitting;notified nsg;call light within reach;encouraged to call for assist;exit alarm on  -MW              User Key  (r) = Recorded By, (t) = Taken By, (c) = Cosigned By      Initials Name Effective Dates     Samina KhanLISETTE 08/20/21 -                      Occupational Therapy Education       Title: PT OT SLP Therapies (In Progress)       Topic: Occupational Therapy (In Progress)       Point: ADL training (Done)       Description:   Instruct learner(s) on proper safety adaptation and remediation techniques during self care or transfers.   Instruct in proper use of assistive devices.                  Learning Progress Summary             Patient Acceptance, E,TB,D, VU,DU by  at 2/8/2024 1200    Comment: precautions, brace wearing, ADL goals/POC, clothing choices                         Point: Home exercise program (Not Started)       Description:   Instruct learner(s) on appropriate technique for monitoring, assisting and/or progressing therapeutic exercises/activities.                  Learner Progress:  Not documented in this visit.              Point: Precautions (Done)       Description:   Instruct learner(s) on prescribed precautions during self-care and functional transfers.                  Learning Progress Summary             Patient Acceptance, E,TB,D, VU,DU by  at 2/8/2024 1200    Comment: precautions, brace wearing, ADL goals/POC, clothing choices                         Point: Body mechanics (Not Started)       Description:   Instruct learner(s) on proper positioning and spine alignment during self-care, functional mobility activities and/or exercises.                  Learner Progress:  Not documented in this visit.                              User Key       Initials Effective Dates Name Provider Type Discipline     04/02/20 -  Lainey Trent OT Occupational Therapist OT                        OT Recommendation and  Plan                         Time Calculation:      Time Calculation- OT       Row Name 02/10/24 1146 02/10/24 1010          Time Calculation- OT    OT Start Time 1100  -MW 0800  -MW     OT Stop Time 1130  -MW 0900  -MW     OT Time Calculation (min) 30 min  -MW 60 min  -MW               User Key  (r) = Recorded By, (t) = Taken By, (c) = Cosigned By      Initials Name Provider Type     Samina Khan OT Occupational Therapist                  Therapy Charges for Today       Code Description Service Date Service Provider Modifiers Qty    11072056981 HC OT SELF CARE/MGMT/TRAIN EA 15 MIN 2/10/2024 Samina Khan OT GO 4    [id number]  OT THER PROC EA 15 MIN 2/10/2024 Samina Khan OT GO 2                     Samina Khan OT  2/10/2024

## 2024-02-10 NOTE — PLAN OF CARE
Goal Outcome Evaluation:  Plan of Care Reviewed With: patient           Outcome Evaluation: Alert and oriented x4, assist x1 with sliding board, and medication with water. He is continent of b/b using BSC and had a BM today. Pain medication given once this morning. VS stable, and Bactrim added. Dressings changed today while Dr Hamlin was present. He is pleasant, cooperative, and very appreciative.  Wife brings meals in and keeps in ADL room, several visitors today, and tolerated all therapies.

## 2024-02-10 NOTE — PLAN OF CARE
Goal Outcome Evaluation:  Plan of Care Reviewed With: patient        Progress: improving  Outcome Evaluation: Pt is A&OX4, calm and cooperative. Flat affect. Meds whole with water. Assist X1-2 with SB. Con of B&B, last BM 2/10, uses urinal. Knee ace wraps and braces in place. Pt participated with all therapies today. Wife visited this afternoon. Port to RU chest, bandaide removed, per pt request, tiny red/scabbed area at insertion site, CDI. Pt uses call light appropriately.    BL edema noted in LE, pt c/o discomfort at bottom of BLknee braces, mepilex applied to back of lower calf for protection and pillow cases placed in brace to prevent rubbing and pressure on calf, per pt request.

## 2024-02-10 NOTE — THERAPY TREATMENT NOTE
Inpatient Rehabilitation - Physical Therapy Treatment Note       [facility]     Patient Name: Angel Remy  : 1945  MRN: [id number]    Today's Date: 2/10/2024                    Admit Date: 2024      Visit Dx:   No diagnosis found.    Patient Active Problem List   Diagnosis    Hairy cell leukemia    FH: colon cancer    Hyponatremia    Orthostatic hypotension    Supine hypertension    Anemia    Macrocytosis    LBBB (left bundle branch block)    Prolonged P-R interval    Pancytopenia    Fitting and adjustment of vascular catheter    Elevated blood pressure reading    Thrombocytopenia    History of left bundle branch block (LBBB)    PAF (paroxysmal atrial fibrillation) history    Closed displaced transverse fracture of left patella    Closed displaced transverse fracture of right patella    Patella fracture       Past Medical History:   Diagnosis Date    BCC (basal cell carcinoma of skin) 2022    NOSE    Benign prostate hyperplasia     Elevated cholesterol     H/O Elevated PSA     H/O Hairy cell leukemia (CMS/HCC)     H/O Internal thrombosed hemorrhoids     H/O minimal right carotid plaque     H/O peripheral neuropathy     History of transfusion     Hyperlipidemia     Hypertension     Primary open-angle glaucoma, bilateral, mild stage        Past Surgical History:   Procedure Laterality Date    BONE MARROW BIOPSY W/ ASPIRATION      COLONOSCOPY      COLONOSCOPY N/A 2021    Procedure: COLONOSCOPY TO CECUM AND INTO TI WITH COLD BIOPSY POLYPECTOMIES;  Surgeon: Juan Jenkins MD;  Location: [facility] ENDOSCOPY;  Service: Gastroenterology;  Laterality: N/A;  pre: family history of colon cancer  post: polyps, diverticulosis  and internal hemorrhoids    EXCISION MASS HEAD/NECK N/A 2022    Procedure: EXCISION BASAL CELL CARCINOMA NOSE WITH FROZEN SECTION FULL THICKNESS GRAFT;  Surgeon: Arturo Weiss MD;  [place] [facility] [place] [facility]  Service: Plastics;  Laterality: N/A;    HERNIA REPAIR  Right 02/2000    Inguinal     OTHER SURGICAL HISTORY  1989    Median nerve foreign body excision    PATELLA OPEN REDUCTION INTERNAL FIXATION Bilateral 2/5/2024    Procedure: PATELLA OPEN REDUCTION INTERNAL FIXATION;  Surgeon: Adrián Roberson MD;  Location: [facility];  Service: Orthopedics;  Laterality: Bilateral;    PROSTATE BIOPSY      March 2010 and October 2011 whan PSA had acceleration    SIGMOIDOSCOPY  1999    With small thrombosed internal hemorrhoid.    TONSILLECTOMY      VENOUS ACCESS DEVICE (PORT) INSERTION Right 10/18/2023    Procedure: MEDIPORT PLACEMENTWITH SUPERIOR VENACAVAGRAM;  Surgeon: Randy Fan MD;  Location: [facility];  Service: Vascular;  Laterality: Right;    WISDOM TOOTH EXTRACTION  1961       PT ASSESSMENT (last 12 hours)       IRF PT Evaluation and Treatment       Row Name 02/10/24 1000          PT Time and Intention    Document Type daily treatment  -LB     Mode of Treatment physical therapy  -LB     Patient/Family/Caregiver Comments/Observations pt in wheelchair, agreeable to therapy.  Wants to work on transfer to assist going home  -LB       Row Name 02/10/24 1000          General Information    Patient Profile Reviewed yes  -LB     Existing Precautions/Restrictions fall;weight bearing  TTWD  -LB       Row Name 02/10/24 1000          Pain Assessment    Pretreatment Pain Rating 0/10 - no pain  -LB     Posttreatment Pain Rating 0/10 - no pain  -LB       Row Name 02/10/24 1000          Cognition/Psychosocial    Personal Safety Interventions fall prevention program maintained;gait belt;muscle strengthening facilitated;nonskid shoes/slippers when out of bed  -LB       Row Name 02/10/24 1000          Bed Mobility    Comment, (Bed Mobility) support for LEs  -LB       Row Name 02/10/24 1000          Transfer Assessment/Treatment    Comment, (Transfers) Pt performed at least 5 sliding board transfers.  Support provided for LEs but the patient was able to lift trunk over sliding  board  -LB       Row Name 02/10/24 1000          Bed-Chair Transfer    Bed-Chair [place] (Transfers) standby assist  -LB     Assistive Device (Bed-Chair Transfers) wheelchair;transfer board  -LB     Comment, (Bed-Chair Transfer) provided pt with sliding board; pt placed indep.  In PM, placed LEs on rolling stool during transfer to provide support.  Trial to see if spouse could use technique at home  -LB       Row Name 02/10/24 1000          Chair-Bed Transfer    Chair-Bed [place] (Transfers) standby assist  -LB     Assistive Device (Chair-Bed Transfers) transfer board;wheelchair  -LB     Comment, (Chair-Bed Transfer) provided pt with sliding board; pt placed indep  -LB       Row Name 02/10/24 1000          Wheelchair Mobility/Management    Forward Propulsion [place] (Wheelchair) modified independence  -LB     Distance Propelled in Feet (Wheelchair) 100  -LB       Row Name 02/10/24 1000          Safety Issues, Functional Mobility    Impairments Affecting Function (Mobility) range of motion (ROM)  -LB       Row Name 02/10/24 1000          Balance    Balance Interventions sitting  -LB     Comment, Balance Lateral trunk lean to right and left without UE times 3 reps in each direction; reverse seated sit ups times 10 reps for count of 3; seated shoulder roll with scapular retraction with RTB; posterior to anterior pelvic tilts;  -LB       Row Name 02/10/24 1000          Hip (Therapeutic Exercise)    Hip Isometrics (Therapeutic Exercise) bilateral;gluteal sets;10 repetitions;3 second hold  -LB     Hip Strengthening (Therapeutic Exercise) bilateral;aBduction;aDduction;supine;10 repetitions;2 sets  -LB       Row Name 02/10/24 1000          Positioning and Restraints    Pre-Treatment Position sitting in chair/recliner  -LB     Post Treatment Position wheelchair  -LB     In Wheelchair call light within reach;encouraged to call for assist;exit alarm on  -LB               User Key  (r) = Recorded By, (t) = Taken  By, (c) = Cosigned By      Initials Name Provider Type    Rhina Jimenez, PT Physical Therapist                  Wound 02/05/24 1433 Left anterior knee Incision (Active)   Dressing Appearance dry;intact 02/10/24 1000   Closure Unable to assess 02/10/24 1000   Base dressing in place, unable to visualize 02/10/24 1000   Drainage Amount none 02/09/24 2127   Dressing Care elastic bandage 02/09/24 2127       Wound 02/05/24 1539 Right anterior knee Incision (Active)   Dressing Appearance dry;intact 02/10/24 1000   Closure Unable to assess 02/10/24 1000   Base dressing in place, unable to visualize 02/10/24 1000   Drainage Amount none 02/09/24 2127   Dressing Care elastic bandage 02/09/24 2127       Wound 02/07/24 2244 coccyx Other (comment) (Active)   Dressing Appearance open to air 02/10/24 1000   Closure None 02/10/24 1000   Base clean;dry 02/10/24 1000   Drainage Amount none 02/10/24 1000   Care, Wound cleansed with;soap and water 02/10/24 1000   Dressing Care open to air 02/10/24 1000   Periwound Care dry periwound area maintained 02/10/24 1000     Physical Therapy Education       Title: PT OT SLP Therapies (In Progress)       Topic: Physical Therapy (In Progress)       Point: Mobility training (Not Started)       Learner Progress:  Not documented in this visit.              Point: Home exercise program (Not Started)       Learner Progress:  Not documented in this visit.              Point: Body mechanics (Not Started)       Learner Progress:  Not documented in this visit.              Point: Precautions (Done)       Learning Progress Summary             Patient Acceptance, E, VU by MD at 2/9/2024 1052    Acceptance, E, VU by MD at 2/8/2024 1226                                         User Key       Initials Effective Dates Name Provider Type Abraham FLORENTINO 06/16/21 -  eBrna Rivera, PT Physical Therapist PT                    PT Recommendation and Plan                          Time Calculation:      PT Charges        Row Name 02/10/24 1419 02/10/24 1200          Time Calculation    Start Time 1230  -LB 0900  -LB     Stop Time 1300  -LB 1000  -LB     Time Calculation (min) 30 min  -LB 60 min  -LB     PT Received On 02/10/24  -LB 02/10/24  -LB     PT - Next Appointment 02/12/24  -LB --               User Key  (r) = Recorded By, (t) = Taken By, (c) = Cosigned By      Initials Name Provider Type    Rhina Jimenez, PT Physical Therapist                    Therapy Charges for Today       Code Description Service Date Service Provider Modifiers Qty    75482978588  PT THERAPEUTIC ACT EA 15 MIN 2/10/2024 Rhina Landrum, PT GP 4    23296144438 HC PT THER PROC EA 15 MIN 2/10/2024 Rhina Landrum, PT GP 2              PT G-Codes  AM-PAC 6 Clicks Score (PT): 12      Rhina Landrum PT  2/10/2024

## 2024-02-10 NOTE — THERAPY TREATMENT NOTE
Inpatient Rehabilitation - Occupational Therapy Treatment Note    [facility]     Patient Name: Angel Remy  : 1945  MRN: [id number]    Today's Date: 2/10/2024                 Admit Date: 2024       No diagnosis found.    Patient Active Problem List   Diagnosis    Hairy cell leukemia    FH: colon cancer    Hyponatremia    Orthostatic hypotension    Supine hypertension    Anemia    Macrocytosis    LBBB (left bundle branch block)    Prolonged P-R interval    Pancytopenia    Fitting and adjustment of vascular catheter    Elevated blood pressure reading    Thrombocytopenia    History of left bundle branch block (LBBB)    PAF (paroxysmal atrial fibrillation) history    Closed displaced transverse fracture of left patella    Closed displaced transverse fracture of right patella    Patella fracture       Past Medical History:   Diagnosis Date    BCC (basal cell carcinoma of skin) 2022    NOSE    Benign prostate hyperplasia     Elevated cholesterol     H/O Elevated PSA     H/O Hairy cell leukemia (CMS/HCC)     H/O Internal thrombosed hemorrhoids     H/O minimal right carotid plaque     H/O peripheral neuropathy     History of transfusion     Hyperlipidemia     Hypertension     Primary open-angle glaucoma, bilateral, mild stage        Past Surgical History:   Procedure Laterality Date    BONE MARROW BIOPSY W/ ASPIRATION      COLONOSCOPY      COLONOSCOPY N/A 2021    Procedure: COLONOSCOPY TO CECUM AND INTO TI WITH COLD BIOPSY POLYPECTOMIES;  Surgeon: Juan Jenkins MD;  Location: [facility] ENDOSCOPY;  Service: Gastroenterology;  Laterality: N/A;  pre: family history of colon cancer  post: polyps, diverticulosis  and internal hemorrhoids    EXCISION MASS HEAD/NECK N/A 2022    Procedure: EXCISION BASAL CELL CARCINOMA NOSE WITH FROZEN SECTION FULL THICKNESS GRAFT;  Surgeon: Arturo Weiss MD;  Location: [facility] OR [facility] [place];  Service: Plastics;  Laterality: N/A;    HERNIA REPAIR Right 2000     Inguinal     OTHER SURGICAL HISTORY  1989    Median nerve foreign body excision    PATELLA OPEN REDUCTION INTERNAL FIXATION Bilateral 2/5/2024    Procedure: PATELLA OPEN REDUCTION INTERNAL FIXATION;  Surgeon: Adrián Roberson MD;  Location: [facility];  Service: Orthopedics;  Laterality: Bilateral;    PROSTATE BIOPSY      March 2010 and October 2011 [place] PSA had acceleration    SIGMOIDOSCOPY  1999    With small thrombosed internal hemorrhoid.    TONSILLECTOMY      VENOUS ACCESS DEVICE (PORT) INSERTION Right 10/18/2023    Procedure: MEDIPORT PLACEMENTWITH SUPERIOR VENACAVAGRAM;  Surgeon: Randy Fan MD;  Location: [facility];  Service: Vascular;  Laterality: Right;    WISDOM TOOTH EXTRACTION  1961             IRF OT ASSESSMENT FLOWSHEET (last 12 hours)       IRF OT Evaluation and Treatment       Row Name 02/10/24 1005          OT Time and Intention    Document Type daily treatment  -MW     Mode of Treatment individual therapy;occupational therapy  -MW     Patient Effort good  -MW     Symptoms Noted During/After Treatment none  -MW       Row Name 02/10/24 1005          General Information    Patient Profile Reviewed yes  -MW     Patient/Family/Caregiver Comments/Observations AM first session-supine in bed  -MW     Existing Precautions/Restrictions fall;weight bearing  -MW       Row Name 02/10/24 1005          Pain Assessment    Pretreatment Pain Rating 0/10 - no pain  -MW     Posttreatment Pain Rating 0/10 - no pain  -MW       Row Name 02/10/24 1005          Cognition/Psychosocial    Orientation Status (Cognition) oriented x 4  -MW     Follows Commands (Cognition) WFL  -MW     Personal Safety Interventions gait belt;fall prevention program maintained  -MW       Row Name 02/10/24 1005          Bathing    [place] Level (Bathing) bathing skills;upper body;set up;standby assist;lower body;maximum assist (25% patient effort)  -MW     Position (Bathing) sitting up in bed  -MW     Set-up Assistance  (Bathing) obtain supplies  -     Comment (Bathing) LB supine  -       Row Name 02/10/24 1005          Upper Body Dressing    [place] Level (Upper Body Dressing) upper body dressing skills;doff;don;pull over garment;set up assistance;supervision  -     Position (Upper Body Dressing) sitting up in bed  -     Set-up Assistance (Upper Body Dressing) obtain clothing  -       Row Name 02/10/24 1005          Lower Body Dressing    [place] Level (Lower Body Dressing) don;doff;socks;underwear;set up;maximum assist (25% patient effort);dependent (less than 25% patient effort)  -     Position (Lower Body Dressing) sitting up in bed  -     Set-up Assistance (Lower Body Dressing) obtain clothing  -     Comment (Lower Body Dressing) able to weight shift sitting up in bed, assist with pulling up over hips  -       Row Name 02/10/24 1005          Grooming    [place] Level (Grooming) grooming skills;deodorant application;hair care, combing/brushing;oral care regimen;shave face;wash face, hands;set up;standby assist  -     Position (Grooming) supported sitting  -     Set-up Assistance (Grooming) obtain supplies  -       Row Name 02/10/24 1005          Toileting    Comment (Toileting) urinal use prior to entry, lyssa'd x2 trials of BSC transfer however declined need to void at time of session  -       Row Name 02/10/24 1005          Bed-Chair Transfer    Bed-Chair [place] (Transfers) set up;standby assist;verbal cues  -     Assistive Device (Bed-Chair Transfers) wheelchair;transfer board  -     Comment, (Bed-Chair Transfer) descending transfer  -       Row Name 02/10/24 1005          Chair-Bed Transfer    Chair-Bed [place] (Transfers) verbal cues;standby assist;contact guard  -     Assistive Device (Chair-Bed Transfers) transfer board;wheelchair  -       Row Name 02/10/24 1005          Sit-Stand Transfer    Sit-Stand [place] (Transfers) unable to assess  -[facility]  Name 02/10/24 1005          Toilet Transfer    Type (Toilet Transfer) lateral  -MW     [place] Level (Toilet Transfer) standby assist;contact guard  -MW     Assistive Device (Toilet Transfer) transfer board;commode, bedside without drop arms  -MW     Comment, (Toilet Transfer) completed x2 this date bed <>BSC <>bed <>BSC <> bed  rest breaks required  -MW       Row Name 02/10/24 1005          Balance    Static Sitting Balance independent  -MW     Dynamic Sitting Balance standby assist  -MW     Position, Sitting Balance unsupported;supported  -MW     Balance Interventions sitting  -MW       Row Name 02/10/24 1005          Positioning and Restraints    Pre-Treatment Position in bed  -MW     Post Treatment Position wheelchair  -MW     In Wheelchair sitting;notified nsg;call light within reach;encouraged to call for assist;exit alarm on  -MW               User Key  (r) = Recorded By, (t) = Taken By, (c) = Cosigned By      Initials Name Effective Dates     Samina Khan OT 08/20/21 -                      Occupational Therapy Education       Title: PT OT SLP Therapies (In Progress)       Topic: Occupational Therapy (In Progress)       Point: ADL training (Done)       Description:   Instruct learner(s) on proper safety adaptation and remediation techniques during self care or transfers.   Instruct in proper use of assistive devices.                  Learning Progress Summary             Patient Acceptance, E,TB,D, VU,DU by  at 2/8/2024 1200    Comment: precautions, brace wearing, ADL goals/POC, clothing choices                         Point: Home exercise program (Not Started)       Description:   Instruct learner(s) on appropriate technique for monitoring, assisting and/or progressing therapeutic exercises/activities.                  Learner Progress:  Not documented in this visit.              Point: Precautions (Done)       Description:   Instruct learner(s) on prescribed precautions during self-care and  functional transfers.                  Learning Progress Summary             Patient Acceptance, E,TB,D, VU,DU by  at 2/8/2024 1200    Comment: precautions, brace wearing, ADL goals/POC, clothing choices                         Point: Body mechanics (Not Started)       Description:   Instruct learner(s) on proper positioning and spine alignment during self-care, functional mobility activities and/or exercises.                  Learner Progress:  Not documented in this visit.                              User Key       Initials Effective Dates Name Provider Type Discipline     04/02/20 -  Lainey Trent OT Occupational Therapist OT                        OT Recommendation and Plan                         Time Calculation:      Time Calculation- OT       Row Name 02/10/24 1010             Time Calculation- OT    OT Start Time 0800  -MW      OT Stop Time 0900  -MW      OT Time Calculation (min) 60 min  -MW                User Key  (r) = Recorded By, (t) = Taken By, (c) = Cosigned By      Initials Name Provider Type     Samina Khan OT Occupational Therapist                  Therapy Charges for Today       Code Description Service Date Service Provider Modifiers Qty    09235330544 HC OT SELF CARE/MGMT/TRAIN EA 15 MIN 2/10/2024 Samina Khan OT GO 4                     Samina Khan OT  2/10/2024

## 2024-02-10 NOTE — PROGRESS NOTES
Inpatient Rehabilitation Plan of Care Note    Plan of Care  Care Plan Reviewed - No updates at this time.    Sphincter Control    [RN] Bladder Management(Active)  Current Status(02/10/2024): Continent 100%  Weekly Goal(02/14/2024): Continent 100%  Discharge Goal: Continent 100%    [RN] Bowel Management(Active)  Current Status(02/10/2024): Continent 100%; pt able to put self on bedpan; per  PT can use CGA with SB to BSC  Weekly Goal(02/14/2024): Continent 100%  Discharge Goal: Continent 100%    Performed Intervention(s)  Urinal and bedpan within reach  Incontinence products in use  SB to BSC      Safety    [RN] Potential for Injury(Active)  Current Status(02/10/2024): At risk for falls r/t samantha knee fractures; NWB except  TTWB when transferring  Weekly Goal(02/14/2024): will use call light  Discharge Goal: No falls    Performed Intervention(s)  Wearing samantha knee braces at all times  Falls precautions in place  Hourly rounding      Psychosocial    [RN] Coping/Adjustment(Active)  Current Status(02/10/2024): Pt coping well; motivated to start therapy; has  supportive wife  Weekly Goal(02/14/2024): Verbalizes needs and concerns  Discharge Goal: Demonstrates adequate coping    Performed Intervention(s)  Allow patient to verbalize concerns regarding health  Provide calm enviroment      Pain    [RN] Pain Management(Active)  Current Status(02/10/2024): Pain to samantha knees; PRN Tramadol and Tylenol  available  Weekly Goal(02/14/2024): will be able to tolerate therapies  Discharge Goal: pain controlled    Performed Intervention(s)  Offer pain medication when available  encourage repositioning q2    Signed by: Maura Oro RN

## 2024-02-10 NOTE — PLAN OF CARE
Goal Outcome Evaluation:  Plan of Care Reviewed With: patient             Problem: Rehabilitation (IRF) Plan of Care  Goal: Plan of Care Review  Outcome: Ongoing, Progressing  Flowsheets (Taken 2/10/2024 0241)  Plan of Care Reviewed With: patient  Outcome Evaluation: Pt is alert and oriented x 4. Very pleasant and cooperative with all care. Takes all meds whole PO with thins. Continent of bowel and bladder. Urinal at bedside. Uses BSC with SB. Bilateral knee incisions covered with ace wraps and braces in place at all times. Pt is able to make adjustments to take pressure off of glutes. Compliant with venous pumps while in bed. Deaccessed port covered with bandaid to right chest. PRN Tramadol administered at 2127. Pt prefers all 4 side rails for easier self positioning. No unsafe behaviors. Call light within reach.

## 2024-02-10 NOTE — PROGRESS NOTES
[facility]     Progress Note    Patient Name: Angel Remy  : 1945  MRN: [id number]  Primary Care Physician:  Damián Vuong Jr., MD  Date of admission: 2024    Subjective Pt is awake and alert. Pt is generally pleased with his progress. Requests to resume BIW blood draws per Dr Jeffers recommendation.   Subjective     Chief Complaint: same    History of Present Illness  Patient Reports     Review of Systems    Objective  exam unchanged. Resp unlabored and regular  Objective     Vitals:   Temp:  [98.1 °F (36.7 °C)-98.5 °F (36.9 °C)] 98.1 °F (36.7 °C)  Heart Rate:  [64-71] 64  Resp:  [18] 18  BP: (127-143)/(60-72) 134/64    Physical Exam     Result Review    Result Review:  I have personally reviewed the results from the time of this admission to 2/10/2024 12:29 EST and agree with these findings:  []  Laboratory list / accordion  []  Microbiology  []  Radiology  []  EKG/Telemetry   []  Cardiology/Vascular   []  Pathology  []  Old records  []  Other:  Most notable findings include:       Assessment & Plan  Continue eval phase. Will go to Mon /Thurs blood draw schedule  Assessment / Plan     Brief Patient Summary:  Angel Remy is a 78 y.o. male who    Active Hospital Problems:  Active Hospital Problems    Diagnosis     Patella fracture      Plan:       DVT prophylaxis:  Medical and mechanical DVT prophylaxis orders are present.        CODE STATUS:    Code Status (Patient has no pulse and is not breathing): CPR (Attempt to Resuscitate)  Medical Interventions (Patient has pulse or is breathing): Full    Disposition:  I expect patient to be discharged     Ron Geller MD

## 2024-02-11 RX ORDER — LIDOCAINE 4 G/G
1 PATCH TOPICAL
Status: DISCONTINUED | OUTPATIENT
Start: 2024-02-11 | End: 2024-02-15 | Stop reason: HOSPADM

## 2024-02-11 RX ORDER — TRAMADOL HYDROCHLORIDE 50 MG/1
50 TABLET ORAL EVERY 4 HOURS PRN
Status: DISCONTINUED | OUTPATIENT
Start: 2024-02-11 | End: 2024-02-15 | Stop reason: HOSPADM

## 2024-02-11 RX ADMIN — ACYCLOVIR 400 MG: 400 TABLET ORAL at 21:52

## 2024-02-11 RX ADMIN — FINASTERIDE 5 MG: 5 TABLET, FILM COATED ORAL at 21:52

## 2024-02-11 RX ADMIN — Medication 5000 UNITS: at 08:44

## 2024-02-11 RX ADMIN — OXYCODONE HYDROCHLORIDE AND ACETAMINOPHEN 1000 MG: 500 TABLET ORAL at 08:45

## 2024-02-11 RX ADMIN — PROPAFENONE HYDROCHLORIDE 150 MG: 150 TABLET, COATED ORAL at 16:36

## 2024-02-11 RX ADMIN — TRAMADOL HYDROCHLORIDE 50 MG: 50 TABLET ORAL at 08:48

## 2024-02-11 RX ADMIN — BISOPROLOL FUMARATE 5 MG: 5 TABLET, FILM COATED ORAL at 08:45

## 2024-02-11 RX ADMIN — FERROUS SULFATE TAB 325 MG (65 MG ELEMENTAL FE) 325 MG: 325 (65 FE) TAB at 08:45

## 2024-02-11 RX ADMIN — TAMSULOSIN HYDROCHLORIDE 0.4 MG: 0.4 CAPSULE ORAL at 21:53

## 2024-02-11 RX ADMIN — APIXABAN 2.5 MG: 2.5 TABLET, FILM COATED ORAL at 21:52

## 2024-02-11 RX ADMIN — MAGNESIUM OXIDE 400 MG (241.3 MG MAGNESIUM) TABLET 400 MG: TABLET at 08:45

## 2024-02-11 RX ADMIN — ACYCLOVIR 400 MG: 400 TABLET ORAL at 08:44

## 2024-02-11 RX ADMIN — ROSUVASTATIN CALCIUM 10 MG: 10 TABLET, FILM COATED ORAL at 21:53

## 2024-02-11 RX ADMIN — AMLODIPINE BESYLATE 2.5 MG: 2.5 TABLET ORAL at 21:53

## 2024-02-11 RX ADMIN — TRAMADOL HYDROCHLORIDE 50 MG: 50 TABLET ORAL at 22:31

## 2024-02-11 RX ADMIN — PROPAFENONE HYDROCHLORIDE 150 MG: 150 TABLET, COATED ORAL at 08:45

## 2024-02-11 RX ADMIN — TAMSULOSIN HYDROCHLORIDE 0.4 MG: 0.4 CAPSULE ORAL at 08:45

## 2024-02-11 RX ADMIN — APIXABAN 2.5 MG: 2.5 TABLET, FILM COATED ORAL at 08:46

## 2024-02-11 RX ADMIN — PROPAFENONE HYDROCHLORIDE 150 MG: 150 TABLET, COATED ORAL at 21:53

## 2024-02-11 RX ADMIN — LATANOPROST 1 DROP: 50 SOLUTION/ DROPS OPHTHALMIC at 21:50

## 2024-02-11 NOTE — PROGRESS NOTES
Inpatient Rehabilitation Plan of Care Note    Plan of Care  Care Plan Reviewed - No updates at this time.    Sphincter Control    Performed Intervention(s)  Urinal and bedpan within reach  Incontinence products in use  SB to BSC      Safety    Performed Intervention(s)  Wearing samantha knee braces at all times  Falls precautions in place  Hourly rounding      Psychosocial    Performed Intervention(s)  Allow patient to verbalize concerns regarding health  Provide calm enviroment      Pain    Performed Intervention(s)  Offer pain medication when available  encourage repositioning q2    Signed by: Awa Jurado RN

## 2024-02-11 NOTE — PLAN OF CARE
Goal Outcome Evaluation:  Plan of Care Reviewed With: patient        Progress: improving  Outcome Evaluation: Pt is A&OX4, calm and cooperative. Flat affect. Meds whole with water. Assist X1-2 with SB. Con of B&B, uses urinal and bedside commode. Knee ace wraps and braces in place. Wife visited this afternoon. Deaccessed port to  chest, CDI. Pt altnerated between bed and sitting in wc throughout shift. Pt uses call light appropriately. PRN pain meds given for pain in back, MD ordered lidocaine patches.

## 2024-02-11 NOTE — PLAN OF CARE
Goal Outcome Evaluation:  Plan of Care Reviewed With: patient        Progress: improving  Outcome Evaluation: Pt is AOX4, calm and cooperative. Meds whole with water. Given Tramal PRN for pain. On room air. Orthosis on; knee ace wraps and brace in place. Neurovascular check done. Assist x1-2 with sliding board, no flexion or leg raise. Venous pump on @HS. Continent B/B, last BM 02/10. No unsafe behavior. Call light within reach.

## 2024-02-11 NOTE — PROGRESS NOTES
" [facility]     Progress Note    Patient Name: Angel Remy  : 1945  MRN: [id number]  Primary Care Physician:  Damián Vuong Jr., MD  Date of admission: 2024    Subjective Pt is awake and alert. Pt remains pleased with his progress and dc plan. C/O \"catch\" R flank with prolonged sitting. We discussed use of Tramodol and addn of Lidoderm patch.   Subjective     Chief Complaint: same    History of Present Illness  Patient Reports     Review of Systems    Objective  Resp unlabored and regular good distal sensation and movement in bioth feet.   Objective     Vitals:   Temp:  [97.4 °F (36.3 °C)-98.6 °F (37 °C)] 98.2 °F (36.8 °C)  Heart Rate:  [67-84] 67  Resp:  [18] 18  BP: (116-142)/(63-70) 142/65    Physical Exam     Result Review    Result Review:  I have personally reviewed the results from the time of this admission to 2024 11:26 EST and agree with these findings:  []  Laboratory list / accordion  []  Microbiology  []  Radiology  []  EKG/Telemetry   []  Cardiology/Vascular   []  Pathology  []  Old records  []  Other:  Most notable findings include:       Assessment & Plan  Continue to prepare for dc. Will add Lidoderm patch to R flank on 12/off 12.  Labs changed to Mon/thur from daily per Dr Jeffers.   Assessment / Plan     Brief Patient Summary:  Angel Remy is a 78 y.o. male who     Active Hospital Problems:  Active Hospital Problems    Diagnosis     Patella fracture      Plan:       DVT prophylaxis:  Medical and mechanical DVT prophylaxis orders are present.        CODE STATUS:    Code Status (Patient has no pulse and is not breathing): CPR (Attempt to Resuscitate)  Medical Interventions (Patient has pulse or is breathing): Full    Disposition:  I expect patient to be discharged    Ron Geller MD             "

## 2024-02-12 VITALS
WEIGHT: 171.3 LBS | DIASTOLIC BLOOD PRESSURE: 66 MMHG | SYSTOLIC BLOOD PRESSURE: 124 MMHG | RESPIRATION RATE: 18 BRPM | OXYGEN SATURATION: 99 % | HEART RATE: 72 BPM | HEIGHT: 70 IN | TEMPERATURE: 98.5 F | BODY MASS INDEX: 24.52 KG/M2

## 2024-02-12 LAB
ANION GAP SERPL CALCULATED.3IONS-SCNC: 9.6 MMOL/L (ref 5–15)
BASOPHILS # BLD AUTO: 0 10*3/MM3 (ref 0–0.2)
BASOPHILS NFR BLD AUTO: 0 % (ref 0–1.5)
BUN SERPL-MCNC: 17 MG/DL (ref 8–23)
BUN/CREAT SERPL: 28.8 (ref 7–25)
CALCIUM SPEC-SCNC: 8.1 MG/DL (ref 8.6–10.5)
CHLORIDE SERPL-SCNC: 102 MMOL/L (ref 98–107)
CO2 SERPL-SCNC: 24.4 MMOL/L (ref 22–29)
CREAT SERPL-MCNC: 0.59 MG/DL (ref 0.76–1.27)
DEPRECATED RDW RBC AUTO: 44.5 FL (ref 37–54)
EGFRCR SERPLBLD CKD-EPI 2021: 99.3 ML/MIN/1.73
EOSINOPHIL # BLD AUTO: 0.06 10*3/MM3 (ref 0–0.4)
EOSINOPHIL NFR BLD AUTO: 1.2 % (ref 0.3–6.2)
ERYTHROCYTE [DISTWIDTH] IN BLOOD BY AUTOMATED COUNT: 13.4 % (ref 12.3–15.4)
GLUCOSE SERPL-MCNC: 112 MG/DL (ref 65–99)
HAPTOGLOB SERPL-MCNC: 254 MG/DL (ref 30–200)
HCT VFR BLD AUTO: 27 % (ref 37.5–51)
HGB BLD-MCNC: 9 G/DL (ref 13–17.7)
HGB RETIC QN AUTO: 30.8 PG (ref 29.8–36.1)
IMM GRANULOCYTES # BLD AUTO: 0.03 10*3/MM3 (ref 0–0.05)
IMM GRANULOCYTES NFR BLD AUTO: 0.6 % (ref 0–0.5)
IMM RETICS NFR: 30.1 % (ref 3–15.8)
LDH SERPL-CCNC: 200 U/L (ref 135–225)
LYMPHOCYTES # BLD AUTO: 0.47 10*3/MM3 (ref 0.7–3.1)
LYMPHOCYTES NFR BLD AUTO: 9.7 % (ref 19.6–45.3)
MCH RBC QN AUTO: 30.5 PG (ref 26.6–33)
MCHC RBC AUTO-ENTMCNC: 33.3 G/DL (ref 31.5–35.7)
MCV RBC AUTO: 91.5 FL (ref 79–97)
MONOCYTES # BLD AUTO: 0.67 10*3/MM3 (ref 0.1–0.9)
MONOCYTES NFR BLD AUTO: 13.8 % (ref 5–12)
NEUTROPHILS NFR BLD AUTO: 3.62 10*3/MM3 (ref 1.7–7)
NEUTROPHILS NFR BLD AUTO: 74.7 % (ref 42.7–76)
NRBC BLD AUTO-RTO: 0 /100 WBC (ref 0–0.2)
PLATELET # BLD AUTO: 231 10*3/MM3 (ref 140–450)
PMV BLD AUTO: 10.1 FL (ref 6–12)
POTASSIUM SERPL-SCNC: 4.1 MMOL/L (ref 3.5–5.2)
RBC # BLD AUTO: 2.95 10*6/MM3 (ref 4.14–5.8)
RETICS # AUTO: 0.11 10*6/MM3 (ref 0.02–0.13)
RETICS/RBC NFR AUTO: 3.68 % (ref 0.7–1.9)
SODIUM SERPL-SCNC: 136 MMOL/L (ref 136–145)
WBC NRBC COR # BLD AUTO: 4.85 10*3/MM3 (ref 3.4–10.8)

## 2024-02-12 PROCEDURE — 97535 SELF CARE MNGMENT TRAINING: CPT

## 2024-02-12 PROCEDURE — 80048 BASIC METABOLIC PNL TOTAL CA: CPT | Performed by: PHYSICAL MEDICINE & REHABILITATION

## 2024-02-12 PROCEDURE — 83615 LACTATE (LD) (LDH) ENZYME: CPT | Performed by: INTERNAL MEDICINE

## 2024-02-12 PROCEDURE — 83010 ASSAY OF HAPTOGLOBIN QUANT: CPT | Performed by: INTERNAL MEDICINE

## 2024-02-12 PROCEDURE — 85046 RETICYTE/HGB CONCENTRATE: CPT | Performed by: INTERNAL MEDICINE

## 2024-02-12 PROCEDURE — 97110 THERAPEUTIC EXERCISES: CPT

## 2024-02-12 PROCEDURE — 85025 COMPLETE CBC W/AUTO DIFF WBC: CPT | Performed by: PHYSICAL MEDICINE & REHABILITATION

## 2024-02-12 PROCEDURE — 97530 THERAPEUTIC ACTIVITIES: CPT

## 2024-02-12 RX ADMIN — TAMSULOSIN HYDROCHLORIDE 0.4 MG: 0.4 CAPSULE ORAL at 21:22

## 2024-02-12 RX ADMIN — FINASTERIDE 5 MG: 5 TABLET, FILM COATED ORAL at 21:22

## 2024-02-12 RX ADMIN — TRAMADOL HYDROCHLORIDE 50 MG: 50 TABLET ORAL at 21:23

## 2024-02-12 RX ADMIN — POLYETHYLENE GLYCOL 3350 17 G: 17 POWDER, FOR SOLUTION ORAL at 08:27

## 2024-02-12 RX ADMIN — Medication 5000 UNITS: at 08:27

## 2024-02-12 RX ADMIN — TAMSULOSIN HYDROCHLORIDE 0.4 MG: 0.4 CAPSULE ORAL at 08:26

## 2024-02-12 RX ADMIN — APIXABAN 2.5 MG: 2.5 TABLET, FILM COATED ORAL at 08:27

## 2024-02-12 RX ADMIN — ROSUVASTATIN CALCIUM 10 MG: 10 TABLET, FILM COATED ORAL at 21:22

## 2024-02-12 RX ADMIN — PROPAFENONE HYDROCHLORIDE 150 MG: 150 TABLET, COATED ORAL at 08:30

## 2024-02-12 RX ADMIN — FERROUS SULFATE TAB 325 MG (65 MG ELEMENTAL FE) 325 MG: 325 (65 FE) TAB at 08:27

## 2024-02-12 RX ADMIN — DOCUSATE SODIUM 100 MG: 100 CAPSULE, LIQUID FILLED ORAL at 21:22

## 2024-02-12 RX ADMIN — MAGNESIUM OXIDE 400 MG (241.3 MG MAGNESIUM) TABLET 400 MG: TABLET at 08:26

## 2024-02-12 RX ADMIN — TRAMADOL HYDROCHLORIDE 50 MG: 50 TABLET ORAL at 08:27

## 2024-02-12 RX ADMIN — DOCUSATE SODIUM 100 MG: 100 CAPSULE, LIQUID FILLED ORAL at 08:27

## 2024-02-12 RX ADMIN — AMLODIPINE BESYLATE 2.5 MG: 2.5 TABLET ORAL at 21:22

## 2024-02-12 RX ADMIN — APIXABAN 2.5 MG: 2.5 TABLET, FILM COATED ORAL at 21:22

## 2024-02-12 RX ADMIN — DOCUSATE SODIUM 50MG AND SENNOSIDES 8.6MG 2 TABLET: 8.6; 5 TABLET, FILM COATED ORAL at 00:28

## 2024-02-12 RX ADMIN — ACYCLOVIR 400 MG: 400 TABLET ORAL at 08:26

## 2024-02-12 RX ADMIN — PROPAFENONE HYDROCHLORIDE 150 MG: 150 TABLET, COATED ORAL at 21:22

## 2024-02-12 RX ADMIN — LATANOPROST 1 DROP: 50 SOLUTION/ DROPS OPHTHALMIC at 21:24

## 2024-02-12 RX ADMIN — ACYCLOVIR 400 MG: 400 TABLET ORAL at 21:22

## 2024-02-12 RX ADMIN — LIDOCAINE 1 PATCH: 4 PATCH TOPICAL at 08:27

## 2024-02-12 RX ADMIN — SULFAMETHOXAZOLE AND TRIMETHOPRIM 1 TABLET: 800; 160 TABLET ORAL at 08:26

## 2024-02-12 RX ADMIN — OXYCODONE HYDROCHLORIDE AND ACETAMINOPHEN 1000 MG: 500 TABLET ORAL at 08:26

## 2024-02-12 RX ADMIN — PROPAFENONE HYDROCHLORIDE 150 MG: 150 TABLET, COATED ORAL at 16:11

## 2024-02-12 RX ADMIN — DOCUSATE SODIUM 50MG AND SENNOSIDES 8.6MG 2 TABLET: 8.6; 5 TABLET, FILM COATED ORAL at 21:22

## 2024-02-12 RX ADMIN — BISOPROLOL FUMARATE 5 MG: 5 TABLET, FILM COATED ORAL at 08:31

## 2024-02-12 NOTE — PROGRESS NOTES
Inpatient Rehabilitation Plan of Care Note    Plan of Care  Care Plan Reviewed - Updates as Follows    Sphincter Control    [RN] Bladder Management(Active)  Current Status(02/12/2024): Continent 100%  Weekly Goal(02/14/2024): Continent 100%  Discharge Goal: Continent 100%    [RN] Bowel Management(Active)  Current Status(02/12/2024): Continent 100%; pt able to put self on bedpan; per  PT can use CGA with SB to BSC  Weekly Goal(02/14/2024): Continent 100%  Discharge Goal: Continent 100%    Performed Intervention(s)  Urinal and bedpan within reach  Incontinence products in use  SB to BSC      Safety    [RN] Potential for Injury(Active)  Current Status(02/12/2024): At risk for falls r/t samantha knee fractures; NWB except  TTWB when transferring  Weekly Goal(02/14/2024): will use call light  Discharge Goal: No falls    Performed Intervention(s)  Wearing samantha knee braces at all times  Falls precautions in place  Hourly rounding      Psychosocial    [RN] Coping/Adjustment(Active)  Current Status(02/12/2024): Pt coping well; motivated to start therapy; has  supportive wife  Weekly Goal(02/14/2024): Verbalizes needs and concerns  Discharge Goal: Demonstrates adequate coping    Performed Intervention(s)  Allow patient to verbalize concerns regarding health  Provide calm enviroment      Pain    [RN] Pain Management(Active)  Current Status(02/12/2024): Pain to samantha knees; PRN Tramadol and Tylenol  available  Weekly Goal(02/14/2024): will be able to tolerate therapies  Discharge Goal: pain controlled    Performed Intervention(s)  Offer pain medication when available  encourage repositioning q2    Signed by: Quiana Agustin RN

## 2024-02-12 NOTE — PROGRESS NOTES
Inpatient Rehabilitation Plan of Care Note    Plan of Care  Care Plan Reviewed - No updates at this time.    Sphincter Control    Performed Intervention(s)  Urinal and bedpan within reach  Incontinence products in use  SB to BSC      Safety    Performed Intervention(s)  Wearing samantha knee braces at all times  Falls precautions in place  Hourly rounding      Psychosocial    Performed Intervention(s)  Allow patient to verbalize concerns regarding health  Provide calm enviroment      Pain    Performed Intervention(s)  Offer pain medication when available  encourage repositioning q2    Signed by: Leidy Garza RN

## 2024-02-12 NOTE — PROGRESS NOTES
Spoke with patient's wife, by phone, regarding d/c planning for home sometime this week. She has decided to use [employer] for ramp at home and the install is scheduled for Wednesday, 2/14. Their son is flying in on Thursday sometime so discussed d/c either Thursday or Friday. She would like son to assist getting patient home. Scheduled family conference for tomorrow, 2/13 at 11:00 a.m. Discussed family teaching in afternoon. Will assist with plans.

## 2024-02-12 NOTE — PLAN OF CARE
Goal Outcome Evaluation:  Plan of Care Reviewed With: patient        Progress: improving  Outcome Evaluation: A&Ox4. cooperative. See MAR. Refused stool softners and then later decided he did want to take the stool softners.  Meds whole with water. Cont B&B. TTWB BLE. Immobilizers on. Foot pumps on while in bed. Transfers to BSC with slide board and assist x1. no unsafe behavior. having a difficult time getting to sleep.

## 2024-02-12 NOTE — PROGRESS NOTES
Inpatient Rehabilitation Functional Measures Assessment and Plan of Care    Plan of Care  Updated Problems/Interventions  Self Care    [OT] Bathing(Active)  Current Status(02/12/2024): UB SBA LB Max A  Weekly Goal(02/16/2024): UB set up LB  Discharge Goal: UB set up LB with AE    [OT] Dressing (Lower)(Active)  Current Status(02/12/2024): Mod/min supine in bed  Weekly Goal(02/16/2024): min supine in bed  Discharge Goal: min supine in bed    [OT] Dressing (Upper)(Active)  Current Status(02/12/2024): SBA  Weekly Goal(02/16/2024): Set up  Discharge Goal: Set up    [OT] Grooming(Active)  Current Status(02/12/2024): SBA  Weekly Goal(02/16/2024): set up  Discharge Goal: set up    [OT] Toileting(Active)  Current Status(02/12/2024): Min  Weekly Goal(02/16/2024): Min/CGA  Discharge Goal: Min/CGA        Mobility    [OT] Toilet Transfers(Active)  Current Status(02/12/2024): CGA/SBA with transfer board to platform drop arm BSC    Weekly Goal(02/16/2024): SBA with TB to platform drop arm BSC  Discharge Goal: SBA with TB to platform drop arm BSC    Functional Measures  FAY Eating:  Branch  FAY Grooming: Branch  FAY Bathing:  Branch  [facility] Upper Body Dressing:  Branch  [facility] Lower Body Dressing:  Branch  [facility] Toileting:  Branch    [facility] Bladder Management  Level of Assistance:  Branch  Frequency/Number of Accidents this Shift:  Branch    FAY Bowel Management  Level of Assistance: Branch  Frequency/Number of Accidents this Shift: Branch    FAY Bed/Chair/Wheelchair Transfer:  Branch  FAY Toilet Transfer:  Branch  FAY Tub/Shower Transfer:  Branch    Previously Documented Mode of Locomotion at Discharge: Field  [facility] Expected Mode of Locomotion at Discharge: Branch  [facility] Walk/Wheelchair:  Branch  [facility] Stairs:  Branch    [facility] Comprehension:  Branch  [facility] Expression:  Branch  [facility] Social Interaction:  Branch  [facility] Problem Solving:  Branch  [facility] Memory:  Branch    Therapy Mode Minutes  Occupational Therapy: Branch  Physical Therapy: Branch  Speech  Language Pathology:  Branch    Signed by: Debra Still, OT

## 2024-02-12 NOTE — PROGRESS NOTES
LOS: 5 days   Patient Care Team:  Damián Vuong Jr., MD as PCP - General (Internal Medicine)  Provider, No Known as PCP - Family Medicine  Paco Jeffers MD as Consulting Physician (Hematology and Oncology)  Angel Remy MD as Referring Physician (Dermatology)      ANGEL REMY  1945      ADMITTING DIAGNOSIS:    Bilateral patella fractures - tendon advancement repair Feb 5, 2024  Non-weightbearing except may do toe-touch during transfers.  Knee immobilizers at all times. No quad sets/straight leg raise/knee flexion.  Impaired mobility/ impaired self care       Subjective   No significant pain complaints.  Tolerating therapies.  Working on upper body strengthening.  Working on transfers.  Reviewed hemoglobin 9.0.  Anticipate discharge home later part of the week       Objective     Vitals:    02/12/24 0826   BP: 120/70   Pulse: 74   Resp:    Temp:    SpO2:        Intake/Output Summary (Last 24 hours) at 2/12/2024 1052  Last data filed at 2/12/2024 0828  Gross per 24 hour   Intake 240 ml   Output 2275 ml   Net -2035 ml     PHYSICAL EXAM:     MENTAL STATUS -  AWAKE / ALERT  HEENT-    LUNGS - CTA, NO WHEEZES, RALES OR RHONCHI  HEART- RRR, NO RUB, MURMUR, OR GALLOP  ABD - NORMOACTIVE BOWEL SOUNDS, SOFT, NT.   EXT - NO EDEMA OR CYANOSIS  Bilateral lower extremities - ACE wraps and knee immobilizers  NEURO - Ox4.    MOTOR EXAM -  . B ADF/EV 5/5              MEDICATIONS  Scheduled Meds:acyclovir, 400 mg, Oral, BID  amLODIPine, 2.5 mg, Oral, Daily  apixaban, 2.5 mg, Oral, Q12H  vitamin C, 1,000 mg, Oral, Daily  bisoprolol, 5 mg, Oral, Daily  cholecalciferol, 5,000 Units, Oral, Daily  docusate sodium, 100 mg, Oral, BID  ferrous sulfate, 325 mg, Oral, Daily With Breakfast  finasteride, 5 mg, Oral, Daily  latanoprost, 1 drop, Both Eyes, Nightly  Lidocaine, 1 patch, Transdermal, Q24H  magnesium oxide, 400 mg, Oral, Daily  polyethylene glycol, 17 g, Oral, Daily  propafenone, 150 mg, Oral,  "TID  rosuvastatin, 10 mg, Oral, Nightly  senna-docusate sodium, 2 tablet, Oral, BID  sulfamethoxazole-trimethoprim, 1 tablet, Oral, Once per day on Monday Wednesday Friday  tamsulosin, 0.4 mg, Oral, BID      Continuous Infusions:   PRN Meds:.  acetaminophen **OR** acetaminophen **OR** acetaminophen    bisacodyl    heparin    ondansetron ODT **OR** ondansetron    traMADol      RESULTS  No results found for: \"POCGLU\"  Results from last 7 days   Lab Units 02/12/24  0656 02/10/24  0413 02/09/24  0526   WBC 10*3/mm3 4.85 4.78 4.50   HEMOGLOBIN g/dL 9.0* 8.5* 8.4*   HEMATOCRIT % 27.0* 25.6* 24.5*   PLATELETS 10*3/mm3 231 174 125*     Results from last 7 days   Lab Units 02/12/24  0656 02/08/24  0650 02/07/24  0548   SODIUM mmol/L 136 135* 136   POTASSIUM mmol/L 4.1 4.0 4.0   CHLORIDE mmol/L 102 102 103   CO2 mmol/L 24.4 24.9 26.0   BUN mg/dL 17 14 18   CREATININE mg/dL 0.59* 0.71* 0.65*   CALCIUM mg/dL 8.1* 7.9* 7.9*   GLUCOSE mg/dL 112* 111* 123*           ASSESSMENT and PLAN    Patella fracture    Bilateral patella fractures - tendon advancement repair Feb 5, 2024  Non-weightbearing except may do toe-touch during transfers.  Knee immobilizers at all times. No quad sets/straight leg raise/knee flexion.    Per orthopedic service-February 9-no (Zero) flexion of the knees during any dressing change, legs must remain straight given the concern for tendon repairs. If needed, can remove ACE wraps to inspect legs and replace dressing with an Optifoarm, but that should then remain in place until they see him in the office. They want to limit dressing changes given concern for the tendon repairs. Needs to remain in knee immobilizers at all times.   - may inspect with nursing later today with ACE wraps off to make sure no significant drainage through dressings, but if dressing are dry would leave in place as sterile from the OR.     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 JANNET report reviewed.       Disposition-February 12-to use [employer] for ramp at home and the install is scheduled for Wednesday, 2/14. Their son is flying in on Thursday sometime so discussed d/c either Thursday or Friday      Now admit for comprehensive acute inpatient rehabilitation .  This would be an interdisciplinary program with physical therapy 1.5 hour,  occupational therapy 1.5 hour,  5 days a week.  Rehabilitation nursing for carryover, monitoring of  incisions, anemia  status, bowel and bladder, and skin  Ongoing physician follow-up.  Weekly team conferences.  Goals are to achieve a level of modificed independent with  transfers for mobility and self-care   .   Rehabilitation prognosis good.  Medical prognosis fair- defer to Orthopedics.  Estimated length of stay is approximately one week , but is only an estimation.   The patient's functional status and clinical status is unchanged from preadmission assessment and the patient continues appropriate for acute inpatient rehabilitation.  Goal is for home with  home health   therapies.  Barrier to discharge:  impaired mobility and self care  - work on  transfers and ADLS with adaptive equipment and techniques  to overcome.           Alan Hamlin MD      Appropriate PPE was worn during the entire visit.  Hand hygiene was completed before and after.

## 2024-02-12 NOTE — PLAN OF CARE
Goal Outcome Evaluation:  Plan of Care Reviewed With: patient           Outcome Evaluation: Alert and oriented x4, assist x1 with sliding board, and medication with water. He is continent of b/b using BSC and had a large black BM today. Pain medication given once this morning. VS stable, He is so pleasant, cooperative, and very appreciative.  Wife brings meals in and keeps in ADL room. Family conference 2/13 at 11am and possible discharge on Thurs -according to patient.

## 2024-02-12 NOTE — THERAPY TREATMENT NOTE
Inpatient Rehabilitation - Occupational Therapy Treatment Note    [facility]     Patient Name: Angel Remy  : 1945  MRN: [id number]    Today's Date: 2024                 Admit Date: 2024       No diagnosis found.    Patient Active Problem List   Diagnosis    Hairy cell leukemia    FH: colon cancer    Hyponatremia    Orthostatic hypotension    Supine hypertension    Anemia    Macrocytosis    LBBB (left bundle branch block)    Prolonged P-R interval    Pancytopenia    Fitting and adjustment of vascular catheter    Elevated blood pressure reading    Thrombocytopenia    History of left bundle branch block (LBBB)    PAF (paroxysmal atrial fibrillation) history    Closed displaced transverse fracture of left patella    Closed displaced transverse fracture of right patella    Patella fracture       Past Medical History:   Diagnosis Date    BCC (basal cell carcinoma of skin) 2022    NOSE    Benign prostate hyperplasia     Elevated cholesterol     H/O Elevated PSA     H/O Hairy cell leukemia (CMS/HCC)     H/O Internal thrombosed hemorrhoids     H/O minimal right carotid plaque     H/O peripheral neuropathy     History of transfusion     Hyperlipidemia     Hypertension     Primary open-angle glaucoma, bilateral, mild stage        Past Surgical History:   Procedure Laterality Date    BONE MARROW BIOPSY W/ ASPIRATION      COLONOSCOPY      COLONOSCOPY N/A 2021    Procedure: COLONOSCOPY TO CECUM AND INTO TI WITH COLD BIOPSY POLYPECTOMIES;  Surgeon: Juan Jenkins MD;  Location: [facility] ENDOSCOPY;  Service: Gastroenterology;  Laterality: N/A;  pre: family history of colon cancer  post: polyps, diverticulosis  and internal hemorrhoids    EXCISION MASS HEAD/NECK N/A 2022    Procedure: EXCISION BASAL CELL CARCINOMA NOSE WITH FROZEN SECTION FULL THICKNESS GRAFT;  Surgeon: Arturo Weiss MD;  [place] [facility] [place] [facility]  Service: Plastics;  Laterality: N/A;    HERNIA REPAIR Right 2000     Inguinal     OTHER SURGICAL HISTORY  1989    Median nerve foreign body excision    PATELLA OPEN REDUCTION INTERNAL FIXATION Bilateral 2/5/2024    Procedure: PATELLA OPEN REDUCTION INTERNAL FIXATION;  Surgeon: Adrián Roberson MD;  Location: [facility];  Service: Orthopedics;  Laterality: Bilateral;    PROSTATE BIOPSY      March 2010 and October 2011 whruby PSA had acceleration    SIGMOIDOSCOPY  1999    With small thrombosed internal hemorrhoid.    TONSILLECTOMY      VENOUS ACCESS DEVICE (PORT) INSERTION Right 10/18/2023    Procedure: MEDIPORT PLACEMENTWITH SUPERIOR VENACAVAGRAM;  Surgeon: Randy Fan MD;  Location: [facility];  Service: Vascular;  Laterality: Right;    WISDOM TOOTH EXTRACTION  1961             IRF OT ASSESSMENT FLOWSHEET (last 12 hours)       IRF OT Evaluation and Treatment       Row Name 02/12/24 1001          OT Time and Intention    Document Type daily treatment  -KA     Mode of Treatment individual therapy;occupational therapy  -KA     Patient Effort good  -KA       Row Name 02/12/24 1001          General Information    Patient Profile Reviewed yes  -KA     Patient/Family/Caregiver Comments/Observations Supine in bed AM session and in w/c for PM session  -KA     Existing Precautions/Restrictions fall;weight bearing  TTWB BLE. KI on at all times  -KA       Row Name 02/12/24 1001          Pain Assessment    Pretreatment Pain Rating 0/10 - no pain  -KA     Posttreatment Pain Rating 0/10 - no pain  -       Row Name 02/12/24 1001          Cognition/Psychosocial    Orientation Status (Cognition) oriented x 4  -KA     Follows Commands (Cognition) WFL  -KA     Personal Safety Interventions fall prevention program maintained;gait belt;nonskid shoes/slippers when out of bed  -KA       Row Name 02/12/24 1001          Bathing    [place] Level (Bathing) bathing skills;upper body;set up;standby assist;lower body;maximum assist (25% patient effort)  -KA     Position (Bathing) sitting up  in bed  -     Set-up Assistance (Bathing) obtain supplies  -     Comment (Bathing) LB supine  -       Row Name 02/12/24 1001          Upper Body Dressing    [place] Level (Upper Body Dressing) upper body dressing skills;don;doff;set up assistance  -KA     Position (Upper Body Dressing) supported sitting  -     Set-up Assistance (Upper Body Dressing) obtain clothing  -       Row Name 02/12/24 1001          Lower Body Dressing    [place] Level (Lower Body Dressing) don;doff;underwear;moderate assist (50% patient effort);minimum assist (75% patient effort)  -KA     Position (Lower Body Dressing) sitting up in bed  -     Set-up Assistance (Lower Body Dressing) obtain clothing  -     Comment (Lower Body Dressing) Assist to pull up pants supine in bed due to sticking to velcro on his KIs  Dep for socks  -       Row Name 02/12/24 1001          Grooming    [place] Level (Grooming) grooming skills;deodorant application;hair care, combing/brushing;oral care regimen;shave face;wash face, hands;set up;standby assist  -     Position (Grooming) supported sitting  -     Set-up Assistance (Grooming) obtain supplies  -     Comment (Grooming) in wc. washed hair at sink in the shower room in AM session  -       Row Name 02/12/24 1001          Toileting    Comment (Toileting) Used urinal independently upon arrival. practiced dry run from w/c ot BSC. Did not have to perform toileting but practiced wt shifting side to side to pull down underwear and demo reaching behind to simulate posterior hygiene. Pt able to reach his bottom  -       Row Name 02/12/24 1001          Bed Mobility    Supine-Sit [place] (Bed Mobility) supervision  -       Row Name 02/12/24 1001          Bed-Chair Transfer    Bed-Chair [place] (Transfers) standby assist;verbal cues  -     Assistive Device (Bed-Chair Transfers) wheelchair;transfer board  -       Row Name 02/12/24 1001          Toilet Transfer    Type  (Toilet Transfer) lateral  -     [place] Level (Toilet Transfer) standby assist  -     Assistive Device (Toilet Transfer) commode, bedside with drop arms;transfer board  platform commode  -     Comment, (Toilet Transfer) performed w/c to platform drop arm BSC back to w/c. cues to ensure brakes were locked. therapist supported BLEs  -       Row Name 02/12/24 1001          Shoulder (Therapeutic Exercise)    Shoulder Strengthening (Therapeutic Exercise) bilateral;flexion;extension  4# dowel nury. Rickshaw with 20# on each side by request of pt. educated pt on green theraband exercises he can complete  -       Row Name 02/12/24 1001          Elbow/Forearm (Therapeutic Exercise)    Elbow/Forearm Strengthening (Therapeutic Exercise) flexion;extension;supination;pronation;sitting;4 lb free weight;10 repetitions;3 sets  -       Row Name 02/12/24 1001          Balance    Static Sitting Balance independent  -       Row Name 02/12/24 1001          Positioning and Restraints    Pre-Treatment Position in bed  -     Post Treatment Position wheelchair  Am and Pm session  -KA     In Wheelchair sitting;call light within reach;encouraged to call for assist;exit alarm on  -               User Key  (r) = Recorded By, (t) = Taken By, (c) = Cosigned By      Initials Name Effective Dates    Debra Zarco OT 09/22/22 -                      Occupational Therapy Education       Title: PT OT SLP Therapies (In Progress)       Topic: Occupational Therapy (In Progress)       Point: ADL training (Done)       Description:   Instruct learner(s) on proper safety adaptation and remediation techniques during self care or transfers.   Instruct in proper use of assistive devices.                  Learning Progress Summary             Patient Acceptance, E,TB,D, VU,DU by  at 2/8/2024 1200    Comment: precautions, brace wearing, ADL goals/POC, clothing choices                         Point: Home exercise program (Not  Started)       Description:   Instruct learner(s) on appropriate technique for monitoring, assisting and/or progressing therapeutic exercises/activities.                  Learner Progress:  Not documented in this visit.              Point: Precautions (Done)       Description:   Instruct learner(s) on prescribed precautions during self-care and functional transfers.                  Learning Progress Summary             Patient Acceptance, E,TB,D, VU,DU by  at 2/8/2024 1200    Comment: precautions, brace wearing, ADL goals/POC, clothing choices                         Point: Body mechanics (Not Started)       Description:   Instruct learner(s) on proper positioning and spine alignment during self-care, functional mobility activities and/or exercises.                  Learner Progress:  Not documented in this visit.                              User Key       Initials Effective Dates Name Provider Type Discipline     04/02/20 -  Lainey Trent OT Occupational Therapist OT                        OT Recommendation and Plan                         Time Calculation:      Time Calculation- OT       Row Name 02/12/24 1543 02/12/24 1542          Time Calculation- OT    OT Start Time 1230  -KA 0930  -KA     OT Stop Time 1300  -KA 1030  -KA     OT Time Calculation (min) 30 min  -KA 60 min  -KA               User Key  (r) = Recorded By, (t) = Taken By, (c) = Cosigned By      Initials Name Provider Type    Debra Zarco OT Occupational Therapist                  Therapy Charges for Today       Code Description Service Date Service Provider Modifiers Qty    46929054077 HC OT SELF CARE/MGMT/TRAIN EA 15 MIN 2/12/2024 Debra Still OT GO 4    59297104166 HC OT SELF CARE/MGMT/TRAIN EA 15 MIN 2/12/2024 Debra Still OT GO 1    [id number] HC OT THER PROC EA 15 MIN 2/12/2024 Debra Still OT GO 1                     Debra Still OT  2/12/2024

## 2024-02-12 NOTE — THERAPY TREATMENT NOTE
Inpatient Rehabilitation - Physical Therapy Treatment Note       [facility]     Patient Name: Angel Remy  : 1945  MRN: [id number]    Today's Date: 2024                    Admit Date: 2024      Visit Dx:   No diagnosis found.    Patient Active Problem List   Diagnosis    Hairy cell leukemia    FH: colon cancer    Hyponatremia    Orthostatic hypotension    Supine hypertension    Anemia    Macrocytosis    LBBB (left bundle branch block)    Prolonged P-R interval    Pancytopenia    Fitting and adjustment of vascular catheter    Elevated blood pressure reading    Thrombocytopenia    History of left bundle branch block (LBBB)    PAF (paroxysmal atrial fibrillation) history    Closed displaced transverse fracture of left patella    Closed displaced transverse fracture of right patella    Patella fracture       Past Medical History:   Diagnosis Date    BCC (basal cell carcinoma of skin) 2022    NOSE    Benign prostate hyperplasia     Elevated cholesterol     H/O Elevated PSA     H/O Hairy cell leukemia (CMS/HCC)     H/O Internal thrombosed hemorrhoids     H/O minimal right carotid plaque     H/O peripheral neuropathy     History of transfusion     Hyperlipidemia     Hypertension     Primary open-angle glaucoma, bilateral, mild stage        Past Surgical History:   Procedure Laterality Date    BONE MARROW BIOPSY W/ ASPIRATION      COLONOSCOPY      COLONOSCOPY N/A 2021    Procedure: COLONOSCOPY TO CECUM AND INTO TI WITH COLD BIOPSY POLYPECTOMIES;  Surgeon: Juan Jenkins MD;  Location: [facility] ENDOSCOPY;  Service: Gastroenterology;  Laterality: N/A;  pre: family history of colon cancer  post: polyps, diverticulosis  and internal hemorrhoids    EXCISION MASS HEAD/NECK N/A 2022    Procedure: EXCISION BASAL CELL CARCINOMA NOSE WITH FROZEN SECTION FULL THICKNESS GRAFT;  Surgeon: Arturo Weiss MD;  [place] [facility] [place] [facility];  Service: Plastics;  Laterality: N/A;    HERNIA REPAIR  Right 02/2000    Inguinal     OTHER SURGICAL HISTORY  1989    Median nerve foreign body excision    PATELLA OPEN REDUCTION INTERNAL FIXATION Bilateral 2/5/2024    Procedure: PATELLA OPEN REDUCTION INTERNAL FIXATION;  Surgeon: Adrián Roberson MD;  Location: [facility];  Service: Orthopedics;  Laterality: Bilateral;    PROSTATE BIOPSY      March 2010 and October 2011 whan PSA had acceleration    SIGMOIDOSCOPY  1999    With small thrombosed internal hemorrhoid.    TONSILLECTOMY      VENOUS ACCESS DEVICE (PORT) INSERTION Right 10/18/2023    Procedure: MEDIPORT PLACEMENTWITH SUPERIOR VENACAVAGRAM;  Surgeon: Randy Fan MD;  Location: [facility];  Service: Vascular;  Laterality: Right;    WISDOM TOOTH EXTRACTION  1961       PT ASSESSMENT (last 12 hours)       IRF PT Evaluation and Treatment       Row Name 02/12/24 1148          PT Time and Intention    Document Type daily treatment  -MD     Mode of Treatment physical therapy  -MD     Patient/Family/Caregiver Comments/Observations Pt sitting in WC showing no signs of acute distress.  -MD       Row Name 02/12/24 1148          General Information    Existing Precautions/Restrictions fall;weight bearing  TTWBing B.  KI on at all times  -MD       Row Name 02/12/24 1148          Pain Assessment    Pretreatment Pain Rating 0/10 - no pain  -MD       Row Name 02/12/24 1148          Cognition/Psychosocial    Orientation Status (Cognition) oriented x 4  -MD     Follows Commands (Cognition) WFL  -MD     Personal Safety Interventions fall prevention program maintained;gait belt  -MD       Row Name 02/12/24 1148          Bed Mobility    Supine-Sit [place] (Bed Mobility) supervision  -MD     Sit-Supine [place] (Bed Mobility) supervision  -MD       Row Name 02/12/24 1148          Bed-Chair Transfer    Bed-Chair [place] (Transfers) standby assist  -MD     Assistive Device (Bed-Chair Transfers) transfer board;wheelchair  -MD       Row Name 02/12/24 1148           Chair-Bed Transfer    Chair-Bed [place] (Transfers) standby assist  -MD     Assistive Device (Chair-Bed Transfers) wheelchair;transfer board  -MD       Row Name 02/12/24 1148          Wheelchair Mobility/Management    Method of Wheelchair Locomotion (Mobility) bimanual (upper extremity) propulsion  -MD     Mobility Activities (Wheelchair) forward propulsion;turning;ramp navigation  -MD     Forward Propulsion [place] (Wheelchair) modified independence  -MD     Turning [place] (Wheelchair) modified independence  -MD     Distance Propelled in Feet (Wheelchair) 200'x2  -MD     Comment, Wheelchair Mobility Pt propelled WC outside on concrete w SBA  -MD     [place], Ramp Navigation (Wheelchair) verbal cues;standby assist  -MD       Row Name 02/12/24 1148          Hip (Therapeutic Exercise)    Hip Strengthening (Therapeutic Exercise) bilateral;aBduction;aDduction;external rotation;internal rotation;10 repetitions;2 sets  -MD       Row Name 02/12/24 1148          Ankle (Therapeutic Exercise)    Ankle Strengthening (Therapeutic Exercise) bilateral;dorsiflexion;plantarflexion;inversion;eversion;10 repetitions;2 sets  -MD       Row Name 02/12/24 1148          Positioning and Restraints    Pre-Treatment Position sitting in chair/recliner  -MD     Post Treatment Position bed  -MD     In Bed supine;call light within reach;exit alarm on  -MD               User Key  (r) = Recorded By, (t) = Taken By, (c) = Cosigned By      Initials Name Provider Type    Berna Rico, PT Physical Therapist                  Wound 02/05/24 1433 Left anterior knee Incision (Active)   Dressing Appearance dry;intact 02/11/24 2154   Closure Unable to assess 02/11/24 2154       Wound 02/05/24 1539 Right anterior knee Incision (Active)   Dressing Appearance dry;intact 02/11/24 2154   Closure Unable to assess 02/11/24 2154       Wound 02/07/24 2244 coccyx Other (comment) (Active)   Dressing Appearance open to air 02/11/24 2154    Closure None 02/11/24 2154   Base clean;dry 02/11/24 2154   Drainage Amount none 02/11/24 2154     Physical Therapy Education       Title: PT OT SLP Therapies (In Progress)       Topic: Physical Therapy (In Progress)       Point: Mobility training (Not Started)       Learner Progress:  Not documented in this visit.              Point: Home exercise program (Not Started)       Learner Progress:  Not documented in this visit.              Point: Body mechanics (Not Started)       Learner Progress:  Not documented in this visit.              Point: Precautions (Done)       Learning Progress Summary             Patient Acceptance, E, VU by MD at 2/12/2024 1151    Acceptance, E, VU by MD at 2/9/2024 1052    Acceptance, E, VU by MD at 2/8/2024 1226                                         User Key       Initials Effective Dates Name Provider Type Discipline    MD 06/16/21 -  Berna Rivera PT Physical Therapist PT                    PT Recommendation and Plan    Planned Therapy Interventions (PT): home exercise program, patient/family education, transfer training, bed mobility training  Frequency of Treatment (PT): 5 times per week, 90 minutes per session                     Time Calculation:      PT Charges       Row Name 02/12/24 1543 02/12/24 1147          Time Calculation    Start Time 1430  -MD 1030  -MD     Stop Time 1500  -MD 1130  -MD     Time Calculation (min) 30 min  -MD 60 min  -MD     PT Received On -- 02/12/24  -MD     PT - Next Appointment -- 02/13/24  -MD               User Key  (r) = Recorded By, (t) = Taken By, (c) = Cosigned By      Initials Name Provider Type    Berna Rico, PT Physical Therapist                    Therapy Charges for Today       Code Description Service Date Service Provider Modifiers Qty    43504361202 HC PT THERAPEUTIC ACT EA 15 MIN 2/12/2024 Berna Rivera, PT GP 3    60854611443 HC PT THER PROC EA 15 MIN 2/12/2024 Berna Rivera, PT GP 3              PT G-Codes  AM-PAC 6 Clicks Score (PT):  12      Berna Rivera, PT  2/12/2024

## 2024-02-12 NOTE — PROGRESS NOTES
Subjective     HISTORY OF PRESENT ILLNESS:   The patient is a 78 y.o. year old male with medical history significant for recurrent hairy cell leukemia, BCC of the skin, hypertension and dyslipidemia was hospitalized on 2/4/2024 with bilateral patellar fractures after falling directly on his knees.  S/p repair by  on 2/5/2024.  Patient had significant postoperative anemia and was seen by hematology.  Recommended to start oral iron supplementation and transfusion to keep hemoglobin > 7.  Patient was discharged to acute rehab.  Hematology has been reconsulted in view of persistent anemia with hemoglobin of 9.0 on 2/12/2024.  MCV 91.5.  Normal WBC and platelets.     Patient is well-known to our service and follows up with  for recurrent hairy cell leukemia.    2/12/24:  No acute issues overnight.  Patient undergoing physical therapy.    Past Medical History, Past Surgical History, Social History, Family History have been reviewed and are without significant changes except as mentioned.    Review of Systems   A comprehensive 14 point review of systems was performed and was negative except as mentioned.    Medications:  The current medication list was reviewed in the EMR    ALLERGIES:  No Known Allergies    Objective      Vitals:    02/11/24 1952 02/12/24 0500 02/12/24 0826 02/12/24 1309   BP: 122/64 127/67 120/70 128/62   BP Location: Left arm Left arm  Left arm   Patient Position: Lying Lying  Sitting   Pulse: 70 70 74 67   Resp: 18 18  17   Temp: 97.2 °F (36.2 °C) 97.1 °F (36.2 °C)  98.1 °F (36.7 °C)   TempSrc: Oral Oral  Oral   SpO2: 100% 99%  100%   Weight:       Height:             1/4/2024    12:39 PM   Current Status   ECOG score 0       Physical Exam    CONSTITUTIONAL:  Vital signs reviewed.  No distress, looks comfortable.  EYES:  Conjunctiva and lids unremarkable.    EARS,NOSE,MOUTH,THROAT:  Ears and nose appear unremarkable.    RESPIRATORY:  Normal respiratory effort.  CARDIOVASCULAR:   Normal heart rate  GASTROINTESTINAL: Abdomen appears unremarkable.  Nondistended   LYMPHATIC:  No cervical, supraclavicular lymphadenopathy.  SKIN:  Warm.  No rashes.  Extensive bruises on bilateral thighs.  Bilateral knees covered in dressing and braces  PSYCHIATRIC:  Normal judgment and insight.  Normal mood and affect.  NEURO: AAOx3, no obvious focal deficits.    RECENT LABS:  Hematology WBC   Date Value Ref Range Status   02/12/2024 4.85 3.40 - 10.80 10*3/mm3 Final     RBC   Date Value Ref Range Status   02/12/2024 2.95 (L) 4.14 - 5.80 10*6/mm3 Final     Hemoglobin   Date Value Ref Range Status   02/12/2024 9.0 (L) 13.0 - 17.7 g/dL Final     Hematocrit   Date Value Ref Range Status   02/12/2024 27.0 (L) 37.5 - 51.0 % Final     Platelets   Date Value Ref Range Status   02/12/2024 231 140 - 450 10*3/mm3 Final              Assessment & Plan   # Normocytic anemia, likely blood loss related:  Hemoglobin had dropped to 8.0 on 2/7/2024 postoperatively from 13.1 prior to that.  Iron panel had shown mild iron deficiency.  Started on oral iron supplementation  Hemoglobin 9.0, MCV 91.5 on 2/12/2024.  Informed patient that the drop in hemoglobin is likely blood loss related to surgery and trauma (had extensive bruises on bilateral thighs).  Encouraged to increase dietary iron intake.  Patient states he eats lots of green vegetables and salads.  Continue daily oral iron supplementation, ferrous sulfate 3 and 25 mg daily  Will schedule for repeat CBC and iron profile check in our office on 2/23/2024 at 1 PM.     # Recurrent hairy cell leukemia:  Recent administration of rituximab.  Will follow-up with  as outpatient     # Bilateral patellar fracture:  Following a fall  S/p fracture repair by  on 2/5/2024.  Undergoing acute rehab     Recommendations:  -Continue p.o. ferrous sulfate 325 mg daily  -Encouraged to increase dietary iron intake  -Transfuse to keep hemoglobin > 7  -Will schedule lab draw in our  office on 2/23/2024 as per patient's request  -Patient will maintain follow-up with  for his oncologic care     Will follow peripherally.  Please call us with any questions.  I spent 52 minutes on this encounter, before, during & after the visit evaluating the patient, reviewing records and writing orders.

## 2024-02-13 PROCEDURE — 97530 THERAPEUTIC ACTIVITIES: CPT

## 2024-02-13 PROCEDURE — 97110 THERAPEUTIC EXERCISES: CPT

## 2024-02-13 PROCEDURE — 97535 SELF CARE MNGMENT TRAINING: CPT | Performed by: OCCUPATIONAL THERAPIST

## 2024-02-13 PROCEDURE — 97110 THERAPEUTIC EXERCISES: CPT | Performed by: OCCUPATIONAL THERAPIST

## 2024-02-13 RX ADMIN — MAGNESIUM OXIDE 400 MG (241.3 MG MAGNESIUM) TABLET 400 MG: TABLET at 08:49

## 2024-02-13 RX ADMIN — PROPAFENONE HYDROCHLORIDE 150 MG: 150 TABLET, COATED ORAL at 21:32

## 2024-02-13 RX ADMIN — FINASTERIDE 5 MG: 5 TABLET, FILM COATED ORAL at 21:32

## 2024-02-13 RX ADMIN — Medication 5000 UNITS: at 08:48

## 2024-02-13 RX ADMIN — TRAMADOL HYDROCHLORIDE 50 MG: 50 TABLET ORAL at 08:46

## 2024-02-13 RX ADMIN — OXYCODONE HYDROCHLORIDE AND ACETAMINOPHEN 1000 MG: 500 TABLET ORAL at 08:48

## 2024-02-13 RX ADMIN — DOCUSATE SODIUM 50MG AND SENNOSIDES 8.6MG 2 TABLET: 8.6; 5 TABLET, FILM COATED ORAL at 08:50

## 2024-02-13 RX ADMIN — TAMSULOSIN HYDROCHLORIDE 0.4 MG: 0.4 CAPSULE ORAL at 08:49

## 2024-02-13 RX ADMIN — ROSUVASTATIN CALCIUM 10 MG: 10 TABLET, FILM COATED ORAL at 21:31

## 2024-02-13 RX ADMIN — TRAMADOL HYDROCHLORIDE 50 MG: 50 TABLET ORAL at 21:37

## 2024-02-13 RX ADMIN — BISOPROLOL FUMARATE 5 MG: 5 TABLET, FILM COATED ORAL at 08:49

## 2024-02-13 RX ADMIN — PROPAFENONE HYDROCHLORIDE 150 MG: 150 TABLET, COATED ORAL at 15:50

## 2024-02-13 RX ADMIN — APIXABAN 2.5 MG: 2.5 TABLET, FILM COATED ORAL at 21:32

## 2024-02-13 RX ADMIN — PROPAFENONE HYDROCHLORIDE 150 MG: 150 TABLET, COATED ORAL at 08:49

## 2024-02-13 RX ADMIN — ACYCLOVIR 400 MG: 400 TABLET ORAL at 08:49

## 2024-02-13 RX ADMIN — POLYETHYLENE GLYCOL 3350 17 G: 17 POWDER, FOR SOLUTION ORAL at 15:52

## 2024-02-13 RX ADMIN — TAMSULOSIN HYDROCHLORIDE 0.4 MG: 0.4 CAPSULE ORAL at 21:31

## 2024-02-13 RX ADMIN — TRAMADOL HYDROCHLORIDE 50 MG: 50 TABLET ORAL at 15:50

## 2024-02-13 RX ADMIN — FERROUS SULFATE TAB 325 MG (65 MG ELEMENTAL FE) 325 MG: 325 (65 FE) TAB at 08:49

## 2024-02-13 RX ADMIN — ACYCLOVIR 400 MG: 400 TABLET ORAL at 21:31

## 2024-02-13 RX ADMIN — AMLODIPINE BESYLATE 2.5 MG: 2.5 TABLET ORAL at 21:31

## 2024-02-13 RX ADMIN — LATANOPROST 1 DROP: 50 SOLUTION/ DROPS OPHTHALMIC at 21:32

## 2024-02-13 RX ADMIN — APIXABAN 2.5 MG: 2.5 TABLET, FILM COATED ORAL at 08:49

## 2024-02-13 NOTE — PROGRESS NOTES
Inpatient Rehabilitation Plan of Care Note    Plan of Care  Care Plan Reviewed - Updates as Follows    Sphincter Control    [RN] Bladder Management(Active)  Current Status(02/12/2024): Continent 100%  Weekly Goal(02/19/2024): Continent 100%  Discharge Goal: Continent 100%    [RN] Bowel Management(Active)  Current Status(02/12/2024): Continent 100%; pt able to put self on bedpan; per  PT can use CGA with SB to BSC  Weekly Goal(02/19/2024): Continent 100%  Discharge Goal: Continent 100%    Performed Intervention(s)  Urinal and bedpan within reach  Incontinence products in use  SB to BSC      Safety    [RN] Potential for Injury(Active)  Current Status(02/12/2024): At risk for falls r/t samantha knee fractures; NWB except  TTWB when transferring  Weekly Goal(02/19/2024): will use call light  Discharge Goal: No falls    Performed Intervention(s)  Wearing samantha knee braces at all times  Falls precautions in place  Hourly rounding      Psychosocial    [RN] Coping/Adjustment(Active)  Current Status(02/12/2024): Pt coping well; motivated to start therapy; has  supportive wife  Weekly Goal(02/19/2024): Verbalizes needs and concerns  Discharge Goal: Demonstrates adequate coping    Performed Intervention(s)  Allow patient to verbalize concerns regarding health  Provide calm enviroment      Pain    [RN] Pain Management(Active)  Current Status(02/12/2024): Pain to samantha knees; PRN Tramadol and Tylenol  available  Weekly Goal(02/19/2024): will be able to tolerate therapies  Discharge Goal: pain controlled    Performed Intervention(s)  Offer pain medication when available  encourage repositioning q2    Signed by: Sandra Dunham RN

## 2024-02-14 PROCEDURE — 97110 THERAPEUTIC EXERCISES: CPT | Performed by: OCCUPATIONAL THERAPIST

## 2024-02-14 PROCEDURE — 97530 THERAPEUTIC ACTIVITIES: CPT

## 2024-02-14 PROCEDURE — 97110 THERAPEUTIC EXERCISES: CPT

## 2024-02-14 PROCEDURE — 97535 SELF CARE MNGMENT TRAINING: CPT | Performed by: OCCUPATIONAL THERAPIST

## 2024-02-14 RX ADMIN — APIXABAN 2.5 MG: 2.5 TABLET, FILM COATED ORAL at 21:31

## 2024-02-14 RX ADMIN — LATANOPROST 1 DROP: 50 SOLUTION/ DROPS OPHTHALMIC at 21:33

## 2024-02-14 RX ADMIN — TRAMADOL HYDROCHLORIDE 50 MG: 50 TABLET ORAL at 09:09

## 2024-02-14 RX ADMIN — TAMSULOSIN HYDROCHLORIDE 0.4 MG: 0.4 CAPSULE ORAL at 09:09

## 2024-02-14 RX ADMIN — FINASTERIDE 5 MG: 5 TABLET, FILM COATED ORAL at 21:31

## 2024-02-14 RX ADMIN — ACYCLOVIR 400 MG: 400 TABLET ORAL at 21:31

## 2024-02-14 RX ADMIN — PROPAFENONE HYDROCHLORIDE 150 MG: 150 TABLET, COATED ORAL at 09:08

## 2024-02-14 RX ADMIN — TRAMADOL HYDROCHLORIDE 50 MG: 50 TABLET ORAL at 21:31

## 2024-02-14 RX ADMIN — BISOPROLOL FUMARATE 5 MG: 5 TABLET, FILM COATED ORAL at 09:09

## 2024-02-14 RX ADMIN — TAMSULOSIN HYDROCHLORIDE 0.4 MG: 0.4 CAPSULE ORAL at 21:31

## 2024-02-14 RX ADMIN — MAGNESIUM OXIDE 400 MG (241.3 MG MAGNESIUM) TABLET 400 MG: TABLET at 09:09

## 2024-02-14 RX ADMIN — ROSUVASTATIN CALCIUM 10 MG: 10 TABLET, FILM COATED ORAL at 21:31

## 2024-02-14 RX ADMIN — AMLODIPINE BESYLATE 2.5 MG: 2.5 TABLET ORAL at 21:31

## 2024-02-14 RX ADMIN — APIXABAN 2.5 MG: 2.5 TABLET, FILM COATED ORAL at 09:09

## 2024-02-14 RX ADMIN — ACYCLOVIR 400 MG: 400 TABLET ORAL at 09:20

## 2024-02-14 RX ADMIN — OXYCODONE HYDROCHLORIDE AND ACETAMINOPHEN 1000 MG: 500 TABLET ORAL at 09:09

## 2024-02-14 RX ADMIN — PROPAFENONE HYDROCHLORIDE 150 MG: 150 TABLET, COATED ORAL at 16:32

## 2024-02-14 RX ADMIN — FERROUS SULFATE TAB 325 MG (65 MG ELEMENTAL FE) 325 MG: 325 (65 FE) TAB at 09:09

## 2024-02-14 RX ADMIN — TRAMADOL HYDROCHLORIDE 50 MG: 50 TABLET ORAL at 16:32

## 2024-02-14 RX ADMIN — SULFAMETHOXAZOLE AND TRIMETHOPRIM 1 TABLET: 800; 160 TABLET ORAL at 11:39

## 2024-02-14 RX ADMIN — Medication 5000 UNITS: at 09:09

## 2024-02-14 RX ADMIN — PROPAFENONE HYDROCHLORIDE 150 MG: 150 TABLET, COATED ORAL at 21:31

## 2024-02-14 RX ADMIN — LIDOCAINE 1 PATCH: 4 PATCH TOPICAL at 21:31

## 2024-02-15 ENCOUNTER — HOME HEALTH ADMISSION (OUTPATIENT)
Dept: HOME HEALTH SERVICES | Facility: HOME HEALTHCARE | Age: 79
End: 2024-02-15
Payer: MEDICARE

## 2024-02-15 ENCOUNTER — DOCUMENTATION (OUTPATIENT)
Dept: HOME HEALTH SERVICES | Facility: HOME HEALTHCARE | Age: 79
End: 2024-02-15
Payer: MEDICARE

## 2024-02-15 ENCOUNTER — TRANSCRIBE ORDERS (OUTPATIENT)
Dept: HOME HEALTH SERVICES | Facility: HOME HEALTHCARE | Age: 79
End: 2024-02-15
Payer: MEDICARE

## 2024-02-15 VITALS
TEMPERATURE: 98.3 F | HEART RATE: 51 BPM | HEIGHT: 70 IN | WEIGHT: 171.3 LBS | OXYGEN SATURATION: 98 % | BODY MASS INDEX: 24.52 KG/M2 | DIASTOLIC BLOOD PRESSURE: 59 MMHG | SYSTOLIC BLOOD PRESSURE: 131 MMHG | RESPIRATION RATE: 17 BRPM

## 2024-02-15 DIAGNOSIS — S82.001A CLOSED DISPLACED FRACTURE OF RIGHT PATELLA, UNSPECIFIED FRACTURE MORPHOLOGY, INITIAL ENCOUNTER: ICD-10-CM

## 2024-02-15 DIAGNOSIS — S82.031A CLOSED DISPLACED TRANSVERSE FRACTURE OF RIGHT PATELLA, INITIAL ENCOUNTER: ICD-10-CM

## 2024-02-15 DIAGNOSIS — S82.032A CLOSED DISPLACED TRANSVERSE FRACTURE OF LEFT PATELLA, INITIAL ENCOUNTER: Primary | ICD-10-CM

## 2024-02-15 LAB
ANION GAP SERPL CALCULATED.3IONS-SCNC: 10.2 MMOL/L (ref 5–15)
BASOPHILS # BLD AUTO: 0.01 10*3/MM3 (ref 0–0.2)
BASOPHILS NFR BLD AUTO: 0.2 % (ref 0–1.5)
BUN SERPL-MCNC: 23 MG/DL (ref 8–23)
BUN/CREAT SERPL: 33.8 (ref 7–25)
CALCIUM SPEC-SCNC: 8.4 MG/DL (ref 8.6–10.5)
CHLORIDE SERPL-SCNC: 102 MMOL/L (ref 98–107)
CO2 SERPL-SCNC: 24.8 MMOL/L (ref 22–29)
CREAT SERPL-MCNC: 0.68 MG/DL (ref 0.76–1.27)
DEPRECATED RDW RBC AUTO: 47.5 FL (ref 37–54)
EGFRCR SERPLBLD CKD-EPI 2021: 95.1 ML/MIN/1.73
EOSINOPHIL # BLD AUTO: 0.07 10*3/MM3 (ref 0–0.4)
EOSINOPHIL NFR BLD AUTO: 1.3 % (ref 0.3–6.2)
ERYTHROCYTE [DISTWIDTH] IN BLOOD BY AUTOMATED COUNT: 14 % (ref 12.3–15.4)
GLUCOSE SERPL-MCNC: 112 MG/DL (ref 65–99)
HCT VFR BLD AUTO: 29.2 % (ref 37.5–51)
HGB BLD-MCNC: 9.8 G/DL (ref 13–17.7)
IMM GRANULOCYTES # BLD AUTO: 0.06 10*3/MM3 (ref 0–0.05)
IMM GRANULOCYTES NFR BLD AUTO: 1.1 % (ref 0–0.5)
LYMPHOCYTES # BLD AUTO: 0.48 10*3/MM3 (ref 0.7–3.1)
LYMPHOCYTES NFR BLD AUTO: 8.9 % (ref 19.6–45.3)
MCH RBC QN AUTO: 31.6 PG (ref 26.6–33)
MCHC RBC AUTO-ENTMCNC: 33.6 G/DL (ref 31.5–35.7)
MCV RBC AUTO: 94.2 FL (ref 79–97)
MONOCYTES # BLD AUTO: 0.7 10*3/MM3 (ref 0.1–0.9)
MONOCYTES NFR BLD AUTO: 12.9 % (ref 5–12)
NEUTROPHILS NFR BLD AUTO: 4.09 10*3/MM3 (ref 1.7–7)
NEUTROPHILS NFR BLD AUTO: 75.6 % (ref 42.7–76)
NRBC BLD AUTO-RTO: 0 /100 WBC (ref 0–0.2)
PLATELET # BLD AUTO: 260 10*3/MM3 (ref 140–450)
PMV BLD AUTO: 9.7 FL (ref 6–12)
POTASSIUM SERPL-SCNC: 4.1 MMOL/L (ref 3.5–5.2)
RBC # BLD AUTO: 3.1 10*6/MM3 (ref 4.14–5.8)
SODIUM SERPL-SCNC: 137 MMOL/L (ref 136–145)
WBC NRBC COR # BLD AUTO: 5.41 10*3/MM3 (ref 3.4–10.8)

## 2024-02-15 PROCEDURE — 80048 BASIC METABOLIC PNL TOTAL CA: CPT | Performed by: PHYSICAL MEDICINE & REHABILITATION

## 2024-02-15 PROCEDURE — 85025 COMPLETE CBC W/AUTO DIFF WBC: CPT | Performed by: PHYSICAL MEDICINE & REHABILITATION

## 2024-02-15 RX ORDER — FERROUS SULFATE 325(65) MG
325 TABLET ORAL
Qty: 90 TABLET | Refills: 0 | Status: SHIPPED | OUTPATIENT
Start: 2024-02-16

## 2024-02-15 RX ORDER — SULFAMETHOXAZOLE AND TRIMETHOPRIM 800; 160 MG/1; MG/1
1 TABLET ORAL 3 TIMES WEEKLY
Qty: 12 TABLET | Refills: 3 | Status: SHIPPED | OUTPATIENT
Start: 2024-02-16 | End: 2024-06-06

## 2024-02-15 RX ORDER — ACETAMINOPHEN 325 MG/1
650 TABLET ORAL EVERY 6 HOURS PRN
Start: 2024-02-15

## 2024-02-15 RX ORDER — TRAMADOL HYDROCHLORIDE 50 MG/1
50 TABLET ORAL EVERY 4 HOURS PRN
Qty: 21 TABLET | Refills: 0 | Status: SHIPPED | OUTPATIENT
Start: 2024-02-15 | End: 2024-02-22

## 2024-02-15 RX ORDER — LIDOCAINE 4 G/G
1 PATCH TOPICAL
Qty: 15 PATCH | Refills: 1 | Status: SHIPPED | OUTPATIENT
Start: 2024-02-15

## 2024-02-15 RX ORDER — AMOXICILLIN 250 MG
2 CAPSULE ORAL 2 TIMES DAILY
Qty: 120 TABLET | Refills: 0 | Status: SHIPPED | OUTPATIENT
Start: 2024-02-15

## 2024-02-15 RX ADMIN — FERROUS SULFATE TAB 325 MG (65 MG ELEMENTAL FE) 325 MG: 325 (65 FE) TAB at 08:43

## 2024-02-15 RX ADMIN — DOCUSATE SODIUM 50MG AND SENNOSIDES 8.6MG 2 TABLET: 8.6; 5 TABLET, FILM COATED ORAL at 08:43

## 2024-02-15 RX ADMIN — PROPAFENONE HYDROCHLORIDE 150 MG: 150 TABLET, COATED ORAL at 08:43

## 2024-02-15 RX ADMIN — TAMSULOSIN HYDROCHLORIDE 0.4 MG: 0.4 CAPSULE ORAL at 08:43

## 2024-02-15 RX ADMIN — BISOPROLOL FUMARATE 5 MG: 5 TABLET, FILM COATED ORAL at 08:43

## 2024-02-15 RX ADMIN — DOCUSATE SODIUM 100 MG: 100 CAPSULE, LIQUID FILLED ORAL at 08:43

## 2024-02-15 RX ADMIN — ACYCLOVIR 400 MG: 400 TABLET ORAL at 08:43

## 2024-02-15 RX ADMIN — MAGNESIUM OXIDE 400 MG (241.3 MG MAGNESIUM) TABLET 400 MG: TABLET at 08:43

## 2024-02-15 RX ADMIN — PROPAFENONE HYDROCHLORIDE 150 MG: 150 TABLET, COATED ORAL at 15:36

## 2024-02-15 RX ADMIN — OXYCODONE HYDROCHLORIDE AND ACETAMINOPHEN 1000 MG: 500 TABLET ORAL at 08:44

## 2024-02-15 RX ADMIN — Medication 5000 UNITS: at 08:44

## 2024-02-15 RX ADMIN — APIXABAN 2.5 MG: 2.5 TABLET, FILM COATED ORAL at 08:43

## 2024-02-16 ENCOUNTER — HOME CARE VISIT (OUTPATIENT)
Dept: HOME HEALTH SERVICES | Facility: HOME HEALTHCARE | Age: 79
End: 2024-02-16
Payer: MEDICARE

## 2024-02-16 VITALS
RESPIRATION RATE: 18 BRPM | OXYGEN SATURATION: 98 % | HEART RATE: 63 BPM | SYSTOLIC BLOOD PRESSURE: 126 MMHG | DIASTOLIC BLOOD PRESSURE: 68 MMHG | TEMPERATURE: 96.6 F

## 2024-02-16 PROCEDURE — G0151 HHCP-SERV OF PT,EA 15 MIN: HCPCS

## 2024-02-18 ENCOUNTER — HOME CARE VISIT (OUTPATIENT)
Dept: HOME HEALTH SERVICES | Facility: HOME HEALTHCARE | Age: 79
End: 2024-02-18
Payer: MEDICARE

## 2024-02-20 ENCOUNTER — HOME CARE VISIT (OUTPATIENT)
Dept: HOME HEALTH SERVICES | Facility: HOME HEALTHCARE | Age: 79
End: 2024-02-20
Payer: MEDICARE

## 2024-02-20 VITALS
DIASTOLIC BLOOD PRESSURE: 82 MMHG | RESPIRATION RATE: 18 BRPM | HEART RATE: 62 BPM | TEMPERATURE: 97.2 F | SYSTOLIC BLOOD PRESSURE: 128 MMHG | OXYGEN SATURATION: 97 %

## 2024-02-20 PROCEDURE — G0151 HHCP-SERV OF PT,EA 15 MIN: HCPCS

## 2024-02-21 NOTE — CASE COMMUNICATION
Dear Dr. Roberson,    Mr. Remy has met his goals for  PT with his transfers and home safety.  Until the patient's WB status or ROM changes there is no skilled need for the patient at this time.  We would be happy to see him to progress his care once his restrictions change.    Thank you,  Cecelia Barragan, PT, DPT  [facility]

## 2024-02-21 NOTE — HOME HEALTH
"Subjective: \"I am doing well with minimal pain in my legs.\"    Changes in Medications: none    Any Falls Since Last Visit: none    Assessment: Patient has done well since returning home with his wife, he demonstrated independence with his wife performing transfers safely.  At this time patient and with caregiver assistance is independent with his transfers and safety in the home.  Patient was discharged from  until his precautions have advanced to PWB and then HH will resume.  Patient and wife were agreeable and patient did not feel further  PT at this stage."

## 2024-02-23 ENCOUNTER — LAB (OUTPATIENT)
Dept: LAB | Facility: HOSPITAL | Age: 79
End: 2024-02-23
Payer: MEDICARE

## 2024-02-23 ENCOUNTER — OFFICE VISIT (OUTPATIENT)
Dept: ORTHOPEDIC SURGERY | Facility: CLINIC | Age: 79
End: 2024-02-23
Payer: MEDICARE

## 2024-02-23 VITALS — TEMPERATURE: 98.9 F | BODY MASS INDEX: 24.48 KG/M2 | HEIGHT: 70 IN | WEIGHT: 171 LBS

## 2024-02-23 DIAGNOSIS — S82.032A CLOSED DISPLACED TRANSVERSE FRACTURE OF LEFT PATELLA, INITIAL ENCOUNTER: ICD-10-CM

## 2024-02-23 DIAGNOSIS — C91.40 HAIRY CELL LEUKEMIA NOT HAVING ACHIEVED REMISSION: Primary | ICD-10-CM

## 2024-02-23 DIAGNOSIS — S82.031A CLOSED DISPLACED TRANSVERSE FRACTURE OF RIGHT PATELLA, INITIAL ENCOUNTER: ICD-10-CM

## 2024-02-23 DIAGNOSIS — Z09 SURGERY FOLLOW-UP: Primary | ICD-10-CM

## 2024-02-23 DIAGNOSIS — Z79.899 LONG-TERM USE OF HIGH-RISK MEDICATION: ICD-10-CM

## 2024-02-23 LAB
BASOPHILS # BLD AUTO: 0.01 10*3/MM3 (ref 0–0.2)
BASOPHILS NFR BLD AUTO: 0.2 % (ref 0–1.5)
DEPRECATED RDW RBC AUTO: 53 FL (ref 37–54)
EOSINOPHIL # BLD AUTO: 0.04 10*3/MM3 (ref 0–0.4)
EOSINOPHIL NFR BLD AUTO: 0.7 % (ref 0.3–6.2)
ERYTHROCYTE [DISTWIDTH] IN BLOOD BY AUTOMATED COUNT: 14.9 % (ref 12.3–15.4)
FERRITIN SERPL-MCNC: 303 NG/ML (ref 30–400)
HCT VFR BLD AUTO: 35.8 % (ref 37.5–51)
HGB BLD-MCNC: 11.9 G/DL (ref 13–17.7)
IMM GRANULOCYTES # BLD AUTO: 0.02 10*3/MM3 (ref 0–0.05)
IMM GRANULOCYTES NFR BLD AUTO: 0.4 % (ref 0–0.5)
IRON 24H UR-MRATE: 113 MCG/DL (ref 59–158)
IRON SATN MFR SERPL: 40 % (ref 20–50)
LYMPHOCYTES # BLD AUTO: 0.54 10*3/MM3 (ref 0.7–3.1)
LYMPHOCYTES NFR BLD AUTO: 10 % (ref 19.6–45.3)
MCH RBC QN AUTO: 32.3 PG (ref 26.6–33)
MCHC RBC AUTO-ENTMCNC: 33.2 G/DL (ref 31.5–35.7)
MCV RBC AUTO: 97.3 FL (ref 79–97)
MONOCYTES # BLD AUTO: 0.58 10*3/MM3 (ref 0.1–0.9)
MONOCYTES NFR BLD AUTO: 10.7 % (ref 5–12)
NEUTROPHILS NFR BLD AUTO: 4.22 10*3/MM3 (ref 1.7–7)
NEUTROPHILS NFR BLD AUTO: 78 % (ref 42.7–76)
NRBC BLD AUTO-RTO: 0 /100 WBC (ref 0–0.2)
PLATELET # BLD AUTO: 237 10*3/MM3 (ref 140–450)
PMV BLD AUTO: 8.9 FL (ref 6–12)
RBC # BLD AUTO: 3.68 10*6/MM3 (ref 4.14–5.8)
TIBC SERPL-MCNC: 284 MCG/DL (ref 298–536)
TRANSFERRIN SERPL-MCNC: 203 MG/DL (ref 200–360)
WBC NRBC COR # BLD AUTO: 5.41 10*3/MM3 (ref 3.4–10.8)

## 2024-02-23 PROCEDURE — 36415 COLL VENOUS BLD VENIPUNCTURE: CPT

## 2024-02-23 PROCEDURE — 84466 ASSAY OF TRANSFERRIN: CPT

## 2024-02-23 PROCEDURE — 83540 ASSAY OF IRON: CPT

## 2024-02-23 PROCEDURE — 82728 ASSAY OF FERRITIN: CPT

## 2024-02-23 PROCEDURE — 85025 COMPLETE CBC W/AUTO DIFF WBC: CPT

## 2024-02-23 RX ORDER — TRAMADOL HYDROCHLORIDE 50 MG/1
50 TABLET ORAL EVERY 8 HOURS PRN
Qty: 30 TABLET | Refills: 0 | Status: SHIPPED | OUTPATIENT
Start: 2024-02-23

## 2024-02-27 DIAGNOSIS — Z09 SURGERY FOLLOW-UP: ICD-10-CM

## 2024-02-27 RX ORDER — TRAMADOL HYDROCHLORIDE 50 MG/1
50 TABLET ORAL EVERY 8 HOURS PRN
Qty: 30 TABLET | Refills: 0 | Status: SHIPPED | OUTPATIENT
Start: 2024-02-27

## 2024-03-06 ENCOUNTER — OFFICE VISIT (OUTPATIENT)
Dept: ORTHOPEDIC SURGERY | Facility: CLINIC | Age: 79
End: 2024-03-06
Payer: MEDICARE

## 2024-03-06 VITALS — TEMPERATURE: 98.2 F | WEIGHT: 171 LBS | HEIGHT: 70 IN | BODY MASS INDEX: 24.48 KG/M2

## 2024-03-06 DIAGNOSIS — S82.032A CLOSED DISPLACED TRANSVERSE FRACTURE OF LEFT PATELLA, INITIAL ENCOUNTER: Primary | ICD-10-CM

## 2024-03-06 DIAGNOSIS — S82.031A CLOSED DISPLACED TRANSVERSE FRACTURE OF RIGHT PATELLA, INITIAL ENCOUNTER: ICD-10-CM

## 2024-03-06 RX ORDER — TRAMADOL HYDROCHLORIDE 50 MG/1
50 TABLET ORAL EVERY 4 HOURS PRN
Qty: 60 TABLET | Refills: 0 | Status: SHIPPED | OUTPATIENT
Start: 2024-03-06

## 2024-03-11 ENCOUNTER — TELEPHONE (OUTPATIENT)
Dept: ONCOLOGY | Facility: CLINIC | Age: 79
End: 2024-03-11
Payer: MEDICARE

## 2024-03-11 DIAGNOSIS — Z09 SURGERY FOLLOW-UP: ICD-10-CM

## 2024-03-11 RX ORDER — SULFAMETHOXAZOLE AND TRIMETHOPRIM 800; 160 MG/1; MG/1
1 TABLET ORAL 3 TIMES WEEKLY
Qty: 12 TABLET | Refills: 0 | Status: SHIPPED | OUTPATIENT
Start: 2024-03-11 | End: 2024-06-29

## 2024-03-11 NOTE — TELEPHONE ENCOUNTER
Called the patient to let him know that he should take the bactrim until the end of March. Patient needed a refill and this was sent. He v/u.

## 2024-03-11 NOTE — TELEPHONE ENCOUNTER
Caller: Angel Remy    Relationship: Self    Best call back number: [phone]    What is the best time to reach you: ANY    Who are you requesting to speak with (clinical staff, provider,  specific staff member): CLINICAL    What was the call regarding: ANGEL IS WANITNG TO KNOW IF HE NEEDS TO CONTINUE TAKING HIS GENERIC BACTRIM  HE HAS BEEN TAKING IT SINCE HIS SURGERY IN FEBRUARY    PLEASE ADVISE     Is it okay if the provider responds through MyChart: NO

## 2024-03-12 RX ORDER — TRAMADOL HYDROCHLORIDE 50 MG/1
50 TABLET ORAL EVERY 8 HOURS PRN
Qty: 30 TABLET | Refills: 0 | Status: SHIPPED | OUTPATIENT
Start: 2024-03-12

## 2024-03-19 ENCOUNTER — TELEPHONE (OUTPATIENT)
Dept: ORTHOPEDIC SURGERY | Facility: CLINIC | Age: 79
End: 2024-03-19
Payer: MEDICARE

## 2024-03-19 DIAGNOSIS — S82.031A CLOSED DISPLACED TRANSVERSE FRACTURE OF RIGHT PATELLA, INITIAL ENCOUNTER: ICD-10-CM

## 2024-03-19 DIAGNOSIS — Z09 SURGERY FOLLOW-UP: ICD-10-CM

## 2024-03-19 DIAGNOSIS — S82.032A CLOSED DISPLACED TRANSVERSE FRACTURE OF LEFT PATELLA, INITIAL ENCOUNTER: Primary | ICD-10-CM

## 2024-03-19 NOTE — TELEPHONE ENCOUNTER
Caller: DR YURIDIA KOTHARI     Relationship: PATIENT     Best call back number: [phone]    What orders are you requesting (i.e. lab or imaging): PHYSICAL THERAPY     In what timeframe would the patient need to come in: ASAP - HAS ALREADY TALKED TO [facility] WHO HAS AVAILABILITY , PATIENT WILL FOLLOW UP WITH ALANNA WHYTE 03/27/2024 AT WHICH TIME HE EXPECT PT TO BE ORDERED     Where will you receive your lab/imaging services: [facility]     Additional notes: CONTACT NUMBER [phone]    CALL [facility] CELL [phone]    POST OP  APPT WITH DR WHYTE 03/27/2024  DOMINIQUE KNEE SX 02/05/2024

## 2024-03-19 NOTE — TELEPHONE ENCOUNTER
There is an order for  PT I have reached out to Cecelia and she will let me know if she needs anything else

## 2024-03-22 ENCOUNTER — TELEPHONE (OUTPATIENT)
Dept: ORTHOPEDIC SURGERY | Facility: CLINIC | Age: 79
End: 2024-03-22
Payer: MEDICARE

## 2024-03-22 NOTE — TELEPHONE ENCOUNTER
I had a long conversation with him and his wife.  They called wanting to be released to do more with his legs.  He says he is already bending his knee almost 100 degrees.  He wants permission to walk normally.    I told him that it sounds like he is doing great but I am concerned that he is doing too much.  Based on what he is describing, he is about 30 or 40 degrees ahead of where I would expect him to be at this point.  Furthermore, I am concerned that if he puts weight on his legs with them bent, he could compromise his repairs.      His wife is adamant that he needs to move for his mental health.  I tried to tell them that they need to be extremely cautious not to jeopardize these repairs.  It takes about 4 or 5 months to fully heal and while it sounds like he is doing great, I am concerned that if he does too much he is going to jeopardize the repair which is probably still very fragile at this point.  I got the sense that they were not at all happy with my answers but I tried to reassure them that I am just looking out for his best interest.

## 2024-03-22 NOTE — TELEPHONE ENCOUNTER
Caller: Angel Remy    Relationship: Self    Best call back number:     What is the best time to reach you: ANY     Who are you requesting to speak with (clinical staff, provider,  specific staff member): CLINICAL      What was the call regarding: PATIENT STILL NEEDS TO GET HOME HEALTH ORDERS TAKEN CARE OF AND SET UP.  HE STATED NEEDS THEM ASAP.  HE IS SUPPOSED TO START THURSDAY 3/28/24.  PLEASE CONTACT HIM     Is it okay if the provider responds through [facility]: PLEASE CALL

## 2024-03-27 ENCOUNTER — TELEPHONE (OUTPATIENT)
Dept: ORTHOPEDIC SURGERY | Facility: CLINIC | Age: 79
End: 2024-03-27

## 2024-03-27 ENCOUNTER — INFUSION (OUTPATIENT)
Dept: ONCOLOGY | Facility: HOSPITAL | Age: 79
End: 2024-03-27
Payer: MEDICARE

## 2024-03-27 ENCOUNTER — OFFICE VISIT (OUTPATIENT)
Dept: ORTHOPEDIC SURGERY | Facility: CLINIC | Age: 79
End: 2024-03-27
Payer: MEDICARE

## 2024-03-27 VITALS — TEMPERATURE: 98.2 F | HEIGHT: 70 IN | WEIGHT: 160 LBS | BODY MASS INDEX: 22.9 KG/M2

## 2024-03-27 DIAGNOSIS — Z09 SURGERY FOLLOW-UP: Primary | ICD-10-CM

## 2024-03-27 DIAGNOSIS — Z09 SURGERY FOLLOW-UP: ICD-10-CM

## 2024-03-27 DIAGNOSIS — S82.032A CLOSED DISPLACED TRANSVERSE FRACTURE OF LEFT PATELLA, INITIAL ENCOUNTER: Primary | ICD-10-CM

## 2024-03-27 DIAGNOSIS — S82.031A CLOSED DISPLACED TRANSVERSE FRACTURE OF RIGHT PATELLA, INITIAL ENCOUNTER: ICD-10-CM

## 2024-03-27 DIAGNOSIS — Z45.2 FITTING AND ADJUSTMENT OF VASCULAR CATHETER: Primary | ICD-10-CM

## 2024-03-27 DIAGNOSIS — C91.41 HAIRY CELL LEUKEMIA, IN REMISSION: ICD-10-CM

## 2024-03-27 LAB
BASOPHILS # BLD AUTO: 0.01 10*3/MM3 (ref 0–0.2)
BASOPHILS NFR BLD AUTO: 0.3 % (ref 0–1.5)
DEPRECATED RDW RBC AUTO: 49.1 FL (ref 37–54)
EOSINOPHIL # BLD AUTO: 0.06 10*3/MM3 (ref 0–0.4)
EOSINOPHIL NFR BLD AUTO: 1.5 % (ref 0.3–6.2)
ERYTHROCYTE [DISTWIDTH] IN BLOOD BY AUTOMATED COUNT: 13.8 % (ref 12.3–15.4)
HCT VFR BLD AUTO: 39.4 % (ref 37.5–51)
HGB BLD-MCNC: 12.9 G/DL (ref 13–17.7)
IMM GRANULOCYTES # BLD AUTO: 0.02 10*3/MM3 (ref 0–0.05)
IMM GRANULOCYTES NFR BLD AUTO: 0.5 % (ref 0–0.5)
LYMPHOCYTES # BLD AUTO: 0.56 10*3/MM3 (ref 0.7–3.1)
LYMPHOCYTES NFR BLD AUTO: 14.1 % (ref 19.6–45.3)
MCH RBC QN AUTO: 31.4 PG (ref 26.6–33)
MCHC RBC AUTO-ENTMCNC: 32.7 G/DL (ref 31.5–35.7)
MCV RBC AUTO: 95.9 FL (ref 79–97)
MONOCYTES # BLD AUTO: 0.55 10*3/MM3 (ref 0.1–0.9)
MONOCYTES NFR BLD AUTO: 13.9 % (ref 5–12)
NEUTROPHILS NFR BLD AUTO: 2.76 10*3/MM3 (ref 1.7–7)
NEUTROPHILS NFR BLD AUTO: 69.7 % (ref 42.7–76)
NRBC BLD AUTO-RTO: 0 /100 WBC (ref 0–0.2)
PLATELET # BLD AUTO: 143 10*3/MM3 (ref 140–450)
PMV BLD AUTO: 10.6 FL (ref 6–12)
RBC # BLD AUTO: 4.11 10*6/MM3 (ref 4.14–5.8)
WBC NRBC COR # BLD AUTO: 3.96 10*3/MM3 (ref 3.4–10.8)

## 2024-03-27 PROCEDURE — 36591 DRAW BLOOD OFF VENOUS DEVICE: CPT

## 2024-03-27 PROCEDURE — 25010000002 HEPARIN LOCK FLUSH PER 10 UNITS: Performed by: INTERNAL MEDICINE

## 2024-03-27 PROCEDURE — 85025 COMPLETE CBC W/AUTO DIFF WBC: CPT | Performed by: INTERNAL MEDICINE

## 2024-03-27 RX ORDER — HEPARIN SODIUM (PORCINE) LOCK FLUSH IV SOLN 100 UNIT/ML 100 UNIT/ML
500 SOLUTION INTRAVENOUS AS NEEDED
Status: DISCONTINUED | OUTPATIENT
Start: 2024-03-27 | End: 2024-03-27 | Stop reason: HOSPADM

## 2024-03-27 RX ORDER — SODIUM CHLORIDE 0.9 % (FLUSH) 0.9 %
10 SYRINGE (ML) INJECTION AS NEEDED
Status: DISCONTINUED | OUTPATIENT
Start: 2024-03-27 | End: 2024-03-27 | Stop reason: HOSPADM

## 2024-03-27 RX ORDER — HEPARIN SODIUM (PORCINE) LOCK FLUSH IV SOLN 100 UNIT/ML 100 UNIT/ML
500 SOLUTION INTRAVENOUS AS NEEDED
OUTPATIENT
Start: 2024-03-27

## 2024-03-27 RX ORDER — SODIUM CHLORIDE 0.9 % (FLUSH) 0.9 %
10 SYRINGE (ML) INJECTION AS NEEDED
OUTPATIENT
Start: 2024-03-27

## 2024-03-27 RX ADMIN — Medication 500 UNITS: at 14:53

## 2024-03-27 RX ADMIN — Medication 10 ML: at 14:53

## 2024-03-27 NOTE — TELEPHONE ENCOUNTER
Called and was unable to leave a voice message ( call disconnects after message plays). Rolling walker order has been placed. Dr. Roberson stated 90 degree flexion is fine.

## 2024-03-27 NOTE — PROGRESS NOTES
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  He says he is doing great.  No complaints or concerns.  He has been bending his knee all the way down to past 90.  He says he has no pain with knee bending.  He says he is able to fully straighten both legs without any difficulty or discomfort.  He says he can straight leg raise without any difficulty.    Both incisions are healed.  No evidence for infection.  Patellar tendons are palpably intact.  No defect.  He can actively extend on both sides with surprisingly good strength.  Flexion is to about 100 degrees.    Assessment: Follow-up now 7-week status post bilateral patellar tendon advancements    Plan: He is doing remarkably well.  I am concerned that he may be doing too much too soon and I encouraged him to try to be very careful.  His bending is beyond what I would have hoped for at this point but nonetheless everything seems to be healing.  I am going to have him keep progressing at 10 degrees/week.  I would prefer that he continue to bear weight in extension only.  He can start to walk with a walker with wheels for safe ambulation.  I will have him follow-up with me in another 3 weeks.  I plan to get him out of the braces at that time.  .dme    Adrián Roberson MD

## 2024-03-28 ENCOUNTER — HOME CARE VISIT (OUTPATIENT)
Dept: HOME HEALTH SERVICES | Facility: HOME HEALTHCARE | Age: 79
End: 2024-03-28
Payer: MEDICARE

## 2024-03-28 ENCOUNTER — HOME HEALTH ADMISSION (OUTPATIENT)
Dept: HOME HEALTH SERVICES | Facility: HOME HEALTHCARE | Age: 79
End: 2024-03-28
Payer: MEDICARE

## 2024-03-28 ENCOUNTER — TELEPHONE (OUTPATIENT)
Dept: ONCOLOGY | Facility: CLINIC | Age: 79
End: 2024-03-28
Payer: MEDICARE

## 2024-03-28 ENCOUNTER — TELEPHONE (OUTPATIENT)
Dept: ORTHOPEDIC SURGERY | Facility: CLINIC | Age: 79
End: 2024-03-28
Payer: MEDICARE

## 2024-03-28 VITALS
RESPIRATION RATE: 18 BRPM | DIASTOLIC BLOOD PRESSURE: 74 MMHG | TEMPERATURE: 97.9 F | SYSTOLIC BLOOD PRESSURE: 124 MMHG | HEART RATE: 67 BPM | OXYGEN SATURATION: 98 %

## 2024-03-28 PROCEDURE — G0151 HHCP-SERV OF PT,EA 15 MIN: HCPCS | Performed by: PHYSICAL THERAPIST

## 2024-03-28 NOTE — TELEPHONE ENCOUNTER
Provider: Zeyad  Caller: patient  Relationship to Patient: self  Call Back Phone Number: [phone]  Reason for Call: he wants to know if he needs to keep taking Iron meds and if so, he will need a refill

## 2024-03-28 NOTE — Clinical Note
Dear Dr. 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  Patient be seen 2w1, 3w3, 2w2 and then dc to outpatient PT.  PT to focus on increasing his strength, knee ROM, gait training, transfer training.    Thank you,  Cecelia Barragan, PT, DPT  [facility]

## 2024-03-28 NOTE — HOME HEALTH
Home Health ordered for: disciplines physical therapy                  Reason for Hosp/Primary Dx: Bilateral patella fractures with bilateral open excision of fistal pole of patella with patellar tendon advancement and repair 2/5/24 after falling off the bottom step of his basement stairs landing on his knees anteriorly    Patient then went to [facility] inpatient rehab from 2/7-2/25/24 then was discharged home to home health, patient met all of his goals and was discharged until he was clear for WB and ambulation                  Co-morbidities:  hair cell leukemia in remission, anemia, a fib, HTN, BPH                   Focus of Care:      Skilled physical therapy to focus on increasing his strength, gait training, transfers, fall risk safety, balance training, ROM improvements and post surgical care following bilateral patellar fracture repair.       Current Functional status/mobility/DME: Patient utilizes a w/c for mobility around the home with  a bedside commode, sliding board to transfer, and hospital bed.  Patient has a FWW to use for ambulation with therapy but is unable to stand on his own     Patient is WBAT bilaterally for ambulation with his legs locked in his braces at full extension             HB status/Living Arrangements: Patient lives with his wife in a multi story home with a hospital bed on the main floor and bedside commode                  Skin Integrity/wound status: healed bilateral knee incisions                   Code Status: full code                  Fall Risk: high risk                   MD follow up: 4/17/24 Dr. Roberson    Plan For Next Visit:  - gait training  - HEP    Patient had some pain in his left knee with an audible pop on his first attempt to stand from the w/c, he was unable to get all the way up and dropped 4 inches down into the /wc his knee was in extension locked in the brace.  His pain subsided shortly after and he was able to perform a SAQ and was able to bend the knee to 90  degrees.  Therapist had him in supine perform a SLR with no difficulty but he did note some discomfort.  Patient denied any pain while weight bearing.  Educated the patient a lot in the healing process and anatomy of the knee, called and spoke with Analy Hernandez and she agreed that it was most likely scar tissue or tendon movement but to rest tonight and keep ice on his knee.  She called the patient and spoke with him

## 2024-03-28 NOTE — TELEPHONE ENCOUNTER
Caller: Angel Remy    Relationship: Self    Best call back number:     Who are you requesting to speak with (clinical staff, provider,  specific staff member): DR WHYTE    What was the call regarding: DR REMY FORGOT TO ASK DR WHYTE A COUPLE QUESTIONS AT HIS APPT ON 3/27/24.    1) SHOULD HE CONTINUE HIS ELIQUIS?    2) IS IT OK FOR HIM TO TAKE HIS BRACES OFF TO SLEEP AT NIGHT BECAUSE HE SLEEPS FLAT ON HIS BACK?      Is it okay if the provider responds through MyChart: CALL

## 2024-03-28 NOTE — TELEPHONE ENCOUNTER
Cecelia with  PT is calling about Dr. Remy says he felt a pop in his Left knee when standing. He was int his braces lock in extension.     [phone]

## 2024-03-28 NOTE — TELEPHONE ENCOUNTER
I spoke to Dr. Remy.  He may discontinue Eliquis at this point.  I explained Dr. Roberson recommends he continue to sleep in the braces to avoid hyperflexion.  Dr. Roberson will likely get him out of the braces altogether at his follow-up appointment in 3 weeks.  He verbalized understanding.      I explained I had spoken to his therapist, Cecelia, regarding his therapy session earlier today.  Patient reports he felt a twinge of pain in the left knee earlier today while getting out of his wheelchair with braces locked in full extension.  Reports he was able to walk across the room after the incident and the therapist had no concerns during her exam.  Cecelia will return tomorrow and has agreed to contact us if there are any concerns.      I encouraged him to call with any questions or concerns.

## 2024-03-28 NOTE — CASE COMMUNICATION
Home Health ordered for: disciplines physical therapy                  Reason for Hosp/Primary Dx: Bilateral patella fractures with bilateral open excision of fistal pole of patella with patellar tendon advancement and repair 2/5/24 after falling off the bottom step of his basement stairs landing on his knees anteriorly    Patient then went to [facility] inpatient rehab from 2/7-2/25/24 then was discharged home to home health, patient met  all of his goals and was discharged until he was clear for WB and ambulation                  Co-morbidities:  hair cell leukemia in remission, anemia, a fib, HTN, BPH                   Focus of Care:      Skilled physical therapy to focus on increasing his strength, gait training, transfers, fall risk safety, balance training, ROM improvements and post surgical care following bilateral patellar fracture repair.       Current Functional  status/mobility/DME: Patient utilizes a w/c for mobility around the home with  a bedside commode, sliding board to transfer, and hospital bed.  Patient has a FWW to use for ambulation with therapy but is unable to stand on his own     Patient is WBAT bilaterally for ambulation with his legs locked in his braces at full extension             HB status/Living Arrangements: Patient lives with his wife in a multi story home with a hospital  bed on the main floor and bedside commode                  Skin Integrity/wound status: healed bilateral knee incisions                   Code Status: full code                  Fall Risk: high risk                   MD follow up: 4/17/24 Dr. Roberson

## 2024-03-29 ENCOUNTER — HOME CARE VISIT (OUTPATIENT)
Dept: HOME HEALTH SERVICES | Facility: HOME HEALTHCARE | Age: 79
End: 2024-03-29
Payer: MEDICARE

## 2024-03-29 VITALS
DIASTOLIC BLOOD PRESSURE: 76 MMHG | SYSTOLIC BLOOD PRESSURE: 138 MMHG | TEMPERATURE: 98.3 F | OXYGEN SATURATION: 97 % | HEART RATE: 56 BPM | RESPIRATION RATE: 18 BRPM

## 2024-03-29 PROCEDURE — G0151 HHCP-SERV OF PT,EA 15 MIN: HCPCS | Performed by: PHYSICAL THERAPIST

## 2024-03-29 NOTE — HOME HEALTH
"Subjective: \"I am doing better today but still have some tenderness at my left knee however I have not had any swelling in the knee or intense pain\"    Changes in Medications: none    Any Falls Since Last Visit: none    Assessment: Patient did well today, he struggles some with a SLR however with 10% assistance he is able to raise his leg 6 inches.  Therapist instructed him in walking around his home and performing a car transfers, patient did well with his transfers however still requires moderate assistance which his wife can not perform at this time.     Plan For Next Visit:  - gait training  - transfer training"

## 2024-04-01 ENCOUNTER — TELEPHONE (OUTPATIENT)
Dept: ORTHOPEDIC SURGERY | Facility: CLINIC | Age: 79
End: 2024-04-01

## 2024-04-01 ENCOUNTER — HOME CARE VISIT (OUTPATIENT)
Dept: HOME HEALTH SERVICES | Facility: HOME HEALTHCARE | Age: 79
End: 2024-04-01
Payer: MEDICARE

## 2024-04-01 VITALS
TEMPERATURE: 98.6 F | SYSTOLIC BLOOD PRESSURE: 135 MMHG | HEART RATE: 78 BPM | OXYGEN SATURATION: 97 % | RESPIRATION RATE: 18 BRPM | DIASTOLIC BLOOD PRESSURE: 74 MMHG

## 2024-04-01 PROCEDURE — G0151 HHCP-SERV OF PT,EA 15 MIN: HCPCS | Performed by: PHYSICAL THERAPIST

## 2024-04-02 ENCOUNTER — HOME CARE VISIT (OUTPATIENT)
Dept: HOME HEALTH SERVICES | Facility: HOME HEALTHCARE | Age: 79
End: 2024-04-02
Payer: MEDICARE

## 2024-04-02 VITALS
TEMPERATURE: 98.1 F | HEART RATE: 78 BPM | RESPIRATION RATE: 18 BRPM | DIASTOLIC BLOOD PRESSURE: 78 MMHG | SYSTOLIC BLOOD PRESSURE: 121 MMHG | OXYGEN SATURATION: 98 %

## 2024-04-02 PROCEDURE — G0151 HHCP-SERV OF PT,EA 15 MIN: HCPCS | Performed by: PHYSICAL THERAPIST

## 2024-04-02 NOTE — CASE COMMUNICATION
Hello,    I just called [facility] to check on the order for  mariopsa for his FWW that Dr. Roberson sent last week.  They do not have the referral and are requesting a new one to be sent to them.    Thank you,  Cecelia Barragan, PT, DPT  [facility]

## 2024-04-02 NOTE — TELEPHONE ENCOUNTER
I have called and spoke to Dr. Remy and he will come in to the office today to get this taken care of.

## 2024-04-02 NOTE — HOME HEALTH
"Subjective: \"I am doing well but still have some tenderness at the distal aspect of my knees.\"    Changes in Medications: none    Any Falls Since Last Visit: none    Assessment: Patient did very well today with all of his transfers and his mobility.  Him and his wife were able to successfully perform 2 sit to stand transfers from the w/c today.  He continues to struggle with kicking his legs out when sitting to reduce flexion on his knees due to some weakness in his legs\"    Plan For Next Visit:  - bed transfer without w/c  - HEP"

## 2024-04-05 ENCOUNTER — HOME CARE VISIT (OUTPATIENT)
Dept: HOME HEALTH SERVICES | Facility: HOME HEALTHCARE | Age: 79
End: 2024-04-05
Payer: MEDICARE

## 2024-04-05 VITALS
SYSTOLIC BLOOD PRESSURE: 155 MMHG | RESPIRATION RATE: 18 BRPM | OXYGEN SATURATION: 97 % | TEMPERATURE: 97.8 F | HEART RATE: 56 BPM | DIASTOLIC BLOOD PRESSURE: 85 MMHG

## 2024-04-05 PROCEDURE — G0151 HHCP-SERV OF PT,EA 15 MIN: HCPCS | Performed by: PHYSICAL THERAPIST

## 2024-04-05 NOTE — CASE COMMUNICATION
Dear Dr. Roberson,    Can you send an order to [facility] Outpatient referal que for Dr. Remy to begin outpatient PT on 4/15/24?    Thank you,  Cecelia Barragan, PT, DPT  [facility]

## 2024-04-05 NOTE — HOME HEALTH
"Subjective: \"I am doing well and have been transferring with my wife with almost no assistance\"    Changes in Medications: none    Any Falls Since Last Visit: none    Assessment: Patient is doing well with all of his exercises with improved mobility bilaterally his left knee reaches 90 degrees now just like his right knee.  He is now SBA for his ambulation.    Plan For Next Visit:  - gait training  - ramp training"

## 2024-04-08 ENCOUNTER — HOME CARE VISIT (OUTPATIENT)
Dept: HOME HEALTH SERVICES | Facility: HOME HEALTHCARE | Age: 79
End: 2024-04-08
Payer: MEDICARE

## 2024-04-08 ENCOUNTER — TELEPHONE (OUTPATIENT)
Dept: ORTHOPEDIC SURGERY | Facility: CLINIC | Age: 79
End: 2024-04-08
Payer: MEDICARE

## 2024-04-08 VITALS
SYSTOLIC BLOOD PRESSURE: 147 MMHG | DIASTOLIC BLOOD PRESSURE: 87 MMHG | OXYGEN SATURATION: 97 % | RESPIRATION RATE: 18 BRPM | TEMPERATURE: 97.7 F | HEART RATE: 66 BPM

## 2024-04-08 DIAGNOSIS — S82.031A CLOSED DISPLACED TRANSVERSE FRACTURE OF RIGHT PATELLA, INITIAL ENCOUNTER: ICD-10-CM

## 2024-04-08 DIAGNOSIS — S82.032A CLOSED DISPLACED TRANSVERSE FRACTURE OF LEFT PATELLA, INITIAL ENCOUNTER: Primary | ICD-10-CM

## 2024-04-08 PROCEDURE — G0151 HHCP-SERV OF PT,EA 15 MIN: HCPCS | Performed by: PHYSICAL THERAPIST

## 2024-04-08 NOTE — HOME HEALTH
"Subjective: \"I am doing well and no longer need help  getting up from a chair or going to the bathroom.\"    Changes in Medications: none    Any Falls Since Last Visit: none    Assessment: Patient is making great progress with his strength and knee ROM.  He did well being able to enter and exit the home with navigating the ramp.  Patient will be ready for outpatient next week if he can get in for an appointment.  Waiting on MD to send orders    Plan For Next Visit:  - gait training  progress knee flexion to 100 degrees bilaterally"

## 2024-04-08 NOTE — TELEPHONE ENCOUNTER
Caller: DR YURIDIA KOTHARI     Relationship: PATIENT     Best call back number: [phone]    What orders are you requesting (i.e. lab or imaging): PHYSICAL THERAPY OUTSIDE OF HOME     In what timeframe would the patient need to come in: 1.5 WEEKS     Where will you receive your lab/imaging services: [facility]     Additional notes: PATIENT PARTICIPATES IN HOME PHYSICAL THERAPY CURRENTLY AND WANTS A SMOOTH TRANSITION INTO OUT OF HOME PHYSICAL THERAPY AT [facility]

## 2024-04-10 ENCOUNTER — HOME CARE VISIT (OUTPATIENT)
Dept: HOME HEALTH SERVICES | Facility: HOME HEALTHCARE | Age: 79
End: 2024-04-10
Payer: MEDICARE

## 2024-04-10 VITALS
OXYGEN SATURATION: 97 % | TEMPERATURE: 98.3 F | DIASTOLIC BLOOD PRESSURE: 92 MMHG | HEART RATE: 59 BPM | SYSTOLIC BLOOD PRESSURE: 160 MMHG | RESPIRATION RATE: 18 BRPM

## 2024-04-10 PROCEDURE — G0151 HHCP-SERV OF PT,EA 15 MIN: HCPCS | Performed by: PHYSICAL THERAPIST

## 2024-04-11 NOTE — HOME HEALTH
"Subjective: \"I am doing well and have been walking around the house a lot, I have also been able to stand at the counter for 40 minute intervals without any difficulty or swelling.\"    Changes in Medications: none    Any Falls Since Last Visit: none    Assessment:  Patient did well today ambulating with the cane around his home, discussed with the patient and his wife in the importance of progressing his ROM and mobility but to not push himself too far to avoid injury.   Patient was able to get scheduled for outpatient PT    Plan For Next Visit:  - gait training  - HEP"

## 2024-04-12 ENCOUNTER — HOME CARE VISIT (OUTPATIENT)
Dept: HOME HEALTH SERVICES | Facility: HOME HEALTHCARE | Age: 79
End: 2024-04-12
Payer: MEDICARE

## 2024-04-12 VITALS
RESPIRATION RATE: 18 BRPM | SYSTOLIC BLOOD PRESSURE: 171 MMHG | DIASTOLIC BLOOD PRESSURE: 98 MMHG | TEMPERATURE: 98.6 F | OXYGEN SATURATION: 98 % | HEART RATE: 72 BPM

## 2024-04-12 PROCEDURE — G0151 HHCP-SERV OF PT,EA 15 MIN: HCPCS | Performed by: PHYSICAL THERAPIST

## 2024-04-13 NOTE — HOME HEALTH
"Subjective: \"I am doing well and have been walking with just the cane mostly at this point.\"    Changes in Medications: none    Any Falls Since Last Visit: none    Assessment: Patient is doing well with his ambulation and balance, because he is walking so well the braces are almost a hindrance to his ambulation because he is having to walk with his legs apart further than his normal gait pattern due to the braces wanting to hit at the medial aspects of his knees.  Patient will be ready for dc next visit    Plan For Next Visit:  - gait training  - HEP"

## 2024-04-15 ENCOUNTER — HOME CARE VISIT (OUTPATIENT)
Dept: HOME HEALTH SERVICES | Facility: HOME HEALTHCARE | Age: 79
End: 2024-04-15
Payer: MEDICARE

## 2024-04-15 VITALS
HEART RATE: 78 BPM | OXYGEN SATURATION: 99 % | RESPIRATION RATE: 18 BRPM | TEMPERATURE: 99.1 F | DIASTOLIC BLOOD PRESSURE: 78 MMHG | SYSTOLIC BLOOD PRESSURE: 156 MMHG

## 2024-04-15 PROCEDURE — G0151 HHCP-SERV OF PT,EA 15 MIN: HCPCS | Performed by: PHYSICAL THERAPIST

## 2024-04-16 NOTE — CASE COMMUNICATION
Dear Dr. Roberson,    Mr. Remy has met his goals at this time and will begin outpatient PT on 4/18/24.  Patient has done amazing and is reaching 100 degrees bilaterally with knee flexion without having to provide any overpressure, he continues to be limited with knee extension due to hamstring tightness however he is working on increasing his knee extension.  He is able to perform all of his transfers with no assistance using a SPC and  utilize a ramp to enter and exit his home.  I think he will do great without his braces on, he demonstrates great balance and was able to stand for 1 hour.  Patient is eager to remove the braces and be able to perform the steps.  He has 4 steps to enter and exit his home with a handrail and then a full flight to his bedroom.  We did not do the stairs however him and his wife were educated in how to do them safely.    Thank you,  Cecelia pantoja, PT, DPT  [facility]

## 2024-04-16 NOTE — HOME HEALTH
"Subjective: \"I am doing with with no complaints just ready to get these braces off my legs and do stairs.\"    Changes in Medications: none    Any Falls Since Last Visit: none    Assessment: Patient has done well with HH therapy with improved strength, endurance, ambulation quality, transfer ability and improved knee AROM.  Patient is appropriate for discharge at this time due to meeting his goals.  Patient will do well with outpatient therapy and should make a full reocvery"

## 2024-04-17 ENCOUNTER — OFFICE VISIT (OUTPATIENT)
Dept: ORTHOPEDIC SURGERY | Facility: CLINIC | Age: 79
End: 2024-04-17
Payer: MEDICARE

## 2024-04-17 VITALS — WEIGHT: 160 LBS | HEIGHT: 70 IN | BODY MASS INDEX: 22.9 KG/M2

## 2024-04-17 DIAGNOSIS — Z09 SURGERY FOLLOW-UP: Primary | ICD-10-CM

## 2024-04-17 NOTE — PROGRESS NOTES
Chief complaint: Follow-up status post bilateral patellar tendon advancements    Dr. Remy follows up today for his knees.  He reports doing well.  No complaints or concerns.  Therapy is going really well.    Incisions are healed.  No tenderness.  Patellar tendons and extensor mechanism is completely intact.  Moderate bilateral quadriceps atrophy.  He can actively extend both sides.  Flexion is to about 120 bilaterally.  Gait is reciprocal heel toe with a cane.    Assessment: Follow-up now 10 weeks status post bilateral patellar tendon advancement    Plan: He can come out of the braces at this point.  We discussed appropriate activity modifications and restrictions.  I encouraged him to continue PT.  I would like to see him back in 1 month for a final visit.    Adrián Roberson MD    Answers submitted by the patient for this visit:  Primary Reason for Visit (Submitted on 4/11/2024)  What is the primary reason for your visit?: Other  Other (Submitted on 4/11/2024)  Please describe your symptoms.: post op  Have you had these symptoms before?: Yes  How long have you been having these symptoms?: Greater than 2 weeks  Please list any medications you are currently taking for this condition.: tramadol  Please describe any probable cause for these symptoms. : post op follow up

## 2024-04-18 ENCOUNTER — HOSPITAL ENCOUNTER (OUTPATIENT)
Dept: PHYSICAL THERAPY | Facility: HOSPITAL | Age: 79
Discharge: HOME | End: 2024-04-18
Admitting: ORTHOPAEDIC SURGERY
Payer: MEDICARE

## 2024-04-18 DIAGNOSIS — R26.89 IMPAIRED GAIT AND MOBILITY: ICD-10-CM

## 2024-04-18 DIAGNOSIS — S82.009S: Primary | ICD-10-CM

## 2024-04-18 DIAGNOSIS — Z47.89 ORTHOPEDIC AFTERCARE: ICD-10-CM

## 2024-04-18 PROCEDURE — 97110 THERAPEUTIC EXERCISES: CPT

## 2024-04-18 PROCEDURE — 97161 PT EVAL LOW COMPLEX 20 MIN: CPT

## 2024-04-18 NOTE — THERAPY EVALUATION
Outpatient Physical Therapy Ortho Initial Evaluation  [facility]     Patient Name: Angel Remy  : 1945  MRN: [id number]  Today's Date: 2024      Visit Date: 2024    Patient Active Problem List   Diagnosis    Hairy cell leukemia    FH: colon cancer    Hyponatremia    Orthostatic hypotension    Supine hypertension    Anemia    Macrocytosis    LBBB (left bundle branch block)    Prolonged P-R interval    Pancytopenia    Fitting and adjustment of vascular catheter    Elevated blood pressure reading    Thrombocytopenia    History of left bundle branch block (LBBB)    PAF (paroxysmal atrial fibrillation) history    Closed displaced transverse fracture of left patella    Closed displaced transverse fracture of right patella    Patella fracture        Past Medical History:   Diagnosis Date    BCC (basal cell carcinoma of skin) 2022    NOSE    Benign prostate hyperplasia     Elevated cholesterol     H/O Elevated PSA     H/O Hairy cell leukemia (CMS/HCC)     H/O Internal thrombosed hemorrhoids     H/O minimal right carotid plaque     H/O peripheral neuropathy     History of transfusion     Hyperlipidemia     Hypertension     Primary open-angle glaucoma, bilateral, mild stage         Past Surgical History:   Procedure Laterality Date    BONE MARROW BIOPSY W/ ASPIRATION      COLONOSCOPY      COLONOSCOPY N/A 2021    Procedure: COLONOSCOPY TO CECUM AND INTO TI WITH COLD BIOPSY POLYPECTOMIES;  Surgeon: Juan Jenkins MD;  Location: [facility] ENDOSCOPY;  Service: Gastroenterology;  Laterality: N/A;  pre: family history of colon cancer  post: polyps, diverticulosis  and internal hemorrhoids    EXCISION MASS HEAD/NECK N/A 2022    Procedure: EXCISION BASAL CELL CARCINOMA NOSE WITH FROZEN SECTION FULL THICKNESS GRAFT;  Surgeon: Arturo Weiss MD;  Location: [facility] OR [facility];  Service: Plastics;  Laterality: N/A;    HERNIA REPAIR Right 2000    Inguinal     KNEE SURGERY  [id number]     op both knee fx repair    OTHER SURGICAL HISTORY  1989    Median nerve foreign body excision    PATELLA OPEN REDUCTION INTERNAL FIXATION Bilateral 02/05/2024    Procedure: PATELLA OPEN REDUCTION INTERNAL FIXATION;  Surgeon: Adrián Roberson MD;  Location: [facility];  Service: Orthopedics;  Laterality: Bilateral;    PROSTATE BIOPSY      March 2010 and October 2011 whan PSA had acceleration    SIGMOIDOSCOPY  1999    With small thrombosed internal hemorrhoid.    TONSILLECTOMY      VENOUS ACCESS DEVICE (PORT) INSERTION Right 10/18/2023    Procedure: MEDIPORT PLACEMENTWITH SUPERIOR VENACAVAGRAM;  Surgeon: Randy Fan MD;  Location: [facility];  Service: Vascular;  Laterality: Right;    WISDOM TOOTH EXTRACTION  1961       Visit Dx:     ICD-10-CM ICD-9-CM   1. Closed fracture of patella, sequela  S82.009S 905.4   2. Orthopedic aftercare  Z47.89 V54.9   3. Impaired gait and mobility  R26.89 781.2          Patient History       Row Name 04/18/24 1100             History    Chief Complaint Balance Problems;Difficulty Walking;Falls/history of falls;Muscle weakness;Joint stiffness;Pain  -DR      Type of Pain Knee pain  -DR      Date Current Problem(s) Began 02/04/24  -DR      Brief Description of Current Complaint Angel Remy is a 78 y.o. male who presents to physical therapy today s/p bilateral patella ORIF with patellar tendon advancement and repair (2/5/24). Pt had a fall off of his bottom step at home (2/4/24) suffering from bilateral patellar closed transverse fx, which he then underwent surgery the following day. He has done previous inpatient rehab through [facility] from 2/7-2/25/24 and continued with HHPT after leaving hospital from 2/16-4/15/24. Went to MD yesterday; able to remove both knee braces at this time. Pt currently ambulating with QC and has been for 10 days, feeling comfortable as far as stability when walking. He reports 0/10 pain all the time and really has not been having issues. His biggest  complaint right now is LBP that has onset since beginning PT. Angel Remy reports difficulty/increased pain with ascending/descending stairs (3 flights of stairs within home). He has not attempted stairs until seeing OP PT. PMH includes HTN, hyponatremia, thrombocytopenia, a-fib, left BBB, anemia, CA (hairy cell leukemia). Angel Remy primary goal for attending skilled physical therapy is to return to recreational activities.  -DR      Previous treatment for THIS PROBLEM Surgery  -DR      Surgery Date: 02/05/24  -DR      Patient/Caregiver Goals Relieve pain;Return to prior level of function;Improve mobility;Improve strength;Know what to do to help the symptoms  -DR      Hand Dominance right-handed  -DR      Occupation/sports/leisure activities exercising 3-4 times a week  -DR      Related/Recent Hospitalizations Yes  -DR      Date of Hospitalization 02/04/24  -DR      Surgery/Hospitalization s/p bilateral patella ORIF with patellar tendon advancement and repair (2/5/24)  -DR         Pain     Pain Location Knee  -DR      Pain at Present 0  -DR      Pain at Best 0  -DR      Pain at Worst 0  -DR      Pain Frequency Rarely  -DR      What Performance Factors Make the Current Problem(s) WORSE? navigating stairs  -DR      Tolerance Time- Standing 45 min  -DR      Is your sleep disturbed? No  -DR         Fall Risk Assessment    Any falls in the past year: Yes  -DR      Number of falls reported in the last 12 months 1  -DR      Factors that contributed to the fall: Tripped  -DR         Services    Prior Rehab/Home Health Experiences Yes  -DR      Are you currently receiving Home Health services No  -DR         Daily Activities    Primary Language English  -DR      How does patient learn best? Demonstration;Pictures/Video;Listening;Reading  -DR      Barriers to learning None  -DR      Pt Participated in POC and Goals Yes  -DR         Safety    Are you being hurt, hit, or frightened by anyone at home or in your life?  No  -DR      Are you being neglected by a caregiver No  -DR      Have you had any of the following issues with N/A  -DR                User Key  (r) = Recorded By, (t) = Taken By, (c) = Cosigned By      Initials Name Provider Type    Stevie Almodovar, PT Physical Therapist                     PT Ortho       Row Name 04/18/24 1100       Subjective    Subjective Comments eval  -DR       Posture/Observations    Alignment Options Lumbar lordosis;Rounded shoulders  -DR    Rounded Shoulders Moderate  -DR    Lumbar lordosis Mild;Decreased;Standing posture  -DR    Observations Incision healing  -DR       Quarter Clearing    Quarter Clearing Lower Quarter Clearing  -DR       Sensory Screen for Light Touch- Lower Quarter Clearing    L1 (inguinal area) Bilateral:;Intact  -DR    L2 (anterior mid thigh) Bilateral:;Intact  -DR    L3 (distal anterior thigh) Bilateral:;Intact  -DR    L4 (medial lower leg/foot) Bilateral:;Intact  -DR    L5 (lateral lower leg/great toe) Right:;Diminished  -DR    S1 (bottom of foot) Bilateral:;Intact  -DR       Myotomal Screen- Lower Quarter Clearing    Hip flexion (L2) Bilateral:;4 (Good)  -DR    Knee extension (L3) Left:;4 (Good);Right:;4+ (Good +)  -DR    Ankle DF (L4) Bilateral:;4+ (Good +)  -DR    Knee flexion (S2) Left:;4 (Good);Right:;4+ (Good +)  -DR       General ROM    RT Lower Ext Rt Knee Extension/Flexion  -DR    LT Lower Ext Lt Knee Extension/Flexion  -DR       Right Lower Ext    Rt Knee Extension/Flexion AROM 0-9-105  -DR    Rt Knee Extension/Flexion PROM 0-  -DR       Left Lower Ext    Lt Knee Extension/Flexion AROM 0-5-110  -DR    Lt Knee Extension/Flexion PROM 0-7-110  -DR       MMT (Manual Muscle Testing)    Rt Lower Ext Rt Hip ABduction  -DR    Lt Lower Ext Lt Hip ABduction  -DR       MMT Right Lower Ext    Rt Hip ABduction MMT, Gross Movement (4-/5) good minus  -DR       MMT Left Lower Ext    Lt Hip ABduction MMT, Gross Movement (4/5) good  -DR       Flexibility     Flexibility Tested? Lower Extremity  -DR       Lower Extremity Flexibility    Hamstrings Bilateral:;Moderately limited  -DR    Hip Flexors Bilateral:;Mildly limited  -DR    Hip External Rotators Bilateral:;Mildly limited  -DR    Hip Internal Rotators Bilateral:;Mildly limited  -DR       Gait/Stairs (Locomotion)    [place] Level (Stairs) stand by assist;supervision  -DR    Assistive Device (Stairs) cane, quad  -DR    Handrail Location (Stairs) left side (ascending);both sides;right side (descending)  -DR    Number of Steps (Stairs) 4 stairs x5 each  -DR    Ascending Technique (Stairs) step-over-step  -DR    Descending Technique (Stairs) step-to-step  -DR    Stairs, Safety Issues weight-shifting ability decreased;other (see comments)  decreased stability when FWB onto LLE when descending  -DR    Stairs, Impairments ROM decreased;strength decreased  -DR    Comment, (Gait/Stairs) Pt ambulates with AD (quad cane) used intermittently, slowed gait speed and shortened stride length bilaterally. Pt has decreased knee flexion at heel strike and unable to obtain full knee extension at midstance into toe-off due to functional ROM limitations.  -DR              User Key  (r) = Recorded By, (t) = Taken By, (c) = Cosigned By      Initials Name Provider Type    Stevie Almodovar, PT Physical Therapist                                Therapy Education  Education Details: Educated on PT role and POC; discussed expectations/timeframe for healing. Stair training using AD. Provided HEP, Access Code: [email or web address]  Given: HEP, Symptoms/condition management, Pain management, Posture/body mechanics, Fall prevention and home safety, Mobility training  Program: New  How Provided: Verbal, Demonstration, Written  Provided to: Patient  Level of Understanding: Teach back education performed, Verbalized, Demonstrated      PT OP Goals       Row Name 04/18/24 1100          PT Short Term Goals    STG Date to Achieve 05/18/24  -DR     STG 1 Pt will  be independent with initial HEP to improve strength/ROM and decrease pain.  -     STG 1 Progress New  -     STG 2 Pt will improve bilateral knee AROM 0-2-115 degrees to improve ability to navigate stairs.  -     STG 2 Progress New  -     STG 3 Pt will improve B LE strength to at least 4+/5.  -     STG 3 Progress New  -DR        Long Term Goals    LTG Date to Achieve 06/17/24  -     LTG 1 Pt will be independent with advance HEP to improve strength/ROM and decrease pain.  -     LTG 1 Progress New  -DR     LTG 2 Pt will ambulate with a reciprocal gait pattern with no AD to improve community access.  -DR     LTG 2 Progress New  -DR     LTG 3 Pt will be able to ascend/descend stairs in a reciprocal pattern with no increase pain.  -DR     LTG 3 Progress New  -DR     LTG 4 Pt will improve bilateral knee AROM to at least 0-120 deg to improve ability to navigate stairs.  -DR     LTG 4 Progress New  -DR     LTG 5 Pt will improve KOS score to 85% to show improved quality of life.  -     LTG 5 Progress New  -DR     LTG 6 --  -DR     LTG 6 Progress --  -     LTG 7 --  -     LTG 7 Progress --  -DR        Time Calculation    PT Goal Re-Cert Due Date 07/17/24  -               User Key  (r) = Recorded By, (t) = Taken By, (c) = Cosigned By      Initials Name Provider Type    Stevie Almodovar, PT Physical Therapist                     PT Assessment/Plan       Row Name 04/18/24 1100          PT Assessment    Functional Limitations Decreased safety during functional activities;Impaired gait;Limitation in home management;Limitations in community activities;Limitations in functional capacity and performance;Performance in leisure activities;Performance in self-care ADL;Performance in work activities  -     Impairments Balance;Endurance;Gait;Joint integrity;Joint mobility;Muscle strength;Pain;Poor body mechanics;Posture;Range of motion  -     Assessment Comments Angel Remy is a 78 y.o. year-old male  referred to physical therapy for s/p bilateral patella ORIF with patellar tendon advancement and repair (2/5/24). He presents with a evolving clinical presentation. He has comorbidities of HTN, hyponatremia, thrombocytopenia, a-fib, left BBB, anemia, CA  and personal factors of chronicity of symptoms with learned compensatory habits, previous imaging, active lifestyle, and job tasks that may affect his progress in the plan of care. Self scored disability measure of KOS-ADL was a 64.29%, where 100% is fully functional. Pt ambulates into clinic with AD (quad cane) used intermittently, slowed gait speed and shortened stride length bilaterally. Pt has decreased knee flexion at heel strike and unable to obtain full knee extension at midstance into toe-off due to functional ROM limitations. He demonstrated impaired postural mechanics with decreased lumbar lordosis and moderate rounded shoulders, healing incisions noted bilaterally, and WNL sensation in BLE. Pt demonstrated impaired bilateral strength deficits in BLE and hips and limitations in knee AROM in BLE. Pt has L knee AROM measuring 0-5-110 and R knee AROM 0-9-105 for knee flex/ext. Pt also mildly-moderately limited in overall LE flexibility, which could play a factor into ROM limitations. Assessed stair navigation today; pt showing difficulty FWB onto LLE to lower R leg, so pt will be descending stairs at home using step-to pattern until strength is improved. Ascending stairs using reciprocal gait pattern and QC in one hand and railing assisting in other. Signs and symptoms are consistent with referring diagnosis. He is appropriate for skilled therapy services at this time to address deficits and improve ease with recreational activities.  -DR     Please refer to paper survey for additional self-reported information No  -DR     Rehab Potential Good  -DR     Patient/caregiver participated in establishment of treatment plan and goals Yes  -DR     Patient would  benefit from skilled therapy intervention Yes  -DR        PT Plan    PT Frequency 2x/week  -     Predicted Duration of Therapy Intervention (PT) 6-8 visits  -     Planned CPT's? PT EVAL LOW COMPLEXITY: 44407;PT RE-EVAL: 57399;PT THER PROC EA 15 MIN: 44224;PT THER ACT EA 15 MIN: 93386;PT MANUAL THERAPY EA 15 MIN: 66474;PT NEUROMUSC RE-EDUCATION EA 15 MIN: 59694;PT GAIT TRAINING EA 15 MIN: 52098;PT SELF CARE/HOME MGMT/TRAIN EA 15: 17105;PT HOT OR COLD PACK TREAT MCARE  -     PT Plan Comments assess how pt responded to eval and new HEP; begin on nustep, maybe trial ending on RCB with half revolutions to full? review stretches given. pt has many exs given through [facility] and HHPT that he does at home- but will benefit from performance in clinic addressing seated knee flexion with scooter, standing TKE at wall, heel prop, STS from lowest mat, continued stair training, step ups?  -               User Key  (r) = Recorded By, (t) = Taken By, (c) = Cosigned By      Initials Name Provider Type    Stevie Almodovar, PT Physical Therapist                       OP Exercises       Row Name 04/18/24 1100             Subjective    Subjective Comments eval  -DR         Total Minutes    68513 - PT Therapeutic Exercise Minutes 8  -DR         Exercise 1    Exercise Name 1 nustep  -DR      Additional Comments next visit  -DR         Exercise 2    Exercise Name 2 stair navigation  -DR      Cueing 2 Verbal;Demo  -      Reps 2 using SPC x5 ascend/descend  -DR      Time 2 ascend reciprocal, descend step-to  -DR         Exercise 3    Exercise Name 3 knee flex/HS/calf stretch on stairs  -      Cueing 3 Verbal;[place]  -      Reps 3 4e  -DR      Time 3 20s  -DR                User Key  (r) = Recorded By, (t) = Taken By, (c) = Cosigned By      Initials Name Provider Type    Stevie Almodovar, PT Physical Therapist                                  Outcome Measure Options: Knee Outcome Score- ADL  Knee Outcome Survey Activities of  Daily Living Scale  Symptoms: Pain: I have the symptom, but it does not affect my activity  Symptoms: Stiffness: I have the symptom, but it does not affect my activity  Symptoms: Swelling: I do not have the symptom  Symptoms: Giving way, buckling, or shifting of the knee: I have the symptom, but it does not affect my activity  Symptoms: Weakness: I do not have the symptom  Symptoms: Limping: I have the symptom, but it does not affect my activity  Functional Limitations with ADL's: Walk: Activity is minimally difficult  Functional Limitations with ADL's: Go up stairs: I am unable to  Functional Limitations with ADL's: Go down stairs: I am unable to  Functional Limitations with ADL's: Stand: Activity is not difficult  Functional Limitations with ADL's: Kneel on front of your knee: I am unable to  Functional Limitations with ADL's: Squat: I am unable to  Functional Limitations with ADL's: Sit with your knee bent: Activity is not difficult  Functional Limitations with ADL's: Rise from a chair: Activity is not difficult  Knee Outcome Summary ADL's Score: 64.29 %      Time Calculation:     Start Time: 1144  Stop Time: 1237  Time Calculation (min): 53 min  Timed Charges  83658 - PT Therapeutic Exercise Minutes: 8  Total Minutes  Timed Charges Total Minutes: 8   Total Minutes: 8     Therapy Charges for Today       Code Description Service Date Service Provider Modifiers Qty    56437774553 HC PT THER PROC EA 15 MIN 4/18/2024 Stevie Fine, PT GP 1    [id number] HC PT EVAL LOW COMPLEXITY 3 4/18/2024 Stevie Fine, PT GP 1            PT G-Codes  Outcome Measure Options: Knee Outcome Score- ADL         Stevie Fine PT  4/18/2024

## 2024-04-23 ENCOUNTER — HOSPITAL ENCOUNTER (OUTPATIENT)
Dept: PHYSICAL THERAPY | Facility: HOSPITAL | Age: 79
Setting detail: THERAPIES SERIES
Discharge: HOME | End: 2024-04-23
Payer: MEDICARE

## 2024-04-23 DIAGNOSIS — Z47.89 ORTHOPEDIC AFTERCARE: ICD-10-CM

## 2024-04-23 DIAGNOSIS — R26.89 IMPAIRED GAIT AND MOBILITY: ICD-10-CM

## 2024-04-23 DIAGNOSIS — S82.009S: Primary | ICD-10-CM

## 2024-04-23 PROCEDURE — 97530 THERAPEUTIC ACTIVITIES: CPT

## 2024-04-23 PROCEDURE — 97110 THERAPEUTIC EXERCISES: CPT

## 2024-04-23 NOTE — THERAPY TREATMENT NOTE
Outpatient Physical Therapy Ortho Treatment Note  [facility]     Patient Name: Angel Remy  : 1945  MRN: [id number]  Today's Date: 2024      Visit Date: 2024    Visit Dx:    ICD-10-CM ICD-9-CM   1. Closed fracture of patella, sequela  S82.009S 905.4   2. Orthopedic aftercare  Z47.89 V54.9   3. Impaired gait and mobility  R26.89 781.2       Patient Active Problem List   Diagnosis    Hairy cell leukemia    FH: colon cancer    Hyponatremia    Orthostatic hypotension    Supine hypertension    Anemia    Macrocytosis    LBBB (left bundle branch block)    Prolonged P-R interval    Pancytopenia    Fitting and adjustment of vascular catheter    Elevated blood pressure reading    Thrombocytopenia    History of left bundle branch block (LBBB)    PAF (paroxysmal atrial fibrillation) history    Closed displaced transverse fracture of left patella    Closed displaced transverse fracture of right patella    Patella fracture        Past Medical History:   Diagnosis Date    BCC (basal cell carcinoma of skin) 2022    NOSE    Benign prostate hyperplasia     Elevated cholesterol     H/O Elevated PSA     H/O Hairy cell leukemia (CMS/HCC)     H/O Internal thrombosed hemorrhoids     H/O minimal right carotid plaque     H/O peripheral neuropathy     History of transfusion     Hyperlipidemia     Hypertension     Primary open-angle glaucoma, bilateral, mild stage         Past Surgical History:   Procedure Laterality Date    BONE MARROW BIOPSY W/ ASPIRATION      COLONOSCOPY      COLONOSCOPY N/A 2021    Procedure: COLONOSCOPY TO CECUM AND INTO TI WITH COLD BIOPSY POLYPECTOMIES;  Surgeon: Juan Jenkins MD;  Location: [facility] ENDOSCOPY;  Service: Gastroenterology;  Laterality: N/A;  pre: family history of colon cancer  post: polyps, diverticulosis  and internal hemorrhoids    EXCISION MASS HEAD/NECK N/A 2022    Procedure: EXCISION BASAL CELL CARCINOMA NOSE WITH FROZEN SECTION FULL THICKNESS  "GRAFT;  Surgeon: Arturo Weiss MD;  [place]:  [facility];  Service: Plastics;  Laterality: N/A;    HERNIA REPAIR Right 02/2000    Inguinal     KNEE SURGERY  [id number]    op both knee fx repair    OTHER SURGICAL HISTORY  1989    Median nerve foreign body excision    PATELLA OPEN REDUCTION INTERNAL FIXATION Bilateral 02/05/2024    Procedure: PATELLA OPEN REDUCTION INTERNAL FIXATION;  Surgeon: Adrián Roberson MD;  Location: [facility] MAIN OR;  Service: Orthopedics;  Laterality: Bilateral;    PROSTATE BIOPSY      March 2010 and October 2011 whan PSA had acceleration    SIGMOIDOSCOPY  1999    With small thrombosed internal hemorrhoid.    TONSILLECTOMY      VENOUS ACCESS DEVICE (PORT) INSERTION Right 10/18/2023    Procedure: MEDIPORT PLACEMENTWITH SUPERIOR VENACAVAGRAM;  Surgeon: Randy Fan MD;  Location: [facility] OR;  Service: Vascular;  Laterality: Right;    WISDOM TOOTH EXTRACTION  1961                        PT Assessment/Plan       Row Name 04/23/24 1200          PT Assessment    Assessment Comments Gt Jonel returns to clinic for first follow up from initial evaluation s/p bilateral patella ORIF with patellar tendon advancement and repair (2/5/24). He presents ambulating with adequate heel strike and gait speed, brought SPC but not using for assist. He verbalizes 3 primary concerns/questions including low back pain related to duration he was in wheelchair, stair navigation and rising/lowering to 16\" commode. Pt. required single UE assist to rise from 17\"-16\" chair in clinic, demonstrated ability to ascend stairs reciprocally and descend with step to until eccentric strength improves. Issued gentle stretching for lumbar spine secondary to pain limiting progressions with knee.  -MP        PT Plan    PT Plan Comments continue work with STS, review full HEP, small step down? TKE, rec bike  -MP               User Key  (r) = Recorded By, (t) = Taken By, (c) = Cosigned By      Initials Name Provider " "Type    MP Alyx Tong, PT Physical Therapist                       OP Exercises       Row Name 04/23/24 1200             Subjective    Subjective Comments I have a few questions for you  -MP         Total Minutes    70018 - PT Therapeutic Exercise Minutes 26  -MP      16431 - PT Therapeutic Activity Minutes 15  -MP         Exercise 1    Exercise Name 1 nustep  -MP      Cueing 1 Verbal  -MP      Time 1 5 min  -MP         Exercise 2    Exercise Name 2 knee flex stretch at step  -MP      Cueing 2 Verbal;Demo  -MP      Reps 2 2e  -MP      Time 2 20s  -MP         Exercise 3    Exercise Name 3 HS stretch at step  -MP      Cueing 3 Verbal;Demo  -MP      Reps 3 2e  -MP      Time 3 20s  -MP         Exercise 4    Exercise Name 4 gastroc stretch off step  -MP      Cueing 4 Verbal;Demo  -MP      Reps 4 3B  -MP      Time 4 20s  -MP         Exercise 5    Exercise Name 5 step ups 6\" 1 UE  -MP      Cueing 5 Verbal;Demo  -MP      Reps 5 10ea  -MP         Exercise 6    Exercise Name 6 stair navigation  -MP      Cueing 6 Verbal  -MP      Reps 6 4 stairs  -MP      Additional Comments reciprocal up 2 HR, step to down  -MP         Exercise 7    Exercise Name 7 STS  -MP      Cueing 7 Verbal;Demo  -MP      Reps 7 5  -MP      Time 7 22\", 20\", 17\", 16\" x2  -MP      Additional Comments 1 hand on armrest  -MP         Exercise 8    Exercise Name 8 LTR  -MP      Cueing 8 Verbal;Demo  -MP      Reps 8 10ea  -MP      Time 8 5s  -MP         Exercise 9    Exercise Name 9 SKTC  -MP      Cueing 9 Verbal;Demo  -MP      Reps 9 3e  -MP      Time 9 20sec  -MP                User Key  (r) = Recorded By, (t) = Taken By, (c) = Cosigned By      Initials Name Provider Type    MP Alyx Tong, PT Physical Therapist                                  PT OP Goals       Row Name 04/23/24 1200          PT Short Term Goals    STG Date to Achieve 05/18/24  -MP     STG 1 Pt will be independent with initial HEP to improve strength/ROM and decrease pain.  -MP     " STG 1 Progress Ongoing  -MP     STG 2 Pt will improve bilateral knee AROM 0-2-115 degrees to improve ability to navigate stairs.  -MP     STG 2 Progress Ongoing  -MP     STG 3 Pt will improve B LE strength to at least 4+/5.  -MP     STG 3 Progress Ongoing  -MP        Long Term Goals    LTG Date to Achieve 06/17/24  -MP     LTG 1 Pt will be independent with advance HEP to improve strength/ROM and decrease pain.  -MP     LTG 1 Progress Ongoing  -MP     LTG 2 Pt will ambulate with a reciprocal gait pattern with no AD to improve community access.  -MP     LTG 2 Progress Ongoing  -MP     LTG 3 Pt will be able to ascend/descend stairs in a reciprocal pattern with no increase pain.  -MP     LTG 3 Progress Ongoing  -MP     LTG 4 Pt will improve bilateral knee AROM to at least 0-120 deg to improve ability to navigate stairs.  -MP     LTG 4 Progress Ongoing  -MP     LTG 5 Pt will improve KOS score to 85% to show improved quality of life.  -MP     LTG 5 Progress Ongoing  -MP               User Key  (r) = Recorded By, (t) = Taken By, (c) = Cosigned By      Initials Name Provider Type    Alyx Sewell PT Physical Therapist                    Therapy Education  Education Details: Updated HEP [id number] reviewed stairs, STS and stretches for LBP  Given: Symptoms/condition management, Posture/body mechanics  Program: Reinforced, Progressed  How Provided: Demonstration, Verbal  Provided to: Patient  Level of Understanding: Verbalized, Demonstrated              Time Calculation:   Start Time: 1305  Stop Time: 1346  Time Calculation (min): 41 min  Total Timed Code Minutes- PT: 41 minute(s)  Timed Charges  98537 - PT Therapeutic Exercise Minutes: 26  51121 - PT Therapeutic Activity Minutes: 15  Total Minutes  Timed Charges Total Minutes: 41   Total Minutes: 41  Therapy Charges for Today       Code Description Service Date Service Provider Modifiers Qty    90808621183 HC PT THERAPEUTIC ACT EA 15 MIN 4/23/2024 Alyx Tong PT  GP 1    [id number]  PT THER PROC EA 15 MIN 4/23/2024 Alyx Tong, PT GP 2                      Alyx Tong, PT  4/23/2024

## 2024-04-25 ENCOUNTER — HOSPITAL ENCOUNTER (OUTPATIENT)
Dept: PHYSICAL THERAPY | Facility: HOSPITAL | Age: 79
Setting detail: THERAPIES SERIES
Discharge: HOME | End: 2024-04-25
Payer: MEDICARE

## 2024-04-25 DIAGNOSIS — R26.89 IMPAIRED GAIT AND MOBILITY: ICD-10-CM

## 2024-04-25 DIAGNOSIS — S82.009S: Primary | ICD-10-CM

## 2024-04-25 DIAGNOSIS — Z47.89 ORTHOPEDIC AFTERCARE: ICD-10-CM

## 2024-04-25 PROCEDURE — 97530 THERAPEUTIC ACTIVITIES: CPT

## 2024-04-25 PROCEDURE — 97110 THERAPEUTIC EXERCISES: CPT

## 2024-04-25 NOTE — THERAPY TREATMENT NOTE
Outpatient Physical Therapy Ortho Treatment Note  [facility]     Patient Name: Angel Remy  : 1945  MRN: [id number]  Today's Date: 2024      Visit Date: 2024    Visit Dx:    ICD-10-CM ICD-9-CM   1. Closed fracture of patella, sequela  S82.009S 905.4   2. Orthopedic aftercare  Z47.89 V54.9   3. Impaired gait and mobility  R26.89 781.2       Patient Active Problem List   Diagnosis    Hairy cell leukemia    FH: colon cancer    Hyponatremia    Orthostatic hypotension    Supine hypertension    Anemia    Macrocytosis    LBBB (left bundle branch block)    Prolonged P-R interval    Pancytopenia    Fitting and adjustment of vascular catheter    Elevated blood pressure reading    Thrombocytopenia    History of left bundle branch block (LBBB)    PAF (paroxysmal atrial fibrillation) history    Closed displaced transverse fracture of left patella    Closed displaced transverse fracture of right patella    Patella fracture        Past Medical History:   Diagnosis Date    BCC (basal cell carcinoma of skin) 2022    NOSE    Benign prostate hyperplasia     Elevated cholesterol     H/O Elevated PSA     H/O Hairy cell leukemia (CMS/HCC)     H/O Internal thrombosed hemorrhoids     H/O minimal right carotid plaque     H/O peripheral neuropathy     History of transfusion     Hyperlipidemia     Hypertension     Primary open-angle glaucoma, bilateral, mild stage         Past Surgical History:   Procedure Laterality Date    BONE MARROW BIOPSY W/ ASPIRATION      COLONOSCOPY      COLONOSCOPY N/A 2021    Procedure: COLONOSCOPY TO CECUM AND INTO TI WITH COLD BIOPSY POLYPECTOMIES;  Surgeon: Juan Jenkins MD;  Location: [facility] ENDOSCOPY;  Service: Gastroenterology;  Laterality: N/A;  pre: family history of colon cancer  post: polyps, diverticulosis  and internal hemorrhoids    EXCISION MASS HEAD/NECK N/A 2022    Procedure: EXCISION BASAL CELL CARCINOMA NOSE WITH FROZEN SECTION FULL THICKNESS  GRAFT;  Surgeon: Arturo Weiss MD;  Location:  [facility];  Service: Plastics;  Laterality: N/A;    HERNIA REPAIR Right 02/2000    Inguinal     KNEE SURGERY  [id number]    op both knee fx repair    OTHER SURGICAL HISTORY  1989    Median nerve foreign body excision    PATELLA OPEN REDUCTION INTERNAL FIXATION Bilateral 02/05/2024    Procedure: PATELLA OPEN REDUCTION INTERNAL FIXATION;  Surgeon: Adrián Roberson MD;  Location: [facility] MAIN OR;  Service: Orthopedics;  Laterality: Bilateral;    PROSTATE BIOPSY      March 2010 and October 2011 whan PSA had acceleration    SIGMOIDOSCOPY  1999    With small thrombosed internal hemorrhoid.    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 Spent a lot of time today with review and modifying form of exercise to assist with regaining full functional mobility within home, including STS and stair training. Changed hand positioning during STS to be at quad tendons vs middle quad, which assisted pt immensely and demonstrated ability to STS 15 reps from lowest mat table using no handrails. Pt also demonstrated great ability to navigate stairs both in ascending and descending using a reciprocal pattern with no LOB or buckling noted. Added step ups with drivers, both fwd and lateral, and added LAQ with hip adduction with great quad control and activation on each. Updated HEP. Discussed strengthening every other day and stretching daily to allow for muscle recovery and improved mobility. Also, introduced supine piriformis stretch to assist with low back pain, since that can limit pt progress  "with knees. Pt remains appropriate for skilled PT at this time.  -DR        PT Plan    PT Plan Comments progressions of STS and reciprocal stair navigation at home? utilizing rest days? end visit with RCB for ROM, TKE at wall, side steps, MW?  -DR               User Key  (r) = Recorded By, (t) = Taken By, (c) = Cosigned By      Initials Name Provider Type    Stevie Almodovar, PT Physical Therapist                       OP Exercises       Row Name 04/25/24 1400             Subjective    Subjective Comments I still have some back pain. Left knee gets more sore than right.  -DR         Total Minutes    79868 - PT Therapeutic Exercise Minutes 23  -DR      03098 - PT Therapeutic Activity Minutes 20  -DR         Exercise 1    Exercise Name 1 nustep  -DR      Cueing 1 Verbal  -DR      Time 1 5 min  -DR         Exercise 2    Exercise Name 2 knee flex stretch at step  -DR      Cueing 2 Verbal;Demo  -DR      Reps 2 2e  -DR      Time 2 20s  -DR         Exercise 3    Exercise Name 3 HS stretch at step  -DR      Cueing 3 Verbal;Demo  -DR      Reps 3 2e  -DR      Time 3 20s  -DR         Exercise 4    Exercise Name 4 gastroc stretch off step  -DR      Cueing 4 Verbal;Demo  -DR      Reps 4 3B  -DR      Time 4 20s  -DR         Exercise 5    Exercise Name 5 fwd/lateral step up 6\"  -DR      Cueing 5 Verbal;Demo  -DR      Sets 5 1  -DR      Reps 5 10e  -DR      Time 5 +   -DR         Exercise 6    Exercise Name 6 stair navigation  -DR      Cueing 6 Verbal  -DR      Reps 6 4 up/4 down  -DR      Time 6 reciprocal pattern using L UE ascend only  -DR         Exercise 7    Exercise Name 7 STS  -DR      Cueing 7 Verbal;Demo  -DR      Reps 7 15  -DR      Time 7 lowest mat table setting  -DR      Additional Comments pushing through quad tendons vs quads  -DR         Exercise 8    Exercise Name 8 LTR  -DR      Cueing 8 Verbal;Demo  -DR      Reps 8 15e  -DR         Exercise 9    Exercise Name 9 SKTC  -DR      Cueing 9 Verbal;Demo  -DR   "    Reps 9 2e  -DR      Time 9 20s  -DR      Additional Comments towel  -DR         Exercise 10    Exercise Name 10 supine piriformis stretch  -DR      Cueing 10 Verbal;Demo  -DR      Reps 10 3e  -DR      Time 10 20s  -DR         Exercise 11    Exercise Name 11 LAQ with hip adduction squeeze  -DR      Cueing 11 Verbal;Demo  -DR      Sets 11 2e  -DR      Reps 11 10  -DR      Time 11 2# AW; blue ball  -DR                User Key  (r) = Recorded By, (t) = Taken By, (c) = Cosigned By      Initials Name Provider Type    Stevie Almodovar, PT Physical Therapist                                  PT OP Goals       Row Name 04/25/24 1400          PT Short Term Goals    STG Date to Achieve 05/18/24  -     STG 1 Pt will be independent with initial HEP to improve strength/ROM and decrease pain.  -     STG 1 Progress Ongoing  -     STG 2 Pt will improve bilateral knee AROM 0-2-115 degrees to improve ability to navigate stairs.  -     STG 2 Progress Ongoing  -     STG 3 Pt will improve B LE strength to at least 4+/5.  -     STG 3 Progress Ongoing  -DR        Long Term Goals    LTG Date to Achieve 06/17/24  -     LTG 1 Pt will be independent with advance HEP to improve strength/ROM and decrease pain.  -     LTG 1 Progress Ongoing  -     LTG 2 Pt will ambulate with a reciprocal gait pattern with no AD to improve community access.  -     LTG 2 Progress Ongoing  -     LTG 3 Pt will be able to ascend/descend stairs in a reciprocal pattern with no increase pain.  -     LTG 3 Progress Ongoing  -     LTG 4 Pt will improve bilateral knee AROM to at least 0-120 deg to improve ability to navigate stairs.  -     LTG 4 Progress Ongoing  -     LTG 5 Pt will improve KOS score to 85% to show improved quality of life.  -     LTG 5 Progress Ongoing  -DR               User Key  (r) = Recorded By, (t) = Taken By, (c) = Cosigned By      Initials Name Provider Type    Stevie Almodovar, PT Physical Therapist                     Therapy Education  Education Details: updated HEP; reviewed STS mechanisms and stair navigation. advised SPC use only for long distances since no concern for unsteadiness or risks of falls; frequency of HEP to allow for rest days.  Given: HEP, Symptoms/condition management, Posture/body mechanics, Mobility training, Fall prevention and home safety  Program: Reinforced, Progressed  How Provided: Verbal, Demonstration, Written  Provided to: Patient  Level of Understanding: Teach back education performed, Verbalized, Demonstrated              Time Calculation:   Start Time: 1447  Stop Time: 1530  Time Calculation (min): 43 min  Timed Charges  45607 - PT Therapeutic Exercise Minutes: 23  40150 - PT Therapeutic Activity Minutes: 20  Total Minutes  Timed Charges Total Minutes: 43   Total Minutes: 43  Therapy Charges for Today       Code Description Service Date Service Provider Modifiers Qty    [id number]  PT THER PROC EA 15 MIN 4/25/2024 Stevie Fine, PT GP 2    [id number]  PT THERAPEUTIC ACT EA 15 MIN 4/25/2024 Stevie Fine, PT GP 1                      Stevie Fine PT  4/25/2024

## 2024-04-29 ENCOUNTER — HOSPITAL ENCOUNTER (OUTPATIENT)
Dept: PHYSICAL THERAPY | Facility: HOSPITAL | Age: 79
Discharge: HOME | End: 2024-04-29
Admitting: ORTHOPAEDIC SURGERY
Payer: MEDICARE

## 2024-04-29 DIAGNOSIS — R26.89 IMPAIRED GAIT AND MOBILITY: ICD-10-CM

## 2024-04-29 DIAGNOSIS — Z47.89 ORTHOPEDIC AFTERCARE: ICD-10-CM

## 2024-04-29 DIAGNOSIS — S82.009S: Primary | ICD-10-CM

## 2024-04-29 PROCEDURE — 97110 THERAPEUTIC EXERCISES: CPT

## 2024-04-29 PROCEDURE — 97530 THERAPEUTIC ACTIVITIES: CPT

## 2024-04-29 NOTE — THERAPY TREATMENT NOTE
Outpatient Physical Therapy Ortho Treatment Note  [facility]     Patient Name: Angel Remy  : 1945  MRN: [id number]  Today's Date: 2024      Visit Date: 2024    Visit Dx:    ICD-10-CM ICD-9-CM   1. Closed fracture of patella, sequela  S82.009S 905.4   2. Orthopedic aftercare  Z47.89 V54.9   3. Impaired gait and mobility  R26.89 781.2       Patient Active Problem List   Diagnosis    Hairy cell leukemia    FH: colon cancer    Hyponatremia    Orthostatic hypotension    Supine hypertension    Anemia    Macrocytosis    LBBB (left bundle branch block)    Prolonged P-R interval    Pancytopenia    Fitting and adjustment of vascular catheter    Elevated blood pressure reading    Thrombocytopenia    History of left bundle branch block (LBBB)    PAF (paroxysmal atrial fibrillation) history    Closed displaced transverse fracture of left patella    Closed displaced transverse fracture of right patella    Patella fracture        Past Medical History:   Diagnosis Date    BCC (basal cell carcinoma of skin) 2022    NOSE    Benign prostate hyperplasia     Elevated cholesterol     H/O Elevated PSA     H/O Hairy cell leukemia (CMS/HCC)     H/O Internal thrombosed hemorrhoids     H/O minimal right carotid plaque     H/O peripheral neuropathy     History of transfusion     Hyperlipidemia     Hypertension     Primary open-angle glaucoma, bilateral, mild stage         Past Surgical History:   Procedure Laterality Date    BONE MARROW BIOPSY W/ ASPIRATION      COLONOSCOPY      COLONOSCOPY N/A 2021    Procedure: COLONOSCOPY TO CECUM AND INTO TI WITH COLD BIOPSY POLYPECTOMIES;  Surgeon: Juan Jenkins MD;  Location: [facility] ENDOSCOPY;  Service: Gastroenterology;  Laterality: N/A;  pre: family history of colon cancer  post: polyps, diverticulosis  and internal hemorrhoids    EXCISION MASS HEAD/NECK N/A 2022    Procedure: EXCISION BASAL CELL CARCINOMA NOSE WITH FROZEN SECTION FULL THICKNESS  GRAFT;  Surgeon: Arturo Weiss MD;  [facility]  [facility]  Service: Plastics;  Laterality: N/A;    HERNIA REPAIR Right 02/2000    Inguinal     KNEE SURGERY  [id number]    op both knee fx repair    OTHER SURGICAL HISTORY  1989    Median nerve foreign body excision    PATELLA OPEN REDUCTION INTERNAL FIXATION Bilateral 02/05/2024    Procedure: PATELLA OPEN REDUCTION INTERNAL FIXATION;  Surgeon: Adrián Roberson MD;  Location: [facility] MAIN OR;  Service: Orthopedics;  Laterality: Bilateral;    PROSTATE BIOPSY      March 2010 and October 2011 whan PSA had acceleration    SIGMOIDOSCOPY  1999    With small thrombosed internal hemorrhoid.    TONSILLECTOMY      VENOUS ACCESS DEVICE (PORT) INSERTION Right 10/18/2023    Procedure: MEDIPORT PLACEMENTWITH SUPERIOR VENACAVAGRAM;  Surgeon: Randy Fan MD;  Location: [facility] MAIN OR;  Service: Vascular;  Laterality: Right;    WISDOM TOOTH EXTRACTION  1961                        PT Assessment/Plan       Row Name 04/29/24 1200          PT Assessment    Assessment Comments No new complaints or reports at this time. Reviewed STS at lowest table level, with great mechanics, focusing on fully erect posture and getting stagnant to ground prior to ambulating fwd. Added side steps and monster walks for increasing proximal leg strengthening, as well as ended with RCB for cardio and pushing into greater ROM. Updated HEP. Discussed cardio progressions at home and to progress as able. Resting in between days to allow for greater muscle recovery. Pt remains appropriate for skilled PT.  -DR        PT Plan    PT Plan Comments begin visit with RCB; revisit step ups on stairs, leg press machine, 3 way hip.  -               User Key  (r) = Recorded By, (t) = Taken By, (c) = Cosigned By      Initials Name Provider Type    Stevie Almodovar, PT Physical Therapist                       OP Exercises       Row Name 04/29/24 1100             Subjective    Subjective Comments Im going down  the stairs better but my stands are still wonky.  -DR         Total Minutes    09659 - PT Therapeutic Exercise Minutes 23  -DR      60074 - PT Therapeutic Activity Minutes 19  -DR         Exercise 1    Exercise Name 1 nustep  -DR      Cueing 1 Verbal  -DR      Time 1 5 min  -DR         Exercise 2    Exercise Name 2 knee flex stretch at step  -DR      Cueing 2 Verbal;Demo  -DR      Reps 2 3e  -DR      Time 2 20s  -DR         Exercise 3    Exercise Name 3 HS stretch at step  -DR      Cueing 3 Verbal;Demo  -DR      Reps 3 3e  -DR      Time 3 20s  -DR         Exercise 4    Exercise Name 4 gastroc stretch off step  -DR      Cueing 4 Verbal;Demo  -DR      Reps 4 3B  -DR      Time 4 20s  -DR         Exercise 6    Exercise Name 6 side steps/MW  -DR      Cueing 6 Verbal;Demo  -DR      Reps 6 3 laps each  -DR      Time 6 RTB  -DR      Additional Comments fwd/bkwd  -DR         Exercise 7    Exercise Name 7 STS  -DR      Cueing 7 Verbal;Demo  -DR      Reps 7 15  -DR      Time 7 lowest mat table setting  -DR      Additional Comments pushing through quad tendons vs quads  -DR         Exercise 8    Exercise Name 8 LTR  -DR      Additional Comments reviewed for HEP  -DR         Exercise 9    Exercise Name 9 SKTC  -DR      Cueing 9 Verbal;Demo  -DR      Reps 9 10  -DR      Time 9 10s  -DR      Additional Comments today did DKTC with towe  -DR         Exercise 10    Exercise Name 10 supine piriformis stretch  -DR      Cueing 10 Verbal;Demo  -DR      Reps 10 3e  -DR      Time 10 20s  -DR         Exercise 11    Exercise Name 11 LAQ with hip adduction squeeze  -DR      Cueing 11 Verbal;Demo  -DR      Sets 11 2e  -DR      Reps 11 10  -DR      Time 11 2# AW; blue ball  -DR         Exercise 12    Exercise Name 12 RCB  -DR      Cueing 12 Verbal;Demo  -DR      Time 12 5 min; lvl 3  -DR                User Key  (r) = Recorded By, (t) = Taken By, (c) = Cosigned By      Initials Name Provider Type    Stevie Almodovar, PT Physical Therapist                                   PT OP Goals       Row Name 04/29/24 1100          PT Short Term Goals    STG Date to Achieve 05/18/24  -     STG 1 Pt will be independent with initial HEP to improve strength/ROM and decrease pain.  -     STG 1 Progress Ongoing  -     STG 2 Pt will improve bilateral knee AROM 0-2-115 degrees to improve ability to navigate stairs.  -     STG 2 Progress Ongoing  -DR     STG 3 Pt will improve B LE strength to at least 4+/5.  -     STG 3 Progress Ongoing  -DR        Long Term Goals    LTG Date to Achieve 06/17/24  -     LTG 1 Pt will be independent with advance HEP to improve strength/ROM and decrease pain.  -     LTG 1 Progress Ongoing  -     LTG 2 Pt will ambulate with a reciprocal gait pattern with no AD to improve community access.  -     LTG 2 Progress Ongoing  -     LTG 3 Pt will be able to ascend/descend stairs in a reciprocal pattern with no increase pain.  -     LTG 3 Progress Ongoing  -     LTG 4 Pt will improve bilateral knee AROM to at least 0-120 deg to improve ability to navigate stairs.  -     LTG 4 Progress Ongoing  -     LTG 5 Pt will improve KOS score to 85% to show improved quality of life.  -     LTG 5 Progress Ongoing  -DR               User Key  (r) = Recorded By, (t) = Taken By, (c) = Cosigned By      Initials Name Provider Type    Stevie Almodovar, PT Physical Therapist                    Therapy Education  Education Details: printed & reviewed HEP; discussed strengthening every other day, stretching daily to allow for muscle recovery; cardio equipment progressions  Given: HEP, Symptoms/condition management  Program: Reinforced, Progressed  How Provided: Verbal, Demonstration, Written  Provided to: Patient  Level of Understanding: Teach back education performed, Verbalized, Demonstrated              Time Calculation:   Start Time: 1116  Stop Time: 1158  Time Calculation (min): 42 min  Timed Charges  79152 - PT Therapeutic Exercise  Minutes: 23  31710 - PT Therapeutic Activity Minutes: 19  Total Minutes  Timed Charges Total Minutes: 42   Total Minutes: 42  Therapy Charges for Today       Code Description Service Date Service Provider Modifiers Qty    [id number]  PT THER PROC EA 15 MIN 4/29/2024 Stevie Fine, PT GP 2    [id number]  PT THERAPEUTIC ACT EA 15 MIN 4/29/2024 Stevie Fine, PT GP 1                      Stevie Fine, PT  4/29/2024

## 2024-04-30 ENCOUNTER — TELEPHONE (OUTPATIENT)
Dept: ONCOLOGY | Facility: CLINIC | Age: 79
End: 2024-04-30
Payer: MEDICARE

## 2024-04-30 NOTE — TELEPHONE ENCOUNTER
Caller: [facility]/ [facility]        Best call back number: [phone]      What was the call regarding: FACILITY CALLED THEY NEED CLINICAL INFORMATION PERTAINING TO PATIENTS DX. PATIENTS HEP B VACCINATION NEEDS AN AUTH      REF # [id number]

## 2024-05-01 ENCOUNTER — TELEPHONE (OUTPATIENT)
Dept: ONCOLOGY | Facility: CLINIC | Age: 79
End: 2024-05-01
Payer: MEDICARE

## 2024-05-02 ENCOUNTER — HOSPITAL ENCOUNTER (OUTPATIENT)
Dept: PHYSICAL THERAPY | Facility: HOSPITAL | Age: 79
Setting detail: THERAPIES SERIES
Discharge: HOME | End: 2024-05-02
Payer: MEDICARE

## 2024-05-02 DIAGNOSIS — S82.009S: Primary | ICD-10-CM

## 2024-05-02 DIAGNOSIS — R26.89 IMPAIRED GAIT AND MOBILITY: ICD-10-CM

## 2024-05-02 DIAGNOSIS — Z47.89 ORTHOPEDIC AFTERCARE: ICD-10-CM

## 2024-05-02 PROCEDURE — 97110 THERAPEUTIC EXERCISES: CPT

## 2024-05-02 PROCEDURE — 97530 THERAPEUTIC ACTIVITIES: CPT

## 2024-05-02 NOTE — THERAPY TREATMENT NOTE
Outpatient Physical Therapy Ortho Treatment Note  [facility]     Patient Name: Angel Remy  : 1945  MRN: [id number]  Today's Date: 2024      Visit Date: 2024    Visit Dx:    ICD-10-CM ICD-9-CM   1. Closed fracture of patella, sequela  S82.009S 905.4   2. Orthopedic aftercare  Z47.89 V54.9   3. Impaired gait and mobility  R26.89 781.2       Patient Active Problem List   Diagnosis    Hairy cell leukemia    FH: colon cancer    Hyponatremia    Orthostatic hypotension    Supine hypertension    Anemia    Macrocytosis    LBBB (left bundle branch block)    Prolonged P-R interval    Pancytopenia    Fitting and adjustment of vascular catheter    Elevated blood pressure reading    Thrombocytopenia    History of left bundle branch block (LBBB)    PAF (paroxysmal atrial fibrillation) history    Closed displaced transverse fracture of left patella    Closed displaced transverse fracture of right patella    Patella fracture        Past Medical History:   Diagnosis Date    BCC (basal cell carcinoma of skin) 2022    NOSE    Benign prostate hyperplasia     Elevated cholesterol     H/O Elevated PSA     H/O Hairy cell leukemia (CMS/HCC)     H/O Internal thrombosed hemorrhoids     H/O minimal right carotid plaque     H/O peripheral neuropathy     History of transfusion     Hyperlipidemia     Hypertension     Primary open-angle glaucoma, bilateral, mild stage         Past Surgical History:   Procedure Laterality Date    BONE MARROW BIOPSY W/ ASPIRATION      COLONOSCOPY      COLONOSCOPY N/A 2021    Procedure: COLONOSCOPY TO CECUM AND INTO TI WITH COLD BIOPSY POLYPECTOMIES;  Surgeon: Juan Jenkins MD;  Location: [facility] ENDOSCOPY;  Service: Gastroenterology;  Laterality: N/A;  pre: family history of colon cancer  post: polyps, diverticulosis  and internal hemorrhoids    EXCISION MASS HEAD/NECK N/A 2022    Procedure: EXCISION BASAL CELL CARCINOMA NOSE WITH FROZEN SECTION FULL THICKNESS GRAFT;   Surgeon: Arturo Weiss MD;  Location: [facility];  Service: Plastics;  Laterality: N/A;    HERNIA REPAIR Right 02/2000    Inguinal     KNEE SURGERY  [id number]    op both knee fx repair    OTHER SURGICAL HISTORY  1989    Median nerve foreign body excision    PATELLA OPEN REDUCTION INTERNAL FIXATION Bilateral 02/05/2024    Procedure: PATELLA OPEN REDUCTION INTERNAL FIXATION;  Surgeon: Adrián Roberson MD;  Location: [facility] OR;  Service: Orthopedics;  Laterality: Bilateral;    PROSTATE BIOPSY      March 2010 and October 2011 whan PSA had acceleration    SIGMOIDOSCOPY  1999    With small thrombosed internal hemorrhoid.    TONSILLECTOMY      VENOUS ACCESS DEVICE (PORT) INSERTION Right 10/18/2023    Procedure: MEDIPORT PLACEMENTWITH SUPERIOR VENACAVAGRAM;  Surgeon: Randy Fan MD;  Location: [facility] OR;  Service: Vascular;  Laterality: Right;    WISDOM TOOTH EXTRACTION  1961                        PT Assessment/Plan       Row Name 05/02/24 1500          PT Assessment    Assessment Comments No new complaints at this time. Continuing to review STS and stair navigation to assess proper mechanics. Added fwd lunge to bosu and standing heel raises today to target LE strengthening with good response. Pt reports muscle fatigue by end of visit. He demonstrates great functional mobility, ambulating throughout full visit with no AD at side. Will likely drop to 1x/wk in near future. Pt remains appropriate for skilled PT at this time.  -        PT Plan    PT Plan Comments leg press machine, 3 way hip? continue warm up on RCB  -               User Key  (r) = Recorded By, (t) = Taken By, (c) = Cosigned By      Initials Name Provider Type    Stevie Almodovar, PT Physical Therapist                       OP Exercises       Row Name 05/02/24 1400             Subjective    Subjective Comments Everything is going well.  -         Total Minutes    46123 - PT Therapeutic Exercise Minutes 30  -      76383 -  "PT Therapeutic Activity Minutes 16  -DR         Exercise 1    Exercise Name 1 RCB  -DR      Cueing 1 Verbal  -DR      Time 1 5 min  -DR         Exercise 2    Exercise Name 2 knee flex stretch at step  -DR      Cueing 2 Verbal;Demo  -DR      Reps 2 3e  -DR      Time 2 20s  -DR         Exercise 3    Exercise Name 3 HS stretch at step  -DR      Cueing 3 Verbal;Demo  -DR      Reps 3 3e  -DR      Time 3 20s  -DR         Exercise 4    Exercise Name 4 gastroc stretch off step  -DR      Cueing 4 Verbal;Demo  -DR      Reps 4 3B  -DR      Time 4 20s  -DR         Exercise 5    Exercise Name 5 fwd step up 6\"  -DR      Cueing 5 Verbal;Demo  -DR      Sets 5 2e  -DR      Reps 5 10  -DR      Time 5 +   -DR         Exercise 6    Exercise Name 6 side steps/MW  -DR      Cueing 6 Verbal;Demo  -DR      Reps 6 4 laps each  -DR      Time 6 RTB  -DR      Additional Comments fwd/bkwd  -DR         Exercise 7    Exercise Name 7 STS  -DR      Cueing 7 Verbal;Demo  -DR      Reps 7 15  -DR      Time 7 lowest mat table setting  -DR         Exercise 8    Exercise Name 8 fwd lunge to bosu  -DR      Cueing 8 Verbal;Demo  -DR      Sets 8 2e  -DR      Reps 8 10  -DR         Exercise 9    Exercise Name 9 SKTC  -DR      Cueing 9 Verbal;Demo  -DR      Reps 9 2e  -DR      Time 9 20s  -DR         Exercise 10    Exercise Name 10 supine piriformis stretch  -DR      Cueing 10 Verbal;Demo  -DR      Reps 10 3e  -DR      Time 10 20s  -DR         Exercise 12    Exercise Name 12 standing HR  -DR      Cueing 12 Verbal;Demo  -DR      Time 12 20e  -DR                User Key  (r) = Recorded By, (t) = Taken By, (c) = Cosigned By      Initials Name Provider Type    Stevie Almodovar, PT Physical Therapist                                  PT OP Goals       Row Name 05/02/24 1400          PT Short Term Goals    STG Date to Achieve 05/18/24  -DR     STG 1 Pt will be independent with initial HEP to improve strength/ROM and decrease pain.  -DR     STG 1 Progress " Ongoing  -     STG 2 Pt will improve bilateral knee AROM 0-2-115 degrees to improve ability to navigate stairs.  -     STG 2 Progress Ongoing  -     STG 3 Pt will improve B LE strength to at least 4+/5.  -     STG 3 Progress Ongoing  -        Long Term Goals    LTG Date to Achieve 06/17/24  -     LTG 1 Pt will be independent with advance HEP to improve strength/ROM and decrease pain.  -     LTG 1 Progress Ongoing  -     LTG 2 Pt will ambulate with a reciprocal gait pattern with no AD to improve community access.  -     LTG 2 Progress Ongoing  -     LTG 3 Pt will be able to ascend/descend stairs in a reciprocal pattern with no increase pain.  -     LTG 3 Progress Ongoing  -     LTG 4 Pt will improve bilateral knee AROM to at least 0-120 deg to improve ability to navigate stairs.  -     LTG 4 Progress Ongoing  -     LTG 5 Pt will improve KOS score to 85% to show improved quality of life.  -     LTG 5 Progress Ongoing  -               User Key  (r) = Recorded By, (t) = Taken By, (c) = Cosigned By      Initials Name Provider Type    Stevie Almodovar, PT Physical Therapist                    Therapy Education  Education Details: reviewed activity modifications  Given: HEP, Symptoms/condition management  Program: Reinforced  How Provided: Verbal, Demonstration, Written  Provided to: Patient  Level of Understanding: Teach back education performed, Verbalized, Demonstrated              Time Calculation:   Start Time: 1459  Stop Time: 1545  Time Calculation (min): 46 min  Timed Charges  78360 - PT Therapeutic Exercise Minutes: 30  92034 - PT Therapeutic Activity Minutes: 16  Total Minutes  Timed Charges Total Minutes: 46   Total Minutes: 46  Therapy Charges for Today       Code Description Service Date Service Provider Modifiers Qty    36357379611 HC PT THER PROC EA 15 MIN 5/2/2024 Stevie Fine, PT GP 2    61607838682 HC PT THERAPEUTIC ACT EA 15 MIN 5/2/2024 Stevie Fine, PT GP 1                       Stevie Fine, PT  5/2/2024

## 2024-05-07 ENCOUNTER — HOSPITAL ENCOUNTER (OUTPATIENT)
Dept: PHYSICAL THERAPY | Facility: HOSPITAL | Age: 79
Setting detail: THERAPIES SERIES
Discharge: HOME | End: 2024-05-07
Payer: MEDICARE

## 2024-05-07 DIAGNOSIS — Z47.89 ORTHOPEDIC AFTERCARE: ICD-10-CM

## 2024-05-07 DIAGNOSIS — R26.89 IMPAIRED GAIT AND MOBILITY: ICD-10-CM

## 2024-05-07 DIAGNOSIS — S82.009S: Primary | ICD-10-CM

## 2024-05-07 PROCEDURE — 97530 THERAPEUTIC ACTIVITIES: CPT | Performed by: PHYSICAL THERAPIST

## 2024-05-09 ENCOUNTER — INFUSION (OUTPATIENT)
Dept: ONCOLOGY | Facility: HOSPITAL | Age: 79
End: 2024-05-09
Payer: MEDICARE

## 2024-05-09 ENCOUNTER — HOSPITAL ENCOUNTER (OUTPATIENT)
Dept: PHYSICAL THERAPY | Facility: HOSPITAL | Age: 79
Discharge: HOME | End: 2024-05-09
Payer: MEDICARE

## 2024-05-09 DIAGNOSIS — Z47.89 ORTHOPEDIC AFTERCARE: ICD-10-CM

## 2024-05-09 DIAGNOSIS — R26.89 IMPAIRED GAIT AND MOBILITY: ICD-10-CM

## 2024-05-09 DIAGNOSIS — S82.009S: Primary | ICD-10-CM

## 2024-05-09 DIAGNOSIS — Z45.2 FITTING AND ADJUSTMENT OF VASCULAR CATHETER: Primary | ICD-10-CM

## 2024-05-09 PROCEDURE — 96523 IRRIG DRUG DELIVERY DEVICE: CPT

## 2024-05-09 PROCEDURE — 97530 THERAPEUTIC ACTIVITIES: CPT

## 2024-05-09 PROCEDURE — 25010000002 HEPARIN LOCK FLUSH PER 10 UNITS: Performed by: INTERNAL MEDICINE

## 2024-05-09 RX ORDER — SODIUM CHLORIDE 0.9 % (FLUSH) 0.9 %
10 SYRINGE (ML) INJECTION AS NEEDED
OUTPATIENT
Start: 2024-05-09

## 2024-05-09 RX ORDER — HEPARIN SODIUM (PORCINE) LOCK FLUSH IV SOLN 100 UNIT/ML 100 UNIT/ML
500 SOLUTION INTRAVENOUS AS NEEDED
Status: DISCONTINUED | OUTPATIENT
Start: 2024-05-09 | End: 2024-05-09 | Stop reason: HOSPADM

## 2024-05-09 RX ORDER — SODIUM CHLORIDE 0.9 % (FLUSH) 0.9 %
10 SYRINGE (ML) INJECTION AS NEEDED
Status: DISCONTINUED | OUTPATIENT
Start: 2024-05-09 | End: 2024-05-09 | Stop reason: HOSPADM

## 2024-05-09 RX ORDER — HEPARIN SODIUM (PORCINE) LOCK FLUSH IV SOLN 100 UNIT/ML 100 UNIT/ML
500 SOLUTION INTRAVENOUS AS NEEDED
OUTPATIENT
Start: 2024-05-09

## 2024-05-09 RX ADMIN — Medication 10 ML: at 13:14

## 2024-05-09 RX ADMIN — Medication 500 UNITS: at 13:14

## 2024-05-14 ENCOUNTER — APPOINTMENT (OUTPATIENT)
Dept: PHYSICAL THERAPY | Facility: HOSPITAL | Age: 79
End: 2024-05-14
Payer: MEDICARE

## 2024-05-17 ENCOUNTER — OFFICE VISIT (OUTPATIENT)
Dept: ORTHOPEDIC SURGERY | Facility: CLINIC | Age: 79
End: 2024-05-17
Payer: MEDICARE

## 2024-05-17 VITALS — BODY MASS INDEX: 24.18 KG/M2 | WEIGHT: 168.9 LBS | HEIGHT: 70 IN | TEMPERATURE: 97.7 F

## 2024-05-17 DIAGNOSIS — Z09 SURGERY FOLLOW-UP: Primary | ICD-10-CM

## 2024-05-17 NOTE — PROGRESS NOTES
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  He is doing great.  No complaints or problems.    Both knee incisions are healed.  Extensor mechanisms are completely intact.  He has full extension and flexion to 125 bilaterally.  Gait is normal.  No assist device with ambulation.    Assessment: Healed bilateral patellar tendon advancement    Plan: I told him that his strength will get better for up to a year.  He seems to be doing great.  He can follow-up as needed.

## 2024-05-20 DIAGNOSIS — Z09 SURGERY FOLLOW-UP: ICD-10-CM

## 2024-05-21 ENCOUNTER — APPOINTMENT (OUTPATIENT)
Dept: PHYSICAL THERAPY | Facility: HOSPITAL | Age: 79
End: 2024-05-21
Payer: MEDICARE

## 2024-05-21 RX ORDER — TRAMADOL HYDROCHLORIDE 50 MG/1
50 TABLET ORAL EVERY 8 HOURS PRN
Qty: 30 TABLET | Refills: 0 | Status: SHIPPED | OUTPATIENT
Start: 2024-05-21

## 2024-06-19 NOTE — PROGRESS NOTES
REASONS FOR FOLLOWUP:   Hairy cell leukemia.   Admission 05/11/2012 through 05/17/12 for 7 day continuous infusion cladribine (2-CdA) without complication.   Patient seen 5 months post treatment in complete remission.   Patient seen 8 months post treatment with continued complete remission, every 3 month reassessment per CBC planned, 6 month review by MD scheduled.   Patient seen at 15 months without evidence of recurrent disease, every 6 month followup planned.   The patient was seen 02/05/2014 with no evidence of recurrent disease, 6-month followup planned.   Patient seen 07/31/2014, stable with no evidence of recurrent disease, every 6 month followup.   Patient seen 01/15/2015, stable without recurrent disease, 6-month CBC planned, yearly followup scheduled.   Patient seen 02/04/2016, stable, ?early peripheral neuropathy developing distally per feet, no evidence for recurrent disease per hairy cell leukemia.  Patient reviewed September 02, 2016, previous July CBC with mild thrombocytopenia developing, recheck September 2 normalized, every 6 month follow-ups anticipated  Patient reviewed February 02, 2017, status post negative prostate biopsy, mild thrombocytopenia again recognized  Patient reviewed July 20, 2017, hematologically stable, every 6 month follow-up CBCs planned, yearly M.D. Assessment  Patient seen July 26, 2018 after recent airline travel any cold symptoms  Patient reviewed January 10, 2019, no additional symptoms, hematologically stable  Patient reviewed July 25, 2019, stable, six-month follow-up as planned  Patient viewed January 16, 2020, stable, every 6 months CBC, MD assessment yearly planned  Reassessment January 21, 2021, no evidence of recurrence disease  Patient assessed 2/10/2022, evidence of continued remission noted both on clinical examination, peripheral blood smear review and flow cytometric evaluation.  Patient review 2/23/2023 mild thrombocytopenia, no new abnormalities per  peripheral smear, close for follow-up planned  Patient assessed 9/14/2023, concern for early relapse.  Subsequent marrow 10/12/2023-variably hypercellular with diffuse involvement-a 90%-hairy cell leukemia relapse.  Patient seen 10/22/2023 for initiation of cladribine-outpatient regimen to be followed by Rituxan-28 days later 4 to 6 weeks  Patient proceeded through Rituxan treatments weekly beginning 11/22/2023 and completed 12/14/2023.  Patient with findings consistent with remission 1/4/2024  Patient hospitalized early February, status post bilateral patellar fractures, tendon advancement repair 2/5/2024.  No evidence of recurrent hairy cell leukemia         History of Present Illness     The patient is a 78 y.o. male with the above-mentioned history, who completed therapy with cladribine and subsequent consolidative Rituxan weekly x 4 with his last treatment given 12/14/2023.  He is now seen back in office again feeling well with no additional issues.  He continues to work a full schedule and, wonderfully, was able to proceed to therapy almost entirely without interruption to his usual schedule.   He is eating and drinking adequately.  He has no fevers or chills, signs or symptoms of infection.  He has no arthralgias.     Unfortunately he required admission 2/4 - 7/24 after a fall sustaining bilateral patellar injuries requiring surgical intervention, postoperative supportive care including worsening anemia, supportive transfusion 2/7/2024.    He was hospitalized 2/7-15/24 as he recovered from tendon advancement repair February 5, 2024.  Subsequent assessments for blood loss anemia, ferritin of 303, iron profile with saturation of 40% and normal CBC 3/27/2024.  He is next seen formally in office 6/20/2024 with H&H of 12.8 and 37.4, white count of 4380, platelet count 141,000 normal automated differential except for mild lymphopenia.    He is seen back in office 6/20/2024 having made an excellent recovery,  returned to work, and achieved an excellent performance status.        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 He is usually followed by Dr. Vuong for a history of hypertension, hyperlipidemia, minimal right carotid plaque as well as elevated PSA.       He had exams done on 1-09-12 for routine followup. This included normal serum chemistry including LFTs, cholesterol 131, triglyceride 34, HDL 51, and LDL 73. CBC revealed H&H 13.3, 37.9%, WBC 3300, platelet count 110,000, 26% polys and 74% lymphs with A NC of 0.9.       Dr. Remy provides information when initially seen with studies from as far back as 1- at which time hemoglobin and hematocrit was 14.4 and 42.1%, WBC 6950, platelets 227,000 with 47% polys, 42% lymph; this was automated. The additional test includ es approximately every year to every other year thereafter though it was noted in January 2010 WBC dropped from an average of approximately 5,000-6,000 to 3600. Hemoglobin and hematocrit 13.6 and 39.7%. Platelets 150,000-170,000 with ANC beginning in Ja nuary 2010 at 1900.       We were contacted per the above findings and asked Dr. Remy to undergo further assessment including flow cytometric exam per peripheral blood. This revealed no evidence of abnormal myeloid maturation increased blast population. No evidence of lymphopro l iferative disorder. The patient was asked to be seen formally in the office now and comes in today for further assessment. Dr. Remy indicates that he has taken a number of medications in an attempt to improve his health. Several homeopathic medication s include vitamin C, folate, flaxseed oil, fish oil, vitamin E, selenium, pomegranate tabs, Co-enzyme Q, vitamin D, niacin, and red yeast rice daily. He has discontinued these and stays on those described below.       He feels well with no additional problem s and states  that he has no little or no symptoms that he can detail, whether this is just ill health in anyway, though he does have occasional hemorrhoidal pain. He also notes rare palpitations and occasional epistaxis when the ambient air has dried sub stantially. No fevers, chills, weight loss, night sweats, or other constitutional symptoms.       As a result of the above, the patient was asked to undergo a series of studies. This went onto include rheumatoid factor of 6, MARYURI screen negative, negative s misael protein electrophoresis, normal quantitative immunoglobulins, CMV IgG of 2.9, IgM less than .9, EBV IgM of less than .9 and BCA IgG antibody of 4.6. These are consistent with previous exposures. HIV was negative, CRSP less than .1, sedimentation ra t e of 4, iron 125, TIBC 297, 14% saturation, and ferritin of 77. Peripheral blood flow cytometry was negative for evidence of abnormal myeloid maturation, increased blast population or lymphoproliferative disorder. As the patient is seen back today, he i s continuing to feel well though is obviously greatly concerned about his followup counts. Reviews in the office had been planned to follow him briefly recheck performance 3/29/12 with an H&H of 12.9, 36.1, WBC 3900, 32 polys, 62 lymphs, platelet count 94 , 000, IPF slightly elevated at 8.8. The patient was therefore asked to return and to be further assessed with bone marrow aspirate and biopsy. He now presents to do so. He has had no additional symptoms since last seen. He has discontinued his Crestor use.       As a result of his review the patient underwent bone marrow aspiration and biopsy. This revealed evidence of lymphocytic infiltrate reviewed here in the office. 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 Eventually cytogenetics was found to be negative with no evidence of cyclin-D1/IgH or BCL-2/IgH  rearrangement. Additional tissue was requested. As a result, the patient was notifi alesha leon of these findings by personal visit, and plans were made to proceed with endoscopy, ? mantle cell involvement. At the time of this dictation, this was not yet performed. He was also asked to undergo CT of the neck, chest, abdomen and pelvis which show ed no evidence of abnormality. PET scan 04/13/12 showed increased activity within the marrow, but no additional abnormalities including the spleen, with no background bowel or gastric activity as well.       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 hairy cell leukemia. This has been discussed in great detail with the alexander thakur at [facility] and additional stains are pending as is the patient's GI assessment now to be pursued through Dr. Ray.       His case was again reviewed over quite a period of time, and ultimately his marrow was signed out as 80% involved with hairy cell leukemia - normal cytogenetics, no evidence of cyclin D1/IgH or BCL-2/IgH rearrangement. Dr. Remy was advised of the treatment options which include followup with no immediate therapy, and/or the use of cladribine or pentostatin. After numerous di s cussion, he ultimately elected to try to proceed with treatment when he is feeling as well as he is to improve his ability to withstand the therapy itself. We therefore began to discuss the treatment schedule, which also could be somewhat variable, inclu d ing 7-day infusions, 2-hr infusion q.d. x five days or every other week infusion. After discussion he wishes to proceed with 7-day infusion. When we attempted to do this as an outpatient we found out thereafter that this would not be covered as an outpa tient infusion through his insurance. Therefore we have made plans  for him to be admitted after he is seen in office 05/10/12.       The patient was thereafter admitted 05/11-05/17/12. PICC line was placed and he initiated treatment at 0.1 mg/kg or 7.7 mg IV in fusion over seven days. The patient fortunately had no serious issues at all during the seven days and continued to work out on a daily basis. He was seen by his internist as well with some of his meds adjusted including his HCTZ and Ziac. He was stabl e at discharge, but was given Neulasta 6 mg subcutaneously 24 hours after he completed treatment. He has been having counts done on a regular basis, and returns back today with only minimal evidence of neutropenia at his becca, and has an excellent periph eral smear today - normal findings. His performance status remains excellent as well.       The patient when seen on 6/7/12 was felt to have improved substantially. We followed him for weekly counts and plans at that point were for him to take a trip out of the country. He did actually take this trip, which had him eventually hiking at 11-12,000 feet without any complication or ill effect. As he returns back today on 7/19 he feels well and again with an excellent performance status. He has had no fever, c hills or suggestion of infection or complications thus far. He remains again, with an excellent performance status.       The patient thereafter is asked to have his counts done on an every six weeks basis, now seen three months later with a normal CBC as well as examination. He again feels quite well with a completely normal performance status.       The patient was asked to return for followup studies that included normal serum chemistries, unchanged lipid profile and stable hematologic findings. He is now seen on 2/7/13, 4 months from his last appointment. He continues to do well with unchanged performance status.       The patient was asked to have counts done q3 months seeing the physician in six months. He is now seen  back 08/22/2013 feeling well having [place] with that. He had no additional fevers, sweats, chills, significant change in weight although he has tried to lose some additional pounds and we see this today. He continues to exercise regularl y and dietary program. Review of his smears again continues to demonstrate a complete remission.       Today, the patient was asked to be seen back in 6 months for followup, and is now seen 02/05/2014, continuing to feel well. Again, review of his smears and physical examination fortunately demonstrates no evidence of recurrent disease.       The patient was asked to be seen back in 6 months for followup and is now seen on 07/31/2014. Again he feels well with an excellent performance status and no change since last reviewed.       The patient thereafter was now seen back 2015, 2016 stable at both visits with no evidence of recurrence of his hairy cell leukemia. He feels well but when now reviewed 02/04/2016, he has had some degree of peripheral neuropathy? This improv es with activity and it is certainly not limiting his action or his function.   Patient now reviewed back again September 02, 2016, continues to have an excellent performance status and hematologically remain stable brother had some concern about thrombocytopenia last visit.    The patient is next seen February 02, 2017.  He has not had any medical issues since last seen and remains physically quite well.  He does describe following with urology and undergoing a repeat biopsy recently when his PSA was above 7.  His findings evidently were negative for malignancy though he did have post procedure hematuria.  This is also now abated.  The patient is next seen July 28, 2017.  He continues to have an excellent performance status and no particular difficulty in day-to-day function and/or pursuing active vacations.      The patient is next seen July 26, 2018.  He had  recently returned from a trip to [place].  After experiencing an airplane ride with multiple coughing passengers he himself developed a cold, particularly severe over the last 3-4 days with cough and congestion.  He's had low-grade fever which is now beginning to resolve but is clearly uncomfortable.    The patient's next seen January 10, 2019.  He has continued to travel without issues and is feeling well when reviewed today.  Plans were made for usual follow-up and the patient is next seen July 25, 2019.  His performance status remains excellent though he has lost some weight and indicates he did this per altering his diet.  His stamina and other usual activities are continued unabated.  The patient is next seen January 16, 2020.  He continues an active practice and lifestyle.  He has lost some additional weight on purpose through diet.    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    The patient's follow-up laboratory studies demonstrated by late September degree of leukopenia, unchanged thrombocytopenia and this was followed with repeat analysis within the last several weeks as he seen February 10, 2022. Flow cytometry failed to demonstrate any new process.  As you seen February 10, 2020 his performance status remains excellent, stable weight, appetite, no additional fever, chills, rash, night sweats.    The patient is next evaluated 2/23/2023 with an unchanged performance status for mild thrombocytopenia.  He has had some exposure to viral illnesses through his grandchildren as of late but is otherwise felt fine with a unchanged performance status and work schedule.    He required admission 5/23-27/2023 had return from [place] where he developed nausea and vomiting and difficulty with keeping p.o.  He had further nausea, vomiting and dizziness and was seen in the  emergency department and was found to have a sodium 115.  He was seen by nephrology with concern of hyponatremia related to hydrochlorothiazide along with SIDH, treated with fluid restriction and salt tablets and p.o. Lasix.  His medications were adjusted per renal medicine.  At that point he continued to demonstrate chronic pancytopenia that did not accelerate in any particular way.     Subsequent CBC 6/1/2023 include H&H 11.3 and 32.2 with white count of 2840, platelet count 93,000, ANC of 1610.    The patient is next evaluated 8/3/2020 3 repeat CBC includes a white count of 2250, H&H 11.8 and 33.6, platelet count of 88,000-differential of 44% polys, 52% lymphs, 3% monocytes.    Patient contacted about flow cytometry findings of small population potentially suggestive of recurrence versus secondary process.  Plan to reassess in follow-up visits 9/7/2023.  Please note that if treatment is necessary then repeat treatment with cladribine could be given in a variety of of schedules including 0.15 mg/kg/day over 2 hours x 5 days followed by rituximab or 5.6 mg/m² over 2 hours also for 5 days and also followed by rituximab therapy.      The patient was seen back 9/14/2023.Flow cytometry assessed on 2 visits includes a CD10 positive monoclonal lambda B-cell population representing 0.3% of total events 8/3/2023 and repeat 9/7/2023 demonstrates a similar B-cell population also 0.3% total events-phenotype positive CD10 bright, CD19 bright, CD20 bright, CD25, CD45,   slambda.  Discussed symptoms usually due to splenomegaly, fatigue, weakness, weight loss, bruising, recurrent infections, periodic vasculitis.  He is clear that he is not having any of the symptoms and feels that his overall performance status is as good as its ever been.  We have discussed that additional issues include possible organomegaly and cytopenias developing with his follow-up CBC in office today including H&H of 12.2 and 33.4, white count of 3250  and platelet count of 92,000.  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    We had the patient return for periodic blood counts, had him undergo testing for hepatitis and reviewed the various regimens including 5-day cladribine followed by Rituxan therapy.  10/5/2023 scans revealed no substantial abnormalities including lack of splenomegaly or lymphadenopathy.    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  Additionally the patient's hepatitis B surface antibody was equivocal and he went on to have additional vaccinations   -influenza, COVID-19, RSV and hepatitis B.  Further vascular surgeon was contacted and the patient underwent a right internal jugular Mediport placement 10/18/2023 and teaching concerning cladribine day 1 through 5 and subsequent Rituxan therapy.    After further discussion of available treatment options plans made to offer 5-day cladribine and, review of literature, indicates that Rituxan is associated with further immunosuppression.  Available guidance suggests proceeding with initial cladribine and then 4-6 weekly doses of Rituxan consolidation starting 4 to 6 weeks after initial treatment.    The patient proceeded with cladribine taking therapy daily-days 1-5 through 10/27/2023 and given Neulasta 10/30/2023.  He is next seen 11/2/2023.  Fortunately he has had no untoward effects of treatment as far and continues to work unabated.  His  follow-up CBC does not demonstrate significant development of neutropenia anemia or thrombocytopenia.  We discussed assessing him regularly over the next several weeks and anticipate starting Rituxan approximately 3 weeks from now.    The patient's subsequent laboratory studies demonstrated gradual improvement in his white count to normalization, including degree of neutrophilia by 11/16/2023, improvement in his thrombocytopenia as well.  He has not had any additional side effects including lack of fever, chills, night sweats since he completed therapy.    He is seen back formally 11/20/2023 and we anticipate initiation of his rituximab therapy 11/22/2023.  Again he is done very well and his peripheral blood counts remain quite acceptable without evidence of deterioration.  He has had no fever, chills, night sweats, infectious episodes or change in performance status.  We have agreed that he will start his consolidation rituximab therapy 11/22/2023 and that we would schedule subsequent Thursday treatments x3 also.    The patient is seen 12/7/2023.  He underwent Rituxan therapy 11/22/2023 11/30/2023 without complication and is seen 12/7 H&H up to 12.0 and 34.6, white count of 4000, platelet 122,000 and normal automated differential except for mild lymphopenia.  He states that he will be taking a trip to [place] at the end of the month.      Patient seen back 1/4/2024 with normal peripheral blood smear including H&H of 13.3 and 38.3, white count is 6170 and platelet count of 159,000 with a normal automated differential. We discussed his peripheral smear today that demonstrates normalization and, fortunately, he developed no additional symptoms that would direct us otherwise.  We have agreed, however, that he would maintain his port over the next year until it is clear that he no longer needs this access.    Unfortunately he required admission 2/4 - 7/24 after a fall sustaining bilateral patellar injuries requiring  surgical intervention, postoperative supportive care including worsening anemia, supportive transfusion 2/7/2024.    He was hospitalized 2/7-15/24 as he recovered from tendon advancement repair February 5, 2024.  Subsequent assessments for blood loss anemia, ferritin of 303, iron profile with saturation of 40% and normal CBC 3/27/2024.  He is next seen formally in office 6/20/2024 with H&H of 12.8 and 37.4, white count of 4380, platelet count 141,000 normal automated differential except for mild lymphopenia.    Patient seen 6/20/2024 with general recovery, excellent performance status.           Past Medical History:   Diagnosis Date    BCC (basal cell carcinoma of skin) 06/22/2022    NOSE    Benign prostate hyperplasia     Elevated cholesterol     H/O Elevated PSA     H/O Hairy cell leukemia (CMS/HCC) 2012    H/O Internal thrombosed hemorrhoids     H/O minimal right carotid plaque     H/O peripheral neuropathy     History of transfusion     Hyperlipidemia     Hypertension     Primary open-angle glaucoma, bilateral, mild stage        ONCOLOGIC HISTORY:  (History from previous dates can be found in the separate document.)    Current Outpatient Medications on File Prior to Visit   Medication Sig Dispense Refill    acetaminophen (TYLENOL) 325 MG tablet Take 2 tablets by mouth Every 6 (Six) Hours As Needed for Mild Pain.      acyclovir (ZOVIRAX) 400 MG tablet Take 1 tablet by mouth 2 (Two) Times a Day. 60 tablet 7    amLODIPine (NORVASC) 2.5 MG tablet Take 1 tablet by mouth Daily. 90 tablet 3    bimatoprost (Lumigan) 0.01 % ophthalmic drops Administer 1 drop to both eyes every night at bedtime. 7.5 mL 3    bimatoprost (LUMIGAN) 0.03 % ophthalmic drops Administer 1 drop to both eyes Every Night. Indications: Persistently Increased Pressure in the Eye      bisoprolol (ZEBeta) 5 MG tablet Take 1 tablet by mouth Daily. 90 tablet 3    Calcium-Magnesium-Vitamin D (CALCIUM 1200+D3 PO) Take 1 tablet by mouth Daily. Indications:  vitamin deficiency      dicyclomine (BENTYL) 20 MG tablet Take 1 tablet by mouth 3 (Three) Times a Day As Needed for Muscle Spasms 30 tablet 0    finasteride (PROSCAR) 5 MG tablet Take 1 tablet by mouth Daily. 90 tablet 3    Hepatitis B Vac Recombinant (Engerix-B) 20 MCG/ML suspension prefilled syringe Inject 1 mL into the appropriate muscle as directed by prescriber. 1 mL 0    Hepatitis B Vac Recombinant (Engerix-B) 20 MCG/ML suspension prefilled syringe Inject 1 mL into the appropriate muscle as directed by prescriber. 1 mL 0    Hepatitis B Vac Recombinant (Engerix-B) 20 MCG/ML suspension prefilled syringe Inject 1 mL into the appropriate muscle as directed by prescriber. 1 mL 0    Magnesium 500 MG tablet Take 1 tablet by mouth 2 (two) times a day. Indications: magnesium deficiency      metroNIDAZOLE (METROGEL) 1 % gel Apply 1 application  topically to the appropriate area as directed Daily. 60 g 11    Multiple Vitamins-Minerals (PRESERVISION AREDS 2 PO) Take 1 tablet by mouth 2 (Two) Times a Day. Indications: vision supplement      niacin 500 MG tablet Take 1 tablet by mouth Every Night. Indications: Deficiency of the Vitamin Niacin      propafenone (RYTHMOL) 150 MG tablet Take 1 tablet by mouth 3 (Three) Times a Day. 270 tablet 3    rosuvastatin (Crestor) 10 MG tablet Take 1 tablet by mouth Every Night. 90 tablet 3    tamsulosin (FLOMAX) 0.4 MG capsule 24 hr capsule Take 2 capsules by mouth Daily. 180 capsule 3    traMADol (ULTRAM) 50 MG tablet Take 1 tablet by mouth Every 8 (Eight) Hours As Needed for Moderate Pain. 30 tablet 0    vitamin C (ASCORBIC ACID) 250 MG tablet Take 4 tablets by mouth Daily. With madisyn hips   Indications: Inadequate Vitamin C      vitamin D3 125 MCG (5000 UT) capsule capsule Take 1 capsule by mouth Daily. Indications: Vitamin D Deficiency      ferrous sulfate 325 (65 FE) MG tablet Take 1 tablet by mouth Daily With Breakfast. 90 tablet 0    Lidocaine 4 % Place 1 patch on the skin as  "directed by provider Daily. Remove & Discard patch within 12 hours or as directed by MD 15 patch 1    ondansetron (ZOFRAN) 8 MG tablet Take 1 tablet by mouth 3 (Three) Times a Day As Needed for Nausea or Vomiting. 30 tablet 5    sennosides-docusate (PERICOLACE) 8.6-50 MG per tablet Take 2 tablets by mouth 2 (Two) Times a Day. 120 tablet 0    sulfamethoxazole-trimethoprim (BACTRIM DS,SEPTRA DS) 800-160 MG per tablet Take 1 tablet by mouth 3 (Three) Times a Week. 12 tablet 0    tamsulosin (FLOMAX) 0.4 MG capsule 24 hr capsule Take 1 capsule by mouth 2 (Two) Times a Day. (Patient not taking: Reported on 5/17/2024) 90 capsule 3    traMADol (ULTRAM) 50 MG tablet Take 1 tablet by mouth Every 4 (Four) Hours As Needed for Moderate Pain. (Patient not taking: Reported on 5/17/2024) 60 tablet 0     No current facility-administered medications on file prior to visit.       ALLERGIES:   No Known Allergies    Social History     Socioeconomic History    Marital status:      Spouse name: Hanna   Tobacco Use    Smoking status: Former     Current packs/day: 0.50     Types: Cigarettes     Passive exposure: Past    Smokeless tobacco: Never    Tobacco comments:     Quit smoking many years ago.   Vaping Use    Vaping status: Never Used   Substance and Sexual Activity    Alcohol use: Yes     Alcohol/week: 7.0 standard drinks of alcohol     Types: 7 Glasses of wine per week     Comment: 1/2 glass of wine or beer per day    Drug use: No    Sexual activity: Yes     Partners: Female     Birth control/protection: Other         Cancer-related family history includes Cancer in his father; Cancer (age of onset: 90) in his mother.      Review of Systems   ROS as per HPI    Vitals:    06/20/24 1329   BP: 172/83   Pulse: 64   Resp: 16   Temp: 98.4 °F (36.9 °C)   TempSrc: Oral   SpO2: 99%   Weight: 71.8 kg (158 lb 6.4 oz)   Height: 177.8 cm (70\")   PainSc: 0-No pain               6/20/2024     1:30 PM   Current Status   ECOG score 0 "       Physical Exam    GENERAL: Well-developed, well-nourished male in no acute distress.   SKIN: Warm, dry, without new rash or lesion  HEAD: Normocephalic.   EYES: Pupils equal, round and reactive to light, EOMs intact. Conjunctivae normal.   EARS: Hearing intact.   NOSE: Septum midline. No excoriations or nasal discharge.   NECK: Supple with good range of motion; no masses, no JVD or bruits.   LYMPHATICS: No cervical, supraclavicular adenopathy.   CHEST: Lungs clear to percussion and auscultation.   CARDIAC: Regular rate and rhythm without murmurs, rubs or gallops.   ABDOMEN: Soft, nontender, bowel sounds present.  EXTREMITIES: No clubbing, cyanosis or edema.   NEUROLOGICAL: No focal neurological deficits.       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   He was treated 5/11-5/17/ 12 with cladribine being given as continuous infusion at 0.1 mg/kg or 7.7 mg/24-hours x7 days.   He did well during hospitalization with little toxicity and minimal myelosuppression.   He obtained a complete remission.   July 2018, marrow suppression noted dose smear with no evidence of hairy cell leukemia.  Atypical lymphocytes thought to be secondary to viral infection.   He again remained in observation with MD assessment every 6 months  September 2021 degree of leukopenia and thrombocytopenia though flow cytometry fails to demonstrate any new processes.  Follow-up 8/3/2023 with CBC noting WBC 2250, hemoglobin 11.8, hematocrit 33.6%, platelet 88,000.  Differential with 44% polys, 52% lymphs and 3% monocytes.  Flow cytometry findings with a small population potentially just of of early recurrence versus secondary process  9/7/2020 3 repeat flow cytometry includes a CD10 positive monoclonal lambda  B-cell population representing 0.3% of total events 8/3/2023 and repeat 9/7/2023 demonstrates a similar B-cell population also 0.3% total events-phenotype positive CD10 bright, CD19 bright, CD20 bright, CD25, CD45,   slambda.   We had the patient return for periodic blood counts, had him undergo testing for hepatitis and reviewed the various regimens including 5-day cladribine followed by Rituxan therapy.  10/5/2023 scans revealed no substantial abnormalities including lack of splenomegaly or lymphadenopathy.  10/12/2023 CT-guided bone marrow biopsy and aspirate found to be variably hypercellular with diffuse involvement by his known hairy cell leukemia involving 89% of marrow cellularity.  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   10/18/2023 Mediport placed by vascular surgery, Dr. Fan  Patient completed all vaccinations prior to initiation of therapy including hepatitis B  Plans for treatment with cladribine 0.15 mg/kg/day over 2 hours x 5 days followed by rituximab or 5.6 mg/m² over 2 hours also for 5 days and also followed by rituximab therapy.  Cladribine initiated 10/23/2023 through 10/27/2023 with Neulasta support 10/30/2023  Excellent tolerance to cladribine  Consolidation therapy with weekly Rituxan initiated 11/22/2023 with plans for 4 weeks of therapy.  Today, 12/14/2023 counts continue to overall recover from previous cladribine with WBC 4.66, hemoglobin 12.8, platelets 125,000.  Proceed with fourth and final dose of weekly Rituxan.  Follow-up peripheral smear 1/4/2024 demonstrates normalization (likely remission) and, fortunately, he has developed no additional symptoms that would direct us otherwise.  We have agreed, however, that he would maintain his port over the next year until it is clear that he no longer needs this access.  Plan to maintain port every 6 weeks,  CBC 3 months, MD 6 months    2.Unfortunately he required admission 2/4 - 7/24 after a fall sustaining bilateral patellar injuries requiring surgical intervention, postoperative supportive care including worsening anemia, supportive transfusion 2/7/2024.    He was hospitalized 2/7-15/24 as he recovered from tendon advancement repair February 5, 2024.  Subsequent assessments for blood loss anemia, ferritin of 303, iron profile with saturation of 40% and normal CBC 3/27/2024.  He is next seen formally in office 6/20/2024 with H&H of 12.8 and 37.4, white count of 4380, platelet count 141,000 normal automated differential except for mild lymphopenia.  Reassessment 6/20/2024 with no evidence of recurrence hairy cell leukemia          3.  Hyponatremia  Admission 5/23-27/2023 had return from [place] where he developed nausea and vomiting and difficulty with keeping p.o.  He had further nausea, vomiting and dizziness and was seen in the emergency department and was found to have a sodium 115.  He was seen by nephrology with concern of hyponatremia related to hydrochlorothiazide along with SIDH, treated with fluid restriction and salt tablets and p.o. Lasix.  CMP including serum sodium 12/14/2023-139  Reassessment 2/15/2024 sodium 137      4.  Prophylaxis  Discontinue Bactrim, reduce, though continue, dosing of acyclovir to 400 mg daily, other medications reviewed and continued  Every 6 weeks port flush, 3-month CBC, 6 months MD Paco Jeffers MD   06/20/2024

## 2024-06-20 ENCOUNTER — INFUSION (OUTPATIENT)
Dept: ONCOLOGY | Facility: HOSPITAL | Age: 79
End: 2024-06-20
Payer: MEDICARE

## 2024-06-20 ENCOUNTER — OFFICE VISIT (OUTPATIENT)
Dept: ONCOLOGY | Facility: CLINIC | Age: 79
End: 2024-06-20
Payer: MEDICARE

## 2024-06-20 VITALS
RESPIRATION RATE: 16 BRPM | DIASTOLIC BLOOD PRESSURE: 83 MMHG | HEIGHT: 70 IN | TEMPERATURE: 98.4 F | BODY MASS INDEX: 22.68 KG/M2 | SYSTOLIC BLOOD PRESSURE: 172 MMHG | WEIGHT: 158.4 LBS | OXYGEN SATURATION: 99 % | HEART RATE: 64 BPM

## 2024-06-20 DIAGNOSIS — C91.41 HAIRY CELL LEUKEMIA, IN REMISSION: Primary | ICD-10-CM

## 2024-06-20 DIAGNOSIS — Z45.2 FITTING AND ADJUSTMENT OF VASCULAR CATHETER: Primary | ICD-10-CM

## 2024-06-20 DIAGNOSIS — C91.41 HAIRY CELL LEUKEMIA, IN REMISSION: ICD-10-CM

## 2024-06-20 LAB
BASOPHILS # BLD AUTO: 0.01 10*3/MM3 (ref 0–0.2)
BASOPHILS NFR BLD AUTO: 0.2 % (ref 0–1.5)
DEPRECATED RDW RBC AUTO: 44.1 FL (ref 37–54)
EOSINOPHIL # BLD AUTO: 0.05 10*3/MM3 (ref 0–0.4)
EOSINOPHIL NFR BLD AUTO: 1.1 % (ref 0.3–6.2)
ERYTHROCYTE [DISTWIDTH] IN BLOOD BY AUTOMATED COUNT: 12.9 % (ref 12.3–15.4)
HCT VFR BLD AUTO: 37.4 % (ref 37.5–51)
HGB BLD-MCNC: 12.8 G/DL (ref 13–17.7)
IMM GRANULOCYTES # BLD AUTO: 0.01 10*3/MM3 (ref 0–0.05)
IMM GRANULOCYTES NFR BLD AUTO: 0.2 % (ref 0–0.5)
LYMPHOCYTES # BLD AUTO: 0.75 10*3/MM3 (ref 0.7–3.1)
LYMPHOCYTES NFR BLD AUTO: 17.1 % (ref 19.6–45.3)
MCH RBC QN AUTO: 32.3 PG (ref 26.6–33)
MCHC RBC AUTO-ENTMCNC: 34.2 G/DL (ref 31.5–35.7)
MCV RBC AUTO: 94.4 FL (ref 79–97)
MONOCYTES # BLD AUTO: 0.53 10*3/MM3 (ref 0.1–0.9)
MONOCYTES NFR BLD AUTO: 12.1 % (ref 5–12)
NEUTROPHILS NFR BLD AUTO: 3.03 10*3/MM3 (ref 1.7–7)
NEUTROPHILS NFR BLD AUTO: 69.3 % (ref 42.7–76)
NRBC BLD AUTO-RTO: 0 /100 WBC (ref 0–0.2)
PLATELET # BLD AUTO: 141 10*3/MM3 (ref 140–450)
PMV BLD AUTO: 9.7 FL (ref 6–12)
RBC # BLD AUTO: 3.96 10*6/MM3 (ref 4.14–5.8)
WBC NRBC COR # BLD AUTO: 4.38 10*3/MM3 (ref 3.4–10.8)

## 2024-06-20 PROCEDURE — 3077F SYST BP >= 140 MM HG: CPT | Performed by: INTERNAL MEDICINE

## 2024-06-20 PROCEDURE — 85025 COMPLETE CBC W/AUTO DIFF WBC: CPT

## 2024-06-20 PROCEDURE — 36591 DRAW BLOOD OFF VENOUS DEVICE: CPT

## 2024-06-20 PROCEDURE — 99214 OFFICE O/P EST MOD 30 MIN: CPT | Performed by: INTERNAL MEDICINE

## 2024-06-20 PROCEDURE — 25010000002 HEPARIN LOCK FLUSH PER 10 UNITS: Performed by: INTERNAL MEDICINE

## 2024-06-20 PROCEDURE — 1126F AMNT PAIN NOTED NONE PRSNT: CPT | Performed by: INTERNAL MEDICINE

## 2024-06-20 PROCEDURE — 3079F DIAST BP 80-89 MM HG: CPT | Performed by: INTERNAL MEDICINE

## 2024-06-20 RX ORDER — HEPARIN SODIUM (PORCINE) LOCK FLUSH IV SOLN 100 UNIT/ML 100 UNIT/ML
500 SOLUTION INTRAVENOUS AS NEEDED
OUTPATIENT
Start: 2024-06-20

## 2024-06-20 RX ORDER — HEPARIN SODIUM (PORCINE) LOCK FLUSH IV SOLN 100 UNIT/ML 100 UNIT/ML
500 SOLUTION INTRAVENOUS AS NEEDED
Status: DISCONTINUED | OUTPATIENT
Start: 2024-06-20 | End: 2024-06-20 | Stop reason: HOSPADM

## 2024-06-20 RX ORDER — SODIUM CHLORIDE 0.9 % (FLUSH) 0.9 %
10 SYRINGE (ML) INJECTION AS NEEDED
OUTPATIENT
Start: 2024-06-20

## 2024-06-20 RX ORDER — SODIUM CHLORIDE 0.9 % (FLUSH) 0.9 %
10 SYRINGE (ML) INJECTION AS NEEDED
Status: DISCONTINUED | OUTPATIENT
Start: 2024-06-20 | End: 2024-06-20 | Stop reason: HOSPADM

## 2024-06-20 RX ADMIN — Medication 500 UNITS: at 13:14

## 2024-06-20 RX ADMIN — Medication 10 ML: at 13:14

## 2024-07-25 ENCOUNTER — TRANSCRIBE ORDERS (OUTPATIENT)
Dept: LAB | Facility: HOSPITAL | Age: 79
End: 2024-07-25
Payer: MEDICARE

## 2024-07-25 DIAGNOSIS — R81 GLYCOSURIA: ICD-10-CM

## 2024-07-25 DIAGNOSIS — I10 ESSENTIAL HYPERTENSION, MALIGNANT: Primary | ICD-10-CM

## 2024-07-25 DIAGNOSIS — R73.09 OTHER ABNORMAL GLUCOSE: ICD-10-CM

## 2024-07-29 ENCOUNTER — LAB (OUTPATIENT)
Dept: LAB | Facility: HOSPITAL | Age: 79
End: 2024-07-29
Payer: MEDICARE

## 2024-07-29 ENCOUNTER — TRANSCRIBE ORDERS (OUTPATIENT)
Dept: ADMINISTRATIVE | Facility: HOSPITAL | Age: 79
End: 2024-07-29
Payer: MEDICARE

## 2024-07-29 DIAGNOSIS — R81 GLYCOSURIA: ICD-10-CM

## 2024-07-29 DIAGNOSIS — I10 ESSENTIAL HYPERTENSION, MALIGNANT: Primary | ICD-10-CM

## 2024-07-29 DIAGNOSIS — I10 ESSENTIAL HYPERTENSION, MALIGNANT: ICD-10-CM

## 2024-07-29 DIAGNOSIS — R73.09 OTHER ABNORMAL GLUCOSE: ICD-10-CM

## 2024-07-29 LAB
ALBUMIN SERPL-MCNC: 4.1 G/DL (ref 3.5–5.2)
ALBUMIN/GLOB SERPL: 1.7 G/DL
ALP SERPL-CCNC: 64 U/L (ref 39–117)
ALT SERPL W P-5'-P-CCNC: 23 U/L (ref 1–41)
ANION GAP SERPL CALCULATED.3IONS-SCNC: 9.7 MMOL/L (ref 5–15)
AST SERPL-CCNC: 19 U/L (ref 1–40)
BILIRUB SERPL-MCNC: 0.7 MG/DL (ref 0–1.2)
BUN SERPL-MCNC: 19 MG/DL (ref 8–23)
BUN/CREAT SERPL: 22.1 (ref 7–25)
CALCIUM SPEC-SCNC: 8.8 MG/DL (ref 8.6–10.5)
CHLORIDE SERPL-SCNC: 105 MMOL/L (ref 98–107)
CHOLEST SERPL-MCNC: 158 MG/DL (ref 0–200)
CO2 SERPL-SCNC: 26.3 MMOL/L (ref 22–29)
CREAT SERPL-MCNC: 0.86 MG/DL (ref 0.76–1.27)
EGFRCR SERPLBLD CKD-EPI 2021: 88.6 ML/MIN/1.73
GLOBULIN UR ELPH-MCNC: 2.4 GM/DL
GLUCOSE SERPL-MCNC: 114 MG/DL (ref 65–99)
HBA1C MFR BLD: 5.5 % (ref 4.8–5.6)
HDLC SERPL-MCNC: 64 MG/DL (ref 40–60)
LDLC SERPL CALC-MCNC: 85 MG/DL (ref 0–100)
LDLC/HDLC SERPL: 1.35 {RATIO}
POTASSIUM SERPL-SCNC: 4.1 MMOL/L (ref 3.5–5.2)
PROT SERPL-MCNC: 6.5 G/DL (ref 6–8.5)
SODIUM SERPL-SCNC: 141 MMOL/L (ref 136–145)
TRIGL SERPL-MCNC: 37 MG/DL (ref 0–150)
VLDLC SERPL-MCNC: 9 MG/DL (ref 5–40)

## 2024-07-29 PROCEDURE — 80061 LIPID PANEL: CPT

## 2024-07-29 PROCEDURE — 83036 HEMOGLOBIN GLYCOSYLATED A1C: CPT

## 2024-07-29 PROCEDURE — 80053 COMPREHEN METABOLIC PANEL: CPT

## 2024-07-29 PROCEDURE — 36415 COLL VENOUS BLD VENIPUNCTURE: CPT

## 2024-08-01 ENCOUNTER — INFUSION (OUTPATIENT)
Dept: ONCOLOGY | Facility: HOSPITAL | Age: 79
End: 2024-08-01
Payer: COMMERCIAL

## 2024-08-01 DIAGNOSIS — Z45.2 FITTING AND ADJUSTMENT OF VASCULAR CATHETER: Primary | ICD-10-CM

## 2024-08-01 DIAGNOSIS — C91.41 HAIRY CELL LEUKEMIA, IN REMISSION: ICD-10-CM

## 2024-08-01 PROCEDURE — 25010000002 HEPARIN LOCK FLUSH PER 10 UNITS: Performed by: INTERNAL MEDICINE

## 2024-08-01 PROCEDURE — 96523 IRRIG DRUG DELIVERY DEVICE: CPT

## 2024-08-01 RX ORDER — HEPARIN SODIUM (PORCINE) LOCK FLUSH IV SOLN 100 UNIT/ML 100 UNIT/ML
500 SOLUTION INTRAVENOUS AS NEEDED
OUTPATIENT
Start: 2024-08-01

## 2024-08-01 RX ORDER — SODIUM CHLORIDE 0.9 % (FLUSH) 0.9 %
10 SYRINGE (ML) INJECTION AS NEEDED
OUTPATIENT
Start: 2024-08-01

## 2024-08-01 RX ORDER — HEPARIN SODIUM (PORCINE) LOCK FLUSH IV SOLN 100 UNIT/ML 100 UNIT/ML
500 SOLUTION INTRAVENOUS AS NEEDED
Status: DISCONTINUED | OUTPATIENT
Start: 2024-08-01 | End: 2024-08-01 | Stop reason: HOSPADM

## 2024-08-01 RX ORDER — SODIUM CHLORIDE 0.9 % (FLUSH) 0.9 %
10 SYRINGE (ML) INJECTION AS NEEDED
Status: DISCONTINUED | OUTPATIENT
Start: 2024-08-01 | End: 2024-08-01 | Stop reason: HOSPADM

## 2024-08-01 RX ADMIN — Medication 10 ML: at 14:17

## 2024-08-01 RX ADMIN — HEPARIN 500 UNITS: 100 SYRINGE at 14:23

## 2024-08-26 ENCOUNTER — TELEPHONE (OUTPATIENT)
Dept: ONCOLOGY | Facility: CLINIC | Age: 79
End: 2024-08-26
Payer: MEDICARE

## 2024-08-26 NOTE — TELEPHONE ENCOUNTER
"  Caller: Angel Remy \"DR. REMY\"    Relationship: Self    Best call back number: [phone]    What was the call regarding: PATENT WANTED TO KNOW IF HE IS SUPPOSED TO HAVE HIS LABS DONE (CBC) SAME DAY AS HIS PORT FLUSH, IF SO CAN WE ADD TO THE ORDER WITH THE PORT FLUSH         PLEASE CALL TO ADVISE   "

## 2024-09-12 ENCOUNTER — INFUSION (OUTPATIENT)
Dept: ONCOLOGY | Facility: HOSPITAL | Age: 79
End: 2024-09-12
Payer: MEDICARE

## 2024-09-12 DIAGNOSIS — C91.41 HAIRY CELL LEUKEMIA, IN REMISSION: ICD-10-CM

## 2024-09-12 DIAGNOSIS — Z45.2 FITTING AND ADJUSTMENT OF VASCULAR CATHETER: Primary | ICD-10-CM

## 2024-09-12 LAB
BASOPHILS # BLD AUTO: 0.01 10*3/MM3 (ref 0–0.2)
BASOPHILS NFR BLD AUTO: 0.3 % (ref 0–1.5)
DEPRECATED RDW RBC AUTO: 43.8 FL (ref 37–54)
EOSINOPHIL # BLD AUTO: 0.12 10*3/MM3 (ref 0–0.4)
EOSINOPHIL NFR BLD AUTO: 3.1 % (ref 0.3–6.2)
ERYTHROCYTE [DISTWIDTH] IN BLOOD BY AUTOMATED COUNT: 12.8 % (ref 12.3–15.4)
HCT VFR BLD AUTO: 36.1 % (ref 37.5–51)
HGB BLD-MCNC: 12.5 G/DL (ref 13–17.7)
IMM GRANULOCYTES # BLD AUTO: 0.01 10*3/MM3 (ref 0–0.05)
IMM GRANULOCYTES NFR BLD AUTO: 0.3 % (ref 0–0.5)
LYMPHOCYTES # BLD AUTO: 0.68 10*3/MM3 (ref 0.7–3.1)
LYMPHOCYTES NFR BLD AUTO: 17.5 % (ref 19.6–45.3)
MCH RBC QN AUTO: 32.3 PG (ref 26.6–33)
MCHC RBC AUTO-ENTMCNC: 34.6 G/DL (ref 31.5–35.7)
MCV RBC AUTO: 93.3 FL (ref 79–97)
MONOCYTES # BLD AUTO: 0.54 10*3/MM3 (ref 0.1–0.9)
MONOCYTES NFR BLD AUTO: 13.9 % (ref 5–12)
NEUTROPHILS NFR BLD AUTO: 2.52 10*3/MM3 (ref 1.7–7)
NEUTROPHILS NFR BLD AUTO: 64.9 % (ref 42.7–76)
NRBC BLD AUTO-RTO: 0 /100 WBC (ref 0–0.2)
PLATELET # BLD AUTO: 134 10*3/MM3 (ref 140–450)
PMV BLD AUTO: 10.5 FL (ref 6–12)
RBC # BLD AUTO: 3.87 10*6/MM3 (ref 4.14–5.8)
WBC NRBC COR # BLD AUTO: 3.88 10*3/MM3 (ref 3.4–10.8)

## 2024-09-12 PROCEDURE — 25010000002 HEPARIN LOCK FLUSH PER 10 UNITS: Performed by: INTERNAL MEDICINE

## 2024-09-12 PROCEDURE — 85025 COMPLETE CBC W/AUTO DIFF WBC: CPT

## 2024-09-12 PROCEDURE — 36591 DRAW BLOOD OFF VENOUS DEVICE: CPT

## 2024-09-12 RX ORDER — HEPARIN SODIUM (PORCINE) LOCK FLUSH IV SOLN 100 UNIT/ML 100 UNIT/ML
500 SOLUTION INTRAVENOUS AS NEEDED
Status: DISCONTINUED | OUTPATIENT
Start: 2024-09-12 | End: 2024-09-12 | Stop reason: HOSPADM

## 2024-09-12 RX ORDER — HEPARIN SODIUM (PORCINE) LOCK FLUSH IV SOLN 100 UNIT/ML 100 UNIT/ML
500 SOLUTION INTRAVENOUS AS NEEDED
OUTPATIENT
Start: 2024-09-12

## 2024-09-12 RX ORDER — SODIUM CHLORIDE 0.9 % (FLUSH) 0.9 %
10 SYRINGE (ML) INJECTION AS NEEDED
OUTPATIENT
Start: 2024-09-12

## 2024-09-12 RX ORDER — SODIUM CHLORIDE 0.9 % (FLUSH) 0.9 %
10 SYRINGE (ML) INJECTION AS NEEDED
Status: DISCONTINUED | OUTPATIENT
Start: 2024-09-12 | End: 2024-09-12 | Stop reason: HOSPADM

## 2024-09-12 RX ADMIN — Medication 10 ML: at 14:21

## 2024-09-12 RX ADMIN — Medication 500 UNITS: at 14:21

## 2024-09-16 ENCOUNTER — TELEPHONE (OUTPATIENT)
Dept: GASTROENTEROLOGY | Facility: CLINIC | Age: 79
End: 2024-09-16
Payer: MEDICARE

## 2024-09-16 ENCOUNTER — PREP FOR SURGERY (OUTPATIENT)
Dept: OTHER | Facility: HOSPITAL | Age: 79
End: 2024-09-16
Payer: MEDICARE

## 2024-09-16 DIAGNOSIS — Z80.0 FH: COLON CANCER: Primary | ICD-10-CM

## 2024-09-16 DIAGNOSIS — K63.5 COLON POLYP: ICD-10-CM

## 2024-10-05 ENCOUNTER — ANESTHESIA EVENT (OUTPATIENT)
Dept: GASTROENTEROLOGY | Facility: HOSPITAL | Age: 79
End: 2024-10-05
Payer: MEDICARE

## 2024-10-05 ENCOUNTER — ANESTHESIA (OUTPATIENT)
Dept: GASTROENTEROLOGY | Facility: HOSPITAL | Age: 79
End: 2024-10-05
Payer: MEDICARE

## 2024-10-05 ENCOUNTER — HOSPITAL ENCOUNTER (OUTPATIENT)
Facility: HOSPITAL | Age: 79
Setting detail: HOSPITAL OUTPATIENT SURGERY
Discharge: HOME | End: 2024-10-05
Attending: INTERNAL MEDICINE | Admitting: INTERNAL MEDICINE
Payer: MEDICARE

## 2024-10-05 VITALS
WEIGHT: 160.8 LBS | OXYGEN SATURATION: 99 % | DIASTOLIC BLOOD PRESSURE: 63 MMHG | HEART RATE: 52 BPM | SYSTOLIC BLOOD PRESSURE: 119 MMHG | BODY MASS INDEX: 23.02 KG/M2 | HEIGHT: 70 IN | RESPIRATION RATE: 17 BRPM

## 2024-10-05 DIAGNOSIS — K63.5 COLON POLYP: ICD-10-CM

## 2024-10-05 DIAGNOSIS — Z80.0 FH: COLON CANCER: ICD-10-CM

## 2024-10-05 PROCEDURE — 45380 COLONOSCOPY AND BIOPSY: CPT | Performed by: INTERNAL MEDICINE

## 2024-10-05 PROCEDURE — 25010000002 LIDOCAINE 2% SOLUTION: Performed by: STUDENT IN AN ORGANIZED HEALTH CARE EDUCATION/TRAINING PROGRAM

## 2024-10-05 PROCEDURE — 25810000003 SODIUM CHLORIDE 0.9 % SOLUTION: Performed by: INTERNAL MEDICINE

## 2024-10-05 PROCEDURE — 88305 TISSUE EXAM BY PATHOLOGIST: CPT | Performed by: INTERNAL MEDICINE

## 2024-10-05 PROCEDURE — 25810000003 LACTATED RINGERS PER 1000 ML: Performed by: STUDENT IN AN ORGANIZED HEALTH CARE EDUCATION/TRAINING PROGRAM

## 2024-10-05 PROCEDURE — 25010000002 PROPOFOL 200 MG/20ML EMULSION: Performed by: STUDENT IN AN ORGANIZED HEALTH CARE EDUCATION/TRAINING PROGRAM

## 2024-10-05 RX ORDER — LIDOCAINE HYDROCHLORIDE 20 MG/ML
INJECTION, SOLUTION INFILTRATION; PERINEURAL AS NEEDED
Status: DISCONTINUED | OUTPATIENT
Start: 2024-10-05 | End: 2024-10-05 | Stop reason: SURG

## 2024-10-05 RX ORDER — SODIUM CHLORIDE 9 MG/ML
30 INJECTION, SOLUTION INTRAVENOUS CONTINUOUS PRN
Status: DISCONTINUED | OUTPATIENT
Start: 2024-10-05 | End: 2024-10-05 | Stop reason: HOSPADM

## 2024-10-05 RX ORDER — PROPOFOL 10 MG/ML
INJECTION, EMULSION INTRAVENOUS AS NEEDED
Status: DISCONTINUED | OUTPATIENT
Start: 2024-10-05 | End: 2024-10-05 | Stop reason: SURG

## 2024-10-05 RX ORDER — EPHEDRINE SULFATE 50 MG/ML
INJECTION, SOLUTION INTRAVENOUS AS NEEDED
Status: DISCONTINUED | OUTPATIENT
Start: 2024-10-05 | End: 2024-10-05 | Stop reason: SURG

## 2024-10-05 RX ORDER — SODIUM CHLORIDE, SODIUM LACTATE, POTASSIUM CHLORIDE, CALCIUM CHLORIDE 600; 310; 30; 20 MG/100ML; MG/100ML; MG/100ML; MG/100ML
INJECTION, SOLUTION INTRAVENOUS CONTINUOUS PRN
Status: DISCONTINUED | OUTPATIENT
Start: 2024-10-05 | End: 2024-10-05 | Stop reason: SURG

## 2024-10-05 RX ADMIN — EPHEDRINE SULFATE 10 MG: 50 INJECTION INTRAMUSCULAR; INTRAVENOUS; SUBCUTANEOUS at 08:11

## 2024-10-05 RX ADMIN — SODIUM CHLORIDE 30 ML/HR: 9 INJECTION, SOLUTION INTRAVENOUS at 07:28

## 2024-10-05 RX ADMIN — PROPOFOL 140 MCG/KG/MIN: 10 INJECTION, EMULSION INTRAVENOUS at 07:58

## 2024-10-05 RX ADMIN — PROPOFOL 80 MG: 10 INJECTION, EMULSION INTRAVENOUS at 07:57

## 2024-10-05 RX ADMIN — LIDOCAINE HYDROCHLORIDE 60 MG: 20 INJECTION, SOLUTION INFILTRATION; PERINEURAL at 07:57

## 2024-10-05 RX ADMIN — SODIUM CHLORIDE, SODIUM LACTATE, POTASSIUM CHLORIDE, AND CALCIUM CHLORIDE: 600; 310; 30; 20 INJECTION, SOLUTION INTRAVENOUS at 07:55

## 2024-10-05 NOTE — H&P
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     Past Medical History:   Past Medical History:   Diagnosis Date    Atrial fibrillation     BCC (basal cell carcinoma of skin) 06/22/2022    NOSE    Benign prostate hyperplasia     BPH (benign prostatic hyperplasia)     Elevated cholesterol     H/O Elevated PSA     H/O Hairy cell leukemia (CMS/HCC) 2012    H/O Internal thrombosed hemorrhoids     H/O minimal right carotid plaque     H/O peripheral neuropathy     History of transfusion     Hyperlipidemia     Hypertension     Primary open-angle glaucoma, bilateral, mild stage        Family History:  Family History   Problem Relation Age of Onset    Cancer Mother 90        Breast     Hypertension Mother     Cancer Father         Unknown primary melanoma    Hypertension Father     Hypertension Sister     Hector Hyperthermia Neg Hx        Social History:   reports that he has quit smoking. His smoking use included cigarettes. He has been exposed to tobacco smoke. He has never used smokeless tobacco. He reports current alcohol use of about 7.0 standard drinks of alcohol per week. He reports that he does not use drugs.    Medications:   Medications Prior to Admission   Medication Sig Dispense Refill Last Dose    acyclovir (ZOVIRAX) 400 MG tablet Take 1 tablet by mouth 2 (Two) Times a Day. 60 tablet 7 10/4/2024    amLODIPine (NORVASC) 2.5 MG tablet Take 1 tablet by mouth Daily. 90 tablet 3 10/4/2024    bimatoprost (Lumigan) 0.01 % ophthalmic drops Administer 1 drop to both eyes every night at bedtime. 7.5 mL 3 10/4/2024    bisoprolol (ZEBeta) 5 MG tablet Take 1 tablet by mouth Daily. 90 tablet 3 10/5/2024    Calcium-Magnesium-Vitamin D (CALCIUM 1200+D3 PO) Take 1 tablet by mouth Daily. Indications: vitamin deficiency   10/4/2024    dicyclomine (BENTYL) 20  MG tablet Take 1 tablet by mouth 3 (Three) Times a Day As Needed for Muscle Spasms 30 tablet 0 Past Month    finasteride (PROSCAR) 5 MG tablet Take 1 tablet by mouth Daily. 90 tablet 3 10/4/2024    latanoprost (XALATAN) 0.005 % ophthalmic solution Administer 1 drop to both eyes every night at bedtime. 7.5 mL 2 10/4/2024    Magnesium 500 MG tablet Take 1 tablet by mouth 2 (two) times a day. Indications: magnesium deficiency   10/4/2024    metroNIDAZOLE (METROGEL) 1 % gel Apply 1 application  topically to the appropriate area as directed Daily. 60 g 11 Past Week    Multiple Vitamins-Minerals (PRESERVISION AREDS 2 PO) Take 1 tablet by mouth 2 (Two) Times a Day. Indications: vision supplement   10/4/2024    niacin 500 MG tablet Take 1 tablet by mouth Every Night. Indications: Deficiency of the Vitamin Niacin   Past Week    propafenone (RYTHMOL) 150 MG tablet Take 1 tablet by mouth 3 (Three) Times a Day. 270 tablet 3 10/5/2024    rosuvastatin (Crestor) 10 MG tablet Take 1 tablet by mouth Every Night. 90 tablet 3 10/4/2024    tamsulosin (FLOMAX) 0.4 MG capsule 24 hr capsule Take 2 capsules by mouth Daily. 180 capsule 3 10/4/2024    vitamin C (ASCORBIC ACID) 250 MG tablet Take 4 tablets by mouth Daily. With madisyn hips   Indications: Inadequate Vitamin C   10/4/2024    vitamin D3 125 MCG (5000 UT) capsule capsule Take 1 capsule by mouth Daily. Indications: Vitamin D Deficiency   10/4/2024    acetaminophen (TYLENOL) 325 MG tablet Take 2 tablets by mouth Every 6 (Six) Hours As Needed for Mild Pain.   More than a month    amLODIPine (NORVASC) 2.5 MG tablet Take 1 tablet by mouth Daily. 90 tablet 3     bimatoprost (LUMIGAN) 0.03 % ophthalmic drops Administer 1 drop to both eyes Every Night. Indications: Persistently Increased Pressure in the Eye       bisoprolol (ZEBeta) 5 MG tablet Take 1 tablet by mouth Daily. 90 tablet 3     chlorhexidine (PERIDEX) 0.12 % solution GENTLY APPLY WITH COTTON BALL TO SURGICAL SITE TWICE DAILY 473  mL 1     dicyclomine (BENTYL) 20 MG tablet Take 1 tablet by mouth 3 (Three) Times a Day As Needed. 90 tablet 3     ferrous sulfate 325 (65 FE) MG tablet Take 1 tablet by mouth Daily With Breakfast. 90 tablet 0 More than a month    Hepatitis B Vac Recombinant (Engerix-B) 20 MCG/ML suspension prefilled syringe Inject 1 mL into the appropriate muscle as directed by prescriber. 1 mL 0     Hepatitis B Vac Recombinant (Engerix-B) 20 MCG/ML suspension prefilled syringe Inject 1 mL into the appropriate muscle as directed by prescriber. 1 mL 0     Hepatitis B Vac Recombinant (Engerix-B) 20 MCG/ML suspension prefilled syringe Inject 1 mL into the appropriate muscle as directed by prescriber. 1 mL 0     Lidocaine 4 % Place 1 patch on the skin as directed by provider Daily. Remove & Discard patch within 12 hours or as directed by MD 15 patch 1     ondansetron (ZOFRAN) 8 MG tablet Take 1 tablet by mouth 3 (Three) Times a Day As Needed for Nausea or Vomiting. 30 tablet 5     Pneumococcal 20-Marcelle Conj Vacc (Prevnar 20) 0.5 ML suspension prefilled syringe vaccine Inject 0.5 mL into the appropriate muscle as directed by prescriber. 0.5 mL 0     propafenone (RYTHMOL) 150 MG tablet Take 1 tablet by mouth 3 times a day. 270 tablet 3     sennosides-docusate (PERICOLACE) 8.6-50 MG per tablet Take 2 tablets by mouth 2 (Two) Times a Day. 120 tablet 0     tamsulosin (FLOMAX) 0.4 MG capsule 24 hr capsule Take 1 capsule by mouth 2 (Two) Times a Day. (Patient not taking: Reported on 5/17/2024) 90 capsule 3     tamsulosin (FLOMAX) 0.4 MG capsule 24 hr capsule Take 2 capsules by mouth Daily. 180 capsule 3     traMADol (ULTRAM) 50 MG tablet Take 1 tablet by mouth Every 4 (Four) Hours As Needed for Moderate Pain. (Patient not taking: Reported on 5/17/2024) 60 tablet 0     traMADol (ULTRAM) 50 MG tablet Take 1 tablet by mouth Every 8 (Eight) Hours As Needed for Moderate Pain. 30 tablet 0 More than a month       Allergies:  Patient has no known  "allergies.    ROS:    Pertinent items are noted in HPI     Objective     Blood pressure (!) 192/87, pulse 60, resp. rate 18, height 177.8 cm (70\"), SpO2 100%.    Physical Exam   Constitutional: Pt is oriented to person, place, and time and well-developed, well-nourished, and in no distress.   HENT:   Mouth/Throat: Oropharynx is clear and moist.   Neck: Normal range of motion. Neck supple.   Cardiovascular: Normal rate, regular rhythm and normal heart sounds.    Pulmonary/Chest: Effort normal and breath sounds normal. No respiratory distress. No  wheezes.   Abdominal: Soft. Bowel sounds are normal.   Skin: Skin is warm and dry.   Psychiatric: Mood, memory, affect and judgment normal.     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     Anticipated Surgical Procedure:  Colonoscopy    The risks, benefits, and alternatives of this procedure have been discussed with the patient or the responsible party- the patient understands and agrees to proceed.    Juan Jenkins M.D.   "

## 2024-10-05 NOTE — ANESTHESIA POSTPROCEDURE EVALUATION
Patient: Angel Remy    Procedure Summary       Date: 10/05/24 Room / Location:  [facility]    Anesthesia Start: 0751 Anesthesia Stop: 0820    Procedure: COLONOSCOPY into cecum with biopsies and cold biopsy polypectomies Diagnosis:       FH: colon cancer      Colon polyp      (FH: colon cancer [Z80.0])      (Colon polyp [K63.5])    Surgeons: Juan Jenkins MD Provider: Peterson Henley MD    Anesthesia Type: MAC ASA Status: 3            Anesthesia Type: MAC    Vitals  Vitals Value Taken Time   /79 10/05/24 0830   Temp     Pulse 51 10/05/24 0836   Resp 11 10/05/24 0829   SpO2 99 % 10/05/24 0836   Vitals shown include unfiled device data.        Post Anesthesia Care and Evaluation    Patient location during evaluation: bedside  Patient participation: complete - patient participated  Level of consciousness: awake and alert  Pain management: adequate    Airway patency: patent  Anesthetic complications: No anesthetic complications  PONV Status: controlled  Cardiovascular status: blood pressure returned to baseline and acceptable  Respiratory status: acceptable  Hydration status: acceptable

## 2024-10-05 NOTE — ANESTHESIA PREPROCEDURE EVALUATION
Anesthesia Evaluation     Patient summary reviewed and Nursing notes reviewed   no history of anesthetic complications:   NPO Solid Status: > 8 hours  NPO Liquid Status: > 2 hours           Airway   Mallampati: II  TM distance: >3 FB  Neck ROM: full  Dental      Pulmonary    Cardiovascular     ECG reviewed    (+) hypertension, dysrhythmias Atrial Fib, hyperlipidemia,  carotid artery disease    ROS comment: Stress test, echo reviewed    Neuro/Psych  GI/Hepatic/Renal/Endo      Musculoskeletal     Abdominal    Substance History      OB/GYN          Other      history of cancer                      Anesthesia Plan    ASA 3     MAC     intravenous induction     Anesthetic plan, risks, benefits, and alternatives have been provided, discussed and informed consent has been obtained with: patient.        CODE STATUS:

## 2024-10-05 NOTE — DISCHARGE INSTRUCTIONS
For the next 24 hours patient needs to be with a responsible adult.    For 24 hours DO NOT drive, operate machinery, appliances, drink alcohol, make important decisions or sign legal documents.    Start with a light or bland diet if you are feeling sick to your stomach otherwise advance to regular diet as tolerated.    Follow recommendations on procedure report if provided by your doctor.    Call Dr Jenknis for problems [phone]    Problems may include but not limited to: large amounts of bleeding, trouble breathing, repeated vomiting, severe unrelieved pain, fever or chills.

## 2024-10-08 NOTE — PROGRESS NOTES
10/08/24       Called him with the results of pathology from his recent colonoscopy.  I recommend that he have a repeat colonoscopy in 3 to 5 years.  Please send a copy of this report to his PCP.  Saniya polanco

## 2024-10-09 ENCOUNTER — TELEPHONE (OUTPATIENT)
Dept: GASTROENTEROLOGY | Facility: CLINIC | Age: 79
End: 2024-10-09
Payer: MEDICARE

## 2024-10-09 NOTE — TELEPHONE ENCOUNTER
result note routed to the PCP via [facility]  Hm and cs recall 10/5/27

## 2024-10-09 NOTE — TELEPHONE ENCOUNTER
----- Message from Juan Schusterine sent at 10/8/2024  4:51 PM EDT -----  10/08/24       Called him with the results of pathology from his recent colonoscopy.  I recommend that he have a repeat colonoscopy in 3 to 5 years.  Please send a copy of this report to his PCP.  Thx. kjh

## 2024-10-24 ENCOUNTER — INFUSION (OUTPATIENT)
Dept: ONCOLOGY | Facility: HOSPITAL | Age: 79
End: 2024-10-24
Payer: MEDICARE

## 2024-10-24 DIAGNOSIS — Z45.2 FITTING AND ADJUSTMENT OF VASCULAR CATHETER: Primary | ICD-10-CM

## 2024-10-24 PROCEDURE — 25010000002 HEPARIN LOCK FLUSH PER 10 UNITS: Performed by: INTERNAL MEDICINE

## 2024-10-24 PROCEDURE — 96523 IRRIG DRUG DELIVERY DEVICE: CPT

## 2024-10-24 RX ORDER — SODIUM CHLORIDE 0.9 % (FLUSH) 0.9 %
10 SYRINGE (ML) INJECTION AS NEEDED
Status: DISCONTINUED | OUTPATIENT
Start: 2024-10-24 | End: 2024-10-24 | Stop reason: HOSPADM

## 2024-10-24 RX ORDER — HEPARIN SODIUM (PORCINE) LOCK FLUSH IV SOLN 100 UNIT/ML 100 UNIT/ML
500 SOLUTION INTRAVENOUS AS NEEDED
Status: DISCONTINUED | OUTPATIENT
Start: 2024-10-24 | End: 2024-10-24 | Stop reason: HOSPADM

## 2024-10-24 RX ADMIN — Medication 10 ML: at 14:19

## 2024-10-24 RX ADMIN — Medication 500 UNITS: at 14:19

## 2024-12-05 ENCOUNTER — INFUSION (OUTPATIENT)
Dept: ONCOLOGY | Facility: HOSPITAL | Age: 79
End: 2024-12-05
Payer: MEDICARE

## 2024-12-05 ENCOUNTER — OFFICE VISIT (OUTPATIENT)
Dept: ONCOLOGY | Facility: CLINIC | Age: 79
End: 2024-12-05
Payer: MEDICARE

## 2024-12-05 VITALS
OXYGEN SATURATION: 97 % | RESPIRATION RATE: 16 BRPM | DIASTOLIC BLOOD PRESSURE: 89 MMHG | TEMPERATURE: 98 F | SYSTOLIC BLOOD PRESSURE: 174 MMHG | HEART RATE: 63 BPM | BODY MASS INDEX: 24.47 KG/M2 | HEIGHT: 70 IN | WEIGHT: 170.9 LBS

## 2024-12-05 DIAGNOSIS — Z45.2 FITTING AND ADJUSTMENT OF VASCULAR CATHETER: Primary | ICD-10-CM

## 2024-12-05 DIAGNOSIS — C91.41 HAIRY CELL LEUKEMIA, IN REMISSION: Primary | ICD-10-CM

## 2024-12-05 LAB
ALBUMIN SERPL-MCNC: 4.2 G/DL (ref 3.5–5.2)
ALBUMIN/GLOB SERPL: 1.8 G/DL
ALP SERPL-CCNC: 54 U/L (ref 39–117)
ALT SERPL W P-5'-P-CCNC: 32 U/L (ref 1–41)
ANION GAP SERPL CALCULATED.3IONS-SCNC: 10.1 MMOL/L (ref 5–15)
AST SERPL-CCNC: 28 U/L (ref 1–40)
BASOPHILS # BLD AUTO: 0.01 10*3/MM3 (ref 0–0.2)
BASOPHILS NFR BLD AUTO: 0.2 % (ref 0–1.5)
BILIRUB SERPL-MCNC: 0.8 MG/DL (ref 0–1.2)
BUN SERPL-MCNC: 23 MG/DL (ref 8–23)
BUN/CREAT SERPL: 28.8 (ref 7–25)
CALCIUM SPEC-SCNC: 8.9 MG/DL (ref 8.6–10.5)
CHLORIDE SERPL-SCNC: 102 MMOL/L (ref 98–107)
CO2 SERPL-SCNC: 26.9 MMOL/L (ref 22–29)
CREAT SERPL-MCNC: 0.8 MG/DL (ref 0.76–1.27)
DEPRECATED RDW RBC AUTO: 44.2 FL (ref 37–54)
EGFRCR SERPLBLD CKD-EPI 2021: 90 ML/MIN/1.73
EOSINOPHIL # BLD AUTO: 0.1 10*3/MM3 (ref 0–0.4)
EOSINOPHIL NFR BLD AUTO: 2.2 % (ref 0.3–6.2)
ERYTHROCYTE [DISTWIDTH] IN BLOOD BY AUTOMATED COUNT: 12.6 % (ref 12.3–15.4)
GLOBULIN UR ELPH-MCNC: 2.4 GM/DL
GLUCOSE SERPL-MCNC: 112 MG/DL (ref 65–99)
HCT VFR BLD AUTO: 39.3 % (ref 37.5–51)
HGB BLD-MCNC: 13.3 G/DL (ref 13–17.7)
IMM GRANULOCYTES # BLD AUTO: 0.02 10*3/MM3 (ref 0–0.05)
IMM GRANULOCYTES NFR BLD AUTO: 0.4 % (ref 0–0.5)
LYMPHOCYTES # BLD AUTO: 0.71 10*3/MM3 (ref 0.7–3.1)
LYMPHOCYTES NFR BLD AUTO: 15.6 % (ref 19.6–45.3)
MCH RBC QN AUTO: 32.4 PG (ref 26.6–33)
MCHC RBC AUTO-ENTMCNC: 33.8 G/DL (ref 31.5–35.7)
MCV RBC AUTO: 95.6 FL (ref 79–97)
MONOCYTES # BLD AUTO: 0.6 10*3/MM3 (ref 0.1–0.9)
MONOCYTES NFR BLD AUTO: 13.2 % (ref 5–12)
NEUTROPHILS NFR BLD AUTO: 3.12 10*3/MM3 (ref 1.7–7)
NEUTROPHILS NFR BLD AUTO: 68.4 % (ref 42.7–76)
NRBC BLD AUTO-RTO: 0 /100 WBC (ref 0–0.2)
PLATELET # BLD AUTO: 142 10*3/MM3 (ref 140–450)
PMV BLD AUTO: 10.4 FL (ref 6–12)
POTASSIUM SERPL-SCNC: 4.2 MMOL/L (ref 3.5–5.2)
PROT SERPL-MCNC: 6.6 G/DL (ref 6–8.5)
RBC # BLD AUTO: 4.11 10*6/MM3 (ref 4.14–5.8)
SODIUM SERPL-SCNC: 139 MMOL/L (ref 136–145)
WBC NRBC COR # BLD AUTO: 4.56 10*3/MM3 (ref 3.4–10.8)

## 2024-12-05 PROCEDURE — 36415 COLL VENOUS BLD VENIPUNCTURE: CPT | Performed by: INTERNAL MEDICINE

## 2024-12-05 PROCEDURE — 3079F DIAST BP 80-89 MM HG: CPT | Performed by: INTERNAL MEDICINE

## 2024-12-05 PROCEDURE — 85025 COMPLETE CBC W/AUTO DIFF WBC: CPT | Performed by: INTERNAL MEDICINE

## 2024-12-05 PROCEDURE — 96523 IRRIG DRUG DELIVERY DEVICE: CPT

## 2024-12-05 PROCEDURE — 1126F AMNT PAIN NOTED NONE PRSNT: CPT | Performed by: INTERNAL MEDICINE

## 2024-12-05 PROCEDURE — 25010000002 HEPARIN LOCK FLUSH PER 10 UNITS: Performed by: INTERNAL MEDICINE

## 2024-12-05 PROCEDURE — 80053 COMPREHEN METABOLIC PANEL: CPT | Performed by: INTERNAL MEDICINE

## 2024-12-05 PROCEDURE — 99213 OFFICE O/P EST LOW 20 MIN: CPT | Performed by: INTERNAL MEDICINE

## 2024-12-05 PROCEDURE — 3077F SYST BP >= 140 MM HG: CPT | Performed by: INTERNAL MEDICINE

## 2024-12-05 RX ORDER — SODIUM CHLORIDE 0.9 % (FLUSH) 0.9 %
10 SYRINGE (ML) INJECTION AS NEEDED
Status: DISCONTINUED | OUTPATIENT
Start: 2024-12-05 | End: 2024-12-05 | Stop reason: HOSPADM

## 2024-12-05 RX ORDER — HEPARIN SODIUM (PORCINE) LOCK FLUSH IV SOLN 100 UNIT/ML 100 UNIT/ML
500 SOLUTION INTRAVENOUS AS NEEDED
Status: DISCONTINUED | OUTPATIENT
Start: 2024-12-05 | End: 2024-12-05 | Stop reason: HOSPADM

## 2024-12-05 RX ADMIN — Medication 10 ML: at 13:54

## 2024-12-05 RX ADMIN — Medication 500 UNITS: at 13:54

## 2024-12-05 NOTE — PROGRESS NOTES
REASONS FOR FOLLOWUP:   Hairy cell leukemia.   Admission 05/11/2012 through 05/17/12 for 7 day continuous infusion cladribine (2-CdA) without complication.   Patient seen 5 months post treatment in complete remission.   Patient seen 8 months post treatment with continued complete remission, every 3 month reassessment per CBC planned, 6 month review by MD scheduled.   Patient seen at 15 months without evidence of recurrent disease, every 6 month followup planned.   The patient was seen 02/05/2014 with no evidence of recurrent disease, 6-month followup planned.   Patient seen 07/31/2014, stable with no evidence of recurrent disease, every 6 month followup.   Patient seen 01/15/2015, stable without recurrent disease, 6-month CBC planned, yearly followup scheduled.   Patient seen 02/04/2016, stable, ?early peripheral neuropathy developing distally per feet, no evidence for recurrent disease per hairy cell leukemia.  Patient reviewed September 02, 2016, previous July CBC with mild thrombocytopenia developing, recheck September 2 normalized, every 6 month follow-ups anticipated  Patient reviewed February 02, 2017, status post negative prostate biopsy, mild thrombocytopenia again recognized  Patient reviewed July 20, 2017, hematologically stable, every 6 month follow-up CBCs planned, yearly M.D. Assessment  Patient seen July 26, 2018 after recent airline travel any cold symptoms  Patient reviewed January 10, 2019, no additional symptoms, hematologically stable  Patient reviewed July 25, 2019, stable, six-month follow-up as planned  Patient viewed January 16, 2020, stable, every 6 months CBC, MD assessment yearly planned  Reassessment January 21, 2021, no evidence of recurrence disease  Patient assessed 2/10/2022, evidence of continued remission noted both on clinical examination, peripheral blood smear review and flow cytometric evaluation.  Patient review 2/23/2023 mild thrombocytopenia, no new abnormalities per  peripheral smear, close for follow-up planned  Patient assessed 9/14/2023, concern for early relapse.  Subsequent marrow 10/12/2023-variably hypercellular with diffuse involvement-a 90%-hairy cell leukemia relapse.  Patient seen 10/22/2023 for initiation of cladribine-outpatient regimen to be followed by Rituxan-28 days later 4 to 6 weeks  Patient proceeded through Rituxan treatments weekly beginning 11/22/2023 and completed 12/14/2023.  Patient with findings consistent with remission 1/4/2024  Patient hospitalized early February, status post bilateral patellar fractures, tendon advancement repair 2/5/2024.  No evidence of recurrent hairy cell leukemia  Reassessment 12/5/2024 again no evidence of recurrent hairy cell leukemia he has returned to normal activity at this point,         History of Present Illness     The patient is a 79 y.o. male with the above-mentioned history, who completed therapy with cladribine and subsequent consolidative Rituxan weekly x 4 with his last treatment given 12/14/2023.  He is now seen back in office again feeling well with no additional issues.  He continues to work a full schedule and, wonderfully, was able to proceed to therapy almost entirely without interruption to his usual schedule.   He is eating and drinking adequately.  He has no fevers or chills, signs or symptoms of infection.  He has no arthralgias.     Unfortunately he required admission 2/4 - 7/24 after a fall sustaining bilateral patellar injuries requiring surgical intervention, postoperative supportive care including worsening anemia, supportive transfusion 2/7/2024.    He was hospitalized 2/7-15/24 as he recovered from tendon advancement repair February 5, 2024.  Subsequent assessments for blood loss anemia, ferritin of 303, iron profile with saturation of 40% and normal CBC 3/27/2024.  He is next seen formally in office 6/20/2024 with H&H of 12.8 and 37.4, white count of 4380, platelet count 141,000 normal  automated differential except for mild lymphopenia.    He is seen back in office 6/20/2024 having made an excellent recovery, returned to work, and achieved an excellent performance status.    He is next seen back 12/5/2024 with repeat CBC 9/12/2024 with H&H of 12.5 and 36.1 white count of 3880 and platelet count 134,000 normal differential.  Recent colonoscopy also reviewed with benign colonic mucosa biopsies in the cecum, tubular adenoma in the right ascending colon, additionally tubular adenoma and benign colonic mucosa in the rectum.  He has returned to normal activity, energy performance status remains excellent.        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 He is usually followed by Dr. Vuong for a history of hypertension, hyperlipidemia, minimal right carotid plaque as well as elevated PSA.       He had exams done on 1-09-12 for routine followup. This included normal serum chemistry including LFTs, cholesterol 131, triglyceride 34, HDL 51, and LDL 73. CBC revealed H&H 13.3, 37.9%, WBC 3300, platelet count 110,000, 26% polys and 74% lymphs with A NC of 0.9.       Dr. Remy provides information when initially seen with studies from as far back as 1- at which time hemoglobin and hematocrit was 14.4 and 42.1%, WBC 6950, platelets 227,000 with 47% polys, 42% lymph; this was automated. The additional test includ es approximately every year to every other year thereafter though it was noted in January 2010 WBC dropped from an average of approximately 5,000-6,000 to 3600. Hemoglobin and hematocrit 13.6 and 39.7%. Platelets 150,000-170,000 with ANC beginning in Ja nuary 2010 at 1900.       We were contacted per the above findings and asked Dr. Remy to undergo further assessment including flow cytometric exam per peripheral blood. This revealed no evidence of abnormal myeloid maturation increased blast  population. No evidence of lymphopro l iferative disorder. The patient was asked to be seen formally in the office now and comes in today for further assessment. Dr. Remy indicates that he has taken a number of medications in an attempt to improve his health. Several homeopathic medication s include vitamin C, folate, flaxseed oil, fish oil, vitamin E, selenium, pomegranate tabs, Co-enzyme Q, vitamin D, niacin, and red yeast rice daily. He has discontinued these and stays on those described below.       He feels well with no additional problem s and states that he has no little or no symptoms that he can detail, whether this is just ill health in anyway, though he does have occasional hemorrhoidal pain. He also notes rare palpitations and occasional epistaxis when the ambient air has dried sub stantially. No fevers, chills, weight loss, night sweats, or other constitutional symptoms.       As a result of the above, the patient was asked to undergo a series of studies. This went onto include rheumatoid factor of 6, MARYURI screen negative, negative s misael protein electrophoresis, normal quantitative immunoglobulins, CMV IgG of 2.9, IgM less than .9, EBV IgM of less than .9 and BCA IgG antibody of 4.6. These are consistent with previous exposures. HIV was negative, CRSP less than .1, sedimentation ra t e of 4, iron 125, TIBC 297, 14% saturation, and ferritin of 77. Peripheral blood flow cytometry was negative for evidence of abnormal myeloid maturation, increased blast population or lymphoproliferative disorder. As the patient is seen back today, he i s continuing to feel well though is obviously greatly concerned about his followup counts. Reviews in the office had been planned to follow him briefly recheck performance 3/29/12 with an H&H of 12.9, 36.1, WBC 3900, 32 polys, 62 lymphs, platelet count 94 , 000, IPF slightly elevated at 8.8. The patient was therefore asked to return and to be further assessed with bone  marrow aspirate and biopsy. He now presents to do so. He has had no additional symptoms since last seen. He has discontinued his Crestor use.       As a result of his review the patient underwent bone marrow aspiration and biopsy. This revealed evidence of lymphocytic infiltrate reviewed here in the office. 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 Eventually cytogenetics was found to be negative with no evidence of cyclin-D1/IgH or BCL-2/IgH rearrangement. Additional tissue was requested. As a result, the patient was notifi e d of these findings by personal visit, and plans were made to proceed with endoscopy, ? mantle cell involvement. At the time of this dictation, this was not yet performed. He was also asked to undergo CT of the neck, chest, abdomen and pelvis which show ed no evidence of abnormality. PET scan 04/13/12 showed increased activity within the marrow, but no additional abnormalities including the spleen, with no background bowel or gastric activity as well.       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 hairy cell leukemia. This has been discussed in great detail with the alexander thakur at [facility] and additional stains are pending as is the patient's GI assessment now to be pursued through Dr. Ray.       His case was again reviewed over quite a period of time, and ultimately his marrow was signed out as 80% involved with hairy cell leukemia - normal cytogenetics, no evidence of cyclin D1/IgH or BCL-2/IgH rearrangement. Dr. Remy was advised of the treatment options which include followup with no immediate therapy, and/or the use of cladribine or pentostatin. After numerous di s cussion, he  ultimately elected to try to proceed with treatment when he is feeling as well as he is to improve his ability to withstand the therapy itself. We therefore began to discuss the treatment schedule, which also could be somewhat variable, inclu d ing 7-day infusions, 2-hr infusion q.d. x five days or every other week infusion. After discussion he wishes to proceed with 7-day infusion. When we attempted to do this as an outpatient we found out thereafter that this would not be covered as an outpa tient infusion through his insurance. Therefore we have made plans for him to be admitted after he is seen in office 05/10/12.       The patient was thereafter admitted 05/11-05/17/12. PICC line was placed and he initiated treatment at 0.1 mg/kg or 7.7 mg IV in fusion over seven days. The patient fortunately had no serious issues at all during the seven days and continued to work out on a daily basis. He was seen by his internist as well with some of his meds adjusted including his HCTZ and Ziac. He was stabl e at discharge, but was given Neulasta 6 mg subcutaneously 24 hours after he completed treatment. He has been having counts done on a regular basis, and returns back today with only minimal evidence of neutropenia at his becca, and has an excellent periph eral smear today - normal findings. His performance status remains excellent as well.       The patient when seen on 6/7/12 was felt to have improved substantially. We followed him for weekly counts and plans at that point were for him to take a trip out of the country. He did actually take this trip, which had him eventually hiking at 11-12,000 feet without any complication or ill effect. As he returns back today on 7/19 he feels well and again with an excellent performance status. He has had no fever, c hills or suggestion of infection or complications thus far. He remains again, with an excellent performance status.       The patient thereafter is asked to have his  counts done on an every six weeks basis, now seen three months later with a normal CBC as well as examination. He again feels quite well with a completely normal performance status.       The patient was asked to return for followup studies that included normal serum chemistries, unchanged lipid profile and stable hematologic findings. He is now seen on 2/7/13, 4 months from his last appointment. He continues to do well with unchanged performance status.       The patient was asked to have counts done q3 months seeing the physician in six months. He is now seen back 08/22/2013 feeling well having [place] with that. He had no additional fevers, sweats, chills, significant change in weight although he has tried to lose some additional pounds and we see this today. He continues to exercise regularl y and dietary program. Review of his smears again continues to demonstrate a complete remission.       Today, the patient was asked to be seen back in 6 months for followup, and is now seen 02/05/2014, continuing to feel well. Again, review of his smears and physical examination fortunately demonstrates no evidence of recurrent disease.       The patient was asked to be seen back in 6 months for followup and is now seen on 07/31/2014. Again he feels well with an excellent performance status and no change since last reviewed.       The patient thereafter was now seen back 2015, 2016 stable at both visits with no evidence of recurrence of his hairy cell leukemia. He feels well but when now reviewed 02/04/2016, he has had some degree of peripheral neuropathy? This improv es with activity and it is certainly not limiting his action or his function.   Patient now reviewed back again September 02, 2016, continues to have an excellent performance status and hematologically remain stable brother had some concern about thrombocytopenia last visit.    The patient is next seen  February 02, 2017.  He has not had any medical issues since last seen and remains physically quite well.  He does describe following with urology and undergoing a repeat biopsy recently when his PSA was above 7.  His findings evidently were negative for malignancy though he did have post procedure hematuria.  This is also now abated.  The patient is next seen July 28, 2017.  He continues to have an excellent performance status and no particular difficulty in day-to-day function and/or pursuing active vacations.      The patient is next seen July 26, 2018.  He had recently returned from a trip to [place].  After experiencing an airplane ride with multiple coughing passengers he himself developed a cold, particularly severe over the last 3-4 days with cough and congestion.  He's had low-grade fever which is now beginning to resolve but is clearly uncomfortable.    The patient's next seen January 10, 2019.  He has continued to travel without issues and is feeling well when reviewed today.  Plans were made for usual follow-up and the patient is next seen July 25, 2019.  His performance status remains excellent though he has lost some weight and indicates he did this per altering his diet.  His stamina and other usual activities are continued unabated.  The patient is next seen January 16, 2020.  He continues an active practice and lifestyle.  He has lost some additional weight on purpose through diet.    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    The patient's follow-up laboratory studies demonstrated by late September degree of leukopenia, unchanged thrombocytopenia and this was followed with repeat analysis within the last several weeks as he seen February 10, 2022. Flow cytometry failed to demonstrate any new process.  As you seen February 10, 2020 his performance status  remains excellent, stable weight, appetite, no additional fever, chills, rash, night sweats.    The patient is next evaluated 2/23/2023 with an unchanged performance status for mild thrombocytopenia.  He has had some exposure to viral illnesses through his grandchildren as of late but is otherwise felt fine with a unchanged performance status and work schedule.    He required admission 5/23-27/2023 had return from [place] where he developed nausea and vomiting and difficulty with keeping p.o.  He had further nausea, vomiting and dizziness and was seen in the emergency department and was found to have a sodium 115.  He was seen by nephrology with concern of hyponatremia related to hydrochlorothiazide along with SIDH, treated with fluid restriction and salt tablets and p.o. Lasix.  His medications were adjusted per renal medicine.  At that point he continued to demonstrate chronic pancytopenia that did not accelerate in any particular way.     Subsequent CBC 6/1/2023 include H&H 11.3 and 32.2 with white count of 2840, platelet count 93,000, ANC of 1610.    The patient is next evaluated 8/3/2020 3 repeat CBC includes a white count of 2250, H&H 11.8 and 33.6, platelet count of 88,000-differential of 44% polys, 52% lymphs, 3% monocytes.    Patient contacted about flow cytometry findings of small population potentially suggestive of recurrence versus secondary process.  Plan to reassess in follow-up visits 9/7/2023.  Please note that if treatment is necessary then repeat treatment with cladribine could be given in a variety of of schedules including 0.15 mg/kg/day over 2 hours x 5 days followed by rituximab or 5.6 mg/m² over 2 hours also for 5 days and also followed by rituximab therapy.      The patient was seen back 9/14/2023.Flow cytometry assessed on 2 visits includes a CD10 positive monoclonal lambda B-cell population representing 0.3% of total events 8/3/2023 and repeat 9/7/2023 demonstrates a similar B-cell  population also 0.3% total events-phenotype positive CD10 bright, CD19 bright, CD20 bright, CD25, CD45,   slambda.  Discussed symptoms usually due to splenomegaly, fatigue, weakness, weight loss, bruising, recurrent infections, periodic vasculitis.  He is clear that he is not having any of the symptoms and feels that his overall performance status is as good as its ever been.  We have discussed that additional issues include possible organomegaly and cytopenias developing with his follow-up CBC in office today including H&H of 12.2 and 33.4, white count of 3250 and platelet count of 92,000.  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    We had the patient return for periodic blood counts, had him undergo testing for hepatitis and reviewed the various regimens including 5-day cladribine followed by Rituxan therapy.  10/5/2023 scans revealed no substantial abnormalities including lack of splenomegaly or lymphadenopathy.    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  Additionally the patient's hepatitis B surface antibody was equivocal and he went on to have additional vaccinations   -influenza, COVID-19, RSV and hepatitis B.  Further vascular surgeon was contacted and the patient underwent a right internal jugular Mediport placement 10/18/2023 and teaching concerning cladribine day 1 through 5 and subsequent Rituxan therapy.    After further discussion of  available treatment options plans made to offer 5-day cladribine and, review of literature, indicates that Rituxan is associated with further immunosuppression.  Available guidance suggests proceeding with initial cladribine and then 4-6 weekly doses of Rituxan consolidation starting 4 to 6 weeks after initial treatment.    The patient proceeded with cladribine taking therapy daily-days 1-5 through 10/27/2023 and given Neulasta 10/30/2023.  He is next seen 11/2/2023.  Fortunately he has had no untoward effects of treatment as far and continues to work unabated.  His follow-up CBC does not demonstrate significant development of neutropenia anemia or thrombocytopenia.  We discussed assessing him regularly over the next several weeks and anticipate starting Rituxan approximately 3 weeks from now.    The patient's subsequent laboratory studies demonstrated gradual improvement in his white count to normalization, including degree of neutrophilia by 11/16/2023, improvement in his thrombocytopenia as well.  He has not had any additional side effects including lack of fever, chills, night sweats since he completed therapy.    He is seen back formally 11/20/2023 and we anticipate initiation of his rituximab therapy 11/22/2023.  Again he is done very well and his peripheral blood counts remain quite acceptable without evidence of deterioration.  He has had no fever, chills, night sweats, infectious episodes or change in performance status.  We have agreed that he will start his consolidation rituximab therapy 11/22/2023 and that we would schedule subsequent Thursday treatments x3 also.    The patient is seen 12/7/2023.  He underwent Rituxan therapy 11/22/2023 11/30/2023 without complication and is seen 12/7 H&H up to 12.0 and 34.6, white count of 4000, platelet 122,000 and normal automated differential except for mild lymphopenia.  He states that he will be taking a trip to [place] at the end of the month.      Patient seen  back 1/4/2024 with normal peripheral blood smear including H&H of 13.3 and 38.3, white count is 6170 and platelet count of 159,000 with a normal automated differential. We discussed his peripheral smear today that demonstrates normalization and, fortunately, he developed no additional symptoms that would direct us otherwise.  We have agreed, however, that he would maintain his port over the next year until it is clear that he no longer needs this access.    Unfortunately he required admission 2/4 - 7/24 after a fall sustaining bilateral patellar injuries requiring surgical intervention, postoperative supportive care including worsening anemia, supportive transfusion 2/7/2024.    He was hospitalized 2/7-15/24 as he recovered from tendon advancement repair February 5, 2024.  Subsequent assessments for blood loss anemia, ferritin of 303, iron profile with saturation of 40% and normal CBC 3/27/2024.  He is next seen formally in office 6/20/2024 with H&H of 12.8 and 37.4, white count of 4380, platelet count 141,000 normal automated differential except for mild lymphopenia.    Patient seen 6/20/2024 with general recovery, excellent performance status.    Similarly seen 12/5/2024 with normal performance status, normal CBC, normal CMP           Past Medical History:   Diagnosis Date    Atrial fibrillation     BCC (basal cell carcinoma of skin) 06/22/2022    NOSE    Benign prostate hyperplasia     BPH (benign prostatic hyperplasia)     Elevated cholesterol     H/O Elevated PSA     H/O Hairy cell leukemia (CMS/HCC) 2012    H/O Internal thrombosed hemorrhoids     H/O minimal right carotid plaque     H/O peripheral neuropathy     History of transfusion     Hyperlipidemia     Hypertension     Primary open-angle glaucoma, bilateral, mild stage        ONCOLOGIC HISTORY:  (History from previous dates can be found in the separate document.)    Current Outpatient Medications on File Prior to Visit   Medication Sig Dispense Refill     acetaminophen (TYLENOL) 325 MG tablet Take 2 tablets by mouth Every 6 (Six) Hours As Needed for Mild Pain.      acyclovir (ZOVIRAX) 400 MG tablet Take 1 tablet by mouth 2 (Two) Times a Day. 60 tablet 7    amLODIPine (NORVASC) 2.5 MG tablet Take 1 tablet by mouth Daily. 90 tablet 3    amLODIPine (NORVASC) 2.5 MG tablet Take 1 tablet by mouth Daily. 90 tablet 3    bimatoprost (Lumigan) 0.01 % ophthalmic drops Administer 1 drop to both eyes every night at bedtime. 7.5 mL 3    bimatoprost (LUMIGAN) 0.03 % ophthalmic drops Administer 1 drop to both eyes Every Night. Indications: Persistently Increased Pressure in the Eye      bisoprolol (ZEBeta) 5 MG tablet Take 1 tablet by mouth Daily. 90 tablet 3    bisoprolol (ZEBeta) 5 MG tablet Take 1 tablet by mouth Daily. 90 tablet 3    Calcium-Magnesium-Vitamin D (CALCIUM 1200+D3 PO) Take 1 tablet by mouth Daily. Indications: vitamin deficiency      chlorhexidine (PERIDEX) 0.12 % solution GENTLY APPLY WITH COTTON BALL TO SURGICAL SITE TWICE DAILY 473 mL 1    dicyclomine (BENTYL) 20 MG tablet Take 1 tablet by mouth 3 (Three) Times a Day As Needed for Muscle Spasms 30 tablet 0    dicyclomine (BENTYL) 20 MG tablet Take 1 tablet by mouth 3 (Three) Times a Day As Needed. 90 tablet 3    finasteride (PROSCAR) 5 MG tablet Take 1 tablet by mouth Daily. 90 tablet 3    Hepatitis B Vac Recombinant (Engerix-B) 20 MCG/ML suspension prefilled syringe Inject 1 mL into the appropriate muscle as directed by prescriber. 1 mL 0    Hepatitis B Vac Recombinant (Engerix-B) 20 MCG/ML suspension prefilled syringe Inject 1 mL into the appropriate muscle as directed by prescriber. 1 mL 0    Hepatitis B Vac Recombinant (Engerix-B) 20 MCG/ML suspension prefilled syringe Inject 1 mL into the appropriate muscle as directed by prescriber. 1 mL 0    latanoprost (XALATAN) 0.005 % ophthalmic solution Administer 1 drop to both eyes every night at bedtime. 7.5 mL 2    Magnesium 500 MG tablet Take 1 tablet by mouth 2  (two) times a day. Indications: magnesium deficiency      metroNIDAZOLE (METROGEL) 1 % gel Apply 1 application  topically to the appropriate area as directed Daily. 60 g 11    Multiple Vitamins-Minerals (PRESERVISION AREDS 2 PO) Take 1 tablet by mouth 2 (Two) Times a Day. Indications: vision supplement      niacin 500 MG tablet Take 1 tablet by mouth Every Night. Indications: Deficiency of the Vitamin Niacin      Pneumococcal 20-Marcelle Conj Vacc (Prevnar 20) 0.5 ML suspension prefilled syringe vaccine Inject 0.5 mL into the appropriate muscle as directed by prescriber. 0.5 mL 0    propafenone (RYTHMOL) 150 MG tablet Take 1 tablet by mouth 3 (Three) Times a Day. 270 tablet 3    propafenone (RYTHMOL) 150 MG tablet Take 1 tablet by mouth 3 times a day. 270 tablet 3    rosuvastatin (Crestor) 10 MG tablet Take 1 tablet by mouth Every Night. 90 tablet 3    tamsulosin (FLOMAX) 0.4 MG capsule 24 hr capsule Take 1 capsule by mouth 2 (Two) Times a Day. 90 capsule 3    tamsulosin (FLOMAX) 0.4 MG capsule 24 hr capsule Take 2 capsules by mouth Daily. 180 capsule 3    vitamin C (ASCORBIC ACID) 250 MG tablet Take 4 tablets by mouth Daily. With madisyn hips   Indications: Inadequate Vitamin C      vitamin D3 125 MCG (5000 UT) capsule capsule Take 1 capsule by mouth Daily. Indications: Vitamin D Deficiency      ferrous sulfate 325 (65 FE) MG tablet Take 1 tablet by mouth Daily With Breakfast. 90 tablet 0    Lidocaine 4 % Place 1 patch on the skin as directed by provider Daily. Remove & Discard patch within 12 hours or as directed by MD 15 patch 1    ondansetron (ZOFRAN) 8 MG tablet Take 1 tablet by mouth 3 (Three) Times a Day As Needed for Nausea or Vomiting. 30 tablet 5    sennosides-docusate (PERICOLACE) 8.6-50 MG per tablet Take 2 tablets by mouth 2 (Two) Times a Day. 120 tablet 0    tamsulosin (FLOMAX) 0.4 MG capsule 24 hr capsule Take 2 capsules by mouth Daily. 180 capsule 3    traMADol (ULTRAM) 50 MG tablet Take 1 tablet by mouth  "Every 4 (Four) Hours As Needed for Moderate Pain. (Patient not taking: Reported on 5/17/2024) 60 tablet 0    traMADol (ULTRAM) 50 MG tablet Take 1 tablet by mouth Every 8 (Eight) Hours As Needed for Moderate Pain. 30 tablet 0     No current facility-administered medications on file prior to visit.       ALLERGIES:   No Known Allergies    Social History     Socioeconomic History    Marital status:      Spouse name: Hanna   Tobacco Use    Smoking status: Former     Current packs/day: 0.50     Types: Cigarettes     Passive exposure: Past    Smokeless tobacco: Never    Tobacco comments:     Quit smoking many years ago.   Vaping Use    Vaping status: Never Used   Substance and Sexual Activity    Alcohol use: Yes     Alcohol/week: 7.0 standard drinks of alcohol     Types: 7 Glasses of wine per week     Comment: 1/2 glass of wine or beer per day    Drug use: No    Sexual activity: Yes     Partners: Female     Birth control/protection: Other         Cancer-related family history includes Cancer in his father; Cancer (age of onset: 90) in his mother.      Review of Systems   ROS as per HPI    Vitals:    12/05/24 1410   BP: 174/89   Pulse: 63   Resp: 16   Temp: 98 °F (36.7 °C)   TempSrc: Oral   SpO2: 97%   Weight: 77.5 kg (170 lb 14.4 oz)   Height: 177.8 cm (70\")   PainSc: 0-No pain               12/5/2024     2:12 PM   Current Status   ECOG score 0       Physical Exam    GENERAL: Well-developed, well-nourished male in no acute distress.   SKIN: Warm, dry, without new rash or lesion  HEAD: Normocephalic.   EYES: Pupils equal, round and reactive to light, EOMs intact. Conjunctivae normal.   EARS: Hearing intact.   NOSE: Septum midline. No excoriations or nasal discharge.   NECK: Supple with good range of motion; no masses, no JVD or bruits.   LYMPHATICS: No cervical, supraclavicular adenopathy.   CHEST: Lungs clear to percussion and auscultation.   CARDIAC: Regular rate and rhythm without murmurs, rubs or gallops. "   ABDOMEN: Soft, nontender, bowel sounds present.  EXTREMITIES: No clubbing, cyanosis or edema.   NEUROLOGICAL: No focal neurological deficits.       RECENT LABS:  WBC   Date Value Ref Range Status   12/05/2024 4.56 3.40 - 10.80 10*3/mm3 Final     RBC   Date Value Ref Range Status   12/05/2024 4.11 (L) 4.14 - 5.80 10*6/mm3 Final     Hemoglobin   Date Value Ref Range Status   12/05/2024 13.3 13.0 - 17.7 g/dL Final     Hematocrit   Date Value Ref Range Status   12/05/2024 39.3 37.5 - 51.0 % Final     MCV   Date Value Ref Range Status   12/05/2024 95.6 79.0 - 97.0 fL Final     MCH   Date Value Ref Range Status   12/05/2024 32.4 26.6 - 33.0 pg Final     MCHC   Date Value Ref Range Status   12/05/2024 33.8 31.5 - 35.7 g/dL Final     RDW   Date Value Ref Range Status   12/05/2024 12.6 12.3 - 15.4 % Final     RDW-SD   Date Value Ref Range Status   12/05/2024 44.2 37.0 - 54.0 fl Final     MPV   Date Value Ref Range Status   12/05/2024 10.4 6.0 - 12.0 fL Final     Platelets   Date Value Ref Range Status   12/05/2024 142 140 - 450 10*3/mm3 Final     Neutrophil %   Date Value Ref Range Status   12/05/2024 68.4 42.7 - 76.0 % Final     Lymphocyte %   Date Value Ref Range Status   12/05/2024 15.6 (L) 19.6 - 45.3 % Final     Monocyte %   Date Value Ref Range Status   12/05/2024 13.2 (H) 5.0 - 12.0 % Final     Eosinophil %   Date Value Ref Range Status   12/05/2024 2.2 0.3 - 6.2 % Final     Basophil %   Date Value Ref Range Status   12/05/2024 0.2 0.0 - 1.5 % Final     Immature Grans %   Date Value Ref Range Status   12/05/2024 0.4 0.0 - 0.5 % Final     Neutrophils, Absolute   Date Value Ref Range Status   12/05/2024 3.12 1.70 - 7.00 10*3/mm3 Final     Lymphocytes, Absolute   Date Value Ref Range Status   12/05/2024 0.71 0.70 - 3.10 10*3/mm3 Final     Monocytes, Absolute   Date Value Ref Range Status   12/05/2024 0.60 0.10 - 0.90 10*3/mm3 Final     Eosinophils, Absolute   Date Value Ref Range Status   12/05/2024 0.10 0.00 - 0.40  10*3/mm3 Final     Basophils, Absolute   Date Value Ref Range Status   12/05/2024 0.01 0.00 - 0.20 10*3/mm3 Final     Immature Grans, Absolute   Date Value Ref Range Status   12/05/2024 0.02 0.00 - 0.05 10*3/mm3 Final     nRBC   Date Value Ref Range Status   12/05/2024 0.0 0.0 - 0.2 /100 WBC Final         Assessment & Plan     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   He was treated 5/11-5/17/ 12 with cladribine being given as continuous infusion at 0.1 mg/kg or 7.7 mg/24-hours x7 days.   He did well during hospitalization with little toxicity and minimal myelosuppression.   He obtained a complete remission.   July 2018, marrow suppression noted dose smear with no evidence of hairy cell leukemia.  Atypical lymphocytes thought to be secondary to viral infection.   He again remained in observation with MD assessment every 6 months  September 2021 degree of leukopenia and thrombocytopenia though flow cytometry fails to demonstrate any new processes.  Follow-up 8/3/2023 with CBC noting WBC 2250, hemoglobin 11.8, hematocrit 33.6%, platelet 88,000.  Differential with 44% polys, 52% lymphs and 3% monocytes.  Flow cytometry findings with a small population potentially just of of early recurrence versus secondary process  9/7/2020 3 repeat flow cytometry includes a CD10 positive monoclonal lambda B-cell population representing 0.3% of total events 8/3/2023 and repeat 9/7/2023 demonstrates a similar B-cell population also 0.3% total events-phenotype positive CD10 bright, CD19 bright, CD20 bright, CD25, CD45,   slambda.   We had the patient return for periodic blood counts, had him undergo testing for hepatitis and reviewed the various regimens including 5-day cladribine followed by Rituxan therapy.  10/5/2023 scans revealed no substantial abnormalities including lack of splenomegaly or lymphadenopathy.  10/12/2023 CT-guided bone marrow biopsy and aspirate found to be variably hypercellular with diffuse  involvement by his known hairy cell leukemia involving 89% of marrow cellularity.  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   10/18/2023 Mediport placed by vascular surgery, Dr. Fan  Patient completed all vaccinations prior to initiation of therapy including hepatitis B  Plans for treatment with cladribine 0.15 mg/kg/day over 2 hours x 5 days followed by rituximab or 5.6 mg/m² over 2 hours also for 5 days and also followed by rituximab therapy.  Cladribine initiated 10/23/2023 through 10/27/2023 with Neulasta support 10/30/2023  Excellent tolerance to cladribine  Consolidation therapy with weekly Rituxan initiated 11/22/2023 with plans for 4 weeks of therapy.  Today, 12/14/2023 counts continue to overall recover from previous cladribine with WBC 4.66, hemoglobin 12.8, platelets 125,000.  Proceed with fourth and final dose of weekly Rituxan.  Follow-up peripheral smear 1/4/2024 demonstrates normalization (likely remission) and, fortunately, he has developed no additional symptoms that would direct us otherwise.  We have agreed, however, that he would maintain his port over the next year until it is clear that he no longer needs this access.  Plan to maintain port every 6 weeks, CBC 3 months, MD 6 months  Patient evaluated 12/5/2024 with normal CBC, normal CMP    2.Unfortunately he required admission 2/4 - 7/24 after a fall sustaining bilateral patellar injuries requiring surgical intervention, postoperative supportive care including worsening anemia, supportive transfusion 2/7/2024.    He was hospitalized 2/7-15/24 as he recovered from tendon advancement repair February 5, 2024.  Subsequent assessments for blood loss anemia, ferritin of 303, iron profile with saturation of 40% and normal CBC 3/27/2024.  He is next seen formally in office 6/20/2024 with H&H of 12.8 and 37.4, white count of  4380, platelet count 141,000 normal automated differential except for mild lymphopenia.  Reassessment 6/20/2024 with no evidence of recurrence hairy cell leukemia          3.  Hyponatremia  Admission 5/23-27/2023 had return from [place] where he developed nausea and vomiting and difficulty with keeping p.o.  He had further nausea, vomiting and dizziness and was seen in the emergency department and was found to have a sodium 115.  He was seen by nephrology with concern of hyponatremia related to hydrochlorothiazide along with SIDH, treated with fluid restriction and salt tablets and p.o. Lasix.  CMP including serum sodium 12/14/2023-139  Reassessment 2/15/2024 sodium 137  Reevaluated 12/5/2024 sodium 139      4.  Prophylaxis  Discontinue Bactrim, reduce, though continue, dosing of acyclovir to 400 mg daily, other medications reviewed and continued  Every 6 weeks port flush, 3-month CBC, 3  months MD.  Stable at this point we can move to every 6 months assessment    Paco Jeffers MD   12/05/2024

## 2024-12-11 DIAGNOSIS — C91.40 HAIRY CELL LEUKEMIA NOT HAVING ACHIEVED REMISSION: ICD-10-CM

## 2024-12-12 RX ORDER — ACYCLOVIR 400 MG/1
400 TABLET ORAL 2 TIMES DAILY
Qty: 60 TABLET | Refills: 7 | OUTPATIENT
Start: 2024-12-12

## 2025-01-16 ENCOUNTER — INFUSION (OUTPATIENT)
Dept: ONCOLOGY | Facility: HOSPITAL | Age: 80
End: 2025-01-16
Payer: MEDICARE

## 2025-01-16 DIAGNOSIS — Z45.2 FITTING AND ADJUSTMENT OF VASCULAR CATHETER: Primary | ICD-10-CM

## 2025-01-16 PROCEDURE — 25010000002 HEPARIN LOCK FLUSH PER 10 UNITS: Performed by: INTERNAL MEDICINE

## 2025-01-16 PROCEDURE — 96523 IRRIG DRUG DELIVERY DEVICE: CPT

## 2025-01-16 RX ORDER — HEPARIN SODIUM (PORCINE) LOCK FLUSH IV SOLN 100 UNIT/ML 100 UNIT/ML
500 SOLUTION INTRAVENOUS AS NEEDED
OUTPATIENT
Start: 2025-01-16

## 2025-01-16 RX ORDER — SODIUM CHLORIDE 0.9 % (FLUSH) 0.9 %
10 SYRINGE (ML) INJECTION AS NEEDED
Status: DISCONTINUED | OUTPATIENT
Start: 2025-01-16 | End: 2025-01-16 | Stop reason: HOSPADM

## 2025-01-16 RX ORDER — SODIUM CHLORIDE 0.9 % (FLUSH) 0.9 %
10 SYRINGE (ML) INJECTION AS NEEDED
OUTPATIENT
Start: 2025-01-16

## 2025-01-16 RX ORDER — HEPARIN SODIUM (PORCINE) LOCK FLUSH IV SOLN 100 UNIT/ML 100 UNIT/ML
500 SOLUTION INTRAVENOUS AS NEEDED
Status: DISCONTINUED | OUTPATIENT
Start: 2025-01-16 | End: 2025-01-16 | Stop reason: HOSPADM

## 2025-01-16 RX ADMIN — Medication 10 ML: at 14:44

## 2025-01-16 RX ADMIN — Medication 500 UNITS: at 14:44

## 2025-02-27 ENCOUNTER — OFFICE VISIT (OUTPATIENT)
Dept: ONCOLOGY | Facility: CLINIC | Age: 80
End: 2025-02-27
Payer: MEDICARE

## 2025-02-27 ENCOUNTER — INFUSION (OUTPATIENT)
Dept: ONCOLOGY | Facility: HOSPITAL | Age: 80
End: 2025-02-27
Payer: MEDICARE

## 2025-02-27 VITALS
TEMPERATURE: 97.7 F | SYSTOLIC BLOOD PRESSURE: 194 MMHG | HEART RATE: 60 BPM | OXYGEN SATURATION: 96 % | RESPIRATION RATE: 16 BRPM | BODY MASS INDEX: 24.45 KG/M2 | DIASTOLIC BLOOD PRESSURE: 93 MMHG | HEIGHT: 70 IN | WEIGHT: 170.8 LBS

## 2025-02-27 DIAGNOSIS — C91.41 HAIRY CELL LEUKEMIA, IN REMISSION: ICD-10-CM

## 2025-02-27 DIAGNOSIS — Z45.2 FITTING AND ADJUSTMENT OF VASCULAR CATHETER: Primary | ICD-10-CM

## 2025-02-27 DIAGNOSIS — C91.41 HAIRY CELL LEUKEMIA, IN REMISSION: Primary | ICD-10-CM

## 2025-02-27 LAB
ALBUMIN SERPL-MCNC: 3.9 G/DL (ref 3.5–5.2)
ALBUMIN/GLOB SERPL: 1.8 G/DL
ALP SERPL-CCNC: 46 U/L (ref 39–117)
ALT SERPL W P-5'-P-CCNC: 27 U/L (ref 1–41)
ANION GAP SERPL CALCULATED.3IONS-SCNC: 10.7 MMOL/L (ref 5–15)
AST SERPL-CCNC: 23 U/L (ref 1–40)
BASOPHILS # BLD AUTO: 0.01 10*3/MM3 (ref 0–0.2)
BASOPHILS NFR BLD AUTO: 0.2 % (ref 0–1.5)
BILIRUB SERPL-MCNC: 0.8 MG/DL (ref 0–1.2)
BUN SERPL-MCNC: 25 MG/DL (ref 8–23)
BUN/CREAT SERPL: 32.5 (ref 7–25)
CALCIUM SPEC-SCNC: 9 MG/DL (ref 8.6–10.5)
CHLORIDE SERPL-SCNC: 102 MMOL/L (ref 98–107)
CO2 SERPL-SCNC: 26.3 MMOL/L (ref 22–29)
CREAT SERPL-MCNC: 0.77 MG/DL (ref 0.76–1.27)
DEPRECATED RDW RBC AUTO: 43.1 FL (ref 37–54)
EGFRCR SERPLBLD CKD-EPI 2021: 91.1 ML/MIN/1.73
EOSINOPHIL # BLD AUTO: 0.07 10*3/MM3 (ref 0–0.4)
EOSINOPHIL NFR BLD AUTO: 1.6 % (ref 0.3–6.2)
ERYTHROCYTE [DISTWIDTH] IN BLOOD BY AUTOMATED COUNT: 12.4 % (ref 12.3–15.4)
GLOBULIN UR ELPH-MCNC: 2.2 GM/DL
GLUCOSE SERPL-MCNC: 94 MG/DL (ref 65–99)
HCT VFR BLD AUTO: 37.9 % (ref 37.5–51)
HGB BLD-MCNC: 13 G/DL (ref 13–17.7)
IMM GRANULOCYTES # BLD AUTO: 0.01 10*3/MM3 (ref 0–0.05)
IMM GRANULOCYTES NFR BLD AUTO: 0.2 % (ref 0–0.5)
LYMPHOCYTES # BLD AUTO: 0.88 10*3/MM3 (ref 0.7–3.1)
LYMPHOCYTES NFR BLD AUTO: 19.9 % (ref 19.6–45.3)
MCH RBC QN AUTO: 32.4 PG (ref 26.6–33)
MCHC RBC AUTO-ENTMCNC: 34.3 G/DL (ref 31.5–35.7)
MCV RBC AUTO: 94.5 FL (ref 79–97)
MONOCYTES # BLD AUTO: 0.59 10*3/MM3 (ref 0.1–0.9)
MONOCYTES NFR BLD AUTO: 13.3 % (ref 5–12)
NEUTROPHILS NFR BLD AUTO: 2.87 10*3/MM3 (ref 1.7–7)
NEUTROPHILS NFR BLD AUTO: 64.8 % (ref 42.7–76)
NRBC BLD AUTO-RTO: 0 /100 WBC (ref 0–0.2)
PLATELET # BLD AUTO: 142 10*3/MM3 (ref 140–450)
PMV BLD AUTO: 10.6 FL (ref 6–12)
POTASSIUM SERPL-SCNC: 4.4 MMOL/L (ref 3.5–5.2)
PROT SERPL-MCNC: 6.1 G/DL (ref 6–8.5)
RBC # BLD AUTO: 4.01 10*6/MM3 (ref 4.14–5.8)
SODIUM SERPL-SCNC: 139 MMOL/L (ref 136–145)
WBC NRBC COR # BLD AUTO: 4.43 10*3/MM3 (ref 3.4–10.8)

## 2025-02-27 PROCEDURE — 3077F SYST BP >= 140 MM HG: CPT | Performed by: INTERNAL MEDICINE

## 2025-02-27 PROCEDURE — 99213 OFFICE O/P EST LOW 20 MIN: CPT | Performed by: INTERNAL MEDICINE

## 2025-02-27 PROCEDURE — 36591 DRAW BLOOD OFF VENOUS DEVICE: CPT

## 2025-02-27 PROCEDURE — 25010000002 HEPARIN LOCK FLUSH PER 10 UNITS: Performed by: INTERNAL MEDICINE

## 2025-02-27 PROCEDURE — 3080F DIAST BP >= 90 MM HG: CPT | Performed by: INTERNAL MEDICINE

## 2025-02-27 PROCEDURE — 1126F AMNT PAIN NOTED NONE PRSNT: CPT | Performed by: INTERNAL MEDICINE

## 2025-02-27 PROCEDURE — 85025 COMPLETE CBC W/AUTO DIFF WBC: CPT

## 2025-02-27 PROCEDURE — 80053 COMPREHEN METABOLIC PANEL: CPT

## 2025-02-27 RX ORDER — HEPARIN SODIUM (PORCINE) LOCK FLUSH IV SOLN 100 UNIT/ML 100 UNIT/ML
500 SOLUTION INTRAVENOUS AS NEEDED
OUTPATIENT
Start: 2025-02-27

## 2025-02-27 RX ORDER — SODIUM CHLORIDE 0.9 % (FLUSH) 0.9 %
10 SYRINGE (ML) INJECTION AS NEEDED
Status: DISCONTINUED | OUTPATIENT
Start: 2025-02-27 | End: 2025-02-27 | Stop reason: HOSPADM

## 2025-02-27 RX ORDER — HEPARIN SODIUM (PORCINE) LOCK FLUSH IV SOLN 100 UNIT/ML 100 UNIT/ML
500 SOLUTION INTRAVENOUS AS NEEDED
Status: DISCONTINUED | OUTPATIENT
Start: 2025-02-27 | End: 2025-02-27 | Stop reason: HOSPADM

## 2025-02-27 RX ORDER — SODIUM CHLORIDE 0.9 % (FLUSH) 0.9 %
10 SYRINGE (ML) INJECTION AS NEEDED
OUTPATIENT
Start: 2025-02-27

## 2025-02-27 RX ADMIN — Medication 10 ML: at 15:15

## 2025-02-27 RX ADMIN — Medication 500 UNITS: at 15:15

## 2025-02-27 NOTE — PROGRESS NOTES
REASONS FOR FOLLOWUP:   Hairy cell leukemia.   Admission 05/11/2012 through 05/17/12 for 7 day continuous infusion cladribine (2-CdA) without complication.   Patient seen 5 months post treatment in complete remission.   Patient seen 8 months post treatment with continued complete remission, every 3 month reassessment per CBC planned, 6 month review by MD scheduled.   Patient seen at 15 months without evidence of recurrent disease, every 6 month followup planned.   The patient was seen 02/05/2014 with no evidence of recurrent disease, 6-month followup planned.   Patient seen 07/31/2014, stable with no evidence of recurrent disease, every 6 month followup.   Patient seen 01/15/2015, stable without recurrent disease, 6-month CBC planned, yearly followup scheduled.   Patient seen 02/04/2016, stable, ?early peripheral neuropathy developing distally per feet, no evidence for recurrent disease per hairy cell leukemia.  Patient reviewed September 02, 2016, previous July CBC with mild thrombocytopenia developing, recheck September 2 normalized, every 6 month follow-ups anticipated  Patient reviewed February 02, 2017, status post negative prostate biopsy, mild thrombocytopenia again recognized  Patient reviewed July 20, 2017, hematologically stable, every 6 month follow-up CBCs planned, yearly M.D. Assessment  Patient seen July 26, 2018 after recent airline travel any cold symptoms  Patient reviewed January 10, 2019, no additional symptoms, hematologically stable  Patient reviewed July 25, 2019, stable, six-month follow-up as planned  Patient viewed January 16, 2020, stable, every 6 months CBC, MD assessment yearly planned  Reassessment January 21, 2021, no evidence of recurrence disease  Patient assessed 2/10/2022, evidence of continued remission noted both on clinical examination, peripheral blood smear review and flow cytometric evaluation.  Patient review 2/23/2023 mild thrombocytopenia, no new abnormalities per  peripheral smear, close for follow-up planned  Patient assessed 9/14/2023, concern for early relapse.  Subsequent marrow 10/12/2023-variably hypercellular with diffuse involvement-a 90%-hairy cell leukemia relapse.  Patient seen 10/22/2023 for initiation of cladribine-outpatient regimen to be followed by Rituxan-28 days later 4 to 6 weeks  Patient proceeded through Rituxan treatments weekly beginning 11/22/2023 and completed 12/14/2023.  Patient with findings consistent with remission 1/4/2024  Patient hospitalized early February, status post bilateral patellar fractures, tendon advancement repair 2/5/2024.  No evidence of recurrent hairy cell leukemia  Reassessment 12/5/2024 again no evidence of recurrent hairy cell leukemia he has returned to normal activity at this point,  Reassessment 2/27/2025, resolution of lymphopenia, port removal planned.         History of Present Illness     The patient is a 79 y.o. male with the above-mentioned history, who completed therapy with cladribine and subsequent consolidative Rituxan weekly x 4 with his last treatment given 12/14/2023.  He is now seen back in office again feeling well with no additional issues.  He continues to work a full schedule and, wonderfully, was able to proceed to therapy almost entirely without interruption to his usual schedule.   He is eating and drinking adequately.  He has no fevers or chills, signs or symptoms of infection.  He has no arthralgias.     Unfortunately he required admission 2/4 - 7/24 after a fall sustaining bilateral patellar injuries requiring surgical intervention, postoperative supportive care including worsening anemia, supportive transfusion 2/7/2024.    He was hospitalized 2/7-15/24 as he recovered from tendon advancement repair February 5, 2024.  Subsequent assessments for blood loss anemia, ferritin of 303, iron profile with saturation of 40% and normal CBC 3/27/2024.  He is next seen formally in office 6/20/2024 with H&H of  12.8 and 37.4, white count of 4380, platelet count 141,000 normal automated differential except for mild lymphopenia.    He is seen back in office 6/20/2024 having made an excellent recovery, returned to work, and achieved an excellent performance status.    He is next seen back 12/5/2024 with repeat CBC 9/12/2024 with H&H of 12.5 and 36.1 white count of 3880 and platelet count 134,000 normal differential.  Recent colonoscopy also reviewed with benign colonic mucosa biopsies in the cecum, tubular adenoma in the right ascending colon, additionally tubular adenoma and benign colonic mucosa in the rectum.  He has returned to normal activity, energy performance status remains excellent.    He returns to the office 2/27/2025.  His follow-up CBC demonstrates gradual resolution of the previously noted lymphopenia.  Fortunately he is feeling well with an excellent, normalized performance status as he continues to work full-time.        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 He is usually followed by Dr. Vuong for a history of hypertension, hyperlipidemia, minimal right carotid plaque as well as elevated PSA.       He had exams done on 1-09-12 for routine followup. This included normal serum chemistry including LFTs, cholesterol 131, triglyceride 34, HDL 51, and LDL 73. CBC revealed H&H 13.3, 37.9%, WBC 3300, platelet count 110,000, 26% polys and 74% lymphs with A NC of 0.9.       Dr. Remy provides information when initially seen with studies from as far back as 1- at which time hemoglobin and hematocrit was 14.4 and 42.1%, WBC 6950, platelets 227,000 with 47% polys, 42% lymph; this was automated. The additional test includ es approximately every year to every other year thereafter though it was noted in January 2010 WBC dropped from an average of approximately 5,000-6,000 to 3600. Hemoglobin and hematocrit 13.6  and 39.7%. Platelets 150,000-170,000 with ANC beginning in Ja nuary 2010 at 1900.       We were contacted per the above findings and asked Dr. Remy to undergo further assessment including flow cytometric exam per peripheral blood. This revealed no evidence of abnormal myeloid maturation increased blast population. No evidence of lymphopro l iferative disorder. The patient was asked to be seen formally in the office now and comes in today for further assessment. Dr. Remy indicates that he has taken a number of medications in an attempt to improve his health. Several homeopathic medication s include vitamin C, folate, flaxseed oil, fish oil, vitamin E, selenium, pomegranate tabs, Co-enzyme Q, vitamin D, niacin, and red yeast rice daily. He has discontinued these and stays on those described below.       He feels well with no additional problem s and states that he has no little or no symptoms that he can detail, whether this is just ill health in anyway, though he does have occasional hemorrhoidal pain. He also notes rare palpitations and occasional epistaxis when the ambient air has dried sub stantially. No fevers, chills, weight loss, night sweats, or other constitutional symptoms.       As a result of the above, the patient was asked to undergo a series of studies. This went onto include rheumatoid factor of 6, MARYURI screen negative, negative s misael protein electrophoresis, normal quantitative immunoglobulins, CMV IgG of 2.9, IgM less than .9, EBV IgM of less than .9 and BCA IgG antibody of 4.6. These are consistent with previous exposures. HIV was negative, CRSP less than .1, sedimentation ra t e of 4, iron 125, TIBC 297, 14% saturation, and ferritin of 77. Peripheral blood flow cytometry was negative for evidence of abnormal myeloid maturation, increased blast population or lymphoproliferative disorder. As the patient is seen back today, he i s continuing to feel well though is obviously greatly concerned about  his followup counts. Reviews in the office had been planned to follow him briefly recheck performance 3/29/12 with an H&H of 12.9, 36.1, WBC 3900, 32 polys, 62 lymphs, platelet count 94 , 000, IPF slightly elevated at 8.8. The patient was therefore asked to return and to be further assessed with bone marrow aspirate and biopsy. He now presents to do so. He has had no additional symptoms since last seen. He has discontinued his Crestor use.       As a result of his review the patient underwent bone marrow aspiration and biopsy. This revealed evidence of lymphocytic infiltrate reviewed here in the office. 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 Eventually cytogenetics was found to be negative with no evidence of cyclin-D1/IgH or BCL-2/IgH rearrangement. Additional tissue was requested. As a result, the patient was notifi e edward of these findings by personal visit, and plans were made to proceed with endoscopy, ? mantle cell involvement. At the time of this dictation, this was not yet performed. He was also asked to undergo CT of the neck, chest, abdomen and pelvis which show ed no evidence of abnormality. PET scan 04/13/12 showed increased activity within the marrow, but no additional abnormalities including the spleen, with no background bowel or gastric activity as well.       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 hairy cell leukemia. This has been discussed in great detail with the alexander thakur at [facility] and additional stains are pending as is the patient's GI assessment now to be pursued through Dr. Ray.       His case was again reviewed over quite a period of time, and ultimately his marrow was  signed out as 80% involved with hairy cell leukemia - normal cytogenetics, no evidence of cyclin D1/IgH or BCL-2/IgH rearrangement. Dr. Remy was advised of the treatment options which include followup with no immediate therapy, and/or the use of cladribine or pentostatin. After numerous di s cussion, he ultimately elected to try to proceed with treatment when he is feeling as well as he is to improve his ability to withstand the therapy itself. We therefore began to discuss the treatment schedule, which also could be somewhat variable, inclu d ing 7-day infusions, 2-hr infusion q.d. x five days or every other week infusion. After discussion he wishes to proceed with 7-day infusion. When we attempted to do this as an outpatient we found out thereafter that this would not be covered as an outpa tient infusion through his insurance. Therefore we have made plans for him to be admitted after he is seen in office 05/10/12.       The patient was thereafter admitted 05/11-05/17/12. PICC line was placed and he initiated treatment at 0.1 mg/kg or 7.7 mg IV in fusion over seven days. The patient fortunately had no serious issues at all during the seven days and continued to work out on a daily basis. He was seen by his internist as well with some of his meds adjusted including his HCTZ and Ziac. He was stabl e at discharge, but was given Neulasta 6 mg subcutaneously 24 hours after he completed treatment. He has been having counts done on a regular basis, and returns back today with only minimal evidence of neutropenia at his becca, and has an excellent periph eral smear today - normal findings. His performance status remains excellent as well.       The patient when seen on 6/7/12 was felt to have improved substantially. We followed him for weekly counts and plans at that point were for him to take a trip out of the country. He did actually take this trip, which had him eventually hiking at 11-12,000 feet without any  complication or ill effect. As he returns back today on 7/19 he feels well and again with an excellent performance status. He has had no fever, c hills or suggestion of infection or complications thus far. He remains again, with an excellent performance status.       The patient thereafter is asked to have his counts done on an every six weeks basis, now seen three months later with a normal CBC as well as examination. He again feels quite well with a completely normal performance status.       The patient was asked to return for followup studies that included normal serum chemistries, unchanged lipid profile and stable hematologic findings. He is now seen on 2/7/13, 4 months from his last appointment. He continues to do well with unchanged performance status.       The patient was asked to have counts done q3 months seeing the physician in six months. He is now seen back 08/22/2013 feeling well having [place] with that. He had no additional fevers, sweats, chills, significant change in weight although he has tried to lose some additional pounds and we see this today. He continues to exercise regularl y and dietary program. Review of his smears again continues to demonstrate a complete remission.       Today, the patient was asked to be seen back in 6 months for followup, and is now seen 02/05/2014, continuing to feel well. Again, review of his smears and physical examination fortunately demonstrates no evidence of recurrent disease.       The patient was asked to be seen back in 6 months for followup and is now seen on 07/31/2014. Again he feels well with an excellent performance status and no change since last reviewed.       The patient thereafter was now seen back 2015, 2016 stable at both visits with no evidence of recurrence of his hairy cell leukemia. He feels well but when now reviewed 02/04/2016, he has had some degree of peripheral neuropathy? This  kennyv es with activity and it is certainly not limiting his action or his function.   Patient now reviewed back again September 02, 2016, continues to have an excellent performance status and hematologically remain stable brother had some concern about thrombocytopenia last visit.    The patient is next seen February 02, 2017.  He has not had any medical issues since last seen and remains physically quite well.  He does describe following with urology and undergoing a repeat biopsy recently when his PSA was above 7.  His findings evidently were negative for malignancy though he did have post procedure hematuria.  This is also now abated.  The patient is next seen July 28, 2017.  He continues to have an excellent performance status and no particular difficulty in day-to-day function and/or pursuing active vacations.      The patient is next seen July 26, 2018.  He had recently returned from a trip to [place].  After experiencing an airplane ride with multiple coughing passengers he himself developed a cold, particularly severe over the last 3-4 days with cough and congestion.  He's had low-grade fever which is now beginning to resolve but is clearly uncomfortable.    The patient's next seen January 10, 2019.  He has continued to travel without issues and is feeling well when reviewed today.  Plans were made for usual follow-up and the patient is next seen July 25, 2019.  His performance status remains excellent though he has lost some weight and indicates he did this per altering his diet.  His stamina and other usual activities are continued unabated.  The patient is next seen January 16, 2020.  He continues an active practice and lifestyle.  He has lost some additional weight on purpose through diet.    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    The patient's follow-up  laboratory studies demonstrated by late September degree of leukopenia, unchanged thrombocytopenia and this was followed with repeat analysis within the last several weeks as he seen February 10, 2022. Flow cytometry failed to demonstrate any new process.  As you seen February 10, 2020 his performance status remains excellent, stable weight, appetite, no additional fever, chills, rash, night sweats.    The patient is next evaluated 2/23/2023 with an unchanged performance status for mild thrombocytopenia.  He has had some exposure to viral illnesses through his grandchildren as of late but is otherwise felt fine with a unchanged performance status and work schedule.    He required admission 5/23-27/2023 had return from [place] where he developed nausea and vomiting and difficulty with keeping p.o.  He had further nausea, vomiting and dizziness and was seen in the emergency department and was found to have a sodium 115.  He was seen by nephrology with concern of hyponatremia related to hydrochlorothiazide along with SIDH, treated with fluid restriction and salt tablets and p.o. Lasix.  His medications were adjusted per renal medicine.  At that point he continued to demonstrate chronic pancytopenia that did not accelerate in any particular way.     Subsequent CBC 6/1/2023 include H&H 11.3 and 32.2 with white count of 2840, platelet count 93,000, ANC of 1610.    The patient is next evaluated 8/3/2020 3 repeat CBC includes a white count of 2250, H&H 11.8 and 33.6, platelet count of 88,000-differential of 44% polys, 52% lymphs, 3% monocytes.    Patient contacted about flow cytometry findings of small population potentially suggestive of recurrence versus secondary process.  Plan to reassess in follow-up visits 9/7/2023.  Please note that if treatment is necessary then repeat treatment with cladribine could be given in a variety of of schedules including 0.15 mg/kg/day over 2 hours x 5 days followed by rituximab or 5.6  mg/m² over 2 hours also for 5 days and also followed by rituximab therapy.      The patient was seen back 9/14/2023.Flow cytometry assessed on 2 visits includes a CD10 positive monoclonal lambda B-cell population representing 0.3% of total events 8/3/2023 and repeat 9/7/2023 demonstrates a similar B-cell population also 0.3% total events-phenotype positive CD10 bright, CD19 bright, CD20 bright, CD25, CD45,   slambda.  Discussed symptoms usually due to splenomegaly, fatigue, weakness, weight loss, bruising, recurrent infections, periodic vasculitis.  He is clear that he is not having any of the symptoms and feels that his overall performance status is as good as its ever been.  We have discussed that additional issues include possible organomegaly and cytopenias developing with his follow-up CBC in office today including H&H of 12.2 and 33.4, white count of 3250 and platelet count of 92,000.  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    We had the patient return for periodic blood counts, had him undergo testing for hepatitis and reviewed the various regimens including 5-day cladribine followed by Rituxan therapy.  10/5/2023 scans revealed no substantial abnormalities including lack of splenomegaly or lymphadenopathy.    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  Additionally the patient's hepatitis B surface antibody was equivocal and he went on to  have additional vaccinations   -influenza, COVID-19, RSV and hepatitis B.  Further vascular surgeon was contacted and the patient underwent a right internal jugular Mediport placement 10/18/2023 and teaching concerning cladribine day 1 through 5 and subsequent Rituxan therapy.    After further discussion of available treatment options plans made to offer 5-day cladribine and, review of literature, indicates that Rituxan is associated with further immunosuppression.  Available guidance suggests proceeding with initial cladribine and then 4-6 weekly doses of Rituxan consolidation starting 4 to 6 weeks after initial treatment.    The patient proceeded with cladribine taking therapy daily-days 1-5 through 10/27/2023 and given Neulasta 10/30/2023.  He is next seen 11/2/2023.  Fortunately he has had no untoward effects of treatment as far and continues to work unabated.  His follow-up CBC does not demonstrate significant development of neutropenia anemia or thrombocytopenia.  We discussed assessing him regularly over the next several weeks and anticipate starting Rituxan approximately 3 weeks from now.    The patient's subsequent laboratory studies demonstrated gradual improvement in his white count to normalization, including degree of neutrophilia by 11/16/2023, improvement in his thrombocytopenia as well.  He has not had any additional side effects including lack of fever, chills, night sweats since he completed therapy.    He is seen back formally 11/20/2023 and we anticipate initiation of his rituximab therapy 11/22/2023.  Again he is done very well and his peripheral blood counts remain quite acceptable without evidence of deterioration.  He has had no fever, chills, night sweats, infectious episodes or change in performance status.  We have agreed that he will start his consolidation rituximab therapy 11/22/2023 and that we would schedule subsequent Thursday treatments x3 also.    The patient is seen 12/7/2023.  He  underwent Rituxan therapy 11/22/2023 11/30/2023 without complication and is seen 12/7 H&H up to 12.0 and 34.6, white count of 4000, platelet 122,000 and normal automated differential except for mild lymphopenia.  He states that he will be taking a trip to [place] at the end of the month.      Patient seen back 1/4/2024 with normal peripheral blood smear including H&H of 13.3 and 38.3, white count is 6170 and platelet count of 159,000 with a normal automated differential. We discussed his peripheral smear today that demonstrates normalization and, fortunately, he developed no additional symptoms that would direct us otherwise.  We have agreed, however, that he would maintain his port over the next year until it is clear that he no longer needs this access.    Unfortunately he required admission 2/4 - 7/24 after a fall sustaining bilateral patellar injuries requiring surgical intervention, postoperative supportive care including worsening anemia, supportive transfusion 2/7/2024.    He was hospitalized 2/7-15/24 as he recovered from tendon advancement repair February 5, 2024.  Subsequent assessments for blood loss anemia, ferritin of 303, iron profile with saturation of 40% and normal CBC 3/27/2024.  He is next seen formally in office 6/20/2024 with H&H of 12.8 and 37.4, white count of 4380, platelet count 141,000 normal automated differential except for mild lymphopenia.    Patient seen 6/20/2024 with general recovery, excellent performance status.    Similarly seen 12/5/2024 with normal performance status, normal CBC, normal CMP.    He Is seen back for follow-up 2/27/2025 with an excellent performance status, working full-time without restriction.  His peripheral blood smear continues to demonstrate improvement including resolution of his previously noted lymphopenia.  At this point is felt his port could be removed.           Past Medical History:   Diagnosis Date    Atrial fibrillation     BCC (basal cell  carcinoma of skin) 06/22/2022    NOSE    Benign prostate hyperplasia     BPH (benign prostatic hyperplasia)     Elevated cholesterol     H/O Elevated PSA     H/O Hairy cell leukemia (CMS/HCC) 2012    H/O Internal thrombosed hemorrhoids     H/O minimal right carotid plaque     H/O peripheral neuropathy     History of transfusion     Hyperlipidemia     Hypertension     Primary open-angle glaucoma, bilateral, mild stage        ONCOLOGIC HISTORY:  (History from previous dates can be found in the separate document.)    Current Outpatient Medications on File Prior to Visit   Medication Sig Dispense Refill    acetaminophen (TYLENOL) 325 MG tablet Take 2 tablets by mouth Every 6 (Six) Hours As Needed for Mild Pain.      acyclovir (ZOVIRAX) 400 MG tablet Take 1 tablet by mouth 2 (Two) Times a Day. 60 tablet 7    acyclovir (ZOVIRAX) 400 MG tablet Take 1 tablet by mouth Every 12 (Twelve) Hours. Take no more than 5 doses a day. 180 tablet 3    amLODIPine (NORVASC) 2.5 MG tablet Take 1 tablet by mouth Daily. 90 tablet 3    amLODIPine (NORVASC) 2.5 MG tablet Take 1 tablet by mouth Daily. 90 tablet 3    bimatoprost (Lumigan) 0.01 % ophthalmic drops Administer 1 drop to both eyes every night at bedtime. 7.5 mL 3    bimatoprost (LUMIGAN) 0.03 % ophthalmic drops Administer 1 drop to both eyes Every Night. Indications: Persistently Increased Pressure in the Eye      bisoprolol (ZEBeta) 5 MG tablet Take 1 tablet by mouth Daily. 90 tablet 3    bisoprolol (ZEBeta) 5 MG tablet Take 1 tablet by mouth Daily. 90 tablet 3    Calcium-Magnesium-Vitamin D (CALCIUM 1200+D3 PO) Take 1 tablet by mouth Daily. Indications: vitamin deficiency      chlorhexidine (PERIDEX) 0.12 % solution GENTLY APPLY WITH COTTON BALL TO SURGICAL SITE TWICE DAILY 473 mL 1    dicyclomine (BENTYL) 20 MG tablet Take 1 tablet by mouth 3 (Three) Times a Day As Needed for Muscle Spasms 30 tablet 0    dicyclomine (BENTYL) 20 MG tablet Take 1 tablet by mouth 3 (Three) Times a  Day As Needed. 90 tablet 3    finasteride (PROSCAR) 5 MG tablet Take 1 tablet by mouth Daily. 90 tablet 3    Hepatitis B Vac Recombinant (Engerix-B) 20 MCG/ML suspension prefilled syringe Inject 1 mL into the appropriate muscle as directed by prescriber. 1 mL 0    Hepatitis B Vac Recombinant (Engerix-B) 20 MCG/ML suspension prefilled syringe Inject 1 mL into the appropriate muscle as directed by prescriber. 1 mL 0    Hepatitis B Vac Recombinant (Engerix-B) 20 MCG/ML suspension prefilled syringe Inject 1 mL into the appropriate muscle as directed by prescriber. 1 mL 0    latanoprost (XALATAN) 0.005 % ophthalmic solution Administer 1 drop to both eyes every night at bedtime. 7.5 mL 2    Magnesium 500 MG tablet Take 1 tablet by mouth 2 (two) times a day. Indications: magnesium deficiency      metroNIDAZOLE (METROGEL) 1 % gel Apply 1 application  topically to the appropriate area as directed Daily. 60 g 11    Multiple Vitamins-Minerals (PRESERVISION AREDS 2 PO) Take 1 tablet by mouth 2 (Two) Times a Day. Indications: vision supplement      niacin 500 MG tablet Take 1 tablet by mouth Every Night. Indications: Deficiency of the Vitamin Niacin      Pneumococcal 20-Marcelle Conj Vacc (Prevnar 20) 0.5 ML suspension prefilled syringe vaccine Inject 0.5 mL into the appropriate muscle as directed by prescriber. 0.5 mL 0    propafenone (RYTHMOL) 150 MG tablet Take 1 tablet by mouth 3 (Three) Times a Day. 270 tablet 3    propafenone (RYTHMOL) 150 MG tablet Take 1 tablet by mouth 3 times a day. 270 tablet 3    rosuvastatin (CRESTOR) 10 MG tablet Take 1 tablet by mouth Every Evening. 90 tablet 3    rosuvastatin (Crestor) 10 MG tablet Take 1 tablet by mouth Every Night. 90 tablet 3    tamsulosin (FLOMAX) 0.4 MG capsule 24 hr capsule Take 1 capsule by mouth 2 (Two) Times a Day. 90 capsule 3    tamsulosin (FLOMAX) 0.4 MG capsule 24 hr capsule Take 2 capsules by mouth Daily. 180 capsule 3    vitamin C (ASCORBIC ACID) 250 MG tablet Take 4  tablets by mouth Daily. With madisyn hips   Indications: Inadequate Vitamin C      vitamin D3 125 MCG (5000 UT) capsule capsule Take 1 capsule by mouth Daily. Indications: Vitamin D Deficiency      ferrous sulfate 325 (65 FE) MG tablet Take 1 tablet by mouth Daily With Breakfast. 90 tablet 0    Lidocaine 4 % Place 1 patch on the skin as directed by provider Daily. Remove & Discard patch within 12 hours or as directed by MD 15 patch 1    ondansetron (ZOFRAN) 8 MG tablet Take 1 tablet by mouth 3 (Three) Times a Day As Needed for Nausea or Vomiting. 30 tablet 5    sennosides-docusate (PERICOLACE) 8.6-50 MG per tablet Take 2 tablets by mouth 2 (Two) Times a Day. 120 tablet 0    tamsulosin (FLOMAX) 0.4 MG capsule 24 hr capsule Take 2 capsules by mouth Daily. 180 capsule 3    traMADol (ULTRAM) 50 MG tablet Take 1 tablet by mouth Every 4 (Four) Hours As Needed for Moderate Pain. (Patient not taking: Reported on 5/17/2024) 60 tablet 0    traMADol (ULTRAM) 50 MG tablet Take 1 tablet by mouth Every 8 (Eight) Hours As Needed for Moderate Pain. 30 tablet 0     No current facility-administered medications on file prior to visit.       ALLERGIES:   No Known Allergies    Social History     Socioeconomic History    Marital status:      Spouse name: Hanna   Tobacco Use    Smoking status: Former     Current packs/day: 0.50     Types: Cigarettes     Passive exposure: Past    Smokeless tobacco: Never    Tobacco comments:     Quit smoking many years ago.   Vaping Use    Vaping status: Never Used   Substance and Sexual Activity    Alcohol use: Yes     Alcohol/week: 7.0 standard drinks of alcohol     Types: 7 Glasses of wine per week     Comment: 1/2 glass of wine or beer per day    Drug use: No    Sexual activity: Yes     Partners: Female     Birth control/protection: Other         Cancer-related family history includes Cancer in his father; Cancer (age of onset: 90) in his mother.      Review of Systems   ROS as per HPI    Vitals:  "   02/27/25 1512   BP: (!) 194/93   Pulse: 60   Resp: 16   Temp: 97.7 °F (36.5 °C)   TempSrc: Oral   SpO2: 96%   Weight: 77.5 kg (170 lb 12.8 oz)   Height: 177.8 cm (70\")   PainSc: 0-No pain                 2/27/2025     3:12 PM   Current Status   ECOG score 0       Physical Exam    GENERAL: Well-developed, well-nourished male in no acute distress.   SKIN: Warm, dry, without new rash or lesion  HEAD: Normocephalic.   EYES: Pupils equal, round and reactive to light, EOMs intact. Conjunctivae normal.   EARS: Hearing intact.   NOSE: Septum midline. No excoriations or nasal discharge.   NECK: Supple with good range of motion; no masses, no JVD or bruits.   LYMPHATICS: No cervical, supraclavicular adenopathy.   CHEST: Lungs clear to percussion and auscultation.   CARDIAC: Regular rate and rhythm without murmurs, rubs or gallops.   ABDOMEN: Soft, nontender, bowel sounds present.  EXTREMITIES: No clubbing, cyanosis or edema.   NEUROLOGICAL: No focal neurological deficits.       RECENT LABS:  WBC   Date Value Ref Range Status   02/27/2025 4.43 3.40 - 10.80 10*3/mm3 Final     RBC   Date Value Ref Range Status   02/27/2025 4.01 (L) 4.14 - 5.80 10*6/mm3 Final     Hemoglobin   Date Value Ref Range Status   02/27/2025 13.0 13.0 - 17.7 g/dL Final     Hematocrit   Date Value Ref Range Status   02/27/2025 37.9 37.5 - 51.0 % Final     MCV   Date Value Ref Range Status   02/27/2025 94.5 79.0 - 97.0 fL Final     MCH   Date Value Ref Range Status   02/27/2025 32.4 26.6 - 33.0 pg Final     MCHC   Date Value Ref Range Status   02/27/2025 34.3 31.5 - 35.7 g/dL Final     RDW   Date Value Ref Range Status   02/27/2025 12.4 12.3 - 15.4 % Final     RDW-SD   Date Value Ref Range Status   02/27/2025 43.1 37.0 - 54.0 fl Final     MPV   Date Value Ref Range Status   02/27/2025 10.6 6.0 - 12.0 fL Final     Platelets   Date Value Ref Range Status   02/27/2025 142 140 - 450 10*3/mm3 Final     Neutrophil %   Date Value Ref Range Status   02/27/2025 " 64.8 42.7 - 76.0 % Final     Lymphocyte %   Date Value Ref Range Status   02/27/2025 19.9 19.6 - 45.3 % Final     Monocyte %   Date Value Ref Range Status   02/27/2025 13.3 (H) 5.0 - 12.0 % Final     Eosinophil %   Date Value Ref Range Status   02/27/2025 1.6 0.3 - 6.2 % Final     Basophil %   Date Value Ref Range Status   02/27/2025 0.2 0.0 - 1.5 % Final     Immature Grans %   Date Value Ref Range Status   02/27/2025 0.2 0.0 - 0.5 % Final     Neutrophils, Absolute   Date Value Ref Range Status   02/27/2025 2.87 1.70 - 7.00 10*3/mm3 Final     Lymphocytes, Absolute   Date Value Ref Range Status   02/27/2025 0.88 0.70 - 3.10 10*3/mm3 Final     Monocytes, Absolute   Date Value Ref Range Status   02/27/2025 0.59 0.10 - 0.90 10*3/mm3 Final     Eosinophils, Absolute   Date Value Ref Range Status   02/27/2025 0.07 0.00 - 0.40 10*3/mm3 Final     Basophils, Absolute   Date Value Ref Range Status   02/27/2025 0.01 0.00 - 0.20 10*3/mm3 Final     Immature Grans, Absolute   Date Value Ref Range Status   02/27/2025 0.01 0.00 - 0.05 10*3/mm3 Final     nRBC   Date Value Ref Range Status   02/27/2025 0.0 0.0 - 0.2 /100 WBC Final         Assessment & Plan     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   He was treated 5/11-5/17/ 12 with cladribine being given as continuous infusion at 0.1 mg/kg or 7.7 mg/24-hours x7 days.   He did well during hospitalization with little toxicity and minimal myelosuppression.   He obtained a complete remission.   July 2018, marrow suppression noted dose smear with no evidence of hairy cell leukemia.  Atypical lymphocytes thought to be secondary to viral infection.   He again remained in observation with MD assessment every 6 months  September 2021 degree of leukopenia and thrombocytopenia though flow cytometry fails to demonstrate any new processes.  Follow-up 8/3/2023 with CBC noting WBC 2250, hemoglobin 11.8, hematocrit 33.6%, platelet 88,000.  Differential with 44% polys, 52% lymphs  and 3% monocytes.  Flow cytometry findings with a small population potentially just of of early recurrence versus secondary process  9/7/2020 3 repeat flow cytometry includes a CD10 positive monoclonal lambda B-cell population representing 0.3% of total events 8/3/2023 and repeat 9/7/2023 demonstrates a similar B-cell population also 0.3% total events-phenotype positive CD10 bright, CD19 bright, CD20 bright, CD25, CD45,   slambda.   We had the patient return for periodic blood counts, had him undergo testing for hepatitis and reviewed the various regimens including 5-day cladribine followed by Rituxan therapy.  10/5/2023 scans revealed no substantial abnormalities including lack of splenomegaly or lymphadenopathy.  10/12/2023 CT-guided bone marrow biopsy and aspirate found to be variably hypercellular with diffuse involvement by his known hairy cell leukemia involving 89% of marrow cellularity.  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   10/18/2023 Mediport placed by vascular surgery, Dr. Fan  Patient completed all vaccinations prior to initiation of therapy including hepatitis B  Plans for treatment with cladribine 0.15 mg/kg/day over 2 hours x 5 days followed by rituximab or 5.6 mg/m² over 2 hours also for 5 days and also followed by rituximab therapy.  Cladribine initiated 10/23/2023 through 10/27/2023 with Neulasta support 10/30/2023  Excellent tolerance to cladribine  Consolidation therapy with weekly Rituxan initiated 11/22/2023 with plans for 4 weeks of therapy.  Today, 12/14/2023 counts continue to overall recover from previous cladribine with WBC 4.66, hemoglobin 12.8, platelets 125,000.  Proceed with fourth and final dose of weekly Rituxan.  Follow-up peripheral smear 1/4/2024 demonstrates normalization (likely remission) and, fortunately, he has developed no additional  symptoms that would direct us otherwise.  We have agreed, however, that he would maintain his port over the next year until it is clear that he no longer needs this access.  Plan to maintain port every 6 weeks, CBC 3 months, MD 6 months  Patient evaluated 12/5/2024 with normal CBC, normal CMP  Repeat evaluation 2/27/2025 stable, lymphopenia resolving  2/27/2025 further    2.Unfortunately he required admission 2/4 - 7/24 after a fall sustaining bilateral patellar injuries requiring surgical intervention, postoperative supportive care including worsening anemia, supportive transfusion 2/7/2024.    He was hospitalized 2/7-15/24 as he recovered from tendon advancement repair February 5, 2024.  Subsequent assessments for blood loss anemia, ferritin of 303, iron profile with saturation of 40% and normal CBC 3/27/2024.  He is next seen formally in office 6/20/2024 with H&H of 12.8 and 37.4, white count of 4380, platelet count 141,000 normal automated differential except for mild lymphopenia.  Reassessment 6/20/2024 with no evidence of recurrence hairy cell leukemia  2/27/2025, further recovery noted with resolution of leukopenia.  At this point the patient could have his port removed.          3.  Hyponatremia  Admission 5/23-27/2023 had return from [place] where he developed nausea and vomiting and difficulty with keeping p.o.  He had further nausea, vomiting and dizziness and was seen in the emergency department and was found to have a sodium 115.  He was seen by nephrology with concern of hyponatremia related to hydrochlorothiazide along with SIDH, treated with fluid restriction and salt tablets and p.o. Lasix.  CMP including serum sodium 12/14/2023-139  Reassessment 2/15/2024 sodium 137  Reevaluated 12/5/2024 sodium 139  2/27/2025 sodium 139       4.  Prophyla 9  Discontinue Bactrim, reduce, though continue, dosing of acyclovir to 400 mg daily, other medications reviewed and continued  Every 6 weeks port flush, 3-month  CBC, 3  months MD.  Stable at this point we can move to every 6 months assessment  Assessed 2/27/2025 6-month follow-up with CBC, KAR, PE, free serum light chains -consider at that point discontinuance of acyclovir prophylaxis    Paco Jeffers MD   02/27/2025

## 2025-02-28 DIAGNOSIS — C91.41 HAIRY CELL LEUKEMIA, IN REMISSION: Primary | ICD-10-CM

## 2025-03-04 ENCOUNTER — TELEPHONE (OUTPATIENT)
Age: 80
End: 2025-03-04

## 2025-03-04 NOTE — TELEPHONE ENCOUNTER
"  Caller: Angel Remy \"DR. REMY\"    Relationship: Self    Best call back number: [phone]     What is the best time to reach you: ANYTIME    Who are you requesting to speak with (clinical staff, provider,  specific staff member): SURGERY SCHEDULING    Do you know the name of the person who called: PT    What was the call regarding: PT WAS CALLING TO CHECK IF HE MISSED A CALL AS HE WAS TOLD BY DR. BRUNNER HE WAS GOING TO BE CONTACTED TO SCHEDULE HIS PROCEDURE. HE WOULD LIKE TO PASS THE MESSAGE ALONG AS WELL THAT IF HE CAN GET SCHEDULED FOR A LATER TIME ON A FRIDAY SO HE DOESN'T HAVE TO CANCEL ANY MORE PATIENTS AT HIS PRACTICE IF POSSIBLE HE WOULD APPRECIATE THAT. ATTEMPTED TO CONTACT THE OFFICE BUT WAS UNSUCCESSFUL PLEASE CONTACT HIM TO ADVISE AND UPDATE ON WHEN HE CAN SCHEDULE THANK YOU    Is it okay if the provider responds through MyChart: NO    "

## 2025-03-07 ENCOUNTER — HOSPITAL ENCOUNTER (OUTPATIENT)
Facility: HOSPITAL | Age: 80
Setting detail: HOSPITAL OUTPATIENT SURGERY
Discharge: HOME | End: 2025-03-07
Attending: SURGERY | Admitting: SURGERY
Payer: MEDICARE

## 2025-03-07 ENCOUNTER — ANESTHESIA (OUTPATIENT)
Dept: PERIOP | Facility: HOSPITAL | Age: 80
End: 2025-03-07
Payer: MEDICARE

## 2025-03-07 ENCOUNTER — ANESTHESIA EVENT (OUTPATIENT)
Dept: PERIOP | Facility: HOSPITAL | Age: 80
End: 2025-03-07
Payer: MEDICARE

## 2025-03-07 VITALS
DIASTOLIC BLOOD PRESSURE: 64 MMHG | TEMPERATURE: 98 F | HEART RATE: 49 BPM | OXYGEN SATURATION: 99 % | SYSTOLIC BLOOD PRESSURE: 123 MMHG | RESPIRATION RATE: 16 BRPM

## 2025-03-07 DIAGNOSIS — C91.41 HAIRY CELL LEUKEMIA, IN REMISSION: ICD-10-CM

## 2025-03-07 PROCEDURE — 25010000002 CEFAZOLIN PER 500 MG: Performed by: SURGERY

## 2025-03-07 PROCEDURE — 25010000002 PROPOFOL 10 MG/ML EMULSION: Performed by: ANESTHESIOLOGY

## 2025-03-07 PROCEDURE — 25010000002 BUPIVACAINE (PF) 0.25 % SOLUTION 10 ML VIAL: Performed by: SURGERY

## 2025-03-07 PROCEDURE — 25010000002 LIDOCAINE 2% SOLUTION: Performed by: ANESTHESIOLOGY

## 2025-03-07 PROCEDURE — S0260 H&P FOR SURGERY: HCPCS | Performed by: SURGERY

## 2025-03-07 PROCEDURE — 36590 REMOVAL TUNNELED CV CATH: CPT | Performed by: SURGERY

## 2025-03-07 PROCEDURE — 25010000002 LIDOCAINE 1 % SOLUTION 20 ML VIAL: Performed by: SURGERY

## 2025-03-07 PROCEDURE — 25810000003 LACTATED RINGERS PER 1000 ML: Performed by: ANESTHESIOLOGY

## 2025-03-07 RX ORDER — PROMETHAZINE HYDROCHLORIDE 25 MG/1
25 TABLET ORAL ONCE AS NEEDED
Status: DISCONTINUED | OUTPATIENT
Start: 2025-03-07 | End: 2025-03-07 | Stop reason: HOSPADM

## 2025-03-07 RX ORDER — PROMETHAZINE HYDROCHLORIDE 25 MG/1
25 SUPPOSITORY RECTAL ONCE AS NEEDED
Status: DISCONTINUED | OUTPATIENT
Start: 2025-03-07 | End: 2025-03-07 | Stop reason: HOSPADM

## 2025-03-07 RX ORDER — SODIUM CHLORIDE 0.9 % (FLUSH) 0.9 %
3-10 SYRINGE (ML) INJECTION AS NEEDED
Status: DISCONTINUED | OUTPATIENT
Start: 2025-03-07 | End: 2025-03-07 | Stop reason: HOSPADM

## 2025-03-07 RX ORDER — ATROPINE SULFATE 0.4 MG/ML
0.4 INJECTION, SOLUTION INTRAMUSCULAR; INTRAVENOUS; SUBCUTANEOUS ONCE AS NEEDED
Status: DISCONTINUED | OUTPATIENT
Start: 2025-03-07 | End: 2025-03-07 | Stop reason: HOSPADM

## 2025-03-07 RX ORDER — FAMOTIDINE 10 MG/ML
20 INJECTION, SOLUTION INTRAVENOUS ONCE
Status: COMPLETED | OUTPATIENT
Start: 2025-03-07 | End: 2025-03-07

## 2025-03-07 RX ORDER — HYDROMORPHONE HYDROCHLORIDE 1 MG/ML
0.5 INJECTION, SOLUTION INTRAMUSCULAR; INTRAVENOUS; SUBCUTANEOUS
Status: DISCONTINUED | OUTPATIENT
Start: 2025-03-07 | End: 2025-03-07 | Stop reason: HOSPADM

## 2025-03-07 RX ORDER — SODIUM CHLORIDE, SODIUM LACTATE, POTASSIUM CHLORIDE, CALCIUM CHLORIDE 600; 310; 30; 20 MG/100ML; MG/100ML; MG/100ML; MG/100ML
9 INJECTION, SOLUTION INTRAVENOUS CONTINUOUS
Status: DISCONTINUED | OUTPATIENT
Start: 2025-03-07 | End: 2025-03-07 | Stop reason: HOSPADM

## 2025-03-07 RX ORDER — PROPOFOL 10 MG/ML
VIAL (ML) INTRAVENOUS AS NEEDED
Status: DISCONTINUED | OUTPATIENT
Start: 2025-03-07 | End: 2025-03-07 | Stop reason: SURG

## 2025-03-07 RX ORDER — LIDOCAINE HYDROCHLORIDE 10 MG/ML
0.5 INJECTION, SOLUTION INFILTRATION; PERINEURAL ONCE AS NEEDED
Status: DISCONTINUED | OUTPATIENT
Start: 2025-03-07 | End: 2025-03-07 | Stop reason: HOSPADM

## 2025-03-07 RX ORDER — LIDOCAINE HYDROCHLORIDE 20 MG/ML
INJECTION, SOLUTION INFILTRATION; PERINEURAL AS NEEDED
Status: DISCONTINUED | OUTPATIENT
Start: 2025-03-07 | End: 2025-03-07 | Stop reason: SURG

## 2025-03-07 RX ORDER — FENTANYL CITRATE 50 UG/ML
50 INJECTION, SOLUTION INTRAMUSCULAR; INTRAVENOUS
Status: DISCONTINUED | OUTPATIENT
Start: 2025-03-07 | End: 2025-03-07 | Stop reason: HOSPADM

## 2025-03-07 RX ORDER — LABETALOL HYDROCHLORIDE 5 MG/ML
5 INJECTION, SOLUTION INTRAVENOUS
Status: DISCONTINUED | OUTPATIENT
Start: 2025-03-07 | End: 2025-03-07 | Stop reason: HOSPADM

## 2025-03-07 RX ORDER — EPHEDRINE SULFATE 50 MG/ML
INJECTION, SOLUTION INTRAVENOUS AS NEEDED
Status: DISCONTINUED | OUTPATIENT
Start: 2025-03-07 | End: 2025-03-07 | Stop reason: SURG

## 2025-03-07 RX ORDER — HYDRALAZINE HYDROCHLORIDE 20 MG/ML
5 INJECTION INTRAMUSCULAR; INTRAVENOUS
Status: DISCONTINUED | OUTPATIENT
Start: 2025-03-07 | End: 2025-03-07 | Stop reason: HOSPADM

## 2025-03-07 RX ORDER — ONDANSETRON 2 MG/ML
4 INJECTION INTRAMUSCULAR; INTRAVENOUS ONCE AS NEEDED
Status: DISCONTINUED | OUTPATIENT
Start: 2025-03-07 | End: 2025-03-07 | Stop reason: HOSPADM

## 2025-03-07 RX ORDER — HYDROCODONE BITARTRATE AND ACETAMINOPHEN 5; 325 MG/1; MG/1
1 TABLET ORAL ONCE AS NEEDED
Status: DISCONTINUED | OUTPATIENT
Start: 2025-03-07 | End: 2025-03-07 | Stop reason: HOSPADM

## 2025-03-07 RX ORDER — EPHEDRINE SULFATE 50 MG/ML
5 INJECTION, SOLUTION INTRAVENOUS ONCE AS NEEDED
Status: DISCONTINUED | OUTPATIENT
Start: 2025-03-07 | End: 2025-03-07 | Stop reason: HOSPADM

## 2025-03-07 RX ORDER — IPRATROPIUM BROMIDE AND ALBUTEROL SULFATE 2.5; .5 MG/3ML; MG/3ML
3 SOLUTION RESPIRATORY (INHALATION) ONCE AS NEEDED
Status: DISCONTINUED | OUTPATIENT
Start: 2025-03-07 | End: 2025-03-07 | Stop reason: HOSPADM

## 2025-03-07 RX ORDER — DIPHENHYDRAMINE HYDROCHLORIDE 50 MG/ML
12.5 INJECTION INTRAMUSCULAR; INTRAVENOUS
Status: DISCONTINUED | OUTPATIENT
Start: 2025-03-07 | End: 2025-03-07 | Stop reason: HOSPADM

## 2025-03-07 RX ORDER — SODIUM CHLORIDE 0.9 % (FLUSH) 0.9 %
3 SYRINGE (ML) INJECTION EVERY 12 HOURS SCHEDULED
Status: DISCONTINUED | OUTPATIENT
Start: 2025-03-07 | End: 2025-03-07 | Stop reason: HOSPADM

## 2025-03-07 RX ORDER — OXYCODONE AND ACETAMINOPHEN 7.5; 325 MG/1; MG/1
1 TABLET ORAL EVERY 4 HOURS PRN
Status: DISCONTINUED | OUTPATIENT
Start: 2025-03-07 | End: 2025-03-07 | Stop reason: HOSPADM

## 2025-03-07 RX ORDER — NALOXONE HCL 0.4 MG/ML
0.2 VIAL (ML) INJECTION AS NEEDED
Status: DISCONTINUED | OUTPATIENT
Start: 2025-03-07 | End: 2025-03-07 | Stop reason: HOSPADM

## 2025-03-07 RX ORDER — FLUMAZENIL 0.1 MG/ML
0.2 INJECTION INTRAVENOUS AS NEEDED
Status: DISCONTINUED | OUTPATIENT
Start: 2025-03-07 | End: 2025-03-07 | Stop reason: HOSPADM

## 2025-03-07 RX ADMIN — LIDOCAINE HYDROCHLORIDE 60 MG: 20 INJECTION, SOLUTION INFILTRATION; PERINEURAL at 15:00

## 2025-03-07 RX ADMIN — EPHEDRINE SULFATE 10 MG: 50 INJECTION INTRAVENOUS at 15:16

## 2025-03-07 RX ADMIN — EPHEDRINE SULFATE 10 MG: 50 INJECTION INTRAVENOUS at 15:06

## 2025-03-07 RX ADMIN — SODIUM CHLORIDE, SODIUM LACTATE, POTASSIUM CHLORIDE, AND CALCIUM CHLORIDE 9 ML/HR: 600; 310; 30; 20 INJECTION, SOLUTION INTRAVENOUS at 14:21

## 2025-03-07 RX ADMIN — FAMOTIDINE 20 MG: 10 INJECTION INTRAVENOUS at 14:21

## 2025-03-07 RX ADMIN — CEFAZOLIN 2000 MG: 2 INJECTION, POWDER, FOR SOLUTION INTRAMUSCULAR; INTRAVENOUS at 14:46

## 2025-03-07 RX ADMIN — PROPOFOL 120 MG: 10 INJECTION, EMULSION INTRAVENOUS at 15:00

## 2025-03-07 RX ADMIN — PROPOFOL 160 MCG/KG/MIN: 10 INJECTION, EMULSION INTRAVENOUS at 15:00

## 2025-03-07 NOTE — H&P
[facility]   PREOPERATIVE HISTORY AND PHYSICAL    Patient Name:Angel Remy  : 1945  MRN: [id number]  Primary Care Physician: Damián Vuong Jr., MD  Date of admission: 3/7/2025    Subjective   Subjective     Chief Complaint: preoperative evaluation    History of Present Illness  Angel Remy is a 79 y.o. male who presents for preoperative evaluation. He is scheduled for PORT REMOVAL (N/A)    Review of Systems.  Feels overall well.    Personal History     Past Medical History:   Diagnosis Date    Atrial fibrillation     BCC (basal cell carcinoma of skin) 2022    NOSE    Benign prostate hyperplasia     BPH (benign prostatic hyperplasia)     Elevated cholesterol     H/O Elevated PSA     H/O Hairy cell leukemia (CMS/HCC)     H/O Internal thrombosed hemorrhoids     H/O minimal right carotid plaque     H/O peripheral neuropathy     History of transfusion     Hyperlipidemia     Hypertension     Primary open-angle glaucoma, bilateral, mild stage        Past Surgical History:   Procedure Laterality Date    BONE MARROW BIOPSY W/ ASPIRATION      COLONOSCOPY      COLONOSCOPY N/A 2021    Procedure: COLONOSCOPY TO CECUM AND INTO TI WITH COLD BIOPSY POLYPECTOMIES;  Surgeon: Juan Jenkins MD;  Location: [facility] ENDOSCOPY;  Service: Gastroenterology;  Laterality: N/A;  pre: family history of colon cancer  post: polyps, diverticulosis  and internal hemorrhoids    COLONOSCOPY N/A 10/5/2024    Procedure: COLONOSCOPY into cecum with biopsies and cold biopsy polypectomies;  Surgeon: Juan Jenkins MD;  Location: [facility] ENDOSCOPY;  Service: Gastroenterology;  Laterality: N/A;  pre- history of colon polyps, family history colon cancer  post- diverticulosis, polyps, internal hemorrhoids    EXCISION MASS HEAD/NECK N/A 2022    Procedure: EXCISION BASAL CELL CARCINOMA NOSE WITH FROZEN SECTION FULL THICKNESS GRAFT;  Surgeon: Arturo Weiss MD;  [place]  [facility] [place] [facility] [place]  Service: [place];   Laterality: N/A;    HERNIA REPAIR Right 02/2000    Inguinal     KNEE SURGERY  [id number]    op both knee fx repair    OTHER SURGICAL HISTORY  1989    Median nerve foreign body excision    PATELLA OPEN REDUCTION INTERNAL FIXATION Bilateral 02/05/2024    Procedure: PATELLA OPEN REDUCTION INTERNAL FIXATION;  Surgeon: Adrián Roberson MD;  Location: [facility];  Service: Orthopedics;  Laterality: Bilateral;    PROSTATE BIOPSY      March 2010 and October 2011 whan PSA had acceleration    SIGMOIDOSCOPY  1999    With small thrombosed internal hemorrhoid.    TONSILLECTOMY      VENOUS ACCESS DEVICE (PORT) INSERTION Right 10/18/2023    Procedure: MEDIPORT PLACEMENTWITH SUPERIOR VENACAVAGRAM;  Surgeon: Randy Fan MD;  Location: [facility];  Service: Vascular;  Laterality: Right;    WISDOM TOOTH EXTRACTION  1961       Family History: His family history includes Cancer in his father; Cancer (age of onset: 90) in his mother; Hypertension in his father, mother, and sister.     Social History: He  reports that he has quit smoking. His smoking use included cigarettes. He has been exposed to tobacco smoke. He has never used smokeless tobacco. He reports current alcohol use of about 7.0 standard drinks of alcohol per week. He reports that he does not use drugs.    Home Medications:  Calcium-Magnesium-Vitamin D, Hepatitis B Vac Recombinant, Lidocaine, Magnesium, Multiple Vitamins-Minerals, Pneumococcal 20-Marcelle Conj Vacc, acetaminophen, acyclovir, amLODIPine, bimatoprost, bisoprolol, chlorhexidine, dicyclomine, ferrous sulfate, finasteride, latanoprost, metroNIDAZOLE, niacin, ondansetron, propafenone, rosuvastatin, sennosides-docusate, tamsulosin, traMADol, vitamin C, and vitamin D3    Allergies:  He has No Known Allergies.    Objective    Objective     Vitals:    Temp:  [98.2 °F (36.8 °C)] 98.2 °F (36.8 °C)  Heart Rate:  [61] 61  Resp:  [16] 16  BP: (203)/(78) 203/78    Physical Exam  Lungs clear  Abdomen  benign  Extremities viable    Assessment & Plan   Assessment / Plan     Brief Patient Summary:  Angel Remy is a 79 y.o. male who presents for preoperative evaluation.  He has recovered from hairy cell leukemia which is in remission and removal port is indicated.  He understands risk benefits complications and agrees    Pre-Op Diagnosis Codes:      * Hairy cell leukemia, in remission [C91.41]    Active Hospital Problems:  Active Hospital Problems    Diagnosis     **Hairy cell leukemia      Plan:   Procedure(s):  PORT REMOVAL    The risks, benefits, and alternatives of the procedure including but not limited to bleeding infection failure to heal well.  And risks of the anesthesia were discussed in detail with the patient and questions were answered. No guarantees were made or implied. Informed consent was obtained.    Randy Fan MD

## 2025-03-07 NOTE — ANESTHESIA PREPROCEDURE EVALUATION
Anesthesia Evaluation     Patient summary reviewed and Nursing notes reviewed   NPO Solid Status: > 8 hours  NPO Liquid Status: > 2 hours           Airway   Mallampati: II  TM distance: >3 FB  Neck ROM: full  No difficulty expected  Comment: Grade I view with MAC 3  Dental    (+) implants    Comment: Upper left bridge/lower right implant in back    Pulmonary - normal exam    breath sounds clear to auscultation  (+) a smoker Former,  Cardiovascular - normal exam    ECG reviewed  Rhythm: regular  Rate: normal    (+) hypertension 2 medications or greater, dysrhythmias Paroxysmal Atrial Fib, hyperlipidemia,  carotid artery disease    ROS comment: EF 61% by ECHO 5/23/normal myocardial perfusion stress test 7/23/hx PAF/LBBB/orthostatic hypotension    Neuro/Psych    ROS Comment: Hx peripheral neuropathy  GI/Hepatic/Renal/Endo      Musculoskeletal         ROS comment: ORIF bilateral patellar fxs in 2/24  Abdominal  - normal exam   Substance History   (+) alcohol use      Comment: 7 drinks/week   OB/GYN          Other   blood dyscrasia,   history of cancer      Other Comment: Hairy cell leukemia      Phys Exam Other: Mediport right chest              Anesthesia Plan    ASA 3     MAC     (Patient is dermatologist here at [facility], he prefers just a Propofol drip and declines preop Versed)  intravenous induction     Anesthetic plan, risks, benefits, and alternatives have been provided, discussed and informed consent has been obtained with: patient.      CODE STATUS:

## 2025-03-07 NOTE — DISCHARGE INSTRUCTIONS
Discharge Instructions for Port Removal    Go home, rest and take it easy today; however, you should get up and move about several times today to reduce the risk of developing a clot in your legs.      You may experience some dizziness or memory loss from the anesthesia.  This may last for the next 24 hours.  Someone should plan on staying with you for the first 24 hours for your safety.    Do not make any important legal decisions or sign any legal papers for the next 24 hours.      Eat and drink lightly today.  Start off with liquids, jello, soup, crackers or other bland foods at first. You may advance your diet tomorrow as tolerated as long as you do not experience any nausea or vomiting.     If skin glue (Dermabond) was used, your incisions are protected and covered.  The invisible glue will dissolve on its own as your incision heals. If you have dressings, you may remove your outer dressings in 3 days.  The white tapes called steri-strips should stay in place.  They will fall off on their own in 1-2 weeks.  Do not worry if they come off sooner.     You may notice some bleeding/drainage on your outer dressings. A little bloody drainage is normal. If the bleeding/drainage is such that the bandage cannot absorb it, remove the dressing, apply clean gauze and apply firm pressure for a full 15 minutes.  If the bleeding continues, please call me.    You may shower tomorrow allowing water to run over the incisions; however do not scrub the incisions.  No tub baths until your incisions are completely healed.    You have received a prescription for a narcotic pain medicine, as you may have some pain/discomfort following surgery.   You will not be totally pain free, but your pain medicine should make the pain tolerable.  Please take your pain medicine as prescribed and always take your pills with food to prevent nausea. If you are having severe pain that cannot be controlled by the pain medicine, please contact me.  If  the pain is such that narcotic pain medicine is not required, you may take Tylenol or Ibuprofen as directed unless indicated otherwise.      You have also received a prescription for an anti-nausea medicine.  Please take this as prescribed for any nausea or vomiting.  Nausea could be a result of the anesthesia or a result of the narcotic pain medicine.  If you experience severe nausea and vomiting that cannot be controlled by the nausea medicine, please call me.      No driving for 24 hours and for as long as you are taking your prescription pain medicine.      Please call  the office at [phone] to schedule a follow-up in about 1 week.      Remember to contact me for any of the following:    Fever> 101 degrees  Severe pain that cannot be controlled by taking your pain pills  Severe nausea or vomiting that cannot be controlled by taking your nausea medicine  Significant bleeding from your incision  Drainage that has a bad smell or is yellow or green in appearance  Any other questions or concerns

## 2025-03-07 NOTE — OP NOTE
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    Anesthesia: Monitored Anesthesia Care    Staff:   Circulator: Yazmin Becker RN; Tameka Singh RN  Scrub Person: Odette Brunner  Assistant: Carolyne Berry CSA    Estimated Blood Loss: minimal    Specimens: Mediport removed intact.  Not sent    Complications: None    Findings: Mediport removed intact .    Indications:    The patient is an 79 y.o. male seen for evaluation retained Mediport with leukemia in remission.  Plans for removal.  Patient understands risk benefits complications and agrees to procedure.       Procedure:    Patient was prepped and draped in a sterile manner.  Using local anesthetic we injected the area of the prior scar and made a 15 blade scalpel incision through the prior scar.  Using Bovie cutting cautery size down to the level of the Mediport where the Mediport itself was found to be intact.  We incised the 2 Prolene sutures in place and remove them along the body of the Mediport and the tubing of the right Mediport all intact and without evidence of any retained foreign bodies.  The pocket then was fulgurated with ball-tipped cautery and then closed with deep stitches of 3-0 Vicryl followed by subcutaneous 3-0 Vicryl and 4-0 Vicryl subcuticular closure and skin glue.  Patient tolerated the procedure well.  No complicating features were seen at completion.  He was sent to the face to recovery directly.        Active Hospital Problems    Diagnosis  POA    **Hairy cell leukemia [C91.40]  Unknown      Resolved Hospital Problems   No  resolved problems to display.      Randy Fan MD     Date: 3/7/2025  Time: 15:31 EST

## 2025-03-07 NOTE — ANESTHESIA POSTPROCEDURE EVALUATION
Patient: Angel Remy    Procedure Summary       Date: 03/07/25 Room / Location: [facility] OR 07 / 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        Post Anesthesia Care and Evaluation    Patient location during evaluation: PHASE II  Patient participation: complete - patient participated  Level of consciousness: awake and alert  Pain management: adequate    Airway patency: patent  Anesthetic complications: No anesthetic complications  PONV Status: none  Cardiovascular status: acceptable and hemodynamically stable  Respiratory status: acceptable, nonlabored ventilation and spontaneous ventilation  Hydration status: acceptable

## 2025-05-27 NOTE — HOME HEALTH
"Subjective: \"I am doing well but still have some tenderness in my knee, my wife and I were able to get up from the w.c and I was able to walk some\"    Changes in Medications: none    Any Falls Since Last Visit: none    Assessment: Patient did well today with improved bilateral knee AROM, him and his wife demonstrate the ability to transfer safely with the two of them.  We were able to raise his bed up and he was able to transfer without having to use the w.c    Plan For Next Visit:  - gait training  - standing exercises"
not applicable

## 2025-07-31 ENCOUNTER — LAB (OUTPATIENT)
Dept: LAB | Facility: HOSPITAL | Age: 80
End: 2025-07-31
Payer: MEDICARE

## 2025-07-31 ENCOUNTER — TRANSCRIBE ORDERS (OUTPATIENT)
Dept: LAB | Facility: HOSPITAL | Age: 80
End: 2025-07-31
Payer: MEDICARE

## 2025-07-31 DIAGNOSIS — R81 GLUCOSURIA: ICD-10-CM

## 2025-07-31 DIAGNOSIS — E78.5 HYPERLIPIDEMIA, UNSPECIFIED HYPERLIPIDEMIA TYPE: ICD-10-CM

## 2025-07-31 DIAGNOSIS — R73.9 HYPERGLYCEMIA: Primary | ICD-10-CM

## 2025-07-31 DIAGNOSIS — E78.5 HYPERLIPIDEMIA, UNSPECIFIED HYPERLIPIDEMIA TYPE: Primary | ICD-10-CM

## 2025-07-31 LAB
ALBUMIN SERPL-MCNC: 4.5 G/DL (ref 3.5–5.2)
ALBUMIN/GLOB SERPL: 2 G/DL
ALP SERPL-CCNC: 56 U/L (ref 39–117)
ALT SERPL W P-5'-P-CCNC: 20 U/L (ref 1–41)
ANION GAP SERPL CALCULATED.3IONS-SCNC: 9.1 MMOL/L (ref 5–15)
AST SERPL-CCNC: 22 U/L (ref 1–40)
BILIRUB SERPL-MCNC: 1.1 MG/DL (ref 0–1.2)
BUN SERPL-MCNC: 17 MG/DL (ref 8–23)
BUN/CREAT SERPL: 16.8 (ref 7–25)
CALCIUM SPEC-SCNC: 9.4 MG/DL (ref 8.6–10.5)
CHLORIDE SERPL-SCNC: 102 MMOL/L (ref 98–107)
CHOLEST SERPL-MCNC: 184 MG/DL (ref 0–200)
CO2 SERPL-SCNC: 27.9 MMOL/L (ref 22–29)
CREAT SERPL-MCNC: 1.01 MG/DL (ref 0.76–1.27)
EGFRCR SERPLBLD CKD-EPI 2021: 75.7 ML/MIN/1.73
GLOBULIN UR ELPH-MCNC: 2.3 GM/DL
GLUCOSE SERPL-MCNC: 122 MG/DL (ref 65–99)
HBA1C MFR BLD: 5 % (ref 4.8–5.6)
HDLC SERPL-MCNC: 75 MG/DL (ref 40–60)
LDLC SERPL CALC-MCNC: 100 MG/DL (ref 0–100)
LDLC/HDLC SERPL: 1.34 {RATIO}
POTASSIUM SERPL-SCNC: 4.8 MMOL/L (ref 3.5–5.2)
PROT SERPL-MCNC: 6.8 G/DL (ref 6–8.5)
SODIUM SERPL-SCNC: 139 MMOL/L (ref 136–145)
TRIGL SERPL-MCNC: 42 MG/DL (ref 0–150)
VLDLC SERPL-MCNC: 9 MG/DL (ref 5–40)

## 2025-07-31 PROCEDURE — 83036 HEMOGLOBIN GLYCOSYLATED A1C: CPT

## 2025-07-31 PROCEDURE — 36415 COLL VENOUS BLD VENIPUNCTURE: CPT

## 2025-07-31 PROCEDURE — 80061 LIPID PANEL: CPT

## 2025-07-31 PROCEDURE — 80053 COMPREHEN METABOLIC PANEL: CPT

## 2025-08-14 ENCOUNTER — LAB (OUTPATIENT)
Dept: LAB | Facility: HOSPITAL | Age: 80
End: 2025-08-14
Payer: MEDICARE

## 2025-08-14 ENCOUNTER — OFFICE VISIT (OUTPATIENT)
Dept: ONCOLOGY | Facility: CLINIC | Age: 80
End: 2025-08-14
Payer: MEDICARE

## 2025-08-14 VITALS
DIASTOLIC BLOOD PRESSURE: 90 MMHG | BODY MASS INDEX: 22.41 KG/M2 | SYSTOLIC BLOOD PRESSURE: 160 MMHG | OXYGEN SATURATION: 98 % | WEIGHT: 156.5 LBS | TEMPERATURE: 97.5 F | HEIGHT: 70 IN | HEART RATE: 69 BPM

## 2025-08-14 DIAGNOSIS — C91.41 HAIRY CELL LEUKEMIA, IN REMISSION: ICD-10-CM

## 2025-08-14 DIAGNOSIS — C91.41 HAIRY CELL LEUKEMIA, IN REMISSION: Primary | ICD-10-CM

## 2025-08-14 LAB
BASOPHILS # BLD AUTO: 0.01 10*3/MM3 (ref 0–0.2)
BASOPHILS NFR BLD AUTO: 0.2 % (ref 0–1.5)
DEPRECATED RDW RBC AUTO: 42.9 FL (ref 37–54)
EOSINOPHIL # BLD AUTO: 0.02 10*3/MM3 (ref 0–0.4)
EOSINOPHIL NFR BLD AUTO: 0.4 % (ref 0.3–6.2)
ERYTHROCYTE [DISTWIDTH] IN BLOOD BY AUTOMATED COUNT: 12.5 % (ref 12.3–15.4)
HCT VFR BLD AUTO: 42.3 % (ref 37.5–51)
HGB BLD-MCNC: 14.5 G/DL (ref 13–17.7)
IMM GRANULOCYTES # BLD AUTO: 0.02 10*3/MM3 (ref 0–0.05)
IMM GRANULOCYTES NFR BLD AUTO: 0.4 % (ref 0–0.5)
LYMPHOCYTES # BLD AUTO: 0.86 10*3/MM3 (ref 0.7–3.1)
LYMPHOCYTES NFR BLD AUTO: 18.7 % (ref 19.6–45.3)
MCH RBC QN AUTO: 31.7 PG (ref 26.6–33)
MCHC RBC AUTO-ENTMCNC: 34.3 G/DL (ref 31.5–35.7)
MCV RBC AUTO: 92.6 FL (ref 79–97)
MONOCYTES # BLD AUTO: 0.53 10*3/MM3 (ref 0.1–0.9)
MONOCYTES NFR BLD AUTO: 11.5 % (ref 5–12)
NEUTROPHILS NFR BLD AUTO: 3.16 10*3/MM3 (ref 1.7–7)
NEUTROPHILS NFR BLD AUTO: 68.8 % (ref 42.7–76)
NRBC BLD AUTO-RTO: 0 /100 WBC (ref 0–0.2)
PLATELET # BLD AUTO: 145 10*3/MM3 (ref 140–450)
PMV BLD AUTO: 10.2 FL (ref 6–12)
RBC # BLD AUTO: 4.57 10*6/MM3 (ref 4.14–5.8)
WBC NRBC COR # BLD AUTO: 4.6 10*3/MM3 (ref 3.4–10.8)

## 2025-08-14 PROCEDURE — 85025 COMPLETE CBC W/AUTO DIFF WBC: CPT

## 2025-08-14 PROCEDURE — 99213 OFFICE O/P EST LOW 20 MIN: CPT | Performed by: INTERNAL MEDICINE

## 2025-08-14 PROCEDURE — 36415 COLL VENOUS BLD VENIPUNCTURE: CPT

## 2025-08-14 PROCEDURE — 3080F DIAST BP >= 90 MM HG: CPT | Performed by: INTERNAL MEDICINE

## 2025-08-14 PROCEDURE — 1126F AMNT PAIN NOTED NONE PRSNT: CPT | Performed by: INTERNAL MEDICINE

## 2025-08-14 PROCEDURE — 3077F SYST BP >= 140 MM HG: CPT | Performed by: INTERNAL MEDICINE

## 2025-08-18 LAB
ALBUMIN SERPL ELPH-MCNC: 4.1 G/DL (ref 2.9–4.4)
ALBUMIN/GLOB SERPL: 1.7 {RATIO} (ref 0.7–1.7)
ALPHA1 GLOB SERPL ELPH-MCNC: 0.3 G/DL (ref 0–0.4)
ALPHA2 GLOB SERPL ELPH-MCNC: 0.6 G/DL (ref 0.4–1)
B-GLOBULIN SERPL ELPH-MCNC: 0.8 G/DL (ref 0.7–1.3)
GAMMA GLOB SERPL ELPH-MCNC: 0.8 G/DL (ref 0.4–1.8)
GLOBULIN SER-MCNC: 2.5 G/DL (ref 2.2–3.9)
IGA SERPL-MCNC: 77 MG/DL (ref 61–437)
IGG SERPL-MCNC: 868 MG/DL (ref 603–1613)
IGM SERPL-MCNC: 58 MG/DL (ref 15–143)
INTERPRETATION SERPL IEP-IMP: NORMAL
KAPPA LC FREE SER-MCNC: 9.8 MG/L (ref 3.3–19.4)
KAPPA LC FREE/LAMBDA FREE SER: 1.24 {RATIO} (ref 0.26–1.65)
LABORATORY COMMENT REPORT: NORMAL
LAMBDA LC FREE SERPL-MCNC: 7.9 MG/L (ref 5.7–26.3)
M PROTEIN SERPL ELPH-MCNC: NORMAL G/DL
PROT SERPL-MCNC: 6.6 G/DL (ref 6–8.5)

## (undated) DEVICE — PATIENT RETURN ELECTRODE, SINGLE-USE, CONTACT QUALITY MONITORING, ADULT, WITH 9FT CORD, FOR PATIENTS WEIGING OVER 33LBS. (15KG): Brand: MEGADYNE

## (undated) DEVICE — APPL CHLORAPREP HI/LITE 26ML ORNG

## (undated) DEVICE — ADAPT CLN BIOGUARD AIR/H2O DISP

## (undated) DEVICE — ANTIBACTERIAL UNDYED BRAIDED (POLYGLACTIN 910), SYNTHETIC ABSORBABLE SUTURE: Brand: COATED VICRYL

## (undated) DEVICE — PAD CAST SPECIST STRL 4IN

## (undated) DEVICE — GLV SURG BIOGEL LTX PF 7 1/2

## (undated) DEVICE — NDL HYPO PRECISIONGLIDE REG 25G 1 1/2

## (undated) DEVICE — PREP SOL POVIDONE/IODINE BT 4OZ

## (undated) DEVICE — TRAP FLD MINIVAC MEGADYNE 100ML

## (undated) DEVICE — CVR PROB 96IN LF STRL

## (undated) DEVICE — DRSNG SURESITE WNDW 4X4.5

## (undated) DEVICE — CONTAINER,SPECIMEN,OR STERILE,4OZ: Brand: MEDLINE

## (undated) DEVICE — STPLR SKIN VISISTAT WD 35CT

## (undated) DEVICE — DRSNG WND GZ CURAD OIL EMULSION 3X3IN STRL

## (undated) DEVICE — DRSNG WND GZ CURAD OIL EMULSION 3X8IN LF STRL 1PK

## (undated) DEVICE — PK ORTHO MAJ 40

## (undated) DEVICE — RETRV SUT HEWSON 1P/U 71111579

## (undated) DEVICE — UNDYED BRAIDED (POLYGLACTIN 910), SYNTHETIC ABSORBABLE SUTURE: Brand: COATED VICRYL

## (undated) DEVICE — SUT VIC PLS CTD ANTIB BR 0 18IN 45CM

## (undated) DEVICE — DRP C/ARM 41X74IN

## (undated) DEVICE — LN SMPL CO2 SHTRM SD STREAM W/M LUER

## (undated) DEVICE — TUBING, SUCTION, 1/4" X 10', STRAIGHT: Brand: MEDLINE

## (undated) DEVICE — SYR LUERLOK 20CC BX/50

## (undated) DEVICE — DRP SURG T/DRP EXTREM STRL

## (undated) DEVICE — SENSR O2 OXIMAX FNGR A/ 18IN NONSTR

## (undated) DEVICE — SINGLE-USE BIOPSY FORCEPS: Brand: RADIAL JAW 4

## (undated) DEVICE — 3M™ STERI-STRIP™ REINFORCED ADHESIVE SKIN CLOSURES, R1546, 1/4 IN X 4 IN (6 MM X 100 MM), 10 STRIPS/ENVELOPE: Brand: 3M™ STERI-STRIP™

## (undated) DEVICE — Device

## (undated) DEVICE — SYR LL TP 10ML STRL

## (undated) DEVICE — TBG PENCL TELESCP MEGADYNE SMOKE EVAC 10FT

## (undated) DEVICE — SUT VIC 1 CT1 CR8 18IN UD VCP841D

## (undated) DEVICE — IMMOB KN 3PNL DLX CANVS 22IN BLU

## (undated) DEVICE — CANN O2 ETCO2 FITS ALL CONN CO2 SMPL A/ 7IN DISP LF

## (undated) DEVICE — IMMOB KN FELT LG/XL 21IN

## (undated) DEVICE — SUT VIC 5/0 P1 18IN J490G

## (undated) DEVICE — KT ORCA ORCAPOD DISP STRL

## (undated) DEVICE — GLV SURG SIGNATURE ESSENTIAL PF LTX SZ8.5

## (undated) DEVICE — SUT PROLN 3/0 SH D/A 36IN 8522H

## (undated) DEVICE — Device: Brand: FABCO

## (undated) DEVICE — GLV SURG BIOGEL LTX PF 8 1/2

## (undated) DEVICE — LOU MINOR PROCEDURE: Brand: MEDLINE INDUSTRIES, INC.

## (undated) DEVICE — SOL ISO/ALC 70PCT 4OZ

## (undated) DEVICE — GLV SURG BIOGEL LTX PF 7

## (undated) DEVICE — SUT VIC 2/0 CT1 36IN UD VCP945H

## (undated) DEVICE — DECANTER BAG 9": Brand: MEDLINE INDUSTRIES, INC.

## (undated) DEVICE — ELECTRD BLD EZ CLN MOD 2.5IN

## (undated) DEVICE — BNDG ESMARK 4IN 12FT LF STRL BLU

## (undated) DEVICE — SUT VIC 3/0 TIES 18IN J110T

## (undated) DEVICE — PK ENT 40

## (undated) DEVICE — TRY SKINPREP SCRB CHG

## (undated) DEVICE — CUFF TOURNI 1BLDR 1PRT 4X34IN PRP

## (undated) DEVICE — ELECTRD BALL EZ CLN STD 5IN